# Patient Record
Sex: MALE | Race: WHITE | NOT HISPANIC OR LATINO | Employment: OTHER | URBAN - METROPOLITAN AREA
[De-identification: names, ages, dates, MRNs, and addresses within clinical notes are randomized per-mention and may not be internally consistent; named-entity substitution may affect disease eponyms.]

---

## 2021-04-12 DIAGNOSIS — Z76.82 ORGAN TRANSPLANT CANDIDATE: Primary | ICD-10-CM

## 2021-04-14 ENCOUNTER — TELEPHONE (OUTPATIENT)
Dept: TRANSPLANT | Facility: CLINIC | Age: 61
End: 2021-04-14

## 2021-05-03 DIAGNOSIS — Z76.82 ORGAN TRANSPLANT CANDIDATE: Primary | ICD-10-CM

## 2021-05-19 ENCOUNTER — TELEPHONE (OUTPATIENT)
Dept: TRANSPLANT | Facility: CLINIC | Age: 61
End: 2021-05-19

## 2021-05-24 ENCOUNTER — TELEPHONE (OUTPATIENT)
Dept: TRANSPLANT | Facility: CLINIC | Age: 61
End: 2021-05-24

## 2021-05-25 ENCOUNTER — HOSPITAL ENCOUNTER (OUTPATIENT)
Dept: RADIOLOGY | Facility: HOSPITAL | Age: 61
Discharge: HOME | End: 2021-05-25
Attending: NURSE PRACTITIONER
Payer: COMMERCIAL

## 2021-05-25 ENCOUNTER — OFFICE VISIT (OUTPATIENT)
Dept: TRANSPLANT | Facility: CLINIC | Age: 61
End: 2021-05-25
Payer: COMMERCIAL

## 2021-05-25 VITALS
SYSTOLIC BLOOD PRESSURE: 132 MMHG | BODY MASS INDEX: 32.28 KG/M2 | HEART RATE: 96 BPM | RESPIRATION RATE: 18 BRPM | DIASTOLIC BLOOD PRESSURE: 84 MMHG | TEMPERATURE: 99 F | HEIGHT: 67 IN | OXYGEN SATURATION: 98 % | WEIGHT: 205.69 LBS

## 2021-05-25 DIAGNOSIS — E66.01 CLASS 2 SEVERE OBESITY DUE TO EXCESS CALORIES WITH SERIOUS COMORBIDITY IN ADULT, UNSPECIFIED BMI: ICD-10-CM

## 2021-05-25 DIAGNOSIS — I15.0 RENOVASCULAR HYPERTENSION: ICD-10-CM

## 2021-05-25 DIAGNOSIS — Z76.82 ORGAN TRANSPLANT CANDIDATE: ICD-10-CM

## 2021-05-25 DIAGNOSIS — Z01.818 PRE-TRANSPLANT EVALUATION FOR CHRONIC KIDNEY DISEASE: Primary | ICD-10-CM

## 2021-05-25 DIAGNOSIS — N02.B9 IGA NEPHROPATHY: ICD-10-CM

## 2021-05-25 DIAGNOSIS — N18.4 CKD (CHRONIC KIDNEY DISEASE), STAGE IV: ICD-10-CM

## 2021-05-25 DIAGNOSIS — R73.03 PRE-DIABETES: ICD-10-CM

## 2021-05-25 DIAGNOSIS — Z79.60 LONG-TERM USE OF IMMUNOSUPPRESSANT MEDICATION: ICD-10-CM

## 2021-05-25 DIAGNOSIS — E78.49 OTHER HYPERLIPIDEMIA: ICD-10-CM

## 2021-05-25 PROCEDURE — 76770 US RETROPERITONEAL COMPLETE: ICD-10-PCS | Mod: 26,TXP,, | Performed by: RADIOLOGY

## 2021-05-25 PROCEDURE — 99999 PR PBB SHADOW E&M-EST. PATIENT-LVL IV: CPT | Mod: PBBFAC,TXP,, | Performed by: NURSE PRACTITIONER

## 2021-05-25 PROCEDURE — 97802 PR MED NUTR THER, 1ST, INDIV, EA 15 MIN: ICD-10-PCS | Mod: S$GLB,TXP,, | Performed by: DIETITIAN, REGISTERED

## 2021-05-25 PROCEDURE — 97802 MEDICAL NUTRITION INDIV IN: CPT | Mod: S$GLB,TXP,, | Performed by: DIETITIAN, REGISTERED

## 2021-05-25 PROCEDURE — 72170 X-RAY EXAM OF PELVIS: CPT | Mod: TC,TXP

## 2021-05-25 PROCEDURE — 99999 PR PBB SHADOW E&M-EST. PATIENT-LVL IV: ICD-10-PCS | Mod: PBBFAC,TXP,, | Performed by: NURSE PRACTITIONER

## 2021-05-25 PROCEDURE — 99203 OFFICE O/P NEW LOW 30 MIN: CPT | Mod: S$GLB,TXP,, | Performed by: TRANSPLANT SURGERY

## 2021-05-25 PROCEDURE — 1126F AMNT PAIN NOTED NONE PRSNT: CPT | Mod: S$GLB,TXP,, | Performed by: NURSE PRACTITIONER

## 2021-05-25 PROCEDURE — 99205 OFFICE O/P NEW HI 60 MIN: CPT | Mod: S$GLB,TXP,, | Performed by: NURSE PRACTITIONER

## 2021-05-25 PROCEDURE — 72170 XR PELVIS ROUTINE AP: ICD-10-PCS | Mod: 26,TXP,, | Performed by: RADIOLOGY

## 2021-05-25 PROCEDURE — 72170 X-RAY EXAM OF PELVIS: CPT | Mod: 26,TXP,, | Performed by: RADIOLOGY

## 2021-05-25 PROCEDURE — 99205 PR OFFICE/OUTPT VISIT, NEW, LEVL V, 60-74 MIN: ICD-10-PCS | Mod: S$GLB,TXP,, | Performed by: NURSE PRACTITIONER

## 2021-05-25 PROCEDURE — 71046 XR CHEST PA AND LATERAL: ICD-10-PCS | Mod: 26,TXP,, | Performed by: RADIOLOGY

## 2021-05-25 PROCEDURE — 71046 X-RAY EXAM CHEST 2 VIEWS: CPT | Mod: TC,TXP

## 2021-05-25 PROCEDURE — 76770 US EXAM ABDO BACK WALL COMP: CPT | Mod: TC,TXP

## 2021-05-25 PROCEDURE — 99203 PR OFFICE/OUTPT VISIT, NEW, LEVL III, 30-44 MIN: ICD-10-PCS | Mod: S$GLB,TXP,, | Performed by: TRANSPLANT SURGERY

## 2021-05-25 PROCEDURE — 1126F PR PAIN SEVERITY QUANTIFIED, NO PAIN PRESENT: ICD-10-PCS | Mod: S$GLB,TXP,, | Performed by: NURSE PRACTITIONER

## 2021-05-25 PROCEDURE — 76770 US EXAM ABDO BACK WALL COMP: CPT | Mod: 26,TXP,, | Performed by: RADIOLOGY

## 2021-05-25 PROCEDURE — 71046 X-RAY EXAM CHEST 2 VIEWS: CPT | Mod: 26,TXP,, | Performed by: RADIOLOGY

## 2021-05-25 RX ORDER — LOSARTAN POTASSIUM 100 MG/1
100 TABLET ORAL DAILY
Status: ON HOLD | COMMUNITY
Start: 2021-05-08 | End: 2024-03-12 | Stop reason: HOSPADM

## 2021-05-25 RX ORDER — ATORVASTATIN CALCIUM 40 MG/1
1 TABLET, FILM COATED ORAL NIGHTLY
COMMUNITY
Start: 2021-05-13

## 2021-05-25 RX ORDER — OMEPRAZOLE 20 MG/1
1 CAPSULE, DELAYED RELEASE ORAL DAILY
COMMUNITY
Start: 2021-03-29 | End: 2024-02-28

## 2021-05-25 RX ORDER — MYCOPHENOLATE MOFETIL 500 MG/1
500 TABLET ORAL 2 TIMES DAILY
Status: ON HOLD | COMMUNITY
Start: 2021-05-16 | End: 2024-03-12 | Stop reason: HOSPADM

## 2021-05-25 RX ORDER — ASPIRIN 81 MG/1
81 TABLET ORAL DAILY
Status: ON HOLD | COMMUNITY
End: 2024-03-13

## 2021-06-09 ENCOUNTER — TELEPHONE (OUTPATIENT)
Dept: TRANSPLANT | Facility: CLINIC | Age: 61
End: 2021-06-09

## 2021-06-15 ENCOUNTER — TELEPHONE (OUTPATIENT)
Dept: TRANSPLANT | Facility: CLINIC | Age: 61
End: 2021-06-15

## 2021-06-15 DIAGNOSIS — Z76.82 ORGAN TRANSPLANT CANDIDATE: Primary | ICD-10-CM

## 2021-07-06 ENCOUNTER — TELEPHONE (OUTPATIENT)
Dept: CARDIOLOGY | Facility: CLINIC | Age: 61
End: 2021-07-06

## 2021-07-07 ENCOUNTER — HOSPITAL ENCOUNTER (OUTPATIENT)
Dept: CARDIOLOGY | Facility: HOSPITAL | Age: 61
Discharge: HOME | End: 2021-07-07
Attending: NURSE PRACTITIONER
Payer: COMMERCIAL

## 2021-07-07 ENCOUNTER — OFFICE VISIT (OUTPATIENT)
Dept: INFECTIOUS DISEASES | Facility: CLINIC | Age: 61
End: 2021-07-07
Attending: NURSE PRACTITIONER
Payer: COMMERCIAL

## 2021-07-07 ENCOUNTER — OFFICE VISIT (OUTPATIENT)
Dept: CARDIOLOGY | Facility: CLINIC | Age: 61
End: 2021-07-07
Attending: NURSE PRACTITIONER
Payer: COMMERCIAL

## 2021-07-07 VITALS — HEIGHT: 67 IN | WEIGHT: 205 LBS | BODY MASS INDEX: 32.18 KG/M2

## 2021-07-07 VITALS
HEIGHT: 67 IN | SYSTOLIC BLOOD PRESSURE: 140 MMHG | WEIGHT: 207.25 LBS | HEART RATE: 85 BPM | BODY MASS INDEX: 32.53 KG/M2 | DIASTOLIC BLOOD PRESSURE: 83 MMHG

## 2021-07-07 VITALS
DIASTOLIC BLOOD PRESSURE: 78 MMHG | WEIGHT: 205 LBS | HEIGHT: 67 IN | SYSTOLIC BLOOD PRESSURE: 138 MMHG | BODY MASS INDEX: 32.18 KG/M2 | HEART RATE: 68 BPM

## 2021-07-07 VITALS
HEART RATE: 97 BPM | SYSTOLIC BLOOD PRESSURE: 134 MMHG | TEMPERATURE: 98 F | BODY MASS INDEX: 32.53 KG/M2 | HEIGHT: 67 IN | DIASTOLIC BLOOD PRESSURE: 74 MMHG | WEIGHT: 207.25 LBS

## 2021-07-07 DIAGNOSIS — B78.9 STRONGYLOIDES STERCORALIS INFECTION: ICD-10-CM

## 2021-07-07 DIAGNOSIS — Z76.82 ORGAN TRANSPLANT CANDIDATE: Primary | ICD-10-CM

## 2021-07-07 DIAGNOSIS — Z76.82 ORGAN TRANSPLANT CANDIDATE: ICD-10-CM

## 2021-07-07 DIAGNOSIS — E78.5 HYPERLIPIDEMIA, UNSPECIFIED HYPERLIPIDEMIA TYPE: ICD-10-CM

## 2021-07-07 DIAGNOSIS — I10 HYPERTENSION, UNSPECIFIED TYPE: ICD-10-CM

## 2021-07-07 DIAGNOSIS — I63.9 CEREBROVASCULAR ACCIDENT (CVA), UNSPECIFIED MECHANISM: ICD-10-CM

## 2021-07-07 DIAGNOSIS — Z01.818 PRE-TRANSPLANT EVALUATION FOR END STAGE RENAL DISEASE: ICD-10-CM

## 2021-07-07 DIAGNOSIS — Z23 NEED FOR VACCINATION AGAINST STREPTOCOCCUS PNEUMONIAE: ICD-10-CM

## 2021-07-07 DIAGNOSIS — D84.9 IMMUNOSUPPRESSION: ICD-10-CM

## 2021-07-07 DIAGNOSIS — Z23 NEED FOR VACCINATION WITH TWINRIX: ICD-10-CM

## 2021-07-07 DIAGNOSIS — Z23 NEED FOR ZOSTER VACCINATION: ICD-10-CM

## 2021-07-07 PROBLEM — N04.8: Status: ACTIVE | Noted: 2021-02-05

## 2021-07-07 PROBLEM — N02.B9 IGA NEPHROPATHY: Status: ACTIVE | Noted: 2019-12-24

## 2021-07-07 PROBLEM — I12.9 HYPERTENSIVE CHRONIC KIDNEY DISEASE WITH STAGE 1 THROUGH STAGE 4 CHRONIC KIDNEY DISEASE, OR UNSPECIFIED CHRONIC KIDNEY DISEASE: Status: ACTIVE | Noted: 2021-02-08

## 2021-07-07 LAB
ASCENDING AORTA: 2.9 CM
AV INDEX (PROSTH): 0.64
AV MEAN GRADIENT: 8 MMHG
AV PEAK GRADIENT: 16 MMHG
AV VALVE AREA: 1.97 CM2
AV VELOCITY RATIO: 0.63
BSA FOR ECHO PROCEDURE: 2.1 M2
CV ECHO LV RWT: 0.38 CM
CV PHARM DOSE: 0.4 MG
CV STRESS BASE HR: 61 BPM
DIASTOLIC BLOOD PRESSURE: 75 MMHG
DOP CALC AO PEAK VEL: 1.98 M/S
DOP CALC AO VTI: 33.44 CM
DOP CALC LVOT AREA: 3.1 CM2
DOP CALC LVOT DIAMETER: 1.98 CM
DOP CALC LVOT PEAK VEL: 1.24 M/S
DOP CALC LVOT STROKE VOLUME: 65.8 CM3
DOP CALCLVOT PEAK VEL VTI: 21.38 CM
E WAVE DECELERATION TIME: 264.84 MSEC
E/A RATIO: 0.71
E/E' RATIO: 9.33 M/S
ECHO LV POSTERIOR WALL: 0.95 CM (ref 0.6–1.1)
EJECTION FRACTION- HIGH: 65 %
EJECTION FRACTION: 65 %
END DIASTOLIC INDEX-HIGH: 153 ML/M2
END DIASTOLIC INDEX-LOW: 93 ML/M2
END SYSTOLIC INDEX-HIGH: 71 ML/M2
END SYSTOLIC INDEX-LOW: 31 ML/M2
FRACTIONAL SHORTENING: 28 % (ref 28–44)
INTERVENTRICULAR SEPTUM: 0.97 CM (ref 0.6–1.1)
IVRT: 97.05 MSEC
LA MAJOR: 4.54 CM
LA MINOR: 4.36 CM
LA WIDTH: 3.34 CM
LEFT ATRIUM SIZE: 3.22 CM
LEFT ATRIUM VOLUME INDEX MOD: 11.9 ML/M2
LEFT ATRIUM VOLUME INDEX: 19.9 ML/M2
LEFT ATRIUM VOLUME MOD: 24.22 CM3
LEFT ATRIUM VOLUME: 40.66 CM3
LEFT INTERNAL DIMENSION IN SYSTOLE: 3.61 CM (ref 2.1–4)
LEFT VENTRICLE DIASTOLIC VOLUME INDEX: 57.39 ML/M2
LEFT VENTRICLE DIASTOLIC VOLUME: 117.08 ML
LEFT VENTRICLE MASS INDEX: 84 G/M2
LEFT VENTRICLE SYSTOLIC VOLUME INDEX: 26.9 ML/M2
LEFT VENTRICLE SYSTOLIC VOLUME: 54.83 ML
LEFT VENTRICULAR INTERNAL DIMENSION IN DIASTOLE: 4.98 CM (ref 3.5–6)
LEFT VENTRICULAR MASS: 171.16 G
LV LATERAL E/E' RATIO: 8.75 M/S
LV SEPTAL E/E' RATIO: 10 M/S
MV A" WAVE DURATION": 13.7 MSEC
MV PEAK A VEL: 0.99 M/S
MV PEAK E VEL: 0.7 M/S
MV STENOSIS PRESSURE HALF TIME: 76.8 MS
MV VALVE AREA P 1/2 METHOD: 2.86 CM2
NUC REST DIASTOLIC VOLUME INDEX: 91
NUC REST EJECTION FRACTION: 52
NUC REST SYSTOLIC VOLUME INDEX: 44
NUC STRESS DIASTOLIC VOLUME INDEX: 70
NUC STRESS EJECTION FRACTION: 70 %
NUC STRESS SYSTOLIC VOLUME INDEX: 21
OHS CV CPX 85 PERCENT MAX PREDICTED HEART RATE MALE: 134
OHS CV CPX MAX PREDICTED HEART RATE: 158
OHS CV CPX PATIENT IS FEMALE: 0
OHS CV CPX PATIENT IS MALE: 1
OHS CV CPX PEAK DIASTOLIC BLOOD PRESSURE: 87 MMHG
OHS CV CPX PEAK HEAR RATE: 84 BPM
OHS CV CPX PEAK RATE PRESSURE PRODUCT: NORMAL
OHS CV CPX PEAK SYSTOLIC BLOOD PRESSURE: 150 MMHG
OHS CV CPX PERCENT MAX PREDICTED HEART RATE ACHIEVED: 53
OHS CV CPX RATE PRESSURE PRODUCT PRESENTING: 7564
PISA TR MAX VEL: 1.81 M/S
PULM VEIN S/D RATIO: 1.36
PV PEAK D VEL: 0.47 M/S
PV PEAK S VEL: 0.64 M/S
RA MAJOR: 4.56 CM
RA PRESSURE: 3 MMHG
RA WIDTH: 2.96 CM
RETIRED EF AND QEF - SEE NOTES: 53 %
RIGHT VENTRICULAR END-DIASTOLIC DIMENSION: 2.24 CM
RV TISSUE DOPPLER FREE WALL SYSTOLIC VELOCITY 1 (APICAL 4 CHAMBER VIEW): 16.67 CM/S
SINUS: 2.7 CM
STJ: 2.52 CM
SYSTOLIC BLOOD PRESSURE: 124 MMHG
TDI LATERAL: 0.08 M/S
TDI SEPTAL: 0.07 M/S
TDI: 0.08 M/S
TR MAX PG: 13 MMHG
TRICUSPID ANNULAR PLANE SYSTOLIC EXCURSION: 2.22 CM
TV REST PULMONARY ARTERY PRESSURE: 16 MMHG

## 2021-07-07 PROCEDURE — 78452 HT MUSCLE IMAGE SPECT MULT: CPT | Mod: 26,TXP,, | Performed by: INTERNAL MEDICINE

## 2021-07-07 PROCEDURE — 93306 TTE W/DOPPLER COMPLETE: CPT | Mod: 26,TXP,, | Performed by: INTERNAL MEDICINE

## 2021-07-07 PROCEDURE — 99204 OFFICE O/P NEW MOD 45 MIN: CPT | Mod: S$GLB,TXP,, | Performed by: STUDENT IN AN ORGANIZED HEALTH CARE EDUCATION/TRAINING PROGRAM

## 2021-07-07 PROCEDURE — 99999 PR PBB SHADOW E&M-EST. PATIENT-LVL III: ICD-10-PCS | Mod: PBBFAC,TXP,, | Performed by: INTERNAL MEDICINE

## 2021-07-07 PROCEDURE — 93306 TTE W/DOPPLER COMPLETE: CPT | Mod: TXP

## 2021-07-07 PROCEDURE — 99999 PR PBB SHADOW E&M-EST. PATIENT-LVL IV: CPT | Mod: PBBFAC,TXP,, | Performed by: STUDENT IN AN ORGANIZED HEALTH CARE EDUCATION/TRAINING PROGRAM

## 2021-07-07 PROCEDURE — 93016 STRESS TEST WITH MYOCARDIAL PERFUSION (CUPID ONLY): ICD-10-PCS | Mod: TXP,,, | Performed by: INTERNAL MEDICINE

## 2021-07-07 PROCEDURE — 99203 OFFICE O/P NEW LOW 30 MIN: CPT | Mod: S$GLB,TXP,, | Performed by: INTERNAL MEDICINE

## 2021-07-07 PROCEDURE — 93306 ECHO (CUPID ONLY): ICD-10-PCS | Mod: 26,TXP,, | Performed by: INTERNAL MEDICINE

## 2021-07-07 PROCEDURE — 99999 PR PBB SHADOW E&M-EST. PATIENT-LVL IV: ICD-10-PCS | Mod: PBBFAC,TXP,, | Performed by: STUDENT IN AN ORGANIZED HEALTH CARE EDUCATION/TRAINING PROGRAM

## 2021-07-07 PROCEDURE — 99204 PR OFFICE/OUTPT VISIT, NEW, LEVL IV, 45-59 MIN: ICD-10-PCS | Mod: S$GLB,TXP,, | Performed by: STUDENT IN AN ORGANIZED HEALTH CARE EDUCATION/TRAINING PROGRAM

## 2021-07-07 PROCEDURE — 93018 CV STRESS TEST I&R ONLY: CPT | Mod: TXP,,, | Performed by: INTERNAL MEDICINE

## 2021-07-07 PROCEDURE — 93018 STRESS TEST WITH MYOCARDIAL PERFUSION (CUPID ONLY): ICD-10-PCS | Mod: TXP,,, | Performed by: INTERNAL MEDICINE

## 2021-07-07 PROCEDURE — 93016 CV STRESS TEST SUPVJ ONLY: CPT | Mod: TXP,,, | Performed by: INTERNAL MEDICINE

## 2021-07-07 PROCEDURE — 78452 STRESS TEST WITH MYOCARDIAL PERFUSION (CUPID ONLY): ICD-10-PCS | Mod: 26,TXP,, | Performed by: INTERNAL MEDICINE

## 2021-07-07 PROCEDURE — 93017 CV STRESS TEST TRACING ONLY: CPT | Mod: TXP

## 2021-07-07 PROCEDURE — A9502 TC99M TETROFOSMIN: HCPCS | Mod: TXP

## 2021-07-07 PROCEDURE — 99203 PR OFFICE/OUTPT VISIT, NEW, LEVL III, 30-44 MIN: ICD-10-PCS | Mod: S$GLB,TXP,, | Performed by: INTERNAL MEDICINE

## 2021-07-07 PROCEDURE — 63600175 PHARM REV CODE 636 W HCPCS: Mod: TXP | Performed by: NURSE PRACTITIONER

## 2021-07-07 PROCEDURE — 99999 PR PBB SHADOW E&M-EST. PATIENT-LVL III: CPT | Mod: PBBFAC,TXP,, | Performed by: INTERNAL MEDICINE

## 2021-07-07 RX ORDER — REGADENOSON 0.08 MG/ML
0.4 INJECTION, SOLUTION INTRAVENOUS ONCE
Status: COMPLETED | OUTPATIENT
Start: 2021-07-07 | End: 2021-07-07

## 2021-07-07 RX ORDER — IVERMECTIN 3 MG/1
21 TABLET ORAL ONCE
Qty: 7 TABLET | Refills: 0 | Status: SHIPPED | OUTPATIENT
Start: 2021-07-07 | End: 2021-07-07

## 2021-07-07 RX ADMIN — REGADENOSON 0.4 MG: 0.08 INJECTION, SOLUTION INTRAVENOUS at 08:07

## 2021-07-22 ENCOUNTER — TELEPHONE (OUTPATIENT)
Dept: TRANSPLANT | Facility: CLINIC | Age: 61
End: 2021-07-22

## 2021-07-23 ENCOUNTER — TELEPHONE (OUTPATIENT)
Dept: TRANSPLANT | Facility: CLINIC | Age: 61
End: 2021-07-23

## 2021-09-13 ENCOUNTER — TELEPHONE (OUTPATIENT)
Dept: TRANSPLANT | Facility: CLINIC | Age: 61
End: 2021-09-13

## 2021-09-13 DIAGNOSIS — F19.90 DRUG USE: ICD-10-CM

## 2021-09-13 DIAGNOSIS — Z01.818 PRE-TRANSPLANT EVALUATION FOR CHRONIC KIDNEY DISEASE: Primary | ICD-10-CM

## 2021-09-15 ENCOUNTER — EPISODE CHANGES (OUTPATIENT)
Dept: TRANSPLANT | Facility: CLINIC | Age: 61
End: 2021-09-15

## 2021-09-15 ENCOUNTER — TELEPHONE (OUTPATIENT)
Dept: TRANSPLANT | Facility: CLINIC | Age: 61
End: 2021-09-15

## 2021-09-20 ENCOUNTER — SOCIAL WORK (OUTPATIENT)
Dept: TRANSPLANT | Facility: CLINIC | Age: 61
End: 2021-09-20

## 2021-09-24 ENCOUNTER — TELEPHONE (OUTPATIENT)
Dept: TRANSPLANT | Facility: CLINIC | Age: 61
End: 2021-09-24

## 2021-09-27 ENCOUNTER — TELEPHONE (OUTPATIENT)
Dept: TRANSPLANT | Facility: CLINIC | Age: 61
End: 2021-09-27

## 2021-10-08 ENCOUNTER — LAB VISIT (OUTPATIENT)
Dept: LAB | Facility: HOSPITAL | Age: 61
End: 2021-10-08
Payer: COMMERCIAL

## 2021-10-08 DIAGNOSIS — F19.90 DRUG USE: ICD-10-CM

## 2021-10-08 DIAGNOSIS — Z01.818 PRE-TRANSPLANT EVALUATION FOR CHRONIC KIDNEY DISEASE: ICD-10-CM

## 2021-10-08 PROCEDURE — 36415 COLL VENOUS BLD VENIPUNCTURE: CPT | Mod: TXP | Performed by: NURSE PRACTITIONER

## 2021-10-08 PROCEDURE — 80307 DRUG TEST PRSMV CHEM ANLYZR: CPT | Mod: TXP | Performed by: NURSE PRACTITIONER

## 2021-10-11 LAB
AMPHETAMINES SERPL QL: NEGATIVE
BARBITURATES SERPL QL SCN: NEGATIVE
BENZODIAZ SERPL QL SCN: NEGATIVE
BZE SERPL QL: NEGATIVE
CARBOXYTHC SERPL QL SCN: NEGATIVE
ETHANOL SERPL QL SCN: NEGATIVE
METHADONE SERPL QL SCN: NEGATIVE
OPIATES SERPL QL SCN: NEGATIVE
PCP SERPL QL SCN: NEGATIVE
PROPOXYPH SERPL QL: NEGATIVE

## 2021-10-18 ENCOUNTER — SOCIAL WORK (OUTPATIENT)
Dept: TRANSPLANT | Facility: CLINIC | Age: 61
End: 2021-10-18

## 2021-10-27 ENCOUNTER — TELEPHONE (OUTPATIENT)
Dept: TRANSPLANT | Facility: CLINIC | Age: 61
End: 2021-10-27

## 2021-11-04 ENCOUNTER — SOCIAL WORK (OUTPATIENT)
Dept: TRANSPLANT | Facility: CLINIC | Age: 61
End: 2021-11-04

## 2021-11-05 ENCOUNTER — COMMITTEE REVIEW (OUTPATIENT)
Dept: TRANSPLANT | Facility: CLINIC | Age: 61
End: 2021-11-05

## 2021-11-08 ENCOUNTER — TELEPHONE (OUTPATIENT)
Dept: TRANSPLANT | Facility: CLINIC | Age: 61
End: 2021-11-08

## 2021-11-08 DIAGNOSIS — Z76.82 ORGAN TRANSPLANT CANDIDATE: Primary | ICD-10-CM

## 2021-11-09 DIAGNOSIS — Z76.82 ORGAN TRANSPLANT CANDIDATE: Primary | ICD-10-CM

## 2021-11-10 ENCOUNTER — TELEPHONE (OUTPATIENT)
Dept: TRANSPLANT | Facility: CLINIC | Age: 61
End: 2021-11-10

## 2021-11-18 ENCOUNTER — LAB VISIT (OUTPATIENT)
Dept: LAB | Facility: HOSPITAL | Age: 61
End: 2021-11-18
Attending: NURSE PRACTITIONER
Payer: COMMERCIAL

## 2021-11-18 DIAGNOSIS — Z76.82 ORGAN TRANSPLANT CANDIDATE: ICD-10-CM

## 2021-11-18 PROCEDURE — 86833 HLA CLASS II HIGH DEFIN QUAL: CPT | Mod: PO,TXP | Performed by: NURSE PRACTITIONER

## 2021-11-18 PROCEDURE — 86977 RBC SERUM PRETX INCUBJ/INHIB: CPT | Mod: PO,TXP | Performed by: NURSE PRACTITIONER

## 2021-11-18 PROCEDURE — 99001 SPECIMEN HANDLING PT-LAB: CPT | Mod: TXP | Performed by: NURSE PRACTITIONER

## 2021-11-18 PROCEDURE — 86832 HLA CLASS I HIGH DEFIN QUAL: CPT | Mod: PO,TXP | Performed by: NURSE PRACTITIONER

## 2021-11-24 ENCOUNTER — LAB VISIT (OUTPATIENT)
Dept: LAB | Facility: HOSPITAL | Age: 61
End: 2021-11-24
Payer: COMMERCIAL

## 2021-11-24 DIAGNOSIS — Z76.82 ORGAN TRANSPLANT CANDIDATE: ICD-10-CM

## 2021-12-13 LAB
HLA FREEZE AND HOLD INTERPRETATION: NORMAL
HLAFH COLLECTION DATE: NORMAL
HPRA INTERPRETATION: NORMAL
HPRA INTERPRETATION: NORMAL

## 2021-12-21 ENCOUNTER — LAB VISIT (OUTPATIENT)
Dept: LAB | Facility: HOSPITAL | Age: 61
End: 2021-12-21
Payer: COMMERCIAL

## 2021-12-21 DIAGNOSIS — Z76.82 ORGAN TRANSPLANT CANDIDATE: ICD-10-CM

## 2021-12-22 LAB
CLASS I ANTIBODIES - LUMINEX: NORMAL
CLASS I ANTIBODY COMMENTS - LUMINEX: NORMAL
CLASS I ANTIBODY COMMENTS - LUMINEX: NORMAL
CLASS II ANTIBODY COMMENTS - LUMINEX: NORMAL
CLASS II ANTIBODY COMMENTS - LUMINEX: NORMAL
CPRA %: 1
SERUM COLLECTION DT - LUMINEX CLASS I: NORMAL
SERUM COLLECTION DT - LUMINEX CLASS I: NORMAL
SERUM COLLECTION DT - LUMINEX CLASS II: NORMAL
SERUM COLLECTION DT - LUMINEX CLASS II: NORMAL
SPCL1 TESTING DATE: NORMAL
SPCL1 TESTING DATE: NORMAL
SPCL2 TESTING DATE: NORMAL
SPCL2 TESTING DATE: NORMAL
SPCLU TESTING DATE: NORMAL
SPLUA TESTING DATE: NORMAL

## 2022-01-12 PROCEDURE — 86833 HLA CLASS II HIGH DEFIN QUAL: CPT | Mod: PO,TXP | Performed by: NURSE PRACTITIONER

## 2022-01-12 PROCEDURE — 86832 HLA CLASS I HIGH DEFIN QUAL: CPT | Mod: PO,TXP | Performed by: NURSE PRACTITIONER

## 2022-01-12 PROCEDURE — 86977 RBC SERUM PRETX INCUBJ/INHIB: CPT | Mod: PO,TXP | Performed by: NURSE PRACTITIONER

## 2022-01-18 ENCOUNTER — LAB VISIT (OUTPATIENT)
Dept: LAB | Facility: HOSPITAL | Age: 62
End: 2022-01-18
Payer: COMMERCIAL

## 2022-01-18 DIAGNOSIS — Z76.82 ORGAN TRANSPLANT CANDIDATE: ICD-10-CM

## 2022-01-24 PROCEDURE — 99001 SPECIMEN HANDLING PT-LAB: CPT | Mod: PO,TXP | Performed by: NURSE PRACTITIONER

## 2022-01-25 LAB
HLA FREEZE AND HOLD INTERPRETATION: NORMAL
HLAFH COLLECTION DATE: NORMAL
HPRA INTERPRETATION: NORMAL

## 2022-02-09 LAB — HPRA INTERPRETATION: NORMAL

## 2022-02-13 PROCEDURE — 99001 SPECIMEN HANDLING PT-LAB: CPT | Mod: PO,TXP | Performed by: NURSE PRACTITIONER

## 2022-02-18 LAB
CLASS I ANTIBODIES - LUMINEX: NORMAL
CLASS I ANTIBODY COMMENTS - LUMINEX: NORMAL
CLASS II ANTIBODY COMMENTS - LUMINEX: NORMAL
CPRA %: 0
CPRA %: 1
CPRA %: 1
SERUM COLLECTION DT - LUMINEX CLASS I: NORMAL
SERUM COLLECTION DT - LUMINEX CLASS II: NORMAL
SPCL1 TESTING DATE: NORMAL
SPCL2 TESTING DATE: NORMAL
SPLUA TESTING DATE: NORMAL

## 2022-02-22 ENCOUNTER — LAB VISIT (OUTPATIENT)
Dept: LAB | Facility: HOSPITAL | Age: 62
End: 2022-02-22
Payer: COMMERCIAL

## 2022-02-22 DIAGNOSIS — Z76.82 ORGAN TRANSPLANT CANDIDATE: ICD-10-CM

## 2022-03-14 PROCEDURE — 86833 HLA CLASS II HIGH DEFIN QUAL: CPT | Mod: PO,TXP | Performed by: NURSE PRACTITIONER

## 2022-03-14 PROCEDURE — 86832 HLA CLASS I HIGH DEFIN QUAL: CPT | Mod: PO,TXP | Performed by: NURSE PRACTITIONER

## 2022-03-18 ENCOUNTER — LAB VISIT (OUTPATIENT)
Dept: LAB | Facility: HOSPITAL | Age: 62
End: 2022-03-18
Payer: COMMERCIAL

## 2022-03-18 DIAGNOSIS — Z76.82 ORGAN TRANSPLANT CANDIDATE: ICD-10-CM

## 2022-03-31 LAB — HPRA INTERPRETATION: NORMAL

## 2022-04-12 PROCEDURE — 99001 SPECIMEN HANDLING PT-LAB: CPT | Mod: PO,TXP | Performed by: NURSE PRACTITIONER

## 2022-04-19 ENCOUNTER — LAB VISIT (OUTPATIENT)
Dept: LAB | Facility: HOSPITAL | Age: 62
End: 2022-04-19
Payer: COMMERCIAL

## 2022-04-19 DIAGNOSIS — Z76.82 ORGAN TRANSPLANT CANDIDATE: ICD-10-CM

## 2022-05-12 ENCOUNTER — EPISODE CHANGES (OUTPATIENT)
Dept: TRANSPLANT | Facility: CLINIC | Age: 62
End: 2022-05-12

## 2022-05-13 LAB
CLASS I ANTIBODIES - LUMINEX: NORMAL
CLASS I ANTIBODY COMMENTS - LUMINEX: NORMAL
CLASS II ANTIBODIES - LUMINEX: NORMAL
CLASS II ANTIBODY COMMENTS - LUMINEX: NORMAL
CPRA %: 1
SERUM COLLECTION DT - LUMINEX CLASS I: NORMAL
SERUM COLLECTION DT - LUMINEX CLASS II: NORMAL
SPCL1 TESTING DATE: NORMAL
SPCL2 TESTING DATE: NORMAL
SPCLU TESTING DATE: NORMAL

## 2022-05-14 PROCEDURE — 86833 HLA CLASS II HIGH DEFIN QUAL: CPT | Mod: PO,TXP | Performed by: NURSE PRACTITIONER

## 2022-05-14 PROCEDURE — 86832 HLA CLASS I HIGH DEFIN QUAL: CPT | Mod: PO,TXP | Performed by: NURSE PRACTITIONER

## 2022-05-16 ENCOUNTER — TELEPHONE (OUTPATIENT)
Dept: TRANSPLANT | Facility: CLINIC | Age: 62
End: 2022-05-16
Payer: COMMERCIAL

## 2022-05-17 ENCOUNTER — LAB VISIT (OUTPATIENT)
Dept: LAB | Facility: HOSPITAL | Age: 62
End: 2022-05-17
Payer: COMMERCIAL

## 2022-05-17 DIAGNOSIS — Z76.82 ORGAN TRANSPLANT CANDIDATE: ICD-10-CM

## 2022-06-14 LAB — HPRA INTERPRETATION: NORMAL

## 2022-06-14 PROCEDURE — 99001 SPECIMEN HANDLING PT-LAB: CPT | Mod: PO,TXP | Performed by: NURSE PRACTITIONER

## 2022-06-21 ENCOUNTER — LAB VISIT (OUTPATIENT)
Dept: LAB | Facility: HOSPITAL | Age: 62
End: 2022-06-21
Payer: COMMERCIAL

## 2022-06-21 DIAGNOSIS — Z76.82 ORGAN TRANSPLANT CANDIDATE: ICD-10-CM

## 2022-07-12 PROCEDURE — 86833 HLA CLASS II HIGH DEFIN QUAL: CPT | Mod: PO,TXP | Performed by: NURSE PRACTITIONER

## 2022-07-12 PROCEDURE — 86832 HLA CLASS I HIGH DEFIN QUAL: CPT | Mod: PO,TXP | Performed by: NURSE PRACTITIONER

## 2022-07-15 ENCOUNTER — LAB VISIT (OUTPATIENT)
Dept: LAB | Facility: HOSPITAL | Age: 62
End: 2022-07-15
Payer: COMMERCIAL

## 2022-07-15 DIAGNOSIS — Z76.82 ORGAN TRANSPLANT CANDIDATE: ICD-10-CM

## 2022-07-25 LAB — HPRA INTERPRETATION: NORMAL

## 2022-08-15 PROCEDURE — 99001 SPECIMEN HANDLING PT-LAB: CPT | Mod: PO,TXP | Performed by: NURSE PRACTITIONER

## 2022-08-19 ENCOUNTER — LAB VISIT (OUTPATIENT)
Dept: LAB | Facility: HOSPITAL | Age: 62
End: 2022-08-19
Payer: COMMERCIAL

## 2022-08-19 DIAGNOSIS — Z76.82 ORGAN TRANSPLANT CANDIDATE: ICD-10-CM

## 2022-08-26 LAB
CLASS I ANTIBODY COMMENTS - LUMINEX: NORMAL
CLASS II ANTIBODY COMMENTS - LUMINEX: NORMAL
CPRA %: 0
SERUM COLLECTION DT - LUMINEX CLASS I: NORMAL
SERUM COLLECTION DT - LUMINEX CLASS II: NORMAL
SPCL1 TESTING DATE: NORMAL
SPCL2 TESTING DATE: NORMAL
SPCLU TESTING DATE: NORMAL

## 2022-09-13 PROCEDURE — 86832 HLA CLASS I HIGH DEFIN QUAL: CPT | Mod: PO,TXP | Performed by: NURSE PRACTITIONER

## 2022-09-13 PROCEDURE — 86833 HLA CLASS II HIGH DEFIN QUAL: CPT | Mod: PO,TXP | Performed by: NURSE PRACTITIONER

## 2022-09-16 ENCOUNTER — LAB VISIT (OUTPATIENT)
Dept: LAB | Facility: HOSPITAL | Age: 62
End: 2022-09-16
Payer: COMMERCIAL

## 2022-09-16 DIAGNOSIS — Z76.82 ORGAN TRANSPLANT CANDIDATE: ICD-10-CM

## 2022-09-22 LAB — HPRA INTERPRETATION: NORMAL

## 2022-09-28 LAB
CLASS I ANTIBODIES - LUMINEX: NORMAL
CLASS I ANTIBODY COMMENTS - LUMINEX: NORMAL
CLASS II ANTIBODY COMMENTS - LUMINEX: NORMAL
CPRA %: 1
SERUM COLLECTION DT - LUMINEX CLASS I: NORMAL
SERUM COLLECTION DT - LUMINEX CLASS II: NORMAL
SPCL1 TESTING DATE: NORMAL
SPCL2 TESTING DATE: NORMAL
SPCLU TESTING DATE: NORMAL

## 2022-10-11 PROCEDURE — 99001 SPECIMEN HANDLING PT-LAB: CPT | Mod: PO,TXP | Performed by: NURSE PRACTITIONER

## 2022-10-18 ENCOUNTER — LAB VISIT (OUTPATIENT)
Dept: LAB | Facility: HOSPITAL | Age: 62
End: 2022-10-18
Payer: COMMERCIAL

## 2022-10-18 DIAGNOSIS — Z76.82 ORGAN TRANSPLANT CANDIDATE: ICD-10-CM

## 2022-10-21 ENCOUNTER — TELEPHONE (OUTPATIENT)
Dept: TRANSPLANT | Facility: CLINIC | Age: 62
End: 2022-10-21
Payer: COMMERCIAL

## 2022-10-21 DIAGNOSIS — Z76.82 ORGAN TRANSPLANT CANDIDATE: Primary | ICD-10-CM

## 2022-10-21 NOTE — PROGRESS NOTES
YEARLY LIST MANAGEMENT NOTE    Colten Monet's kidney transplant listing status reviewed.  Patient is due for follow-up appointments in December 2022.  Appointments will be scheduled per protocol.  HLA sample is current and being rec'd on a regular basis.

## 2022-10-24 ENCOUNTER — TELEPHONE (OUTPATIENT)
Dept: TRANSPLANT | Facility: CLINIC | Age: 62
End: 2022-10-24
Payer: COMMERCIAL

## 2022-11-14 PROCEDURE — 86832 HLA CLASS I HIGH DEFIN QUAL: CPT | Mod: PO,TXP | Performed by: NURSE PRACTITIONER

## 2022-11-14 PROCEDURE — 86833 HLA CLASS II HIGH DEFIN QUAL: CPT | Mod: PO,TXP | Performed by: NURSE PRACTITIONER

## 2022-11-15 ENCOUNTER — LAB VISIT (OUTPATIENT)
Dept: LAB | Facility: HOSPITAL | Age: 62
End: 2022-11-15
Payer: COMMERCIAL

## 2022-11-15 DIAGNOSIS — Z76.82 ORGAN TRANSPLANT CANDIDATE: ICD-10-CM

## 2022-11-29 ENCOUNTER — HOSPITAL ENCOUNTER (OUTPATIENT)
Dept: RADIOLOGY | Facility: HOSPITAL | Age: 62
Discharge: HOME | End: 2022-11-29
Attending: NURSE PRACTITIONER
Payer: COMMERCIAL

## 2022-11-29 ENCOUNTER — HOSPITAL ENCOUNTER (OUTPATIENT)
Dept: CARDIOLOGY | Facility: HOSPITAL | Age: 62
Discharge: HOME | End: 2022-11-29
Attending: NURSE PRACTITIONER
Payer: COMMERCIAL

## 2022-11-29 ENCOUNTER — OFFICE VISIT (OUTPATIENT)
Dept: TRANSPLANT | Facility: CLINIC | Age: 62
End: 2022-11-29
Payer: COMMERCIAL

## 2022-11-29 VITALS
RESPIRATION RATE: 18 BRPM | DIASTOLIC BLOOD PRESSURE: 86 MMHG | HEART RATE: 92 BPM | BODY MASS INDEX: 33.67 KG/M2 | HEIGHT: 67 IN | OXYGEN SATURATION: 100 % | SYSTOLIC BLOOD PRESSURE: 154 MMHG | TEMPERATURE: 97 F | WEIGHT: 214.5 LBS

## 2022-11-29 VITALS
WEIGHT: 207 LBS | BODY MASS INDEX: 32.49 KG/M2 | DIASTOLIC BLOOD PRESSURE: 76 MMHG | HEIGHT: 67 IN | SYSTOLIC BLOOD PRESSURE: 144 MMHG | HEART RATE: 76 BPM

## 2022-11-29 DIAGNOSIS — Z76.82 PATIENT ON WAITING LIST FOR KIDNEY TRANSPLANT: Primary | ICD-10-CM

## 2022-11-29 DIAGNOSIS — N02.B9 IGA NEPHROPATHY: ICD-10-CM

## 2022-11-29 DIAGNOSIS — I15.0 RENOVASCULAR HYPERTENSION: ICD-10-CM

## 2022-11-29 DIAGNOSIS — Z76.82 ORGAN TRANSPLANT CANDIDATE: ICD-10-CM

## 2022-11-29 DIAGNOSIS — D84.9 IMMUNOSUPPRESSION: ICD-10-CM

## 2022-11-29 DIAGNOSIS — N18.5 CKD (CHRONIC KIDNEY DISEASE), STAGE V: ICD-10-CM

## 2022-11-29 PROBLEM — Z01.818 PRE-TRANSPLANT EVALUATION FOR END STAGE RENAL DISEASE: Status: RESOLVED | Noted: 2021-07-07 | Resolved: 2022-11-29

## 2022-11-29 LAB
ASCENDING AORTA: 3.03 CM
AV INDEX (PROSTH): 0.74
AV MEAN GRADIENT: 4 MMHG
AV PEAK GRADIENT: 8 MMHG
AV VALVE AREA: 2.84 CM2
AV VELOCITY RATIO: 0.73
BSA FOR ECHO PROCEDURE: 2.11 M2
CV ECHO LV RWT: 0.41 CM
DOP CALC AO PEAK VEL: 1.42 M/S
DOP CALC AO VTI: 25.2 CM
DOP CALC LVOT AREA: 3.9 CM2
DOP CALC LVOT DIAMETER: 2.22 CM
DOP CALC LVOT PEAK VEL: 1.04 M/S
DOP CALC LVOT STROKE VOLUME: 71.69 CM3
DOP CALCLVOT PEAK VEL VTI: 18.53 CM
E WAVE DECELERATION TIME: 280.1 MSEC
E/A RATIO: 0.73
E/E' RATIO: 6.4 M/S
ECHO LV POSTERIOR WALL: 0.95 CM (ref 0.6–1.1)
EJECTION FRACTION: 60 %
FRACTIONAL SHORTENING: 26 % (ref 28–44)
INTERVENTRICULAR SEPTUM: 0.8 CM (ref 0.6–1.1)
LA MAJOR: 3.97 CM
LA MINOR: 4.18 CM
LA WIDTH: 2.96 CM
LEFT ATRIUM SIZE: 3.09 CM
LEFT ATRIUM VOLUME INDEX MOD: 13.6 ML/M2
LEFT ATRIUM VOLUME INDEX: 15.4 ML/M2
LEFT ATRIUM VOLUME MOD: 27.83 CM3
LEFT ATRIUM VOLUME: 31.66 CM3
LEFT INTERNAL DIMENSION IN SYSTOLE: 3.47 CM (ref 2.1–4)
LEFT VENTRICLE DIASTOLIC VOLUME INDEX: 49.73 ML/M2
LEFT VENTRICLE DIASTOLIC VOLUME: 101.95 ML
LEFT VENTRICLE MASS INDEX: 67 G/M2
LEFT VENTRICLE SYSTOLIC VOLUME INDEX: 24.3 ML/M2
LEFT VENTRICLE SYSTOLIC VOLUME: 49.74 ML
LEFT VENTRICULAR INTERNAL DIMENSION IN DIASTOLE: 4.69 CM (ref 3.5–6)
LEFT VENTRICULAR MASS: 136.99 G
LV LATERAL E/E' RATIO: 5.33 M/S
LV SEPTAL E/E' RATIO: 8 M/S
MV A" WAVE DURATION": 7.99 MSEC
MV PEAK A VEL: 0.66 M/S
MV PEAK E VEL: 0.48 M/S
MV STENOSIS PRESSURE HALF TIME: 81.23 MS
MV VALVE AREA P 1/2 METHOD: 2.71 CM2
PULM VEIN S/D RATIO: 1.75
PV PEAK D VEL: 0.32 M/S
PV PEAK S VEL: 0.56 M/S
RA MAJOR: 4.33 CM
RA PRESSURE: 3 MMHG
RA WIDTH: 3.02 CM
RIGHT VENTRICULAR END-DIASTOLIC DIMENSION: 2.58 CM
RV TISSUE DOPPLER FREE WALL SYSTOLIC VELOCITY 1 (APICAL 4 CHAMBER VIEW): 12.12 CM/S
SINUS: 3.06 CM
STJ: 2.5 CM
TDI LATERAL: 0.09 M/S
TDI SEPTAL: 0.06 M/S
TDI: 0.08 M/S
TRICUSPID ANNULAR PLANE SYSTOLIC EXCURSION: 1.87 CM

## 2022-11-29 PROCEDURE — 99999 PR PBB SHADOW E&M-EST. PATIENT-LVL IV: ICD-10-PCS | Mod: PBBFAC,TXP,, | Performed by: NURSE PRACTITIONER

## 2022-11-29 PROCEDURE — 71046 XR CHEST PA AND LATERAL: ICD-10-PCS | Mod: 26,TXP,, | Performed by: RADIOLOGY

## 2022-11-29 PROCEDURE — 93306 TTE W/DOPPLER COMPLETE: CPT | Mod: TXP

## 2022-11-29 PROCEDURE — 99999 PR PBB SHADOW E&M-EST. PATIENT-LVL IV: CPT | Mod: PBBFAC,TXP,, | Performed by: NURSE PRACTITIONER

## 2022-11-29 PROCEDURE — 71046 X-RAY EXAM CHEST 2 VIEWS: CPT | Mod: TC,TXP

## 2022-11-29 PROCEDURE — 93978 VASCULAR STUDY: CPT | Mod: 26,TXP,, | Performed by: INTERNAL MEDICINE

## 2022-11-29 PROCEDURE — 93978 US DOPP ILIACS BILATERAL: ICD-10-PCS | Mod: 26,TXP,, | Performed by: INTERNAL MEDICINE

## 2022-11-29 PROCEDURE — 99215 PR OFFICE/OUTPT VISIT, EST, LEVL V, 40-54 MIN: ICD-10-PCS | Mod: S$GLB,TXP,, | Performed by: NURSE PRACTITIONER

## 2022-11-29 PROCEDURE — 99215 OFFICE O/P EST HI 40 MIN: CPT | Mod: S$GLB,TXP,, | Performed by: NURSE PRACTITIONER

## 2022-11-29 PROCEDURE — 93306 TTE W/DOPPLER COMPLETE: CPT | Mod: 26,TXP,, | Performed by: INTERNAL MEDICINE

## 2022-11-29 PROCEDURE — 93978 VASCULAR STUDY: CPT | Mod: TC,TXP

## 2022-11-29 PROCEDURE — 93306 ECHO (CUPID ONLY): ICD-10-PCS | Mod: 26,TXP,, | Performed by: INTERNAL MEDICINE

## 2022-11-29 PROCEDURE — 71046 X-RAY EXAM CHEST 2 VIEWS: CPT | Mod: 26,TXP,, | Performed by: RADIOLOGY

## 2022-11-29 NOTE — PROGRESS NOTES
Kidney Transplant Recipient Reevalulation    Referring Physician: Colby Rene  Current Nephrologist: Colby Rene  Waitlist Status: active  Dialysis Start Date: (Not currently on dialysis)    Subjective:     CC:  Annual reassessment of kidney transplant candidacy.    HPI:  Mr. Monet is a 62 y.o. year old White male with advanced kidney disease secondary to IgA nephropathy.  He has been on the wait list for a kidney transplant at [facility] since 11/8/2021. Patient is currently pre-dialysis. He has a  no access .   Patient denies any recent hospitalizations or ED visits.  Fx assessment:   Continues to work, does construction work. Does not appear frail.     10/3/2022      Remains on  mg BID     Lab /diagnostic results/wk up  reviewed with patient today.   11/29/22 iliac doppler study : Triphasic bilaterally   11/29/22 CXR (-)  5/25/21 renal US: Chronic medical renal disease  5/25/22 PXR: No evidence of an unusually advanced degree of iliac arterial calcification in the pelvis      11/29/22 TTE  The left ventricle is normal in size with normal systolic function.  The estimated ejection fraction is 60%.  Normal left ventricular diastolic function.  Normal right ventricular size with normal right ventricular systolic function.  Normal central venous pressure (3 mmHg).    7/7/21 Lexiscan    Normal myocardial perfusion scan. There is no evidence of myocardial ischemia or infarction.    The gated perfusion images showed an ejection fraction of 52% at rest. The gated perfusion images showed an ejection fraction of 70% post stress. Normal ejection fraction is greater than 53%.    There is normal wall motion at rest and post stress.    LV cavity size is normal at rest and normal at stress.    The EKG portion of this study is negative for ischemia.    The patient reported no chest pain during the stress test.    There were no arrhythmias during stress.    There are no prior studies for comparison.     PSA, Screen (ng/mL)  "  Date Value   11/29/2022 2.5     colonoscopy/pathology      (Outside) 1/23/20: Misael: A single semi-pedunculated 1 cm polyp of malignant appearance was found in the mid rectum. A polypectomy was performed using a cold snare. The polyp was completely removed.     Polyp (1cm ) in the mild rectum  ) Polypectomy  Call in 1 week for biopsy results   Colonoscopy in 3 years     1/23/20: Final Diagnosis   Rectum: Tubular Adenomatous polyp.       Review of Systems   Constitutional:  Negative for activity change, appetite change, chills, fatigue, fever and unexpected weight change.   HENT:  Negative for congestion, facial swelling, postnasal drip, rhinorrhea, sinus pressure, sore throat and trouble swallowing.    Eyes:  Negative for pain, redness and visual disturbance.   Respiratory:  Negative for cough, chest tightness, shortness of breath and wheezing.    Cardiovascular:  Positive for leg swelling. Negative for chest pain and palpitations.   Gastrointestinal:  Positive for abdominal distention. Negative for abdominal pain, diarrhea, nausea and vomiting.   Genitourinary:  Negative for dysuria, flank pain and urgency.   Musculoskeletal:  Negative for gait problem, neck pain and neck stiffness.   Skin:  Negative for rash.   Allergic/Immunologic: Positive for immunocompromised state. Negative for environmental allergies and food allergies.   Neurological:  Negative for dizziness, weakness, light-headedness and headaches.   Psychiatric/Behavioral:  Negative for agitation and confusion. The patient is not nervous/anxious.      Objective:   body mass index is 33.67 kg/m².  BP (!) 154/86 (BP Location: Right arm, Patient Position: Sitting, BP Method: Medium (Automatic))   Pulse 92   Temp 97.3 °F (36.3 °C) (Temporal)   Resp 18   Ht 5' 6.93" (1.7 m)   Wt 97.3 kg (214 lb 8.1 oz)   SpO2 100%   BMI 33.67 kg/m²     Physical Exam  Vitals reviewed.   Constitutional:       Appearance: Normal appearance. He is well-developed. "   HENT:      Head: Normocephalic.   Eyes:      Pupils: Pupils are equal, round, and reactive to light.   Cardiovascular:      Rate and Rhythm: Normal rate and regular rhythm.      Heart sounds: Normal heart sounds.   Pulmonary:      Effort: Pulmonary effort is normal.      Breath sounds: Normal breath sounds.   Abdominal:      General: Bowel sounds are normal. There is distension.      Palpations: Abdomen is soft.   Musculoskeletal:         General: Normal range of motion.      Cervical back: Normal range of motion and neck supple.      Right lower leg: Edema present.      Left lower leg: Edema present.      Comments: Bilateral trace peripheral edema    Skin:     General: Skin is warm and dry.   Neurological:      Mental Status: He is alert and oriented to person, place, and time.      Motor: No abnormal muscle tone.   Psychiatric:         Behavior: Behavior normal.       Labs:  Lab Results   Component Value Date    WBC 9.81 05/25/2021    HGB 12.6 (L) 05/25/2021    HCT 42.0 05/25/2021     05/25/2021    K 5.7 (H) 05/25/2021     (H) 05/25/2021    CO2 21 (L) 05/25/2021    BUN 42 (H) 05/25/2021    CREATININE 3.3 (H) 05/25/2021    EGFRNONAA 19.0 (A) 05/25/2021    CALCIUM 9.3 05/25/2021    PHOS 3.0 05/25/2021    ALBUMIN 3.7 05/25/2021    AST 17 05/25/2021    ALT 16 05/25/2021    .0 (H) 05/25/2021       No results found for: PREALBUMIN, BILIRUBINUA, GGT, AMYLASE, LIPASE, PROTEINUA, NITRITE, RBCUA, WBCUA    No results found for: HLAABCTYPE    Lab Results   Component Value Date    CPRA 1 09/13/2022    FW4CVAF B48 09/13/2022    CIABCLM C07--UELZ CW17, CW18, CW9, CW12, CW5, CW16 09/13/2022    CIIAB DP20 05/14/2022    ABCMT WEAK DP20, DP10, DQ7, DR4, DR52 09/13/2022       Labs were reviewed with the patient.    Pre-transplant Workup:   Reviewed with the patient.    Assessment:     1. Patient on waiting list for kidney transplant    2. IgA nephropathy    3. Renovascular hypertension    4. Immunosuppression     5. CKD (chronic kidney disease), stage V        Plan:       Transplant Candidacy:   Mr. Monet is a suitable kidney transplant candidate.  Meets center eligibility for accepting HCV+ donor offer - yes.  Patient educated on HCV+ donors. Colten is willing  to accept HCV+ donor offer -  yes   Patient is a candidate for KDPI > 85 kidney donor offer - no d/t weight   He remains in overall stable health, and will remain active on the transplant list.    Patient advised that it is recommended that all transplant candidates, and their close contacts and household members receive Covid vaccination.    Florida Jennings NP       Follow-up:   In addition to the tests noted in the plan, Mr. Monet will continue to have reevaluation as per the standing pre-kidney transplant protocol:  Monthly blood for PRA  Annual return to clinic, except HIV positive, > 65 years of age, or pancreas transplant candidates who will be scheduled to see transplant every 6 months while in pre-transplant phase  Annual re-testing: CXR, EKG, yearly mammograms for women over 40 and PSA for males over 40, cardiology follow-up as recommended by initial cardiology pre-transplant evaluation  Renal ultrasound every 2 years  Baseline colonoscopy after age 50 and repeated as recommended    UNOS Patient Status  Functional Status: 60% - Requires occasional assistance but is able to care for needs  Physical Capacity: No Limitations

## 2022-11-29 NOTE — LETTER
December 1, 2022        Colby Rene  [address] [place] [address] [address]  [place] MILL[place] [address]  Phone: [phone]  Fax: [phone]             [facility] Transplant 1st Fl  [address]  [facility] [address]  Phone: [phone]   Patient: Colten Monet   MR Number: [id number]   YOB: 1960   Date of Visit: 11/29/2022       Dear Dr. Colby Rene    Thank you for referring Colten Monet to me for evaluation. Attached you will find relevant portions of my assessment and plan of care.    If you have questions, please do not hesitate to call me. I look forward to following Colten Monet along with you.    Sincerely,    Florida Jennings, NP    Enclosure    If you would like to receive this communication electronically, please contact [email or web address] or [phone] to request [employer] access.    [employer] is a tool which provides read-only access to select patient information with whom you have a relationship. Its easy to use and provides real time access to review your patients record including encounter summaries, notes, results, and demographic information.    If you feel you have received this communication in error or would no longer like to receive these types of communications, please e-mail [email or web address]

## 2022-11-30 LAB — HPRA INTERPRETATION: NORMAL

## 2022-12-01 ENCOUNTER — TELEPHONE (OUTPATIENT)
Dept: TRANSPLANT | Facility: CLINIC | Age: 62
End: 2022-12-01
Payer: COMMERCIAL

## 2022-12-01 NOTE — LETTER
Date: 12/1/2022          Referral Process      To: Dialysis Unit  and Charge RN From: [facility] and      Kidney Transplant Nurse Coordinators    RE: Colten Monet, 1960, [id number]     At [facility], we conduct adherence checks as an important part of transplant care. Initial and listed patient assessments are not complete without adherence information.        Please complete the following information:                 Data from the last 3 months:  (data from last 3 months preferred):    Number of AMAs with dates, time, and reasons: ____________________________________________________    ______________________________________________________________________________________________    ______________________________________________________________________________________________    Number of No-Shows with dates and reasons: ______________________________________________________      ______________________________________________________________________________________________      Any concerns with Caregivers:  YES / NO    If yes, please explain:  ___________________________________________________________________________    ______________________________________________________________________________________________     Any concerns with Transportation:  YES / NO    If yes, please explain:  ___________________________________________________________________________    ______________________________________________________________________________________________    Any Psychiatric and/or Psychosocial concerns:  YES / NO     If yes, please explain: ___________________________________________________________________________    ______________________________________________________________________________________________      PLEASE RETURN TO: FAX: [phone]     Thank you for collaborating with us in the care of this patient.            [address]  ?  [place] [address]  ?  phone [phone]  ?  fax [phone]  ?  [email or web address]  Confidentiality notice: The accompanying facsimile is intended solely for the use of the recipient designated above. Document(s) transmitted herewith may contain information that is confidential and privileged. Delivery, distribution or dissemination of this communication other than to the intended recipient is strictly prohibited. If you have received this facsimile in error, please notify us immediately by telephone.

## 2022-12-01 NOTE — LETTER
Date: 12/1/2022          Pre-Dialysis      To: Dialysis Unit  and Charge RN From: [facility] and      Kidney Transplant Nurse Coordinators    RE: Colten Monet, 1960, [id number]     At [facility], we conduct adherence checks as an important part of transplant care. Initial and listed patient assessments are not complete without adherence information.        Please complete the following information:                 Data from the last 3 months:  (data from last 3 months preferred):    Number of AMAs with dates, time, and reasons: ____________________________________________________    ______________________________________________________________________________________________    ______________________________________________________________________________________________    Number of No-Shows with dates and reasons: ______________________________________________________      ______________________________________________________________________________________________      Any concerns with Caregivers:  YES / NO    If yes, please explain:  ___________________________________________________________________________    ______________________________________________________________________________________________     Any concerns with Transportation:  YES / NO    If yes, please explain:  ___________________________________________________________________________    ______________________________________________________________________________________________    Any Psychiatric and/or Psychosocial concerns:  YES / NO     If yes, please explain: ___________________________________________________________________________    ______________________________________________________________________________________________      PLEASE RETURN TO: FAX: [phone]     Thank you for collaborating with us in the care of this patient.           [id number]  ?  [place] [address]  ?  phone [phone]  ?  fax [phone]  ?  [email or web address]  Confidentiality notice: The accompanying facsimile is intended solely for the use of the recipient designated above. Document(s) transmitted herewith may contain information that is confidential and privileged. Delivery, distribution or dissemination of this communication other than to the intended recipient is strictly prohibited. If you have received this facsimile in error, please notify us immediately by telephone.

## 2022-12-12 PROCEDURE — 99001 SPECIMEN HANDLING PT-LAB: CPT | Mod: PO,TXP | Performed by: NURSE PRACTITIONER

## 2022-12-15 ENCOUNTER — LAB VISIT (OUTPATIENT)
Dept: LAB | Facility: HOSPITAL | Age: 62
End: 2022-12-15
Payer: COMMERCIAL

## 2022-12-15 DIAGNOSIS — Z76.82 ORGAN TRANSPLANT CANDIDATE: ICD-10-CM

## 2022-12-19 NOTE — PROGRESS NOTES
NEED TO CONFIRM TRANSPORTATION PLAN PRIOR TO COMING FOR TRANSPLANT.    Transplant Psychosocial Evaluation Update:  Last psychosocial evaluation for transplant was completed on 5/25/21 by PREETI Banda    Pt presents today in company of wife, Ginna. Pt and wife  present as alert, oriented to person, place, time, purpose of visit. Pt and wife deny concerns about going through with transplant and state motivation and sense of preparedness for transplantation, caregiver role, psychosocial risk factors, medical risk factors, financial aspects, and lifetime commitments. All concerns and education points as per transplant psychosocial evaluation process addressed (also refer to psychosocial dated 3/12/2021). Pt actively participated in today's interview, with wife participating as caregiver. Pt asking questions appropriately and denies changes to previous psychosocial evaluation for transplantation which we reviewed line by line with pt and, per pt choice, with pts caregiver today.    Primary Caregivers and Transportation for Transplant:  1. Ginna Monet, 41, does not drive, [phone]  2. Tripp Miguel, 58, friend (will provide transportation).[phone]  3. Angela Bulter friend, (will provide transportation) [phone]    Both pt and caregiver/s plan to stay locally at lodging arranged by themselves.  Pt and caregiver informed that lodging assistance will be unavailable beginning 2023.  - information reviewed in depth.  Caregivers plan to stay at hospital as appropriate for transplant and post-transplant process.    Pts Vocation: Pt works at [employer] Last day worked:  10/25/22.    DME: denies.     ADLS:  Pt states ability to independently accomplish all adls.     Household and Dependents: pt resides with wife and two minor children and has two dependents at this time.    Income: Pt states ability to afford all monthly expenses and costs including for medications now and if transplanted based on income  and on savings and assets.    Dialysis Adherence: predialysis-compliance letter sent to nephrologist    Pt and wife deny any additional concerns, stating preparedness and motivation to proceed with organ transplant.     Suitability for Transplant:   Pt remains low to medium for transplant at this time based on psychosocial risk factors and strengths.     Pt noy well overall with health needs and life in general, denies current or past suicidal/homicidal ideation, has stable supports, adequate insurance, financial stability, transportation (to be provided by friends-primary caregiver does not drive.) and caregiver plans in place, and is using no substances unless prescribed.  Pt plans to stay at lodging arranged by themselves.  Pt and caregiver informed that lodging assistance will be unavailable beginning .  - information reviewed in depth. Recommend fundraising to offset costs associated with transplant.  Pt and caregiver informed that they are responsible for transplant related lodging arrangements and expense.   Pt and caregiver informed that lodging assistance will be unavailable beginning . - information reviewed in depth.     Payer/Plan Subscr  Sex Relation Sub. Ins. ID Effective Group Num   1. [facility]* MARGUERITE FLOOD 1960 Male Self [id number]                                    [address] [address], [facility] [address]   2. [facility]* MARGUERITE FLOOD 1960 Male Self [id number]                                    [address] [address]

## 2023-02-13 PROCEDURE — 86832 HLA CLASS I HIGH DEFIN QUAL: CPT | Mod: PO,TXP | Performed by: NURSE PRACTITIONER

## 2023-02-13 PROCEDURE — 86833 HLA CLASS II HIGH DEFIN QUAL: CPT | Mod: PO,TXP | Performed by: NURSE PRACTITIONER

## 2023-02-27 ENCOUNTER — LAB VISIT (OUTPATIENT)
Dept: LAB | Facility: HOSPITAL | Age: 63
End: 2023-02-27
Payer: COMMERCIAL

## 2023-02-27 DIAGNOSIS — Z76.82 ORGAN TRANSPLANT CANDIDATE: ICD-10-CM

## 2023-03-07 LAB — HPRA INTERPRETATION: NORMAL

## 2023-03-10 PROCEDURE — 99001 SPECIMEN HANDLING PT-LAB: CPT | Mod: PO,TXP | Performed by: NURSE PRACTITIONER

## 2023-03-14 ENCOUNTER — LAB VISIT (OUTPATIENT)
Dept: LAB | Facility: HOSPITAL | Age: 63
End: 2023-03-14
Payer: COMMERCIAL

## 2023-03-14 DIAGNOSIS — Z76.82 ORGAN TRANSPLANT CANDIDATE: ICD-10-CM

## 2023-04-04 LAB
CLASS I ANTIBODIES - LUMINEX: NORMAL
CLASS I ANTIBODY COMMENTS - LUMINEX: NORMAL
CLASS II ANTIBODY COMMENTS - LUMINEX: NORMAL
CPRA %: 3
SERUM COLLECTION DT - LUMINEX CLASS I: NORMAL
SERUM COLLECTION DT - LUMINEX CLASS II: NORMAL
SPCL1 TESTING DATE: NORMAL
SPCL2 TESTING DATE: NORMAL
SPCLU TESTING DATE: NORMAL

## 2023-04-12 PROCEDURE — 86832 HLA CLASS I HIGH DEFIN QUAL: CPT | Mod: PO,TXP | Performed by: NURSE PRACTITIONER

## 2023-04-12 PROCEDURE — 86833 HLA CLASS II HIGH DEFIN QUAL: CPT | Mod: PO,TXP | Performed by: NURSE PRACTITIONER

## 2023-04-18 ENCOUNTER — LAB VISIT (OUTPATIENT)
Dept: LAB | Facility: HOSPITAL | Age: 63
End: 2023-04-18
Payer: COMMERCIAL

## 2023-04-18 DIAGNOSIS — Z76.82 ORGAN TRANSPLANT CANDIDATE: ICD-10-CM

## 2023-04-22 LAB — HPRA INTERPRETATION: NORMAL

## 2023-05-13 PROCEDURE — 99001 SPECIMEN HANDLING PT-LAB: CPT | Mod: PO,TXP | Performed by: NURSE PRACTITIONER

## 2023-05-16 ENCOUNTER — LAB VISIT (OUTPATIENT)
Dept: LAB | Facility: HOSPITAL | Age: 63
End: 2023-05-16
Payer: COMMERCIAL

## 2023-05-16 DIAGNOSIS — Z76.82 ORGAN TRANSPLANT CANDIDATE: ICD-10-CM

## 2023-05-22 LAB
CLASS I ANTIBODIES - LUMINEX: NORMAL
CLASS I ANTIBODY COMMENTS - LUMINEX: NORMAL
CLASS II ANTIBODY COMMENTS - LUMINEX: NORMAL
CPRA %: 25
HLA FREEZE AND HOLD INTERPRETATION: NORMAL
HLAFH COLLECTION DATE: NORMAL
HPRA INTERPRETATION: NORMAL
SERUM COLLECTION DT - LUMINEX CLASS I: NORMAL
SERUM COLLECTION DT - LUMINEX CLASS II: NORMAL
SPCL1 TESTING DATE: NORMAL
SPCL2 TESTING DATE: NORMAL
SPCLU TESTING DATE: NORMAL

## 2023-06-18 PROCEDURE — 86832 HLA CLASS I HIGH DEFIN QUAL: CPT | Mod: PO,TXP | Performed by: NURSE PRACTITIONER

## 2023-06-18 PROCEDURE — 86833 HLA CLASS II HIGH DEFIN QUAL: CPT | Mod: PO,TXP | Performed by: NURSE PRACTITIONER

## 2023-06-21 ENCOUNTER — LAB VISIT (OUTPATIENT)
Dept: LAB | Facility: HOSPITAL | Age: 63
End: 2023-06-21
Payer: COMMERCIAL

## 2023-06-21 DIAGNOSIS — Z76.82 ORGAN TRANSPLANT CANDIDATE: ICD-10-CM

## 2023-06-27 LAB — HPRA INTERPRETATION: NORMAL

## 2023-07-14 PROCEDURE — 99001 SPECIMEN HANDLING PT-LAB: CPT | Mod: PO,TXP | Performed by: NURSE PRACTITIONER

## 2023-07-19 ENCOUNTER — LAB VISIT (OUTPATIENT)
Dept: LAB | Facility: HOSPITAL | Age: 63
End: 2023-07-19
Payer: COMMERCIAL

## 2023-07-19 DIAGNOSIS — Z76.82 ORGAN TRANSPLANT CANDIDATE: ICD-10-CM

## 2023-07-26 DIAGNOSIS — Z76.82 PATIENT ON WAITING LIST FOR KIDNEY TRANSPLANT: Primary | ICD-10-CM

## 2023-07-27 LAB — HPRA INTERPRETATION: NORMAL

## 2023-07-31 LAB
HLA FREEZE AND HOLD INTERPRETATION: NORMAL
HLAFH COLLECTION DATE: NORMAL

## 2023-08-01 ENCOUNTER — LAB VISIT (OUTPATIENT)
Dept: LAB | Facility: HOSPITAL | Age: 63
End: 2023-08-01
Payer: COMMERCIAL

## 2023-08-01 DIAGNOSIS — Z76.82 PATIENT ON WAITING LIST FOR KIDNEY TRANSPLANT: ICD-10-CM

## 2023-08-01 PROCEDURE — 86826 HLA X-MATCH NONCYTOTOXC ADDL: CPT | Mod: 91,PO,TXP | Performed by: NURSE PRACTITIONER

## 2023-08-01 PROCEDURE — 86825 HLA X-MATH NON-CYTOTOXIC: CPT | Mod: 91,PO,TXP | Performed by: NURSE PRACTITIONER

## 2023-08-01 PROCEDURE — 86825 HLA X-MATH NON-CYTOTOXIC: CPT | Mod: PO,TXP | Performed by: NURSE PRACTITIONER

## 2023-08-04 LAB
B CELL RESULTS - XM ALLO: NEGATIVE
B MCS AVERAGE - XM ALLO: -1.4
B MCS AVERAGE - XM ALLO: -2.5
B MCS AVERAGE - XM ALLO: 7.3
DONOR MRN: NORMAL
FXMAL TESTING DATE: NORMAL
HLA FLOW CROSSMATCH (ALLO) INTERPRETATION: NORMAL
SERUM COLLECTION DT - XM ALLO: NORMAL
T CELL RESULTS - XM ALLO: NEGATIVE
T MCS AVERAGE - XM ALLO: -4.2
T MCS AVERAGE - XM ALLO: 0.7
T MCS AVERAGE - XM ALLO: 4

## 2023-08-14 PROCEDURE — 86833 HLA CLASS II HIGH DEFIN QUAL: CPT | Mod: PO | Performed by: NURSE PRACTITIONER

## 2023-08-14 PROCEDURE — 86977 RBC SERUM PRETX INCUBJ/INHIB: CPT | Mod: 59,PO | Performed by: NURSE PRACTITIONER

## 2023-08-14 PROCEDURE — 86832 HLA CLASS I HIGH DEFIN QUAL: CPT | Mod: PO | Performed by: NURSE PRACTITIONER

## 2023-08-17 ENCOUNTER — LAB VISIT (OUTPATIENT)
Dept: LAB | Facility: HOSPITAL | Age: 63
End: 2023-08-17
Payer: COMMERCIAL

## 2023-08-17 DIAGNOSIS — Z76.82 ORGAN TRANSPLANT CANDIDATE: ICD-10-CM

## 2023-08-25 DIAGNOSIS — Z76.82 ORGAN TRANSPLANT CANDIDATE: Primary | ICD-10-CM

## 2023-08-25 NOTE — PROGRESS NOTES
YEARLY LIST MANAGEMENT NOTE    Colten Keilajade's kidney transplant listing status reviewed.  Patient is due for follow-up appointments in November 2023.  Appointments will be scheduled per protocol.  HLA sample is up to date and being received on a regular basis.

## 2023-08-29 ENCOUNTER — TELEPHONE (OUTPATIENT)
Dept: TRANSPLANT | Facility: CLINIC | Age: 63
End: 2023-08-29
Payer: COMMERCIAL

## 2023-08-29 NOTE — TELEPHONE ENCOUNTER
Spoke to pt confirming appts on 09/08/2021(Echo) and 09/21/2023 (Test, imaging and clinic visit). Appt reminders were mailed on 08/29/2023 and pt is aware to bring care giver.

## 2023-09-11 ENCOUNTER — TELEPHONE (OUTPATIENT)
Dept: TRANSPLANT | Facility: CLINIC | Age: 63
End: 2023-09-11
Payer: COMMERCIAL

## 2023-09-12 PROCEDURE — 86977 RBC SERUM PRETX INCUBJ/INHIB: CPT | Mod: 59,PO,TXP | Performed by: NURSE PRACTITIONER

## 2023-09-12 PROCEDURE — 86832 HLA CLASS I HIGH DEFIN QUAL: CPT | Mod: PO,TXP | Performed by: NURSE PRACTITIONER

## 2023-09-12 PROCEDURE — 86833 HLA CLASS II HIGH DEFIN QUAL: CPT | Mod: PO,TXP | Performed by: NURSE PRACTITIONER

## 2023-09-13 LAB — HPRA INTERPRETATION: NORMAL

## 2023-09-13 PROCEDURE — 99001 SPECIMEN HANDLING PT-LAB: CPT | Mod: PO,TXP | Performed by: NURSE PRACTITIONER

## 2023-09-14 ENCOUNTER — LAB VISIT (OUTPATIENT)
Dept: LAB | Facility: HOSPITAL | Age: 63
End: 2023-09-14
Payer: COMMERCIAL

## 2023-09-14 DIAGNOSIS — Z76.82 ORGAN TRANSPLANT CANDIDATE: ICD-10-CM

## 2023-09-19 ENCOUNTER — TELEPHONE (OUTPATIENT)
Dept: CARDIOLOGY | Facility: HOSPITAL | Age: 63
End: 2023-09-19
Payer: COMMERCIAL

## 2023-09-21 ENCOUNTER — HOSPITAL ENCOUNTER (OUTPATIENT)
Dept: RADIOLOGY | Facility: HOSPITAL | Age: 63
Discharge: HOME | End: 2023-09-21
Attending: NURSE PRACTITIONER
Payer: COMMERCIAL

## 2023-09-21 ENCOUNTER — HOSPITAL ENCOUNTER (OUTPATIENT)
Dept: CARDIOLOGY | Facility: HOSPITAL | Age: 63
Discharge: HOME | End: 2023-09-21
Attending: NURSE PRACTITIONER
Payer: COMMERCIAL

## 2023-09-21 ENCOUNTER — OFFICE VISIT (OUTPATIENT)
Dept: TRANSPLANT | Facility: CLINIC | Age: 63
End: 2023-09-21
Payer: COMMERCIAL

## 2023-09-21 VITALS
BODY MASS INDEX: 32.91 KG/M2 | RESPIRATION RATE: 16 BRPM | HEIGHT: 67 IN | DIASTOLIC BLOOD PRESSURE: 74 MMHG | SYSTOLIC BLOOD PRESSURE: 134 MMHG | WEIGHT: 209.69 LBS | TEMPERATURE: 97 F | HEART RATE: 104 BPM | OXYGEN SATURATION: 96 %

## 2023-09-21 VITALS
DIASTOLIC BLOOD PRESSURE: 90 MMHG | WEIGHT: 214 LBS | HEART RATE: 77 BPM | HEIGHT: 67 IN | BODY MASS INDEX: 33.59 KG/M2 | SYSTOLIC BLOOD PRESSURE: 154 MMHG

## 2023-09-21 DIAGNOSIS — Z76.82 ORGAN TRANSPLANT CANDIDATE: ICD-10-CM

## 2023-09-21 DIAGNOSIS — N02.B9 IGA NEPHROPATHY: ICD-10-CM

## 2023-09-21 DIAGNOSIS — Z76.82 PATIENT ON WAITING LIST FOR KIDNEY TRANSPLANT: Primary | ICD-10-CM

## 2023-09-21 DIAGNOSIS — N18.5 CKD (CHRONIC KIDNEY DISEASE), STAGE V: ICD-10-CM

## 2023-09-21 DIAGNOSIS — I12.9 HYPERTENSIVE CHRONIC KIDNEY DISEASE WITH STAGE 1 THROUGH STAGE 4 CHRONIC KIDNEY DISEASE, OR UNSPECIFIED CHRONIC KIDNEY DISEASE: ICD-10-CM

## 2023-09-21 LAB
CV PHARM DOSE: 0.4 MG
CV STRESS BASE HR: 77 BPM
DIASTOLIC BLOOD PRESSURE: 90 MMHG
EJECTION FRACTION- HIGH: 59 %
END DIASTOLIC INDEX-HIGH: 155 ML/M2
END DIASTOLIC INDEX-LOW: 91 ML/M2
END SYSTOLIC INDEX-HIGH: 78 ML/M2
END SYSTOLIC INDEX-LOW: 40 ML/M2
NUC REST DIASTOLIC VOLUME INDEX: 55
NUC REST EJECTION FRACTION: 67
NUC REST SYSTOLIC VOLUME INDEX: 18
NUC STRESS DIASTOLIC VOLUME INDEX: 54
NUC STRESS EJECTION FRACTION: 54 %
NUC STRESS SYSTOLIC VOLUME INDEX: 25
OHS CV CPX 1 MINUTE RECOVERY HEART RATE: 114 BPM
OHS CV CPX 85 PERCENT MAX PREDICTED HEART RATE MALE: 133
OHS CV CPX MAX PREDICTED HEART RATE: 156
OHS CV CPX PATIENT IS FEMALE: 0
OHS CV CPX PATIENT IS MALE: 1
OHS CV CPX PEAK DIASTOLIC BLOOD PRESSURE: 85 MMHG
OHS CV CPX PEAK HEAR RATE: 100 BPM
OHS CV CPX PEAK RATE PRESSURE PRODUCT: NORMAL
OHS CV CPX PEAK SYSTOLIC BLOOD PRESSURE: 166 MMHG
OHS CV CPX PERCENT MAX PREDICTED HEART RATE ACHIEVED: 64
OHS CV CPX RATE PRESSURE PRODUCT PRESENTING: NORMAL
RETIRED EF AND QEF - SEE NOTES: 47 %
SYSTOLIC BLOOD PRESSURE: 154 MMHG

## 2023-09-21 PROCEDURE — 86832 HLA CLASS I HIGH DEFIN QUAL: CPT | Mod: PO,TXP | Performed by: NURSE PRACTITIONER

## 2023-09-21 PROCEDURE — 76770 US EXAM ABDO BACK WALL COMP: CPT | Mod: 26,TXP,, | Performed by: RADIOLOGY

## 2023-09-21 PROCEDURE — 86833 HLA CLASS II HIGH DEFIN QUAL: CPT | Mod: PO,TXP | Performed by: NURSE PRACTITIONER

## 2023-09-21 PROCEDURE — 93016 NUCLEAR STRESS - CARDIOLOGY INTERPRETED (CUPID ONLY): ICD-10-PCS | Mod: TXP,,, | Performed by: INTERNAL MEDICINE

## 2023-09-21 PROCEDURE — 72170 X-RAY EXAM OF PELVIS: CPT | Mod: 26,TXP,, | Performed by: RADIOLOGY

## 2023-09-21 PROCEDURE — 99215 OFFICE O/P EST HI 40 MIN: CPT | Mod: S$GLB,TXP,, | Performed by: NURSE PRACTITIONER

## 2023-09-21 PROCEDURE — 99215 PR OFFICE/OUTPT VISIT, EST, LEVL V, 40-54 MIN: ICD-10-PCS | Mod: S$GLB,TXP,, | Performed by: NURSE PRACTITIONER

## 2023-09-21 PROCEDURE — 78452 NUCLEAR STRESS - CARDIOLOGY INTERPRETED (CUPID ONLY): ICD-10-PCS | Mod: 26,TXP,, | Performed by: INTERNAL MEDICINE

## 2023-09-21 PROCEDURE — 72170 XR PELVIS ROUTINE AP: ICD-10-PCS | Mod: 26,TXP,, | Performed by: RADIOLOGY

## 2023-09-21 PROCEDURE — 76770 US RETROPERITONEAL COMPLETE: ICD-10-PCS | Mod: 26,TXP,, | Performed by: RADIOLOGY

## 2023-09-21 PROCEDURE — 63600175 PHARM REV CODE 636 W HCPCS: Mod: TXP | Performed by: NURSE PRACTITIONER

## 2023-09-21 PROCEDURE — 99999 PR PBB SHADOW E&M-EST. PATIENT-LVL IV: ICD-10-PCS | Mod: PBBFAC,TXP,, | Performed by: NURSE PRACTITIONER

## 2023-09-21 PROCEDURE — 93016 CV STRESS TEST SUPVJ ONLY: CPT | Mod: TXP,,, | Performed by: INTERNAL MEDICINE

## 2023-09-21 PROCEDURE — 99999 PR PBB SHADOW E&M-EST. PATIENT-LVL IV: CPT | Mod: PBBFAC,TXP,, | Performed by: NURSE PRACTITIONER

## 2023-09-21 PROCEDURE — 76770 US EXAM ABDO BACK WALL COMP: CPT | Mod: TC,TXP

## 2023-09-21 PROCEDURE — 78452 HT MUSCLE IMAGE SPECT MULT: CPT | Mod: TXP

## 2023-09-21 PROCEDURE — A9502 TC99M TETROFOSMIN: HCPCS | Mod: TXP

## 2023-09-21 PROCEDURE — 78452 HT MUSCLE IMAGE SPECT MULT: CPT | Mod: 26,TXP,, | Performed by: INTERNAL MEDICINE

## 2023-09-21 PROCEDURE — 93018 NUCLEAR STRESS - CARDIOLOGY INTERPRETED (CUPID ONLY): ICD-10-PCS | Mod: TXP,,, | Performed by: INTERNAL MEDICINE

## 2023-09-21 PROCEDURE — 93018 CV STRESS TEST I&R ONLY: CPT | Mod: TXP,,, | Performed by: INTERNAL MEDICINE

## 2023-09-21 PROCEDURE — 72170 X-RAY EXAM OF PELVIS: CPT | Mod: TC,TXP

## 2023-09-21 RX ORDER — AMINOPHYLLINE 25 MG/ML
75 INJECTION, SOLUTION INTRAVENOUS ONCE
Status: COMPLETED | OUTPATIENT
Start: 2023-09-21 | End: 2023-09-21

## 2023-09-21 RX ORDER — CALCITRIOL 0.25 UG/1
0.25 CAPSULE ORAL DAILY
Status: ON HOLD | COMMUNITY
Start: 2023-05-04 | End: 2024-03-13 | Stop reason: HOSPADM

## 2023-09-21 RX ORDER — AZITHROMYCIN 250 MG/1
TABLET, FILM COATED ORAL
COMMUNITY
Start: 2023-09-09 | End: 2024-02-28

## 2023-09-21 RX ORDER — REGADENOSON 0.08 MG/ML
0.4 INJECTION, SOLUTION INTRAVENOUS
Status: COMPLETED | OUTPATIENT
Start: 2023-09-21 | End: 2023-09-21

## 2023-09-21 RX ORDER — BENZONATATE 100 MG/1
100 CAPSULE ORAL
COMMUNITY
Start: 2023-09-09 | End: 2024-02-28

## 2023-09-21 RX ADMIN — AMINOPHYLLINE 75 MG: 25 INJECTION, SOLUTION INTRAVENOUS at 09:09

## 2023-09-21 RX ADMIN — REGADENOSON 0.4 MG: 0.08 INJECTION, SOLUTION INTRAVENOUS at 09:09

## 2023-09-21 NOTE — PROGRESS NOTES
Kidney Transplant Recipient Reevalulation    Referring Physician: Colby Rene  Current Nephrologist: Colby Rene  Waitlist Status: active  Dialysis Start Date: (Not currently on dialysis)    Subjective:     CC:  Annual reassessment of kidney transplant candidacy.    HPI:  Mr. Monet is a 63 y.o. year old White male with advanced kidney disease secondary to IgA nephropathy.  He has been on the wait list for a kidney transplant at [facility] since 11/8/2021. Patient is currently pre-dialysis. He has a  no access .   Patient denies any recent hospitalizations or ED visits.  Fx assessment:   Continues to work, does construction work. Does not appear frail.   Remains on  mg BID     Lab /diagnostic results/wk up  reviewed with patient today.   11/29/22 iliac doppler study : Triphasic bilaterally   7/13/23 CXR The lungs are clear bilaterally. No consolidation or effusion. The cardiac silhouette is within normal limits for size. Pulmonary vasculature is not distended  9/21/23 renal US: Loss of normal corticomedullary distinction consistent with chronic medical renal disease.  9/21/23 PXR: Minimal mild calcified plaquing, common, external, internal iliac and left common femoral artery regions.  No aortic calcified plaque   Results for orders placed during the hospital encounter of 11/29/22    Echo Saline Bubble? No    Interpretation Summary  · The left ventricle is normal in size with normal systolic function.  · The estimated ejection fraction is 60%.  · Normal left ventricular diastolic function.  · Normal right ventricular size with normal right ventricular systolic function.  · Normal central venous pressure (3 mmHg).      7/7/21 Lexiscan    Normal myocardial perfusion scan. There is no evidence of myocardial ischemia or infarction.    The gated perfusion images showed an ejection fraction of 52% at rest. The gated perfusion images showed an ejection fraction of 70% post stress. Normal ejection fraction is greater  "than 53%.    There is normal wall motion at rest and post stress.    LV cavity size is normal at rest and normal at stress.    The EKG portion of this study is negative for ischemia.    The patient reported no chest pain during the stress test.    There were no arrhythmias during stress.    There are no prior studies for comparison.     PSA, Screen (ng/mL)   Date Value   09/21/2023 1.6           1/26/23:  Impressions:    Polyp (5mm) in the sigmoid colon.  (Polypectomy)  Internal Hemorrhoids.  Plan:  Colonoscopy in 5 years.  Pathology:  A.  Colon - Sigmoid  - Tubular Adenoma       Review of Systems   Constitutional:  Negative for activity change, appetite change, chills, fatigue, fever and unexpected weight change.   HENT:  Negative for congestion, facial swelling, postnasal drip, rhinorrhea, sinus pressure, sore throat and trouble swallowing.    Eyes:  Negative for pain, redness and visual disturbance.   Respiratory:  Negative for cough, chest tightness, shortness of breath and wheezing.    Cardiovascular:  Positive for leg swelling. Negative for chest pain and palpitations.   Gastrointestinal:  Positive for abdominal distention. Negative for abdominal pain, diarrhea, nausea and vomiting.   Genitourinary:  Negative for dysuria, flank pain and urgency.   Musculoskeletal:  Negative for gait problem, neck pain and neck stiffness.   Skin:  Negative for rash.   Allergic/Immunologic: Positive for immunocompromised state. Negative for environmental allergies and food allergies.   Neurological:  Negative for dizziness, weakness, light-headedness and headaches.   Psychiatric/Behavioral:  Negative for agitation and confusion. The patient is not nervous/anxious.        Objective:   body mass index is 32.87 kg/m².  /74 (BP Location: Right arm, Patient Position: Sitting, BP Method: Medium (Automatic))   Pulse 104   Temp 97.3 °F (36.3 °C) (Skin)   Resp 16   Ht 5' 6.97" (1.701 m)   Wt 95.1 kg (209 lb 10.5 oz)   SpO2 " "96%   BMI 32.87 kg/m²     Physical Exam  Vitals reviewed.   Constitutional:       Appearance: Normal appearance. He is well-developed.   HENT:      Head: Normocephalic.   Eyes:      Pupils: Pupils are equal, round, and reactive to light.   Cardiovascular:      Rate and Rhythm: Normal rate and regular rhythm.      Heart sounds: Normal heart sounds.   Pulmonary:      Effort: Pulmonary effort is normal.      Breath sounds: Normal breath sounds.   Abdominal:      General: Bowel sounds are normal. There is distension.      Palpations: Abdomen is soft.   Musculoskeletal:         General: Normal range of motion.      Cervical back: Normal range of motion and neck supple.      Right lower leg: Edema present.      Left lower leg: Edema present.      Comments: Bilateral trace peripheral edema    Skin:     General: Skin is warm and dry.   Neurological:      Mental Status: He is alert and oriented to person, place, and time.      Motor: No abnormal muscle tone.   Psychiatric:         Behavior: Behavior normal.         Labs:  Lab Results   Component Value Date    WBC 9.81 05/25/2021    HGB 12.6 (L) 05/25/2021    HCT 42.0 05/25/2021     05/25/2021    K 5.7 (H) 05/25/2021     (H) 05/25/2021    CO2 21 (L) 05/25/2021    BUN 42 (H) 05/25/2021    CREATININE 3.3 (H) 05/25/2021    EGFRNONAA 19.0 (A) 05/25/2021    GLUCOSE 76 09/22/2020    CALCIUM 9.3 05/25/2021    PHOS 3.0 05/25/2021    ALBUMIN 3.7 05/25/2021    AST 17 05/25/2021    ALT 16 05/25/2021    .0 (H) 05/25/2021       No results found for: "PREALBUMIN", "BILIRUBINUA", "GGT", "AMYLASE", "LIPASE", "PROTEINUA", "NITRITE", "RBCUA", "WBCUA"    No results found for: "HLAABCTYPE"    Lab Results   Component Value Date    CPRA 25 04/12/2023    PJ3CZYM B44,B48,B41 04/12/2023    CIABCLM B55--XKVD CW9, CW17, A80, CW5 04/12/2023    CIIAB DP20 05/14/2022    ABCMT WEAK DP20 04/12/2023       Labs were reviewed with the patient.    Pre-transplant Workup:   Reviewed with the " patient.    Assessment:     1. Patient on waiting list for kidney transplant    2. CKD (chronic kidney disease), stage V    3. Hypertensive chronic kidney disease with stage 1 through stage 4 chronic kidney disease, or unspecified chronic kidney disease    4. IgA nephropathy        Plan:   Will need echo in 11/2023    Transplant Candidacy:   Mr. Monet is a suitable kidney transplant candidate.  Meets center eligibility for accepting HCV+ donor offer - yes.  Patient educated on HCV+ donors. Colten is willing  to accept HCV+ donor offer -  yes   Patient is a candidate for KDPI > 85 kidney donor offer - no d/t weight   He remains in overall stable health, and will remain active on the transplant list.    Patient advised that it is recommended that all transplant candidates, and their close contacts and household members receive Covid vaccination.    Kathryn Hernandez NP       Follow-up:   In addition to the tests noted in the plan, Mr. Monet will continue to have reevaluation as per the standing pre-kidney transplant protocol:  Monthly blood for PRA  Annual return to clinic, except HIV positive, > 65 years of age, or pancreas transplant candidates who will be scheduled to see transplant every 6 months while in pre-transplant phase  Annual re-testing: CXR, EKG, yearly mammograms for women over 40 and PSA for males over 40, cardiology follow-up as recommended by initial cardiology pre-transplant evaluation  Renal ultrasound every 2 years  Baseline colonoscopy after age 50 and repeated as recommended    UNOS Patient Status  Functional Status: 60% - Requires occasional assistance but is able to care for needs  Physical Capacity: No Limitations

## 2023-09-21 NOTE — LETTER
September 25, 2023        Colby Rene  [address] [place] [address] [address]  Marla MILLER [address]  Phone: [phone]  Fax: [phone]             [facility] 1st Fl  [address]  [facility] [address]  Phone: [phone]   Patient: Colten Monet   MR Number: [id number]   YOB: 1960   Date of Visit: 9/21/2023       Dear Dr. Colby Rene    Thank you for referring Colten Monet to me for evaluation. Attached you will find relevant portions of my assessment and plan of care.    If you have questions, please do not hesitate to call me. I look forward to following Colten Monet along with you.    Sincerely,    Kathryn Hernandez, NP    Enclosure    If you would like to receive this communication electronically, please contact [email or web address] or [phone] to request [facility] Link access.    [facility] Link is a tool which provides read-only access to select patient information with whom you have a relationship. Its easy to use and provides real time access to review your patients record including encounter summaries, notes, results, and demographic information.    If you feel you have received this communication in error or would no longer like to receive these types of communications, please e-mail [email or web address]

## 2023-09-29 NOTE — PROGRESS NOTES
Psychosocial Update     Met with patient in clinic for listed patient psychosocial update. .      Patient has been listed for kidney transplant since 11/8/2021.  At this time, patient presents as alert and oriented x 4, pleasant, good eye contact, recall good, concentration/judgement good, calm, communicative, cooperative, and asking and answering questions appropriately.  At this time, patient is accompanied by caregiver wife, Ginna Monet.       Household/Family Systems      Patient resides with wife, son and stepson.     Support system includes:  Ginna Monet, 44 y/o wife, [phone], drives  Sophia King, 29 y/o dtr, [place], [phone], drives  Tripp Tyson, 59 y/o friend, [place], [phone], drives     Dependents that require care: none     Patient's primary caregiver is Wife, Ginna, phone number [phone]    Living donor:  denies.   Education provided     Lodging:  Post transplant, patient and caregiver plan to stay at lodging arranged by themselves.  Pt and caregiver informed that lodging assistance may be unavailable beginning 2023.  - information reviewed in depth.  Confirmed patient and patient's caregivers do have access to reliable transportation.     Cognitive Status/Learning      Patient reports reading ability as:  High school.   Patient reports patient learns best by:  multisensory.     Needed: no.   Highest education level: 10th     Vocation/Disability   .  Working for Income: yes, full time at [employer]  If no, reason not working: n/a    Adherence     Pt states current and expected compliance with all healthcare recommendations.      Substance Use     Patient reports the following substance usage.    Tobacco: former smoker, denies current use  Alcohol: denies  Illicit Drugs/Non-prescribed Medications: denies current drug use  Patient states clear understanding of the potential impact of substance use.  Substance abstinence/cessation counseling,  education and resources provided and reviewed.      Services Utilizing/ADLS     Infusion Service: patient utilizing? denies  Home Health: patient utilizing? denies  DME: denies   Pulmonary/Cardiac Rehab:  denies  Dialysis:  pt is predialysis  ADLs:  Pt states ability to independently accomplish all adls.       Insurance    Payer/Plan Subscr  Sex Relation Sub. Ins. ID Effective Group Num   1. [facility]* MARGUERITE FLOOD 1960 Male Self [id number]                                    [address] [address]   2. [facility]* MARGUERITE FLOOD 1960 Male Self [id number]                                   [address] [address], [facility] [address]     Patient reports patient is able to obtain and afford medications at this time.     Living Will/Healthcare Power of      Patient states patient does not have a LW and/or HCPA.   provided education regarding LW and HCPA and the completion of forms.     Coping/Mental Health    Mental health diagnosis:  pt denies mental health concerns  Suicidal/Homicidal:  Pt denies current or past suicidal/homicidal ideation.    Home safety:  Pt denies any home safety concerns.        Coping  Past: Pt has had past experience with surgical procedures.  Present:  family support, hobbies, job  At time of surgery:    family support  Post surgery:  family support        Additional Concerns     Patient is being followed for needs, education, resources, information, emotional support, supportive counseling.  providing ongoing psychosocial support, education, resources and d/c planning as needed.  SW remains available.  provided resources Patient denies additional needs and/or concerns at this time. Patient verbalizes understanding and agreement with information reviewed, social work availability, and how to access available resources as needed.    Suitability    Patient presents as low risk at this time based upon reports  caregiver support, transportation and financial plans.    Recommendations:  Recommend fundraising to offset costs associated with transplant.  Pt and caregiver informed that they are responsible for transplant related lodging arrangements and expense.   Pt and caregiver informed that lodging assistance may be unavailable beginning 2023. - information reviewed in depth.

## 2023-10-06 LAB
CLASS I ANTIBODIES - LUMINEX: NORMAL
CLASS I ANTIBODIES - LUMINEX: NORMAL
CLASS I ANTIBODY COMMENTS - LUMINEX: NORMAL
CLASS I ANTIBODY COMMENTS - LUMINEX: NORMAL
CLASS II ANTIBODIES - LUMINEX: NORMAL
CLASS II ANTIBODY COMMENTS - LUMINEX: NORMAL
CLASS II ANTIBODY COMMENTS - LUMINEX: NORMAL
CPRA %: 38
CPRA %: 38
SERUM COLLECTION DT - LUMINEX CLASS I: NORMAL
SERUM COLLECTION DT - LUMINEX CLASS II: NORMAL
SPCL1 TESTING DATE: NORMAL
SPCL1 TESTING DATE: NORMAL
SPCL2 TESTING DATE: NORMAL
SPCL2 TESTING DATE: NORMAL
SPCLU TESTING DATE: NORMAL
SPIM1 TESTING DATE: NORMAL
SPIM2 TESTING DATE: NORMAL
SPLIM TESTING DATE: NORMAL
SPLUA TESTING DATE: NORMAL

## 2023-10-11 PROCEDURE — 86977 RBC SERUM PRETX INCUBJ/INHIB: CPT | Mod: PO,TXP | Performed by: NURSE PRACTITIONER

## 2023-10-11 PROCEDURE — 86832 HLA CLASS I HIGH DEFIN QUAL: CPT | Mod: PO,TXP | Performed by: NURSE PRACTITIONER

## 2023-10-11 PROCEDURE — 86833 HLA CLASS II HIGH DEFIN QUAL: CPT | Mod: PO,TXP | Performed by: NURSE PRACTITIONER

## 2023-10-12 ENCOUNTER — LAB VISIT (OUTPATIENT)
Dept: LAB | Facility: HOSPITAL | Age: 63
End: 2023-10-12
Payer: COMMERCIAL

## 2023-10-12 DIAGNOSIS — Z76.82 ORGAN TRANSPLANT CANDIDATE: ICD-10-CM

## 2023-11-01 LAB — HPRA INTERPRETATION: NORMAL

## 2023-11-27 ENCOUNTER — LAB VISIT (OUTPATIENT)
Dept: LAB | Facility: HOSPITAL | Age: 63
End: 2023-11-27
Payer: COMMERCIAL

## 2023-11-27 DIAGNOSIS — Z76.82 ORGAN TRANSPLANT CANDIDATE: ICD-10-CM

## 2023-12-19 ENCOUNTER — LAB VISIT (OUTPATIENT)
Dept: LAB | Facility: HOSPITAL | Age: 63
End: 2023-12-19
Payer: COMMERCIAL

## 2023-12-19 DIAGNOSIS — Z76.82 ORGAN TRANSPLANT CANDIDATE: ICD-10-CM

## 2023-12-20 ENCOUNTER — TELEPHONE (OUTPATIENT)
Dept: TRANSPLANT | Facility: CLINIC | Age: 63
End: 2023-12-20
Payer: COMMERCIAL

## 2023-12-20 DIAGNOSIS — Z76.82 ORGAN TRANSPLANT CANDIDATE: Primary | ICD-10-CM

## 2023-12-20 NOTE — TELEPHONE ENCOUNTER
Spoke to pt confirming Echo appt on 12/27/2023 ([facility]). Appt reminder mailed on 12/20/2023.

## 2023-12-27 ENCOUNTER — HOSPITAL ENCOUNTER (OUTPATIENT)
Dept: CARDIOLOGY | Facility: HOSPITAL | Age: 63
Discharge: HOME | End: 2023-12-27
Attending: NURSE PRACTITIONER
Payer: COMMERCIAL

## 2023-12-27 DIAGNOSIS — Z76.82 ORGAN TRANSPLANT CANDIDATE: ICD-10-CM

## 2023-12-27 LAB
ASCENDING AORTA: 2.71 CM
AV INDEX (PROSTH): 0.73
AV MEAN GRADIENT: 5 MMHG
AV PEAK GRADIENT: 10 MMHG
AV VALVE AREA BY VELOCITY RATIO: 2.06 CM²
AV VALVE AREA: 2.36 CM²
AV VELOCITY RATIO: 0.64
CV ECHO LV RWT: 0.42 CM
DOP CALC AO PEAK VEL: 1.57 M/S
DOP CALC AO VTI: 25.6 CM
DOP CALC LVOT AREA: 3.2 CM2
DOP CALC LVOT DIAMETER: 2.03 CM
DOP CALC LVOT PEAK VEL: 1 M/S
DOP CALC LVOT STROKE VOLUME: 60.49 CM3
DOP CALC MV VTI: 16.4 CM
DOP CALCLVOT PEAK VEL VTI: 18.7 CM
E WAVE DECELERATION TIME: 179.07 MSEC
E/A RATIO: 0.79
E/E' RATIO: 11.45 M/S
ECHO LV POSTERIOR WALL: 1.01 CM (ref 0.6–1.1)
FRACTIONAL SHORTENING: 26 % (ref 28–44)
INTERVENTRICULAR SEPTUM: 0.89 CM (ref 0.6–1.1)
IVC DIAMETER: 1.78 CM
IVRT: 85.63 MSEC
LA MAJOR: 4.52 CM
LA MINOR: 4.91 CM
LA WIDTH: 3.6 CM
LEFT ATRIUM SIZE: 3.48 CM
LEFT ATRIUM VOLUME: 50.12 CM3
LEFT INTERNAL DIMENSION IN SYSTOLE: 3.53 CM (ref 2.1–4)
LEFT VENTRICLE DIASTOLIC VOLUME: 107.48 ML
LEFT VENTRICLE SYSTOLIC VOLUME: 52.04 ML
LEFT VENTRICULAR INTERNAL DIMENSION IN DIASTOLE: 4.8 CM (ref 3.5–6)
LEFT VENTRICULAR MASS: 158.82 G
LV LATERAL E/E' RATIO: 9 M/S
LV SEPTAL E/E' RATIO: 15.75 M/S
LVOT MG: 2.36 MMHG
LVOT MV: 0.72 CM/S
MV MEAN GRADIENT: 1 MMHG
MV PEAK A VEL: 0.8 M/S
MV PEAK E VEL: 0.63 M/S
MV PEAK GRADIENT: 3 MMHG
MV STENOSIS PRESSURE HALF TIME: 51.93 MS
MV VALVE AREA BY CONTINUITY EQUATION: 3.69 CM2
MV VALVE AREA P 1/2 METHOD: 4.24 CM2
PISA TR MAX VEL: 1.53 M/S
PULM VEIN S/D RATIO: 1.73
PV PEAK D VEL: 0.33 M/S
PV PEAK GRADIENT: 5 MMHG
PV PEAK S VEL: 0.57 M/S
PV PEAK VELOCITY: 1.17 M/S
RA MAJOR: 4.19 CM
RA PRESSURE ESTIMATED: 3 MMHG
RA WIDTH: 3 CM
RIGHT VENTRICULAR END-DIASTOLIC DIMENSION: 2.74 CM
RV TB RVSP: 5 MMHG
RV TISSUE DOPPLER FREE WALL SYSTOLIC VELOCITY 1 (APICAL 4 CHAMBER VIEW): 13.76 CM/S
SINUS: 3.04 CM
STJ: 2.15 CM
TDI LATERAL: 0.07 M/S
TDI SEPTAL: 0.04 M/S
TDI: 0.06 M/S
TR MAX PG: 9 MMHG
TRICUSPID ANNULAR PLANE SYSTOLIC EXCURSION: 1.99 CM
TV REST PULMONARY ARTERY PRESSURE: 12 MMHG

## 2023-12-27 PROCEDURE — 93306 ECHO (CUPID ONLY): ICD-10-PCS | Mod: 26,TXP,, | Performed by: INTERNAL MEDICINE

## 2023-12-27 PROCEDURE — 93306 TTE W/DOPPLER COMPLETE: CPT | Mod: TXP

## 2023-12-27 PROCEDURE — 93306 TTE W/DOPPLER COMPLETE: CPT | Mod: 26,TXP,, | Performed by: INTERNAL MEDICINE

## 2024-01-02 ENCOUNTER — EPISODE CHANGES (OUTPATIENT)
Dept: TRANSPLANT | Facility: CLINIC | Age: 64
End: 2024-01-02

## 2024-01-09 ENCOUNTER — TELEPHONE (OUTPATIENT)
Dept: TRANSPLANT | Facility: CLINIC | Age: 64
End: 2024-01-09
Payer: COMMERCIAL

## 2024-01-09 ENCOUNTER — HOSPITAL ENCOUNTER (EMERGENCY)
Facility: HOSPITAL | Age: 64
Discharge: HOME | End: 2024-01-09
Attending: EMERGENCY MEDICINE
Payer: COMMERCIAL

## 2024-01-09 VITALS
WEIGHT: 210 LBS | RESPIRATION RATE: 19 BRPM | BODY MASS INDEX: 32.96 KG/M2 | HEART RATE: 104 BPM | HEIGHT: 67 IN | OXYGEN SATURATION: 96 % | SYSTOLIC BLOOD PRESSURE: 163 MMHG | DIASTOLIC BLOOD PRESSURE: 95 MMHG | TEMPERATURE: 97 F

## 2024-01-09 DIAGNOSIS — R06.02 SHORTNESS OF BREATH: ICD-10-CM

## 2024-01-09 DIAGNOSIS — R06.02 SOB (SHORTNESS OF BREATH): ICD-10-CM

## 2024-01-09 LAB
ALBUMIN SERPL BCP-MCNC: 3.7 G/DL (ref 3.5–5.2)
ALP SERPL-CCNC: 91 U/L (ref 55–135)
ALT SERPL W/O P-5'-P-CCNC: 14 U/L (ref 10–44)
ANION GAP SERPL CALC-SCNC: 9 MMOL/L (ref 8–16)
AST SERPL-CCNC: 17 U/L (ref 10–40)
BASOPHILS # BLD AUTO: 0.06 K/UL (ref 0–0.2)
BASOPHILS NFR BLD: 0.5 % (ref 0–1.9)
BILIRUB SERPL-MCNC: 0.3 MG/DL (ref 0.1–1)
BNP SERPL-MCNC: <10 PG/ML (ref 0–99)
BUN SERPL-MCNC: 41 MG/DL (ref 8–23)
CALCIUM SERPL-MCNC: 8.8 MG/DL (ref 8.7–10.5)
CHLORIDE SERPL-SCNC: 110 MMOL/L (ref 95–110)
CO2 SERPL-SCNC: 21 MMOL/L (ref 23–29)
CREAT SERPL-MCNC: 3.6 MG/DL (ref 0.5–1.4)
CTP QC/QA: YES
CTP QC/QA: YES
DIFFERENTIAL METHOD BLD: ABNORMAL
EOSINOPHIL # BLD AUTO: 0.8 K/UL (ref 0–0.5)
EOSINOPHIL NFR BLD: 6.5 % (ref 0–8)
ERYTHROCYTE [DISTWIDTH] IN BLOOD BY AUTOMATED COUNT: 12.8 % (ref 11.5–14.5)
EST. GFR  (NO RACE VARIABLE): 18 ML/MIN/1.73 M^2
GLUCOSE SERPL-MCNC: 87 MG/DL (ref 70–110)
HCT VFR BLD AUTO: 41.2 % (ref 40–54)
HGB BLD-MCNC: 12.4 G/DL (ref 14–18)
IMM GRANULOCYTES # BLD AUTO: 0.15 K/UL (ref 0–0.04)
IMM GRANULOCYTES NFR BLD AUTO: 1.2 % (ref 0–0.5)
LYMPHOCYTES # BLD AUTO: 3.1 K/UL (ref 1–4.8)
LYMPHOCYTES NFR BLD: 24.8 % (ref 18–48)
MCH RBC QN AUTO: 26.6 PG (ref 27–31)
MCHC RBC AUTO-ENTMCNC: 30.1 G/DL (ref 32–36)
MCV RBC AUTO: 88 FL (ref 82–98)
MONOCYTES # BLD AUTO: 1.7 K/UL (ref 0.3–1)
MONOCYTES NFR BLD: 13.3 % (ref 4–15)
NEUTROPHILS # BLD AUTO: 6.7 K/UL (ref 1.8–7.7)
NEUTROPHILS NFR BLD: 53.7 % (ref 38–73)
NRBC BLD-RTO: 0 /100 WBC
PLATELET # BLD AUTO: 254 K/UL (ref 150–450)
PMV BLD AUTO: 9.9 FL (ref 9.2–12.9)
POC MOLECULAR INFLUENZA A AGN: NEGATIVE
POC MOLECULAR INFLUENZA B AGN: NEGATIVE
POTASSIUM SERPL-SCNC: 4.5 MMOL/L (ref 3.5–5.1)
PROT SERPL-MCNC: 6.9 G/DL (ref 6–8.4)
RBC # BLD AUTO: 4.67 M/UL (ref 4.6–6.2)
SARS-COV-2 RDRP RESP QL NAA+PROBE: NEGATIVE
SODIUM SERPL-SCNC: 140 MMOL/L (ref 136–145)
TROPONIN I SERPL DL<=0.01 NG/ML-MCNC: <0.006 NG/ML (ref 0–0.03)
WBC # BLD AUTO: 12.55 K/UL (ref 3.9–12.7)

## 2024-01-09 PROCEDURE — 83880 ASSAY OF NATRIURETIC PEPTIDE: CPT | Mod: NTX | Performed by: PHYSICIAN ASSISTANT

## 2024-01-09 PROCEDURE — 25000242 PHARM REV CODE 250 ALT 637 W/ HCPCS: Mod: NTX | Performed by: EMERGENCY MEDICINE

## 2024-01-09 PROCEDURE — 93005 ELECTROCARDIOGRAM TRACING: CPT | Mod: NTX

## 2024-01-09 PROCEDURE — 93010 ELECTROCARDIOGRAM REPORT: CPT | Mod: NTX,,, | Performed by: INTERNAL MEDICINE

## 2024-01-09 PROCEDURE — 84484 ASSAY OF TROPONIN QUANT: CPT | Mod: NTX | Performed by: PHYSICIAN ASSISTANT

## 2024-01-09 PROCEDURE — 94761 N-INVAS EAR/PLS OXIMETRY MLT: CPT | Mod: NTX

## 2024-01-09 PROCEDURE — 94640 AIRWAY INHALATION TREATMENT: CPT | Mod: NTX

## 2024-01-09 PROCEDURE — 85025 COMPLETE CBC W/AUTO DIFF WBC: CPT | Mod: NTX | Performed by: PHYSICIAN ASSISTANT

## 2024-01-09 PROCEDURE — 87635 SARS-COV-2 COVID-19 AMP PRB: CPT | Mod: NTX | Performed by: EMERGENCY MEDICINE

## 2024-01-09 PROCEDURE — 87502 INFLUENZA DNA AMP PROBE: CPT | Mod: NTX

## 2024-01-09 PROCEDURE — 99285 EMERGENCY DEPT VISIT HI MDM: CPT | Mod: 25,NTX

## 2024-01-09 PROCEDURE — 80053 COMPREHEN METABOLIC PANEL: CPT | Mod: NTX | Performed by: PHYSICIAN ASSISTANT

## 2024-01-09 RX ORDER — ALBUTEROL SULFATE 90 UG/1
1-2 AEROSOL, METERED RESPIRATORY (INHALATION) EVERY 6 HOURS PRN
Qty: 18 G | Refills: 1 | Status: SHIPPED | OUTPATIENT
Start: 2024-01-09 | End: 2025-01-08

## 2024-01-09 RX ORDER — IPRATROPIUM BROMIDE AND ALBUTEROL SULFATE 2.5; .5 MG/3ML; MG/3ML
3 SOLUTION RESPIRATORY (INHALATION)
Status: COMPLETED | OUTPATIENT
Start: 2024-01-09 | End: 2024-01-09

## 2024-01-09 RX ADMIN — IPRATROPIUM BROMIDE AND ALBUTEROL SULFATE 3 ML: 2.5; .5 SOLUTION RESPIRATORY (INHALATION) at 04:01

## 2024-01-09 NOTE — TELEPHONE ENCOUNTER
"Returned patient call.  He advised he is having SOB.  Believes he may have COVID.    Patient stated he is coughing up "white stuff".  Questioned patient as to nature of "White Stuff".  He said it is sometimes phlegm and other times like the "head of a beer'.  Advised that he may be in a fluid overload and that he needs to go to a local ER for evaluation.  He stated he recently went to a local Urgent Care.  Advised patient, that with his medical history and being pre-dialysis, he needs to be evaluated at an ER.  An Urgent Care cannot properly assess him. Advised he would be going to get 'checked out'.  "

## 2024-01-09 NOTE — ED PROVIDER NOTES
"Encounter Date: 2024    SCRIBE #1 NOTE: IJanina, am scribing for, and in the presence of,  Bo Rosales MD.   SCRIBE #2 NOTE: INiya, am scribing for, and in the presence of,  Bo Rosales MD.     History     Chief Complaint   Patient presents with    Shortness of Breath     SOB x1 week progressively worsening in the last 2 days. Patient states he is on the kidney transplant list and reports feeling fatigued. Patient denies CP or dizziness.      Colten Monet is a 63 y.o. male, with a PMHx of Anemia, CVA, GERD, Hypertension, Obesity, HLD, who presents to the ED with  one week history of shortness of breath worsening for the last 2 days. He also reports regurgitating "foamy fluid", orthopnea and fatigue.  Patient also report ZHU and can only walk a short distance w/o Shortness of breath. Patient is on the Kidney transplant list.  No other exacerbating or alleviating factors. Patient denies chest pain, dizziness or other associated symptoms. Patient has social history of smoking but has quit 8 years ago.       The history is provided by the patient.     Review of patient's allergies indicates:   Allergen Reactions    Codeine Hives     Reaction to Tylenol #3     Past Medical History:   Diagnosis Date    Anemia     CVA (cerebral vascular accident)     GERD (gastroesophageal reflux disease)     Hyperlipidemia     Hypertension     IgA nephropathy     Obesity      Past Surgical History:   Procedure Laterality Date    APPENDECTOMY      CARDIAC CATHETERIZATION      [facility] ~ 6-7 years ago    RENAL BIOPSY      TONSILLECTOMY       History reviewed. No pertinent family history.  Social History     Tobacco Use    Smoking status: Former     Current packs/day: 0.00     Types: Cigarettes     Quit date: 2016     Years since quittin.7    Smokeless tobacco: Never   Substance Use Topics    Alcohol use: Not Currently    Drug use: Never     Review of Systems   Constitutional:  Positive for " fatigue. Negative for activity change.   HENT:  Negative for facial swelling.    Eyes:  Negative for pain.   Respiratory:  Positive for shortness of breath. Negative for chest tightness.         Positive orthopnea   Cardiovascular:  Negative for chest pain.   Gastrointestinal:  Positive for abdominal distention. Negative for abdominal pain, constipation, diarrhea, nausea and vomiting.   Genitourinary:  Negative for difficulty urinating and dysuria.   Musculoskeletal:  Negative for back pain.   Skin:  Negative for rash.   Neurological:  Negative for dizziness, weakness and headaches.   Hematological:  Negative for adenopathy.   Psychiatric/Behavioral:  Negative for behavioral problems.        Physical Exam     Initial Vitals [01/09/24 1453]   BP Pulse Resp Temp SpO2   137/76 104 (!) 22 97.4 °F (36.3 °C) 95 %      MAP       --         Physical Exam    Nursing note and vitals reviewed.  Constitutional: He appears well-developed and well-nourished.   HENT:   Head: Normocephalic and atraumatic.   Eyes: EOM are normal. Pupils are equal, round, and reactive to light.   Neck: Neck supple. No thyromegaly present. No JVD present.   Normal range of motion.  Cardiovascular:  Normal rate and regular rhythm.     Exam reveals no gallop and no friction rub.       No murmur heard.  Pulmonary/Chest: No respiratory distress. He has rales.    Bibasilar rales   Abdominal: Abdomen is soft. Bowel sounds are normal. There is no abdominal tenderness.   Musculoskeletal:         General: No tenderness or edema. Normal range of motion.      Cervical back: Normal range of motion and neck supple.     Neurological: He is alert and oriented to person, place, and time. He has normal strength. GCS score is 15. GCS eye subscore is 4. GCS verbal subscore is 5. GCS motor subscore is 6.   Skin: Skin is warm. Capillary refill takes less than 2 seconds.   Psychiatric: He has a normal mood and affect. His behavior is normal. Thought content normal.          ED Course   Procedures  Labs Reviewed   CBC W/ AUTO DIFFERENTIAL - Abnormal; Notable for the following components:       Result Value    Hemoglobin 12.4 (*)     MCH 26.6 (*)     MCHC 30.1 (*)     Immature Granulocytes 1.2 (*)     Immature Grans (Abs) 0.15 (*)     Mono # 1.7 (*)     Eos # 0.8 (*)     All other components within normal limits   COMPREHENSIVE METABOLIC PANEL - Abnormal; Notable for the following components:    CO2 21 (*)     BUN 41 (*)     Creatinine 3.6 (*)     eGFR 18 (*)     All other components within normal limits   TROPONIN I   B-TYPE NATRIURETIC PEPTIDE   SARS-COV-2 RDRP GENE   POCT INFLUENZA A/B MOLECULAR     EKG Readings: (Independently Interpreted)   Initial Reading: No STEMI. Rhythm: Sinus Tachycardia. Heart Rate: 116. ST Segments: Normal ST Segments.     ECG Results              EKG 12-lead (Final result)  Result time 01/09/24 21:52:57      Final result by Interface, Lab In [facility] (01/09/24 21:52:57)                   Narrative:    Test Reason : R06.02,    Vent. Rate : 116 BPM     Atrial Rate : 116 BPM     P-R Int : 124 ms          QRS Dur : 076 ms      QT Int : 312 ms       P-R-T Axes : 063 087 047 degrees     QTc Int : 433 ms    Sinus tachycardia  Otherwise normal ECG  When compared with ECG of 21-SEP-2023 08:59,  Significant changes have occurred  Confirmed by Jayla GAITAN, Maurice CASILLAS ([id number]) on 1/9/2024 9:52:50 PM    Referred By: [email or web address]   SELF           Confirmed By:Maurice Dickerson MD                                  Imaging Results              X-Ray Chest PA And Lateral (Final result)  Result time 01/09/24 15:28:44      Final result by Teresita Ohara MD (01/09/24 15:28:44)                   Impression:      No significant abnormality seen.      Electronically signed by: Teresita Ohara MD  Date:    01/09/2024  Time:    15:28               Narrative:    EXAMINATION:  XR CHEST PA AND LATERAL    CLINICAL HISTORY:  SOB;    TECHNIQUE:  PA and lateral views of the chest were  "performed.    COMPARISON:  07/13/2023    FINDINGS:  The cardiac silhouette is normal in size, midline.    The pulmonary vascularity is normal.    The pulmonary [place] appear normal bilaterally.    The lungs are well expanded and clear.    No focal airspace disease.    No pleural effusion, no pneumothorax.    The osseous structures appear within normal limits for age.                                       Medications   albuterol-ipratropium 2.5 mg-0.5 mg/3 mL nebulizer solution 3 mL (3 mLs Nebulization Given 1/9/24 3356)     Medical Decision Making  Emergent evaluation of 63 y.o. male, with a PMHx of Anemia, CVA, GERD, Hypertension, Obesity, HLD, who presents to the ED with  one week history of shortness of breath worsening for the last 2 days. He also reports regurgitating "foamy fluid", orthopnea and fatigue.  Patient also report ZHU and can only walk a short distance w/o Shortness of breath. Patient is on the Kidney transplant list.  No other exacerbating or alleviating factors. Patient denies chest pain, dizziness or other associated symptoms. Patient has social history of smoking but has quit 8 years ago.   VSS and nursing notes reviewed. On exam, he has bibasilar rales. Differential diagnosis includes, but is not limited to:  pulmonary infectious process, allergic rhinitis, postnasal drip, allergic bronchitis, sinusitis, infectious bronchitis, COPD, asthma, pulmonary embolus, pleural effusion, myocardial ischemia, cardiac valvulopathy, and congestive heart failure. Will order labs, CXR, EKG, and reassess. Administered albuterol for relief.    Amount and/or Complexity of Data Reviewed  Labs: ordered. Decision-making details documented in ED Course.  Radiology: ordered. Decision-making details documented in ED Course.  ECG/medicine tests: ordered and independent interpretation performed. Decision-making details documented in ED Course.    Risk  Prescription drug management.    Patient had improvement to resolution " with albuterol.  Oxygen saturations normal.  No chest pain or pain with breathing.  No evidence of congestive heart failure.  Will discharge with albuterol.  Follow up with primary care.       Scribe Attestation:   Scribe #1: I performed the above scribed service and the documentation accurately describes the services I performed. I attest to the accuracy of the note.  Scribe #2: I performed the above scribed service and the documentation accurately describes the services I performed. I attest to the accuracy of the note.                           I, Bo Rosales, personally performed the services described in this documentation. All medical record entries made by the scribe were at my direction and in my presence. I have reviewed the chart and agree that the record reflects my personal performance and is accurate and complete.      Clinical Impression:  Final diagnoses:  [R06.02] Shortness of breath  [R06.02] SOB (shortness of breath)          ED Disposition Condition    Discharge Stable          ED Prescriptions       Medication Sig Dispense Start Date End Date Auth. Provider    albuterol (PROVENTIL/VENTOLIN HFA) 90 mcg/actuation inhaler Inhale 1-2 puffs into the lungs every 6 (six) hours as needed for Wheezing. Rescue 18 g 1/9/2024 1/8/2025 Bo Rosales MD          Follow-up Information       Follow up With Specialties Details Why Contact Info        contact your transplant team in the AM for further directions.             Bo Rosales MD  02/07/24 [id number]

## 2024-01-09 NOTE — ED TRIAGE NOTES
Pt complaining of SOB and cough for a few days. Pt is on kidney transplant list and was advised to come to ED for evaluation. Pt has no hx of CHF and denies chest pain. Placed on cardiac monitor.    FAMILY HISTORY:  No pertinent family history in first degree relatives  No pertinent family history in first degree relatives

## 2024-01-18 ENCOUNTER — LAB VISIT (OUTPATIENT)
Dept: LAB | Facility: HOSPITAL | Age: 64
End: 2024-01-18
Payer: COMMERCIAL

## 2024-01-18 DIAGNOSIS — Z76.82 ORGAN TRANSPLANT CANDIDATE: ICD-10-CM

## 2024-01-22 PROCEDURE — 99001 SPECIMEN HANDLING PT-LAB: CPT | Mod: PO,TXP | Performed by: NURSE PRACTITIONER

## 2024-01-26 PROCEDURE — 86832 HLA CLASS I HIGH DEFIN QUAL: CPT | Mod: PO,TXP | Performed by: NURSE PRACTITIONER

## 2024-01-26 PROCEDURE — 86833 HLA CLASS II HIGH DEFIN QUAL: CPT | Mod: PO,TXP | Performed by: NURSE PRACTITIONER

## 2024-02-06 ENCOUNTER — TELEPHONE (OUTPATIENT)
Dept: TRANSPLANT | Facility: CLINIC | Age: 64
End: 2024-02-06
Payer: COMMERCIAL

## 2024-02-06 LAB — HPRA INTERPRETATION: NORMAL

## 2024-02-06 NOTE — TELEPHONE ENCOUNTER
"Confirmed surgery date of 3/11/24 with patient. Preop appointments will be scheduled for 2/28/24. All questions were answered. 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 Recommended that he speak with his HR department. All questions were answered.     ----- Message from Will oT Jr., RN sent at 2/6/2024  9:41 AM CST -----  Good morning,  Can you confirm this date for me?  I can call him back if you just confirm the date, or if you want to call him.  Please just let me know.  Thanks  Will    ----- Message -----  From: David Mary  Sent: 2/6/2024   9:31 AM CST  To: Kidney Waitlisted Coordinator    Consult/Advisory:      Name Of Caller: Self      Contact Preference?: [phone] (home)       What is the nature of the call?: Calling to speak christian/ Will about clarifying transplant date. Stating his sister was told 3/11, but wanted to be extra sure      Additional Notes:  "Thank you for all that you do for our patients"           "

## 2024-02-07 ENCOUNTER — TELEPHONE (OUTPATIENT)
Dept: TRANSPLANT | Facility: CLINIC | Age: 64
End: 2024-02-07
Payer: COMMERCIAL

## 2024-02-07 DIAGNOSIS — Z76.82 ORGAN TRANSPLANT CANDIDATE: Primary | ICD-10-CM

## 2024-02-07 NOTE — TELEPHONE ENCOUNTER
PRE-SCHEDULING LD SURGERY REVIEW    Living donor surgery planned 3/11/24    Preop testing planned 2/28/24      Recipient:      Surgeon  Neph/DAVID  Stress test 9/21/23 Ok 2/7/24 EB    OK 2/15/2024 JG   2D Echo 12/27/23 Ok 2/7/24 EB OK 2/15/2024 JG         CXR 1/9/24 Ok 2/7/24 EB OK 2/15/2024 JG   Renal US 9/21/23 Ok 2/7/24 EB OK 2/15/2024 JG   Imaging 9/21/23 PXR - favorable Ok 2/7/24 EB          ABO  A Ok 2/7/24 EB OK 2/15/2024 JG   Serologies 5/25/21 - + stongyloides treated Ok 2/7/24 EB OK 2/15/2024 JG   PTH 4/21/23 - 177 care everywhere Ok 2/7/24 EB OK 2/15/2024 JG   Lab 1/9/24 care everywhere Ok 2/7/24 EB OK 2/15/2024 JG         Cancer Screenings UTD; colonoscopy in media Ok 2/7/24 EB OK 2/15/2024 JG         Pt. Specific issues Pre-dialysis Ok 2/7/24 EB I see hematology wanted to repeat anticardiolipin ab due to prior stroke, which was done. I can't open the result (11/2020)       Donor:       Surgeon  Neph/DAVID  ABO compatible A Ok 2/7/24 EB OK 2/15/2024 JG         Serologies/Lab 9/26/23 Ok 2/7/24 EB OK 2/15/2024 JG         Imaging Left Ok 2/7/24 EB

## 2024-02-10 ENCOUNTER — LAB VISIT (OUTPATIENT)
Dept: LAB | Facility: HOSPITAL | Age: 64
End: 2024-02-10
Attending: INTERNAL MEDICINE
Payer: COMMERCIAL

## 2024-02-10 DIAGNOSIS — Z76.82 ORGAN TRANSPLANT CANDIDATE: ICD-10-CM

## 2024-02-10 PROCEDURE — 86665 EPSTEIN-BARR CAPSID VCA: CPT | Mod: TXP | Performed by: INTERNAL MEDICINE

## 2024-02-10 PROCEDURE — 36415 COLL VENOUS BLD VENIPUNCTURE: CPT | Mod: TXP | Performed by: INTERNAL MEDICINE

## 2024-02-12 LAB — EBV VCA IGG SER QL IA: POSITIVE

## 2024-02-24 PROCEDURE — 99001 SPECIMEN HANDLING PT-LAB: CPT | Mod: PO,TXP | Performed by: NURSE PRACTITIONER

## 2024-02-27 NOTE — DISCHARGE INSTRUCTIONS
Your surgery has been scheduled for:______3/11/2024____________________________________    You should report to:  ____[facility], located on the [place] side of the first floor of the           [facility] ([phone])  __x__[facility], located on the [facility] side of the            Second floor of the [facility] ([phone])  ____3rd Floor [facility] located on the [facility] side of the [facility] [phone]  ____[facility]: located at [address] [place] [address]. [address]     Please Note   Tell your doctor if you take Aspirin, products containing Aspirin, herbal medications  or blood thinners, such as Coumadin, Ticlid, or Plavix.  (Consult your provider regarding holding or stopping before surgery).  Arrange for someone to drive you home following surgery.  You will not be allowed to leave the surgical facility alone or drive yourself home following sedation and anesthesia.        Before Surgery  Stop taking all herbal medications, vitamins, and supplements 7 days prior to surgery  No Motrin/Advil (Ibuprofen) 7 days before surgery  No Aleve (Naproxen) 7 days before surgery  Stop Taking Asprin, products containing Asprin __7___days before surgery  Stop taking blood thinners_______days before surgery  No Goody's/BC  Powder 7 days before surgery  Refrain from drinking alcoholic beverages for 24hours before and after surgery  Stop or limit smoking ____7_____days before surgery  You may take Tylenol for pain    Night before Surgery  Do not eat or drink after midnight  Take a shower or bath (shower is recommended).  Bathe with Hibiclens soap or an antibacterial soap from the neck down.  If not supplied by your surgeon, hibiclens soap will need to be purchased over the counter in pharmacy.  Rinse soap off thoroughly.  Shampoo your hair with your regular shampoo    The Day of  Surgery  Take another bath or shower with hibiclens or any antibacterial soap, to reduce the chance of infection.  Take heart and blood pressure medications with a small sip of water, as advised by the perioperative team.  Do not take fluid pills  You may brush your teeth and rinse your mouth, but do not swall any additional water.   Do not apply perfumes, powder, body lotions or deodorant on the day of surgery.  Nail polish should be removed.  Do not wear makeup or moisturizer  Wear comfortable clothes, such as a button front shirt and loose fitting pants.  Leave all jewelry, including body piercings, and valuables at home.    Bring any devices you will neeed after surgery such as crutches or canes.  If you have sleep apnea, please bring your CPAP machine  In the event that your physical condition changes including the onset of a cold or respiratory illness, or if you have to delay or cancel your surgery, please notify your surgeon.     Anesthesia: General Anesthesia     You are watched continuously during your procedure by your anesthesia provider.     Youre due to have surgery. During surgery, youll be given medicine called anesthesia or anesthetic. This will keep you comfortable and pain-free. Your anesthesia provider will use general anesthesia.  What is general anesthesia?  General anesthesia puts you into a state like deep sleep. It goes into the bloodstream (IV anesthetics), into the lungs (gas anesthetics), or both. You feel nothing during the procedure. You will not remember it. During the procedure, the anesthesia provider monitors you continuously. He or she checks your heart rate and rhythm, blood pressure, breathing, and blood oxygen.  IV anesthetics. IV anesthetics are given through an IV line in your arm. Theyre often given first. This is so you are asleep before a gas anesthetic is started. Some kinds of IV anesthetics relieve pain. Others relax you. Your doctor will decide which kind is best in  your case.  Gas anesthetics. Gas anesthetics are breathed into the lungs. They are often used to keep you asleep. They can be given through a facemask or a tube placed in your larynx or trachea (breathing tube).  If you have a facemask, your anesthesia provider will most likely place it over your nose and mouth while youre still awake. Youll breathe oxygen through the mask as your IV anesthetic is started. Gas anesthetic may be added through the mask.  If you have a tube in the larynx or trachea, it will be inserted into your throat after youre asleep.  Anesthesia tools and medicines  You will likely have:  IV anesthetics. These are put into an IV line into your bloodstream.  Gas anesthetics. You breathe these anesthetics into your lungs, where they pass into your bloodstream.  Pulse oximeter. This is a small clip that is attached to the end of your finger. This measures your blood oxygen level.  Electrocardiography leads (electrodes). These are small sticky pads that are placed on your chest. They record your heart rate and rhythm.  Blood pressure cuff. This reads your blood pressure.  Risks and possible complications  General anesthesia has some risks. These include:  Breathing problems  Nausea and vomiting  Sore throat or hoarseness (usually temporary)  Allergic reaction to the anesthetic  Irregular heartbeat (rare)  Cardiac arrest (rare)   Anesthesia safety  Follow all instructions you are given for how long not to eat or drink before your procedure.  Be sure your doctor knows what medicines and drugs you take. This includes over-the-counter medicines, herbs, supplements, alcohol or other drugs. You will be asked when those were last taken.  Have an adult family member or friend drive you home after the procedure.  For the first 24 hours after your surgery:  Do not drive or use heavy equipment.  Do not make important decisions or sign legal documents. If important decisions or signing legal documents is  necessary during the first 24 hours after surgery, have a trusted family member or spouse act on your behalf.  Avoid alcohol.  Have a responsible adult stay with you. He or she can watch for problems and help keep you safe.  Date Last Reviewed: 12/1/2016 © 2000-2017 [employer]. [address] [place] [address], [place] [address]. All rights reserved. This information is not intended as a substitute for professional medical care. Always follow your healthcare professional's instructions.     hard copy, drawn during this pregnancy

## 2024-02-27 NOTE — PROGRESS NOTES
ADDENDUM 3/8/24  After seeing me in clinic as noted below, Colten was sent to ED for management of hyperkalemia and eval resp sx and leukocytosis. No acute process found. He was subsequently seen by ID, pulmonary and ENT for bronchitis and ear pain. None found evidence of active infection or any barrier to transplant.  Thus, from a medical perspective, he is stable to proceed with transplant as scheduled 3/11/24.  Michelle Lundberg MD         Kidney Transplant Recipient Preoperative Evaluation    Subjective:     CC:  Preoperative evaluation of kidney transplant candidacy.    HPI:  Mr. Monet is a 63 y.o. year old White male who has been approved to receive a living  donor  kidney transplant.  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  Patient is predialysis.  He has presented for his final preoperative medical evaluation.       Hospitalizations or ED visits since last visit 9/21/23:  Changes in health or new problems diagnosed:  Current dialysis schedule:  Current dialysis access:  Urine output:  Urinary symptoms:  Bladder emptying problems:  Activity:  Blood transfusions:    Echo after last listed clinic visit 9/2023:    Left Ventricle: There is normal systolic function with a visually estimated ejection fraction of 55 - 60%.    Right Ventricle: Normal right ventricular cavity size. Systolic function is normal.    Aortic Valve: There is mild aortic valve sclerosis.    Pulmonary Artery: The estimated pulmonary artery systolic pressure is 12 mmHg.    IVC/SVC: Normal venous pressure at 3 mmHg.    Colten presents for pre-op clearance prior to his LD kidney transplant from his sister. He reports doing OK, but upon questioning, he noted having cough and resp issues x 3 months. His hx was difficult to clarify, but I was able to discern he saw local urgent care 1/3/24 and was given rx for doxy. His resp symptoms continues, so he went to [facility] 1/26/24 where (per care everywhere), he was  "diagnosed with viral URI, acute bronchitis, and mild COPD. He was rx'd prednisone 20 mg bid x5 days and used albuterol inhaler. He states these treatments helped.  He noted 3 days ago more congestion and cough. He does not feel , noting breathing at baseline. He reports having smoked for many years, but quit 8 yrs ago, and was told in past he had COPD [no records noted to confirm].  He reports his urinary stream is OK, but not great.    Current Outpatient Medications   Medication Sig    aspirin (ECOTRIN) 81 MG EC tablet Take 81 mg by mouth once daily.    atorvastatin (LIPITOR) 40 MG tablet Take 1 tablet by mouth once daily.    calcitRIOL (ROCALTROL) 0.25 MCG Cap Take 0.25 mcg by mouth once daily.    losartan (COZAAR) 100 MG tablet Take 100 mg by mouth once daily.    mycophenolate (CELLCEPT) 500 mg Tab Take 500 mg by mouth 2 (two) times daily.    albuterol (PROVENTIL/VENTOLIN HFA) 90 mcg/actuation inhaler Inhale 1-2 puffs into the lungs every 6 (six) hours as needed for Wheezing. Rescue (Patient not taking: Reported on 2024)     No current facility-administered medications for this visit.       Review of Systems   Constitutional:  Negative for fever.   HENT:  Positive for congestion and postnasal drip. Negative for sore throat.    Eyes:  Negative for visual disturbance.   Respiratory:  Positive for cough (occ productive yellow white). Negative for shortness of breath and wheezing.    Cardiovascular:  Negative for chest pain and leg swelling.   Gastrointestinal:  Negative for abdominal pain, nausea and vomiting.   Genitourinary:  Negative for difficulty urinating.   Skin:  Negative for rash.   Allergic/Immunologic: Positive for immunocompromised state.       Objective:     Blood pressure (!) 144/87, pulse 106, temperature 97.9 °F (36.6 °C), temperature source Temporal, resp. rate 16, height 5' 6.97" (1.701 m), weight 92.3 kg (203 lb 7.8 oz), SpO2 98 %.    Physical Exam  Constitutional:       Appearance: Normal " appearance.   Eyes:      Conjunctiva/sclera: Conjunctivae normal.   Cardiovascular:      Rate and Rhythm: Normal rate and regular rhythm.   Pulmonary:      Effort: Pulmonary effort is normal.      Breath sounds: Wheezing and rales (bases) present.   Abdominal:      Palpations: There is no mass.      Tenderness: There is no abdominal tenderness. There is no guarding.   Musculoskeletal:      Right lower leg: Edema (1+) present.      Left lower leg: Edema (1+) present.   Skin:     General: Skin is warm and dry.   Neurological:      Mental Status: He is alert.         Labs:  2/28/2024: Prothrombin Time 9.8 sec (Ref range: 9.0 - 12.5 sec)  Labs were reviewed with the patient.    ABO type: A POS    Assessment:     No diagnosis found.    Plan:   SEND TO ED FOR HYPERKALEMIA AND LEUKOCYTOSIS I IMMUNOCOMPROMISED PATIENT WITH COUGH/CONGESTION & CRACKLES/WHEEZES.  NEEDS OPTIMIZED PRIOR TO PROCEEDING WITH TRANSPLANT.    Transplant Candidacy:   Based on available information, Mr. Monet is an an unacceptable kidney transplant candidate today d/t high WBC and acute resp illness. He also needs hyperkalemia addressed, although this can be managed medically, as he has no s/s uremia that warrant dialysis initiation.  He may NOT proceed with transplant surgery as planned until resp status improved and hyperkalemia managed. He otherwise would be acceptable.  -Family to stay behind as he goes to ED for low K diet education per our dietician  -start Veltassa 8.4 g daily until transplant to manage K.  -Share this note with Dr. Cloud d/w BRENDA Anne RN to formulate plan of care post DC from ED and prior to LD transplant  -pt and family updated on plan.    Michelle Lundberg MD       Follow-up:  After the transplant, the patient will follow-up with the kidney transplant team and get blood work per standard protocol as described below.

## 2024-02-28 ENCOUNTER — SOCIAL WORK (OUTPATIENT)
Dept: TRANSPLANT | Facility: CLINIC | Age: 64
End: 2024-02-28
Payer: COMMERCIAL

## 2024-02-28 ENCOUNTER — CLINICAL SUPPORT (OUTPATIENT)
Dept: TRANSPLANT | Facility: CLINIC | Age: 64
End: 2024-02-28
Payer: COMMERCIAL

## 2024-02-28 ENCOUNTER — OFFICE VISIT (OUTPATIENT)
Dept: TRANSPLANT | Facility: CLINIC | Age: 64
End: 2024-02-28
Payer: COMMERCIAL

## 2024-02-28 ENCOUNTER — HOSPITAL ENCOUNTER (OUTPATIENT)
Dept: PREADMISSION TESTING | Facility: HOSPITAL | Age: 64
Discharge: HOME | End: 2024-02-28
Payer: COMMERCIAL

## 2024-02-28 ENCOUNTER — HOSPITAL ENCOUNTER (EMERGENCY)
Facility: HOSPITAL | Age: 64
Discharge: HOME | End: 2024-02-28
Attending: STUDENT IN AN ORGANIZED HEALTH CARE EDUCATION/TRAINING PROGRAM
Payer: COMMERCIAL

## 2024-02-28 VITALS
RESPIRATION RATE: 16 BRPM | BODY MASS INDEX: 31.94 KG/M2 | BODY MASS INDEX: 31.94 KG/M2 | WEIGHT: 203.5 LBS | WEIGHT: 203.5 LBS | HEIGHT: 67 IN | RESPIRATION RATE: 16 BRPM | BODY MASS INDEX: 31.94 KG/M2 | RESPIRATION RATE: 16 BRPM | OXYGEN SATURATION: 98 % | OXYGEN SATURATION: 98 % | TEMPERATURE: 98 F | DIASTOLIC BLOOD PRESSURE: 87 MMHG | SYSTOLIC BLOOD PRESSURE: 144 MMHG | SYSTOLIC BLOOD PRESSURE: 144 MMHG | TEMPERATURE: 98 F | SYSTOLIC BLOOD PRESSURE: 144 MMHG | DIASTOLIC BLOOD PRESSURE: 87 MMHG | BODY MASS INDEX: 31.94 KG/M2 | SYSTOLIC BLOOD PRESSURE: 144 MMHG | OXYGEN SATURATION: 98 % | HEIGHT: 67 IN | WEIGHT: 203.5 LBS | WEIGHT: 203.5 LBS | HEIGHT: 67 IN | HEART RATE: 106 BPM | HEART RATE: 106 BPM | HEART RATE: 106 BPM | DIASTOLIC BLOOD PRESSURE: 87 MMHG | HEART RATE: 106 BPM | TEMPERATURE: 98 F | OXYGEN SATURATION: 98 % | HEIGHT: 67 IN | RESPIRATION RATE: 16 BRPM | DIASTOLIC BLOOD PRESSURE: 87 MMHG | TEMPERATURE: 98 F

## 2024-02-28 VITALS
DIASTOLIC BLOOD PRESSURE: 70 MMHG | OXYGEN SATURATION: 96 % | TEMPERATURE: 98 F | HEART RATE: 127 BPM | WEIGHT: 200 LBS | BODY MASS INDEX: 31.35 KG/M2 | SYSTOLIC BLOOD PRESSURE: 134 MMHG | RESPIRATION RATE: 18 BRPM

## 2024-02-28 DIAGNOSIS — D84.821 IMMUNOCOMPROMISED STATE DUE TO DRUG THERAPY: ICD-10-CM

## 2024-02-28 DIAGNOSIS — N18.9 CHRONIC KIDNEY DISEASE, UNSPECIFIED CKD STAGE: ICD-10-CM

## 2024-02-28 DIAGNOSIS — E87.5 HYPERKALEMIA: Primary | ICD-10-CM

## 2024-02-28 DIAGNOSIS — N18.5 CHRONIC KIDNEY DISEASE (CKD), STAGE V: ICD-10-CM

## 2024-02-28 DIAGNOSIS — D84.9: ICD-10-CM

## 2024-02-28 DIAGNOSIS — E87.5 HYPERKALEMIA: ICD-10-CM

## 2024-02-28 DIAGNOSIS — N18.5 CKD (CHRONIC KIDNEY DISEASE), STAGE V: Primary | ICD-10-CM

## 2024-02-28 DIAGNOSIS — Z76.82 AWAITING TRANSPLANTATION OF KIDNEY: ICD-10-CM

## 2024-02-28 DIAGNOSIS — Z79.899 IMMUNOCOMPROMISED STATE DUE TO DRUG THERAPY: ICD-10-CM

## 2024-02-28 DIAGNOSIS — J06.9: ICD-10-CM

## 2024-02-28 DIAGNOSIS — D72.829 LEUKOCYTOSIS, UNSPECIFIED TYPE: Primary | ICD-10-CM

## 2024-02-28 DIAGNOSIS — R89.9 ABNORMAL LABORATORY TEST: ICD-10-CM

## 2024-02-28 DIAGNOSIS — R05.9 COUGH: ICD-10-CM

## 2024-02-28 LAB
ALBUMIN SERPL BCP-MCNC: 3.9 G/DL (ref 3.5–5.2)
ALP SERPL-CCNC: 112 U/L (ref 55–135)
ALT SERPL W/O P-5'-P-CCNC: 12 U/L (ref 10–44)
ANION GAP SERPL CALC-SCNC: 6 MMOL/L (ref 8–16)
AST SERPL-CCNC: 15 U/L (ref 10–40)
BASOPHILS # BLD AUTO: 0.05 K/UL (ref 0–0.2)
BASOPHILS NFR BLD: 0.3 % (ref 0–1.9)
BILIRUB SERPL-MCNC: 0.4 MG/DL (ref 0.1–1)
BUN SERPL-MCNC: 39 MG/DL (ref 8–23)
CALCIUM SERPL-MCNC: 9.9 MG/DL (ref 8.7–10.5)
CHLORIDE SERPL-SCNC: 110 MMOL/L (ref 95–110)
CLASS I ANTIBODIES - LUMINEX: NORMAL
CLASS I ANTIBODY COMMENTS - LUMINEX: NORMAL
CLASS II ANTIBODY COMMENTS - LUMINEX: NORMAL
CO2 SERPL-SCNC: 24 MMOL/L (ref 23–29)
CPRA %: 1
CREAT SERPL-MCNC: 3.5 MG/DL (ref 0.5–1.4)
DIFFERENTIAL METHOD BLD: ABNORMAL
EOSINOPHIL # BLD AUTO: 0.4 K/UL (ref 0–0.5)
EOSINOPHIL NFR BLD: 2.2 % (ref 0–8)
ERYTHROCYTE [DISTWIDTH] IN BLOOD BY AUTOMATED COUNT: 13.2 % (ref 11.5–14.5)
EST. GFR  (NO RACE VARIABLE): 18.7 ML/MIN/1.73 M^2
GLUCOSE SERPL-MCNC: 94 MG/DL (ref 70–110)
HCT VFR BLD AUTO: 42.8 % (ref 40–54)
HGB BLD-MCNC: 13.1 G/DL (ref 14–18)
IMM GRANULOCYTES # BLD AUTO: 0.08 K/UL (ref 0–0.04)
IMM GRANULOCYTES NFR BLD AUTO: 0.5 % (ref 0–0.5)
LYMPHOCYTES # BLD AUTO: 2.8 K/UL (ref 1–4.8)
LYMPHOCYTES NFR BLD: 17.4 % (ref 18–48)
MAGNESIUM SERPL-MCNC: 2 MG/DL (ref 1.6–2.6)
MCH RBC QN AUTO: 26.8 PG (ref 27–31)
MCHC RBC AUTO-ENTMCNC: 30.6 G/DL (ref 32–36)
MCV RBC AUTO: 88 FL (ref 82–98)
MONOCYTES # BLD AUTO: 1.2 K/UL (ref 0.3–1)
MONOCYTES NFR BLD: 7.5 % (ref 4–15)
NEUTROPHILS # BLD AUTO: 11.7 K/UL (ref 1.8–7.7)
NEUTROPHILS NFR BLD: 72.1 % (ref 38–73)
NRBC BLD-RTO: 0 /100 WBC
OHS QRS DURATION: 74 MS
OHS QTC CALCULATION: 403 MS
PHOSPHATE SERPL-MCNC: 2.9 MG/DL (ref 2.7–4.5)
PLATELET # BLD AUTO: 278 K/UL (ref 150–450)
PMV BLD AUTO: 9.8 FL (ref 9.2–12.9)
POTASSIUM SERPL-SCNC: 5.4 MMOL/L (ref 3.5–5.1)
PROT SERPL-MCNC: 7.8 G/DL (ref 6–8.4)
RBC # BLD AUTO: 4.89 M/UL (ref 4.6–6.2)
SERUM COLLECTION DT - LUMINEX CLASS I: NORMAL
SERUM COLLECTION DT - LUMINEX CLASS II: NORMAL
SODIUM SERPL-SCNC: 140 MMOL/L (ref 136–145)
SPCL1 TESTING DATE: NORMAL
SPCL2 TESTING DATE: NORMAL
SPLUA TESTING DATE: NORMAL
WBC # BLD AUTO: 16.17 K/UL (ref 3.9–12.7)

## 2024-02-28 PROCEDURE — 99999 PR PBB SHADOW E&M-EST. PATIENT-LVL III: CPT | Mod: PBBFAC,TXP,,

## 2024-02-28 PROCEDURE — 83735 ASSAY OF MAGNESIUM: CPT | Mod: NTX | Performed by: STUDENT IN AN ORGANIZED HEALTH CARE EDUCATION/TRAINING PROGRAM

## 2024-02-28 PROCEDURE — 94640 AIRWAY INHALATION TREATMENT: CPT | Mod: NTX

## 2024-02-28 PROCEDURE — 25000242 PHARM REV CODE 250 ALT 637 W/ HCPCS: Mod: NTX | Performed by: STUDENT IN AN ORGANIZED HEALTH CARE EDUCATION/TRAINING PROGRAM

## 2024-02-28 PROCEDURE — 93005 ELECTROCARDIOGRAM TRACING: CPT | Mod: NTX

## 2024-02-28 PROCEDURE — 84100 ASSAY OF PHOSPHORUS: CPT | Mod: 91,NTX | Performed by: STUDENT IN AN ORGANIZED HEALTH CARE EDUCATION/TRAINING PROGRAM

## 2024-02-28 PROCEDURE — 85025 COMPLETE CBC W/AUTO DIFF WBC: CPT | Mod: 91,NTX | Performed by: STUDENT IN AN ORGANIZED HEALTH CARE EDUCATION/TRAINING PROGRAM

## 2024-02-28 PROCEDURE — 99999 PR PBB SHADOW E&M-EST. PATIENT-LVL I: CPT | Mod: PBBFAC,TXP,,

## 2024-02-28 PROCEDURE — 99215 OFFICE O/P EST HI 40 MIN: CPT | Mod: S$GLB,TXP,, | Performed by: INTERNAL MEDICINE

## 2024-02-28 PROCEDURE — 63600175 PHARM REV CODE 636 W HCPCS: Mod: NTX | Performed by: STUDENT IN AN ORGANIZED HEALTH CARE EDUCATION/TRAINING PROGRAM

## 2024-02-28 PROCEDURE — 99499 UNLISTED E&M SERVICE: CPT | Mod: NTX,S$GLB,, | Performed by: TRANSPLANT SURGERY

## 2024-02-28 PROCEDURE — 96374 THER/PROPH/DIAG INJ IV PUSH: CPT | Mod: NTX

## 2024-02-28 PROCEDURE — 93010 ELECTROCARDIOGRAM REPORT: CPT | Mod: NTX,,, | Performed by: INTERNAL MEDICINE

## 2024-02-28 PROCEDURE — 99999 PR PBB SHADOW E&M-EST. PATIENT-LVL III: CPT | Mod: PBBFAC,TXP,, | Performed by: INTERNAL MEDICINE

## 2024-02-28 PROCEDURE — 99285 EMERGENCY DEPT VISIT HI MDM: CPT | Mod: 25,NTX

## 2024-02-28 PROCEDURE — 80053 COMPREHEN METABOLIC PANEL: CPT | Mod: 91,NTX | Performed by: STUDENT IN AN ORGANIZED HEALTH CARE EDUCATION/TRAINING PROGRAM

## 2024-02-28 RX ORDER — FUROSEMIDE 10 MG/ML
80 INJECTION INTRAMUSCULAR; INTRAVENOUS
Status: COMPLETED | OUTPATIENT
Start: 2024-02-28 | End: 2024-02-28

## 2024-02-28 RX ORDER — ALBUTEROL SULFATE 2.5 MG/.5ML
10 SOLUTION RESPIRATORY (INHALATION)
Status: COMPLETED | OUTPATIENT
Start: 2024-02-28 | End: 2024-02-28

## 2024-02-28 RX ORDER — SODIUM POLYSTYRENE SULFONATE 4.1 MEQ/G
15 POWDER, FOR SUSPENSION ORAL; RECTAL DAILY
Qty: 453.6 G | Refills: 2 | Status: SHIPPED | OUTPATIENT
Start: 2024-02-28 | End: 2024-02-28 | Stop reason: ALTCHOICE

## 2024-02-28 RX ORDER — SODIUM POLYSTYRENE SULFONATE 4.1 MEQ/G
15 POWDER, FOR SUSPENSION ORAL; RECTAL DAILY
Qty: 453.6 G | Refills: 2 | Status: SHIPPED | OUTPATIENT
Start: 2024-02-28 | End: 2024-02-28

## 2024-02-28 RX ADMIN — ALBUTEROL SULFATE 10 MG: 2.5 SOLUTION RESPIRATORY (INHALATION) at 12:02

## 2024-02-28 RX ADMIN — FUROSEMIDE 80 MG: 10 INJECTION, SOLUTION INTRAVENOUS at 01:02

## 2024-02-28 NOTE — DISCHARGE INSTRUCTIONS
Follow-up as scheduled for your upcoming appointments  Return to the emergency department for any chest pain, dizziness, feeling like her heart is racing, fevers, shortness of breath, or for any other concerning symptoms

## 2024-02-28 NOTE — ED PROVIDER NOTES
Encounter Date: 2024       History     Chief Complaint   Patient presents with    Abnormal Lab     Sent from clinic for Hyperkalemia - supposed to get a kidney transplant next month      63 y.o. male with CKD, GERD, HLD, HTN presents for hyperkalemia on outside labs.  Patient reports he was at a routine follow-up appointment and was found to have potassium of 6.  Patient reports a cough and upper respiratory symptoms as well as occasional shortness of breath.  He denies any palpitations, chest pain, fevers, dizziness, decreased urine output.  He is scheduled to have a kidney transplant in the next few weeks    The history is provided by the patient and medical records.     Review of patient's allergies indicates:   Allergen Reactions    Codeine Hives     Reaction to Tylenol #3     Past Medical History:   Diagnosis Date    Anemia     CVA (cerebral vascular accident)     GERD (gastroesophageal reflux disease)     Hyperlipidemia     Hypertension     IgA nephropathy     Obesity      Past Surgical History:   Procedure Laterality Date    APPENDECTOMY      CARDIAC CATHETERIZATION      [place] ~ 6-7 years ago    RENAL BIOPSY      TONSILLECTOMY       Family History   Problem Relation Age of Onset    Depression Mother     Kidney disease Neg Hx     Cancer Neg Hx      Social History     Tobacco Use    Smoking status: Former     Current packs/day: 0.00     Types: Cigarettes     Quit date: 2016     Years since quittin.7    Smokeless tobacco: Never   Substance Use Topics    Alcohol use: Not Currently    Drug use: Never     Review of Systems   Reason unable to perform ROS: See HPI for relevant ROS.       Physical Exam     Initial Vitals [24 1022]   BP Pulse Resp Temp SpO2   (!) 140/86 100 18 97.7 °F (36.5 °C) 97 %      MAP       --         Physical Exam    Nursing note and vitals reviewed.  Constitutional:   Alert, speaking full sentences, no acute distress   Eyes: Conjunctivae are normal. No scleral icterus.    Cardiovascular:  Normal rate, regular rhythm and intact distal pulses.           Pulmonary/Chest:   Normal work of breathing, speaking full sentences, mild expiratory wheezing   Abdominal: Abdomen is soft. He exhibits no distension. There is no abdominal tenderness.   Musculoskeletal:         General: No tenderness or edema.     Neurological: He is alert and oriented to person, place, and time.   Skin: Skin is warm and dry.         ED Course   Procedures  Labs Reviewed   CBC W/ AUTO DIFFERENTIAL - Abnormal; Notable for the following components:       Result Value    WBC 16.17 (*)     Hemoglobin 13.1 (*)     MCH 26.8 (*)     MCHC 30.6 (*)     Gran # (ANC) 11.7 (*)     Immature Grans (Abs) 0.08 (*)     Mono # 1.2 (*)     Lymph % 17.4 (*)     All other components within normal limits   COMPREHENSIVE METABOLIC PANEL - Abnormal; Notable for the following components:    Potassium 5.4 (*)     BUN 39 (*)     Creatinine 3.5 (*)     eGFR 18.7 (*)     Anion Gap 6 (*)     All other components within normal limits   MAGNESIUM   PHOSPHORUS     EKG Readings: (Independently Interpreted)   Sinus rhythm, regular, narrow complex, rate of 98, no STEMI, mildly prominent T-waves in V3 and V4, compared to prior     ECG Results              EKG 12-lead (Final result)        Collection Time Result Time QRS Duration OHS QTC Calculation    02/28/24 10:42:27 02/28/24 17:57:34 74 403                     Final result by Interface, Lab In [facility] (02/28/24 17:57:38)                   Narrative:    Test Reason : R89.9,    Vent. Rate : 098 BPM     Atrial Rate : 098 BPM     P-R Int : 122 ms          QRS Dur : 074 ms      QT Int : 316 ms       P-R-T Axes : 062 050 059 degrees     QTc Int : 403 ms    Normal sinus rhythm  Normal ECG  When compared with ECG of 09-JAN-2024 14:52,  No significant change was found  Confirmed by ILANA GARCIA MD ([id number]) on 2/28/2024 5:57:33 PM    Referred By: KRYSTAL   SELF           Confirmed By:ILANA GARCIA MD                                   Imaging Results              X-Ray Chest AP Portable (Final result)  Result time 02/28/24 13:22:54      Final result by Dinesh Ojeda MD (02/28/24 13:22:54)                   Impression:      No radiographic evidence of pneumonia or other source of cough/shortness of breath, noting that early/mild viral pneumonia or nonspecific bronchitis may be radiographically occult.      Electronically signed by: Dinesh Ojeda MD  Date:    02/28/2024  Time:    13:22               Narrative:    EXAMINATION:  XR CHEST AP PORTABLE    CLINICAL HISTORY:  Cough, unspecified    TECHNIQUE:  Single frontal view of the chest was performed.    COMPARISON:  Chest radiograph 01/09/2024    FINDINGS:  Patient is slightly rotated.    Left basilar minimal platelike scarring versus atelectasis.  The lungs are otherwise symmetrically well expanded without consolidation, pleural effusion or pneumothorax.  Cardiomediastinal silhouette is midline and within normal limits for age noting calcification at the arch.  Pulmonary vasculature and hilar contours are within normal limits.  No acute osseous process seen.  PA and lateral views can be obtained.                                       Medications   albuterol sulfate nebulizer solution 10 mg (10 mg Nebulization Given 2/28/24 1218)   furosemide injection 80 mg (80 mg Intravenous Given 2/28/24 1302)     Medical Decision Making  63 y.o. male with CKD, GERD, HLD, HTN presents for hyperkalemia on outside labs.  Differentials include HARRISON, hyperkalemia, metabolic derangement, URI, pneumonia  Patient is alert, well-appearing on exam.  EKG with mild prominence of T-waves with no significant peaking and no widening of QRS, no evidence of arrhythmia  Patient with potassium of 6 on outside labs with creatinine 3.7 which is baseline.  He is scheduled for kidney transplant in the next couple weeks  Patient does report URI symptoms and cough for several months, he reports his URI  symptoms have improved but continues to have a cough, he has mild end expiratory wheezing, no hypoxia, no fever/chills, chest x-ray ordered to evaluate further.  Suspect asthma resulting in chronic cough, no focal rales on exam, no hypoxia  No other infectious symptoms    Amount and/or Complexity of Data Reviewed  Labs: ordered. Decision-making details documented in ED Course.  Radiology: ordered. Decision-making details documented in ED Course.    Risk  Prescription drug management.               ED Course as of 02/28/24 1825   Wed Feb 28, 2024   1317 Creatinine(!): 3.5  At approximate baseline [OK]   1317 X-Ray Chest AP Portable  Findings, per independent interpretation:  Symmetrically expanded lungs, no focal consolidation, no pulmonary edema [OK]   1424 Potassium(!): 5.4  Prior to intervention [OK]   1425 On repeat evaluation patient with no significant symptoms.  He is tachycardic but I suspect this is secondary to large dose of albuterol he received as he did not have significant tachycardia prior to his treatments and does not have a fever, does not appear hypovolemic or septic.  Chest x-ray without evidence of pneumonia, suspect his cough is secondary to bronchitis/asthma.  He does have a mild leukocytosis but no significant left shift on ED labs.  No significant respiratory distress.  No hypoxia.  Continues to be hemodynamically stable.  Discussed results with patient.  No evidence of pneumonia on chest x-ray.  Repeat potassium prior to intervention is 5.4.  Unclear if there was some degree of hemolysis on outside labs.  Offered admission in context of patient with poor kidney function and hyperkalemia.  However, patient was just prescribed potassium binder and has no EKG changes, was given 1 dose of Lasix and shifted with albuterol.  Patient would prefer to go home, shared decision was made to discharge home as he does have close follow-up this week, return precautions were given [OK]   1437 Called and  discussed with patient's transplant nephrologist.  Reviewed the case including his chest x-ray findings in his new potassium level in the ED. we discussed plan of care and she is in agreement that patient is safe for discharge [OK]      ED Course User Index  [OK] Yash Puente MD                             Clinical Impression:  Final diagnoses:  [R89.9] Abnormal laboratory test  [E87.5] Hyperkalemia (Primary)  [R05.9] Cough  [N18.9] Chronic kidney disease, unspecified CKD stage          ED Disposition Condition    Discharge Stable          ED Prescriptions    None       Follow-up Information    None          Yash Puente MD  02/28/24 1246

## 2024-02-28 NOTE — LETTER
February 28, 2024        Colby Rene  [address] [place] [address] [address]  Marla MILLER [address]  Phone: [phone]  Fax: [phone]             [facility]- Transplant 1st Fl  [address]  [facility] [address]  Phone: [phone]   Patient: Colten Monet   MR Number: [id number]   YOB: 1960   Date of Visit: 2/28/2024       Dear Dr. Colby Rene    Thank you for referring Colten Monet to me for evaluation. Attached you will find relevant portions of my assessment and plan of care.    If you have questions, please do not hesitate to call me. I look forward to following Colten Monet along with you.    Sincerely,    Michelle Lundberg MD    Enclosure    If you would like to receive this communication electronically, please contact [email or web address] or [phone] to request [employer] Link access.    [employer] Link is a tool which provides read-only access to select patient information with whom you have a relationship. Its easy to use and provides real time access to review your patients record including encounter summaries, notes, results, and demographic information.    If you feel you have received this communication in error or would no longer like to receive these types of communications, please e-mail [email or web address]

## 2024-02-28 NOTE — PROGRESS NOTES
REASON FOR VISIT:   Pre-Op Kidney Recipient; elevated K     PAST MEDICAL HX:   IgA nephropathy, HTN, HLD, CVA, GERD     LABS:   K 6.0    NUTRITION HX:   Low potassium education per RN request. Unable to fully educate pt 2/2 being wheeled to the ED. Encouraged pt and caregiver to call with any questions.     INTERVENTION/EDUCATION:  Contact information was provided & will f/u as needed at clinic visits.     Consultation Time: 1 minute

## 2024-02-28 NOTE — PHARMACY MED REC
"    Admission Medication History     The home medication history was taken by Sonia Dixon.    You may go to "Admission" then "Reconcile Home Medications" tabs to review and/or act upon these items.     The home medication list has been updated by the Pharmacy department.   Please read ALL comments highlighted in yellow.   Please address this information as you see fit.    Feel free to contact us if you have any questions or require assistance.      The medications listed below were removed from the home medication list. Please reorder if appropriate:  Patient reports no longer taking the following medication(s):  VELTASSA 8.4 GM DOSE PACK    Medications listed below were obtained from: Patient/family and Analytic software- "[employer]  Current Outpatient Medications on File Prior to Encounter   Medication Sig    albuterol (PROVENTIL/VENTOLIN HFA) 90 mcg/actuation inhaler   Inhale 1-2 puffs into the lungs every 6 (six) hours as needed for wheezing. Rescue    aspirin (ECOTRIN) 81 MG EC tablet   Take 81 mg by mouth once daily.    atorvastatin (LIPITOR) 40 MG tablet   Take 1 tablet by mouth every evening.    calcitRIOL (ROCALTROL) 0.25 MCG Cap   Take 0.25 mcg by mouth once daily.    losartan (COZAAR) 100 MG tablet   Take 100 mg by mouth once daily.    mycophenolate (CELLCEPT) 500 mg Tab   Take 500 mg by mouth 2 (two) times daily.  Last filled on 11/25/23 for 180/90 DS         Sonia Dixon  EXT [id number]              .          "

## 2024-02-28 NOTE — PROGRESS NOTES
Preop appointment not complete as patient was sent to the ER for elevated potassium and cough/congestion.   Patient notified of Veltassa prescription at [facility] pharmacy and informed that we will be scheduling an infectious disease appointment for him. Understanding verbalized.

## 2024-02-28 NOTE — ED TRIAGE NOTES
Colten Monet, a 63 y.o. male presents to the ED w/ complaint of abnormal lab values today at pre transplant appointment.     Triage note:  Chief Complaint   Patient presents with    Abnormal Lab     Sent from clinic for Hyperkalemia - supposed to get a kidney transplant next month      Review of patient's allergies indicates:   Allergen Reactions    Codeine Hives     Reaction to Tylenol #3     Past Medical History:   Diagnosis Date    Anemia     CVA (cerebral vascular accident)     GERD (gastroesophageal reflux disease)     Hyperlipidemia     Hypertension     IgA nephropathy     Obesity

## 2024-02-29 ENCOUNTER — CLINICAL SUPPORT (OUTPATIENT)
Dept: INFECTIOUS DISEASES | Facility: CLINIC | Age: 64
End: 2024-02-29
Payer: COMMERCIAL

## 2024-02-29 ENCOUNTER — OFFICE VISIT (OUTPATIENT)
Dept: INFECTIOUS DISEASES | Facility: CLINIC | Age: 64
End: 2024-02-29
Payer: COMMERCIAL

## 2024-02-29 ENCOUNTER — TELEPHONE (OUTPATIENT)
Dept: INFECTIOUS DISEASES | Facility: CLINIC | Age: 64
End: 2024-02-29

## 2024-02-29 ENCOUNTER — HOSPITAL ENCOUNTER (OUTPATIENT)
Dept: RADIOLOGY | Facility: HOSPITAL | Age: 64
Discharge: HOME | End: 2024-02-29
Attending: INTERNAL MEDICINE
Payer: COMMERCIAL

## 2024-02-29 ENCOUNTER — TELEPHONE (OUTPATIENT)
Dept: TRANSPLANT | Facility: CLINIC | Age: 64
End: 2024-02-29
Payer: COMMERCIAL

## 2024-02-29 VITALS
HEIGHT: 66 IN | DIASTOLIC BLOOD PRESSURE: 78 MMHG | TEMPERATURE: 98 F | HEART RATE: 98 BPM | SYSTOLIC BLOOD PRESSURE: 124 MMHG | WEIGHT: 204.81 LBS | BODY MASS INDEX: 32.92 KG/M2

## 2024-02-29 DIAGNOSIS — R05.9 COUGH, UNSPECIFIED TYPE: ICD-10-CM

## 2024-02-29 DIAGNOSIS — Z76.82 PATIENT ON WAITING LIST FOR KIDNEY TRANSPLANT: Primary | ICD-10-CM

## 2024-02-29 DIAGNOSIS — R05.9 COUGH, UNSPECIFIED TYPE: Primary | ICD-10-CM

## 2024-02-29 DIAGNOSIS — Z76.82 PATIENT ON WAITING LIST FOR KIDNEY TRANSPLANT: ICD-10-CM

## 2024-02-29 DIAGNOSIS — Z76.82 ORGAN TRANSPLANT CANDIDATE: Primary | ICD-10-CM

## 2024-02-29 PROBLEM — Z23 NEED FOR ZOSTER VACCINATION: Status: RESOLVED | Noted: 2021-07-07 | Resolved: 2024-02-29

## 2024-02-29 PROBLEM — B78.9 STRONGYLOIDES STERCORALIS INFECTION: Status: RESOLVED | Noted: 2021-07-07 | Resolved: 2024-02-29

## 2024-02-29 PROBLEM — Z23 NEED FOR VACCINATION AGAINST STREPTOCOCCUS PNEUMONIAE: Status: RESOLVED | Noted: 2021-07-07 | Resolved: 2024-02-29

## 2024-02-29 PROBLEM — Z23 NEED FOR VACCINATION WITH TWINRIX: Status: RESOLVED | Noted: 2021-07-07 | Resolved: 2024-02-29

## 2024-02-29 LAB
ADENOVIRUS: NOT DETECTED
BORDETELLA PARAPERTUSSIS (IS1001): NOT DETECTED
BORDETELLA PERTUSSIS (PTXP): NOT DETECTED
CHLAMYDIA PNEUMONIAE: NOT DETECTED
CLASS I ANTIBODIES - LUMINEX: NORMAL
CLASS I ANTIBODY COMMENTS - LUMINEX: NORMAL
CLASS II ANTIBODY COMMENTS - LUMINEX: NORMAL
CORONAVIRUS 229E, COMMON COLD VIRUS: NOT DETECTED
CORONAVIRUS HKU1, COMMON COLD VIRUS: NOT DETECTED
CORONAVIRUS NL63, COMMON COLD VIRUS: NOT DETECTED
CORONAVIRUS OC43, COMMON COLD VIRUS: NOT DETECTED
CPRA %: 1
FLUBV RNA NPH QL NAA+NON-PROBE: NOT DETECTED
HPIV1 RNA NPH QL NAA+NON-PROBE: NOT DETECTED
HPIV2 RNA NPH QL NAA+NON-PROBE: NOT DETECTED
HPIV3 RNA NPH QL NAA+NON-PROBE: NOT DETECTED
HPIV4 RNA NPH QL NAA+NON-PROBE: NOT DETECTED
HUMAN METAPNEUMOVIRUS: NOT DETECTED
INFLUENZA A (SUBTYPES H1,H1-2009,H3): NOT DETECTED
MYCOPLASMA PNEUMONIAE: NOT DETECTED
RESPIRATORY INFECTION PANEL SOURCE: ABNORMAL
RSV RNA NPH QL NAA+NON-PROBE: NOT DETECTED
RV+EV RNA NPH QL NAA+NON-PROBE: DETECTED
SARS-COV-2 RNA RESP QL NAA+PROBE: NOT DETECTED
SERUM COLLECTION DT - LUMINEX CLASS I: NORMAL
SERUM COLLECTION DT - LUMINEX CLASS II: NORMAL
SPCL1 TESTING DATE: NORMAL
SPCL2 TESTING DATE: NORMAL
SPCLU TESTING DATE: NORMAL

## 2024-02-29 PROCEDURE — 87102 FUNGUS ISOLATION CULTURE: CPT | Mod: TXP | Performed by: INTERNAL MEDICINE

## 2024-02-29 PROCEDURE — 71250 CT THORAX DX C-: CPT | Mod: 26,TXP,, | Performed by: RADIOLOGY

## 2024-02-29 PROCEDURE — 87070 CULTURE OTHR SPECIMN AEROBIC: CPT | Mod: NTX | Performed by: INTERNAL MEDICINE

## 2024-02-29 PROCEDURE — 87633 RESP VIRUS 12-25 TARGETS: CPT | Mod: NTX | Performed by: INTERNAL MEDICINE

## 2024-02-29 PROCEDURE — 99999 PR PBB SHADOW E&M-EST. PATIENT-LVL I: CPT | Mod: PBBFAC,TXP,,

## 2024-02-29 PROCEDURE — 99215 OFFICE O/P EST HI 40 MIN: CPT | Mod: 25,S$GLB,TXP, | Performed by: INTERNAL MEDICINE

## 2024-02-29 PROCEDURE — 87015 SPECIMEN INFECT AGNT CONCNTJ: CPT | Mod: TXP | Performed by: INTERNAL MEDICINE

## 2024-02-29 PROCEDURE — 90739 HEPB VACC 2/4 DOSE ADULT IM: CPT | Mod: S$GLB,TXP,, | Performed by: INTERNAL MEDICINE

## 2024-02-29 PROCEDURE — 90471 IMMUNIZATION ADMIN: CPT | Mod: S$GLB,TXP,, | Performed by: INTERNAL MEDICINE

## 2024-02-29 PROCEDURE — 71250 CT THORAX DX C-: CPT | Mod: TC,TXP

## 2024-02-29 PROCEDURE — 87116 MYCOBACTERIA CULTURE: CPT | Mod: NTX | Performed by: INTERNAL MEDICINE

## 2024-02-29 PROCEDURE — 87205 SMEAR GRAM STAIN: CPT | Mod: TXP | Performed by: INTERNAL MEDICINE

## 2024-02-29 PROCEDURE — 99999 PR PBB SHADOW E&M-EST. PATIENT-LVL IV: CPT | Mod: PBBFAC,TXP,, | Performed by: INTERNAL MEDICINE

## 2024-02-29 PROCEDURE — 87206 SMEAR FLUORESCENT/ACID STAI: CPT | Mod: 59,NTX | Performed by: INTERNAL MEDICINE

## 2024-02-29 RX ORDER — DOXYCYCLINE HYCLATE 100 MG/1
100 TABLET, DELAYED RELEASE ORAL
Status: ON HOLD | COMMUNITY
End: 2024-03-13 | Stop reason: HOSPADM

## 2024-02-29 RX ORDER — OFLOXACIN 3 MG/ML
SOLUTION/ DROPS OPHTHALMIC
Status: ON HOLD | COMMUNITY
Start: 2023-03-08 | End: 2024-03-11 | Stop reason: CLARIF

## 2024-02-29 RX ORDER — PREDNISONE 20 MG/1
TABLET ORAL
Status: ON HOLD | COMMUNITY
Start: 2024-01-26 | End: 2024-03-12 | Stop reason: HOSPADM

## 2024-02-29 RX ORDER — BENZONATATE 100 MG/1
1 CAPSULE ORAL 3 TIMES DAILY
COMMUNITY
Start: 2024-01-03 | End: 2024-03-14 | Stop reason: SDUPTHER

## 2024-02-29 RX ORDER — PROMETHAZINE HYDROCHLORIDE AND DEXTROMETHORPHAN HYDROBROMIDE 6.25; 15 MG/5ML; MG/5ML
5 SYRUP ORAL 4 TIMES DAILY PRN
Status: ON HOLD | COMMUNITY
Start: 2024-01-26 | End: 2024-03-12 | Stop reason: HOSPADM

## 2024-02-29 RX ORDER — PREDNISOLONE ACETATE 10 MG/ML
SUSPENSION/ DROPS OPHTHALMIC
Status: ON HOLD | COMMUNITY
Start: 2023-03-27 | End: 2024-03-11 | Stop reason: CLARIF

## 2024-02-29 NOTE — TELEPHONE ENCOUNTER
----- Message from Joanna Parra sent at 2/29/2024  3:08 PM CST -----  Regarding: PFT  Contact: [phone]  Patient is calling to schedule PFT test. Please contact patient to further discuss.

## 2024-02-29 NOTE — TELEPHONE ENCOUNTER
Spoke with patient regarding results of infectious disease appointment this morning. Notified that he tested + for Rhinovirus and that a pulmonary clearance is needed. Connected with  to book appointments. All questions were answered.

## 2024-02-29 NOTE — PROGRESS NOTES
Subjective:     Patient ID: Colten Monet is a 63 y.o. male    Chief Complaint: cough, prekidney transplant    HPI: 63M with IgA nephropathy scheduled for living kidney transplant (sister will be donating) on 3/10 referred to ID for evaluation of cough. He is not currently on dialysis. Patient states that he has been having on and off cough for the last several months. Has 4.5 year old son at home, who has also been intermittently sick w/ the same symptoms. Endorses history of prior smoking for 35 years. Hx of post-nasal drip intermittently, heartburn occasionally that is alleviated w/ tums. Denies any fevers. Cough is occasionally productive. 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     Recent labs w/ a leukocytosis of 16. He was seen in the ER yesterday for hyperkalemia, treated w/ albuterol and lasix w/ improvement. CXR done given hx of cough, no acute pathology present.       Immunization History   Administered Date(s) Administered    Influenza (FLUBLOK) - Quadrivalent - Recombinant - PF *Preferred* (egg allergy) 10/21/2020    Influenza - High Dose - PF (65 years and older) 2013    Influenza - Trivalent (ADULT) 10/01/2018, 2019        Review of Systems   All other systems reviewed and are negative.       Past Medical History:   Diagnosis Date    Anemia     CVA (cerebral vascular accident)     GERD (gastroesophageal reflux disease)     Hyperlipidemia     Hypertension     IgA nephropathy     Obesity      Past Surgical History:   Procedure Laterality Date    APPENDECTOMY      CARDIAC CATHETERIZATION      [facility] ~ 6-7 years ago    RENAL BIOPSY      TONSILLECTOMY       Family History   Problem Relation Age of Onset    Depression Mother     Kidney disease Neg Hx     Cancer Neg Hx      Social History     Tobacco Use    Smoking status: Former     Current packs/day: 0.00     Types: Cigarettes     Quit date: 2016     Years since quittin.7    Smokeless tobacco:  Never   Substance Use Topics    Alcohol use: Not Currently    Drug use: Never       Objective:     Physical Exam  Constitutional:       General: He is not in acute distress.     Appearance: Normal appearance. He is well-developed. He is not ill-appearing or diaphoretic.   HENT:      Head: Normocephalic and atraumatic.      Right Ear: External ear normal.      Left Ear: External ear normal.      Nose: Nose normal.   Eyes:      General: No scleral icterus.        Right eye: No discharge.         Left eye: No discharge.      Extraocular Movements: Extraocular movements intact.      Conjunctiva/sclera: Conjunctivae normal.   Pulmonary:      Effort: Pulmonary effort is normal. No respiratory distress.      Breath sounds: No stridor.      Comments: Expiratory wheezing  Skin:     General: Skin is dry.      Coloration: Skin is not jaundiced or pale.      Findings: No erythema.   Neurological:      General: No focal deficit present.      Mental Status: He is alert and oriented to person, place, and time. Mental status is at baseline.   Psychiatric:         Mood and Affect: Mood normal.         Behavior: Behavior normal.         Thought Content: Thought content normal.         Judgment: Judgment normal.         Data:    All data, including recent labs, radiology, and pathology, has been independently reviewed.    Labs:    Recent Labs   Lab Result Units 01/09/24  1509 02/28/24  0724 02/28/24  1217   WBC K/uL 12.55 17.73* 16.17*   Hemoglobin g/dL 12.4* 12.9* 13.1*   Hematocrit % 41.2 43.1 42.8   Sodium mmol/L 140 140 140   Potassium mmol/L 4.5 6.0* 5.4*   Chloride mmol/L 110 110 110   BUN mg/dL 41* 38* 39*   Creatinine mg/dL 3.6* 3.7* 3.5*   AST U/L 17 14 15   ALT U/L 14 13 12   Alkaline Phosphatase U/L 91 104 112   Total Bilirubin mg/dL 0.3 0.5 0.4   INR   --  0.9  --    HIV 1/2 Ag/Ab   --  Non-reactive  --         Radiology:    X-Ray Chest AP Portable    Result Date: 2/28/2024  No radiographic evidence of pneumonia or other  source of cough/shortness of breath, noting that early/mild viral pneumonia or nonspecific bronchitis may be radiographically occult. Electronically signed by: Dinesh Ojeda MD Date:    02/28/2024 Time:    13:22       Assessment:    1. Cough, unspecified type  Currently scheduled to undergo living donor kidney transplant on 3/10  Ongoing cough with expiratory wheezing  Will obtain more detailed chest imaging, RIP and sputum cultures to assess for infectious etiology  Recommend pulmonary evaluation given significant smoking history, likely component of COPD contributing to symptoms that needs medically optimized, not currently on any therapy. Also significant hx of occupational exposures.    CT Chest Without Contrast    Culture, Respiratory with Gram Stain    AFB Culture & Smear    CULTURE, FUNGUS    Culture, Respiratory with Gram Stain    AFB Culture & Smear    CULTURE, FUNGUS  Pulmonary function testing      2. Patient on waiting list for kidney transplant  (In Office Administered) Hepatitis B (Recombinant) Adjuvanted, 2 dose         Follow up as needed    The total time for evaluation and management services performed on 2/29/24 was greater than 40 minutes.     Marissa Grant DO  Transplant Infectious Disease

## 2024-02-29 NOTE — PROGRESS NOTES
Patient received the first Heplisav B vaccine in the left deltoid. Pt tolerated well. Pt asked to wait in the clinic 15 minutes after injection in the event of an allergic reaction. Pt verbalized understanding. Pt left in NAD.

## 2024-03-02 LAB
BACTERIA SPEC AEROBE CULT: NORMAL
BACTERIA SPEC AEROBE CULT: NORMAL
GRAM STN SPEC: NORMAL

## 2024-03-07 ENCOUNTER — CLINICAL SUPPORT (OUTPATIENT)
Dept: TRANSPLANT | Facility: CLINIC | Age: 64
End: 2024-03-07
Payer: COMMERCIAL

## 2024-03-07 ENCOUNTER — HOSPITAL ENCOUNTER (OUTPATIENT)
Dept: PULMONOLOGY | Facility: CLINIC | Age: 64
Discharge: HOME | End: 2024-03-07
Payer: COMMERCIAL

## 2024-03-07 ENCOUNTER — OFFICE VISIT (OUTPATIENT)
Dept: OTOLARYNGOLOGY | Facility: CLINIC | Age: 64
End: 2024-03-07
Payer: COMMERCIAL

## 2024-03-07 ENCOUNTER — HOSPITAL ENCOUNTER (OUTPATIENT)
Dept: CARDIOLOGY | Facility: CLINIC | Age: 64
Discharge: HOME | End: 2024-03-07
Payer: COMMERCIAL

## 2024-03-07 ENCOUNTER — OFFICE VISIT (OUTPATIENT)
Dept: PULMONOLOGY | Facility: CLINIC | Age: 64
End: 2024-03-07
Payer: COMMERCIAL

## 2024-03-07 ENCOUNTER — OFFICE VISIT (OUTPATIENT)
Dept: TRANSPLANT | Facility: CLINIC | Age: 64
End: 2024-03-07
Payer: COMMERCIAL

## 2024-03-07 ENCOUNTER — SOCIAL WORK (OUTPATIENT)
Dept: TRANSPLANT | Facility: CLINIC | Age: 64
End: 2024-03-07
Payer: COMMERCIAL

## 2024-03-07 ENCOUNTER — HOSPITAL ENCOUNTER (OUTPATIENT)
Dept: PREADMISSION TESTING | Facility: HOSPITAL | Age: 64
Discharge: HOME | End: 2024-03-07
Attending: SURGERY
Payer: COMMERCIAL

## 2024-03-07 VITALS
WEIGHT: 201.94 LBS | SYSTOLIC BLOOD PRESSURE: 146 MMHG | HEIGHT: 67 IN | WEIGHT: 201.94 LBS | BODY MASS INDEX: 31.7 KG/M2 | DIASTOLIC BLOOD PRESSURE: 86 MMHG | SYSTOLIC BLOOD PRESSURE: 146 MMHG | BODY MASS INDEX: 31.7 KG/M2 | OXYGEN SATURATION: 99 % | OXYGEN SATURATION: 99 % | HEART RATE: 92 BPM | TEMPERATURE: 97 F | HEART RATE: 92 BPM | DIASTOLIC BLOOD PRESSURE: 86 MMHG | TEMPERATURE: 97 F | HEIGHT: 67 IN | RESPIRATION RATE: 16 BRPM | RESPIRATION RATE: 16 BRPM

## 2024-03-07 VITALS
DIASTOLIC BLOOD PRESSURE: 106 MMHG | OXYGEN SATURATION: 100 % | WEIGHT: 201.94 LBS | HEIGHT: 66 IN | TEMPERATURE: 98 F | HEART RATE: 97 BPM | SYSTOLIC BLOOD PRESSURE: 162 MMHG | BODY MASS INDEX: 32.45 KG/M2

## 2024-03-07 VITALS
BODY MASS INDEX: 32.49 KG/M2 | SYSTOLIC BLOOD PRESSURE: 142 MMHG | DIASTOLIC BLOOD PRESSURE: 82 MMHG | HEIGHT: 66 IN | WEIGHT: 202.19 LBS | HEART RATE: 84 BPM | OXYGEN SATURATION: 99 %

## 2024-03-07 DIAGNOSIS — Z76.82 ORGAN TRANSPLANT CANDIDATE: Primary | ICD-10-CM

## 2024-03-07 DIAGNOSIS — H61.23 BILATERAL IMPACTED CERUMEN: ICD-10-CM

## 2024-03-07 DIAGNOSIS — Z94.0 STATUS POST KIDNEY TRANSPLANT: ICD-10-CM

## 2024-03-07 DIAGNOSIS — R05.9 COUGH, UNSPECIFIED TYPE: ICD-10-CM

## 2024-03-07 DIAGNOSIS — J68.3 REACTIVE AIRWAYS DYSFUNCTION SYNDROME: Primary | ICD-10-CM

## 2024-03-07 DIAGNOSIS — H65.91 OME (OTITIS MEDIA WITH EFFUSION), RIGHT: Primary | ICD-10-CM

## 2024-03-07 DIAGNOSIS — Z76.82 ORGAN TRANSPLANT CANDIDATE: ICD-10-CM

## 2024-03-07 LAB
DLCO ADJ PRE: 14.07 ML/(MIN*MMHG) (ref 18.07–31.92)
DLCO SINGLE BREATH LLN: 18.07
DLCO SINGLE BREATH PRE REF: 53.7 %
DLCO SINGLE BREATH REF: 25
DLCOC SBVA LLN: 2.64
DLCOC SBVA PRE REF: 79.8 %
DLCOC SBVA REF: 3.96
DLCOC SINGLE BREATH LLN: 18.07
DLCOC SINGLE BREATH PRE REF: 56.3 %
DLCOC SINGLE BREATH REF: 25
DLCOCSBVAULN: 5.27
DLCOCSINGLEBREATHULN: 31.92
DLCOSINGLEBREATHULN: 31.92
DLCOVA LLN: 2.64
DLCOVA PRE REF: 76.2 %
DLCOVA PRE: 3.02 ML/(MIN*MMHG*L) (ref 2.64–5.27)
DLCOVA REF: 3.96
DLCOVAULN: 5.27
DLVAADJ PRE: 3.16 ML/(MIN*MMHG*L) (ref 2.64–5.27)
ERV LLN: -16448.97
ERV PRE REF: 54.7 %
ERV REF: 1.03
ERVULN: ABNORMAL
FEF 25 75 LLN: 1.14
FEF 25 75 PRE REF: 29.7 %
FEF 25 75 REF: 2.45
FET100 CHG: -24.2 %
FEV05 LLN: 1.23
FEV05 REF: 2.37
FEV1 CHG: 17.9 %
FEV1 FVC LLN: 65
FEV1 FVC PRE REF: 85 %
FEV1 FVC REF: 77
FEV1 LLN: 2.21
FEV1 PRE REF: 47.9 %
FEV1 REF: 2.99
FEV1 VOL CHG: 0.26
FRCPLETH LLN: 2.42
FRCPLETH PREREF: 103.7 %
FRCPLETH REF: 3.41
FRCPLETHULN: 4.4
FVC CHG: 12.5 %
FVC LLN: 2.9
FVC PRE REF: 56.4 %
FVC REF: 3.86
FVC VOL CHG: 0.27
IVC PRE: 2.69 L (ref 2.9–4.83)
IVC SINGLE BREATH LLN: 2.9
IVC SINGLE BREATH PRE REF: 69.8 %
IVC SINGLE BREATH REF: 3.86
IVCSINGLEBREATHULN: 4.83
LLN IC: -9999997.25
PEF LLN: 5.97
PEF PRE REF: 43.4 %
PEF REF: 8.03
PHYSICIAN COMMENT: ABNORMAL
POST FEF 25 75: 1.06 L/S (ref 1.14–4.26)
POST FET 100: 7.74 SEC
POST FEV1 FVC: 69.01 % (ref 64.66–88.67)
POST FEV1: 1.69 L (ref 2.21–3.72)
POST FEV5: 1.29 L (ref 1.23–3.5)
POST FVC: 2.45 L (ref 2.9–4.83)
POST PEF: 3.56 L/S (ref 5.97–10.09)
PRE DLCO: 13.43 ML/(MIN*MMHG) (ref 18.07–31.92)
PRE ERV: 0.57 L (ref -16448.97–16451.03)
PRE FEF 25 75: 0.73 L/S (ref 1.14–4.26)
PRE FET 100: 10.22 SEC
PRE FEV05 REF: 46.7 %
PRE FEV1 FVC: 65.82 % (ref 64.66–88.67)
PRE FEV1: 1.43 L (ref 2.21–3.72)
PRE FEV5: 1.11 L (ref 1.23–3.5)
PRE FRC PL: 3.53 L (ref 2.42–4.4)
PRE FVC: 2.17 L (ref 2.9–4.83)
PRE IC: 2.13 L (ref -9999997.25–#######.####)
PRE PEF: 3.48 L/S (ref 5.97–10.09)
PRE REF IC: 77.4 %
PRE RV: 2.97 L (ref 1.7–3.05)
PRE TLC: 5.66 L (ref 5.16–7.47)
RAW PRE REF: 178 %
RAW PRE: 5.45 CMH2O*S/L (ref 3.06–3.06)
RAW REF: 3.06
REF IC: 2.75
RV LLN: 1.7
RV PRE REF: 125 %
RV REF: 2.37
RVTLC LLN: 30
RVTLC PRE REF: 134.7 %
RVTLC PRE: 52.41 % (ref 29.94–47.9)
RVTLC REF: 39
RVTLCULN: 48
RVULN: 3.05
SGAW PRE REF: 51.8 %
SGAW PRE: 0.04 1/(CMH2O*S) (ref 0.08–0.08)
SGAW REF: 0.08
TLC LLN: 5.16
TLC PRE REF: 89.7 %
TLC REF: 6.31
TLC ULN: 7.47
ULN IC: ABNORMAL
VA PRE: 4.45 L (ref 6.16–6.16)
VA SINGLE BREATH LLN: 6.16
VA SINGLE BREATH PRE REF: 72.2 %
VA SINGLE BREATH REF: 6.16
VASINGLEBREATHULN: 6.16
VC LLN: 2.9
VC PRE REF: 69.8 %
VC PRE: 2.69 L (ref 2.9–4.83)
VC REF: 3.86
VC ULN: 4.83

## 2024-03-07 PROCEDURE — 99999 PR PBB SHADOW E&M-EST. PATIENT-LVL IV: CPT | Mod: PBBFAC,TXP,, | Performed by: INTERNAL MEDICINE

## 2024-03-07 PROCEDURE — 99203 OFFICE O/P NEW LOW 30 MIN: CPT | Mod: 25,S$GLB,, | Performed by: INTERNAL MEDICINE

## 2024-03-07 PROCEDURE — 94060 EVALUATION OF WHEEZING: CPT | Mod: S$GLB,,, | Performed by: INTERNAL MEDICINE

## 2024-03-07 PROCEDURE — 93010 ELECTROCARDIOGRAM REPORT: CPT | Mod: S$GLB,,, | Performed by: INTERNAL MEDICINE

## 2024-03-07 PROCEDURE — 93005 ELECTROCARDIOGRAM TRACING: CPT | Mod: S$GLB,,, | Performed by: INTERNAL MEDICINE

## 2024-03-07 PROCEDURE — 94729 DIFFUSING CAPACITY: CPT | Mod: S$GLB,,, | Performed by: INTERNAL MEDICINE

## 2024-03-07 PROCEDURE — 99203 OFFICE O/P NEW LOW 30 MIN: CPT | Mod: 25,S$GLB,, | Performed by: NURSE PRACTITIONER

## 2024-03-07 PROCEDURE — 99999 PR PBB SHADOW E&M-EST. PATIENT-LVL III: CPT | Mod: PBBFAC,TXP,,

## 2024-03-07 PROCEDURE — 99999 PR PBB SHADOW E&M-EST. PATIENT-LVL I: CPT | Mod: PBBFAC,,, | Performed by: NURSE PRACTITIONER

## 2024-03-07 PROCEDURE — 94726 PLETHYSMOGRAPHY LUNG VOLUMES: CPT | Mod: S$GLB,,, | Performed by: INTERNAL MEDICINE

## 2024-03-07 PROCEDURE — 99499 UNLISTED E&M SERVICE: CPT | Mod: S$GLB,,, | Performed by: SURGERY

## 2024-03-07 RX ORDER — SODIUM CHLORIDE 0.9 % (FLUSH) 0.9 %
10 SYRINGE (ML) INJECTION
Status: CANCELLED | OUTPATIENT
Start: 2024-03-07

## 2024-03-07 RX ORDER — CEFAZOLIN SODIUM 2 G/50ML
2 SOLUTION INTRAVENOUS
Status: CANCELLED | OUTPATIENT
Start: 2024-03-07

## 2024-03-07 RX ORDER — ACETAMINOPHEN 650 MG/20.3ML
650 LIQUID ORAL ONCE
Status: CANCELLED | OUTPATIENT
Start: 2024-03-07 | End: 2024-03-07

## 2024-03-07 RX ORDER — MUPIROCIN 20 MG/G
OINTMENT TOPICAL
Status: CANCELLED | OUTPATIENT
Start: 2024-03-07

## 2024-03-07 RX ORDER — PREGABALIN 75 MG/1
75 CAPSULE ORAL
Status: CANCELLED | OUTPATIENT
Start: 2024-03-07

## 2024-03-07 RX ORDER — HEPARIN SODIUM 5000 [USP'U]/ML
5000 INJECTION, SOLUTION INTRAVENOUS; SUBCUTANEOUS ONCE
Status: CANCELLED | OUTPATIENT
Start: 2024-03-07 | End: 2024-03-07

## 2024-03-07 RX ORDER — DIPHENHYDRAMINE HYDROCHLORIDE 50 MG/ML
50 INJECTION INTRAMUSCULAR; INTRAVENOUS ONCE
Status: CANCELLED | OUTPATIENT
Start: 2024-03-07 | End: 2024-03-07

## 2024-03-07 RX ORDER — FLUTICASONE FUROATE AND VILANTEROL TRIFENATATE 100; 25 UG/1; UG/1
1 POWDER RESPIRATORY (INHALATION) DAILY
Qty: 30 EACH | Refills: 11 | Status: SHIPPED | OUTPATIENT
Start: 2024-03-07 | End: 2025-03-07

## 2024-03-07 NOTE — PROGRESS NOTES
RECIPIENT PRE-OP NOTE    Potential Recipient Name: Colten Monet, [id number]  Encounter Date: 3/7/2024    Sex: male  YOB: 1960  Age: 63 y.o.    Housing/Contact Info:  [address] [place] [address] [address] [address]  Telephone Information:   Mobile [phone]    Home: [phone] (home)  Work: There is no work phone number on file.  E-mail: [email or web address]    Potential Surgery Date: Monday March 11, 2024  Potential Donor Name: Angela Phelps MRN [id number]  Potential Donor Relationship: sister    Patient presents as alert and oriented x 4, pleasant, good eye contact, well groomed, recall good, concentration/judgement good, average intelligence, calm, communicative, cooperative, and asking and answering questions appropriately. Patient presents as a 63 y.o. year old male to recipient pre-op appointment for scheduled kidney living donor surgery. Patient's friend accompanies patient.  Patient states that he is independent with ADLs at this time.  Patient states motivation to pursue organ transplant at this time.    Does patient drive?  Patient is aware will not be able to drive until medically cleared by the transplant team. Patient verbalizes understanding that patient will need assistance for all transportation needs until medically cleared to drive.    Caregivers/Transportation:  Name: Ginna Mnoet  Age: 42  Phone: [phone]  Relationship: wife  Does person drive? yes  Does person have own/reliable transportation? yes    Name: Tripp Tyson  Age: 59  Phone: [phone]  Relationship: friend  Does person drive? yes  Does person have own/reliable transportation? yes    Name: Sophia Ballesterosb  Age: 29  Phone: [phone]  Relationship: daughter  Does person drive? Yes  Does person have own/reliable transportation? yes    Dependents/Others who rely on Patient/Caregiver for care: Pt has 3 yo son Colten, will be cared for by family at time of txp    Cognitive:  Education: high school  Reading  Level: 12th grade  Reports difficulty with: N/A  Denies difficulty with: reading, writing, seeing, hearing, comprehension, learning, and memory    Infusion Service: patient utilizing? no  Home Health: patient utilizing? no  DME:  patient utilizing? no    Living Will: no  Healthcare Power of : no  Written LW/HCPA and verbal information presented to patient today.    Insurance: Payor: [employer] / Plan: [facility] CHOICE PLUS / Product Type: Commercial /   Possible concerns regarding insurance post-donation reviewed. Patient verbalizes clear understanding.    Financial:  Employment: Patient is currently employed: occupation: superintendent, company: [employer], time at present employer: 7 yrs.  Able to take time off work without financial concerns: yes.. Patient does plan to return to work once medically cleared. Patient referred to vocational rehabilitation.  Spouse/Significant Other Employment: occupation: homemaker   Patient states does not expect to have any financial problems following transplant surgery.  Patient states has not conducted fundraising to assist with post-transplant costs.    Tobacco/Alcohol/Illicit Drug Abuse: Patient reports does not use tobacco products, alcohol, illicit drugs, and non-prescription medications and that patient does plan to remain abstinent.     Psychiatric History: patient denies    Coping: Patient states that he is coping well with having kidney living donor surgery. Patient states will call as needed and does understand how to access resources including the  as needed, both inpatient and outpatient.    Resources, information and support provided. Psychosocial aspects regarding organ donation and transplantation were discussed. Patient reports having a clear understanding of resources, information, support and psychosocial aspects.    Discharge Plan: Patient to discharge to the [facility] complex under the care of patient's caregiver  post-organ transplant. Patient states that patient's friend will be present as caregiver in the hospital. Patient's friend will transport patient to [facility]. Patient states agreement with not driving and not returning to work until medically cleared to do so.    Patient states having clear understanding and realistic expectations regarding the potential risks and potential benefits of organ transplantation and organ donation. Patient agrees to further discuss with health care team members and support system members, as well as to utilize available resources and express questions and/or concerns. Resources and information provided and reviewed.    Patient reports motivation to proceed with living organ donor transplantation as scheduled.     Suitability for Transplant: Patient presents as low risk candidate for organ transplant at this time.  Patient states does have a caregiver plan, transportation plan, and lodging plan in place. Patient states that patient does have medical and prescription medicine insurance in place and does have a plan in place to afford post-transplant costs.    Patient provided verbal permission to release any necessary information to outside resources for patient care and discharge planning.  Resources and information provided and reviewed.  Patient is choosing has no specific agency preferences.    Pharmacy Name: [email or web address] [place] [id number]    Pharmacy Contact Information: Phone: [phone] Fax: [phone]    Patient states that he is aware of what patient's normal copays and deductibles are for prescription medicines.       provided psychosocial support, counseling, resources, education, assistance, and discharge planning.  remains available.    Recommendations/Additional Comments: SW recommends pt conduct fundraising to assist in the transplant process. SW recommends that pt remain aware of potential mental health concerns and  contact the team if any concerns arise. SW recommends that pt remain abstinent from tobacco, ETOH and drug use. SW remains available to answer any questions or concerns that arise as the pt moves through the transplant process.     Final determination of transplant candidacy will be made once work up is complete and reviewed by the selection committee.    Patient states is aware of [facility]'s affiliation and/or partnership with agencies in home health care, LTAC, SNF, [facility], and other hospitals and clinics.

## 2024-03-07 NOTE — ASSESSMENT & PLAN NOTE
Cough for several months that does seem slightly better but has not resolved completely recently seen by Dr. Grant in ID  and in her evaluations was discovered that he did have rhino virus/ enterovirus on his infection panel.

## 2024-03-07 NOTE — PROGRESS NOTES
"Subjective:     Reason for visit: cough     Patient ID:  Colten Monet is a 63 y.o. male    History:   Mr. Monet is a 62 yo  with IgA nephropathy  and chronic immunosuppression that is planning for a kidney transplant.  He has no previous lung disease but does have a significant smoking history.  He has had a chronic cough for the last couple of months and was recently seen by infectious disease who initiated a thorough workup.  His sputum cultures have been negative thus far, his respiratory infection panel showed rhinovirus or enterovirus and his CT scan showed bronchial thickening and inflammation in his bronchioles.  He is at increased risk for infection given his immunosuppression but he does report that he seems to be feeling somewhat better but his cough has not improved.  He was never diagnosed with COPD or have issues with significant shortness of breath prior to this cough.  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   Constitutional:       General: He is not in acute distress.     Appearance: He is not diaphoretic.   HENT:      Head: Normocephalic and atraumatic.      Right Ear: External ear normal.      Left Ear: External ear normal.   Eyes:      Conjunctiva/sclera: Conjunctivae normal.      Pupils: Pupils are equal, round, and reactive to light.   Neck:      Trachea: No tracheal deviation.   Cardiovascular:      " Rate and Rhythm: Normal rate and regular rhythm.      Heart sounds: Normal heart sounds. No murmur heard.  Pulmonary:      Effort: Pulmonary effort is normal. No respiratory distress.      Breath sounds: No stridor. Rhonchi present. No wheezing or rales.   Abdominal:      General: Bowel sounds are normal. There is no distension.      Palpations: Abdomen is soft.      Tenderness: There is no abdominal tenderness.   Musculoskeletal:         General: Normal range of motion.      Cervical back: Normal range of motion and neck supple.   Skin:     General: Skin is warm and dry.      Findings: No erythema.   Neurological:      Mental Status: He is alert and oriented to person, place, and time.      Gait: Gait is intact.   Psychiatric:         Mood and Affect: Mood and affect normal.         Cognition and Memory: Memory normal.         Judgment: Judgment normal.          Personal Diagnostic Review and Interpretation  Results for orders placed or performed during the hospital encounter of 02/29/24 (from the past 2160 hour(s))   CT Chest Without Contrast    Narrative    EXAMINATION:  CT CHEST WITHOUT CONTRAST    CLINICAL HISTORY:  pre-kidney transplant, persistent cough/sputum production; Cough, unspecified    TECHNIQUE:  Low dose axial images, sagittal and coronal reformations were obtained from the thoracic inlet to the lung bases. Contrast was not administered.    COMPARISON:  Chest x-ray 02/28/2024    FINDINGS:  Heart and mediastinal vessels: Normal sized heart.  Aortic and coronary atherosclerosis.    Bisi/Mediastinum: No pathologic danielle enlargement.    Lungs/Pleura: There are some patchy areas of mosaic attenuation in the lung bases and there is some central and peripheral peribronchial thickening.  There are faint areas of centrilobular and tree-in-bud nodular opacity scattered within the lingula and lower lobes which may reflect endobronchial spread of infection.  No pleural effusions.    Upper Abdomen: The  gallbladder is partially imaged but appears distended.  There is a right adrenal gland adenoma that is partially imaged measuring at least 2.5 cm.    Bones: Unremarkable.      Impression    Peribronchial thickening/inflammation and nodular lung opacities suggesting the possibility of infectious or inflammatory bronchitis.  Mosaic attenuation in the lung bases may reflect some air trapping and or atelectasis.      Electronically signed by: Lorenzo Shaffer Jr  Date:    02/29/2024  Time:    14:08           Pertinent Studies Reviewed & Interpreted:     Pulmonary Function Tests:    PFTs show significant reversibility and low normal FEV1/FVC ratio. Decreased DLCO     Echocardiograms:   Results for orders placed during the hospital encounter of 12/27/23    Echo Saline Bubble? No    Interpretation Summary    Left Ventricle: There is normal systolic function with a visually estimated ejection fraction of 55 - 60%.    Right Ventricle: Normal right ventricular cavity size. Systolic function is normal.    Aortic Valve: There is mild aortic valve sclerosis.    Pulmonary Artery: The estimated pulmonary artery systolic pressure is 12 mmHg.    IVC/SVC: Normal venous pressure at 3 mmHg.        Assessment & Plan:       Problem List Items Addressed This Visit          Pulmonary    Cough    Current Assessment & Plan       Cough for several months that does seem slightly better but has not resolved completely recently seen by Dr. Grant in ID  and in her evaluations was discovered that he did have rhino virus/ enterovirus on his infection panel.         Reactive airways dysfunction syndrome - Primary    Current Assessment & Plan       Over the last couple of months has had worsening cough and PFTs show evidence of possible evolving obstruction but it is significant reversibility with albuterol.  Likely reactive airways triggered by recent viral infection although he does have a significant smoking history in the past.  Starting Advair  for the next couple of months to see if his reactive airways improve and to optimize him prior to major surgery         Relevant Medications    fluticasone furoate-vilanteroL (BREO ELLIPTA) 100-25 mcg/dose diskus inhaler          RETURN TO CLINIC IN 3 MONTHS       Portions of the record may have been created with voice-recognition software. Occasional wrong-word or sound-a-like substitutions may have occurred due to the inherent limitations of voice-recognition software. Read the chart carefully and recognize, using context, where substitutions have occurred.

## 2024-03-07 NOTE — PROGRESS NOTES
Transplant Surgery  Kidney Transplant Recipient Evaluation    Referring Physician: Colby Rene  Current Nephrologist: Colby Rene    Subjective:     Reason for Visit: evaluate transplant candidacy    History of Present Illness: Colten Monet is a 63 y.o. year old male undergoing transplant evaluation.    Dialysis History: Colten is pre-dialysis.      Transplant History: N/A    Etiology of Renal Disease: IgA Nephropathy (based on medical records from referral).    External provider notes reviewed: No    Review of Systems   Constitutional:  Negative for fatigue.   HENT:  Negative for drooling, postnasal drip and sore throat.    Eyes:  Negative for discharge and itching.   Respiratory:  Negative for choking and stridor.    Gastrointestinal:  Negative for rectal pain.   Endocrine: Negative for polydipsia.   Genitourinary:  Negative for enuresis and genital sores.   Musculoskeletal:  Negative for back pain, neck pain and neck stiffness.   Allergic/Immunologic: Negative for immunocompromised state.   Neurological:  Negative for facial asymmetry and numbness.   Hematological:  Negative for adenopathy.   Psychiatric/Behavioral:  Negative for behavioral problems, self-injury and suicidal ideas.    Objective:     Physical Exam:  Constitutional:   Vitals reviewed: yes   Well-nourished and well-groomed: yes  Eyes:   Sclerae icteric: no   Extraocular movements intact: yes  GI:    Bowel sounds normal: yes   Tenderness: no    If yes, quadrant/location: not applicable   Palpable masses: no    If yes, quadrant/location: not applicable   Hepatosplenomegaly: no   Ascites: no   Hernia: no    If yes, type/location: not applicable   Surgical scars: yes    If yes, type/location: lower right quafdranr - appendectomy & small bowel inflammation?  Resp:   Effort normal: yes   Breath sounds normal: yes    CV:   Regular rate and rhythm: yes   Heart sounds normal: yes   Femoral pulses normal: yes   Extremities edematous: no  Skin:   Rashes or  lesions present: no    If yes, describe:not applicable   Jaundice:: no    Musculoskeletal:   Gait normal: yes   Strength normal: yes  Psych:   Oriented to person, place, and time: yes   Affect and mood normal: yes    Additional comments: not applicable    Diagnostics:  The following labs have been reviewed: CBC  CMP  T  Counseling: We provided Colten Monet with a group education session today.  We discussed kidney transplantation at length with him, including risks, potential complications, and alternatives in the management of his renal failure.  The discussion included complications related to anesthesia, bleeding, infection, primary nonfunction, and ATN.  I discussed the typical postoperative course, length of hospitalization, the need for long-term immunosuppression, and the need for long-term routine follow-up.  I discussed living-donor and -donor transplantation and the relative advantages and disadvantages of each.  I also discussed average waiting times for both living donation and  donation.  I discussed national and center-specific survival rates.  I also mentioned the potential benefit of multicenter listing to candidates listed with centers within more than one organ procurement organization.  All questions were answered.    Patient advised that it is recommended that all transplant candidates, and their close contacts and household members receive Covid vaccination.    Final determination of transplant candidacy will be made once evaluation is complete and reviewed by the Kidney & Kidney/Pancreas Selection Committee.    Coronavirus disease (COVID-19) caused by severe acute respiratory virus coronavirus 2 (SARS-C0V 2) is associated with increased mortality in solid organ transplant recipients (SOT) compared to non-transplant patients. Vaccine responses to vaccination are depressed against SARS-CoV2 compared to normal individuals but improve with third vaccination doses. Vaccination  prior to SOT provides both the best opportunity for transplant candidates to develop protective immunity and to reduce the risk of serious COVID19 infections post transplantation. Organ transplant candidates at [facility] will be required to receive SARS-CoV-2 vaccination prior to being listed with a an active status, whenever possible. Exceptions will be made for disability related reasons or for sincerely held Alevism beliefs.          Transplant Surgery - Candidacy   Assessment/Plan:   Colten Monet is pre-dialysis with CKD stage 4 (GFR 15-29 mL/min). I have concerns with recent cough, and left ear pain =- will be further worked up by pulmonary and ent.. Based on available information, Colten Monet is a suitable kidney transplant candidate.     Additional testing to be completed according to the Written Order Guidelines for Adult Pre-kidney and Pancreas Transplant Evaluation (KI-02).  Interpretation of tests and discussion of patient management involves all members of the multidisciplinary transplant team.    Justyn Chirinos MD

## 2024-03-07 NOTE — PROGRESS NOTES
Subjective:   Colten Monet is a 63 y.o. male who was self-referred for cerumen impaction. He has a past medical history of Anemia, CVA (cerebral vascular accident), GERD (gastroesophageal reflux disease), Hyperlipidemia, Hypertension, IgA nephropathy, and Obesity. He reports that he is receiving a kidney transplant from his sister on Monday and was instructed to have his ears checked before the surgery to rule out any infection as he is going to be immunocompromised after surgery. He denies any specific ear complaints today he does know some hearing loss that is worse in the right ear-this developed after recent URI.    Past Medical History  He has a past medical history of Anemia, CVA (cerebral vascular accident), GERD (gastroesophageal reflux disease), Hyperlipidemia, Hypertension, IgA nephropathy, and Obesity.    Past Surgical History  He has a past surgical history that includes Tonsillectomy; Appendectomy; Renal biopsy; and Cardiac catheterization.    Family History  His family history includes Depression in his mother.    Social History  He reports that he quit smoking about 7 years ago. His smoking use included cigarettes. He has never used smokeless tobacco. He reports that he does not currently use alcohol. He reports that he does not use drugs.    Allergies  He is allergic to codeine.    Medications  He has a current medication list which includes the following prescription(s): albuterol, aspirin, atorvastatin, benzonatate, calcitriol, doxycycline, breo ellipta, losartan, mycophenolate, ofloxacin, prednisolone acetate, prednisone, and promethazine-dextromethorphan.  Review of Systems     Constitutional: Negative for chills and fever.      HENT: Positive for hearing loss.  Negative for ear discharge, ear infection, ear pain and runny nose.      Neurological: Negative for dizziness and light-headedness.          Objective:     Constitutional:   He is oriented to person, place, and time. He appears  well-developed and well-nourished. He appears alert. He is cooperative.  Non-toxic appearance. He does not have a sickly appearance. He does not appear ill. Normal speech.      Head:  Normocephalic and atraumatic. Not macrocephalic and not microcephalic. Head is without abrasion, without right periorbital erythema, without left periorbital erythema and without TMJ tenderness.     Ears:    Right Ear: No drainage, swelling or tenderness. No mastoid tenderness. Tympanic membrane is not scarred, not perforated, not erythematous, not retracted and not bulging. A middle ear effusion is present.   Left Ear: No drainage, swelling or tenderness. No mastoid tenderness. Tympanic membrane is not scarred, not perforated, not erythematous, not retracted and not bulging.  No middle ear effusion.   Ceruminous debris obstructing bilateral TMs removed under microscopy  [place]:  AS: AC>BC  AD: BC>AC  [place]: equilateralization    Pulmonary/Chest:   Effort normal.     Psychiatric:   He has a normal mood and affect. His speech is normal and behavior is normal.     Neurological:   He is alert and oriented to person, place, and time.     Procedure  Cerumen removal performed.  See procedure note.  Procedure Note:  The patient was brought to the minor procedure room and placed under the operating microscope of the right ear canal which was cleaned of ceruminous debris. Using a combination of suction, curettes and cup forceps the patient's cerumen was removed. The patient tolerated the procedure well. There were no complications.  Procedure Note:  The patient was brought to the minor procedure room and placed under the operating microscope of the left ear canal which was cleaned of ceruminous debris. Using a combination of suction, curettes and cup forceps the patient's cerumen was removed. The patient tolerated the procedure well. There were no complications.    Audiogram  No testing available today  Assessment:     1. OME (otitis media with  effusion), right    2. Bilateral impacted cerumen      Plan:   Diagnoses and all orders for this visit:    OME (otitis media with effusion), right  After clearing cerumen from the right ear, exam with right serous effusion. No signs of acute infection or inflammation. We discussed the potential causes and treatments for this problem. Recommended trial of Flonase 2 SEN daily. Reviewed the correct administration technique of nasal sprays. Also instructed to perform nasal insufflation 1-2 times daily, but to stop if it causes any pain.     Follow-up in 6-8 weeks with audiogram.  Follow-up sooner if he develops any ear pain or concerning ear symptoms.    Bilateral impacted cerumen  Bilateral cerumen impaction removed under microscopy. Patient tolerated procedure well and noted improvement upon impaction removal.

## 2024-03-07 NOTE — ANESTHESIA PAT ROS NOTE
03/07/2024  Colten Monet is a 63 y.o., male, with IgA nephropathy scheduled for living kidney transplant (sister will be donating) on 3/10.   Presents today for Anesthesia Assessment and preop instructions.      Pre-op Assessment    I have reviewed the Patient Summary Reports.     I have reviewed the Nursing Notes. I have reviewed the NPO Status.   I have reviewed the Medications.     Review of Systems  Anesthesia Hx:  No problems with previous Anesthesia             Denies Family Hx of Anesthesia complications.    Denies Personal Hx of Anesthesia complications.                    Social:  Former Smoker, No Alcohol Use Smoking Status  Former  Quit  5/25/2016  Types  Cigarettes quit in 5/25/2016  Pack Year History  0 packs/day, Started 5/25/2016  Smokeless Tobacco Status  Never        Hematology/Oncology:    Oncology Normal    -- Anemia:                                  EENT/Dental:  denies chronic allergic rhinitis   Tonsillectomy    Eyes:             Eye Disease:           Tresion left eye  Ears General/Symptom(s) Hearing Impairment:     Otitis Media        Cardiovascular:  Exercise tolerance: poor   Denies Pacemaker. Hypertension, poorly controlled       Denies Angina.     hyperlipidemia  Denies ZHU.   12/20/23 TTE   ·  Left Ventricle: There is normal systolic function with a visually estimated ejection fraction of 55 - 60%.  ·  Right Ventricle: Normal right ventricular cavity size. Systolic function is normal.  ·  Aortic Valve: There is mild aortic valve sclerosis.  ·  Pulmonary Artery: The estimated pulmonary artery systolic pressure is 12 mmHg.  ·  IVC/SVC: Normal venous pressure at 3 mmHg.      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  GERD, well controlled Denies Liver Disease.  Denies Hepatitis. Appendectomy          Musculoskeletal:   Musculoskeletal General/Symptoms:  Functional capacity is ambulatory without assistance.            Neurological:   CVA    Denies Seizures.     Neuro Symptoms                CVA - Cerebrovasular Accident , Most recent CVA was on 2019  , residual deficits are hemiparesis - right. No residual             Endocrine:        Obesity / BMI > 30  Psych:    depression                Physical Exam  General: Well nourished, Cooperative, Alert and Oriented    Airway:  Mouth Opening: Normal  Tongue: Normal  Neck ROM: Left Lateral Motion Decreased, Right Lateral Motion Decreased    Dental:  Caps / Implants, Intact    Chest/Lungs:  Clear to auscultation, Normal Respiratory Rate    Heart:  Rate: Normal  Rhythm: Regular Rhythm  Sounds: Normal

## 2024-03-07 NOTE — H&P (VIEW-ONLY)
ransplant Surgery  Kidney Transplant Recipient Evaluation     Referring Physician: Colby Rene  Current Nephrologist: Colby Rene     Subjective:      Reason for Visit: evaluate transplant candidacy     History of Present Illness: Colten Monet is a 63 y.o. year old male undergoing transplant evaluation.     Dialysis History: Colten is pre-dialysis.       Transplant History: N/A     Etiology of Renal Disease: IgA Nephropathy (based on medical records from referral).     External provider notes reviewed: No     Review of Systems   Constitutional:  Negative for fatigue.   HENT:  Negative for drooling, postnasal drip and sore throat.    Eyes:  Negative for discharge and itching.   Respiratory:  Negative for choking and stridor.    Gastrointestinal:  Negative for rectal pain.   Endocrine: Negative for polydipsia.   Genitourinary:  Negative for enuresis and genital sores.   Musculoskeletal:  Negative for back pain, neck pain and neck stiffness.   Allergic/Immunologic: Negative for immunocompromised state.   Neurological:  Negative for facial asymmetry and numbness.   Hematological:  Negative for adenopathy.   Psychiatric/Behavioral:  Negative for behavioral problems, self-injury and suicidal ideas.    Objective:      Physical Exam:  Constitutional:              Vitals reviewed: yes              Well-nourished and well-groomed: yes  Eyes:              Sclerae icteric: no              Extraocular movements intact: yes  GI:                     Bowel sounds normal: yes              Tenderness: no                          If yes, quadrant/location: not applicable              Palpable masses: no                          If yes, quadrant/location: not applicable              Hepatosplenomegaly: no              Ascites: no              Hernia: no                          If yes, type/location: not applicable              Surgical scars: yes                          If yes, type/location: lower right quafdranr - appendectomy &  small bowel inflammation?  Resp:              Effort normal: yes              Breath sounds normal: yes     CV:              Regular rate and rhythm: yes              Heart sounds normal: yes              Femoral pulses normal: yes              Extremities edematous: no  Skin:              Rashes or lesions present: no                          If yes, describe:not applicable              Jaundice:: no     Musculoskeletal:              Gait normal: yes              Strength normal: yes  Psych:              Oriented to person, place, and time: yes              Affect and mood normal: yes     Additional comments: not applicable     Diagnostics:  The following labs have been reviewed: CBC  CMP  T  Counseling: We provided Colten Monet with a group education session today.  We discussed kidney transplantation at length with him, including risks, potential complications, and alternatives in the management of his renal failure.  The discussion included complications related to anesthesia, bleeding, infection, primary nonfunction, and ATN.  I discussed the typical postoperative course, length of hospitalization, the need for long-term immunosuppression, and the need for long-term routine follow-up.  I discussed living-donor and -donor transplantation and the relative advantages and disadvantages of each.  I also discussed average waiting times for both living donation and  donation.  I discussed national and center-specific survival rates.  I also mentioned the potential benefit of multicenter listing to candidates listed with centers within more than one organ procurement organization.  All questions were answered.     Patient advised that it is recommended that all transplant candidates, and their close contacts and household members receive Covid vaccination.     Final determination of transplant candidacy will be made once evaluation is complete and reviewed by the Kidney & Kidney/Pancreas Selection  Committee.     Coronavirus disease (COVID-19) caused by severe acute respiratory virus coronavirus 2 (SARS-C0V 2) is associated with increased mortality in solid organ transplant recipients (SOT) compared to non-transplant patients. Vaccine responses to vaccination are depressed against SARS-CoV2 compared to normal individuals but improve with third vaccination doses. Vaccination prior to SOT provides both the best opportunity for transplant candidates to develop protective immunity and to reduce the risk of serious COVID19 infections post transplantation. Organ transplant candidates at [facility] will be required to receive SARS-CoV-2 vaccination prior to being listed with a an active status, whenever possible. Exceptions will be made for disability related reasons or for sincerely held Rastafari beliefs.         TxpSurgAssessPlan   Transplant Surgery - Candidacy   Assessment/Plan:   Colten Monet is pre-dialysis with CKD stage 4 (GFR 15-29 mL/min). I have concerns with recent cough, and left ear pain =- will be further worked up by pulmonary and ent.. Based on available information, Colten Monet is a suitable kidney transplant candidate.      Additional testing to be completed according to the Written Order Guidelines for Adult Pre-kidney and Pancreas Transplant Evaluation (KI-02).  Interpretation of tests and discussion of patient management involves all members of the multidisciplinary transplant team.     Justyn Chirinos MD         [Well-appearing] : well-appearing [Normocephalic] : normocephalic [No dysmorphic facial features] : no dysmorphic facial features [No deformities] : no deformities [Alert] : alert [Well related, good eye contact] : well related, good eye contact [Conversant] : conversant [Normal speech and language] : normal speech and language [No facial asymmetry or weakness] : no facial asymmetry or weakness [Gross hearing intact] : gross hearing intact [No abnormal involuntary movements] : no abnormal involuntary movements [de-identified] : can not be assessed, Telehealth visit.

## 2024-03-07 NOTE — PROGRESS NOTES
PRE-OP TEACHING NOTE    Colten Monet is here today for pre-op appointments.  Pre-op instructions reviewed and written information was provided. Post kidney transplant information book provided. All questions were answered.  Discussed possibility that surgery may be rescheduled if the program is busy with  donor transplants.  Patient agreed and verbalized understanding of all instructions.

## 2024-03-07 NOTE — ASSESSMENT & PLAN NOTE
Over the last couple of months has had worsening cough and PFTs show evidence of possible evolving obstruction but it is significant reversibility with albuterol.  Likely reactive airways triggered by recent viral infection although he does have a significant smoking history in the past.  Starting Advair for the next couple of months to see if his reactive airways improve and to optimize him prior to major surgery

## 2024-03-07 NOTE — H&P
ransplant Surgery  Kidney Transplant Recipient Evaluation     Referring Physician: Colby Rene  Current Nephrologist: Colby Rene     Subjective:      Reason for Visit: evaluate transplant candidacy     History of Present Illness: Colten Monet is a 63 y.o. year old male undergoing transplant evaluation.     Dialysis History: Colten is pre-dialysis.       Transplant History: N/A     Etiology of Renal Disease: IgA Nephropathy (based on medical records from referral).     External provider notes reviewed: No     Review of Systems   Constitutional:  Negative for fatigue.   HENT:  Negative for drooling, postnasal drip and sore throat.    Eyes:  Negative for discharge and itching.   Respiratory:  Negative for choking and stridor.    Gastrointestinal:  Negative for rectal pain.   Endocrine: Negative for polydipsia.   Genitourinary:  Negative for enuresis and genital sores.   Musculoskeletal:  Negative for back pain, neck pain and neck stiffness.   Allergic/Immunologic: Negative for immunocompromised state.   Neurological:  Negative for facial asymmetry and numbness.   Hematological:  Negative for adenopathy.   Psychiatric/Behavioral:  Negative for behavioral problems, self-injury and suicidal ideas.    Objective:      Physical Exam:  Constitutional:              Vitals reviewed: yes              Well-nourished and well-groomed: yes  Eyes:              Sclerae icteric: no              Extraocular movements intact: yes  GI:                     Bowel sounds normal: yes              Tenderness: no                          If yes, quadrant/location: not applicable              Palpable masses: no                          If yes, quadrant/location: not applicable              Hepatosplenomegaly: no              Ascites: no              Hernia: no                          If yes, type/location: not applicable              Surgical scars: yes                          If yes, type/location: lower right quafdranr - appendectomy &  small bowel inflammation?  Resp:              Effort normal: yes              Breath sounds normal: yes     CV:              Regular rate and rhythm: yes              Heart sounds normal: yes              Femoral pulses normal: yes              Extremities edematous: no  Skin:              Rashes or lesions present: no                          If yes, describe:not applicable              Jaundice:: no     Musculoskeletal:              Gait normal: yes              Strength normal: yes  Psych:              Oriented to person, place, and time: yes              Affect and mood normal: yes     Additional comments: not applicable     Diagnostics:  The following labs have been reviewed: CBC  CMP  T  Counseling: We provided Colten Monet with a group education session today.  We discussed kidney transplantation at length with him, including risks, potential complications, and alternatives in the management of his renal failure.  The discussion included complications related to anesthesia, bleeding, infection, primary nonfunction, and ATN.  I discussed the typical postoperative course, length of hospitalization, the need for long-term immunosuppression, and the need for long-term routine follow-up.  I discussed living-donor and -donor transplantation and the relative advantages and disadvantages of each.  I also discussed average waiting times for both living donation and  donation.  I discussed national and center-specific survival rates.  I also mentioned the potential benefit of multicenter listing to candidates listed with centers within more than one organ procurement organization.  All questions were answered.     Patient advised that it is recommended that all transplant candidates, and their close contacts and household members receive Covid vaccination.     Final determination of transplant candidacy will be made once evaluation is complete and reviewed by the Kidney & Kidney/Pancreas Selection  Committee.     Coronavirus disease (COVID-19) caused by severe acute respiratory virus coronavirus 2 (SARS-C0V 2) is associated with increased mortality in solid organ transplant recipients (SOT) compared to non-transplant patients. Vaccine responses to vaccination are depressed against SARS-CoV2 compared to normal individuals but improve with third vaccination doses. Vaccination prior to SOT provides both the best opportunity for transplant candidates to develop protective immunity and to reduce the risk of serious COVID19 infections post transplantation. Organ transplant candidates at [facility] will be required to receive SARS-CoV-2 vaccination prior to being listed with a an active status, whenever possible. Exceptions will be made for disability related reasons or for sincerely held [employer] beliefs.         TxpSurgAssessPlan   Transplant Surgery - Candidacy   Assessment/Plan:   Colten Monet is pre-dialysis with CKD stage 4 (GFR 15-29 mL/min). I have concerns with recent cough, and left ear pain =- will be further worked up by pulmonary and ent.. Based on available information, Colten Monet is a suitable kidney transplant candidate.      Additional testing to be completed according to the Written Order Guidelines for Adult Pre-kidney and Pancreas Transplant Evaluation (KI-02).  Interpretation of tests and discussion of patient management involves all members of the multidisciplinary transplant team.     Justyn Chirinos MD

## 2024-03-07 NOTE — DISCHARGE INSTRUCTIONS
Your surgery has been scheduled for:_____3/11/2024_____________________________________    You should report to:  ____[facility], located on the [place] side of the first floor of the           [facility] ([phone])  _X___[facility], located on the [facility] side of the            Second floor of the [facility] ([phone])  ____3rd Floor [facility] located on the [facility] side of the [facility] [phone]  ____[facility]: located [address] [place] [address]     Please Note   Tell your doctor if you take Aspirin, products containing Aspirin, herbal medications  or blood thinners, such as Coumadin, Ticlid, or Plavix.  (Consult your provider regarding holding or stopping before surgery).  Arrange for someone to drive you home following surgery.  You will not be allowed to leave the surgical facility alone or drive yourself home following sedation and anesthesia.    Before Surgery  Stop taking all herbal medications, vitamins, and supplements 7 days prior to surgery  No Motrin/Advil (Ibuprofen) 7 days before surgery  No Aleve (Naproxen) 7 days before surgery  Stop Taking Asprin, products containing Asprin _7____days before surgery  Stop taking blood thinners_______days before surgery  No Goody's/BC  Powder 7 days before surgery  Refrain from drinking alcoholic beverages for 24hours before and after surgery  Stop or limit smoking _____7____days before surgery  You may take Tylenol for pain    Night before Surgery  Do not eat or drink after midnight  Take a shower or bath (shower is recommended).  Bathe with Hibiclens soap or an antibacterial soap from the neck down.  If not supplied by your surgeon, hibiclens soap will need to be purchased over the counter in pharmacy.  Rinse soap off thoroughly.  Shampoo your hair with your regular shampoo    The Day of  Surgery  Take another bath or shower with hibiclens or any antibacterial soap, to reduce the chance of infection.  Take heart and blood pressure medications with a small sip of water, as advised by the perioperative team.  Do not take fluid pills  You may brush your teeth and rinse your mouth, but do not swall any additional water.   Do not apply perfumes, powder, body lotions or deodorant on the day of surgery.  Nail polish should be removed.  Do not wear makeup or moisturizer  Wear comfortable clothes, such as a button front shirt and loose fitting pants.  Leave all jewelry, including body piercings, and valuables at home.    Bring any devices you will neeed after surgery such as crutches or canes.  If you have sleep apnea, please bring your CPAP machine  In the event that your physical condition changes including the onset of a cold or respiratory illness, or if you have to delay or cancel your surgery, please notify your surgeon.       Anesthesia: General Anesthesia     You are watched continuously during your procedure by your anesthesia provider.     Youre due to have surgery. During surgery, youll be given medicine called anesthesia or anesthetic. This will keep you comfortable and pain-free. Your anesthesia provider will use general anesthesia.  What is general anesthesia?  General anesthesia puts you into a state like deep sleep. It goes into the bloodstream (IV anesthetics), into the lungs (gas anesthetics), or both. You feel nothing during the procedure. You will not remember it. During the procedure, the anesthesia provider monitors you continuously. He or she checks your heart rate and rhythm, blood pressure, breathing, and blood oxygen.  IV anesthetics. IV anesthetics are given through an IV line in your arm. Theyre often given first. This is so you are asleep before a gas anesthetic is started. Some kinds of IV anesthetics relieve pain. Others relax you. Your doctor will decide which kind is best  in your case.  Gas anesthetics. Gas anesthetics are breathed into the lungs. They are often used to keep you asleep. They can be given through a facemask or a tube placed in your larynx or trachea (breathing tube).  If you have a facemask, your anesthesia provider will most likely place it over your nose and mouth while youre still awake. Youll breathe oxygen through the mask as your IV anesthetic is started. Gas anesthetic may be added through the mask.  If you have a tube in the larynx or trachea, it will be inserted into your throat after youre asleep.  Anesthesia tools and medicines  You will likely have:  IV anesthetics. These are put into an IV line into your bloodstream.  Gas anesthetics. You breathe these anesthetics into your lungs, where they pass into your bloodstream.  Pulse oximeter. This is a small clip that is attached to the end of your finger. This measures your blood oxygen level.  Electrocardiography leads (electrodes). These are small sticky pads that are placed on your chest. They record your heart rate and rhythm.  Blood pressure cuff. This reads your blood pressure.  Risks and possible complications  General anesthesia has some risks. These include:  Breathing problems  Nausea and vomiting  Sore throat or hoarseness (usually temporary)  Allergic reaction to the anesthetic  Irregular heartbeat (rare)  Cardiac arrest (rare)   Anesthesia safety  Follow all instructions you are given for how long not to eat or drink before your procedure.  Be sure your doctor knows what medicines and drugs you take. This includes over-the-counter medicines, herbs, supplements, alcohol or other drugs. You will be asked when those were last taken.  Have an adult family member or friend drive you home after the procedure.  For the first 24 hours after your surgery:  Do not drive or use heavy equipment.  Do not make important decisions or sign legal documents. If important decisions or signing legal documents is  necessary during the first 24 hours after surgery, have a trusted family member or spouse act on your behalf.  Avoid alcohol.  Have a responsible adult stay with you. He or she can watch for problems and help keep you safe.  Date Last Reviewed: 12/1/2016 © 2000-2017 [employer]. [address] [place] [address], [place] [address]. All rights reserved. This information is not intended as a substitute for professional medical care. Always follow your healthcare professional's instructions.

## 2024-03-08 ENCOUNTER — TELEPHONE (OUTPATIENT)
Dept: TRANSPLANT | Facility: CLINIC | Age: 64
End: 2024-03-08
Payer: COMMERCIAL

## 2024-03-08 LAB
OHS QRS DURATION: 84 MS
OHS QTC CALCULATION: 415 MS

## 2024-03-08 NOTE — TELEPHONE ENCOUNTER
Recipient Information  Name: Colten Monet  MRN: [id number]  Age: 63 y.o.    BMI: 32.6       Primary Surgeon:Moreno     Dialysis status: pre-dialysis  GFR: 17.5  K+ at pre-op:   Lab Results   Component Value Date    K 4.4 03/07/2024         Plan for K+: On med    Re-Transplant: No  Currently on Blood thinners? Yes Reason: ASA 81mg for h/o CVA - held    Induction: Thymoglobulin    Other Considerations: Continue Albuterol treatments inpatient    Donor Information  Name: Angela Phelps    MRN: [id number]  Age: 52     GFR:  104 ml/min  BMI: 31.8         Surgeon: Taran and Seal  UNOS ID: [id number]  Kidney to be donated: the left side          Final CXM Results:  HLA Flow Crossmatch (Allo) Interpretation (no units)   Date Value   02/28/2024     Final Crossmatch  No DSA detected.  Results reported to Flor Gage @ 10:00am on 3/1/24. VRB obtainted.       T MCS Average - XM Allo (no units)   Date Value   02/28/2024 -7.50   02/28/2024 3.90   02/28/2024 3.40     T Cell Results - XM Allo (no units)   Date Value   02/28/2024 Negative   02/28/2024 Negative   02/28/2024 Negative     B MCS Average - XM Allo (no units)   Date Value   02/28/2024 -9.00   02/28/2024 3.90   02/28/2024 -7.60     B Cell Results - XM Allo (no units)   Date Value   02/28/2024 Negative   02/28/2024 Negative   02/28/2024 Negative        T MCS Average - XM Auto (no units)   Date Value   02/28/2024 -2.90     T Cell Results - XM Auto (no units)   Date Value   02/28/2024 Negative     B MCS Average - XM Auto (no units)   Date Value   02/28/2024 -16.30     B Cell Results - XM Auto (no units)   Date Value   02/28/2024 Negative       Comments:

## 2024-03-08 NOTE — TELEPHONE ENCOUNTER
"Donor/Recipient Compatibility    Test/Item Donor Recipient Acceptable/Not Acceptable   ABO A POS A POS Acceptable   HBsAb (Hepatitis B Surface Antibody) <3.00, Non-reactive No results found for: "HEPBSAB"  Neg 5/25/21 Acceptable   HBsAg (Hepatitis B Surface Antigen) Non-reactive Hepatitis B Surface Ag (no units)   Date Value   02/28/2024 Non-reactive    Acceptable   HBcAb (Hepatitis Core Antibody) Non-reactive Hep B Core Total Ab (no units)   Date Value   02/28/2024 Non-reactive    Acceptable   HCVab (Hepatitis C antibody) Non-reactive Hepatitis C Ab (no units)   Date Value   05/25/2021 Negative    Acceptable   HIV  Non-reactive HIV 1/2 Ag/Ab (no units)   Date Value   02/28/2024 Non-reactive    Acceptable   CMV Non-reactive CMV IgG Interpretation (no units)   Date Value   05/25/2021 Reactive (A)     If no results found, check Results Review for Sendouts Acceptable   RPR Non-reactive RPR (no units)   Date Value   05/25/2021 Non-reactive    Acceptable     Potassium 4.6 Potassium (mmol/L)   Date Value   03/07/2024 4.4    Acceptable   Creatinine 1.0 Creatinine (mg/dL)   Date Value   03/07/2024 3.7 (H)    Acceptable   eGFR  >60.0 eGFR (mL/min/1.73 m^2)   Date Value   03/07/2024 17.5 (A)    Acceptable   Creatinine Clearance 108 No results found for: "CRCLEARANCE" Acceptable   BUN 9 BUN (mg/dL)   Date Value   03/07/2024 38 (H)    Acceptable   WBC 7.56 WBC (K/uL)   Date Value   03/07/2024 12.83 (H)    Acceptable   Hemoglobin 12.0 Hemoglobin (g/dL)   Date Value   03/07/2024 12.0 (L)    Acceptable   Hematocrit 38.6 Hematocrit (%)   Date Value   03/07/2024 40.5    Acceptable   Platelet count 378 Platelets (K/uL)   Date Value   03/07/2024 260    Acceptable   COVID Vax status Vaccinated Vaccinated      Recipient Only    X Match reviewed     Donor Only    Imaging reviewed imaging and agree with selection coordinator for left kidney   ANA Testing Acceptable  Look in the Labs tab of Chart Review to find the results or Results " Review activity.     Diana Thomas MD

## 2024-03-08 NOTE — TELEPHONE ENCOUNTER
Recipient Information  Name: Colten Monet  MRN: [id number]  Age: 63 y.o.    BMI: 32.6       Primary Surgeon:Moreno     Dialysis status: pre-dialysis  GFR: 17.5  K+ at pre-op:   Lab Results   Component Value Date    K 4.4 03/07/2024         Plan for K+: On med    Re-Transplant: No  Currently on Blood thinners? Yes Reason: ASA 81mg for h/o CVA - held    Induction: Thymoglobulin    Other Considerations: Continue Albuterol treatments inpatient    Donor Information  Name: Angela Phelps    MRN: [id number]  Age: 52     GFR:  104 ml/min  BMI: 31.8         Surgeon: Taran and Seal  UNOS ID: [id number]  Kidney to be donated: the left side          Final CXM Results:  HLA Flow Crossmatch (Allo) Interpretation (no units)   Date Value   02/28/2024     Final Crossmatch  No DSA detected.  Results reported to Flor Gage @ 10:00am on 3/1/24. VRB obtainted.       T MCS Average - XM Allo (no units)   Date Value   02/28/2024 -7.50   02/28/2024 3.90   02/28/2024 3.40     T Cell Results - XM Allo (no units)   Date Value   02/28/2024 Negative   02/28/2024 Negative   02/28/2024 Negative     B MCS Average - XM Allo (no units)   Date Value   02/28/2024 -9.00   02/28/2024 3.90   02/28/2024 -7.60     B Cell Results - XM Allo (no units)   Date Value   02/28/2024 Negative   02/28/2024 Negative   02/28/2024 Negative        T MCS Average - XM Auto (no units)   Date Value   02/28/2024 -2.90     T Cell Results - XM Auto (no units)   Date Value   02/28/2024 Negative     B MCS Average - XM Auto (no units)   Date Value   02/28/2024 -16.30     B Cell Results - XM Auto (no units)   Date Value   02/28/2024 Negative       Comments:

## 2024-03-09 NOTE — TELEPHONE ENCOUNTER
"Donor/Recipient Compatibility    Test/Item Donor Recipient Acceptable/Not Acceptable   ABO A POS A POS Acceptable   HBsAb (Hepatitis B Surface Antibody) <3.00, Non-reactive No results found for: "HEPBSAB" Acceptable   HBsAg (Hepatitis B Surface Antigen) Non-reactive Hepatitis B Surface Ag (no units)   Date Value   02/28/2024 Non-reactive    Acceptable   HBcAb (Hepatitis Core Antibody) Non-reactive Hep B Core Total Ab (no units)   Date Value   02/28/2024 Non-reactive    Acceptable   HCVab (Hepatitis C antibody) Non-reactive Hepatitis C Ab (no units)   Date Value   05/25/2021 Negative    Acceptable   HIV  Non-reactive HIV 1/2 Ag/Ab (no units)   Date Value   02/28/2024 Non-reactive    Acceptable   CMV Non-reactive CMV IgG Interpretation (no units)   Date Value   05/25/2021 Reactive (A)     If no results found, check Results Review for Sendouts Acceptable   RPR Non-reactive RPR (no units)   Date Value   05/25/2021 Non-reactive    Acceptable     Potassium 4.6 Potassium (mmol/L)   Date Value   03/07/2024 4.4    Acceptable   Creatinine 1.0 Creatinine (mg/dL)   Date Value   03/07/2024 3.7 (H)    Acceptable   eGFR  >60.0 eGFR (mL/min/1.73 m^2)   Date Value   03/07/2024 17.5 (A)    Acceptable   Creatinine Clearance 108 No results found for: "CRCLEARANCE" Acceptable   BUN 9 BUN (mg/dL)   Date Value   03/07/2024 38 (H)    Acceptable   WBC 7.56 WBC (K/uL)   Date Value   03/07/2024 12.83 (H)    Acceptable   Hemoglobin 12.0 Hemoglobin (g/dL)   Date Value   03/07/2024 12.0 (L)    Acceptable   Hematocrit 38.6 Hematocrit (%)   Date Value   03/07/2024 40.5    Acceptable   Platelet count 378 Platelets (K/uL)   Date Value   03/07/2024 260    Acceptable   COVID Vax status Vaccinated Vaccinated      Recipient Only    X Match reviewed     Donor Only    Imaging Per surgeon   ANA Testing Acceptable  Look in the Labs tab of Chart Review to find the results or Results Review activity.     Michelle Lundberg MD                "

## 2024-03-10 ENCOUNTER — ANESTHESIA EVENT (OUTPATIENT)
Dept: SURGERY | Facility: HOSPITAL | Age: 64
DRG: 652 | End: 2024-03-10
Payer: COMMERCIAL

## 2024-03-10 NOTE — ANESTHESIA PREPROCEDURE EVALUATION
[facility]  Anesthesia Pre-Operative Evaluation         Patient Name: Colten Monet  YOB: 1960  MRN: [id number]    SUBJECTIVE:     Pre-operative evaluation for Procedure(s) (LRB):  TRANSPLANT, KIDNEY (N/A)     03/10/2024    Colten Monet is a 63 y.o. male with a significant medical history of CVA(2020), GERD, Hypertension, IgA nephropathy, and Obesity who presents for the above procedure.    He also has a remote history of tobacco use with a chronic cough for which he uses an albuterol inhaler. He is pre-dialysis. He takes prednisone 20mg daily.     Lab Results   Component Value Date    HGB 12.0 (L) 03/07/2024     BMP  Lab Results   Component Value Date     03/07/2024    K 4.4 03/07/2024     (H) 03/07/2024    CO2 22 (L) 03/07/2024    BUN 38 (H) 03/07/2024    CREATININE 3.7 (H) 03/07/2024    CALCIUM 9.5 03/07/2024    ANIONGAP 8 03/07/2024    EGFRNORACEVR 17.5 (A) 03/07/2024     Prev airway: None documented.      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     Current Outpatient Medications on File Prior to Encounter   Medication Sig Dispense Refill    albuterol (PROVENTIL/VENTOLIN HFA) 90 mcg/actuation inhaler Inhale 1-2 puffs into the lungs every 6 (six) hours as needed for Wheezing. Rescue 18 g 1    aspirin (ECOTRIN) 81 MG EC tablet Take 81 mg by mouth once daily.       "atorvastatin (LIPITOR) 40 MG tablet Take 1 tablet by mouth every evening.      calcitRIOL (ROCALTROL) 0.25 MCG Cap Take 0.25 mcg by mouth once daily.      losartan (COZAAR) 100 MG tablet Take 100 mg by mouth once daily.      mycophenolate (CELLCEPT) 500 mg Tab Take 500 mg by mouth 2 (two) times daily.         Past Surgical History:   Procedure Laterality Date    APPENDECTOMY      CARDIAC CATHETERIZATION      [place] ~ 6-7 years ago    RENAL BIOPSY      TONSILLECTOMY         Social History     Socioeconomic History    Marital status:    Tobacco Use    Smoking status: Former     Current packs/day: 0.00     Types: Cigarettes     Quit date: 2016     Years since quittin.7    Smokeless tobacco: Never   Substance and Sexual Activity    Alcohol use: Not Currently    Drug use: Never   Social History Narrative    Works in construction as a supervisor, operating crane in marine environment.       OBJECTIVE:     Vital Signs Range:      3/7/2024     8:35 AM 3/7/2024    10:57 AM 3/7/2024     1:57 PM   Vitals - 1 value per visit   SYSTOLIC 146 142 162   DIASTOLIC 86 82 106   Pulse 92 84 97   Temp 36.3 °C (97.3 °F)  36.4 °C (97.5 °F)   Resp 16     SPO2 99 % 99 % 100 %   Weight (lb) 201.94 202.16 201.94   Weight (kg) 91.6 91.7 91.6   Height 5' 6.97" (1.701 m) 5' 6" (1.676 m) 5' 6" (1.676 m)   BMI (Calculated) 31.7 32.6 32.6   Pain Score Zero           CBC:   Lab Results   Component Value Date    WBC 12.83 (H) 2024    HGB 12.0 (L) 2024    HCT 40.5 2024    MCV 89 2024     2024         CMP:   Sodium   Date Value Ref Range Status   2024 141 136 - 145 mmol/L Final     Potassium   Date Value Ref Range Status   2024 4.4 3.5 - 5.1 mmol/L Final     Chloride   Date Value Ref Range Status   2024 111 (H) 95 - 110 mmol/L Final     CO2   Date Value Ref Range Status   2024 22 (L) 23 - 29 mmol/L Final     Glucose   Date Value Ref Range Status   2024 103 70 - 110 mg/dL " Final     BUN   Date Value Ref Range Status   03/07/2024 38 (H) 8 - 23 mg/dL Final     Creatinine   Date Value Ref Range Status   03/07/2024 3.7 (H) 0.5 - 1.4 mg/dL Final     Calcium   Date Value Ref Range Status   03/07/2024 9.5 8.7 - 10.5 mg/dL Final     Total Protein   Date Value Ref Range Status   02/28/2024 7.8 6.0 - 8.4 g/dL Final     Albumin   Date Value Ref Range Status   03/07/2024 3.8 3.5 - 5.2 g/dL Final     Total Bilirubin   Date Value Ref Range Status   02/28/2024 0.4 0.1 - 1.0 mg/dL Final     Comment:     For infants and newborns, interpretation of results should be based  on gestational age, weight and in agreement with clinical  observations.    Premature Infant recommended reference ranges:  Up to 24 hours.............<8.0 mg/dL  Up to 48 hours............<12.0 mg/dL  3-5 days..................<15.0 mg/dL  6-29 days.................<15.0 mg/dL       Alkaline Phosphatase   Date Value Ref Range Status   02/28/2024 112 55 - 135 U/L Final     AST   Date Value Ref Range Status   02/28/2024 15 10 - 40 U/L Final     ALT   Date Value Ref Range Status   02/28/2024 12 10 - 44 U/L Final     Anion Gap   Date Value Ref Range Status   03/07/2024 8 8 - 16 mmol/L Final     eGFR if    Date Value Ref Range Status   05/25/2021 21.9 (A) >60 mL/min/1.73 m^2 Final     eGFR if non    Date Value Ref Range Status   05/25/2021 19.0 (A) >60 mL/min/1.73 m^2 Final     Comment:     Calculation used to obtain the estimated glomerular filtration  rate (eGFR) is the CKD-EPI equation.          INR:  Lab Results   Component Value Date    INR 0.9 02/28/2024    INR 0.9 05/25/2021    INR 0.9 05/04/2020       EKG:   Results for orders placed or performed during the hospital encounter of 03/07/24   EKG 12-lead    Collection Time: 03/07/24 11:39 AM   Result Value Ref Range    QRS Duration 84 ms    [facility] QTC Calculation 415 ms    Narrative    Test Reason : Z76.82,    Vent. Rate : 079 BPM     Atrial Rate : 079  BPM     P-R Int : 126 ms          QRS Dur : 084 ms      QT Int : 362 ms       P-R-T Axes : 065 070 052 degrees     QTc Int : 415 ms    Normal sinus rhythm  Normal ECG  When compared with ECG of 28-FEB-2024 10:42,  No significant change was found  Confirmed by Deion CEVALLOS MD ([id number]) on 3/8/2024 6:39:09 AM    Referred By: DENNIS MORILLO           Confirmed By:Deion CEVALLOS MD        2D ECHO:   Results for orders placed during the hospital encounter of 12/27/23    Echo Saline Bubble? No    Interpretation Summary    Left Ventricle: There is normal systolic function with a visually estimated ejection fraction of 55 - 60%.    Right Ventricle: Normal right ventricular cavity size. Systolic function is normal.    Aortic Valve: There is mild aortic valve sclerosis.    Pulmonary Artery: The estimated pulmonary artery systolic pressure is 12 mmHg.    IVC/SVC: Normal venous pressure at 3 mmHg.         ASSESSMENT/PLAN:         Pre-op Assessment    I have reviewed the Patient Summary Reports.     I have reviewed the Nursing Notes. I have reviewed the NPO Status.   I have reviewed the Medications.     Review of Systems  Anesthesia Hx:  No problems with previous Anesthesia             Denies Family Hx of Anesthesia complications.    Denies Personal Hx of Anesthesia complications.                    Social:  Former Smoker, No Alcohol Use Smoking Status  Former  Quit  5/25/2016  Types  Cigarettes quit in 5/25/2016  Pack Year History  0 packs/day, Started 5/25/2016  Smokeless Tobacco Status  Never        Hematology/Oncology:    Oncology Normal    -- Anemia:                                  EENT/Dental:  denies chronic allergic rhinitis   Tonsillectomy    Eyes:             Eye Disease:           Tresion left eye  Ears General/Symptom(s) Hearing Impairment:     Otitis Media        Cardiovascular:  Exercise tolerance: poor   Denies Pacemaker. Hypertension, poorly controlled       Denies Angina.     hyperlipidemia  Denies ZHU.   12/20/23 TTE    ·  Left Ventricle: There is normal systolic function with a visually estimated ejection fraction of 55 - 60%.  ·  Right Ventricle: Normal right ventricular cavity size. Systolic function is normal.  ·  Aortic Valve: There is mild aortic valve sclerosis.  ·  Pulmonary Artery: The estimated pulmonary artery systolic pressure is 12 mmHg.  ·  IVC/SVC: Normal venous pressure at 3 mmHg.      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  GERD, well controlled Denies Liver Disease.  Denies Hepatitis. Appendectomy          Musculoskeletal:   Musculoskeletal General/Symptoms:  Functional capacity is ambulatory without assistance.            Neurological:   CVA    Denies Seizures.     Neuro Symptoms                CVA - Cerebrovasular Accident , Most recent CVA was on 2019  , residual deficits are hemiparesis - right. No residual             Endocrine:        Obesity / BMI > 30  Psych:    depression                Physical Exam  General: Well nourished, Cooperative, Alert and Oriented    Airway:  Mallampati: II   Mouth Opening: Normal  TM Distance: Normal  Tongue: Normal  Neck ROM: Normal ROM    Dental:  Periodontal disease        Anesthesia Plan  Type of Anesthesia, risks &  benefits discussed:    Anesthesia Type: Gen ETT  Intra-op Monitoring Plan: Standard ASA Monitors, Art Line and Central Line  Post Op Pain Control Plan: multimodal analgesia and IV/PO Opioids PRN  Induction:  IV  Airway Plan: Direct and Video, Post-Induction  Informed Consent: Informed consent signed with the Patient and all parties understand the risks and agree with anesthesia plan.  All questions answered.   ASA Score: 4  Day of Surgery Review of History & Physical: H&P Update referred to the surgeon/provider.    Ready For Surgery From Anesthesia Perspective.     .

## 2024-03-11 ENCOUNTER — ANESTHESIA (OUTPATIENT)
Dept: SURGERY | Facility: HOSPITAL | Age: 64
DRG: 652 | End: 2024-03-11
Payer: COMMERCIAL

## 2024-03-11 ENCOUNTER — HOSPITAL ENCOUNTER (INPATIENT)
Facility: HOSPITAL | Age: 64
LOS: 2 days | Discharge: HOME | DRG: 652 | End: 2024-03-13
Attending: SURGERY | Admitting: SURGERY
Payer: COMMERCIAL

## 2024-03-11 DIAGNOSIS — Z76.82 ORGAN TRANSPLANT CANDIDATE: ICD-10-CM

## 2024-03-11 DIAGNOSIS — D62 ACUTE BLOOD LOSS ANEMIA: ICD-10-CM

## 2024-03-11 DIAGNOSIS — Z91.89 AT RISK FOR OPPORTUNISTIC INFECTIONS: ICD-10-CM

## 2024-03-11 DIAGNOSIS — Z79.60 LONG-TERM USE OF IMMUNOSUPPRESSANT MEDICATION: ICD-10-CM

## 2024-03-11 DIAGNOSIS — Z94.0 STATUS POST KIDNEY TRANSPLANT: ICD-10-CM

## 2024-03-11 DIAGNOSIS — D84.9 IMMUNOSUPPRESSION: ICD-10-CM

## 2024-03-11 DIAGNOSIS — Z29.89 PROPHYLACTIC IMMUNOTHERAPY: ICD-10-CM

## 2024-03-11 DIAGNOSIS — Z94.0 S/P LIVING-DONOR KIDNEY TRANSPLANTATION: Primary | ICD-10-CM

## 2024-03-11 DIAGNOSIS — N02.B9 IGA NEPHROPATHY: ICD-10-CM

## 2024-03-11 LAB
25(OH)D3+25(OH)D2 SERPL-MCNC: 48 NG/ML (ref 30–96)
ABO + RH BLD: NORMAL
ALBUMIN SERPL BCP-MCNC: 3.1 G/DL (ref 3.5–5.2)
ALBUMIN SERPL BCP-MCNC: 4 G/DL (ref 3.5–5.2)
ANION GAP SERPL CALC-SCNC: 11 MMOL/L (ref 8–16)
ANION GAP SERPL CALC-SCNC: 6 MMOL/L (ref 8–16)
ANION GAP SERPL CALC-SCNC: 6 MMOL/L (ref 8–16)
APTT PPP: 24.8 SEC (ref 21–32)
BASOPHILS # BLD AUTO: 0.01 K/UL (ref 0–0.2)
BASOPHILS NFR BLD: 0 % (ref 0–1.9)
BLD GP AB SCN CELLS X3 SERPL QL: NORMAL
BUN SERPL-MCNC: 33 MG/DL (ref 8–23)
BUN SERPL-MCNC: 33 MG/DL (ref 8–23)
BUN SERPL-MCNC: 34 MG/DL (ref 8–23)
CALCIUM SERPL-MCNC: 8 MG/DL (ref 8.7–10.5)
CALCIUM SERPL-MCNC: 8.1 MG/DL (ref 8.7–10.5)
CALCIUM SERPL-MCNC: 9.9 MG/DL (ref 8.7–10.5)
CHLORIDE SERPL-SCNC: 111 MMOL/L (ref 95–110)
CHLORIDE SERPL-SCNC: 113 MMOL/L (ref 95–110)
CHLORIDE SERPL-SCNC: 113 MMOL/L (ref 95–110)
CO2 SERPL-SCNC: 18 MMOL/L (ref 23–29)
CREAT SERPL-MCNC: 2.8 MG/DL (ref 0.5–1.4)
CREAT SERPL-MCNC: 3.4 MG/DL (ref 0.5–1.4)
CREAT SERPL-MCNC: 3.5 MG/DL (ref 0.5–1.4)
CREAT UR-MCNC: 105 MG/DL (ref 23–375)
DIFFERENTIAL METHOD BLD: ABNORMAL
EOSINOPHIL # BLD AUTO: 0 K/UL (ref 0–0.5)
EOSINOPHIL NFR BLD: 0 % (ref 0–8)
ERYTHROCYTE [DISTWIDTH] IN BLOOD BY AUTOMATED COUNT: 13.2 % (ref 11.5–14.5)
EST. GFR  (NO RACE VARIABLE): 18.7 ML/MIN/1.73 M^2
EST. GFR  (NO RACE VARIABLE): 19.4 ML/MIN/1.73 M^2
EST. GFR  (NO RACE VARIABLE): 24.4 ML/MIN/1.73 M^2
GLUCOSE SERPL-MCNC: 134 MG/DL (ref 70–110)
GLUCOSE SERPL-MCNC: 189 MG/DL (ref 70–110)
GLUCOSE SERPL-MCNC: 83 MG/DL (ref 70–110)
HBV CORE AB SERPL QL IA: NORMAL
HBV SURFACE AB SER-ACNC: <3 MIU/ML
HBV SURFACE AB SER-ACNC: NORMAL M[IU]/ML
HBV SURFACE AG SERPL QL IA: NORMAL
HCT VFR BLD AUTO: 34.2 % (ref 40–54)
HCT VFR BLD AUTO: 34.9 % (ref 40–54)
HCT VFR BLD AUTO: 40.9 % (ref 40–54)
HCV AB SERPL QL IA: NORMAL
HGB BLD-MCNC: 10.6 G/DL (ref 14–18)
HIV 1+2 AB+HIV1 P24 AG SERPL QL IA: NORMAL
IMM GRANULOCYTES # BLD AUTO: 0.2 K/UL (ref 0–0.04)
IMM GRANULOCYTES NFR BLD AUTO: 0.9 % (ref 0–0.5)
INR PPP: 0.9 (ref 0.8–1.2)
LYMPHOCYTES # BLD AUTO: 0.1 K/UL (ref 1–4.8)
LYMPHOCYTES NFR BLD: 0.3 % (ref 18–48)
MAGNESIUM SERPL-MCNC: 1.6 MG/DL (ref 1.6–2.6)
MCH RBC QN AUTO: 27.2 PG (ref 27–31)
MCHC RBC AUTO-ENTMCNC: 30.4 G/DL (ref 32–36)
MCV RBC AUTO: 90 FL (ref 82–98)
MONOCYTES # BLD AUTO: 0.6 K/UL (ref 0.3–1)
MONOCYTES NFR BLD: 2.6 % (ref 4–15)
NEUTROPHILS # BLD AUTO: 22 K/UL (ref 1.8–7.7)
NEUTROPHILS NFR BLD: 96.2 % (ref 38–73)
NRBC BLD-RTO: 0 /100 WBC
PHOSPHATE SERPL-MCNC: 2.8 MG/DL (ref 2.7–4.5)
PHOSPHATE SERPL-MCNC: 3.5 MG/DL (ref 2.7–4.5)
PLATELET # BLD AUTO: 180 K/UL (ref 150–450)
PMV BLD AUTO: 10.7 FL (ref 9.2–12.9)
POCT GLUCOSE: 127 MG/DL (ref 70–110)
POCT GLUCOSE: 200 MG/DL (ref 70–110)
POTASSIUM SERPL-SCNC: 4.3 MMOL/L (ref 3.5–5.1)
POTASSIUM SERPL-SCNC: 5.1 MMOL/L (ref 3.5–5.1)
POTASSIUM SERPL-SCNC: 5.7 MMOL/L (ref 3.5–5.1)
PROT UR-MCNC: 73 MG/DL (ref 0–15)
PROT/CREAT UR: 0.7 MG/G{CREAT} (ref 0–0.2)
PROTHROMBIN TIME: 10.1 SEC (ref 9–12.5)
RBC # BLD AUTO: 3.89 M/UL (ref 4.6–6.2)
SARS-COV-2 RDRP RESP QL NAA+PROBE: NEGATIVE
SODIUM SERPL-SCNC: 137 MMOL/L (ref 136–145)
SODIUM SERPL-SCNC: 137 MMOL/L (ref 136–145)
SODIUM SERPL-SCNC: 140 MMOL/L (ref 136–145)
SPECIMEN OUTDATE: NORMAL
WBC # BLD AUTO: 22.84 K/UL (ref 3.9–12.7)

## 2024-03-11 PROCEDURE — 36415 COLL VENOUS BLD VENIPUNCTURE: CPT | Performed by: SURGERY

## 2024-03-11 PROCEDURE — 81100000 HC KIDNEY ACQUISITION-LIVE DONOR

## 2024-03-11 PROCEDURE — 80048 BASIC METABOLIC PNL TOTAL CA: CPT | Performed by: SURGERY

## 2024-03-11 PROCEDURE — 63600175 PHARM REV CODE 636 W HCPCS

## 2024-03-11 PROCEDURE — 63600175 PHARM REV CODE 636 W HCPCS: Mod: NTX | Performed by: SURGERY

## 2024-03-11 PROCEDURE — 71000033 HC RECOVERY, INTIAL HOUR: Performed by: SURGERY

## 2024-03-11 PROCEDURE — 25000003 PHARM REV CODE 250

## 2024-03-11 PROCEDURE — 37000009 HC ANESTHESIA EA ADD 15 MINS: Performed by: SURGERY

## 2024-03-11 PROCEDURE — 50325 PREP DONOR RENAL GRAFT: CPT | Mod: 51,LT,, | Performed by: SURGERY

## 2024-03-11 PROCEDURE — 85730 THROMBOPLASTIN TIME PARTIAL: CPT | Performed by: SURGERY

## 2024-03-11 PROCEDURE — 0TY10Z0 TRANSPLANTATION OF LEFT KIDNEY, ALLOGENEIC, OPEN APPROACH: ICD-10-PCS | Performed by: SURGERY

## 2024-03-11 PROCEDURE — 50605 INSERT URETERAL SUPPORT: CPT | Mod: 51,LT,, | Performed by: SURGERY

## 2024-03-11 PROCEDURE — 63600175 PHARM REV CODE 636 W HCPCS: Performed by: STUDENT IN AN ORGANIZED HEALTH CARE EDUCATION/TRAINING PROGRAM

## 2024-03-11 PROCEDURE — 82306 VITAMIN D 25 HYDROXY: CPT | Performed by: SURGERY

## 2024-03-11 PROCEDURE — 80069 RENAL FUNCTION PANEL: CPT | Mod: 91

## 2024-03-11 PROCEDURE — U0002 COVID-19 LAB TEST NON-CDC: HCPCS | Performed by: SURGERY

## 2024-03-11 PROCEDURE — 84156 ASSAY OF PROTEIN URINE: CPT | Performed by: SURGERY

## 2024-03-11 PROCEDURE — 63600175 PHARM REV CODE 636 W HCPCS: Performed by: SURGERY

## 2024-03-11 PROCEDURE — 20600001 HC STEP DOWN PRIVATE ROOM

## 2024-03-11 PROCEDURE — S5010 5% DEXTROSE AND 0.45% SALINE: HCPCS

## 2024-03-11 PROCEDURE — 86704 HEP B CORE ANTIBODY TOTAL: CPT | Performed by: SURGERY

## 2024-03-11 PROCEDURE — 87340 HEPATITIS B SURFACE AG IA: CPT | Performed by: SURGERY

## 2024-03-11 PROCEDURE — 25000003 PHARM REV CODE 250: Mod: NTX | Performed by: SURGERY

## 2024-03-11 PROCEDURE — D9220A PRA ANESTHESIA: Mod: ,,, | Performed by: SURGERY

## 2024-03-11 PROCEDURE — 36000930 HC OR TIME LEV VII 1ST 15 MIN: Performed by: SURGERY

## 2024-03-11 PROCEDURE — 25000003 PHARM REV CODE 250: Mod: NTX | Performed by: STUDENT IN AN ORGANIZED HEALTH CARE EDUCATION/TRAINING PROGRAM

## 2024-03-11 PROCEDURE — 85014 HEMATOCRIT: CPT | Mod: 91

## 2024-03-11 PROCEDURE — 36415 COLL VENOUS BLD VENIPUNCTURE: CPT | Mod: XB | Performed by: SURGERY

## 2024-03-11 PROCEDURE — 50360 RNL ALTRNSPLJ W/O RCP NFRCT: CPT | Mod: LT,,, | Performed by: SURGERY

## 2024-03-11 PROCEDURE — 83735 ASSAY OF MAGNESIUM: CPT | Performed by: SURGERY

## 2024-03-11 PROCEDURE — 86803 HEPATITIS C AB TEST: CPT | Performed by: SURGERY

## 2024-03-11 PROCEDURE — 37000008 HC ANESTHESIA 1ST 15 MINUTES: Performed by: SURGERY

## 2024-03-11 PROCEDURE — 85025 COMPLETE CBC W/AUTO DIFF WBC: CPT | Performed by: SURGERY

## 2024-03-11 PROCEDURE — 27201423 OPTIME MED/SURG SUP & DEVICES STERILE SUPPLY: Performed by: SURGERY

## 2024-03-11 PROCEDURE — 87522 HEPATITIS C REVRS TRNSCRPJ: CPT | Performed by: SURGERY

## 2024-03-11 PROCEDURE — 86850 RBC ANTIBODY SCREEN: CPT | Performed by: SURGERY

## 2024-03-11 PROCEDURE — 25000242 PHARM REV CODE 250 ALT 637 W/ HCPCS: Performed by: STUDENT IN AN ORGANIZED HEALTH CARE EDUCATION/TRAINING PROGRAM

## 2024-03-11 PROCEDURE — 36000931 HC OR TIME LEV VII EA ADD 15 MIN: Performed by: SURGERY

## 2024-03-11 PROCEDURE — 85014 HEMATOCRIT: CPT | Performed by: SURGERY

## 2024-03-11 PROCEDURE — 36620 INSERTION CATHETER ARTERY: CPT | Mod: GC,,, | Performed by: SURGERY

## 2024-03-11 PROCEDURE — 80069 RENAL FUNCTION PANEL: CPT | Performed by: SURGERY

## 2024-03-11 PROCEDURE — 94761 N-INVAS EAR/PLS OXIMETRY MLT: CPT

## 2024-03-11 PROCEDURE — 87389 HIV-1 AG W/HIV-1&-2 AB AG IA: CPT | Performed by: SURGERY

## 2024-03-11 PROCEDURE — 85610 PROTHROMBIN TIME: CPT | Performed by: SURGERY

## 2024-03-11 PROCEDURE — 71000015 HC POSTOP RECOV 1ST HR: Performed by: SURGERY

## 2024-03-11 PROCEDURE — 25000003 PHARM REV CODE 250: Performed by: STUDENT IN AN ORGANIZED HEALTH CARE EDUCATION/TRAINING PROGRAM

## 2024-03-11 PROCEDURE — 71000016 HC POSTOP RECOV ADDL HR: Performed by: SURGERY

## 2024-03-11 PROCEDURE — C2617 STENT, NON-COR, TEM W/O DEL: HCPCS | Performed by: SURGERY

## 2024-03-11 PROCEDURE — 86706 HEP B SURFACE ANTIBODY: CPT | Mod: 91 | Performed by: SURGERY

## 2024-03-11 DEVICE — STENT DOUBLE J 7FRX12CM: Type: IMPLANTABLE DEVICE | Site: URETER | Status: FUNCTIONAL

## 2024-03-11 RX ORDER — PREDNISONE 20 MG/1
20 TABLET ORAL DAILY
Status: DISCONTINUED | OUTPATIENT
Start: 2024-03-14 | End: 2024-03-13 | Stop reason: HOSPADM

## 2024-03-11 RX ORDER — MANNITOL 250 MG/ML
INJECTION, SOLUTION INTRAVENOUS
Status: DISCONTINUED | OUTPATIENT
Start: 2024-03-11 | End: 2024-03-11

## 2024-03-11 RX ORDER — ACETAMINOPHEN 650 MG/20.3ML
650 LIQUID ORAL ONCE
Status: DISCONTINUED | OUTPATIENT
Start: 2024-03-11 | End: 2024-03-11

## 2024-03-11 RX ORDER — CEFAZOLIN 2 G/1
INJECTION, POWDER, FOR SOLUTION INTRAMUSCULAR; INTRAVENOUS
Status: DISCONTINUED | OUTPATIENT
Start: 2024-03-11 | End: 2024-03-11

## 2024-03-11 RX ORDER — FENTANYL CITRATE 50 UG/ML
25 INJECTION, SOLUTION INTRAMUSCULAR; INTRAVENOUS EVERY 5 MIN PRN
Status: DISCONTINUED | OUTPATIENT
Start: 2024-03-11 | End: 2024-03-11 | Stop reason: HOSPADM

## 2024-03-11 RX ORDER — METHYLPREDNISOLONE SOD SUCC 125 MG
125 VIAL (EA) INJECTION ONCE
Status: COMPLETED | OUTPATIENT
Start: 2024-03-13 | End: 2024-03-13

## 2024-03-11 RX ORDER — KETAMINE HCL IN 0.9 % NACL 50 MG/5 ML
SYRINGE (ML) INTRAVENOUS
Status: DISCONTINUED | OUTPATIENT
Start: 2024-03-11 | End: 2024-03-11

## 2024-03-11 RX ORDER — HEPARIN SODIUM 1000 [USP'U]/ML
INJECTION, SOLUTION INTRAVENOUS; SUBCUTANEOUS
Status: DISCONTINUED | OUTPATIENT
Start: 2024-03-11 | End: 2024-03-11

## 2024-03-11 RX ORDER — ACETAMINOPHEN 325 MG/1
650 TABLET ORAL
Status: DISCONTINUED | OUTPATIENT
Start: 2024-03-12 | End: 2024-03-13 | Stop reason: HOSPADM

## 2024-03-11 RX ORDER — DIPHENHYDRAMINE HCL 25 MG
50 CAPSULE ORAL ONCE AS NEEDED
Status: DISCONTINUED | OUTPATIENT
Start: 2024-03-11 | End: 2024-03-13 | Stop reason: HOSPADM

## 2024-03-11 RX ORDER — LIDOCAINE HYDROCHLORIDE 20 MG/ML
INJECTION, SOLUTION EPIDURAL; INFILTRATION; INTRACAUDAL; PERINEURAL
Status: DISCONTINUED | OUTPATIENT
Start: 2024-03-11 | End: 2024-03-11

## 2024-03-11 RX ORDER — DEXTROSE MONOHYDRATE AND SODIUM CHLORIDE 5; .45 G/100ML; G/100ML
INJECTION, SOLUTION INTRAVENOUS CONTINUOUS
Status: DISCONTINUED | OUTPATIENT
Start: 2024-03-11 | End: 2024-03-12

## 2024-03-11 RX ORDER — SULFAMETHOXAZOLE AND TRIMETHOPRIM 400; 80 MG/1; MG/1
1 TABLET ORAL EVERY MORNING
Status: DISCONTINUED | OUTPATIENT
Start: 2024-03-21 | End: 2024-03-13 | Stop reason: HOSPADM

## 2024-03-11 RX ORDER — ROCURONIUM BROMIDE 10 MG/ML
INJECTION, SOLUTION INTRAVENOUS
Status: DISCONTINUED | OUTPATIENT
Start: 2024-03-11 | End: 2024-03-11

## 2024-03-11 RX ORDER — ACETAMINOPHEN 325 MG/1
650 TABLET ORAL EVERY 8 HOURS
Status: DISPENSED | OUTPATIENT
Start: 2024-03-11 | End: 2024-03-13

## 2024-03-11 RX ORDER — PREGABALIN 75 MG/1
75 CAPSULE ORAL
Status: COMPLETED | OUTPATIENT
Start: 2024-03-11 | End: 2024-03-11

## 2024-03-11 RX ORDER — BENZONATATE 100 MG/1
100 CAPSULE ORAL 3 TIMES DAILY
Status: DISCONTINUED | OUTPATIENT
Start: 2024-03-11 | End: 2024-03-13 | Stop reason: HOSPADM

## 2024-03-11 RX ORDER — SODIUM CHLORIDE 0.9 % (FLUSH) 0.9 %
10 SYRINGE (ML) INJECTION
Status: DISCONTINUED | OUTPATIENT
Start: 2024-03-11 | End: 2024-03-11 | Stop reason: HOSPADM

## 2024-03-11 RX ORDER — VALGANCICLOVIR 450 MG/1
450 TABLET, FILM COATED ORAL EVERY MORNING
Status: DISCONTINUED | OUTPATIENT
Start: 2024-03-21 | End: 2024-03-13 | Stop reason: HOSPADM

## 2024-03-11 RX ORDER — BISACODYL 5 MG
10 TABLET, DELAYED RELEASE (ENTERIC COATED) ORAL NIGHTLY
Status: DISCONTINUED | OUTPATIENT
Start: 2024-03-11 | End: 2024-03-13 | Stop reason: HOSPADM

## 2024-03-11 RX ORDER — METHYLPREDNISOLONE SODIUM SUCCINATE 1 G/16ML
INJECTION INTRAMUSCULAR; INTRAVENOUS
Status: DISCONTINUED | OUTPATIENT
Start: 2024-03-11 | End: 2024-03-11

## 2024-03-11 RX ORDER — TRAMADOL HYDROCHLORIDE 50 MG/1
50 TABLET ORAL EVERY 6 HOURS PRN
Status: DISCONTINUED | OUTPATIENT
Start: 2024-03-11 | End: 2024-03-13 | Stop reason: HOSPADM

## 2024-03-11 RX ORDER — MUPIROCIN 20 MG/G
OINTMENT TOPICAL
Status: DISCONTINUED | OUTPATIENT
Start: 2024-03-11 | End: 2024-03-11 | Stop reason: HOSPADM

## 2024-03-11 RX ORDER — PROPOFOL 10 MG/ML
VIAL (ML) INTRAVENOUS
Status: DISCONTINUED | OUTPATIENT
Start: 2024-03-11 | End: 2024-03-11

## 2024-03-11 RX ORDER — HEPARIN SODIUM 5000 [USP'U]/ML
5000 INJECTION, SOLUTION INTRAVENOUS; SUBCUTANEOUS ONCE
Status: COMPLETED | OUTPATIENT
Start: 2024-03-11 | End: 2024-03-11

## 2024-03-11 RX ORDER — HEPARIN SODIUM 5000 [USP'U]/ML
5000 INJECTION, SOLUTION INTRAVENOUS; SUBCUTANEOUS EVERY 8 HOURS
Status: DISCONTINUED | OUTPATIENT
Start: 2024-03-11 | End: 2024-03-13 | Stop reason: HOSPADM

## 2024-03-11 RX ORDER — SODIUM CHLORIDE 0.9 % (FLUSH) 0.9 %
10 SYRINGE (ML) INJECTION
Status: DISCONTINUED | OUTPATIENT
Start: 2024-03-11 | End: 2024-03-13 | Stop reason: HOSPADM

## 2024-03-11 RX ORDER — EPINEPHRINE 1 MG/ML
1 INJECTION, SOLUTION, CONCENTRATE INTRAVENOUS ONCE AS NEEDED
Status: DISCONTINUED | OUTPATIENT
Start: 2024-03-11 | End: 2024-03-13 | Stop reason: HOSPADM

## 2024-03-11 RX ORDER — IBUPROFEN 200 MG
16 TABLET ORAL
Status: DISCONTINUED | OUTPATIENT
Start: 2024-03-11 | End: 2024-03-13 | Stop reason: HOSPADM

## 2024-03-11 RX ORDER — HALOPERIDOL 5 MG/ML
0.5 INJECTION INTRAMUSCULAR EVERY 10 MIN PRN
Status: DISCONTINUED | OUTPATIENT
Start: 2024-03-11 | End: 2024-03-11 | Stop reason: HOSPADM

## 2024-03-11 RX ORDER — LOSARTAN POTASSIUM 50 MG/1
100 TABLET ORAL DAILY
Status: DISCONTINUED | OUTPATIENT
Start: 2024-03-11 | End: 2024-03-11

## 2024-03-11 RX ORDER — ONDANSETRON HYDROCHLORIDE 2 MG/ML
4 INJECTION, SOLUTION INTRAVENOUS EVERY 6 HOURS PRN
Status: DISCONTINUED | OUTPATIENT
Start: 2024-03-11 | End: 2024-03-13 | Stop reason: HOSPADM

## 2024-03-11 RX ORDER — FLUTICASONE FUROATE AND VILANTEROL 100; 25 UG/1; UG/1
1 POWDER RESPIRATORY (INHALATION) DAILY
Status: DISCONTINUED | OUTPATIENT
Start: 2024-03-11 | End: 2024-03-13 | Stop reason: HOSPADM

## 2024-03-11 RX ORDER — CEFAZOLIN SODIUM 1 G/3ML
INJECTION, POWDER, FOR SOLUTION INTRAMUSCULAR; INTRAVENOUS
Status: DISCONTINUED | OUTPATIENT
Start: 2024-03-11 | End: 2024-03-11 | Stop reason: HOSPADM

## 2024-03-11 RX ORDER — SODIUM CHLORIDE 9 MG/ML
INJECTION, SOLUTION INTRAVENOUS CONTINUOUS
Status: DISCONTINUED | OUTPATIENT
Start: 2024-03-11 | End: 2024-03-13 | Stop reason: HOSPADM

## 2024-03-11 RX ORDER — ALBUTEROL SULFATE 90 UG/1
AEROSOL, METERED RESPIRATORY (INHALATION)
Status: DISCONTINUED | OUTPATIENT
Start: 2024-03-11 | End: 2024-03-11

## 2024-03-11 RX ORDER — ALBUTEROL SULFATE 2.5 MG/.5ML
2.5 SOLUTION RESPIRATORY (INHALATION) EVERY 4 HOURS PRN
Status: DISCONTINUED | OUTPATIENT
Start: 2024-03-11 | End: 2024-03-13 | Stop reason: HOSPADM

## 2024-03-11 RX ORDER — LIDOCAINE HYDROCHLORIDE 10 MG/ML
1 INJECTION, SOLUTION EPIDURAL; INFILTRATION; INTRACAUDAL; PERINEURAL ONCE
Status: DISCONTINUED | OUTPATIENT
Start: 2024-03-11 | End: 2024-03-11 | Stop reason: HOSPADM

## 2024-03-11 RX ORDER — FENTANYL CITRATE 50 UG/ML
INJECTION, SOLUTION INTRAMUSCULAR; INTRAVENOUS
Status: DISCONTINUED | OUTPATIENT
Start: 2024-03-11 | End: 2024-03-11

## 2024-03-11 RX ORDER — ONDANSETRON HYDROCHLORIDE 2 MG/ML
INJECTION, SOLUTION INTRAVENOUS
Status: DISCONTINUED | OUTPATIENT
Start: 2024-03-11 | End: 2024-03-11

## 2024-03-11 RX ORDER — FAMOTIDINE 20 MG/1
20 TABLET, FILM COATED ORAL NIGHTLY
Status: DISCONTINUED | OUTPATIENT
Start: 2024-03-11 | End: 2024-03-13 | Stop reason: HOSPADM

## 2024-03-11 RX ORDER — MIDAZOLAM HYDROCHLORIDE 1 MG/ML
INJECTION, SOLUTION INTRAMUSCULAR; INTRAVENOUS
Status: DISCONTINUED | OUTPATIENT
Start: 2024-03-11 | End: 2024-03-11

## 2024-03-11 RX ORDER — ACETAMINOPHEN 325 MG/1
650 TABLET ORAL ONCE
Status: COMPLETED | OUTPATIENT
Start: 2024-03-11 | End: 2024-03-11

## 2024-03-11 RX ORDER — SODIUM CHLORIDE 9 MG/ML
INJECTION, SOLUTION INTRAVENOUS CONTINUOUS
Status: DISCONTINUED | OUTPATIENT
Start: 2024-03-11 | End: 2024-03-12

## 2024-03-11 RX ORDER — IBUPROFEN 200 MG
24 TABLET ORAL
Status: DISCONTINUED | OUTPATIENT
Start: 2024-03-11 | End: 2024-03-13 | Stop reason: HOSPADM

## 2024-03-11 RX ORDER — TACROLIMUS 1 MG/1
3 CAPSULE ORAL 2 TIMES DAILY
Status: DISCONTINUED | OUTPATIENT
Start: 2024-03-11 | End: 2024-03-12

## 2024-03-11 RX ORDER — ATORVASTATIN CALCIUM 20 MG/1
40 TABLET, FILM COATED ORAL NIGHTLY
Status: DISCONTINUED | OUTPATIENT
Start: 2024-03-11 | End: 2024-03-13 | Stop reason: HOSPADM

## 2024-03-11 RX ORDER — GLUCAGON 1 MG
1 KIT INJECTION CONTINUOUS PRN
Status: DISCONTINUED | OUTPATIENT
Start: 2024-03-11 | End: 2024-03-13 | Stop reason: HOSPADM

## 2024-03-11 RX ORDER — DIPHENHYDRAMINE HYDROCHLORIDE 50 MG/ML
50 INJECTION INTRAMUSCULAR; INTRAVENOUS ONCE
Status: COMPLETED | OUTPATIENT
Start: 2024-03-11 | End: 2024-03-11

## 2024-03-11 RX ORDER — PHENYLEPHRINE HCL IN 0.9% NACL 1 MG/10 ML
SYRINGE (ML) INTRAVENOUS
Status: DISCONTINUED | OUTPATIENT
Start: 2024-03-11 | End: 2024-03-11

## 2024-03-11 RX ORDER — BUPIVACAINE HYDROCHLORIDE 2.5 MG/ML
INJECTION, SOLUTION EPIDURAL; INFILTRATION; INTRACAUDAL
Status: DISCONTINUED | OUTPATIENT
Start: 2024-03-11 | End: 2024-03-11 | Stop reason: HOSPADM

## 2024-03-11 RX ORDER — OXYCODONE HYDROCHLORIDE 5 MG/1
5 TABLET ORAL EVERY 6 HOURS PRN
Status: DISCONTINUED | OUTPATIENT
Start: 2024-03-11 | End: 2024-03-13 | Stop reason: HOSPADM

## 2024-03-11 RX ORDER — MYCOPHENOLATE MOFETIL 250 MG/1
1000 CAPSULE ORAL 2 TIMES DAILY
Status: DISCONTINUED | OUTPATIENT
Start: 2024-03-11 | End: 2024-03-13 | Stop reason: HOSPADM

## 2024-03-11 RX ORDER — INSULIN ASPART 100 [IU]/ML
0-5 INJECTION, SOLUTION INTRAVENOUS; SUBCUTANEOUS
Status: DISCONTINUED | OUTPATIENT
Start: 2024-03-11 | End: 2024-03-13 | Stop reason: HOSPADM

## 2024-03-11 RX ORDER — DOCUSATE SODIUM 100 MG/1
100 CAPSULE, LIQUID FILLED ORAL 3 TIMES DAILY
Status: DISCONTINUED | OUTPATIENT
Start: 2024-03-11 | End: 2024-03-13 | Stop reason: HOSPADM

## 2024-03-11 RX ORDER — SODIUM BICARBONATE 650 MG/1
1300 TABLET ORAL DAILY
Status: DISCONTINUED | OUTPATIENT
Start: 2024-03-11 | End: 2024-03-13

## 2024-03-11 RX ORDER — DIPHENHYDRAMINE HCL 25 MG
25 CAPSULE ORAL
Status: COMPLETED | OUTPATIENT
Start: 2024-03-12 | End: 2024-03-13

## 2024-03-11 RX ORDER — METHYLPREDNISOLONE SOD SUCC 125 MG
250 VIAL (EA) INJECTION ONCE
Status: COMPLETED | OUTPATIENT
Start: 2024-03-12 | End: 2024-03-12

## 2024-03-11 RX ORDER — FUROSEMIDE 10 MG/ML
INJECTION INTRAMUSCULAR; INTRAVENOUS
Status: DISCONTINUED | OUTPATIENT
Start: 2024-03-11 | End: 2024-03-11

## 2024-03-11 RX ORDER — HEPARIN SODIUM 10000 [USP'U]/ML
INJECTION, SOLUTION INTRAVENOUS; SUBCUTANEOUS
Status: DISCONTINUED | OUTPATIENT
Start: 2024-03-11 | End: 2024-03-11 | Stop reason: HOSPADM

## 2024-03-11 RX ORDER — MUPIROCIN 20 MG/G
1 OINTMENT TOPICAL 2 TIMES DAILY
Status: DISCONTINUED | OUTPATIENT
Start: 2024-03-11 | End: 2024-03-13 | Stop reason: HOSPADM

## 2024-03-11 RX ADMIN — FAMOTIDINE 20 MG: 20 TABLET, FILM COATED ORAL at 09:03

## 2024-03-11 RX ADMIN — SUGAMMADEX 200 MG: 100 INJECTION, SOLUTION INTRAVENOUS at 01:03

## 2024-03-11 RX ADMIN — CEFAZOLIN 2 EACH: 330 INJECTION, POWDER, FOR SOLUTION INTRAMUSCULAR; INTRAVENOUS at 09:03

## 2024-03-11 RX ADMIN — MANNITOL 25 G: 250 INJECTION, SOLUTION INTRAVENOUS at 12:03

## 2024-03-11 RX ADMIN — TACROLIMUS 3 MG: 1 CAPSULE ORAL at 06:03

## 2024-03-11 RX ADMIN — ACETAMINOPHEN 650 MG: 325 TABLET ORAL at 02:03

## 2024-03-11 RX ADMIN — Medication 100 MCG: at 09:03

## 2024-03-11 RX ADMIN — Medication 100 MCG: at 12:03

## 2024-03-11 RX ADMIN — OXYCODONE 5 MG: 5 TABLET ORAL at 01:03

## 2024-03-11 RX ADMIN — CEFAZOLIN 2 G: 2 INJECTION, POWDER, FOR SOLUTION INTRAMUSCULAR; INTRAVENOUS at 06:03

## 2024-03-11 RX ADMIN — SODIUM CHLORIDE: 9 INJECTION, SOLUTION INTRAVENOUS at 09:03

## 2024-03-11 RX ADMIN — MIDAZOLAM HYDROCHLORIDE 2 MG: 1 INJECTION, SOLUTION INTRAMUSCULAR; INTRAVENOUS at 08:03

## 2024-03-11 RX ADMIN — DIPHENHYDRAMINE HYDROCHLORIDE 50 MG: 50 INJECTION INTRAMUSCULAR; INTRAVENOUS at 11:03

## 2024-03-11 RX ADMIN — Medication 200 MCG: at 12:03

## 2024-03-11 RX ADMIN — SODIUM CHLORIDE: 9 INJECTION, SOLUTION INTRAVENOUS at 06:03

## 2024-03-11 RX ADMIN — ONDANSETRON 4 MG: 2 INJECTION INTRAMUSCULAR; INTRAVENOUS at 01:03

## 2024-03-11 RX ADMIN — Medication 10 MG: at 12:03

## 2024-03-11 RX ADMIN — FENTANYL CITRATE 25 MCG: 50 INJECTION INTRAMUSCULAR; INTRAVENOUS at 02:03

## 2024-03-11 RX ADMIN — Medication 10 MG: at 01:03

## 2024-03-11 RX ADMIN — SUGAMMADEX 200 MG: 100 INJECTION, SOLUTION INTRAVENOUS at 12:03

## 2024-03-11 RX ADMIN — DOCUSATE SODIUM 100 MG: 100 CAPSULE, LIQUID FILLED ORAL at 02:03

## 2024-03-11 RX ADMIN — HEPARIN SODIUM 1000 UNITS: 1000 INJECTION, SOLUTION INTRAVENOUS; SUBCUTANEOUS at 11:03

## 2024-03-11 RX ADMIN — MUPIROCIN 1 G: 20 OINTMENT TOPICAL at 09:03

## 2024-03-11 RX ADMIN — SODIUM CHLORIDE, SODIUM GLUCONATE, SODIUM ACETATE, POTASSIUM CHLORIDE, MAGNESIUM CHLORIDE, SODIUM PHOSPHATE, DIBASIC, AND POTASSIUM PHOSPHATE: .53; .5; .37; .037; .03; .012; .00082 INJECTION, SOLUTION INTRAVENOUS at 09:03

## 2024-03-11 RX ADMIN — ANTI-THYMOCYTE GLOBULIN (RABBIT) 125 MG: 5 INJECTION, POWDER, LYOPHILIZED, FOR SOLUTION INTRAVENOUS at 11:03

## 2024-03-11 RX ADMIN — THERA TABS 1 TABLET: TAB at 02:03

## 2024-03-11 RX ADMIN — ROCURONIUM BROMIDE 20 MG: 10 INJECTION, SOLUTION INTRAVENOUS at 12:03

## 2024-03-11 RX ADMIN — Medication 100 MCG: at 11:03

## 2024-03-11 RX ADMIN — ROCURONIUM BROMIDE 50 MG: 10 INJECTION, SOLUTION INTRAVENOUS at 08:03

## 2024-03-11 RX ADMIN — PREGABALIN 75 MG: 75 CAPSULE ORAL at 06:03

## 2024-03-11 RX ADMIN — HEPARIN SODIUM 5000 UNITS: 5000 INJECTION INTRAVENOUS; SUBCUTANEOUS at 02:03

## 2024-03-11 RX ADMIN — ALBUTEROL SULFATE 2 PUFF: 108 INHALANT RESPIRATORY (INHALATION) at 08:03

## 2024-03-11 RX ADMIN — FENTANYL CITRATE 50 MCG: 50 INJECTION INTRAMUSCULAR; INTRAVENOUS at 01:03

## 2024-03-11 RX ADMIN — ROCURONIUM BROMIDE 30 MG: 10 INJECTION, SOLUTION INTRAVENOUS at 11:03

## 2024-03-11 RX ADMIN — BENZONATATE 100 MG: 100 CAPSULE ORAL at 09:03

## 2024-03-11 RX ADMIN — PROPOFOL 150 MG: 10 INJECTION, EMULSION INTRAVENOUS at 08:03

## 2024-03-11 RX ADMIN — LIDOCAINE HYDROCHLORIDE 100 MG: 20 INJECTION, SOLUTION EPIDURAL; INFILTRATION; INTRACAUDAL at 08:03

## 2024-03-11 RX ADMIN — HEPARIN SODIUM 5000 UNITS: 5000 INJECTION INTRAVENOUS; SUBCUTANEOUS at 09:03

## 2024-03-11 RX ADMIN — DOCUSATE SODIUM 100 MG: 100 CAPSULE, LIQUID FILLED ORAL at 09:03

## 2024-03-11 RX ADMIN — ATORVASTATIN CALCIUM 40 MG: 20 TABLET, FILM COATED ORAL at 09:03

## 2024-03-11 RX ADMIN — Medication 20 MG: at 09:03

## 2024-03-11 RX ADMIN — DEXTROSE AND SODIUM CHLORIDE: 5; 450 INJECTION, SOLUTION INTRAVENOUS at 01:03

## 2024-03-11 RX ADMIN — BENZONATATE 100 MG: 100 CAPSULE ORAL at 04:03

## 2024-03-11 RX ADMIN — SODIUM CHLORIDE: 9 INJECTION, SOLUTION INTRAVENOUS at 02:03

## 2024-03-11 RX ADMIN — FUROSEMIDE 100 MG: 10 INJECTION, SOLUTION INTRAMUSCULAR; INTRAVENOUS at 12:03

## 2024-03-11 RX ADMIN — MUPIROCIN: 20 OINTMENT TOPICAL at 06:03

## 2024-03-11 RX ADMIN — FENTANYL CITRATE 100 MCG: 50 INJECTION INTRAMUSCULAR; INTRAVENOUS at 08:03

## 2024-03-11 RX ADMIN — BISACODYL 10 MG: 5 TABLET, COATED ORAL at 09:03

## 2024-03-11 RX ADMIN — SODIUM CHLORIDE: 9 INJECTION, SOLUTION INTRAVENOUS at 08:03

## 2024-03-11 RX ADMIN — ACETAMINOPHEN 650 MG: 325 TABLET ORAL at 09:03

## 2024-03-11 RX ADMIN — METHYLPREDNISOLONE SODIUM SUCCINATE 500 MG: 1 INJECTION, POWDER, FOR SOLUTION INTRAMUSCULAR; INTRAVENOUS at 09:03

## 2024-03-11 RX ADMIN — Medication 10 MG: at 11:03

## 2024-03-11 RX ADMIN — SODIUM BICARBONATE 650 MG TABLET 1300 MG: at 04:03

## 2024-03-11 RX ADMIN — ROCURONIUM BROMIDE 20 MG: 10 INJECTION, SOLUTION INTRAVENOUS at 10:03

## 2024-03-11 RX ADMIN — HEPARIN SODIUM 5000 UNITS: 5000 INJECTION INTRAVENOUS; SUBCUTANEOUS at 06:03

## 2024-03-11 RX ADMIN — MYCOPHENOLATE MOFETIL 1000 MG: 250 CAPSULE ORAL at 09:03

## 2024-03-11 RX ADMIN — ACETAMINOPHEN 650 MG: 325 TABLET ORAL at 08:03

## 2024-03-11 RX ADMIN — ALBUTEROL SULFATE 2 PUFF: 108 INHALANT RESPIRATORY (INHALATION) at 01:03

## 2024-03-11 NOTE — ANESTHESIA PROCEDURE NOTES
Intubation    Date/Time: 3/11/2024 8:58 AM    Performed by: Fredrick Carranza MD  Authorized by: William Beebe MD    Intubation:     Induction:  Intravenous    Intubated:  Postinduction    Mask Ventilation:  Easy with oral airway    Attempts:  1    Attempted By:  Resident anesthesiologist    Method of Intubation:  Direct    Blade:  Agustin 3    Laryngeal View Grade: Grade I - full view of cords      Difficult Airway Encountered?: No      Complications:  None    Airway Device:  Oral endotracheal tube    Airway Device Size:  7.5    Style/Cuff Inflation:  Cuffed (inflated to minimal occlusive pressure)    Tube secured:  23    Secured at:  The lips    Placement Verified By:  Capnometry    Findings Post-Intubation:  BS equal bilateral and atraumatic/condition of teeth unchanged

## 2024-03-11 NOTE — TRANSFER OF CARE
"Anesthesia Transfer of Care Note    Patient: Colten Monet    Procedure(s) Performed: Procedure(s) (LRB):  TRANSPLANT, KIDNEY (N/A)    Patient location: PACU    Anesthesia Type: general    Transport from OR: Transported from OR on 6-10 L/min O2 by face mask with adequate spontaneous ventilation    Post pain: adequate analgesia    Post assessment: no apparent anesthetic complications    Post vital signs: stable    Level of consciousness: sedated    Nausea/Vomiting: no nausea/vomiting    Complications: none    Transfer of care protocol was followed      Last vitals: Visit Vitals  BP (!) 128/59   Pulse 96   Temp 37.2 °C (99 °F) (Temporal)   Resp 11   Ht 5' 6" (1.676 m)   Wt 91.6 kg (201 lb 15.1 oz)   SpO2 100%   BMI 32.59 kg/m²     "

## 2024-03-11 NOTE — OP NOTE
Operative Report    Date of Procedure: 3/11/2024  Date of Transplant (UNOS): 3/11/24    Surgeons:  Surgeon(s) and Role:     * Silvio Mayer MD - Primary     * Tanja Laguerre MD - Resident - Assisting    First Assistant Attestation:  The indicated resident served as first assistant for this procedure.    Pre-operative Diagnosis: CKD5, dialysis not yet started  secondary to IgA Nephropathy  Post-operative Diagnosis: Same    Procedure(s) Performed:   Back Table Preparation of left kidney    Living Kidney transplant    Anesthesia: General endotracheal    Preamble  Indications and Patient Counseling: The patient is a 63 y.o. year-old male with end-stage kidney disease secondary to IgA Nephropathy who has been evaluated for a kidney transplant.  The procedure was thoroughly discussed with the patient, including potential risks, complications, and alternatives.  Specific complications mentioned included death, graft non-function, bleeding, infection, and rejection, as well as the possibility of other complications not specifically mentioned.    Donor Risk Factors: Prior to the operation, the patient was advised of any donor-specific risk factors requiring specific disclosure.  Factors in this case included nothing that required specific disclosure.      Specific [facility] donor risk criteria for the organ donor include:  None    All questions were answered, the patient voiced appropriate understanding, and he agreed to proceed with the planned procedure.    ABO Confirmation: Immediately following arrival of the donor organ and prior to implantation, a formal ABO confirmation was done according to hospital and UNOS policies.  I confirmed the UNOS ID number ([id number]) of the donor organ and the donor and recipient ABO types, directly verifying these data by comparison with the UNOS Match Run report ().  This confirmation was personally done by an attending surgeon and circulating nurse, and is officially documented  elsewhere.    Time-Out: A complete time out was carried out prior to incision, with confirmation of patient identity, correct procedure, correct operative site, appropriate antibiotic prophylaxis, review of any known allergies, and presence of all needed equipment.    Procedure in Detail  Prior to starting the operation, the   Left kidney  was prepared on the back table. Arterial anatomy was normal. Venous anatomy was single. Ureteral anatomy was single. Back table vascular reconstruction was not required .  Unneeded fat was removed from the kidney, the vessels were cleaned of adherent tissue and tested for leaks, and the kidney was maintained at ice temperature in organ preservation solution until it was brought to the operative field.     The patient was brought into the operating room and placed in a supine position on the OR table.  After the induction of general endotracheal anesthesia, lines were placed by the anesthesiologist.  The urinary bladder was catheterized and irrigated with antibiotic solution.  There was no tension on the axillae and all pressure points were padded.  Sequential compression boots were used as were Dakota Huggers.  The abdomen was prepped and draped in the usual sterile fashion.  Skin was incised over the right with a knife and deepened with electrocautery.  The peritoneum and its contents were swept medially, exposing the right external iliac artery and the right external iliac vein.  The Bookwalter retractor was used to provide exposure.  Overlying lymphatics were ligated or cauterized and the vessels were dissected free for a length compatible with anastomosis.  The kidney was brought to the OR table at 3/11/2024 11:51 AM.  Venous control was obtained with a vascular clamp.  A venotomy was made, the vein irrigated, and an end renal to right external iliac vein anastomosis was created with 5-0 polypropylene.  Arterial control was obtained with a vascular clamp.  Arteriotomy was made,  the artery irrigated, and an end renal to right external iliac artery anastomosis was created with 6-0 polypropylene.  The kidney was unclamped and reperfused at 3/11/2024 12:12 PM.  Reperfusion quality was good. Intraoperative urine production was observed.  After hemostasis was obtained, a Lich uretero-neocystostomy was created.  The bladder was filled and identified, opened, and the anastomosis created using 6-0 PDS.  The bladder muscle was closed over the distal ureter to create an antireflux tunnel.  A ureteral stent was used.  With the kidney well perfused and sitting appropriately without tension on the anastomoses, viscera were replaced in their usual position.  The wound was closed after a final check for hemostasis.  Overall, the graft quality was assessed to be good. At the end of the case the needle, sponge and instrument counts were all correct.  Sterile dressings were applied and the patient was brought to the recovery room/ICU in good condition.    Estimated Blood Loss: 100 mL  Fluids Administered:    Drains: 0  Specimens: none    Findings:    Organ Transplanted: kidney    Arterial Anatomy: single  Number of Arteries: 1  Configuration of Multiple Arteries: not applicable  Venous Anatomy: single  Number of Veins: 1  Ureteral Anatomy: single  Number of Ureters: 1  Reperfusion Quality: good  Overall Graft Quality: good  Intraoperative Urine Production: yes  Hill: not to be removed before 2 days.  Ureteral Stent: Yes    Ischemic Times:   Anastomosis (warm ischemia) time: 21 minutes   Cold ischemia time: 45 minutes  Total ischemia time: 66 minutes    Donor Data:  UNOS ID: [id number]   UNOS Match Run:    Donor Type: Living   Donor CMV Status:    Donor HBcAB:    Donor HCV Status:

## 2024-03-11 NOTE — ANESTHESIA PROCEDURE NOTES
Left Radial Arterial Line    Diagnosis: chronic kidney disease  Doctor requesting consult: Moreno    Patient location during procedure: done in OR  Timeout: 3/11/2024 8:53 AM  Procedure end time: 3/11/2024 9:00 AM    Staffing  Authorizing Provider: William Beebe MD  Performing Provider: Fredrick Carranza MD    Staffing  Performed by: Fredrick Carranza MD  Authorized by: William Beebe MD    Anesthesiologist was present at the time of the procedure.    Preanesthetic Checklist  Completed: patient identified, IV checked, site marked, risks and benefits discussed, surgical consent, monitors and equipment checked, pre-op evaluation, timeout performed and anesthesia consent givenLeft Radial Arterial Line  Skin Prep: chlorhexidine gluconate and isopropyl alcohol  Local Infiltration: none  Orientation: left  Location: radial    Catheter Size: 20 G  Catheter placement by Ultrasound guidance. Heme positive aspiration all ports.   Vessel Caliber: medium, patent  Needle advanced into vessel with real time Ultrasound guidance.Insertion Attempts: 1  Assessment  Dressing: secured with tape and tegaderm  Patient: Tolerated well

## 2024-03-12 PROBLEM — Z91.89 AT RISK FOR OPPORTUNISTIC INFECTIONS: Status: ACTIVE | Noted: 2024-03-12

## 2024-03-12 PROBLEM — Z94.0 S/P LIVING-DONOR KIDNEY TRANSPLANTATION: Status: ACTIVE | Noted: 2024-03-12

## 2024-03-12 PROBLEM — Z79.60 LONG-TERM USE OF IMMUNOSUPPRESSANT MEDICATION: Status: ACTIVE | Noted: 2024-03-12

## 2024-03-12 PROBLEM — D62 ACUTE BLOOD LOSS ANEMIA: Status: ACTIVE | Noted: 2024-03-12

## 2024-03-12 PROBLEM — Z29.89 PROPHYLACTIC IMMUNOTHERAPY: Status: ACTIVE | Noted: 2024-03-12

## 2024-03-12 LAB
ALBUMIN SERPL BCP-MCNC: 2.8 G/DL (ref 3.5–5.2)
ANION GAP SERPL CALC-SCNC: 7 MMOL/L (ref 8–16)
BASOPHILS # BLD AUTO: 0.03 K/UL (ref 0–0.2)
BASOPHILS NFR BLD: 0.2 % (ref 0–1.9)
BUN SERPL-MCNC: 31 MG/DL (ref 8–23)
CALCIUM SERPL-MCNC: 8.1 MG/DL (ref 8.7–10.5)
CHLORIDE SERPL-SCNC: 115 MMOL/L (ref 95–110)
CO2 SERPL-SCNC: 19 MMOL/L (ref 23–29)
CREAT SERPL-MCNC: 2.1 MG/DL (ref 0.5–1.4)
DIFFERENTIAL METHOD BLD: ABNORMAL
EOSINOPHIL # BLD AUTO: 0 K/UL (ref 0–0.5)
EOSINOPHIL NFR BLD: 0 % (ref 0–8)
ERYTHROCYTE [DISTWIDTH] IN BLOOD BY AUTOMATED COUNT: 13.2 % (ref 11.5–14.5)
EST. GFR  (NO RACE VARIABLE): 34.5 ML/MIN/1.73 M^2
GLUCOSE SERPL-MCNC: 156 MG/DL (ref 70–110)
HCT VFR BLD AUTO: 33.2 % (ref 40–54)
HCV RNA SERPL QL NAA+PROBE: NOT DETECTED
HCV RNA SPEC NAA+PROBE-ACNC: NOT DETECTED IU/ML
HGB BLD-MCNC: 10.2 G/DL (ref 14–18)
IMM GRANULOCYTES # BLD AUTO: 0.09 K/UL (ref 0–0.04)
IMM GRANULOCYTES NFR BLD AUTO: 0.5 % (ref 0–0.5)
LYMPHOCYTES # BLD AUTO: 0.1 K/UL (ref 1–4.8)
LYMPHOCYTES NFR BLD: 0.8 % (ref 18–48)
MAGNESIUM SERPL-MCNC: 1.8 MG/DL (ref 1.6–2.6)
MCH RBC QN AUTO: 27.2 PG (ref 27–31)
MCHC RBC AUTO-ENTMCNC: 30.7 G/DL (ref 32–36)
MCV RBC AUTO: 89 FL (ref 82–98)
MONOCYTES # BLD AUTO: 0.8 K/UL (ref 0.3–1)
MONOCYTES NFR BLD: 4.9 % (ref 4–15)
NEUTROPHILS # BLD AUTO: 15.7 K/UL (ref 1.8–7.7)
NEUTROPHILS NFR BLD: 93.6 % (ref 38–73)
NRBC BLD-RTO: 0 /100 WBC
PHOSPHATE SERPL-MCNC: 2.9 MG/DL (ref 2.7–4.5)
PLATELET # BLD AUTO: 183 K/UL (ref 150–450)
PMV BLD AUTO: 10.7 FL (ref 9.2–12.9)
POCT GLUCOSE: 126 MG/DL (ref 70–110)
POCT GLUCOSE: 158 MG/DL (ref 70–110)
POCT GLUCOSE: 169 MG/DL (ref 70–110)
POCT GLUCOSE: 212 MG/DL (ref 70–110)
POTASSIUM SERPL-SCNC: 4.3 MMOL/L (ref 3.5–5.1)
RBC # BLD AUTO: 3.75 M/UL (ref 4.6–6.2)
SODIUM SERPL-SCNC: 141 MMOL/L (ref 136–145)
TACROLIMUS BLD-MCNC: 2.7 NG/ML (ref 5–15)
WBC # BLD AUTO: 16.73 K/UL (ref 3.9–12.7)

## 2024-03-12 PROCEDURE — 94640 AIRWAY INHALATION TREATMENT: CPT

## 2024-03-12 PROCEDURE — 99233 SBSQ HOSP IP/OBS HIGH 50: CPT | Mod: 24,,, | Performed by: PHYSICIAN ASSISTANT

## 2024-03-12 PROCEDURE — 94761 N-INVAS EAR/PLS OXIMETRY MLT: CPT

## 2024-03-12 PROCEDURE — 36415 COLL VENOUS BLD VENIPUNCTURE: CPT

## 2024-03-12 PROCEDURE — 25000003 PHARM REV CODE 250

## 2024-03-12 PROCEDURE — 80197 ASSAY OF TACROLIMUS: CPT

## 2024-03-12 PROCEDURE — 63600175 PHARM REV CODE 636 W HCPCS

## 2024-03-12 PROCEDURE — 83735 ASSAY OF MAGNESIUM: CPT

## 2024-03-12 PROCEDURE — 85025 COMPLETE CBC W/AUTO DIFF WBC: CPT

## 2024-03-12 PROCEDURE — 25000242 PHARM REV CODE 250 ALT 637 W/ HCPCS

## 2024-03-12 PROCEDURE — S5010 5% DEXTROSE AND 0.45% SALINE: HCPCS

## 2024-03-12 PROCEDURE — 63600175 PHARM REV CODE 636 W HCPCS: Performed by: SURGERY

## 2024-03-12 PROCEDURE — 80069 RENAL FUNCTION PANEL: CPT

## 2024-03-12 PROCEDURE — 20600001 HC STEP DOWN PRIVATE ROOM

## 2024-03-12 PROCEDURE — 25000003 PHARM REV CODE 250: Performed by: SURGERY

## 2024-03-12 RX ORDER — TACROLIMUS 1 MG/1
1 CAPSULE ORAL ONCE
Status: COMPLETED | OUTPATIENT
Start: 2024-03-12 | End: 2024-03-12

## 2024-03-12 RX ORDER — SODIUM CHLORIDE 9 MG/ML
INJECTION, SOLUTION INTRAVENOUS CONTINUOUS
Status: DISCONTINUED | OUTPATIENT
Start: 2024-03-12 | End: 2024-03-12

## 2024-03-12 RX ORDER — TACROLIMUS 1 MG/1
6 CAPSULE ORAL EVERY 12 HOURS
Qty: 360 CAPSULE | Refills: 11 | Status: SHIPPED | OUTPATIENT
Start: 2024-03-12 | End: 2024-03-13

## 2024-03-12 RX ORDER — MYCOPHENOLATE MOFETIL 250 MG/1
1000 CAPSULE ORAL 2 TIMES DAILY
Qty: 240 CAPSULE | Refills: 11 | Status: ON HOLD | OUTPATIENT
Start: 2024-03-12 | End: 2024-04-24 | Stop reason: HOSPADM

## 2024-03-12 RX ORDER — VALGANCICLOVIR 450 MG/1
900 TABLET, FILM COATED ORAL EVERY MORNING
Qty: 60 TABLET | Refills: 2 | Status: SHIPPED | OUTPATIENT
Start: 2024-03-11 | End: 2024-06-10

## 2024-03-12 RX ORDER — BISACODYL 5 MG
10 TABLET, DELAYED RELEASE (ENTERIC COATED) ORAL DAILY PRN
Refills: 0 | COMMUNITY
Start: 2024-03-12 | End: 2024-06-04

## 2024-03-12 RX ORDER — DOCUSATE SODIUM 100 MG/1
100 CAPSULE, LIQUID FILLED ORAL 3 TIMES DAILY PRN
Refills: 0 | COMMUNITY
Start: 2024-03-12

## 2024-03-12 RX ORDER — SULFAMETHOXAZOLE AND TRIMETHOPRIM 400; 80 MG/1; MG/1
1 TABLET ORAL EVERY MORNING
Qty: 30 TABLET | Refills: 5 | Status: ON HOLD | OUTPATIENT
Start: 2024-03-11 | End: 2024-04-24 | Stop reason: HOSPADM

## 2024-03-12 RX ORDER — PREDNISONE 5 MG/1
TABLET ORAL
Qty: 120 TABLET | Refills: 11 | Status: ON HOLD | OUTPATIENT
Start: 2024-03-14 | End: 2024-04-24 | Stop reason: HOSPADM

## 2024-03-12 RX ORDER — FAMOTIDINE 20 MG/1
20 TABLET, FILM COATED ORAL NIGHTLY
Qty: 30 TABLET | Refills: 0 | Status: ON HOLD | OUTPATIENT
Start: 2024-03-12 | End: 2024-05-23 | Stop reason: HOSPADM

## 2024-03-12 RX ORDER — SODIUM BICARBONATE 650 MG/1
1300 TABLET ORAL DAILY
Qty: 60 TABLET | Refills: 5 | Status: SHIPPED | OUTPATIENT
Start: 2024-03-13 | End: 2024-03-13

## 2024-03-12 RX ORDER — TACROLIMUS 1 MG/1
4 CAPSULE ORAL 2 TIMES DAILY
Status: DISCONTINUED | OUTPATIENT
Start: 2024-03-12 | End: 2024-03-13 | Stop reason: HOSPADM

## 2024-03-12 RX ADMIN — TACROLIMUS 4 MG: 1 CAPSULE ORAL at 05:03

## 2024-03-12 RX ADMIN — HEPARIN SODIUM 5000 UNITS: 5000 INJECTION INTRAVENOUS; SUBCUTANEOUS at 01:03

## 2024-03-12 RX ADMIN — DOCUSATE SODIUM 100 MG: 100 CAPSULE, LIQUID FILLED ORAL at 02:03

## 2024-03-12 RX ADMIN — ACETAMINOPHEN 650 MG: 325 TABLET ORAL at 02:03

## 2024-03-12 RX ADMIN — ACETAMINOPHEN 650 MG: 325 TABLET ORAL at 06:03

## 2024-03-12 RX ADMIN — THERA TABS 1 TABLET: TAB at 08:03

## 2024-03-12 RX ADMIN — ATORVASTATIN CALCIUM 40 MG: 20 TABLET, FILM COATED ORAL at 08:03

## 2024-03-12 RX ADMIN — MUPIROCIN 1 G: 20 OINTMENT TOPICAL at 08:03

## 2024-03-12 RX ADMIN — DOCUSATE SODIUM 100 MG: 100 CAPSULE, LIQUID FILLED ORAL at 08:03

## 2024-03-12 RX ADMIN — CEFAZOLIN 2 G: 2 INJECTION, POWDER, FOR SOLUTION INTRAMUSCULAR; INTRAVENOUS at 01:03

## 2024-03-12 RX ADMIN — BISACODYL 10 MG: 5 TABLET, COATED ORAL at 08:03

## 2024-03-12 RX ADMIN — SODIUM BICARBONATE 650 MG TABLET 1300 MG: at 08:03

## 2024-03-12 RX ADMIN — TACROLIMUS 3 MG: 1 CAPSULE ORAL at 08:03

## 2024-03-12 RX ADMIN — HEPARIN SODIUM 5000 UNITS: 5000 INJECTION INTRAVENOUS; SUBCUTANEOUS at 08:03

## 2024-03-12 RX ADMIN — ANTI-THYMOCYTE GLOBULIN (RABBIT) 100 MG: 5 INJECTION, POWDER, LYOPHILIZED, FOR SOLUTION INTRAVENOUS at 10:03

## 2024-03-12 RX ADMIN — FLUTICASONE FUROATE AND VILANTEROL TRIFENATATE 1 PUFF: 100; 25 POWDER RESPIRATORY (INHALATION) at 10:03

## 2024-03-12 RX ADMIN — HEPARIN SODIUM 5000 UNITS: 5000 INJECTION INTRAVENOUS; SUBCUTANEOUS at 06:03

## 2024-03-12 RX ADMIN — BENZONATATE 100 MG: 100 CAPSULE ORAL at 08:03

## 2024-03-12 RX ADMIN — BENZONATATE 100 MG: 100 CAPSULE ORAL at 02:03

## 2024-03-12 RX ADMIN — TACROLIMUS 1 MG: 1 CAPSULE ORAL at 02:03

## 2024-03-12 RX ADMIN — OXYCODONE 5 MG: 5 TABLET ORAL at 05:03

## 2024-03-12 RX ADMIN — SODIUM CHLORIDE: 9 INJECTION, SOLUTION INTRAVENOUS at 09:03

## 2024-03-12 RX ADMIN — DIPHENHYDRAMINE HYDROCHLORIDE 25 MG: 25 CAPSULE ORAL at 10:03

## 2024-03-12 RX ADMIN — MYCOPHENOLATE MOFETIL 1000 MG: 250 CAPSULE ORAL at 08:03

## 2024-03-12 RX ADMIN — METHYLPREDNISOLONE SODIUM SUCCINATE 250 MG: 125 INJECTION, POWDER, FOR SOLUTION INTRAMUSCULAR; INTRAVENOUS at 09:03

## 2024-03-12 RX ADMIN — FAMOTIDINE 20 MG: 20 TABLET, FILM COATED ORAL at 08:03

## 2024-03-12 RX ADMIN — DEXTROSE AND SODIUM CHLORIDE: 5; 450 INJECTION, SOLUTION INTRAVENOUS at 05:03

## 2024-03-12 NOTE — PLAN OF CARE
Recommendations     1. Continue Renal Diet.   2. If PO intake inadequate recommend ONS Novasource Renal QD to help meet nutritional needs.   3. Monitor I/O's, weight and labs.   4. RD to monitor.     Goals: Meet % EEN/EPN by follow-up date.  Nutrition Goal Status: new  Communication of RD Recs: other (comment) (POC)

## 2024-03-12 NOTE — ASSESSMENT & PLAN NOTE
- Continue Prograf. Monitor trough daily and adjust dose as needed to achieve therapeutic level.  - Continue Cellcept.  - Continue steroids

## 2024-03-12 NOTE — SUBJECTIVE & OBJECTIVE
Subjective:   History of Present Illness:  Reason for Visit: evaluate transplant candidacy     History of Present Illness: Colten Monet is a 63 y.o. year old male undergoing transplant evaluation.     Dialysis History: Colten is pre-dialysis.       Transplant History: N/A     Etiology of Renal Disease: IgA Nephropathy (based on medical records from referral).    Mr. Monet is a 63 y.o. year old male who is status post Kidney Transplant - 3/11/2024  (#1).  His maintenance immunosuppression consists of:   Immunosuppressants (From admission, onward)      Start     Stop Route Frequency Ordered    03/12/24 0900  antithymocyte globulin (rabbit) 100 mg, hydrocortisone sodium succinate (SOLU-CORTEF) 20 mg in sodium chloride 0.9% 500 mL (FOR PERIPHERAL LINE ADMINISTRATION ONLY)  (IP TXP KIDNEY POST-OP THYMO WITH PERIPHERAL PRECHECKED)         03/14/24 0859 IV Daily 03/11/24 1329    03/11/24 2200  mycophenolate capsule 1,000 mg  (IP TXP KIDNEY POST-OP THYMO WITH PERIPHERAL PRECHECKED)         -- Oral 2 times daily 03/11/24 1329    03/11/24 1800  tacrolimus capsule 3 mg  (IP TXP KIDNEY POST-OP THYMO WITH PERIPHERAL PRECHECKED)         -- Oral 2 times daily 03/11/24 1329            Hospital Course:  Patient is now S/P living related Ktx. Surgery without complications. Intra-op urine noted. 2 day schmidt placed, no drains.     Interval history: No acute events over night. Patient reports feeling well this morning. H/H stable. Cr trending down. UOP 3.7L/24hr. Plan to stop I/O IVF and placed on continuous IVF.  Pain well controlled. Tolerating diet. +flatus/-BM. Ambulation encouraged. VSS      Past Medical, Surgical, Family, and Social History:   Unchanged from H&P.    Scheduled Meds:   acetaminophen  650 mg Oral Q8H    acetaminophen  650 mg Oral Q24H    antithymocyte globulin (rabbit) 100 mg, hydrocortisone sodium succinate (SOLU-CORTEF) 20 mg in sodium chloride 0.9% 500 mL (FOR PERIPHERAL LINE ADMINISTRATION ONLY)  1.5  mg/kg (Adjusted) Intravenous Daily    atorvastatin  40 mg Oral QHS    benzonatate  100 mg Oral TID    bisacodyL  10 mg Oral QHS    diphenhydrAMINE  25 mg Oral Q24H    docusate sodium  100 mg Oral TID    famotidine  20 mg Oral QHS    fluticasone furoate-vilanteroL  1 puff Inhalation Daily    heparin (porcine)  5,000 Units Subcutaneous Q8H    [START ON 3/13/2024] methylPREDNISolone sodium succinate injection  125 mg Intravenous Once    multivitamin  1 tablet Oral Daily    mupirocin  1 g Nasal BID    mycophenolate  1,000 mg Oral BID    [START ON 3/14/2024] predniSONE  20 mg Oral Daily    sodium bicarbonate  1,300 mg Oral Daily    [START ON 3/21/2024] sulfamethoxazole-trimethoprim 400-80mg  1 tablet Oral Daily AM    tacrolimus  3 mg Oral BID    [START ON 3/21/2024] valGANciclovir  450 mg Oral Daily AM     Continuous Infusions:   sodium chloride 0.9% 10 mL/hr at 03/11/24 0847    sodium chloride 0.9% 100 mL/hr at 03/12/24 0945    glucagon (human recombinant)       PRN Meds:albuterol sulfate, dextrose 10%, diphenhydrAMINE, EPINEPHrine (PF), glucagon (human recombinant), glucose, glucose, glucose, hydrocortisone sodium succinate, insulin aspart U-100, ondansetron, oxyCODONE, sodium chloride 0.9%, traMADoL    Intake/Output - Last 3 Shifts         03/10 0700  03/11 0659 03/11 0700  03/12 0659 03/12 0700  03/13 0659    P.O.  450     I.V. (mL/kg)  2462.8 (26.9)     IV Piggyback  1049.8     Total Intake(mL/kg)  3962.6 (43.3)     Urine (mL/kg/hr)  3685 (1.7) 150 (0.4)    Total Output  3685 150    Net  +277.6 -150                    Review of Systems   Constitutional:  Negative for chills and fever.   Gastrointestinal:  Negative for abdominal distention, abdominal pain, constipation, diarrhea, nausea and vomiting.   Genitourinary:  Negative for decreased urine volume, difficulty urinating, dysuria and frequency.   Skin:  Positive for wound.   Allergic/Immunologic: Positive for immunocompromised state.   Psychiatric/Behavioral:   "Negative for agitation, confusion and decreased concentration. The patient is not nervous/anxious.       Objective:     Vital Signs (Most Recent):  Temp: 98.3 °F (36.8 °C) (03/12/24 1117)  Pulse: 93 (03/12/24 1132)  Resp: 18 (03/12/24 1117)  BP: (!) 147/87 (03/12/24 1132)  SpO2: 95 % (03/12/24 1132) Vital Signs (24h Range):  Temp:  [97.5 °F (36.4 °C)-99 °F (37.2 °C)] 98.3 °F (36.8 °C)  Pulse:  [76-97] 93  Resp:  [10-19] 18  SpO2:  [94 %-100 %] 95 %  BP: (110-147)/(59-87) 147/87     Weight: 91.6 kg (201 lb 15.1 oz)  Height: 5' 6" (167.6 cm)  Body mass index is 32.59 kg/m².     Physical Exam  Vitals and nursing note reviewed.   Constitutional:       General: He is not in acute distress.     Appearance: He is well-developed. He is not diaphoretic.   HENT:      Head: Normocephalic.   Eyes:      Conjunctiva/sclera: Conjunctivae normal.      Pupils: Pupils are equal, round, and reactive to light.   Cardiovascular:      Rate and Rhythm: Normal rate and regular rhythm.      Heart sounds: No murmur heard.     No friction rub.   Pulmonary:      Breath sounds: Normal breath sounds. No wheezing or rales.   Abdominal:      General: Bowel sounds are normal. There is no distension.      Palpations: Abdomen is soft.      Tenderness: There is no abdominal tenderness. There is no guarding or rebound.          Comments: Surgical incision w/ staples CDI   Skin:     General: Skin is warm and dry.      Capillary Refill: Capillary refill takes less than 2 seconds.   Neurological:      Mental Status: He is alert and oriented to person, place, and time. Mental status is at baseline.   Psychiatric:         Behavior: Behavior normal.         Thought Content: Thought content normal.          Laboratory:  CBC:   Recent Labs   Lab 03/07/24  1037 03/11/24  0626 03/11/24  1329 03/11/24  2101 03/12/24  0553   WBC 12.83*  --   --  22.84* 16.73*   RBC 4.55*  --   --  3.89* 3.75*   HGB 12.0*  --   --  10.6* 10.2*   HCT 40.5   < > 34.2* 34.9* 33.2* "     --   --  180 183   MCV 89  --   --  90 89   MCH 26.4*  --   --  27.2 27.2   MCHC 29.6*  --   --  30.4* 30.7*    < > = values in this interval not displayed.     CMP:   Recent Labs   Lab 03/11/24  0626 03/11/24  1329 03/11/24  2101 03/12/24  0553   GLU 83 134* 189* 156*   CALCIUM 9.9 8.1* 8.0* 8.1*   ALBUMIN 4.0 3.1*  --  2.8*    137 137 141   K 5.7* 5.1 4.3 4.3   CO2 18* 18* 18* 19*   * 113* 113* 115*   BUN 34* 33* 33* 31*   CREATININE 3.5* 3.4* 2.8* 2.1*     Labs within the past 24 hours have been reviewed.    Diagnostic Results:  None

## 2024-03-12 NOTE — PROGRESS NOTES
Admit Note     Met with patient and wife to assess needs. Patient is a 63 y.o.  male, admitted for for kidney transplant.      Organ transplant candidate [Z76.82]  Status post kidney transplant [Z94.0]      Patient admitted from home on 3/11/2024 .  At this time, patient presents as alert and oriented x 4, pleasant, good eye contact, well groomed, recall good, concentration/judgement good, average intelligence, calm, communicative, cooperative, and asking and answering questions appropriately.  At this time, patients caregiver presents as alert and oriented x 4, pleasant, good eye contact, well groomed, recall good, concentration/judgement good, average intelligence, calm, communicative, cooperative, and asking and answering questions appropriately.    Household/Family Systems     Patient resides with patient's wife and children, at [address] [place] [address].  Support system includes his wife Ginna Monet ([phone]) and his friend Tripp Miguel ([phone]).   Patient reports his children are being cared for by their god parents while he is in the hospital.      Patients primary caregiver is Ginna Monet and Tripp Miguel. Patients wife does not drive, patients friend Tripp will provide transportation while patient is in [place].  Confirmed patients contact information is [phone] (home);   Telephone Information:   Mobile [phone]   .    During admission, patient's caregiver plans to stay in patient's room.  Confirmed patient and patients caregivers do have access to reliable transportation.    Cognitive Status/Learning     Patient reports reading ability as 10th grade and states patient does have difficulty with hearing.  Patient reports patient learns best by multisensory learning.   Needed: No.   Highest education level: High School (9-12) or GED    Vocation/Disability   .  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    Adherence     Patient reports a high level of adherence to patients health care regimen.  Adherence counseling and education provided. Patient verbalizes understanding.    Substance Use    Patient reports the following substance usage.    Tobacco: none, patient denies any use.  Alcohol: none, patient denies any use.  Illicit Drugs/Non-prescribed Medications: none, patient denies any use.  Patient states clear understanding of the potential impact of substance use.  Substance abstinence/cessation counseling, education and resources provided and reviewed.     Services Utilizing/ADLS    Infusion Service: Prior to admission, patient utilizing? no  Home Health: Prior to admission, patient utilizing? no  DME: Prior to admission, no  Pulmonary/Cardiac Rehab: Prior to admission, no  Dialysis:  Prior to admission, no-  notified transplant MA of need for Medicare 2728 form.   Transplant Specialty Pharmacy:  Prior to admission, no; patient provides permission to release necessary information to specialty pharmacy for medications post-tranplant. Resources provided and patient is choosing [facility] pharmacy at this time.    Prior to admission, patient reports patient was independent with ADLS and was driving.  Patient reports patient is not able to care for self at this time due to compromised medical condition (as documented in medical record) and physical weakness..  Patient indicates a willingness to care for self once medically cleared to do so.    Insurance/Medications    Insured by   Payer/Plan Subscr  Sex Relation Sub. Ins. ID Effective Group Num   1. [facility]* MARGUERITE FLOOD 1960 Male Self [id number]                                    [address] [address]   2. [facility]* MARGUERITE FLOOD 1960 Male Self [id number]                                   [id number]      Primary Insurance (for UNOS reporting): Private  Insurance  Secondary Insurance (for UNOS reporting): Private Insurance    Patient reports patient is able to obtain and afford medications at this time and at time of discharge.    Living Will/Healthcare Power of     Patient states patient does not have a LW and/or HCPA.   provided education regarding LW and HCPA and the completion of forms.    Coping/Mental Health    Patient is coping adequately with the aid of  family members and friends.  Patient denies mental health difficulties. Patient and caregiver denied needing or wanting resources or referrals at this time. Patient and caregiver agree to contact transplant team with needs, questions, or concerns as they arise.     Discharge Planning    At time of discharge, patient plans to return to [facility] under the care of Ginna Monet.  Patients friend will transport patient.  Per rounds today, expected discharge date has not been medically determined at this time. Patient and patients caregiver  verbalize understanding and are involved in treatment planning and discharge process.    Additional Concerns    Patient's caretaker denies additional needs and/or concerns at this time.  providing ongoing psychosocial support, education, resources and d/c planning as needed.  SW remains available.  remains available. Patient's caregiver verbalizes understanding and agreement with information reviewed,  availability and how to access available resources as needed. Patient denies additional needs and/or concerns at this time. Patient verbalizes understanding and agreement with information reviewed, social work availability, and how to access available resources as needed.

## 2024-03-12 NOTE — PLAN OF CARE
Patient is AAOx4.  Afebrile  RA  Patient up in recliner for a few hours today.   Self medication taught to patient and patient's wife. Patient pulled 8PM medications with this RN at 100%.  Thymo #2 of 3 completed  Patient states he is passing gas, but no bowel movement noted thus far this shift.   Pain well controlled. PO oxy 5mg administered x1 this shift.   RLQ incision with island border remains in place. Drainage is marked.   Instructed to call for assistance.   Call light remains within reach.   Plan of care is ongoing.

## 2024-03-12 NOTE — PROGRESS NOTES
TRANSPLANT NOTE:      ORGAN: LEFT KIDNEY    Disease Etiology: IgA Nephropathy  Donor Type: Living    [facility] High Risk: No    Donor CMV Status: Negative  Donor HBcAB:   Negative  Donor HCV Status:   Negative  Peak cPRA % (within 1 year of transplant): 38      Colten Monet is a 63 y.o. male s/p  Living   kidney transplant on 3/11/2024 (Kidney) for IgA Nephropathy.   This patient will receive 3 doses of Thymoglobulin for induction.  This patients maintenance immunosuppression will include a steroid taper per protocol to 5mg daily, Prograf, and Cellcept maintenance.  Opportunistic infection prophylaxis will include Valcyte for 3 months (CMV D - , R + ) and Bactrim for 6 months.  Patient is to begin self medications upon transfer to the [facility], and I plan to meet with this patient and his/her support person prior to discharge to review the medication section of the Kidney Transplant Education Manual.  I have reviewed the pre-op medications and have restarted those, as appropriate.

## 2024-03-12 NOTE — HPI
Reason for Visit: evaluate transplant candidacy     History of Present Illness: Colten Monet is a 63 y.o. year old male undergoing transplant evaluation.     Dialysis History: Colten is pre-dialysis.       Transplant History: N/A     Etiology of Renal Disease: IgA Nephropathy (based on medical records from referral).

## 2024-03-12 NOTE — PLAN OF CARE
Pt is s/p kidney transplant 3/11/24.  He is AAOx4 and is able to reposition himself independently, has not ambulated since surgery.  VSS, Q2 hr vitals taken.  Afebrile.  Weaned down to RA o/n.  Thymo #1 completed, plan for dose 2 today.  Hill in place w/ clear lew urine.  Cr 2.8 trending down.  Q1hr I=O as ordered, D51/2NS @ 50 cc continuously, adjusting NS hourly per MAR.  UOP has steadily decreased each hour.  RLQ incision w/ surgical dressing still in place w/ small amount of SS drainage, area marked.  H/H stable.  Accuchecks achs, no SSI indicated.  Self meds ready.  Pt tolerating a renal diet, bowel regimen started.  PO intake encouraged.  Wife at bedside.  Bed in lowest locked position.  Non skids on.  Call bell in reach.

## 2024-03-12 NOTE — HOSPITAL COURSE
Patient is now S/P living related Ktx 2/2 IgA nephropathy. Surgery without complications. Intra-op urine noted. 2 day schmidt placed, no drains. Post op labs stable.    Interval history: No acute events over night. Patient reports feeling well this morning. H/H stable. Cr trending down. UOP 3.7L/24hr. Plan to stop I/O IVF and placed on continuous IVF.  Pain well controlled. Tolerating diet. +flatus/-BM. Ambulation encouraged. VSS

## 2024-03-12 NOTE — CONSULTS
"  [facility] - Transplant Stepdown  Adult Nutrition  Consult Note- S/p Kidney Transplant    SUMMARY     Recommendations    1. Continue Renal Diet.   2. If PO intake inadequate recommend ONS Novasource Renal QD to help meet nutritional needs.   3. Monitor I/O's, weight and labs.   4. RD to monitor.    Goals: Meet % EEN/EPN by follow-up date.  Nutrition Goal Status: new  Communication of RD Recs: other (comment) (POC)    Assessment and Plan    Nutrition Problem  Increased energy/protein needs    Related to (etiology):   Increased physiological demands    Signs and Symptoms (as evidenced by):   S/p kidney transplant     Interventions/Recommendations (treatment strategy):  Collaboration of nutrition care with other providers  Nutrition education    Nutrition Diagnosis Status:   New      Reason for Assessment    Reason For Assessment: consult  Diagnosis:  (s/p kidney transplant)  Relevant Medical History: anemia, CVA, GERD, HLD, HTN, obesity  Interdisciplinary Rounds: did not attend  General Information Comments: RD consulted for s/o kidney tx. RD spoke with patient and wife at bedside. Tolerating renal diet. No c/o N/V/D, endorses some constipation. UBW ~200lbs, pt appears nourished no physical indicators of malnutrition at this time. Low-sodium/general health precaution nutrition eduation provided. Handout provided. All concerns and questions answered. No other needs identified. RD contact information provided.  Nutrition Discharge Planning: Pending Clinical Course,  Food safety/drug interactions and General healthy/low salt diet education provided 3/12    Nutrition Risk Screen    Nutrition Risk Screen: no indicators present    Nutrition/Diet History    Spiritual, Cultural Beliefs, Zoroastrian Practices, Values that Affect Care: yes  Factors Affecting Nutritional Intake: None identified at this time    Anthropometrics    Temp: 98.4 °F (36.9 °C)  Height: 5' 6" (167.6 cm)  Height (inches): 66 in  Weight: 91.6 kg (201 " lb 15.1 oz)  Weight (lb): 201.94 lb  Ideal Body Weight (IBW), Male: 142 lb  % Ideal Body Weight, Male (lb): 142.21 %  BMI (Calculated): 32.6       Lab/Procedures/Meds    Pertinent Labs Reviewed: reviewed  Pertinent Labs Comments: Cl 115, CO2 19, BUN 31, Cr 2.1, GFR 34.5, Glu 156, Ca 8.1  Pertinent Medications Reviewed: reviewed  Pertinent Medications Comments: atorvastatin, benzonatate, benadryl, docusate sodium, famotidine, heparin, methylprednisolone, MVI, mycophenolate, tacrolimus, NaCl    Estimated/Assessed Needs    Weight Used For Calorie Calculations: 91.2 kg (201 lb)  Energy Calorie Requirements (kcal): 1808 kcal/d (MSJ x 1.1)  Energy Need Method: Keystone-St Jeor  Protein Requirements: 91 g/d (1.0 g/kg)  Weight Used For Protein Calculations: 91.2 kg (201 lb)        RDA Method (mL): 1808         Nutrition Prescription Ordered    Current Diet Order: Renal    Evaluation of Received Nutrient/Fluid Intake    I/O: +277.6 mL  Comments: LBM: 3/10  Tolerance: tolerating  % Intake of Estimated Energy Needs: 75 - 100 %  % Meal Intake: 50 - 75 %    Nutrition Risk    Level of Risk/Frequency of Follow-up: high (2x/ week)       Monitor and Evaluation    Food and Nutrient Intake: energy intake, food and beverage intake  Food and Nutrient Adminstration: diet order  Physical Activity and Function: nutrition-related ADLs and IADLs, factors affecting access to physical activity  Anthropometric Measurements: body mass index, weight change, weight  Biochemical Data, Medical Tests and Procedures: electrolyte and renal panel, glucose/endocrine profile, gastrointestinal profile, inflammatory profile, lipid profile  Nutrition-Focused Physical Findings: skin, overall appearance, extremities, muscles and bones, head and eyes       Nutrition Follow-Up    RD Follow-up?: Yes    Jed Regalado Registration Eligible, Provisional LDN

## 2024-03-12 NOTE — ASSESSMENT & PLAN NOTE
- Patient is now s/p living Ktx 3/11/24  - Surgery without complications  - 2 day schmidt placed, to be removed 3/13 am  - No drains  - Cr trending down  - UOP 3.7L

## 2024-03-12 NOTE — PROGRESS NOTES
[facility] - Transplant Stepdown  Kidney Transplant  Progress Note      Reason for Follow-up: Reassessment of Kidney Transplant - 3/11/2024  (#1) recipient and management of immunosuppression.    ORGAN: LEFT KIDNEY    Donor Type: Living    Donor CMV Status:   Donor HBcAB:  Donor HCV Status:  Donor HBV ANA:   Donor HCV ANA:       Subjective:   History of Present Illness:  Reason for Visit: evaluate transplant candidacy     History of Present Illness: Colten Monet is a 63 y.o. year old male undergoing transplant evaluation.     Dialysis History: Colten is pre-dialysis.       Transplant History: N/A     Etiology of Renal Disease: IgA Nephropathy (based on medical records from referral).    Mr. Monet is a 63 y.o. year old male who is status post Kidney Transplant - 3/11/2024  (#1).  His maintenance immunosuppression consists of:   Immunosuppressants (From admission, onward)      Start     Stop Route Frequency Ordered    03/12/24 0900  antithymocyte globulin (rabbit) 100 mg, hydrocortisone sodium succinate (SOLU-CORTEF) 20 mg in sodium chloride 0.9% 500 mL (FOR PERIPHERAL LINE ADMINISTRATION ONLY)  (IP TXP KIDNEY POST-OP THYMO WITH PERIPHERAL PRECHECKED)         03/14/24 0859 IV Daily 03/11/24 1329    03/11/24 2200  mycophenolate capsule 1,000 mg  (IP TXP KIDNEY POST-OP THYMO WITH PERIPHERAL PRECHECKED)         -- Oral 2 times daily 03/11/24 1329    03/11/24 1800  tacrolimus capsule 3 mg  (IP TXP KIDNEY POST-OP THYMO WITH PERIPHERAL PRECHECKED)         -- Oral 2 times daily 03/11/24 1329            Hospital Course:  Patient is now S/P living related Ktx. Surgery without complications. Intra-op urine noted. 2 day schmidt placed, no drains.     Interval history: No acute events over night. Patient reports feeling well this morning. H/H stable. Cr trending down. UOP 3.7L/24hr. Plan to stop I/O IVF and placed on continuous IVF.  Pain well controlled. Tolerating diet. +flatus/-BM. Ambulation encouraged. VSS      Past  Medical, Surgical, Family, and Social History:   Unchanged from H&P.    Scheduled Meds:   acetaminophen  650 mg Oral Q8H    acetaminophen  650 mg Oral Q24H    antithymocyte globulin (rabbit) 100 mg, hydrocortisone sodium succinate (SOLU-CORTEF) 20 mg in sodium chloride 0.9% 500 mL (FOR PERIPHERAL LINE ADMINISTRATION ONLY)  1.5 mg/kg (Adjusted) Intravenous Daily    atorvastatin  40 mg Oral QHS    benzonatate  100 mg Oral TID    bisacodyL  10 mg Oral QHS    diphenhydrAMINE  25 mg Oral Q24H    docusate sodium  100 mg Oral TID    famotidine  20 mg Oral QHS    fluticasone furoate-vilanteroL  1 puff Inhalation Daily    heparin (porcine)  5,000 Units Subcutaneous Q8H    [START ON 3/13/2024] methylPREDNISolone sodium succinate injection  125 mg Intravenous Once    multivitamin  1 tablet Oral Daily    mupirocin  1 g Nasal BID    mycophenolate  1,000 mg Oral BID    [START ON 3/14/2024] predniSONE  20 mg Oral Daily    sodium bicarbonate  1,300 mg Oral Daily    [START ON 3/21/2024] sulfamethoxazole-trimethoprim 400-80mg  1 tablet Oral Daily AM    tacrolimus  3 mg Oral BID    [START ON 3/21/2024] valGANciclovir  450 mg Oral Daily AM     Continuous Infusions:   sodium chloride 0.9% 10 mL/hr at 03/11/24 0847    sodium chloride 0.9% 100 mL/hr at 03/12/24 0945    glucagon (human recombinant)       PRN Meds:albuterol sulfate, dextrose 10%, diphenhydrAMINE, EPINEPHrine (PF), glucagon (human recombinant), glucose, glucose, glucose, hydrocortisone sodium succinate, insulin aspart U-100, ondansetron, oxyCODONE, sodium chloride 0.9%, traMADoL    Intake/Output - Last 3 Shifts         03/10 0700  03/11 0659 03/11 0700 03/12 0659 03/12 0700  03/13 0659    P.O.  450     I.V. (mL/kg)  2462.8 (26.9)     IV Piggyback  1049.8     Total Intake(mL/kg)  3962.6 (43.3)     Urine (mL/kg/hr)  3685 (1.7) 150 (0.4)    Total Output  3685 150    Net  +277.6 -150                    Review of Systems   Constitutional:  Negative for chills and fever.  "  Gastrointestinal:  Negative for abdominal distention, abdominal pain, constipation, diarrhea, nausea and vomiting.   Genitourinary:  Negative for decreased urine volume, difficulty urinating, dysuria and frequency.   Skin:  Positive for wound.   Allergic/Immunologic: Positive for immunocompromised state.   Psychiatric/Behavioral:  Negative for agitation, confusion and decreased concentration. The patient is not nervous/anxious.       Objective:     Vital Signs (Most Recent):  Temp: 98.3 °F (36.8 °C) (03/12/24 1117)  Pulse: 93 (03/12/24 1132)  Resp: 18 (03/12/24 1117)  BP: (!) 147/87 (03/12/24 1132)  SpO2: 95 % (03/12/24 1132) Vital Signs (24h Range):  Temp:  [97.5 °F (36.4 °C)-99 °F (37.2 °C)] 98.3 °F (36.8 °C)  Pulse:  [76-97] 93  Resp:  [10-19] 18  SpO2:  [94 %-100 %] 95 %  BP: (110-147)/(59-87) 147/87     Weight: 91.6 kg (201 lb 15.1 oz)  Height: 5' 6" (167.6 cm)  Body mass index is 32.59 kg/m².     Physical Exam  Vitals and nursing note reviewed.   Constitutional:       General: He is not in acute distress.     Appearance: He is well-developed. He is not diaphoretic.   HENT:      Head: Normocephalic.   Eyes:      Conjunctiva/sclera: Conjunctivae normal.      Pupils: Pupils are equal, round, and reactive to light.   Cardiovascular:      Rate and Rhythm: Normal rate and regular rhythm.      Heart sounds: No murmur heard.     No friction rub.   Pulmonary:      Breath sounds: Normal breath sounds. No wheezing or rales.   Abdominal:      General: Bowel sounds are normal. There is no distension.      Palpations: Abdomen is soft.      Tenderness: There is no abdominal tenderness. There is no guarding or rebound.          Comments: Surgical incision w/ staples CDI   Skin:     General: Skin is warm and dry.      Capillary Refill: Capillary refill takes less than 2 seconds.   Neurological:      Mental Status: He is alert and oriented to person, place, and time. Mental status is at baseline.   Psychiatric:         " "Behavior: Behavior normal.         Thought Content: Thought content normal.          Laboratory:  CBC:   Recent Labs   Lab 03/07/24  1037 03/11/24  0626 03/11/24  1329 03/11/24  2101 03/12/24  0553   WBC 12.83*  --   --  22.84* 16.73*   RBC 4.55*  --   --  3.89* 3.75*   HGB 12.0*  --   --  10.6* 10.2*   HCT 40.5   < > 34.2* 34.9* 33.2*     --   --  180 183   MCV 89  --   --  90 89   MCH 26.4*  --   --  27.2 27.2   MCHC 29.6*  --   --  30.4* 30.7*    < > = values in this interval not displayed.     CMP:   Recent Labs   Lab 03/11/24  0626 03/11/24  1329 03/11/24 2101 03/12/24  0553   GLU 83 134* 189* 156*   CALCIUM 9.9 8.1* 8.0* 8.1*   ALBUMIN 4.0 3.1*  --  2.8*    137 137 141   K 5.7* 5.1 4.3 4.3   CO2 18* 18* 18* 19*   * 113* 113* 115*   BUN 34* 33* 33* 31*   CREATININE 3.5* 3.4* 2.8* 2.1*     Labs within the past 24 hours have been reviewed.    Diagnostic Results:  None  Assessment/Plan:     Prophylactic immunotherapy  - Continue Prograf. Monitor trough daily and adjust dose as needed to achieve therapeutic level.  - Continue Cellcept.  - Continue steroids    At risk for opportunistic infections  - Bactrim for PCP ppx (start POD#10 or at dc)  - Valcyte for CMV ppx (start POD#10 or at dc)      Long-term use of immunosuppressant medication  - see "prophylactic immunotherapy"      S/P living-donor kidney transplantation  - Patient is now s/p living Ktx 3/11/24  - Surgery without complications  - 2 day schmidt placed, to be removed 3/13 am  - No drains  - Cr trending down  - UOP 3.7L      Acute blood loss anemia  - Expected post-op  - CBC daily      Cough  - Continue home medications      IgA nephropathy  - Patient is now s/p living kidney transplant 3/11/24      Immunosuppression  - See "prophylactic immunotherapy"          Discharge Planning: tentative d/c tomorrow  Medical decision making for this encounter includes review of pertinent labs and diagnostic studies, assessment and planning, " discussions with consulting providers, discussion with patient/family, and participation in multidisciplinary rounds. Time spent caring for patient: 60 minutes    Mayi Bartholomew PA-C  Kidney Transplant  [facility] - Transplant Stepdown

## 2024-03-13 VITALS
OXYGEN SATURATION: 96 % | TEMPERATURE: 98 F | HEART RATE: 94 BPM | SYSTOLIC BLOOD PRESSURE: 162 MMHG | HEIGHT: 66 IN | BODY MASS INDEX: 32.45 KG/M2 | RESPIRATION RATE: 18 BRPM | DIASTOLIC BLOOD PRESSURE: 83 MMHG | WEIGHT: 201.94 LBS

## 2024-03-13 DIAGNOSIS — Z94.0 KIDNEY REPLACED BY TRANSPLANT: Primary | ICD-10-CM

## 2024-03-13 LAB
ALBUMIN SERPL BCP-MCNC: 2.9 G/DL (ref 3.5–5.2)
ANION GAP SERPL CALC-SCNC: 7 MMOL/L (ref 8–16)
BASOPHILS # BLD AUTO: 0.03 K/UL (ref 0–0.2)
BASOPHILS NFR BLD: 0.1 % (ref 0–1.9)
BUN SERPL-MCNC: 25 MG/DL (ref 8–23)
CALCIUM SERPL-MCNC: 8.4 MG/DL (ref 8.7–10.5)
CHLORIDE SERPL-SCNC: 114 MMOL/L (ref 95–110)
CO2 SERPL-SCNC: 19 MMOL/L (ref 23–29)
CREAT SERPL-MCNC: 1.2 MG/DL (ref 0.5–1.4)
DIFFERENTIAL METHOD BLD: ABNORMAL
EOSINOPHIL # BLD AUTO: 0 K/UL (ref 0–0.5)
EOSINOPHIL NFR BLD: 0 % (ref 0–8)
ERYTHROCYTE [DISTWIDTH] IN BLOOD BY AUTOMATED COUNT: 13.1 % (ref 11.5–14.5)
EST. GFR  (NO RACE VARIABLE): >60 ML/MIN/1.73 M^2
GLUCOSE SERPL-MCNC: 104 MG/DL (ref 70–110)
GLUCOSE SERPL-MCNC: 108 MG/DL (ref 70–110)
GLUCOSE SERPL-MCNC: 90 MG/DL (ref 70–110)
HCO3 UR-SCNC: 18.8 MMOL/L (ref 24–28)
HCO3 UR-SCNC: 22 MMOL/L (ref 24–28)
HCT VFR BLD AUTO: 35 % (ref 40–54)
HCT VFR BLD CALC: 32 %PCV (ref 36–54)
HCT VFR BLD CALC: 41 %PCV (ref 36–54)
HGB BLD-MCNC: 10.9 G/DL (ref 14–18)
IMM GRANULOCYTES # BLD AUTO: 0.16 K/UL (ref 0–0.04)
IMM GRANULOCYTES NFR BLD AUTO: 0.8 % (ref 0–0.5)
LYMPHOCYTES # BLD AUTO: 0.4 K/UL (ref 1–4.8)
LYMPHOCYTES NFR BLD: 1.8 % (ref 18–48)
MAGNESIUM SERPL-MCNC: 1.9 MG/DL (ref 1.6–2.6)
MCH RBC QN AUTO: 27.3 PG (ref 27–31)
MCHC RBC AUTO-ENTMCNC: 31.1 G/DL (ref 32–36)
MCV RBC AUTO: 88 FL (ref 82–98)
MONOCYTES # BLD AUTO: 1 K/UL (ref 0.3–1)
MONOCYTES NFR BLD: 4.7 % (ref 4–15)
NEUTROPHILS # BLD AUTO: 19.2 K/UL (ref 1.8–7.7)
NEUTROPHILS NFR BLD: 92.6 % (ref 38–73)
NRBC BLD-RTO: 0 /100 WBC
PCO2 BLDA: 43.9 MMHG (ref 35–45)
PCO2 BLDA: 49.7 MMHG (ref 35–45)
PH SMN: 7.24 [PH] (ref 7.35–7.45)
PH SMN: 7.25 [PH] (ref 7.35–7.45)
PHOSPHATE SERPL-MCNC: 2.1 MG/DL (ref 2.7–4.5)
PLATELET # BLD AUTO: 142 K/UL (ref 150–450)
PMV BLD AUTO: 10.8 FL (ref 9.2–12.9)
PO2 BLDA: 191 MMHG (ref 80–100)
PO2 BLDA: 28 MMHG (ref 40–60)
POC BE: -5 MMOL/L
POC BE: -9 MMOL/L
POC IONIZED CALCIUM: 1.24 MMOL/L (ref 1.06–1.42)
POC IONIZED CALCIUM: 1.26 MMOL/L (ref 1.06–1.42)
POC SATURATED O2: 44 % (ref 95–100)
POC SATURATED O2: 99 % (ref 95–100)
POC TCO2: 20 MMOL/L (ref 23–27)
POC TCO2: 24 MMOL/L (ref 24–29)
POCT GLUCOSE: 125 MG/DL (ref 70–110)
POCT GLUCOSE: 156 MG/DL (ref 70–110)
POTASSIUM BLD-SCNC: 4.4 MMOL/L (ref 3.5–5.1)
POTASSIUM BLD-SCNC: 4.7 MMOL/L (ref 3.5–5.1)
POTASSIUM SERPL-SCNC: 4 MMOL/L (ref 3.5–5.1)
RBC # BLD AUTO: 4 M/UL (ref 4.6–6.2)
SAMPLE: ABNORMAL
SAMPLE: ABNORMAL
SODIUM BLD-SCNC: 140 MMOL/L (ref 136–145)
SODIUM BLD-SCNC: 141 MMOL/L (ref 136–145)
SODIUM SERPL-SCNC: 140 MMOL/L (ref 136–145)
TACROLIMUS BLD-MCNC: 8.1 NG/ML (ref 5–15)
WBC # BLD AUTO: 20.72 K/UL (ref 3.9–12.7)

## 2024-03-13 PROCEDURE — 63600175 PHARM REV CODE 636 W HCPCS

## 2024-03-13 PROCEDURE — 85025 COMPLETE CBC W/AUTO DIFF WBC: CPT

## 2024-03-13 PROCEDURE — 80197 ASSAY OF TACROLIMUS: CPT

## 2024-03-13 PROCEDURE — 80069 RENAL FUNCTION PANEL: CPT

## 2024-03-13 PROCEDURE — 36415 COLL VENOUS BLD VENIPUNCTURE: CPT

## 2024-03-13 PROCEDURE — 99239 HOSP IP/OBS DSCHRG MGMT >30: CPT | Mod: 24,,, | Performed by: PHYSICIAN ASSISTANT

## 2024-03-13 PROCEDURE — 25000003 PHARM REV CODE 250

## 2024-03-13 PROCEDURE — 94640 AIRWAY INHALATION TREATMENT: CPT

## 2024-03-13 PROCEDURE — 63600175 PHARM REV CODE 636 W HCPCS: Performed by: SURGERY

## 2024-03-13 PROCEDURE — 99900035 HC TECH TIME PER 15 MIN (STAT)

## 2024-03-13 PROCEDURE — 25000003 PHARM REV CODE 250: Performed by: SURGERY

## 2024-03-13 PROCEDURE — 83735 ASSAY OF MAGNESIUM: CPT

## 2024-03-13 PROCEDURE — 94761 N-INVAS EAR/PLS OXIMETRY MLT: CPT

## 2024-03-13 RX ORDER — SODIUM BICARBONATE 650 MG/1
1300 TABLET ORAL 2 TIMES DAILY
Qty: 120 TABLET | Refills: 5 | Status: ON HOLD | OUTPATIENT
Start: 2024-03-13 | End: 2024-04-24 | Stop reason: HOSPADM

## 2024-03-13 RX ORDER — TACROLIMUS 1 MG/1
4 CAPSULE ORAL EVERY 12 HOURS
Qty: 240 CAPSULE | Refills: 11 | Status: SHIPPED | OUTPATIENT
Start: 2024-03-13 | End: 2024-03-18

## 2024-03-13 RX ORDER — TRAMADOL HYDROCHLORIDE 50 MG/1
50 TABLET ORAL EVERY 6 HOURS PRN
Qty: 24 TABLET | Refills: 0 | Status: SHIPPED | OUTPATIENT
Start: 2024-03-13 | End: 2024-03-14

## 2024-03-13 RX ORDER — ASPIRIN 81 MG/1
81 TABLET ORAL DAILY
Qty: 30 TABLET | Refills: 11 | Status: SHIPPED | OUTPATIENT
Start: 2024-03-13

## 2024-03-13 RX ORDER — SODIUM BICARBONATE 650 MG/1
1300 TABLET ORAL 2 TIMES DAILY
Status: DISCONTINUED | OUTPATIENT
Start: 2024-03-13 | End: 2024-03-13 | Stop reason: HOSPADM

## 2024-03-13 RX ORDER — TRAMADOL HYDROCHLORIDE 50 MG/1
50 TABLET ORAL EVERY 6 HOURS PRN
Qty: 24 TABLET | Refills: 0 | Status: CANCELLED | OUTPATIENT
Start: 2024-03-13

## 2024-03-13 RX ADMIN — DIBASIC SODIUM PHOSPHATE, MONOBASIC POTASSIUM PHOSPHATE AND MONOBASIC SODIUM PHOSPHATE 2 TABLET: 852; 155; 130 TABLET ORAL at 10:03

## 2024-03-13 RX ADMIN — MUPIROCIN 1 G: 20 OINTMENT TOPICAL at 08:03

## 2024-03-13 RX ADMIN — ACETAMINOPHEN 650 MG: 325 TABLET ORAL at 05:03

## 2024-03-13 RX ADMIN — BENZONATATE 100 MG: 100 CAPSULE ORAL at 08:03

## 2024-03-13 RX ADMIN — DIPHENHYDRAMINE HYDROCHLORIDE 25 MG: 25 CAPSULE ORAL at 10:03

## 2024-03-13 RX ADMIN — TACROLIMUS 4 MG: 1 CAPSULE ORAL at 08:03

## 2024-03-13 RX ADMIN — TRAMADOL HYDROCHLORIDE 50 MG: 50 TABLET, COATED ORAL at 04:03

## 2024-03-13 RX ADMIN — SODIUM BICARBONATE 650 MG TABLET 1300 MG: at 08:03

## 2024-03-13 RX ADMIN — MYCOPHENOLATE MOFETIL 1000 MG: 250 CAPSULE ORAL at 08:03

## 2024-03-13 RX ADMIN — DOCUSATE SODIUM 100 MG: 100 CAPSULE, LIQUID FILLED ORAL at 08:03

## 2024-03-13 RX ADMIN — HEPARIN SODIUM 5000 UNITS: 5000 INJECTION INTRAVENOUS; SUBCUTANEOUS at 05:03

## 2024-03-13 RX ADMIN — ANTI-THYMOCYTE GLOBULIN (RABBIT) 100 MG: 5 INJECTION, POWDER, LYOPHILIZED, FOR SOLUTION INTRAVENOUS at 10:03

## 2024-03-13 RX ADMIN — THERA TABS 1 TABLET: TAB at 08:03

## 2024-03-13 RX ADMIN — FLUTICASONE FUROATE AND VILANTEROL TRIFENATATE 1 PUFF: 100; 25 POWDER RESPIRATORY (INHALATION) at 08:03

## 2024-03-13 RX ADMIN — METHYLPREDNISOLONE SODIUM SUCCINATE 125 MG: 125 INJECTION, POWDER, FOR SOLUTION INTRAMUSCULAR; INTRAVENOUS at 09:03

## 2024-03-13 NOTE — PROGRESS NOTES
DISCHARGE NOTE:    Colten Monet is a 63 y.o. 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      Past Medical History:   Diagnosis Date    Anemia     CVA (cerebral vascular accident)     GERD (gastroesophageal reflux disease)     Hyperlipidemia     Hypertension     IgA nephropathy     Obesity        Hospital Course: Patient with LRD with good UOP and SCr down to 1.2.  Otherwise patient doing well.     Allergies:   Review of patient's allergies indicates:   Allergen Reactions    Codeine Hives     Reaction to Tylenol #3       Patient Pharmacy: [facility]    Discharge Medications:     Medication List        START taking these medications      bisacodyL 5 mg EC tablet  Commonly known as: DULCOLAX  Take 2 tablets (10 mg total) by mouth daily as needed for Constipation.     docusate sodium 100 MG capsule  Commonly known as: COLACE  Take 1 capsule (100 mg total) by mouth 3 (three) times daily as needed for Constipation.     famotidine 20 MG tablet  Commonly known as: PEPCID  Take 1 tablet (20 mg total) by mouth every evening.     mycophenolate 250 mg Cap  Commonly known as: CELLCEPT  Take 4 capsules (1,000 mg total) by mouth 2 (two) times daily.  Replaces: mycophenolate 500 mg Tab     sodium bicarbonate 650 MG tablet  Take 2 tablets (1,300 mg total) by mouth 2 (two) times daily.     sulfamethoxazole-trimethoprim 400-80mg 400-80 mg per tablet  Commonly known as: BACTRIM,SEPTRA  Take 1 tablet by mouth every morning. Stop: 9/7/24     tacrolimus 1 MG Cap  Commonly known as: PROGRAF  Take 4 capsules (4 mg total) by mouth every 12 (twelve) hours.     traMADoL 50 mg tablet  Commonly known as: ULTRAM  Take 1 tablet (50 mg total) by mouth every 6 (six) hours as needed for Pain.     valGANciclovir 450 mg Tab  Commonly known as: VALCYTE  Take 2 tablets (900 mg total) by mouth every morning. Stop: 6/9/24     SALEEM-PHOS 250 NEUTRAL 250 mg Tab  Generic drug: k phos di & mono-sod phos  mono  Take 2 tablets by mouth 2 (two) times daily.            CHANGE how you take these medications      predniSONE 5 MG tablet  Commonly known as: DELTASONE  Take 20 mg BY MOUTH daily 3/14-3/20; 15 mg daily 3/21-3/27; 10 mg daily 3/28-4/3/2024; then 5 mg daily thereafter 4/4/24  Start taking on: March 14, 2024  What changed:   medication strength  See the new instructions.            CONTINUE taking these medications      albuterol 90 mcg/actuation inhaler  Commonly known as: PROVENTIL/VENTOLIN HFA  Inhale 1-2 puffs into the lungs every 6 (six) hours as needed for Wheezing. Rescue     aspirin 81 MG EC tablet  Commonly known as: ECOTRIN  Take 1 tablet (81 mg total) by mouth once daily.     atorvastatin 40 MG tablet  Commonly known as: LIPITOR     benzonatate 100 MG capsule  Commonly known as: TESSALON     BREO ELLIPTA 100-25 mcg/dose diskus inhaler  Generic drug: fluticasone furoate-vilanteroL  Inhale 1 puff into the lungs once daily. Controller            STOP taking these medications      calcitRIOL 0.25 MCG Cap  Commonly known as: ROCALTROL     doxycycline 100 MG EC tablet  Commonly known as: DORYX     losartan 100 MG tablet  Commonly known as: COZAAR     mycophenolate 500 mg Tab  Commonly known as: CELLCEPT  Replaced by: mycophenolate 250 mg Cap     promethazine-dextromethorphan 6.25-15 mg/5 mL Syrp  Commonly known as: PROMETHAZINE-DM               Where to Get Your Medications        These medications were sent to [facility] [address], [place] [address]      Hours: Mon-Fri 7a-7p, Sat-Sun 10a-4p Phone: [phone]   aspirin 81 MG EC tablet  famotidine 20 MG tablet  mycophenolate 250 mg Cap  predniSONE 5 MG tablet  sodium bicarbonate 650 MG tablet  sulfamethoxazole-trimethoprim 400-80mg 400-80 mg per tablet  tacrolimus 1 MG Cap  traMADoL 50 mg tablet  valGANciclovir 450 mg Tab  SALEEM-PHOS 250 NEUTRAL 250 mg Tab       You can get these medications from any pharmacy    You don't need a  prescription for these medications  bisacodyL 5 mg EC tablet  docusate sodium 100 MG capsule          Pharmacy Interventions/Recommendations:  1) Transplant Immunosuppression: Induction Thymoglobulin, and maintenance Tacrolimus 4 mg BID, Cellcept 1000 mg BID and Prednisone taper (cPRA and IgA nephropathy therefore will not taper).     2) Opportunistic Infection prophylaxis: Bactrim until 9/7/24; Valcyte until 6/9/24    3) Osteoporosis Prevention measures (liver txp): N/A    4) Patient Counseling/Education: Demonstrated the use of the BP cuff, thermometer.    5) Follow-Up/Discharge Needs:  Monitor BP - slightly elevated in the hospital, if home readings elevated will need to start agent (was not on a BP med prior to txp); patient needs outpatient PTH (last from 2021); started on KPN (encouraged high phos diet)    6) Patient Assistance Information: N/A    7) The following medications have been placed on HOLD and should be restarted in the outpatient setting (when appropriate): N/A    Colten and his caregiver verbalized their understanding and had the opportunity to ask questions.

## 2024-03-13 NOTE — PROGRESS NOTES
Thymo #3 completed. PIV removed. Patient discharged per order. AVS reviewed with patient and patient verbalizes complete understanding of all discharge instructions. No acute distress noted. Medications have been delivered to the bedside. No further questions at this time. Patient's friend will provide transport to [facility] [place]

## 2024-03-13 NOTE — PLAN OF CARE
Pt is s/p kidney transplant 3/11/24.  He is AAOx4 and ambulatory w/ SBA.  VSS.  Afebrile. Stable on RA o/n.  Thymo #2 completed, plan for dose 3 today. Schmidt in place w/ clear pink urine, plan to d/c schmidt @ 0600.  Cr 2.1 trending down.  IVF off.  RLQ incision w/ staples intact, instructed pt and wife how to paint w/ betadine.  H/H stable.  Accuchecks achs, no SSI indicated.  Self meds pulled 100% by pt w/ dayshift RN assistance.  Pt tolerating a renal diet, bowel regimen continued, (-) BM (+) flatus.  PO intake encouraged.  Wife at bedside. Bed in lowest locked position. Non skids on.  Call bell in reach.

## 2024-03-13 NOTE — DISCHARGE SUMMARY
[facility] - Transplant Stepdown  Kidney Transplant  Discharge Summary    Patient Name: Colten Monet  MRN: [id number]  Admission Date: 3/11/2024  Hospital Length of Stay: 2 days  Discharge Date and Time:  03/13/2024 12:08 PM  Attending Physician: Silvio Mayer MD   Discharging Provider: Mayi Bartholomew PA-C  Primary Care Provider: Jean[facility] Primary Doctor    HPI:   Reason for Visit: evaluate transplant candidacy     History of Present Illness: Colten Monet is a 63 y.o. year old male undergoing transplant evaluation.     Dialysis History: Colten is pre-dialysis.       Transplant History: N/A     Etiology of Renal Disease: IgA Nephropathy (based on medical records from referral).    Procedure(s) (LRB):  TRANSPLANT, KIDNEY (N/A)     Hospital Course:    Patient is now S/P living related Ktx 2/2 IgA nephropathy. Surgery without complications. Intra-op urine noted. 2 day schmidt placed, no drains. Post op labs stable.     Hospital course uneventful. H/H remained stable. Cr trending down. Patient making adequate urine post transplant (~2.5L). IVF stopped 3/12. Schmidt removed 3/13 without issue. Pain remained controlled with PO medications.Tolerating diet. +flatus/+BM. Vital signs remained stable. Patient will follow up in clinic tomorrow for labs. He has a history of COPD, and will need to follow up with his outpatient pulmonologist.    Goals of Care Treatment Preferences:  Code Status: Full Code      Final Active Diagnoses:    Diagnosis Date Noted POA    Acute blood loss anemia [D62] 03/12/2024 No    S/P living-donor kidney transplantation [Z94.0] 03/12/2024 Not Applicable    Long-term use of immunosuppressant medication [Z79.60] 03/12/2024 Not Applicable    At risk for opportunistic infections [Z91.89] 03/12/2024 No    Prophylactic immunotherapy [Z29.89] 03/12/2024 Not Applicable    Cough [R05.9] 03/07/2024 Yes    Immunosuppression [D84.9] 07/07/2021 Yes    IgA nephropathy [N02.B9] 12/24/2019 Yes      Problems Resolved  During this Admission:       Treatments: surgery: kidney transplant     Consults (From admission, onward)          Status Ordering Provider     Inpatient consult to Registered Dietitian/Nutritionist  Once        Provider:  (Not yet assigned)    Completed LYNSEY VELA     Inpatient consult to Transplant Nephrology ([facility])  Once        Provider:  (Not yet assigned)    Acknowledged LYNSEY VELA            Pending Diagnostic Studies:       None          Significant Diagnostic Studies: Labs: CMP   Recent Labs   Lab 03/11/24  1329 03/11/24  2101 03/12/24  0553 03/13/24  0547    137 141 140   K 5.1 4.3 4.3 4.0   * 113* 115* 114*   CO2 18* 18* 19* 19*   * 189* 156* 108   BUN 33* 33* 31* 25*   CREATININE 3.4* 2.8* 2.1* 1.2   CALCIUM 8.1* 8.0* 8.1* 8.4*   ALBUMIN 3.1*  --  2.8* 2.9*   ANIONGAP 6* 6* 7* 7*   , CBC   Recent Labs   Lab 03/11/24  2101 03/12/24  0553 03/13/24  0547   WBC 22.84* 16.73* 20.72*   HGB 10.6* 10.2* 10.9*   HCT 34.9* 33.2* 35.0*    183 142*   , and All labs within the past 24 hours have been reviewed    Discharged Condition: good    Disposition: Home or Self Care    Follow Up:    Patient Instructions:      Diet Adult Regular     Notify your health care provider if you experience any of the following:  temperature >100.4     Notify your health care provider if you experience any of the following:  persistent nausea and vomiting or diarrhea     Notify your health care provider if you experience any of the following:  severe uncontrolled pain     Notify your health care provider if you experience any of the following:  redness, tenderness, or signs of infection (pain, swelling, redness, odor or green/yellow discharge around incision site)     Notify your health care provider if you experience any of the following:  difficulty breathing or increased cough     Notify your health care provider if you experience any of the following:  severe persistent headache     Notify your health  care provider if you experience any of the following:  worsening rash     Notify your health care provider if you experience any of the following:  persistent dizziness, light-headedness, or visual disturbances     Notify your health care provider if you experience any of the following:  increased confusion or weakness     No dressing needed     Activity as tolerated     Medications:  Reconciled Home Medications:      Medication List        START taking these medications      bisacodyL 5 mg EC tablet  Commonly known as: DULCOLAX  Take 2 tablets (10 mg total) by mouth daily as needed for Constipation.     docusate sodium 100 MG capsule  Commonly known as: COLACE  Take 1 capsule (100 mg total) by mouth 3 (three) times daily as needed for Constipation.     famotidine 20 MG tablet  Commonly known as: PEPCID  Take 1 tablet (20 mg total) by mouth every evening.     mycophenolate 250 mg Cap  Commonly known as: CELLCEPT  Take 4 capsules (1,000 mg total) by mouth 2 (two) times daily.  Replaces: mycophenolate 500 mg Tab     sodium bicarbonate 650 MG tablet  Take 2 tablets (1,300 mg total) by mouth 2 (two) times daily.     sulfamethoxazole-trimethoprim 400-80mg 400-80 mg per tablet  Commonly known as: BACTRIM,SEPTRA  Take 1 tablet by mouth every morning. Stop: 9/7/24     tacrolimus 1 MG Cap  Commonly known as: PROGRAF  Take 4 capsules (4 mg total) by mouth every 12 (twelve) hours.     traMADoL 50 mg tablet  Commonly known as: ULTRAM  Take 1 tablet (50 mg total) by mouth every 6 (six) hours as needed for Pain.     valGANciclovir 450 mg Tab  Commonly known as: VALCYTE  Take 2 tablets (900 mg total) by mouth every morning. Stop: 6/9/24     SALEEM-PHOS 250 NEUTRAL 250 mg Tab  Generic drug: k phos di & mono-sod phos mono  Take 2 tablets by mouth 2 (two) times daily.            CHANGE how you take these medications      predniSONE 5 MG tablet  Commonly known as: DELTASONE  Take 20 mg BY MOUTH daily 3/14-3/20; 15 mg daily 3/21-3/27; 10  mg daily 3/28-4/3/2024; then 5 mg daily thereafter 4/4/24  Start taking on: March 14, 2024  What changed:   medication strength  See the new instructions.            CONTINUE taking these medications      albuterol 90 mcg/actuation inhaler  Commonly known as: PROVENTIL/VENTOLIN HFA  Inhale 1-2 puffs into the lungs every 6 (six) hours as needed for Wheezing. Rescue     aspirin 81 MG EC tablet  Commonly known as: ECOTRIN  Take 1 tablet (81 mg total) by mouth once daily.     atorvastatin 40 MG tablet  Commonly known as: LIPITOR  Take 1 tablet by mouth every evening.     benzonatate 100 MG capsule  Commonly known as: TESSALON  Take 1 capsule by mouth 3 (three) times daily.     BREO ELLIPTA 100-25 mcg/dose diskus inhaler  Generic drug: fluticasone furoate-vilanteroL  Inhale 1 puff into the lungs once daily. Controller            STOP taking these medications      calcitRIOL 0.25 MCG Cap  Commonly known as: ROCALTROL     doxycycline 100 MG EC tablet  Commonly known as: DORYX     losartan 100 MG tablet  Commonly known as: COZAAR     mycophenolate 500 mg Tab  Commonly known as: CELLCEPT  Replaced by: mycophenolate 250 mg Cap     promethazine-dextromethorphan 6.25-15 mg/5 mL Syrp  Commonly known as: PROMETHAZINE-DM            Time spent caring for patient (Greater than 1/2 spent in direct face-to-face contact): > 30 minutes    Mayi Bartholomew PA-C  Kidney Transplant  [facility] - Transplant Stepdown

## 2024-03-13 NOTE — PROGRESS NOTES
Patient admitted for Kidney transplant.Transplant coordinator met with the patient on rounds to introduce self and explain the coordinator role. The post-transplant teaching book was given. Transplant Coordinator explained that she will follow the patient while in the hospital and assist with discharge.     CKD 2/2 IgA nephropathy  SCD, LRD from sister  Thymo induction  CMV D-,R+

## 2024-03-13 NOTE — PROGRESS NOTES
Thymo #3 completed. PIV removed. Patient discharged per order. AVS reviewed with patient and patient verbalizes complete understanding of all discharge instructions. No acute distress noted. Medications have been delivered to the bedside. No further questions at this time. Patient's friend will provide transport to [facility].

## 2024-03-13 NOTE — ANESTHESIA POSTPROCEDURE EVALUATION
Anesthesia Post Evaluation    Patient: Colten Monet    Procedure(s) Performed: Procedure(s) (LRB):  TRANSPLANT, KIDNEY (N/A)    Final Anesthesia Type: general      Patient location during evaluation: PACU  Patient participation: Yes- Able to Participate  Level of consciousness: awake and alert  Post-procedure vital signs: reviewed and stable  Pain management: adequate  Airway patency: patent  SAMUEL mitigation strategies: Multimodal analgesia, Preoperative use of mandibular advancement devices or oral appliances, Intraoperative administration of CPAP, nasopharyngeal airway, or oral appliance during sedation, Extubation while patient is awake and Verification of full reversal of neuromuscular block  PONV status at discharge: No PONV  Anesthetic complications: no      Cardiovascular status: blood pressure returned to baseline, hemodynamically stable and stable  Respiratory status: unassisted and spontaneous ventilation  Hydration status: euvolemic  Follow-up not needed.              Vitals Value Taken Time   /113 03/13/24 1151   Temp 36.7 °C (98 °F) 03/13/24 1150   Pulse 96 03/13/24 1150   Resp 18 03/13/24 1150   SpO2 96 % 03/13/24 1150   Vitals shown include unvalidated device data.      Event Time   Out of Recovery 14:00:00         Pain/Kati Score: Pain Rating Prior to Med Admin: 2 (3/13/2024  4:57 AM)  Pain Rating Post Med Admin: 0 (3/13/2024  5:34 AM)

## 2024-03-13 NOTE — NURSING
Schmidt cathter removed at this time per orders.  9cc NS removed from intact balloon tip.  Pt tolerated removal fine.  Pt instructed to void within 6 hrs of schmidt removal.  Urinals provided.  Pt immediately ambulated to BR and voided about 10cc clear red urine w/ small clot.  He is now up in the chair resting comfortably.    LINDA GEORGE notified of pt's urine becoming bloodier o/n.  Urine was clear/pink at start of shift and is now clear/red.  AM labs have been collected and results are pending.  No new orders at this time.

## 2024-03-13 NOTE — PROGRESS NOTES
Transplant Social Work Discharge Note:    Pt will discharge to [facility] [address] (fee $8.33) under the care of Ginna Monet & Tripp Miguel, patient's wife and friend.      met with patient and caregivers in room. Patient reports readiness to discharge at this time. Per rounds this morning patient does not require additional referrals from this  at time of discharge. Patient and caregivers denied needing or wanting referrals or resources at this time. Patient and caregivers agree to contact transplant team with needs, questions, or concerns as they arise.       Pt aware of, involved in, and coping well with this discharge plan. Pt did not have any concerns with the discharge plan at this time. SW remains available at [phone].          Betsey Lewis LMSW

## 2024-03-13 NOTE — PROGRESS NOTES
Transplant Teaching Book given to patient, Colten Monet, during pre-op appts.  During the course of the hospital stay the patient received information regarding kidney transplant. Teaching and instruction were completed.  Areas that were discussed included: how to contact the Transplant Team, the importance of measuring intake of fluids and urine output, and monitoring vital signs such as blood pressure, temperature, and daily weights.  Parameters for which to report abnormal findings were given.  Appointment were provided along with the rational for the importance of lab work and clinic visits.  A written medication list was provided.  The importance of immunosuppressive medications, their common side effects, and treatment to prevent or minimize side effects has been reviewed.  Signs and symptoms of rejection and infection along with various treatments were reviewed.  The need to avoid infection was discussed.  Wound care and special consideration regarding activities of daily living were explained.  Written and verbal teaching of the above information was given.     Discussed with the patient and caregiver the importance of maintaining COVID-19 precautions; wearing a mask, good handwashing, and social distancing.  Also, to report any signs or symptoms (fever, difficulty breathing, loss of taste/smell, etc.), suspected exposure, or COVID testing, immediately to the transplant program.     Education was provided to the patient, his wife and caregiver.

## 2024-03-13 NOTE — PROGRESS NOTES
EDUCATION NOTE:    Met with Colten Pedrazajade and his caregivers to provide teaching re: immunosuppressant medications.  Reviewed medication section of the Kidney Transplant Education book that was provided.  Emphasized the importance of compliance, role of the blue medication card, concerns for drug interactions, and process of obtaining refills.  Counseled regarding Prograf, Cellcept , prednisone, including directions for use, monitoring, how to handle missed doses, and side effects.  Mr. Monet and his caregivers verbalized understanding and had the opportunity to ask questions.

## 2024-03-14 ENCOUNTER — CLINICAL SUPPORT (OUTPATIENT)
Dept: TRANSPLANT | Facility: CLINIC | Age: 64
End: 2024-03-14
Payer: COMMERCIAL

## 2024-03-14 ENCOUNTER — OFFICE VISIT (OUTPATIENT)
Dept: TRANSPLANT | Facility: CLINIC | Age: 64
End: 2024-03-14
Payer: COMMERCIAL

## 2024-03-14 ENCOUNTER — LAB VISIT (OUTPATIENT)
Dept: LAB | Facility: HOSPITAL | Age: 64
End: 2024-03-14
Attending: INTERNAL MEDICINE
Payer: COMMERCIAL

## 2024-03-14 VITALS
DIASTOLIC BLOOD PRESSURE: 81 MMHG | WEIGHT: 207.44 LBS | HEART RATE: 108 BPM | SYSTOLIC BLOOD PRESSURE: 148 MMHG | OXYGEN SATURATION: 98 % | BODY MASS INDEX: 32.56 KG/M2 | HEIGHT: 67 IN | RESPIRATION RATE: 18 BRPM

## 2024-03-14 VITALS
BODY MASS INDEX: 32.56 KG/M2 | RESPIRATION RATE: 18 BRPM | DIASTOLIC BLOOD PRESSURE: 81 MMHG | OXYGEN SATURATION: 98 % | HEIGHT: 67 IN | WEIGHT: 207.44 LBS | SYSTOLIC BLOOD PRESSURE: 148 MMHG | HEART RATE: 108 BPM

## 2024-03-14 DIAGNOSIS — Z94.0 S/P KIDNEY TRANSPLANT: Primary | ICD-10-CM

## 2024-03-14 DIAGNOSIS — Z79.60 LONG-TERM USE OF IMMUNOSUPPRESSANT MEDICATION: ICD-10-CM

## 2024-03-14 DIAGNOSIS — Z51.81 ENCOUNTER FOR THERAPEUTIC DRUG MONITORING: ICD-10-CM

## 2024-03-14 DIAGNOSIS — Z57.8 OCCUPATIONAL EXPOSURE TO OTHER RISK FACTORS: ICD-10-CM

## 2024-03-14 DIAGNOSIS — Z94.0 KIDNEY REPLACED BY TRANSPLANT: ICD-10-CM

## 2024-03-14 DIAGNOSIS — E83.42 HYPOMAGNESEMIA: ICD-10-CM

## 2024-03-14 DIAGNOSIS — Z94.0 KIDNEY REPLACED BY TRANSPLANT: Primary | ICD-10-CM

## 2024-03-14 LAB
ALBUMIN SERPL BCP-MCNC: 2.9 G/DL (ref 3.5–5.2)
ANION GAP SERPL CALC-SCNC: 6 MMOL/L (ref 8–16)
BASOPHILS # BLD AUTO: 0.02 K/UL (ref 0–0.2)
BASOPHILS NFR BLD: 0.2 % (ref 0–1.9)
BUN SERPL-MCNC: 18 MG/DL (ref 8–23)
CALCIUM SERPL-MCNC: 8.2 MG/DL (ref 8.7–10.5)
CHLORIDE SERPL-SCNC: 112 MMOL/L (ref 95–110)
CO2 SERPL-SCNC: 24 MMOL/L (ref 23–29)
CREAT SERPL-MCNC: 1.1 MG/DL (ref 0.5–1.4)
DIFFERENTIAL METHOD BLD: ABNORMAL
EOSINOPHIL # BLD AUTO: 0 K/UL (ref 0–0.5)
EOSINOPHIL NFR BLD: 0.3 % (ref 0–8)
ERYTHROCYTE [DISTWIDTH] IN BLOOD BY AUTOMATED COUNT: 13.3 % (ref 11.5–14.5)
EST. GFR  (NO RACE VARIABLE): >60 ML/MIN/1.73 M^2
GLUCOSE SERPL-MCNC: 114 MG/DL (ref 70–110)
HCT VFR BLD AUTO: 34.5 % (ref 40–54)
HGB BLD-MCNC: 10.4 G/DL (ref 14–18)
IMM GRANULOCYTES # BLD AUTO: 0.09 K/UL (ref 0–0.04)
IMM GRANULOCYTES NFR BLD AUTO: 0.8 % (ref 0–0.5)
LYMPHOCYTES # BLD AUTO: 0.5 K/UL (ref 1–4.8)
LYMPHOCYTES NFR BLD: 4 % (ref 18–48)
MAGNESIUM SERPL-MCNC: 1.6 MG/DL (ref 1.6–2.6)
MCH RBC QN AUTO: 26.5 PG (ref 27–31)
MCHC RBC AUTO-ENTMCNC: 30.1 G/DL (ref 32–36)
MCV RBC AUTO: 88 FL (ref 82–98)
MONOCYTES # BLD AUTO: 0.9 K/UL (ref 0.3–1)
MONOCYTES NFR BLD: 7.3 % (ref 4–15)
NEUTROPHILS # BLD AUTO: 10.4 K/UL (ref 1.8–7.7)
NEUTROPHILS NFR BLD: 87.4 % (ref 38–73)
NRBC BLD-RTO: 0 /100 WBC
PHOSPHATE SERPL-MCNC: 1.6 MG/DL (ref 2.7–4.5)
PLATELET # BLD AUTO: 138 K/UL (ref 150–450)
PMV BLD AUTO: 11.6 FL (ref 9.2–12.9)
POTASSIUM SERPL-SCNC: 3.5 MMOL/L (ref 3.5–5.1)
RBC # BLD AUTO: 3.92 M/UL (ref 4.6–6.2)
SODIUM SERPL-SCNC: 142 MMOL/L (ref 136–145)
TACROLIMUS BLD-MCNC: 7.2 NG/ML (ref 5–15)
WBC # BLD AUTO: 11.86 K/UL (ref 3.9–12.7)

## 2024-03-14 PROCEDURE — 36415 COLL VENOUS BLD VENIPUNCTURE: CPT | Performed by: INTERNAL MEDICINE

## 2024-03-14 PROCEDURE — 80197 ASSAY OF TACROLIMUS: CPT | Performed by: INTERNAL MEDICINE

## 2024-03-14 PROCEDURE — 99214 OFFICE O/P EST MOD 30 MIN: CPT | Mod: 24,S$GLB,, | Performed by: TRANSPLANT SURGERY

## 2024-03-14 PROCEDURE — 85025 COMPLETE CBC W/AUTO DIFF WBC: CPT | Performed by: INTERNAL MEDICINE

## 2024-03-14 PROCEDURE — 83735 ASSAY OF MAGNESIUM: CPT | Performed by: INTERNAL MEDICINE

## 2024-03-14 PROCEDURE — 80069 RENAL FUNCTION PANEL: CPT | Performed by: INTERNAL MEDICINE

## 2024-03-14 PROCEDURE — 99999 PR PBB SHADOW E&M-EST. PATIENT-LVL III: CPT | Mod: PBBFAC,,,

## 2024-03-14 RX ORDER — OXYCODONE HYDROCHLORIDE 5 MG/1
5 TABLET ORAL EVERY 6 HOURS PRN
Qty: 28 TABLET | Refills: 0 | Status: SHIPPED | OUTPATIENT
Start: 2024-03-14 | End: 2024-03-21

## 2024-03-14 SDOH — SOCIAL DETERMINANTS OF HEALTH (SDOH): OCCUPATIONAL EXPOSURE TO OTHER RISK FACTORS: Z57.8

## 2024-03-14 NOTE — PROGRESS NOTES
Transplant Surgery  Kidney Transplant Recipient Follow-up    Referring Physician: Colby Rene  Current Nephrologist: Colby Rene    Subjective:     Chief Complaint: Colten Monet is a 63 y.o. year old White male who is status post Kidney transplant performed on 3/11/2024.    ORGAN: LEFT KIDNEY   Disease Etiology: IgA Nephropathy  Donor Type: Living   Donor CMV Status:    Donor HBcAB:    Donor HCV Status:      History of Present Illness: He reports  pain over incision .  From a transplant perspective, he reports normal urination.  Colten is here for management of his immunosuppression medication.  Colten states that his immunosuppression is being well tolerated.  Hypertension is not present.    External provider notes reviewed: Yes    Review of Systems    Objective:     Physical Exam  Constitutional:       Appearance: He is well-developed.   HENT:      Head: Normocephalic and atraumatic.   Eyes:      Pupils: Pupils are equal, round, and reactive to light.   Neck:      Vascular: No JVD.   Cardiovascular:      Rate and Rhythm: Normal rate and regular rhythm.      Heart sounds: Normal heart sounds.   Pulmonary:      Effort: Pulmonary effort is normal.      Breath sounds: Normal breath sounds. No stridor.   Abdominal:      General: There is no distension.      Palpations: Abdomen is soft.      Tenderness: There is no abdominal tenderness.      Comments: Obese abdomen; incision c/d/I, no erythema; no hernia   Musculoskeletal:         General: Normal range of motion.   Skin:     General: Skin is warm and dry.   Neurological:      Mental Status: He is alert and oriented to person, place, and time.   Psychiatric:         Behavior: Behavior normal.       Lab Results   Component Value Date    CREATININE 1.1 03/14/2024    BUN 18 03/14/2024     Lab Results   Component Value Date    WBC 11.86 03/14/2024    HGB 10.4 (L) 03/14/2024    HCT 34.5 (L) 03/14/2024    HCT 32 (L) 03/11/2024     (L) 03/14/2024     Lab Results    Component Value Date    TACROLIMUS 7.2 03/14/2024       Diagnostics:  The following labs have been reviewed: CBC  CMP  INR  TACROLIMUS LEVEL  The following radiology images have been independently reviewed and interpreted: Renal US    Assessment and Plan:        S/P Kidney transplant.  Dc tramadol; oxycodone prn written  Chronic immunosuppressive medications for rejection prophylaxis at target.  Plan: no adjustment needed.  Continue monitoring symptoms, labs and drug levels for drug-related toxicity and side effects.  Renal hypertension at target.    Additional testing to be completed according to the Kidney: Written Order Guideline for Kidney Transplant Follow-Up (KI-09)    Interpretation of tests and discussion of patient management involves all members of the multidisciplinary transplant team.  Patient advised that it is recommended that all transplant candidates, and their close contacts and household members receive Covid vaccination.  Follow-up: Patient reminded to call with any health changes, since these can be early signs of significant complications.  Also, I advised the patient to be sure any new medications or changes of old medications are discussed with either a pharmacist, or physician knowledgeable with transplant to avoid rejection/drug toxicity related to significant drug interactions.    Rosalio Eduardo Jr, MD       [facility] Patient Status  Functional Status: 60% - Requires occasional assistance but is able to care for needs  Physical Capacity: No Limitations

## 2024-03-14 NOTE — PROGRESS NOTES
"1ST NURSING VISIT POST DISCHARGE NOTE    1st RN appointment with Colten Monet post discharge 3/13/24 s/p kidney transplant 3/11/24.  Patient's caregiver and friend accompanied him.  Patient reports incisional pain and incisional drainage- SS drainage.  Patient says that he that he is sleeping well.  Incision intact with staples and has drainage.  Patient reports incisional pain uncontrolled by prescribed tramadol; Dr. Eduardo at bedside to assess, PRN oxycodone prescribed.  Patient that he is able to explain daily incision care and showering instructions.  Reviewed I&O monitoring, measuring, and recording, and the need for hydration (i.e., at least 2 liters of water daily with minimal caffeine and no grapefruit products).  Medication list and rationale were reviewed.  Patient did bring blue medication card and medication bottles for review.  Patient reports that he has not stopped Dulcolax and has continued Colace.  Patient has had a bowel movement.  Patient expressed understanding of daily care including BID VS, medications, and I&O documentation.  Patient made aware of today's creatinine level: 1.1.  Patient aware that coordinator will review today's labs with a transplant physician and call the patient with any dose changes indicated.  Next lab appointment scheduled for 3/18/24.  First post-operative transplant team appointment with labs scheduled for 3/25/24.    Using the Kidney Transplant Patient Reference Manual, the patient submitted his open book "Self-assessment of Kidney Transplant Patient Knowledge" test, which was completed in the transplant clinic this morning before 1st nursing visit.  This test includes questions regarding critical dose medications commonly used after kidney transplant, medication dosing and side effects, importance of timed lab draws, important signs and symptoms to report 24/7 immediately post-transplant as well as how to contact the transplant team 24/7.    Test Score: " 23/25    After completing the test, the patient was given a copy of the Self-assessment Answer Key to reinforce accurate learning of test content.  Patient expressed his understanding of the value of the information included in the self-assessment test.

## 2024-03-15 ENCOUNTER — TELEPHONE (OUTPATIENT)
Dept: TRANSPLANT | Facility: CLINIC | Age: 64
End: 2024-03-15
Payer: COMMERCIAL

## 2024-03-15 ENCOUNTER — LAB VISIT (OUTPATIENT)
Dept: LAB | Facility: HOSPITAL | Age: 64
End: 2024-03-15
Payer: COMMERCIAL

## 2024-03-15 DIAGNOSIS — Z94.0 KIDNEY REPLACED BY TRANSPLANT: Primary | ICD-10-CM

## 2024-03-15 DIAGNOSIS — Z94.0 KIDNEY REPLACED BY TRANSPLANT: ICD-10-CM

## 2024-03-15 LAB
ALBUMIN SERPL BCP-MCNC: 3 G/DL (ref 3.5–5.2)
ANION GAP SERPL CALC-SCNC: 6 MMOL/L (ref 8–16)
BASOPHILS # BLD AUTO: 0.01 K/UL (ref 0–0.2)
BASOPHILS NFR BLD: 0.1 % (ref 0–1.9)
BUN SERPL-MCNC: 24 MG/DL (ref 8–23)
CALCIUM SERPL-MCNC: 8.3 MG/DL (ref 8.7–10.5)
CHLORIDE SERPL-SCNC: 108 MMOL/L (ref 95–110)
CO2 SERPL-SCNC: 27 MMOL/L (ref 23–29)
CREAT SERPL-MCNC: 1.1 MG/DL (ref 0.5–1.4)
DIFFERENTIAL METHOD BLD: ABNORMAL
EOSINOPHIL # BLD AUTO: 0 K/UL (ref 0–0.5)
EOSINOPHIL NFR BLD: 0.1 % (ref 0–8)
ERYTHROCYTE [DISTWIDTH] IN BLOOD BY AUTOMATED COUNT: 13.1 % (ref 11.5–14.5)
EST. GFR  (NO RACE VARIABLE): >60 ML/MIN/1.73 M^2
GLUCOSE SERPL-MCNC: 127 MG/DL (ref 70–110)
HCT VFR BLD AUTO: 35.1 % (ref 40–54)
HGB BLD-MCNC: 10.6 G/DL (ref 14–18)
IMM GRANULOCYTES # BLD AUTO: 0.09 K/UL (ref 0–0.04)
IMM GRANULOCYTES NFR BLD AUTO: 0.9 % (ref 0–0.5)
LYMPHOCYTES # BLD AUTO: 0.2 K/UL (ref 1–4.8)
LYMPHOCYTES NFR BLD: 2.4 % (ref 18–48)
MCH RBC QN AUTO: 26.8 PG (ref 27–31)
MCHC RBC AUTO-ENTMCNC: 30.2 G/DL (ref 32–36)
MCV RBC AUTO: 89 FL (ref 82–98)
MONOCYTES # BLD AUTO: 0.6 K/UL (ref 0.3–1)
MONOCYTES NFR BLD: 5.9 % (ref 4–15)
NEUTROPHILS # BLD AUTO: 8.9 K/UL (ref 1.8–7.7)
NEUTROPHILS NFR BLD: 90.6 % (ref 38–73)
NRBC BLD-RTO: 0 /100 WBC
PHOSPHATE SERPL-MCNC: 2.8 MG/DL (ref 2.7–4.5)
PLATELET # BLD AUTO: 149 K/UL (ref 150–450)
PMV BLD AUTO: 11.6 FL (ref 9.2–12.9)
POTASSIUM SERPL-SCNC: 4 MMOL/L (ref 3.5–5.1)
RBC # BLD AUTO: 3.95 M/UL (ref 4.6–6.2)
SODIUM SERPL-SCNC: 141 MMOL/L (ref 136–145)
WBC # BLD AUTO: 9.86 K/UL (ref 3.9–12.7)

## 2024-03-15 PROCEDURE — 80069 RENAL FUNCTION PANEL: CPT | Performed by: INTERNAL MEDICINE

## 2024-03-15 PROCEDURE — 36415 COLL VENOUS BLD VENIPUNCTURE: CPT | Performed by: INTERNAL MEDICINE

## 2024-03-15 PROCEDURE — 85025 COMPLETE CBC W/AUTO DIFF WBC: CPT | Performed by: INTERNAL MEDICINE

## 2024-03-15 RX ORDER — FUROSEMIDE 20 MG/1
20 TABLET ORAL DAILY
Qty: 30 TABLET | Refills: 0 | Status: ON HOLD | OUTPATIENT
Start: 2024-03-15 | End: 2024-04-24 | Stop reason: HOSPADM

## 2024-03-15 NOTE — TELEPHONE ENCOUNTER
Spoke with patient regarding lab results - instructed pt to present to [facility] ED over the weekend if his UOP stops/decreases significantly. Pt verbalized understanding.  ----- Message from Candido Arnold MD sent at 3/15/2024  2:18 PM CDT -----  Labs and diagnostic tests were reviewed. No action/changes indicated.

## 2024-03-18 ENCOUNTER — LAB VISIT (OUTPATIENT)
Dept: LAB | Facility: HOSPITAL | Age: 64
End: 2024-03-18
Attending: INTERNAL MEDICINE
Payer: COMMERCIAL

## 2024-03-18 DIAGNOSIS — E83.42 HYPOMAGNESEMIA: ICD-10-CM

## 2024-03-18 DIAGNOSIS — Z94.0 KIDNEY REPLACED BY TRANSPLANT: ICD-10-CM

## 2024-03-18 DIAGNOSIS — Z94.0 STATUS POST KIDNEY TRANSPLANT: ICD-10-CM

## 2024-03-18 LAB
ALBUMIN SERPL BCP-MCNC: 2.9 G/DL (ref 3.5–5.2)
ANION GAP SERPL CALC-SCNC: 6 MMOL/L (ref 8–16)
BASOPHILS # BLD AUTO: 0.04 K/UL (ref 0–0.2)
BASOPHILS NFR BLD: 0.3 % (ref 0–1.9)
BUN SERPL-MCNC: 17 MG/DL (ref 8–23)
CALCIUM SERPL-MCNC: 8.3 MG/DL (ref 8.7–10.5)
CHLORIDE SERPL-SCNC: 109 MMOL/L (ref 95–110)
CO2 SERPL-SCNC: 22 MMOL/L (ref 23–29)
CREAT SERPL-MCNC: 1 MG/DL (ref 0.5–1.4)
DIFFERENTIAL METHOD BLD: ABNORMAL
EOSINOPHIL # BLD AUTO: 0.5 K/UL (ref 0–0.5)
EOSINOPHIL NFR BLD: 3.7 % (ref 0–8)
ERYTHROCYTE [DISTWIDTH] IN BLOOD BY AUTOMATED COUNT: 13.1 % (ref 11.5–14.5)
EST. GFR  (NO RACE VARIABLE): >60 ML/MIN/1.73 M^2
GLUCOSE SERPL-MCNC: 83 MG/DL (ref 70–110)
HCT VFR BLD AUTO: 35.4 % (ref 40–54)
HGB BLD-MCNC: 10.6 G/DL (ref 14–18)
IMM GRANULOCYTES # BLD AUTO: 0.18 K/UL (ref 0–0.04)
IMM GRANULOCYTES NFR BLD AUTO: 1.2 % (ref 0–0.5)
LYMPHOCYTES # BLD AUTO: 1 K/UL (ref 1–4.8)
LYMPHOCYTES NFR BLD: 7.1 % (ref 18–48)
MAGNESIUM SERPL-MCNC: 1.4 MG/DL (ref 1.6–2.6)
MCH RBC QN AUTO: 27.3 PG (ref 27–31)
MCHC RBC AUTO-ENTMCNC: 29.9 G/DL (ref 32–36)
MCV RBC AUTO: 91 FL (ref 82–98)
MONOCYTES # BLD AUTO: 1.4 K/UL (ref 0.3–1)
MONOCYTES NFR BLD: 9.6 % (ref 4–15)
NEUTROPHILS # BLD AUTO: 11.3 K/UL (ref 1.8–7.7)
NEUTROPHILS NFR BLD: 78.1 % (ref 38–73)
NRBC BLD-RTO: 0 /100 WBC
PHOSPHATE SERPL-MCNC: 2.8 MG/DL (ref 2.7–4.5)
PLATELET # BLD AUTO: 157 K/UL (ref 150–450)
PMV BLD AUTO: 11.5 FL (ref 9.2–12.9)
POTASSIUM SERPL-SCNC: 3.9 MMOL/L (ref 3.5–5.1)
RBC # BLD AUTO: 3.88 M/UL (ref 4.6–6.2)
SODIUM SERPL-SCNC: 137 MMOL/L (ref 136–145)
TACROLIMUS BLD-MCNC: 9.9 NG/ML (ref 5–15)
WBC # BLD AUTO: 14.52 K/UL (ref 3.9–12.7)

## 2024-03-18 PROCEDURE — 85025 COMPLETE CBC W/AUTO DIFF WBC: CPT | Performed by: INTERNAL MEDICINE

## 2024-03-18 PROCEDURE — 36415 COLL VENOUS BLD VENIPUNCTURE: CPT | Performed by: INTERNAL MEDICINE

## 2024-03-18 PROCEDURE — 80069 RENAL FUNCTION PANEL: CPT | Performed by: INTERNAL MEDICINE

## 2024-03-18 PROCEDURE — 80197 ASSAY OF TACROLIMUS: CPT | Performed by: INTERNAL MEDICINE

## 2024-03-18 PROCEDURE — 83735 ASSAY OF MAGNESIUM: CPT | Performed by: INTERNAL MEDICINE

## 2024-03-18 RX ORDER — TACROLIMUS 1 MG/1
CAPSULE ORAL
Qty: 210 CAPSULE | Refills: 11 | Status: SHIPPED | OUTPATIENT
Start: 2024-03-18 | End: 2024-03-21

## 2024-03-18 NOTE — PROGRESS NOTES
Is this the patient that you said was seen by surgery and they put him in Doxy for something with the wound? I advise you have this man seen in clinic by surgery soon

## 2024-03-19 ENCOUNTER — HOSPITAL ENCOUNTER (OUTPATIENT)
Dept: RADIOLOGY | Facility: HOSPITAL | Age: 64
Discharge: HOME | End: 2024-03-19
Attending: INTERNAL MEDICINE
Payer: COMMERCIAL

## 2024-03-19 DIAGNOSIS — Z94.0 KIDNEY REPLACED BY TRANSPLANT: ICD-10-CM

## 2024-03-19 PROCEDURE — 76776 US EXAM K TRANSPL W/DOPPLER: CPT | Mod: TC

## 2024-03-19 PROCEDURE — 76776 US EXAM K TRANSPL W/DOPPLER: CPT | Mod: 26,,, | Performed by: STUDENT IN AN ORGANIZED HEALTH CARE EDUCATION/TRAINING PROGRAM

## 2024-03-20 ENCOUNTER — TELEPHONE (OUTPATIENT)
Dept: TRANSPLANT | Facility: CLINIC | Age: 64
End: 2024-03-20
Payer: COMMERCIAL

## 2024-03-20 LAB
CLASS I ANTIBODIES - LUMINEX: NORMAL
CLASS I ANTIBODY COMMENTS - LUMINEX: NORMAL
CLASS II ANTIBODY COMMENTS - LUMINEX: NORMAL
CPRA %: 29
SERUM COLLECTION DT - LUMINEX CLASS I: NORMAL
SERUM COLLECTION DT - LUMINEX CLASS II: NORMAL
SPCL1 TESTING DATE: NORMAL
SPCL2 TESTING DATE: NORMAL
SPLUA TESTING DATE: NORMAL

## 2024-03-20 NOTE — TELEPHONE ENCOUNTER
Returned call to pt. Pt reports incision is still closed, staples intact, reddened area by where staples insert into skin. Instructed pt to splint while coughing and support abdomen while ambulating. Instructed pt to present to ED if incision opens. Surgery Clinic appointment made to assess.  ----- Message from Kymberly Aguilar sent at 3/20/2024  1:46 PM CDT -----  Regarding: concern wound site  Contact: PT  [phone]  The patient called requesting to speak to Nurse regarding concerns - states thinks the incision site and staples are pulling when standing up. Had a coughing spell last night and may have pulled on. He is concerned is coming in for labs in the morning. Please advise maybe stop by so can have looked at. Please call at your earliest convenience.     No further information provided    Patient can be contacted @# [phone]

## 2024-03-21 ENCOUNTER — TELEPHONE (OUTPATIENT)
Dept: UROLOGY | Facility: CLINIC | Age: 64
End: 2024-03-21
Payer: COMMERCIAL

## 2024-03-21 ENCOUNTER — LAB VISIT (OUTPATIENT)
Dept: LAB | Facility: HOSPITAL | Age: 64
End: 2024-03-21
Attending: INTERNAL MEDICINE
Payer: COMMERCIAL

## 2024-03-21 DIAGNOSIS — E83.42 HYPOMAGNESEMIA: ICD-10-CM

## 2024-03-21 DIAGNOSIS — Z94.0 KIDNEY REPLACED BY TRANSPLANT: ICD-10-CM

## 2024-03-21 DIAGNOSIS — Z94.0 STATUS POST KIDNEY TRANSPLANT: ICD-10-CM

## 2024-03-21 PROBLEM — Z76.82 PATIENT ON WAITING LIST FOR KIDNEY TRANSPLANT: Status: RESOLVED | Noted: 2023-09-21 | Resolved: 2024-03-21

## 2024-03-21 PROBLEM — N18.2 STAGE 2 CHRONIC KIDNEY DISEASE: Status: ACTIVE | Noted: 2024-03-21

## 2024-03-21 PROBLEM — N18.5 CKD (CHRONIC KIDNEY DISEASE), STAGE V: Status: RESOLVED | Noted: 2022-11-29 | Resolved: 2024-03-21

## 2024-03-21 LAB
ALBUMIN SERPL BCP-MCNC: 3.2 G/DL (ref 3.5–5.2)
ANION GAP SERPL CALC-SCNC: 6 MMOL/L (ref 8–16)
BASOPHILS # BLD AUTO: 0.07 K/UL (ref 0–0.2)
BASOPHILS NFR BLD: 0.4 % (ref 0–1.9)
BUN SERPL-MCNC: 20 MG/DL (ref 8–23)
CALCIUM SERPL-MCNC: 8.9 MG/DL (ref 8.7–10.5)
CHLORIDE SERPL-SCNC: 109 MMOL/L (ref 95–110)
CO2 SERPL-SCNC: 27 MMOL/L (ref 23–29)
CREAT SERPL-MCNC: 1.3 MG/DL (ref 0.5–1.4)
DIFFERENTIAL METHOD BLD: ABNORMAL
EOSINOPHIL # BLD AUTO: 0.7 K/UL (ref 0–0.5)
EOSINOPHIL NFR BLD: 4.4 % (ref 0–8)
ERYTHROCYTE [DISTWIDTH] IN BLOOD BY AUTOMATED COUNT: 13.6 % (ref 11.5–14.5)
EST. GFR  (NO RACE VARIABLE): >60 ML/MIN/1.73 M^2
GLUCOSE SERPL-MCNC: 79 MG/DL (ref 70–110)
HCT VFR BLD AUTO: 37 % (ref 40–54)
HGB BLD-MCNC: 11 G/DL (ref 14–18)
IMM GRANULOCYTES # BLD AUTO: 0.29 K/UL (ref 0–0.04)
IMM GRANULOCYTES NFR BLD AUTO: 1.8 % (ref 0–0.5)
LYMPHOCYTES # BLD AUTO: 1.2 K/UL (ref 1–4.8)
LYMPHOCYTES NFR BLD: 7.7 % (ref 18–48)
MAGNESIUM SERPL-MCNC: 1.5 MG/DL (ref 1.6–2.6)
MCH RBC QN AUTO: 27.2 PG (ref 27–31)
MCHC RBC AUTO-ENTMCNC: 29.7 G/DL (ref 32–36)
MCV RBC AUTO: 92 FL (ref 82–98)
MONOCYTES # BLD AUTO: 1.6 K/UL (ref 0.3–1)
MONOCYTES NFR BLD: 9.8 % (ref 4–15)
NEUTROPHILS # BLD AUTO: 12.1 K/UL (ref 1.8–7.7)
NEUTROPHILS NFR BLD: 75.9 % (ref 38–73)
NRBC BLD-RTO: 0 /100 WBC
PHOSPHATE SERPL-MCNC: 3.3 MG/DL (ref 2.7–4.5)
PLATELET # BLD AUTO: 275 K/UL (ref 150–450)
PMV BLD AUTO: 10.8 FL (ref 9.2–12.9)
POTASSIUM SERPL-SCNC: 4.7 MMOL/L (ref 3.5–5.1)
RBC # BLD AUTO: 4.04 M/UL (ref 4.6–6.2)
SODIUM SERPL-SCNC: 142 MMOL/L (ref 136–145)
TACROLIMUS BLD-MCNC: 14.2 NG/ML (ref 5–15)
WBC # BLD AUTO: 16 K/UL (ref 3.9–12.7)

## 2024-03-21 PROCEDURE — 85025 COMPLETE CBC W/AUTO DIFF WBC: CPT | Performed by: INTERNAL MEDICINE

## 2024-03-21 PROCEDURE — 83735 ASSAY OF MAGNESIUM: CPT | Performed by: INTERNAL MEDICINE

## 2024-03-21 PROCEDURE — 80197 ASSAY OF TACROLIMUS: CPT | Performed by: INTERNAL MEDICINE

## 2024-03-21 PROCEDURE — 80069 RENAL FUNCTION PANEL: CPT | Performed by: INTERNAL MEDICINE

## 2024-03-21 PROCEDURE — 36415 COLL VENOUS BLD VENIPUNCTURE: CPT | Performed by: INTERNAL MEDICINE

## 2024-03-21 RX ORDER — TACROLIMUS 1 MG/1
CAPSULE ORAL
Qty: 150 CAPSULE | Refills: 11 | Status: SHIPPED | OUTPATIENT
Start: 2024-03-22 | End: 2024-04-01

## 2024-03-21 NOTE — TELEPHONE ENCOUNTER
Spoke with patient who was able to provide acceptable patient identifiers prior to start of conversation.   Procedure rescheduled with patient.  Appointment reminder mailed per patient request.

## 2024-03-21 NOTE — PROGRESS NOTES
Hold prograf tonight , and lower prograf to 3/2, repeat a prograf level on Saturday please and discuss with the transplant MD on call (Dr Mckinley)

## 2024-03-21 NOTE — PROGRESS NOTES
Kidney Post-Transplant Assessment    Referring Physician: Colby Rene  Current Nephrologist: Colby Rene    ORGAN: LEFT KIDNEY  Donor Type: living  PHS Increased Risk: no  Cold Ischemia: 45 mins  Induction Medications:      Subjective:     CC:  Reassessment of renal allograft function and management of chronic immunosuppression.    HPI:  Mr. Monet is a 63 y.o. year old White male with PMH ESRD 2/2 IGA nephropathy, who received a living kidney transplant on 3/11/24. His most recent creatinine is 1.4. He takes mycophenolate mofetil, prednisone, and tacrolimus for maintenance immunosuppression. His post transplant course has been uncomplicated to date.  Hx COPD--needs to f/u with his local pulmonology outpatient       3/19./24 Kidney US:  FINDINGS:  Renal allograft in the right lower quadrant.  The allograft measures 12.8 cm. Normal perfusion.  Simple cyst within the superior pole measuring 1.9 cm.  Mildly complex cyst within the mid kidney measuring 1.1 cm containing thin septation and peripheral calcification measuring 0.6 cm.  No hydronephrosis.  Ureteral stent noted.  Subincisional collection measuring 9.5 x 2.6 x 1.4 cm.   Vasculature:   Resistive indices ranged from 0.68 to 0.70.   Main renal artery peak systolic velocity: 204cm/sec with normal waveform.   Renal artery/iliac ratio: 1.2.   The main renal vein is patent.  Elevated velocity at the main renal vein anastomosis measuring 177 cm/sec.   Impression:   1. Elevated velocity at the main renal vein anastomosis.  Otherwise, satisfactory Doppler evaluation of renal allograft.  2. Simple and mildly complex renal cysts.  3. Subincisional fluid collection.       Interval HX:  Intake ~ 2L water daily   UOP:   BP   BP Readings from Last 3 Encounters:   03/22/24 139/80   03/14/24 (!) 148/81   03/14/24 (!) 148/81     Peripheral edema --none --lasix use prn now --only took 2x last week   Weight: has lost some weight since txp   Appetite: good  Wound--reports small  amount drainage   fx assessment over all feeling well, more energy, denies fever or N/V/D, tolerating IS meds well  Lab /diagnostic results reviewed with patient today.   All questions answered      Past Medical History:   Diagnosis Date    Anemia     CVA (cerebral vascular accident)     GERD (gastroesophageal reflux disease)     Hyperlipidemia     Hypertension     IgA nephropathy     Obesity        Review of Systems   Constitutional:  Negative for activity change, appetite change, chills, fatigue, fever and unexpected weight change.   HENT:  Negative for congestion, facial swelling, postnasal drip, rhinorrhea, sinus pressure, sore throat and trouble swallowing.    Eyes:  Negative for pain, redness and visual disturbance.   Respiratory:  Negative for cough, chest tightness, shortness of breath and wheezing.    Cardiovascular: Negative.  Negative for chest pain, palpitations and leg swelling.   Gastrointestinal:  Positive for abdominal distention. Negative for abdominal pain, diarrhea, nausea and vomiting.   Genitourinary:  Negative for dysuria, flank pain and urgency.   Musculoskeletal:  Negative for gait problem, neck pain and neck stiffness.   Skin:  Positive for wound. Negative for rash.   Allergic/Immunologic: Positive for immunocompromised state. Negative for environmental allergies and food allergies.   Neurological:  Negative for dizziness, weakness, light-headedness and headaches.   Psychiatric/Behavioral:  Negative for agitation and confusion. The patient is not nervous/anxious.        Objective:   There were no vitals taken for this visit.body mass index is unknown because there is no height or weight on file.    Physical Exam  Constitutional:       Appearance: Normal appearance. He is well-developed.   HENT:      Head: Normocephalic.      Nose: Nose normal.   Eyes:      Conjunctiva/sclera: Conjunctivae normal.      Pupils: Pupils are equal, round, and reactive to light.   Cardiovascular:      Rate and  "Rhythm: Normal rate and regular rhythm.      Heart sounds: Normal heart sounds.   Pulmonary:      Effort: Pulmonary effort is normal.      Breath sounds: Normal breath sounds.   Abdominal:      General: Bowel sounds are normal. There is distension.      Palpations: Abdomen is soft. There is no hepatomegaly or splenomegaly.      Comments: Staples intact, incision well approximated . Local erythema on incision/staple line, small amount serosanguinous drainage noted on dressing    Musculoskeletal:      Cervical back: Normal range of motion and neck supple.   Skin:     General: Skin is warm and dry.   Neurological:      Mental Status: He is alert and oriented to person, place, and time.   Psychiatric:         Behavior: Behavior normal.         Labs:  Lab Results   Component Value Date    WBC 17.34 (H) 03/25/2024    HGB 11.0 (L) 03/25/2024    HCT 38.2 (L) 03/25/2024     03/25/2024    K 4.5 03/25/2024     (H) 03/25/2024    CO2 23 03/25/2024    BUN 24 (H) 03/25/2024    CREATININE 1.4 03/25/2024    EGFRNORACEVR 56.1 (A) 03/25/2024    GLUCOSE 76 09/22/2020    CALCIUM 8.9 03/25/2024    PHOS 3.5 03/25/2024    MG 1.6 03/25/2024    ALBUMIN 3.1 (L) 03/25/2024    AST 15 02/28/2024    ALT 12 02/28/2024    UTPCR 0.70 (H) 03/11/2024    .0 (H) 05/25/2021    TACROLIMUS 7.5 03/25/2024       No results found for: "EXTANC", "EXTWBC", "EXTSEGS", "EXTPLATELETS", "EXTHEMOGLOBI", "EXTHEMATOCRI", "EXTCREATININ", "EXTSODIUM", "EXTPOTASSIUM", "EXTBUN", "EXTCO2", "EXTCALCIUM", "EXTPHOSPHORU", "EXTGLUCOSE", "EXTALBUMIN", "EXTAST", "EXTALT", "EXTBILITOTAL", "EXTLIPASE", "EXTAMYLASE"    No results found for: "EXTCYCLOSLVL", "EXTSIROLIMUS", "EXTTACROLVL", "EXTPROTCRE", "EXTPTHINTACT", "EXTPROTEINUA", "EXTWBCUA", "EXTRBCUA"    Labs were reviewed with the patient    Assessment:     1. S/P living-donor kidney transplantation    2. Long-term use of immunosuppressant medication    3. At risk for opportunistic infections    4. " Prophylactic immunotherapy    5. IgA nephropathy    6. Stage 2 chronic kidney disease        Plan:    Discussed with Dr Whyte:  Leukocytosis:   UA, urine cx, blood cx    -add kidney US    -f/u txp surgery apt on Wednesday, 3/27/24   Discussed Low sodium diet and to Increase water intake     Only take Lasix if needed    1) s/p living related kidney transplant              - Graft function CKD 3a   -FK Trough 7.5   - cont on FK  3/2   - cont on Cellcept  1000 mg BID   -prednisone  taper   - Bactrim 400/80 mg QD for PCP prophylaxis   -  Valcyte 900 mg QD for CMV prophylaxis  -Will continue to monitor for drug toxicities     Lab Results   Component Value Date    CREATININE 1.4 03/25/2024           Latest Reference Range & Units POD 14,  Kidney-Post 1 Year  03/25/24 07:15   eGFR >60 mL/min/1.73 m^2 56.1 !   !: Data is abnormal        2) Uncontrolled HTN: advise low salt diet and home BP monitoring  -   lasix  BP Readings from Last 3 Encounters:   03/22/24 139/80   03/14/24 (!) 148/81   03/14/24 (!) 148/81        3) Anemia:                - no intervention required ,will continue to monitor/ guidelines     Leukocytosis:  add UA, urine cx, blood cx today  Lab Results   Component Value Date    WBC 17.34 (H) 03/25/2024    HGB 11.0 (L) 03/25/2024    HCT 38.2 (L) 03/25/2024    MCV 92 03/25/2024     03/25/2024          5) Hypophosphatemia:Secondary hyperparathyroidism:  - no intervention required ,will continue to monitor/ guidelines                -continue [facility]     Lab Results   Component Value Date    .0 (H) 05/25/2021    CALCIUM 8.9 03/25/2024    PHOS 3.5 03/25/2024      Latest Reference Range & Units POD 14,  Kidney-Post 1 Year  03/25/24 07:15   Magnesium  1.6 - 2.6 mg/dL 1.6         6) Metabolic acidosis/Electrolyte balance:  - no intervention required ,will continue to monitor/ guidelines    -  low sodium diet, increase water intake   Lab Results   Component Value Date     03/25/2024    K 4.5  03/25/2024     (H) 03/25/2024    CO2 23 03/25/2024         7) hypomagnesemia:  - no intervention required ,will continue to monitor/ guidelines       Latest Reference Range & Units POD 10,  Kidney-Post 1 Year  03/21/24   Magnesium  1.6 - 2.6 mg/dL 1.5 (L)   (L): Data is abnormally low    8) Cytopenias: no significant cytopenias will monitor as per our guidelines. Medicine list reviewed including potential causes of drug-induced cytopenias    9) Proteinuria: continue p/c ratio as per guidelines   Protein Creatinine Ratios:    Creatinine, Urine   Date Value Ref Range Status   03/11/2024 105.0 23.0 - 375.0 mg/dL Final     Protein, Urine Random   Date Value Ref Range Status   03/11/2024 73 (H) 0 - 15 mg/dL Final     Prot/Creat Ratio, Urine   Date Value Ref Range Status   03/11/2024 0.70 (H) 0.00 - 0.20 Final        .     10) BK virus infection screening:  will continue to monitor/ guidelines     11) Weight education: provided during the clinic visit   There is no height or weight on file to calculate BMI.  .     Follow-up:   Clinic: return to transplant clinic weekly for the first month after transplant; every 2 weeks during months 2-3; then at 6-, 9-, 12-, 18-, 24-, and 36- months post-transplant to reassess for complications from immunosuppression toxicity and monitor for rejection.  Annually thereafter.    Labs: since patient remains at high risk for rejection and drug-related complications that warrant close monitoring, labs will be ordered as follows: continue twice weekly CBC, renal panel, and drug level for first month; then same labs once weekly through 3rd month post-transplant.  Urine for UA and protein/creatinine ratio monthly.  Serum BK - PCR at 1-, 3-, 6-, 9-, 12-, 18-, 24-, 36-, 48-, and 60 months post-transplant.  Hepatic panel at 1-, 2-, 3-, 6-, 9-, 12-, 18-, 24-, and 36- months post-transplant.    Florida Jennings NP       Education:   Material provided to the patient.  Patient reminded to call  with any health changes since these can be early signs of significant complications.  Also, I advised the patient to be sure any new medications or changes of old medications are discussed with either a pharmacist or physician knowledgeable with transplant to avoid rejection/drug toxicity related to significant drug interactions.    Patient advised that it is recommended that all transplanted patients, and their close contacts and household members receive Covid vaccination.

## 2024-03-22 ENCOUNTER — HOSPITAL ENCOUNTER (EMERGENCY)
Facility: HOSPITAL | Age: 64
Discharge: HOME | End: 2024-03-22
Attending: EMERGENCY MEDICINE
Payer: COMMERCIAL

## 2024-03-22 VITALS
HEIGHT: 67 IN | TEMPERATURE: 98 F | RESPIRATION RATE: 20 BRPM | HEART RATE: 92 BPM | BODY MASS INDEX: 31.86 KG/M2 | WEIGHT: 203 LBS | SYSTOLIC BLOOD PRESSURE: 139 MMHG | OXYGEN SATURATION: 97 % | DIASTOLIC BLOOD PRESSURE: 80 MMHG

## 2024-03-22 DIAGNOSIS — Z48.89 ENCOUNTER FOR POST SURGICAL WOUND CHECK: Primary | ICD-10-CM

## 2024-03-22 LAB
OHS QRS DURATION: 74 MS
OHS QTC CALCULATION: 421 MS

## 2024-03-22 PROCEDURE — 99282 EMERGENCY DEPT VISIT SF MDM: CPT

## 2024-03-22 NOTE — ED TRIAGE NOTES
Colten Monet, a 63 y.o. male presents to the ED w/ complaint of incision drainage and pain that started on yesterday and states that the pain is getting worse. Patient has now saturated the pad that was placed on today.     Triage note:  Chief Complaint   Patient presents with    Post-op Problem     March 11 kidney transplant , incision leaking and more painful     Review of patient's allergies indicates:   Allergen Reactions    Codeine Hives     Reaction to Tylenol #3     Past Medical History:   Diagnosis Date    Anemia     CVA (cerebral vascular accident)     GERD (gastroesophageal reflux disease)     Hyperlipidemia     Hypertension     IgA nephropathy     Obesity

## 2024-03-22 NOTE — ED PROVIDER NOTES
Encounter Date: 3/22/2024       History     Chief Complaint   Patient presents with    Post-op Problem      kidney transplant , incision leaking and more painful     63-year-old past medical history of CVA, GERD, hyperlipidemia, IgA nephropathy status post kidney transplant 3/11/24 by  presenting with concerns of fluid drainage from postop wound.  Patient mentions yesterday today noted increased clear/red tinged drainage onto worse wound pad.  Patient additionally mentions feeling mild tightness in the area.  Denies any increased pain, nausea, vomiting, diarrhea, change in urinary output, fevers, chills, bleeding or purulent drainage from wound site.  Patient had laboratory tests done yesterday noted for normal kidney function patient had additionally recent follow-up status post transplant appointment was uneventful.      Review of patient's allergies indicates:   Allergen Reactions    Codeine Hives     Reaction to Tylenol #3     Past Medical History:   Diagnosis Date    Anemia     CVA (cerebral vascular accident)     GERD (gastroesophageal reflux disease)     Hyperlipidemia     Hypertension     IgA nephropathy     Obesity      Past Surgical History:   Procedure Laterality Date    APPENDECTOMY      CARDIAC CATHETERIZATION      [facility] 6-7 years ago    KIDNEY TRANSPLANT N/A 3/11/2024    Procedure: TRANSPLANT, KIDNEY;  Surgeon: Silvio Mayer MD;  Location: [facility] OR [address] [place] [address];  Service: Transplant;  Laterality: N/A;    RENAL BIOPSY      TONSILLECTOMY       Family History   Problem Relation Age of Onset    Depression Mother     Kidney disease Neg Hx     Cancer Neg Hx      Social History     Tobacco Use    Smoking status: Former     Current packs/day: 0.00     Types: Cigarettes     Quit date: 2016     Years since quittin.8    Smokeless tobacco: Never   Substance Use Topics    Alcohol use: Not Currently    Drug use: Never     Review of Systems    Physical Exam     Initial Vitals [24 1135]    BP Pulse Resp Temp SpO2   (!) 150/68 102 18 98.1 °F (36.7 °C) 97 %      MAP       --         Physical Exam    Nursing note and vitals reviewed.  Constitutional: He appears well-developed and well-nourished. He is not diaphoretic. No distress.   HENT:   Head: Normocephalic and atraumatic.   Nose: Nose normal.   Eyes: Conjunctivae and EOM are normal. Pupils are equal, round, and reactive to light. No scleral icterus.   Neck: Neck supple.   Normal range of motion.  Cardiovascular:  Normal rate and regular rhythm.     Exam reveals no gallop and no friction rub.       No murmur heard.  Pulmonary/Chest: Breath sounds normal. No respiratory distress. He has no wheezes. He has no rhonchi. He has no rales.   Abdominal: Abdomen is soft. Bowel sounds are normal. There is no abdominal tenderness.   Wound postop appears clean dry and intact no bleeding or purulent drainage.  No noted dehiscence noted ecchymoses right lower quadrant at various stages of healing.  No severe tenderness to palpation There is no rebound and no guarding.   Musculoskeletal:         General: No tenderness or edema. Normal range of motion.      Cervical back: Normal range of motion and neck supple.     Neurological: He is alert and oriented to person, place, and time. He has normal strength. No sensory deficit. GCS score is 15. GCS eye subscore is 4. GCS verbal subscore is 5. GCS motor subscore is 6.   Skin: Skin is warm and dry. No rash noted. No erythema. No pallor.   Psychiatric: He has a normal mood and affect. His behavior is normal. Judgment and thought content normal.         ED Course   Procedures  Labs Reviewed - No data to display       Imaging Results    None          Medications - No data to display  Medical Decision Making  63-year-old past medical history of recent kidney transplant presenting with postop wound evaluation.    DX includes normal expected postop healing, lower likelihood of cellulitis, postop infection at this  time.    Plan: Will contact kidney transplant team continue reassess.               ED Course as of 03/22/24 1224   Fri Mar 22, 2024   1221 Patient seen by transplant team will DC home Wound appears normal postop wound healing patient safe for plan discharge home without further questions. [DC]      ED Course User Index  [DC] Harvey Nielsen Jr., MD                           Clinical Impression:  Final diagnoses:  [Z48.89] Encounter for post surgical wound check (Primary)          ED Disposition Condition    Discharge Stable          ED Prescriptions    None       Follow-up Information       Follow up With Specialties Details Why Contact Info    [facility] - Emergency Dept Emergency Medicine  If symptoms worsen [address]  [facility] [address]  [phone]             Harvey Nielsen Jr., MD  03/22/24 1225

## 2024-03-23 ENCOUNTER — TELEPHONE (OUTPATIENT)
Dept: TRANSPLANT | Facility: CLINIC | Age: 64
End: 2024-03-23
Payer: COMMERCIAL

## 2024-03-23 ENCOUNTER — LAB VISIT (OUTPATIENT)
Dept: LAB | Facility: HOSPITAL | Age: 64
End: 2024-03-23
Payer: COMMERCIAL

## 2024-03-23 DIAGNOSIS — Z94.0 KIDNEY REPLACED BY TRANSPLANT: ICD-10-CM

## 2024-03-23 LAB — TACROLIMUS BLD-MCNC: 8.2 NG/ML (ref 5–15)

## 2024-03-23 PROCEDURE — 36415 COLL VENOUS BLD VENIPUNCTURE: CPT | Performed by: INTERNAL MEDICINE

## 2024-03-23 PROCEDURE — 80197 ASSAY OF TACROLIMUS: CPT | Performed by: INTERNAL MEDICINE

## 2024-03-23 NOTE — TELEPHONE ENCOUNTER
----- Message from Michelle Lundberg MD sent at 3/23/2024  1:24 PM CDT -----  Level acceptable; no change.

## 2024-03-25 ENCOUNTER — HOSPITAL ENCOUNTER (OUTPATIENT)
Dept: RADIOLOGY | Facility: HOSPITAL | Age: 64
Discharge: HOME | End: 2024-03-25
Attending: NURSE PRACTITIONER
Payer: COMMERCIAL

## 2024-03-25 ENCOUNTER — TELEPHONE (OUTPATIENT)
Dept: TRANSPLANT | Facility: CLINIC | Age: 64
End: 2024-03-25

## 2024-03-25 ENCOUNTER — OFFICE VISIT (OUTPATIENT)
Dept: TRANSPLANT | Facility: CLINIC | Age: 64
End: 2024-03-25
Payer: COMMERCIAL

## 2024-03-25 VITALS
RESPIRATION RATE: 18 BRPM | BODY MASS INDEX: 32.11 KG/M2 | OXYGEN SATURATION: 96 % | HEIGHT: 67 IN | SYSTOLIC BLOOD PRESSURE: 129 MMHG | HEART RATE: 96 BPM | TEMPERATURE: 97 F | WEIGHT: 204.56 LBS | DIASTOLIC BLOOD PRESSURE: 75 MMHG

## 2024-03-25 DIAGNOSIS — N18.2 STAGE 2 CHRONIC KIDNEY DISEASE: ICD-10-CM

## 2024-03-25 DIAGNOSIS — Z79.60 LONG-TERM USE OF IMMUNOSUPPRESSANT MEDICATION: ICD-10-CM

## 2024-03-25 DIAGNOSIS — D72.829 LEUKOCYTOSIS, UNSPECIFIED TYPE: ICD-10-CM

## 2024-03-25 DIAGNOSIS — N02.B9 IGA NEPHROPATHY: ICD-10-CM

## 2024-03-25 DIAGNOSIS — Z91.89 AT RISK FOR OPPORTUNISTIC INFECTIONS: ICD-10-CM

## 2024-03-25 DIAGNOSIS — Z94.0 S/P LIVING-DONOR KIDNEY TRANSPLANTATION: ICD-10-CM

## 2024-03-25 DIAGNOSIS — Z29.89 PROPHYLACTIC IMMUNOTHERAPY: ICD-10-CM

## 2024-03-25 DIAGNOSIS — Z94.0 S/P LIVING-DONOR KIDNEY TRANSPLANTATION: Primary | ICD-10-CM

## 2024-03-25 PROCEDURE — 3066F NEPHROPATHY DOC TX: CPT | Mod: CPTII,S$GLB,, | Performed by: NURSE PRACTITIONER

## 2024-03-25 PROCEDURE — 87086 URINE CULTURE/COLONY COUNT: CPT | Performed by: NURSE PRACTITIONER

## 2024-03-25 PROCEDURE — 1159F MED LIST DOCD IN RCRD: CPT | Mod: CPTII,S$GLB,, | Performed by: NURSE PRACTITIONER

## 2024-03-25 PROCEDURE — 3074F SYST BP LT 130 MM HG: CPT | Mod: CPTII,S$GLB,, | Performed by: NURSE PRACTITIONER

## 2024-03-25 PROCEDURE — 1111F DSCHRG MED/CURRENT MED MERGE: CPT | Mod: CPTII,S$GLB,, | Performed by: NURSE PRACTITIONER

## 2024-03-25 PROCEDURE — 76776 US EXAM K TRANSPL W/DOPPLER: CPT | Mod: TC

## 2024-03-25 PROCEDURE — 4010F ACE/ARB THERAPY RXD/TAKEN: CPT | Mod: CPTII,S$GLB,, | Performed by: NURSE PRACTITIONER

## 2024-03-25 PROCEDURE — 3008F BODY MASS INDEX DOCD: CPT | Mod: CPTII,S$GLB,, | Performed by: NURSE PRACTITIONER

## 2024-03-25 PROCEDURE — 76776 US EXAM K TRANSPL W/DOPPLER: CPT | Mod: 26,,, | Performed by: RADIOLOGY

## 2024-03-25 PROCEDURE — 81001 URINALYSIS AUTO W/SCOPE: CPT | Performed by: NURSE PRACTITIONER

## 2024-03-25 PROCEDURE — 99999 PR PBB SHADOW E&M-EST. PATIENT-LVL V: CPT | Mod: PBBFAC,,, | Performed by: NURSE PRACTITIONER

## 2024-03-25 PROCEDURE — 3078F DIAST BP <80 MM HG: CPT | Mod: CPTII,S$GLB,, | Performed by: NURSE PRACTITIONER

## 2024-03-25 PROCEDURE — 99215 OFFICE O/P EST HI 40 MIN: CPT | Mod: S$GLB,,, | Performed by: NURSE PRACTITIONER

## 2024-03-25 NOTE — TELEPHONE ENCOUNTER
Addressed in clinic by markus    ----- Message from Claudia Whyte MD sent at 3/25/2024  9:13 AM CDT -----  Cr slowly trending up. Encourage more fluid intake   WBC trending up? Please UA, urine culture. Is he ill?  High Na and Cl: avoid high salty food and encourage more fluid intake

## 2024-03-25 NOTE — PROGRESS NOTES
Transplant Coordinator  3/10-3/13/24    Pt admitted for a LRD kidney transplant from his sister. CKD 2/2  IgA nephropathy. Thymo induction. CMV D-, R+    Cr trending down.Good UOP   RLQ incision ANN with staples    He has a history of COPD, and will need to follow up with his outpatient pulmonologist.  Labs 3/14/24 and RTC to meet with coordinator/pharmD

## 2024-03-25 NOTE — LETTER
March 25, 2024        Colby Rene  [address] [place] [address] [address]  [place] [address]  Phone: [phone]  Fax: [phone]             [facility]- Transplant 1st Fl  [address]  [facility] [address]  Phone: [phone]   Patient: Colten Monet   MR Number: [id number]   YOB: 1960   Date of Visit: 3/25/2024       Dear Dr. Colby Rene    Thank you for referring Colten Monet to me for evaluation. Attached you will find relevant portions of my assessment and plan of care.    If you have questions, please do not hesitate to call me. I look forward to following Colten Monet along with you.    Sincerely,    [place] Jennings, NP    Enclosure    If you would like to receive this communication electronically, please contact [email or web address] or [phone] to request mobintent Link access.    mobintent Link is a tool which provides read-only access to select patient information with whom you have a relationship. Its easy to use and provides real time access to review your patients record including encounter summaries, notes, results, and demographic information.    If you feel you have received this communication in error or would no longer like to receive these types of communications, please e-mail [email or web address]

## 2024-03-26 LAB
BACTERIA #/AREA URNS AUTO: ABNORMAL /HPF
BILIRUB UR QL STRIP: NEGATIVE
CLARITY UR REFRACT.AUTO: CLEAR
COLOR UR AUTO: YELLOW
GLUCOSE UR QL STRIP: NEGATIVE
HGB UR QL STRIP: NEGATIVE
HYALINE CASTS UR QL AUTO: 0 /LPF
KETONES UR QL STRIP: NEGATIVE
LEUKOCYTE ESTERASE UR QL STRIP: NEGATIVE
MICROSCOPIC COMMENT: ABNORMAL
NITRITE UR QL STRIP: NEGATIVE
PH UR STRIP: 6 [PH] (ref 5–8)
PROT UR QL STRIP: ABNORMAL
RBC #/AREA URNS AUTO: 16 /HPF (ref 0–4)
SP GR UR STRIP: 1.02 (ref 1–1.03)
URN SPEC COLLECT METH UR: ABNORMAL
WBC #/AREA URNS AUTO: 3 /HPF (ref 0–5)

## 2024-03-27 ENCOUNTER — OFFICE VISIT (OUTPATIENT)
Dept: TRANSPLANT | Facility: CLINIC | Age: 64
End: 2024-03-27
Payer: COMMERCIAL

## 2024-03-27 VITALS
HEIGHT: 67 IN | OXYGEN SATURATION: 95 % | HEART RATE: 117 BPM | DIASTOLIC BLOOD PRESSURE: 69 MMHG | RESPIRATION RATE: 18 BRPM | TEMPERATURE: 98 F | SYSTOLIC BLOOD PRESSURE: 140 MMHG | WEIGHT: 203.5 LBS | BODY MASS INDEX: 31.94 KG/M2

## 2024-03-27 DIAGNOSIS — Z29.89 PROPHYLACTIC IMMUNOTHERAPY: Primary | ICD-10-CM

## 2024-03-27 DIAGNOSIS — Z94.0 S/P KIDNEY TRANSPLANT: ICD-10-CM

## 2024-03-27 DIAGNOSIS — Z79.60 LONG-TERM USE OF IMMUNOSUPPRESSANT MEDICATION: ICD-10-CM

## 2024-03-27 LAB — BACTERIA UR CULT: NORMAL

## 2024-03-27 PROCEDURE — 99213 OFFICE O/P EST LOW 20 MIN: CPT | Mod: 24,S$GLB,, | Performed by: TRANSPLANT SURGERY

## 2024-03-27 PROCEDURE — 99999 PR PBB SHADOW E&M-EST. PATIENT-LVL IV: CPT | Mod: PBBFAC,,, | Performed by: TRANSPLANT SURGERY

## 2024-03-27 NOTE — LETTER
March 27, 2024        Colby Rene  [address] [place] [address] [address]  [place] [address]  Phone: [phone]  Fax: [phone]             [facility]- Transplant 1st Fl  [address]  [facility] [address]  Phone: [phone]   Patient: Colten Monet   MR Number: [id number]   YOB: 1960   Date of Visit: 3/27/2024       Dear Dr. Colby Rene    Thank you for referring Colten Monet to me for evaluation. Attached you will find relevant portions of my assessment and plan of care.    If you have questions, please do not hesitate to call me. I look forward to following Colten Monet along with you.    Sincerely,    Lorenzo Gonzales MD    Enclosure    If you would like to receive this communication electronically, please contact [email or web address] or [phone] to request Editlite Link access.    Editlite Link is a tool which provides read-only access to select patient information with whom you have a relationship. Its easy to use and provides real time access to review your patients record including encounter summaries, notes, results, and demographic information.    If you feel you have received this communication in error or would no longer like to receive these types of communications, please e-mail [email or web address]

## 2024-03-27 NOTE — PROGRESS NOTES
Transplant Surgery  Kidney Transplant Recipient Follow-up    Referring Physician: Colby Rene  Current Nephrologist: Colby Rene    Subjective:     Chief Complaint: Colten Monet is a 63 y.o. year old White male who is status post Kidney transplant performed on 3/11/2024.    ORGAN: LEFT KIDNEY   Disease Etiology: IgA Nephropathy  Donor Type: Living   Donor CMV Status:    Donor HBcAB:    Donor HCV Status:      History of Present Illness: He reports  incisional pain and serous drainage .  From a transplant perspective, he reports normal urination.  Colten is here for management of his immunosuppression medication.  Colten states that his immunosuppression is being well tolerated.  Hypertension is managed by nephrology.    External provider notes reviewed: No    Review of Systems   Constitutional: Negative.  Negative for fatigue and fever.   HENT:  Negative for hearing loss, nosebleeds and sinus pressure.    Eyes:  Negative for photophobia and visual disturbance.   Respiratory:  Negative for cough, shortness of breath, wheezing and stridor.    Cardiovascular:  Negative for chest pain, palpitations and leg swelling.   Gastrointestinal:  Negative for abdominal distention, blood in stool, constipation, diarrhea and nausea.   Endocrine: Negative for polydipsia and polyphagia.   Genitourinary:  Negative for dysuria, flank pain and hematuria.   Musculoskeletal:  Negative for arthralgias, joint swelling and myalgias.   Skin:  Negative for color change and rash.   Neurological:  Negative for dizziness, tremors, seizures, syncope, weakness and numbness.   Hematological:  Negative for adenopathy. Does not bruise/bleed easily.   Psychiatric/Behavioral:  Negative for behavioral problems, confusion, decreased concentration, dysphoric mood and hallucinations.        Objective:     Physical Exam  Vitals and nursing note reviewed.   Constitutional:       Appearance: He is well-developed.   HENT:      Head: Normocephalic and  atraumatic.      Mouth/Throat:      Pharynx: No oropharyngeal exudate.   Eyes:      General: No scleral icterus.     Pupils: Pupils are equal, round, and reactive to light.   Neck:      Thyroid: No thyromegaly.      Vascular: No JVD.      Trachea: No tracheal deviation.   Cardiovascular:      Rate and Rhythm: Normal rate and regular rhythm.      Heart sounds: Normal heart sounds.   Pulmonary:      Effort: Pulmonary effort is normal. No respiratory distress.      Breath sounds: Normal breath sounds. No stridor. No wheezing or rales.   Chest:      Chest wall: No tenderness.   Abdominal:      General: Bowel sounds are normal. There is no distension.      Palpations: Abdomen is soft. There is no mass.      Tenderness: There is no abdominal tenderness. There is no rebound.          Comments: No hematoma or cellulitis; staples intact; no palpable fluid collection   Musculoskeletal:         General: No tenderness. Normal range of motion.      Cervical back: Normal range of motion.   Lymphadenopathy:      Cervical: No cervical adenopathy.   Skin:     General: Skin is warm and dry.      Findings: No erythema or rash.   Neurological:      Mental Status: He is alert and oriented to person, place, and time.   Psychiatric:         Behavior: Behavior normal.       Lab Results   Component Value Date    CREATININE 1.4 03/25/2024    BUN 24 (H) 03/25/2024     Lab Results   Component Value Date    WBC 17.34 (H) 03/25/2024    HGB 11.0 (L) 03/25/2024    HCT 38.2 (L) 03/25/2024    HCT 32 (L) 03/11/2024     03/25/2024     Lab Results   Component Value Date    TACROLIMUS 7.5 03/25/2024       Diagnostics:  The following labs have been reviewed: CBC  CMP  TACROLIMUS LEVEL  The following radiology images have been independently reviewed and interpreted: Renal US    Assessment and Plan:        S/P Kidney transplant.  Chronic immunosuppressive medications for rejection prophylaxis at target.  Plan: no adjustment needed.  Continue  monitoring symptoms, labs and drug levels for drug-related toxicity and side effects.  Renal hypertension per neph.    Additional testing to be completed according to the Kidney: Written Order Guideline for Kidney Transplant Follow-Up (KI-09)    Interpretation of tests and discussion of patient management involves all members of the multidisciplinary transplant team.  Patient advised that it is recommended that all transplant candidates, and their close contacts and household members receive Covid vaccination.  Follow-up: Patient reminded to call with any health changes, since these can be early signs of significant complications.  Also, I advised the patient to be sure any new medications or changes of old medications are discussed with either a pharmacist, or physician knowledgeable with transplant to avoid rejection/drug toxicity related to significant drug interactions.    Lorenzo Gonzales MD       [facility] Patient Status  Functional Status: 80% - Normal activity with effort: some symptoms of disease  Physical Capacity: No Limitations

## 2024-03-28 ENCOUNTER — LAB VISIT (OUTPATIENT)
Dept: LAB | Facility: HOSPITAL | Age: 64
End: 2024-03-28
Attending: INTERNAL MEDICINE
Payer: COMMERCIAL

## 2024-03-28 DIAGNOSIS — E87.5 HYPERKALEMIA: Primary | ICD-10-CM

## 2024-03-28 DIAGNOSIS — Z94.0 KIDNEY REPLACED BY TRANSPLANT: ICD-10-CM

## 2024-03-28 DIAGNOSIS — E83.42 HYPOMAGNESEMIA: ICD-10-CM

## 2024-03-28 LAB
ALBUMIN SERPL BCP-MCNC: 3.2 G/DL (ref 3.5–5.2)
ANION GAP SERPL CALC-SCNC: 7 MMOL/L (ref 8–16)
BASOPHILS # BLD AUTO: 0.12 K/UL (ref 0–0.2)
BASOPHILS NFR BLD: 0.9 % (ref 0–1.9)
BUN SERPL-MCNC: 22 MG/DL (ref 8–23)
CALCIUM SERPL-MCNC: 9.2 MG/DL (ref 8.7–10.5)
CHLORIDE SERPL-SCNC: 112 MMOL/L (ref 95–110)
CO2 SERPL-SCNC: 22 MMOL/L (ref 23–29)
CREAT SERPL-MCNC: 1.3 MG/DL (ref 0.5–1.4)
DIFFERENTIAL METHOD BLD: ABNORMAL
EOSINOPHIL # BLD AUTO: 0.4 K/UL (ref 0–0.5)
EOSINOPHIL NFR BLD: 3.2 % (ref 0–8)
ERYTHROCYTE [DISTWIDTH] IN BLOOD BY AUTOMATED COUNT: 13.5 % (ref 11.5–14.5)
EST. GFR  (NO RACE VARIABLE): >60 ML/MIN/1.73 M^2
GLUCOSE SERPL-MCNC: 83 MG/DL (ref 70–110)
HCT VFR BLD AUTO: 38.9 % (ref 40–54)
HGB BLD-MCNC: 11.6 G/DL (ref 14–18)
IMM GRANULOCYTES # BLD AUTO: 0.17 K/UL (ref 0–0.04)
IMM GRANULOCYTES NFR BLD AUTO: 1.3 % (ref 0–0.5)
LYMPHOCYTES # BLD AUTO: 1.7 K/UL (ref 1–4.8)
LYMPHOCYTES NFR BLD: 12.7 % (ref 18–48)
MAGNESIUM SERPL-MCNC: 1.6 MG/DL (ref 1.6–2.6)
MCH RBC QN AUTO: 27 PG (ref 27–31)
MCHC RBC AUTO-ENTMCNC: 29.8 G/DL (ref 32–36)
MCV RBC AUTO: 91 FL (ref 82–98)
MONOCYTES # BLD AUTO: 0.6 K/UL (ref 0.3–1)
MONOCYTES NFR BLD: 4.6 % (ref 4–15)
NEUTROPHILS # BLD AUTO: 10.3 K/UL (ref 1.8–7.7)
NEUTROPHILS NFR BLD: 77.3 % (ref 38–73)
NRBC BLD-RTO: 0 /100 WBC
PHOSPHATE SERPL-MCNC: 3.4 MG/DL (ref 2.7–4.5)
PLATELET # BLD AUTO: 311 K/UL (ref 150–450)
PMV BLD AUTO: 9.7 FL (ref 9.2–12.9)
POTASSIUM SERPL-SCNC: 5.3 MMOL/L (ref 3.5–5.1)
RBC # BLD AUTO: 4.29 M/UL (ref 4.6–6.2)
SODIUM SERPL-SCNC: 141 MMOL/L (ref 136–145)
TACROLIMUS BLD-MCNC: 9.2 NG/ML (ref 5–15)
WBC # BLD AUTO: 13.29 K/UL (ref 3.9–12.7)

## 2024-03-28 PROCEDURE — 80069 RENAL FUNCTION PANEL: CPT | Performed by: INTERNAL MEDICINE

## 2024-03-28 PROCEDURE — 85025 COMPLETE CBC W/AUTO DIFF WBC: CPT | Performed by: INTERNAL MEDICINE

## 2024-03-28 PROCEDURE — 83735 ASSAY OF MAGNESIUM: CPT | Performed by: INTERNAL MEDICINE

## 2024-03-28 PROCEDURE — 80197 ASSAY OF TACROLIMUS: CPT | Performed by: INTERNAL MEDICINE

## 2024-03-28 PROCEDURE — 36415 COLL VENOUS BLD VENIPUNCTURE: CPT | Performed by: INTERNAL MEDICINE

## 2024-03-28 RX ORDER — SODIUM POLYSTYRENE SULFONATE 4.1 MEQ/G
30 POWDER, FOR SUSPENSION ORAL; RECTAL DAILY
Qty: 60 G | Refills: 0 | Status: SHIPPED | OUTPATIENT
Start: 2024-03-28 | End: 2024-03-30

## 2024-03-28 NOTE — TELEPHONE ENCOUNTER
Called pt and reviewed dangers of hyperkalemia; instructed pt to take 30g kayexalate daily x 2days and to practice low potasium diet. Encouraged increase water intake. Pt verbalized understanding.   ----- Message from Claudia Whyte MD sent at 3/28/2024  9:11 AM CDT -----  High K: low K diet. More water intake. Kayexalate 30 gram daily X 2 days. Pending tac level

## 2024-04-01 ENCOUNTER — CLINICAL SUPPORT (OUTPATIENT)
Dept: INFECTIOUS DISEASES | Facility: CLINIC | Age: 64
End: 2024-04-01
Payer: COMMERCIAL

## 2024-04-01 ENCOUNTER — LAB VISIT (OUTPATIENT)
Dept: LAB | Facility: HOSPITAL | Age: 64
End: 2024-04-01
Attending: INTERNAL MEDICINE
Payer: COMMERCIAL

## 2024-04-01 DIAGNOSIS — E83.42 HYPOMAGNESEMIA: ICD-10-CM

## 2024-04-01 DIAGNOSIS — Z76.82 PATIENT ON WAITING LIST FOR KIDNEY TRANSPLANT: Primary | ICD-10-CM

## 2024-04-01 DIAGNOSIS — Z94.0 STATUS POST KIDNEY TRANSPLANT: ICD-10-CM

## 2024-04-01 DIAGNOSIS — E83.42 HYPOMAGNESEMIA: Primary | ICD-10-CM

## 2024-04-01 DIAGNOSIS — Z94.0 KIDNEY REPLACED BY TRANSPLANT: ICD-10-CM

## 2024-04-01 LAB
BASOPHILS # BLD AUTO: 0.1 K/UL (ref 0–0.2)
BASOPHILS NFR BLD: 0.8 % (ref 0–1.9)
DIFFERENTIAL METHOD BLD: ABNORMAL
EOSINOPHIL # BLD AUTO: 0.3 K/UL (ref 0–0.5)
EOSINOPHIL NFR BLD: 2.7 % (ref 0–8)
ERYTHROCYTE [DISTWIDTH] IN BLOOD BY AUTOMATED COUNT: 13.9 % (ref 11.5–14.5)
FUNGUS SPEC CULT: NORMAL
HCT VFR BLD AUTO: 37.3 % (ref 40–54)
HGB BLD-MCNC: 11.3 G/DL (ref 14–18)
IMM GRANULOCYTES # BLD AUTO: 0.18 K/UL (ref 0–0.04)
IMM GRANULOCYTES NFR BLD AUTO: 1.5 % (ref 0–0.5)
LYMPHOCYTES # BLD AUTO: 1.7 K/UL (ref 1–4.8)
LYMPHOCYTES NFR BLD: 14.2 % (ref 18–48)
MCH RBC QN AUTO: 27.4 PG (ref 27–31)
MCHC RBC AUTO-ENTMCNC: 30.3 G/DL (ref 32–36)
MCV RBC AUTO: 91 FL (ref 82–98)
MONOCYTES # BLD AUTO: 0.7 K/UL (ref 0.3–1)
MONOCYTES NFR BLD: 5.7 % (ref 4–15)
NEUTROPHILS # BLD AUTO: 9.1 K/UL (ref 1.8–7.7)
NEUTROPHILS NFR BLD: 75.1 % (ref 38–73)
NRBC BLD-RTO: 0 /100 WBC
PLATELET # BLD AUTO: 278 K/UL (ref 150–450)
PMV BLD AUTO: 9.5 FL (ref 9.2–12.9)
RBC # BLD AUTO: 4.12 M/UL (ref 4.6–6.2)
TACROLIMUS BLD-MCNC: 10.5 NG/ML (ref 5–15)
WBC # BLD AUTO: 12.1 K/UL (ref 3.9–12.7)

## 2024-04-01 PROCEDURE — 36415 COLL VENOUS BLD VENIPUNCTURE: CPT | Mod: XB | Performed by: INTERNAL MEDICINE

## 2024-04-01 PROCEDURE — 80197 ASSAY OF TACROLIMUS: CPT | Performed by: INTERNAL MEDICINE

## 2024-04-01 PROCEDURE — 85025 COMPLETE CBC W/AUTO DIFF WBC: CPT | Performed by: INTERNAL MEDICINE

## 2024-04-01 PROCEDURE — 90739 HEPB VACC 2/4 DOSE ADULT IM: CPT | Mod: S$GLB,,, | Performed by: INTERNAL MEDICINE

## 2024-04-01 PROCEDURE — 90471 IMMUNIZATION ADMIN: CPT | Mod: S$GLB,,, | Performed by: INTERNAL MEDICINE

## 2024-04-01 PROCEDURE — 99999 PR PBB SHADOW E&M-EST. PATIENT-LVL I: CPT | Mod: PBBFAC,,,

## 2024-04-01 RX ORDER — TACROLIMUS 1 MG/1
2 CAPSULE ORAL EVERY 12 HOURS
Qty: 120 CAPSULE | Refills: 11 | Status: SHIPPED | OUTPATIENT
Start: 2024-04-01 | End: 2024-04-04

## 2024-04-01 NOTE — TELEPHONE ENCOUNTER
Called and spoke with patient about returning to labs for renal panel. Instructed pt to lower prograf dose to 2mg twice a day. Pt repeated instructions and verbalized understanding.   ----- Message from Candido Arnold MD sent at 4/1/2024  9:15 AM CDT -----  Lets lower prograf to 2 mg PO bid

## 2024-04-01 NOTE — TELEPHONE ENCOUNTER
Called and spoke with patient about starting MagOx 400mg BID. Instructed pt to separate MagOx from his other medicines to optimize absorption. Pt verbalized understanding. Prescription sent to local Pharmacy.   ----- Message from Candido Arnold MD sent at 4/1/2024  3:19 PM CDT -----  Lets start magnesium oxide tablets 400 mg PO bid

## 2024-04-02 RX ORDER — LANOLIN ALCOHOL/MO/W.PET/CERES
400 CREAM (GRAM) TOPICAL 2 TIMES DAILY
Qty: 60 TABLET | Refills: 11 | Status: ON HOLD | OUTPATIENT
Start: 2024-04-02 | End: 2024-04-24 | Stop reason: HOSPADM

## 2024-04-03 ENCOUNTER — OFFICE VISIT (OUTPATIENT)
Dept: TRANSPLANT | Facility: CLINIC | Age: 64
End: 2024-04-03
Payer: COMMERCIAL

## 2024-04-03 ENCOUNTER — TELEPHONE (OUTPATIENT)
Dept: UROLOGY | Facility: CLINIC | Age: 64
End: 2024-04-03

## 2024-04-03 VITALS
BODY MASS INDEX: 31.66 KG/M2 | SYSTOLIC BLOOD PRESSURE: 160 MMHG | OXYGEN SATURATION: 96 % | RESPIRATION RATE: 18 BRPM | DIASTOLIC BLOOD PRESSURE: 79 MMHG | HEIGHT: 67 IN | WEIGHT: 201.75 LBS | HEART RATE: 108 BPM

## 2024-04-03 DIAGNOSIS — L03.311 CELLULITIS, ABDOMINAL WALL: ICD-10-CM

## 2024-04-03 DIAGNOSIS — Z79.60 LONG-TERM USE OF IMMUNOSUPPRESSANT MEDICATION: ICD-10-CM

## 2024-04-03 DIAGNOSIS — Z29.89 PROPHYLACTIC IMMUNOTHERAPY: ICD-10-CM

## 2024-04-03 DIAGNOSIS — S30.1XXA ABDOMINAL WALL SEROMA, INITIAL ENCOUNTER: ICD-10-CM

## 2024-04-03 DIAGNOSIS — Z94.0 S/P LIVING-DONOR KIDNEY TRANSPLANTATION: Primary | ICD-10-CM

## 2024-04-03 DIAGNOSIS — Z91.89 AT RISK FOR OPPORTUNISTIC INFECTIONS: ICD-10-CM

## 2024-04-03 DIAGNOSIS — Z51.81 ENCOUNTER FOR THERAPEUTIC DRUG MONITORING: ICD-10-CM

## 2024-04-03 PROCEDURE — 99214 OFFICE O/P EST MOD 30 MIN: CPT | Mod: 24,S$GLB,, | Performed by: SURGERY

## 2024-04-03 PROCEDURE — 99999 PR PBB SHADOW E&M-EST. PATIENT-LVL IV: CPT | Mod: PBBFAC,,, | Performed by: SURGERY

## 2024-04-03 RX ORDER — DOXYCYCLINE 100 MG/1
100 CAPSULE ORAL EVERY 12 HOURS
Qty: 14 CAPSULE | Refills: 0 | Status: ON HOLD | OUTPATIENT
Start: 2024-04-03 | End: 2024-04-24 | Stop reason: HOSPADM

## 2024-04-03 NOTE — TELEPHONE ENCOUNTER
Confirmed appt with pt. Pt has bloodwork in the AM. Pt will come over after bloodwork to see if we can pull the stent at that time since [place] lives out of town

## 2024-04-03 NOTE — PROGRESS NOTES
Transplant Surgery  Kidney Transplant Recipient Follow-up    Referring Physician: Colby Rene  Current Nephrologist: Colby Rene    Subjective:     Chief Complaint: Colten Monet is a 64 y.o. year old White male who is status post Kidney transplant performed on 3/11/2024.    ORGAN: LEFT KIDNEY   Disease Etiology: IgA Nephropathy  Donor Type: Living   Donor CMV Status:    Donor HBcAB:    Donor HCV Status:      History of Present Illness: He reports  incisional drainage . Incision has been draining serous fluid since transplant. He denies fevers, chills, nausea, vomiting. Has occasional incisional tenderness with certain movements but overall feels well. Cr normal with good UOP. Last kidney US with 10cm incisional fluid collection.    From a transplant perspective, he reports normal urination and no dysuria.  Colten is here for management of his immunosuppression medication.  Colten states that his immunosuppression is being well tolerated.  Hypertension is is reportedly well controlled.    External provider notes reviewed: Yes    Review of Systems   Constitutional: Negative.    HENT: Negative.     Eyes: Negative.    Respiratory: Negative.     Cardiovascular: Negative.    Gastrointestinal:  Positive for abdominal pain.   Endocrine: Negative.    Genitourinary: Negative.    Musculoskeletal: Negative.    Skin:  Positive for wound.   Allergic/Immunologic: Positive for immunocompromised state.   Neurological: Negative.    Hematological: Negative.    Psychiatric/Behavioral: Negative.         Objective:     Physical Exam  Vitals reviewed.   Constitutional:       Appearance: Normal appearance.   HENT:      Head: Normocephalic and atraumatic.      Mouth/Throat:      Mouth: Mucous membranes are moist.      Pharynx: Oropharynx is clear.   Eyes:      Extraocular Movements: Extraocular movements intact.      Pupils: Pupils are equal, round, and reactive to light.   Cardiovascular:      Rate and Rhythm: Normal rate.       Pulses: Normal pulses.   Pulmonary:      Effort: Pulmonary effort is normal. No respiratory distress.   Abdominal:      General: There is no distension.      Palpations: Abdomen is soft. There is no mass.      Tenderness: There is abdominal tenderness. There is no guarding or rebound.      Hernia: No hernia is present.          Comments: RLQ incision with staples intact with minimal tenderness to deep palpation. There is mild blanching erythema present around superior half of incision but no induration present. Incision probed with 200cc of serous fluid drained; no purulence or bleeding present. No palpable hernias or masses.   Musculoskeletal:         General: No swelling.      Cervical back: Normal range of motion and neck supple.      Right lower leg: No edema.      Left lower leg: No edema.   Skin:     General: Skin is warm and dry.   Neurological:      Mental Status: He is alert and oriented to person, place, and time. Mental status is at baseline.   Psychiatric:         Mood and Affect: Mood normal.         Behavior: Behavior normal.       Lab Results   Component Value Date    CREATININE 1.2 04/01/2024    BUN 19 04/01/2024     Lab Results   Component Value Date    WBC 12.10 04/01/2024    HGB 11.3 (L) 04/01/2024    HCT 37.3 (L) 04/01/2024    HCT 32 (L) 03/11/2024     04/01/2024     Lab Results   Component Value Date    TACROLIMUS 10.5 04/01/2024       Diagnostics:  The following labs have been reviewed: CBC  BMP  TACROLIMUS LEVEL  The following radiology images have been independently reviewed and interpreted: Renal US    Assessment and Plan:        S/P Kidney transplant.  Chronic immunosuppressive medications for rejection prophylaxis supratherapeutic.  Plan: no adjustment needed; dosage adjusted Monday by Dr. Arnold  Continue monitoring symptoms, labs and drug levels for drug-related toxicity and side effects.  Renal hypertension at target.  Incisional seroma drained bedside; staples are not yet ready  to be removed. Will prescribe 1 week of doxycycline for incisional cellulitis (photo of incision in media) and return to clinic in 1 week to evaluate wound and consider staple removal. Strict return precautions provided and all questions addressed and answered.    Additional testing to be completed according to the Kidney: Written Order Guideline for Kidney Transplant Follow-Up (KI-09)    Interpretation of tests and discussion of patient management involves all members of the multidisciplinary transplant team.  Patient advised that it is recommended that all transplant candidates, and their close contacts and household members receive Covid vaccination.  Follow-up: Patient reminded to call with any health changes, since these can be early signs of significant complications.  Also, I advised the patient to be sure any new medications or changes of old medications are discussed with either a pharmacist, or physician knowledgeable with transplant to avoid rejection/drug toxicity related to significant drug interactions.    MD RYDER Grider Patient Status  Functional Status: 80% - Normal activity with effort: some symptoms of disease  Physical Capacity: No Limitations

## 2024-04-03 NOTE — LETTER
April 3, 2024        Colby Rene  [address] [place] [address] [address]  [place] [address]  Phone: [phone]  Fax: [phone]             [facility]- Transplant 1st Fl  [address]  [facility] [address]  Phone: [phone]   Patient: Colten Monet   MR Number: [id number]   YOB: 1960   Date of Visit: 4/3/2024       Dear Dr. Colby Rene    Thank you for referring Colten Monet to me for evaluation. Attached you will find relevant portions of my assessment and plan of care.    If you have questions, please do not hesitate to call me. I look forward to following Colten Monet along with you.    Sincerely,    Silvio Mayer MD    Enclosure    If you would like to receive this communication electronically, please contact [email or web address] or [phone] to request [employer] Link access.    V[employer] Link is a tool which provides read-only access to select patient information with whom you have a relationship. Its easy to use and provides real time access to review your patients record including encounter summaries, notes, results, and demographic information.    If you feel you have received this communication in error or would no longer like to receive these types of communications, please e-mail [email or web address]

## 2024-04-04 ENCOUNTER — PROCEDURE VISIT (OUTPATIENT)
Dept: UROLOGY | Facility: CLINIC | Age: 64
End: 2024-04-04
Payer: COMMERCIAL

## 2024-04-04 ENCOUNTER — LAB VISIT (OUTPATIENT)
Dept: LAB | Facility: HOSPITAL | Age: 64
End: 2024-04-04
Attending: INTERNAL MEDICINE
Payer: COMMERCIAL

## 2024-04-04 VITALS
WEIGHT: 203.13 LBS | HEART RATE: 115 BPM | TEMPERATURE: 98 F | DIASTOLIC BLOOD PRESSURE: 80 MMHG | RESPIRATION RATE: 17 BRPM | BODY MASS INDEX: 31.82 KG/M2 | SYSTOLIC BLOOD PRESSURE: 167 MMHG

## 2024-04-04 DIAGNOSIS — Z94.0 KIDNEY REPLACED BY TRANSPLANT: ICD-10-CM

## 2024-04-04 DIAGNOSIS — Z94.0 STATUS POST KIDNEY TRANSPLANT: ICD-10-CM

## 2024-04-04 DIAGNOSIS — E83.42 HYPOMAGNESEMIA: ICD-10-CM

## 2024-04-04 LAB
ALBUMIN SERPL BCP-MCNC: 3.3 G/DL (ref 3.5–5.2)
ANION GAP SERPL CALC-SCNC: 8 MMOL/L (ref 8–16)
BASOPHILS # BLD AUTO: 0.08 K/UL (ref 0–0.2)
BASOPHILS NFR BLD: 0.8 % (ref 0–1.9)
BUN SERPL-MCNC: 15 MG/DL (ref 8–23)
CALCIUM SERPL-MCNC: 9.5 MG/DL (ref 8.7–10.5)
CHLORIDE SERPL-SCNC: 107 MMOL/L (ref 95–110)
CO2 SERPL-SCNC: 26 MMOL/L (ref 23–29)
CREAT SERPL-MCNC: 1.3 MG/DL (ref 0.5–1.4)
DIFFERENTIAL METHOD BLD: ABNORMAL
EOSINOPHIL # BLD AUTO: 0.2 K/UL (ref 0–0.5)
EOSINOPHIL NFR BLD: 2 % (ref 0–8)
ERYTHROCYTE [DISTWIDTH] IN BLOOD BY AUTOMATED COUNT: 14.3 % (ref 11.5–14.5)
EST. GFR  (NO RACE VARIABLE): >60 ML/MIN/1.73 M^2
GLUCOSE SERPL-MCNC: 93 MG/DL (ref 70–110)
HCT VFR BLD AUTO: 40.5 % (ref 40–54)
HGB BLD-MCNC: 11.9 G/DL (ref 14–18)
IMM GRANULOCYTES # BLD AUTO: 0.17 K/UL (ref 0–0.04)
IMM GRANULOCYTES NFR BLD AUTO: 1.6 % (ref 0–0.5)
LYMPHOCYTES # BLD AUTO: 1.7 K/UL (ref 1–4.8)
LYMPHOCYTES NFR BLD: 15.6 % (ref 18–48)
MAGNESIUM SERPL-MCNC: 1.8 MG/DL (ref 1.6–2.6)
MCH RBC QN AUTO: 27 PG (ref 27–31)
MCHC RBC AUTO-ENTMCNC: 29.4 G/DL (ref 32–36)
MCV RBC AUTO: 92 FL (ref 82–98)
MONOCYTES # BLD AUTO: 0.7 K/UL (ref 0.3–1)
MONOCYTES NFR BLD: 6.1 % (ref 4–15)
NEUTROPHILS # BLD AUTO: 7.8 K/UL (ref 1.8–7.7)
NEUTROPHILS NFR BLD: 73.9 % (ref 38–73)
NRBC BLD-RTO: 0 /100 WBC
PHOSPHATE SERPL-MCNC: 2.7 MG/DL (ref 2.7–4.5)
PLATELET # BLD AUTO: 257 K/UL (ref 150–450)
PMV BLD AUTO: 9.5 FL (ref 9.2–12.9)
POTASSIUM SERPL-SCNC: 4.5 MMOL/L (ref 3.5–5.1)
RBC # BLD AUTO: 4.41 M/UL (ref 4.6–6.2)
SODIUM SERPL-SCNC: 141 MMOL/L (ref 136–145)
TACROLIMUS BLD-MCNC: 10 NG/ML (ref 5–15)
WBC # BLD AUTO: 10.61 K/UL (ref 3.9–12.7)

## 2024-04-04 PROCEDURE — 52310 CYSTOSCOPY AND TREATMENT: CPT | Mod: S$GLB,,, | Performed by: STUDENT IN AN ORGANIZED HEALTH CARE EDUCATION/TRAINING PROGRAM

## 2024-04-04 PROCEDURE — 83735 ASSAY OF MAGNESIUM: CPT | Performed by: INTERNAL MEDICINE

## 2024-04-04 PROCEDURE — 80069 RENAL FUNCTION PANEL: CPT | Performed by: INTERNAL MEDICINE

## 2024-04-04 PROCEDURE — 85025 COMPLETE CBC W/AUTO DIFF WBC: CPT | Performed by: INTERNAL MEDICINE

## 2024-04-04 PROCEDURE — 36415 COLL VENOUS BLD VENIPUNCTURE: CPT | Performed by: INTERNAL MEDICINE

## 2024-04-04 PROCEDURE — 80197 ASSAY OF TACROLIMUS: CPT | Performed by: INTERNAL MEDICINE

## 2024-04-04 RX ORDER — LIDOCAINE HYDROCHLORIDE 20 MG/ML
JELLY TOPICAL
Status: DISCONTINUED | OUTPATIENT
Start: 2024-04-04 | End: 2024-04-24 | Stop reason: HOSPADM

## 2024-04-04 NOTE — PATIENT INSTRUCTIONS
What to Expect After a Cystoscopy with Stent Removal  For the next 24-48 hours, you may feel a mild burning when you urinate. This burning is normal and expected. Drink plenty of water to dilute the urine to help relieve the burning sensation. You may also see a small amount of blood in your urine after the procedure.    Unless you are already taking antibiotics, you may be given an antibiotic after the test to prevent infection.    Signs and Symptoms to Report  Call the [facility] at [phone] if you develop any of the following:  Fever of 101 degrees or higher  Chills or persistent bleeding  Inability to urinate    After hours or on weekends, you may reach a urology resident on call at this number: [phone].

## 2024-04-04 NOTE — TELEPHONE ENCOUNTER
Called and instructed pt to decrease prograf dose to 2mg in AM and 1.5mg in PM. Cautioned pt to be careful of 2 different prograf doses. Pt repeated instructions and verbalized understanding.   ----- Message from Candido Arnold MD sent at 4/4/2024  3:14 PM CDT -----  Please lower prograf to 2 mg am and 1.5 mg PM

## 2024-04-04 NOTE — PROGRESS NOTES
Patient presented to clinic with KPN #6 remaining.  He has been taking 2 tabs TID, prescription is for 2 tabs BID.  Reviewed phos level from 4/1 = 3.4.  Discussed with Dr Arnold and decision was to HOLD KPN for now.  Discussed with patient and reviewed a high phos diet

## 2024-04-05 RX ORDER — TACROLIMUS 1 MG/1
CAPSULE ORAL
Qty: 90 CAPSULE | Refills: 11 | Status: ON HOLD | OUTPATIENT
Start: 2024-04-05 | End: 2024-04-24 | Stop reason: HOSPADM

## 2024-04-05 RX ORDER — TACROLIMUS 0.5 MG/1
0.5 CAPSULE ORAL NIGHTLY
Qty: 30 CAPSULE | Refills: 11 | Status: ON HOLD | OUTPATIENT
Start: 2024-04-05 | End: 2024-04-24 | Stop reason: HOSPADM

## 2024-04-07 ENCOUNTER — NURSE TRIAGE (OUTPATIENT)
Dept: ADMINISTRATIVE | Facility: CLINIC | Age: 64
End: 2024-04-07
Payer: COMMERCIAL

## 2024-04-07 NOTE — TELEPHONE ENCOUNTER
Reason for Disposition   [1] Red streak runs from the incision AND [2] longer than 1 inch (2.5 cm)    Additional Information   Negative: [1] Major abdominal surgical incision AND [2] wound gaping open AND [3] visible internal organs   Negative: Sounds like a life-threatening emergency to the triager   Negative: [1] Bleeding from incision AND [2] won't stop after 10 minutes of direct pressure   Negative: [1] Widespread rash AND [2] bright red, sunburn-like   Negative: Severe pain in the incision   Negative: [1] Suture came out early AND [2] wound gaping AND [3] < 48 hours since sutures placed   Negative: [1] Incision gaping open AND [2] length of opening > 2 inches (5 cm)   Negative: Patient sounds very sick or weak to the triager   Negative: Sounds like a serious complication to the triager   Negative: Fever > 100.5 F (38.1 C)   Negative: [1] Incision looks infected (spreading redness, pain) AND [2] fever > 99.5 F (37.5 C)   Negative: [1] Incision looks infected (spreading redness, pain) AND [2] large red area (> 2 in. or 5 cm)   Negative: [1] Incision looks infected (spreading redness, pain) AND [2] face wound    Protocols used: Post-Op Incision Symptoms-A-AH  Pt called regarding his incision from kidney transplant. Sutures are in place and he is on antibiotics for infection. Has been seen and wound drained but it is filling up again per patient. Called MD on call and spoke with Dr Chirinos. Pt does not have a fever. Pt instructed per MD to monitor fever and may go to ER or may wait until clinic tomorrow 9-3. Pt provided information and will wait until clinic tomorrow but will call back with any worsening symptoms, including fever >100.4. Pt verbalized understanding. Encounter routed to provider.

## 2024-04-08 ENCOUNTER — LAB VISIT (OUTPATIENT)
Dept: LAB | Facility: HOSPITAL | Age: 64
End: 2024-04-08
Attending: INTERNAL MEDICINE
Payer: COMMERCIAL

## 2024-04-08 ENCOUNTER — HOSPITAL ENCOUNTER (INPATIENT)
Facility: HOSPITAL | Age: 64
LOS: 17 days | Discharge: HOME | DRG: 857 | End: 2024-04-25
Attending: EMERGENCY MEDICINE | Admitting: INTERNAL MEDICINE
Payer: COMMERCIAL

## 2024-04-08 DIAGNOSIS — E87.6 HYPOKALEMIA: ICD-10-CM

## 2024-04-08 DIAGNOSIS — N17.9 AKI (ACUTE KIDNEY INJURY): ICD-10-CM

## 2024-04-08 DIAGNOSIS — K43.2 INCISIONAL HERNIA FOLLOWING TRANSPLANT: ICD-10-CM

## 2024-04-08 DIAGNOSIS — Z94.0 STATUS POST KIDNEY TRANSPLANT: ICD-10-CM

## 2024-04-08 DIAGNOSIS — Z29.89 PROPHYLACTIC IMMUNOTHERAPY: ICD-10-CM

## 2024-04-08 DIAGNOSIS — Z94.9 INCISIONAL HERNIA FOLLOWING TRANSPLANT: ICD-10-CM

## 2024-04-08 DIAGNOSIS — Z98.890 HISTORY OF INCISIONAL HERNIA REPAIR: ICD-10-CM

## 2024-04-08 DIAGNOSIS — E83.42 HYPOMAGNESEMIA: ICD-10-CM

## 2024-04-08 DIAGNOSIS — T81.30XA WOUND DEHISCENCE: ICD-10-CM

## 2024-04-08 DIAGNOSIS — R00.0 TACHYCARDIA: ICD-10-CM

## 2024-04-08 DIAGNOSIS — Z94.0 KIDNEY REPLACED BY TRANSPLANT: ICD-10-CM

## 2024-04-08 DIAGNOSIS — Z94.0 S/P LIVING-DONOR KIDNEY TRANSPLANTATION: ICD-10-CM
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
## 2024-04-08 DIAGNOSIS — Z79.60 LONG-TERM USE OF IMMUNOSUPPRESSANT MEDICATION: ICD-10-CM

## 2024-04-08 DIAGNOSIS — K56.7 ILEUS: ICD-10-CM

## 2024-04-08 DIAGNOSIS — I95.1 ORTHOSTATIC HYPOTENSION: ICD-10-CM

## 2024-04-08 DIAGNOSIS — D63.8 ANEMIA OF CHRONIC DISEASE: ICD-10-CM

## 2024-04-08 DIAGNOSIS — Z87.19 HISTORY OF INCISIONAL HERNIA REPAIR: ICD-10-CM

## 2024-04-08 DIAGNOSIS — Z91.89 AT RISK FOR OPPORTUNISTIC INFECTIONS: ICD-10-CM

## 2024-04-08 DIAGNOSIS — Z48.89 ENCOUNTER FOR POST SURGICAL WOUND CHECK: ICD-10-CM

## 2024-04-08 DIAGNOSIS — E87.5 HYPERKALEMIA: ICD-10-CM

## 2024-04-08 DIAGNOSIS — Z87.09 HISTORY OF COPD: ICD-10-CM

## 2024-04-08 DIAGNOSIS — I12.9 HYPERTENSIVE CHRONIC KIDNEY DISEASE WITH STAGE 1 THROUGH STAGE 4 CHRONIC KIDNEY DISEASE, OR UNSPECIFIED CHRONIC KIDNEY DISEASE: ICD-10-CM

## 2024-04-08 DIAGNOSIS — Z94.0 KIDNEY TRANSPLANT RECIPIENT: Primary | ICD-10-CM

## 2024-04-08 DIAGNOSIS — T81.49XA SURGICAL WOUND INFECTION: ICD-10-CM

## 2024-04-08 DIAGNOSIS — E78.5 DYSLIPIDEMIA: ICD-10-CM

## 2024-04-08 DIAGNOSIS — T81.49XA: ICD-10-CM

## 2024-04-08 PROBLEM — D62 ACUTE BLOOD LOSS ANEMIA: Status: RESOLVED | Noted: 2024-03-12 | Resolved: 2024-04-08

## 2024-04-08 LAB
ALBUMIN SERPL BCP-MCNC: 3.4 G/DL (ref 3.5–5.2)
ANION GAP SERPL CALC-SCNC: 8 MMOL/L (ref 8–16)
BASOPHILS # BLD AUTO: 0.09 K/UL (ref 0–0.2)
BASOPHILS NFR BLD: 0.9 % (ref 0–1.9)
BUN SERPL-MCNC: 20 MG/DL (ref 8–23)
CALCIUM SERPL-MCNC: 10.1 MG/DL (ref 8.7–10.5)
CHLORIDE SERPL-SCNC: 109 MMOL/L (ref 95–110)
CO2 SERPL-SCNC: 23 MMOL/L (ref 23–29)
CREAT SERPL-MCNC: 1.5 MG/DL (ref 0.5–1.4)
DIFFERENTIAL METHOD BLD: ABNORMAL
EOSINOPHIL # BLD AUTO: 0.2 K/UL (ref 0–0.5)
EOSINOPHIL NFR BLD: 2 % (ref 0–8)
ERYTHROCYTE [DISTWIDTH] IN BLOOD BY AUTOMATED COUNT: 14.2 % (ref 11.5–14.5)
EST. GFR  (NO RACE VARIABLE): 51.3 ML/MIN/1.73 M^2
GLUCOSE SERPL-MCNC: 94 MG/DL (ref 70–110)
HCT VFR BLD AUTO: 41.8 % (ref 40–54)
HGB BLD-MCNC: 12.3 G/DL (ref 14–18)
IMM GRANULOCYTES # BLD AUTO: 0.17 K/UL (ref 0–0.04)
IMM GRANULOCYTES NFR BLD AUTO: 1.6 % (ref 0–0.5)
LYMPHOCYTES # BLD AUTO: 1.6 K/UL (ref 1–4.8)
LYMPHOCYTES NFR BLD: 15.8 % (ref 18–48)
MAGNESIUM SERPL-MCNC: 1.5 MG/DL (ref 1.6–2.6)
MCH RBC QN AUTO: 27.3 PG (ref 27–31)
MCHC RBC AUTO-ENTMCNC: 29.4 G/DL (ref 32–36)
MCV RBC AUTO: 93 FL (ref 82–98)
MONOCYTES # BLD AUTO: 0.7 K/UL (ref 0.3–1)
MONOCYTES NFR BLD: 6.5 % (ref 4–15)
NEUTROPHILS # BLD AUTO: 7.6 K/UL (ref 1.8–7.7)
NEUTROPHILS NFR BLD: 73.2 % (ref 38–73)
NRBC BLD-RTO: 0 /100 WBC
PHOSPHATE SERPL-MCNC: 2.9 MG/DL (ref 2.7–4.5)
PLATELET # BLD AUTO: 222 K/UL (ref 150–450)
PMV BLD AUTO: 10 FL (ref 9.2–12.9)
POTASSIUM SERPL-SCNC: 4.8 MMOL/L (ref 3.5–5.1)
RBC # BLD AUTO: 4.51 M/UL (ref 4.6–6.2)
SARS-COV-2 RDRP RESP QL NAA+PROBE: NEGATIVE
SODIUM SERPL-SCNC: 140 MMOL/L (ref 136–145)
TACROLIMUS BLD-MCNC: 8.7 NG/ML (ref 5–15)
WBC # BLD AUTO: 10.36 K/UL (ref 3.9–12.7)

## 2024-04-08 PROCEDURE — 85025 COMPLETE CBC W/AUTO DIFF WBC: CPT | Performed by: INTERNAL MEDICINE

## 2024-04-08 PROCEDURE — 25000003 PHARM REV CODE 250: Performed by: CLINICAL NURSE SPECIALIST

## 2024-04-08 PROCEDURE — U0002 COVID-19 LAB TEST NON-CDC: HCPCS | Performed by: CLINICAL NURSE SPECIALIST

## 2024-04-08 PROCEDURE — 96365 THER/PROPH/DIAG IV INF INIT: CPT

## 2024-04-08 PROCEDURE — 99223 1ST HOSP IP/OBS HIGH 75: CPT | Mod: ,,, | Performed by: CLINICAL NURSE SPECIALIST

## 2024-04-08 PROCEDURE — 63600175 PHARM REV CODE 636 W HCPCS: Performed by: CLINICAL NURSE SPECIALIST

## 2024-04-08 PROCEDURE — 20600001 HC STEP DOWN PRIVATE ROOM

## 2024-04-08 PROCEDURE — 63600175 PHARM REV CODE 636 W HCPCS: Performed by: EMERGENCY MEDICINE

## 2024-04-08 PROCEDURE — 99285 EMERGENCY DEPT VISIT HI MDM: CPT | Mod: 25

## 2024-04-08 PROCEDURE — 25000003 PHARM REV CODE 250: Performed by: EMERGENCY MEDICINE

## 2024-04-08 PROCEDURE — 36415 COLL VENOUS BLD VENIPUNCTURE: CPT | Performed by: INTERNAL MEDICINE

## 2024-04-08 PROCEDURE — 83735 ASSAY OF MAGNESIUM: CPT | Performed by: INTERNAL MEDICINE

## 2024-04-08 PROCEDURE — 87040 BLOOD CULTURE FOR BACTERIA: CPT | Performed by: CLINICAL NURSE SPECIALIST

## 2024-04-08 PROCEDURE — 80197 ASSAY OF TACROLIMUS: CPT | Performed by: INTERNAL MEDICINE

## 2024-04-08 PROCEDURE — 27000207 HC ISOLATION

## 2024-04-08 PROCEDURE — 80069 RENAL FUNCTION PANEL: CPT | Performed by: INTERNAL MEDICINE

## 2024-04-08 RX ORDER — SODIUM CHLORIDE 9 MG/ML
INJECTION, SOLUTION INTRAVENOUS CONTINUOUS
Status: DISCONTINUED | OUTPATIENT
Start: 2024-04-08 | End: 2024-04-08

## 2024-04-08 RX ORDER — ALBUTEROL SULFATE 90 UG/1
1 AEROSOL, METERED RESPIRATORY (INHALATION) EVERY 6 HOURS PRN
Status: DISCONTINUED | OUTPATIENT
Start: 2024-04-08 | End: 2024-04-25 | Stop reason: HOSPADM

## 2024-04-08 RX ORDER — ATORVASTATIN CALCIUM 20 MG/1
40 TABLET, FILM COATED ORAL NIGHTLY
Status: DISCONTINUED | OUTPATIENT
Start: 2024-04-08 | End: 2024-04-25 | Stop reason: HOSPADM

## 2024-04-08 RX ORDER — FAMOTIDINE 20 MG/1
20 TABLET, FILM COATED ORAL NIGHTLY
Status: DISCONTINUED | OUTPATIENT
Start: 2024-04-08 | End: 2024-04-19

## 2024-04-08 RX ORDER — HEPARIN SODIUM 5000 [USP'U]/ML
5000 INJECTION, SOLUTION INTRAVENOUS; SUBCUTANEOUS EVERY 8 HOURS
Status: DISCONTINUED | OUTPATIENT
Start: 2024-04-08 | End: 2024-04-25 | Stop reason: HOSPADM

## 2024-04-08 RX ORDER — SODIUM CHLORIDE 0.9 % (FLUSH) 0.9 %
3 SYRINGE (ML) INJECTION
Status: DISCONTINUED | OUTPATIENT
Start: 2024-04-08 | End: 2024-04-25 | Stop reason: HOSPADM

## 2024-04-08 RX ORDER — MAGNESIUM SULFATE HEPTAHYDRATE 40 MG/ML
2 INJECTION, SOLUTION INTRAVENOUS ONCE
Status: COMPLETED | OUTPATIENT
Start: 2024-04-08 | End: 2024-04-08

## 2024-04-08 RX ORDER — TALC
6 POWDER (GRAM) TOPICAL NIGHTLY PRN
Status: DISCONTINUED | OUTPATIENT
Start: 2024-04-08 | End: 2024-04-25 | Stop reason: HOSPADM

## 2024-04-08 RX ORDER — VALGANCICLOVIR 450 MG/1
450 TABLET, FILM COATED ORAL EVERY MORNING
Status: DISCONTINUED | OUTPATIENT
Start: 2024-04-09 | End: 2024-04-14

## 2024-04-08 RX ORDER — OXYCODONE HYDROCHLORIDE 5 MG/1
5 TABLET ORAL EVERY 6 HOURS PRN
Status: DISCONTINUED | OUTPATIENT
Start: 2024-04-08 | End: 2024-04-10

## 2024-04-08 RX ORDER — SULFAMETHOXAZOLE AND TRIMETHOPRIM 400; 80 MG/1; MG/1
1 TABLET ORAL EVERY MORNING
Status: DISCONTINUED | OUTPATIENT
Start: 2024-04-09 | End: 2024-04-09

## 2024-04-08 RX ORDER — HYDROMORPHONE HYDROCHLORIDE 1 MG/ML
0.5 INJECTION, SOLUTION INTRAMUSCULAR; INTRAVENOUS; SUBCUTANEOUS ONCE
Status: DISCONTINUED | OUTPATIENT
Start: 2024-04-08 | End: 2024-04-08

## 2024-04-08 RX ORDER — LANOLIN ALCOHOL/MO/W.PET/CERES
400 CREAM (GRAM) TOPICAL 2 TIMES DAILY
Status: DISCONTINUED | OUTPATIENT
Start: 2024-04-08 | End: 2024-04-14

## 2024-04-08 RX ORDER — DOCUSATE SODIUM 100 MG/1
100 CAPSULE, LIQUID FILLED ORAL 3 TIMES DAILY PRN
Status: DISCONTINUED | OUTPATIENT
Start: 2024-04-08 | End: 2024-04-10

## 2024-04-08 RX ORDER — TACROLIMUS 1 MG/1
1 CAPSULE ORAL EVERY EVENING
Status: DISCONTINUED | OUTPATIENT
Start: 2024-04-08 | End: 2024-04-10

## 2024-04-08 RX ORDER — SODIUM CHLORIDE 9 MG/ML
INJECTION, SOLUTION INTRAVENOUS CONTINUOUS
Status: DISCONTINUED | OUTPATIENT
Start: 2024-04-08 | End: 2024-04-10

## 2024-04-08 RX ORDER — ACETAMINOPHEN 325 MG/1
650 TABLET ORAL EVERY 6 HOURS PRN
Status: DISCONTINUED | OUTPATIENT
Start: 2024-04-08 | End: 2024-04-25 | Stop reason: HOSPADM

## 2024-04-08 RX ORDER — ONDANSETRON 8 MG/1
8 TABLET, ORALLY DISINTEGRATING ORAL EVERY 6 HOURS PRN
Status: DISCONTINUED | OUTPATIENT
Start: 2024-04-08 | End: 2024-04-25 | Stop reason: HOSPADM

## 2024-04-08 RX ORDER — OXYCODONE HYDROCHLORIDE 10 MG/1
10 TABLET ORAL EVERY 6 HOURS PRN
Status: DISCONTINUED | OUTPATIENT
Start: 2024-04-08 | End: 2024-04-10

## 2024-04-08 RX ORDER — SODIUM BICARBONATE 650 MG/1
1300 TABLET ORAL 2 TIMES DAILY
Status: DISCONTINUED | OUTPATIENT
Start: 2024-04-08 | End: 2024-04-14

## 2024-04-08 RX ORDER — FLUTICASONE FUROATE AND VILANTEROL 100; 25 UG/1; UG/1
1 POWDER RESPIRATORY (INHALATION) DAILY
Status: DISCONTINUED | OUTPATIENT
Start: 2024-04-09 | End: 2024-04-25 | Stop reason: HOSPADM

## 2024-04-08 RX ORDER — PREDNISONE 5 MG/1
5 TABLET ORAL DAILY
Status: DISCONTINUED | OUTPATIENT
Start: 2024-04-09 | End: 2024-04-25 | Stop reason: HOSPADM

## 2024-04-08 RX ORDER — TACROLIMUS 1 MG/1
2 CAPSULE ORAL EVERY MORNING
Status: DISCONTINUED | OUTPATIENT
Start: 2024-04-09 | End: 2024-04-10

## 2024-04-08 RX ADMIN — CEFEPIME 2 G: 2 INJECTION, POWDER, FOR SOLUTION INTRAVENOUS at 01:04

## 2024-04-08 RX ADMIN — VANCOMYCIN HYDROCHLORIDE 1750 MG: 500 INJECTION, POWDER, LYOPHILIZED, FOR SOLUTION INTRAVENOUS at 03:04

## 2024-04-08 RX ADMIN — ATORVASTATIN CALCIUM 40 MG: 20 TABLET, FILM COATED ORAL at 10:04

## 2024-04-08 RX ADMIN — SODIUM BICARBONATE 1300 MG: 650 TABLET ORAL at 10:04

## 2024-04-08 RX ADMIN — Medication 400 MG: at 10:04

## 2024-04-08 RX ADMIN — TACROLIMUS 1 MG: 1 CAPSULE ORAL at 06:04

## 2024-04-08 RX ADMIN — OXYCODONE HYDROCHLORIDE 10 MG: 10 TABLET ORAL at 04:04

## 2024-04-08 RX ADMIN — MAGNESIUM SULFATE HEPTAHYDRATE 2 G: 40 INJECTION, SOLUTION INTRAVENOUS at 11:04

## 2024-04-08 RX ADMIN — HEPARIN SODIUM 5000 UNITS: 5000 INJECTION INTRAVENOUS; SUBCUTANEOUS at 10:04

## 2024-04-08 RX ADMIN — SODIUM CHLORIDE: 9 INJECTION, SOLUTION INTRAVENOUS at 01:04

## 2024-04-08 RX ADMIN — FAMOTIDINE 20 MG: 20 TABLET ORAL at 10:04

## 2024-04-08 RX ADMIN — SODIUM CHLORIDE: 9 INJECTION, SOLUTION INTRAVENOUS at 03:04

## 2024-04-08 NOTE — PROGRESS NOTES
Pt arrived to room via stretcher. Pt assessment and vitals complete. Pt acclimated to room and call light.

## 2024-04-08 NOTE — PROGRESS NOTES
"Pharmacokinetic Initial Assessment: IV Vancomycin    Assessment/Plan:    Initiate intravenous vancomycin with loading dose of 1750 mg once with subsequent doses when random concentrations are less than 20 mcg/mL.  Desired empiric serum trough concentration is 10 to 20 mcg/mL.  Draw vancomycin random level on 4/9/24 at 1300.  Pharmacy will continue to follow and monitor vancomycin.      Please contact pharmacy at extension [phone] with any questions regarding this assessment.     Thank you for the consult,   Chelsi Linares       Patient brief summary:  Colten Monet is a 64 y.o. male initiated on antimicrobial therapy with IV Vancomycin for treatment of suspected intra-abdominal infection.    Drug Allergies:   Review of patient's allergies indicates:   Allergen Reactions    Codeine Hives     Reaction to Tylenol #3       Actual Body Weight:   89.8 kg    Renal Function:   Estimated Creatinine Clearance: 52.5 mL/min (A) (based on SCr of 1.5 mg/dL (H)).,     Dialysis Method (if applicable):  N/A    CBC (last 72 hours):  Recent Labs   Lab Result Units 04/08/24  0712   WBC K/uL 10.36   Hemoglobin g/dL 12.3*   Hematocrit % 41.8   Platelets K/uL 222   Gran % % 73.2*   Lymph % % 15.8*   Mono % % 6.5   Eosinophil % % 2.0   Basophil % % 0.9   Differential Method  Automated       Metabolic Panel (last 72 hours):  Recent Labs   Lab Result Units 04/08/24  0712   Sodium mmol/L 140   Potassium mmol/L 4.8   Chloride mmol/L 109   CO2 mmol/L 23   Glucose mg/dL 94   BUN mg/dL 20   Creatinine mg/dL 1.5*   Albumin g/dL 3.4*   Magnesium mg/dL 1.5*   Phosphorus mg/dL 2.9       Drug levels (last 3 results):  No results for input(s): "VANCOMYCINRA", "VANCORANDOM", "VANCOMYCINPE", "VANCOPEAK", "VANCOMYCINTR", "VANCOTROUGH" in the last 72 hours.    Microbiologic Results:  Microbiology Results (last 7 days)       Procedure Component Value Units Date/Time    Blood culture [[id number]] Collected: 04/08/24 1346    Order Status: Sent Specimen: " Blood from Peripheral, Antecubital, Left     Blood culture [[id number]] Collected: 04/08/24 1346    Order Status: Sent Specimen: Blood from Peripheral, Antecubital, Right

## 2024-04-08 NOTE — PLAN OF CARE
64 year-old male admitted 4/8/24 for Erythema/Drainage from transplant incision. Pt has hx of IgA nephropathy, kidney txp 3/11/24.  -AAOx4, ambulates independently  -20 G Rt AC  -Cefepime Q12 IVPB  -Vanc IVPB once  -NS @ 100ml/hr  -RLQ incision, staples removed at bedside, gauze in place for drainage  -Kidney US complete  -CT AP pending  -PRN Oxy 5/10mg Q6  -NPO @ midnight  -family at bedside, pt lying in bed, bed in lowest position, wheels locked, non-skid socks in place, call light within reach

## 2024-04-08 NOTE — SUBJECTIVE & OBJECTIVE
Subjective:     Chief Complaint/Reason for Admission: Surgical wound infection    History of Present Illness:  Mr. Colten Monet is a 65 yo male w/ PMHx of ESRD d/t IgA nephropathy, now S/P living related Ktxp on 3/11/24. Patient progressed well during initial post-txp course and discharged POD#2 with downtrending Cr. Now with BL Cr 1.0-1.1.     Patient recently seen in transplant surgery clinic on 4/3 and noted to have serous drainage from RLQ kidney txp incision along with palpable erythema. Staples intact w/ minimal tenderness to deep palpation at that time. Incision probed w/ 200 cc serous fluid drained in clinic by surgical fellow. Patient was placed on PO for cellulitis. He now presents to ED for worsening erythema to incisional area (see media for photo). Staples remain intact, erythema noted to entire length of incision, worse in upper portion. Small 1-2 cm area of yellow slough in upper portion incision. Minimal serous drainage expressed upon probing. Patient reports mild tenderness to deep palpation. Kidney US in ED w/ large 14.8 x 7.4 x 1.3 cm, just posterior to the anterior pelvic wall overlying the transplant kidney. Reviewed plan with Dr. Eduardo and Dr. Whyte. CT A/P non-con ordered along with IV abx. Plan to remove staples to drain incision at bedside. Mild HARRISON on admit (Cr 1.5). NS bolus ordered.           Facility-Administered Medications Prior to Admission   Medication    LIDOcaine HCl 2% urojet     PTA Medications   Medication Sig    albuterol (PROVENTIL/VENTOLIN HFA) 90 mcg/actuation inhaler Inhale 1-2 puffs into the lungs every 6 (six) hours as needed for Wheezing. Rescue    aspirin (ECOTRIN) 81 MG EC tablet Take 1 tablet (81 mg total) by mouth once daily.    atorvastatin (LIPITOR) 40 MG tablet Take 1 tablet by mouth every evening.    benzonatate (TESSALON) 100 MG capsule Take 1 capsule (100 mg total) by mouth 3 (three) times daily.    bisacodyL (DULCOLAX) 5 mg EC tablet Take 2 tablets  (10 mg total) by mouth daily as needed for Constipation.    docusate sodium (COLACE) 100 MG capsule Take 1 capsule (100 mg total) by mouth 3 (three) times daily as needed for Constipation.    doxycycline (VIBRAMYCIN) 100 MG Cap Take 1 capsule (100 mg total) by mouth every 12 (twelve) hours.    famotidine (PEPCID) 20 MG tablet Take 1 tablet (20 mg total) by mouth every evening.    fluticasone furoate-vilanteroL (BREO ELLIPTA) 100-25 mcg/dose diskus inhaler Inhale 1 puff into the lungs once daily. Controller    furosemide (LASIX) 20 MG tablet Take 1 tablet (20 mg total) by mouth once daily.    magnesium oxide (MAG-OX) 400 mg (241.3 mg magnesium) tablet Take 1 tablet (400 mg total) by mouth 2 (two) times daily.    mycophenolate (CELLCEPT) 250 mg Cap Take 4 capsules (1,000 mg total) by mouth 2 (two) times daily.    predniSONE (DELTASONE) 5 MG tablet Take 20 mg BY MOUTH daily 3/14-3/20; 15 mg daily 3/21-3/27; 10 mg daily 3/28-4/3/2024; then 5 mg daily thereafter 4/4/24    sodium bicarbonate 650 MG tablet Take 2 tablets (1,300 mg total) by mouth 2 (two) times daily.    sulfamethoxazole-trimethoprim 400-80mg (BACTRIM,SEPTRA) 400-80 mg per tablet Take 1 tablet by mouth every morning. Stop: 9/7/24    tacrolimus (PROGRAF) 0.5 MG Cap Take 1 capsule (0.5 mg total) by mouth every evening.    tacrolimus (PROGRAF) 1 MG Cap Take 2 capsules (2 mg total) by mouth every morning AND 1 capsule (1 mg total) every evening.    valGANciclovir (VALCYTE) 450 mg Tab Take 2 tablets (900 mg total) by mouth every morning. Stop: 6/9/24       Review of patient's allergies indicates:   Allergen Reactions    Codeine Hives     Reaction to Tylenol #3       Past Medical History:   Diagnosis Date    Anemia     CVA (cerebral vascular accident)     GERD (gastroesophageal reflux disease)     Hyperlipidemia     Hypertension     IgA nephropathy     Obesity      Past Surgical History:   Procedure Laterality Date    APPENDECTOMY      CARDIAC CATHETERIZATION    "   [facility]an ~ 6-7 years ago    KIDNEY TRANSPLANT N/A 3/11/2024    Procedure: TRANSPLANT, KIDNEY;  Surgeon: Silvio Mayer MD;  Location: [facility] OR [address] [place] [address];  Service: Transplant;  Laterality: N/A;    RENAL BIOPSY      TONSILLECTOMY       Family History       Problem Relation (Age of Onset)    Depression Mother          Tobacco Use    Smoking status: Former     Current packs/day: 0.00     Types: Cigarettes     Quit date: 2016     Years since quittin.8    Smokeless tobacco: Never   Substance and Sexual Activity    Alcohol use: Not Currently    Drug use: Never    Sexual activity: Not on file        Review of Systems   Constitutional:  Negative for chills and fever.   HENT:  Negative for congestion.    Respiratory:  Negative for cough and shortness of breath.    Cardiovascular:  Negative for chest pain and leg swelling.   Gastrointestinal:  Positive for abdominal pain (incisional). Negative for constipation, nausea and vomiting.   Genitourinary:  Negative for decreased urine volume and difficulty urinating.   Skin:  Positive for color change and wound.   Allergic/Immunologic: Positive for immunocompromised state.   Neurological:  Negative for dizziness, weakness and headaches.   Psychiatric/Behavioral:  Negative for agitation and confusion.      Objective:     Vital Signs (Most Recent):  Temp: 98 °F (36.7 °C) (24 1404)  Pulse: 92 (24 1404)  Resp: 20 (24 1404)  BP: (!) 156/95 (24 1404)  SpO2: 96 % (24 1404)  Height: 5' 7" (170.2 cm)  Weight: 89.8 kg (198 lb)  Body mass index is 31.01 kg/m².      Physical Exam  Vitals and nursing note reviewed.   Constitutional:       General: He is not in acute distress.     Appearance: Normal appearance. He is not ill-appearing.   HENT:      Mouth/Throat:      Pharynx: Oropharynx is clear.   Cardiovascular:      Rate and Rhythm: Normal rate.      Pulses: Normal pulses.   Pulmonary:      Effort: Pulmonary effort is normal.   Abdominal:      General: " Bowel sounds are normal.      Palpations: Abdomen is soft.      Tenderness: There is abdominal tenderness in the right lower quadrant.      Comments: RLQ Kidney txp incision with erythema and warmth noted to entire length of incision, worse in upper 2/3 portion. Small 1-2 cm area of yellow slough in upper portion incision which opened upon staple removal. Moderate serous drainage noted from dehisced area.    Skin:     General: Skin is warm.   Neurological:      Mental Status: He is alert and oriented to person, place, and time. Mental status is at baseline.   Psychiatric:         Mood and Affect: Mood normal.         Behavior: Behavior normal.          Laboratory  CBC:   Recent Labs   Lab 04/04/24  0805 04/08/24  0712   WBC 10.61 10.36   RBC 4.41* 4.51*   HGB 11.9* 12.3*   HCT 40.5 41.8    222   MCV 92 93   MCH 27.0 27.3   MCHC 29.4* 29.4*     CMP:   Recent Labs   Lab 04/04/24  0805 04/08/24  0712   GLU 93 94   CALCIUM 9.5 10.1   ALBUMIN 3.3* 3.4*    140   K 4.5 4.8   CO2 26 23    109   BUN 15 20   CREATININE 1.3 1.5*       Diagnostic Results:  US - Kidney: Results for orders placed during the hospital encounter of 04/08/24    US Transplant Kidney With Doppler    Narrative  EXAMINATION:  US TRANSPLANT KIDNEY WITH DOPPLER    CLINICAL HISTORY:  wound infection/drainage;    TECHNIQUE:  Real time gray scale and doppler ultrasound was performed over the patient's renal allograft.    COMPARISON:  03/25/2024    FINDINGS:  Renal allograft in the right lower quadrant.  The allograft measures 12.9 cm. Normal perfusion. No hydronephrosis.  There is a 1.2 cm cyst at the midpole.  There is a 1.7 cm cyst with a peripheral calcification, similar to the prior exam.    There is an elongated fluid collection underneath the anterior pelvic wall overlying the kidney transplant an area measuring 14.8 x 7.4 x 1.3 cm    Vasculature:    Inter lobar and segmental arterial resistive indices ranged from 0.67 to 0.77,  previously 0.65 to 0.75.    Main renal artery peak systolic velocity: 200 cm/s with normal waveform, previously 314 cm/s .    Renal artery/iliac ratio: 1.3.    The main renal vein is patent.    Impression  Large fluid collection, 14.8 x 7.4 x 1.3 cm, just posterior to the anterior pelvic wall overlying the transplant kidney concerning for seroma, hematoma or abscess.    Small renal cysts, one  of which has a small peripheral calcification, unchanged from the prior study.    Satisfactory Doppler evaluation of the transplant kidney.      Electronically signed by: Teresita Ohara MD  Date:    04/08/2024  Time:    12:38    Patient was SARS-CoV-2 /COVID-19 tested with negative results.

## 2024-04-08 NOTE — PROGRESS NOTES
Nurses Note -- 4 Eyes      4/8/2024   5:55 PM      Skin assessed during: Admit      [] No Altered Skin Integrity Present    []Prevention Measures Documented      [x] Yes- Altered Skin Integrity Present or Discovered   [] LDA Added if Not in Epic (Describe Wound)   [] New Altered Skin Integrity was Present on Admit and Documented in LDA   [x] Wound Image Taken    Wound Care Consulted? No    Attending Nurse:  Jourdan Gamboa RN/Staff Member:  Amara CABRERA RN

## 2024-04-08 NOTE — H&P
[facility] - Transplant Stepdown  Kidney Transplant  H&P      Subjective:     Chief Complaint/Reason for Admission: Surgical wound infection    History of Present Illness:  Mr. Colten Monet is a 65 yo male w/ PMHx of ESRD d/t IgA nephropathy, now S/P living related Ktxp on 3/11/24. Patient progressed well during initial post-txp course and discharged POD#2 with downtrending Cr. Now with BL Cr 1.0-1.1.     Patient recently seen in transplant surgery clinic on 4/3 and noted to have serous drainage from RLQ kidney txp incision along with palpable erythema. Staples intact w/ minimal tenderness to deep palpation at that time. Incision probed w/ 200 cc serous fluid drained in clinic by surgical fellow. Patient was placed on PO for cellulitis. He now presents to ED for worsening erythema to incisional area (see media for photo). Staples remain intact, erythema noted to entire length of incision, worse in upper portion. Small 1-2 cm area of yellow slough in upper portion incision. Minimal serous drainage expressed upon probing. Patient reports mild tenderness to deep palpation. Kidney US in ED w/ large 14.8 x 7.4 x 1.3 cm, just posterior to the anterior pelvic wall overlying the transplant kidney. Reviewed plan with Dr. Eduardo and Dr. Whyte. CT A/P non-con ordered along with IV abx. Plan to remove staples to drain incision at bedside. Mild HARRISON on admit (Cr 1.5). NS bolus ordered.           Facility-Administered Medications Prior to Admission   Medication    LIDOcaine HCl 2% urojet     PTA Medications   Medication Sig    albuterol (PROVENTIL/VENTOLIN HFA) 90 mcg/actuation inhaler Inhale 1-2 puffs into the lungs every 6 (six) hours as needed for Wheezing. Rescue    aspirin (ECOTRIN) 81 MG EC tablet Take 1 tablet (81 mg total) by mouth once daily.    atorvastatin (LIPITOR) 40 MG tablet Take 1 tablet by mouth every evening.    benzonatate (TESSALON) 100 MG capsule Take 1 capsule (100 mg total) by mouth 3 (three) times  daily.    bisacodyL (DULCOLAX) 5 mg EC tablet Take 2 tablets (10 mg total) by mouth daily as needed for Constipation.    docusate sodium (COLACE) 100 MG capsule Take 1 capsule (100 mg total) by mouth 3 (three) times daily as needed for Constipation.    doxycycline (VIBRAMYCIN) 100 MG Cap Take 1 capsule (100 mg total) by mouth every 12 (twelve) hours.    famotidine (PEPCID) 20 MG tablet Take 1 tablet (20 mg total) by mouth every evening.    fluticasone furoate-vilanteroL (BREO ELLIPTA) 100-25 mcg/dose diskus inhaler Inhale 1 puff into the lungs once daily. Controller    furosemide (LASIX) 20 MG tablet Take 1 tablet (20 mg total) by mouth once daily.    magnesium oxide (MAG-OX) 400 mg (241.3 mg magnesium) tablet Take 1 tablet (400 mg total) by mouth 2 (two) times daily.    mycophenolate (CELLCEPT) 250 mg Cap Take 4 capsules (1,000 mg total) by mouth 2 (two) times daily.    predniSONE (DELTASONE) 5 MG tablet Take 20 mg BY MOUTH daily 3/14-3/20; 15 mg daily 3/21-3/27; 10 mg daily 3/28-4/3/2024; then 5 mg daily thereafter 4/4/24    sodium bicarbonate 650 MG tablet Take 2 tablets (1,300 mg total) by mouth 2 (two) times daily.    sulfamethoxazole-trimethoprim 400-80mg (BACTRIM,SEPTRA) 400-80 mg per tablet Take 1 tablet by mouth every morning. Stop: 9/7/24    tacrolimus (PROGRAF) 0.5 MG Cap Take 1 capsule (0.5 mg total) by mouth every evening.    tacrolimus (PROGRAF) 1 MG Cap Take 2 capsules (2 mg total) by mouth every morning AND 1 capsule (1 mg total) every evening.    valGANciclovir (VALCYTE) 450 mg Tab Take 2 tablets (900 mg total) by mouth every morning. Stop: 6/9/24       Review of patient's allergies indicates:   Allergen Reactions    Codeine Hives     Reaction to Tylenol #3       Past Medical History:   Diagnosis Date    Anemia     CVA (cerebral vascular accident)     GERD (gastroesophageal reflux disease)     Hyperlipidemia     Hypertension     IgA nephropathy     Obesity      Past Surgical History:   Procedure  "Laterality Date    APPENDECTOMY      CARDIAC CATHETERIZATION      [facility] ~ 6-7 years ago    KIDNEY TRANSPLANT N/A 3/11/2024    Procedure: TRANSPLANT, KIDNEY;  Surgeon: Silvio Mayer MD;  Location: [facility] OR [address] [place] [address];  Service: Transplant;  Laterality: N/A;    RENAL BIOPSY      TONSILLECTOMY       Family History       Problem Relation (Age of Onset)    Depression Mother          Tobacco Use    Smoking status: Former     Current packs/day: 0.00     Types: Cigarettes     Quit date: 2016     Years since quittin.8    Smokeless tobacco: Never   Substance and Sexual Activity    Alcohol use: Not Currently    Drug use: Never    Sexual activity: Not on file        Review of Systems   Constitutional:  Negative for chills and fever.   HENT:  Negative for congestion.    Respiratory:  Negative for cough and shortness of breath.    Cardiovascular:  Negative for chest pain and leg swelling.   Gastrointestinal:  Positive for abdominal pain (incisional). Negative for constipation, nausea and vomiting.   Genitourinary:  Negative for decreased urine volume and difficulty urinating.   Skin:  Positive for color change and wound.   Allergic/Immunologic: Positive for immunocompromised state.   Neurological:  Negative for dizziness, weakness and headaches.   Psychiatric/Behavioral:  Negative for agitation and confusion.      Objective:     Vital Signs (Most Recent):  Temp: 98 °F (36.7 °C) (24 1404)  Pulse: 92 (24 1404)  Resp: 20 (24 1404)  BP: (!) 156/95 (24 1404)  SpO2: 96 % (24 1404)  Height: 5' 7" (170.2 cm)  Weight: 89.8 kg (198 lb)  Body mass index is 31.01 kg/m².      Physical Exam  Vitals and nursing note reviewed.   Constitutional:       General: He is not in acute distress.     Appearance: Normal appearance. He is not ill-appearing.   HENT:      Mouth/Throat:      Pharynx: Oropharynx is clear.   Cardiovascular:      Rate and Rhythm: Normal rate.      Pulses: Normal pulses.   Pulmonary:      " Effort: Pulmonary effort is normal.   Abdominal:      General: Bowel sounds are normal.      Palpations: Abdomen is soft.      Tenderness: There is abdominal tenderness in the right lower quadrant.      Comments: RLQ Kidney txp incision with erythema and warmth noted to entire length of incision, worse in upper 2/3 portion. Small 1-2 cm area of yellow slough in upper portion incision which opened upon staple removal. Moderate serous drainage noted from dehisced area.    Skin:     General: Skin is warm.   Neurological:      Mental Status: He is alert and oriented to person, place, and time. Mental status is at baseline.   Psychiatric:         Mood and Affect: Mood normal.         Behavior: Behavior normal.          Laboratory  CBC:   Recent Labs   Lab 04/04/24  0805 04/08/24  0712   WBC 10.61 10.36   RBC 4.41* 4.51*   HGB 11.9* 12.3*   HCT 40.5 41.8    222   MCV 92 93   MCH 27.0 27.3   MCHC 29.4* 29.4*     CMP:   Recent Labs   Lab 04/04/24  0805 04/08/24  0712   GLU 93 94   CALCIUM 9.5 10.1   ALBUMIN 3.3* 3.4*    140   K 4.5 4.8   CO2 26 23    109   BUN 15 20   CREATININE 1.3 1.5*       Diagnostic Results:  US - Kidney: Results for orders placed during the hospital encounter of 04/08/24    US Transplant Kidney With Doppler    Narrative  EXAMINATION:  US TRANSPLANT KIDNEY WITH DOPPLER    CLINICAL HISTORY:  wound infection/drainage;    TECHNIQUE:  Real time gray scale and doppler ultrasound was performed over the patient's renal allograft.    COMPARISON:  03/25/2024    FINDINGS:  Renal allograft in the right lower quadrant.  The allograft measures 12.9 cm. Normal perfusion. No hydronephrosis.  There is a 1.2 cm cyst at the midpole.  There is a 1.7 cm cyst with a peripheral calcification, similar to the prior exam.    There is an elongated fluid collection underneath the anterior pelvic wall overlying the kidney transplant an area measuring 14.8 x 7.4 x 1.3 cm    Vasculature:    Inter lobar and  "segmental arterial resistive indices ranged from 0.67 to 0.77, previously 0.65 to 0.75.    Main renal artery peak systolic velocity: 200 cm/s with normal waveform, previously 314 cm/s .    Renal artery/iliac ratio: 1.3.    The main renal vein is patent.    Impression  Large fluid collection, 14.8 x 7.4 x 1.3 cm, just posterior to the anterior pelvic wall overlying the transplant kidney concerning for seroma, hematoma or abscess.    Small renal cysts, one  of which has a small peripheral calcification, unchanged from the prior study.    Satisfactory Doppler evaluation of the transplant kidney.      Electronically signed by: Teresita Ohara MD  Date:    04/08/2024  Time:    12:38    Patient was SARS-CoV-2 /COVID-19 tested with negative results.   Assessment/Plan:     Pulmonary  History of COPD  - Resume home neb      Cardiac/Vascular  Dyslipidemia  - continue home statin      Renal/  HARRISON (acute kidney injury)  - expect due to dehydration, gentle IVF hydration ordered      Hypomagnesemia  - Continue home PO mag  - IV Mag as needed      S/P living-donor kidney transplantation  - S/p Ktxp 3/11/24 d/t IgA nephropathy. Progressed well post-txp with Cr downtrending      ID  * Infection of surgical wound following transplant  - Pt with delayed surgical wound healing, seen in clinic 4/3 with 200 mL serous fluid expressed from incision w/ staples intact. Started on PO doxycycline at that time  - Wound remains erythematous with swelling and mild tenderness. No improvement with PO Doxy  - Kidney US 4/8 w/ large 14.8 x 7.4 x 1.3 cm, just posterior to the anterior pelvic wall overlying the transplant kidney  - Staples removed 4/8, small area dehiscence noted with moderate serous fluid expressed  - CT A/P pending      At risk for opportunistic infections  - OI prophylaxis per transplant protocol      Immunology/Multi System  Prophylactic immunotherapy  - see "long term use of immunosuppression"      Oncology  Anemia of " chronic disease  - monitor daily CBC  - stable      Acute blood loss anemia-resolved as of 4/8/2024        Palliative Care  Long-term use of immunosuppressant medication  - Continue prograf and prednisone  - Check prograf level daily and adjust for therapeutic dosage. Monitor for toxic side effects   - Hold cellcept on admit d/t infection          Discharge Planning:  Not suitable for discharge at this time    Medical decision making for this encounter includes review of pertinent labs and diagnostic studies, assessment and planning, discussions with consulting providers, discussion with patient/family, and participation in multidisciplinary rounds. Time spent caring for patient: 60 minutes    Osman Boyce NP  Kidney Transplant  [facility] - Transplant Stepdown

## 2024-04-08 NOTE — HPI
Mr. Colten Monet is a 63 yo male w/ PMHx of ESRD d/t IgA nephropathy, now S/P living related Ktxp on 3/11/24. Patient progressed well during initial post-txp course and discharged POD#2 with downtrending Cr. Now with BL Cr 1.0-1.1.     Patient recently seen in transplant surgery clinic on 4/3 and noted to have serous drainage from RLQ kidney txp incision along with palpable erythema. Staples intact w/ minimal tenderness to deep palpation at that time. Incision probed w/ 200 cc serous fluid drained in clinic by surgical fellow. Patient was placed on PO for cellulitis. He now presents to ED for worsening erythema to incisional area (see media for photo). Staples remain intact, erythema noted to entire length of incision, worse in upper portion. Small 1-2 cm area of yellow slough in upper portion incision. Minimal serous drainage expressed upon probing. Patient reports mild tenderness to deep palpation. Kidney US in ED w/ large 14.8 x 7.4 x 1.3 cm, just posterior to the anterior pelvic wall overlying the transplant kidney. Reviewed plan with Dr. Eduardo and Dr. Whyte. CT A/P non-con ordered along with IV abx. Staples removed at bedside, small 1-2 cm area of wound dehiscence noted, moderate amount serous fluid able to be expressed from incision. Mild HARRISON on admit (Cr 1.5). NS bolus ordered.

## 2024-04-08 NOTE — ED TRIAGE NOTES
Patient presents to the ED today with reports of post-op problem. Patient states kidney transplant 3/11/24. Patient states has been experiencing redness swelling and drainage to surgical wound. Patient seen here on 3/22 and was placed on antibiotics (doxycycline) without relief. Denies fever or chills. Patient denies pain to site at this time describes as pressure

## 2024-04-08 NOTE — ED PROVIDER NOTES
Encounter Date: 2024       History     Chief Complaint   Patient presents with    Post-op Problem     Kidney xplant month ago, more swelling to incision     64-year-old male, with history of hyperlipidemia, hypertension, IgA nephropathy status post transplant, presents to the ED for wound check.  Patient reports to have kidney transplant 1 month ago, he was seen for wound infection, he was on Bactrim, and now on doxycycline, however, state that his wound does not seem to improve, continued to have drainage, redness and tenderness around the incision area.  Denies fever, chill, abdominal pain, or dysuria.        Review of patient's allergies indicates:   Allergen Reactions    Codeine Hives     Reaction to Tylenol #3     Past Medical History:   Diagnosis Date    Anemia     CVA (cerebral vascular accident)     GERD (gastroesophageal reflux disease)     Hyperlipidemia     Hypertension     IgA nephropathy     Obesity      Past Surgical History:   Procedure Laterality Date    APPENDECTOMY      CARDIAC CATHETERIZATION      [facility] ~ 6-7 years ago    KIDNEY TRANSPLANT N/A 3/11/2024    Procedure: TRANSPLANT, KIDNEY;  Surgeon: Silvio Mayer MD;  Location: [facility] OR [address] [place] [address];  Service: Transplant;  Laterality: N/A;    RENAL BIOPSY      TONSILLECTOMY       Family History   Problem Relation Age of Onset    Depression Mother     Kidney disease Neg Hx     Cancer Neg Hx      Social History     Tobacco Use    Smoking status: Former     Current packs/day: 0.00     Types: Cigarettes     Quit date: 2016     Years since quittin.8    Smokeless tobacco: Never   Substance Use Topics    Alcohol use: Not Currently    Drug use: Never     Review of Systems    Physical Exam     Initial Vitals [24 0920]   BP Pulse Resp Temp SpO2   (!) 152/78 107 20 97.7 °F (36.5 °C) 100 %      MAP       --         Physical Exam    Nursing note and vitals reviewed.  Constitutional: He appears well-developed. No distress.   HENT:   Head:  Normocephalic and atraumatic.   Nose: Nose normal.   Eyes: Conjunctivae and EOM are normal. Pupils are equal, round, and reactive to light.   Neck: No JVD present.   Normal range of motion.  Cardiovascular:  Normal rate, regular rhythm and normal heart sounds.           Pulmonary/Chest: Breath sounds normal. No respiratory distress.   Abdominal: Abdomen is soft. He exhibits no distension. There is no abdominal tenderness.   Right lower quadrant incision dehiscence, erythema, tenderness to palpation   Musculoskeletal:         General: No tenderness or edema. Normal range of motion.      Cervical back: Normal range of motion.     Neurological: He is alert and oriented to person, place, and time. He has normal strength. No cranial nerve deficit.   Skin: Skin is warm and dry. Capillary refill takes less than 2 seconds.         ED Course   Procedures  Labs Reviewed   CULTURE, BLOOD   CULTURE, BLOOD   POCT GLUCOSE MONITORING CONTINUOUS          Imaging Results              US Transplant Kidney With Doppler (Final result)  Result time 04/08/24 12:38:35      Final result by Teresita Ohara MD (04/08/24 12:38:35)                   Impression:      Large fluid collection, 14.8 x 7.4 x 1.3 cm, just posterior to the anterior pelvic wall overlying the transplant kidney concerning for seroma, hematoma or abscess.    Small renal cysts, one  of which has a small peripheral calcification, unchanged from the prior study.    Satisfactory Doppler evaluation of the transplant kidney.      Electronically signed by: Teresita Ohara MD  Date:    04/08/2024  Time:    12:38               Narrative:    EXAMINATION:  US TRANSPLANT KIDNEY WITH DOPPLER    CLINICAL HISTORY:  wound infection/drainage;    TECHNIQUE:  Real time gray scale and doppler ultrasound was performed over the patient's renal allograft.    COMPARISON:  03/25/2024    FINDINGS:  Renal allograft in the right lower quadrant.  The allograft measures 12.9 cm. Normal  perfusion. No hydronephrosis.  There is a 1.2 cm cyst at the midpole.  There is a 1.7 cm cyst with a peripheral calcification, similar to the prior exam.    There is an elongated fluid collection underneath the anterior pelvic wall overlying the kidney transplant an area measuring 14.8 x 7.4 x 1.3 cm    Vasculature:    Inter lobar and segmental arterial resistive indices ranged from 0.67 to 0.77, previously 0.65 to 0.75.    Main renal artery peak systolic velocity: 200 cm/s with normal waveform, previously 314 cm/s .    Renal artery/iliac ratio: 1.3.    The main renal vein is patent.                                       Medications   ceFEPIme (MAXIPIME) 2 g in dextrose 5 % in water (D5W) 100 mL IVPB (MB+) (0 g Intravenous Stopped 4/8/24 1417)   vancomycin - pharmacy to dose (has no administration in time range)   vancomycin 1.75 g in 5 % dextrose 500 mL IVPB (1,750 mg Intravenous New Bag 4/8/24 1531)   sodium chloride 0.9% flush 3 mL (has no administration in time range)   ondansetron disintegrating tablet 8 mg (has no administration in time range)   melatonin tablet 6 mg (has no administration in time range)   acetaminophen tablet 650 mg (has no administration in time range)   heparin (porcine) injection 5,000 Units (5,000 Units Subcutaneous Not Given 4/8/24 1400)   0.9%  NaCl infusion ( Intravenous New Bag 4/8/24 1515)   albuterol inhaler 1 puff (has no administration in time range)   atorvastatin tablet 40 mg (has no administration in time range)   docusate sodium capsule 100 mg (has no administration in time range)   famotidine tablet 20 mg (has no administration in time range)   fluticasone furoate-vilanteroL 100-25 mcg/dose diskus inhaler 1 puff (has no administration in time range)   magnesium oxide tablet 400 mg (has no administration in time range)   predniSONE tablet 5 mg (has no administration in time range)   sodium bicarbonate tablet 1,300 mg (has no administration in time range)    sulfamethoxazole-trimethoprim 400-80mg per tablet 1 tablet (has no administration in time range)   tacrolimus capsule 2 mg (has no administration in time range)   tacrolimus capsule 1 mg (has no administration in time range)   valGANciclovir tablet 450 mg (has no administration in time range)   oxyCODONE immediate release tablet 5 mg (has no administration in time range)   oxyCODONE immediate release tablet Tab 10 mg (10 mg Oral Given 4/8/24 1614)   magnesium sulfate 2g in water 50mL IVPB (premix) (0 g Intravenous Stopped 4/8/24 1340)     Medical Decision Making  64-year-old male, with history of hyperlipidemia, hypertension, IgA nephropathy status post transplant, presents to the ED for wound check.  Patient is well-appearing, afebrile, hemodynamically stable.  Physical exam significant for incision wound open, with erythema and tenderness to palpation.  Differential including but not limited to incisional wound infection, dehiscence, intra-abdominal abscess, renal failure, rejection, doubt necrotizing fasciitis      Amount and/or Complexity of Data Reviewed  External Data Reviewed: labs.     Details: Renal function this morning with worsening HARRISON.    Risk  Decision regarding hospitalization.               ED Course as of 04/08/24 1629   Mon Apr 08, 2024   1034 Discussed with kidney transplant, they recommended renal ultrasound, on no additional labs or imaging is needed at this point.  They will evaluate patient at bedside. [NC]   1628 US Transplant Kidney With Doppler  Large fluid collection, 14.8 x 7.4 x 1.3 cm, just posterior to the anterior pelvic wall overlying the transplant kidney concerning for seroma, hematoma or abscess.      [NC]   1628 Patient admitted to transplant service for further management. [NC]      ED Course User Index  [NC] Anil Foley MD                           Clinical Impression:  Final diagnoses:  [Z48.89] Encounter for post surgical wound check (Primary)  [T81.30XA] Wound  dehiscence  [Z94.0] Kidney transplant recipient  [T81.49XA] Surgical wound infection          ED Disposition Condition    Admit Stable                Anil Foley MD  Resident  04/08/24 0604

## 2024-04-09 ENCOUNTER — ANESTHESIA (OUTPATIENT)
Dept: SURGERY | Facility: HOSPITAL | Age: 64
DRG: 857 | End: 2024-04-09
Payer: COMMERCIAL

## 2024-04-09 ENCOUNTER — ANESTHESIA EVENT (OUTPATIENT)
Dept: SURGERY | Facility: HOSPITAL | Age: 64
DRG: 857 | End: 2024-04-09
Payer: COMMERCIAL

## 2024-04-09 LAB
ALBUMIN SERPL BCP-MCNC: 3 G/DL (ref 3.5–5.2)
ALBUMIN SERPL BCP-MCNC: 3.3 G/DL (ref 3.5–5.2)
ANION GAP SERPL CALC-SCNC: 5 MMOL/L (ref 8–16)
ANION GAP SERPL CALC-SCNC: 7 MMOL/L (ref 8–16)
ANION GAP SERPL CALC-SCNC: 8 MMOL/L (ref 8–16)
BASOPHILS # BLD AUTO: 0.05 K/UL (ref 0–0.2)
BASOPHILS # BLD AUTO: 0.07 K/UL (ref 0–0.2)
BASOPHILS NFR BLD: 0.3 % (ref 0–1.9)
BASOPHILS NFR BLD: 0.8 % (ref 0–1.9)
BILIRUB UR QL STRIP: NEGATIVE
BUN SERPL-MCNC: 21 MG/DL (ref 8–23)
BUN SERPL-MCNC: 21 MG/DL (ref 8–23)
BUN SERPL-MCNC: 22 MG/DL (ref 8–23)
CALCIUM SERPL-MCNC: 9 MG/DL (ref 8.7–10.5)
CALCIUM SERPL-MCNC: 9.1 MG/DL (ref 8.7–10.5)
CALCIUM SERPL-MCNC: 9.3 MG/DL (ref 8.7–10.5)
CHLORIDE SERPL-SCNC: 104 MMOL/L (ref 95–110)
CHLORIDE SERPL-SCNC: 109 MMOL/L (ref 95–110)
CHLORIDE SERPL-SCNC: 110 MMOL/L (ref 95–110)
CLARITY UR REFRACT.AUTO: CLEAR
CO2 SERPL-SCNC: 22 MMOL/L (ref 23–29)
CO2 SERPL-SCNC: 23 MMOL/L (ref 23–29)
CO2 SERPL-SCNC: 23 MMOL/L (ref 23–29)
COLOR UR AUTO: YELLOW
CREAT SERPL-MCNC: 1.6 MG/DL (ref 0.5–1.4)
CREAT SERPL-MCNC: 1.7 MG/DL (ref 0.5–1.4)
CREAT SERPL-MCNC: 1.8 MG/DL (ref 0.5–1.4)
DIFFERENTIAL METHOD BLD: ABNORMAL
DIFFERENTIAL METHOD BLD: ABNORMAL
EOSINOPHIL # BLD AUTO: 0 K/UL (ref 0–0.5)
EOSINOPHIL # BLD AUTO: 0.2 K/UL (ref 0–0.5)
EOSINOPHIL NFR BLD: 0 % (ref 0–8)
EOSINOPHIL NFR BLD: 2.2 % (ref 0–8)
ERYTHROCYTE [DISTWIDTH] IN BLOOD BY AUTOMATED COUNT: 14 % (ref 11.5–14.5)
ERYTHROCYTE [DISTWIDTH] IN BLOOD BY AUTOMATED COUNT: 14.1 % (ref 11.5–14.5)
EST. GFR  (NO RACE VARIABLE): 41.3 ML/MIN/1.73 M^2
EST. GFR  (NO RACE VARIABLE): 44.2 ML/MIN/1.73 M^2
EST. GFR  (NO RACE VARIABLE): 47.5 ML/MIN/1.73 M^2
GLUCOSE SERPL-MCNC: 107 MG/DL (ref 70–110)
GLUCOSE SERPL-MCNC: 174 MG/DL (ref 70–110)
GLUCOSE SERPL-MCNC: 182 MG/DL (ref 70–110)
GLUCOSE UR QL STRIP: NEGATIVE
HCT VFR BLD AUTO: 38.1 % (ref 40–54)
HCT VFR BLD AUTO: 40.1 % (ref 40–54)
HGB BLD-MCNC: 11.2 G/DL (ref 14–18)
HGB BLD-MCNC: 11.7 G/DL (ref 14–18)
HGB UR QL STRIP: NEGATIVE
IMM GRANULOCYTES # BLD AUTO: 0.14 K/UL (ref 0–0.04)
IMM GRANULOCYTES # BLD AUTO: 0.18 K/UL (ref 0–0.04)
IMM GRANULOCYTES NFR BLD AUTO: 1.1 % (ref 0–0.5)
IMM GRANULOCYTES NFR BLD AUTO: 1.5 % (ref 0–0.5)
INR PPP: 0.9 (ref 0.8–1.2)
KETONES UR QL STRIP: NEGATIVE
LEUKOCYTE ESTERASE UR QL STRIP: NEGATIVE
LYMPHOCYTES # BLD AUTO: 0.4 K/UL (ref 1–4.8)
LYMPHOCYTES # BLD AUTO: 1 K/UL (ref 1–4.8)
LYMPHOCYTES NFR BLD: 10.5 % (ref 18–48)
LYMPHOCYTES NFR BLD: 2.6 % (ref 18–48)
MAGNESIUM SERPL-MCNC: 1.8 MG/DL (ref 1.6–2.6)
MCH RBC QN AUTO: 27.1 PG (ref 27–31)
MCH RBC QN AUTO: 27.5 PG (ref 27–31)
MCHC RBC AUTO-ENTMCNC: 29.2 G/DL (ref 32–36)
MCHC RBC AUTO-ENTMCNC: 29.4 G/DL (ref 32–36)
MCV RBC AUTO: 92 FL (ref 82–98)
MCV RBC AUTO: 94 FL (ref 82–98)
MONOCYTES # BLD AUTO: 0.4 K/UL (ref 0.3–1)
MONOCYTES # BLD AUTO: 0.6 K/UL (ref 0.3–1)
MONOCYTES NFR BLD: 2.5 % (ref 4–15)
MONOCYTES NFR BLD: 6 % (ref 4–15)
NEUTROPHILS # BLD AUTO: 14.8 K/UL (ref 1.8–7.7)
NEUTROPHILS # BLD AUTO: 7.2 K/UL (ref 1.8–7.7)
NEUTROPHILS NFR BLD: 79 % (ref 38–73)
NEUTROPHILS NFR BLD: 93.5 % (ref 38–73)
NITRITE UR QL STRIP: NEGATIVE
NRBC BLD-RTO: 0 /100 WBC
NRBC BLD-RTO: 0 /100 WBC
PH UR STRIP: 6 [PH] (ref 5–8)
PHOSPHATE SERPL-MCNC: 3.1 MG/DL (ref 2.7–4.5)
PHOSPHATE SERPL-MCNC: 3.1 MG/DL (ref 2.7–4.5)
PLATELET # BLD AUTO: 195 K/UL (ref 150–450)
PLATELET # BLD AUTO: 210 K/UL (ref 150–450)
PMV BLD AUTO: 9.5 FL (ref 9.2–12.9)
PMV BLD AUTO: 9.6 FL (ref 9.2–12.9)
POCT GLUCOSE: 171 MG/DL (ref 70–110)
POCT GLUCOSE: 217 MG/DL (ref 70–110)
POCT GLUCOSE: 310 MG/DL (ref 70–110)
POCT GLUCOSE: 394 MG/DL (ref 70–110)
POTASSIUM SERPL-SCNC: 4.7 MMOL/L (ref 3.5–5.1)
POTASSIUM SERPL-SCNC: 5.4 MMOL/L (ref 3.5–5.1)
POTASSIUM SERPL-SCNC: 5.6 MMOL/L (ref 3.5–5.1)
POTASSIUM SERPL-SCNC: 6 MMOL/L (ref 3.5–5.1)
PROT UR QL STRIP: ABNORMAL
PROTHROMBIN TIME: 9.8 SEC (ref 9–12.5)
RBC # BLD AUTO: 4.13 M/UL (ref 4.6–6.2)
RBC # BLD AUTO: 4.26 M/UL (ref 4.6–6.2)
SODIUM SERPL-SCNC: 135 MMOL/L (ref 136–145)
SODIUM SERPL-SCNC: 138 MMOL/L (ref 136–145)
SODIUM SERPL-SCNC: 138 MMOL/L (ref 136–145)
SP GR UR STRIP: 1.02 (ref 1–1.03)
TACROLIMUS BLD-MCNC: 7.7 NG/ML (ref 5–15)
URN SPEC COLLECT METH UR: ABNORMAL
VANCOMYCIN SERPL-MCNC: 8.3 UG/ML
WBC # BLD AUTO: 15.83 K/UL (ref 3.9–12.7)
WBC # BLD AUTO: 9.14 K/UL (ref 3.9–12.7)

## 2024-04-09 PROCEDURE — 25000003 PHARM REV CODE 250

## 2024-04-09 PROCEDURE — 27201423 OPTIME MED/SURG SUP & DEVICES STERILE SUPPLY: Performed by: TRANSPLANT SURGERY

## 2024-04-09 PROCEDURE — 0W3G0ZZ CONTROL BLEEDING IN PERITONEAL CAVITY, OPEN APPROACH: ICD-10-PCS | Performed by: TRANSPLANT SURGERY

## 2024-04-09 PROCEDURE — 80069 RENAL FUNCTION PANEL: CPT | Performed by: CLINICAL NURSE SPECIALIST

## 2024-04-09 PROCEDURE — 63600175 PHARM REV CODE 636 W HCPCS: Performed by: NURSE ANESTHETIST, CERTIFIED REGISTERED

## 2024-04-09 PROCEDURE — 80202 ASSAY OF VANCOMYCIN: CPT | Performed by: INTERNAL MEDICINE

## 2024-04-09 PROCEDURE — 25000003 PHARM REV CODE 250: Performed by: INTERNAL MEDICINE

## 2024-04-09 PROCEDURE — 37000008 HC ANESTHESIA 1ST 15 MINUTES: Performed by: TRANSPLANT SURGERY

## 2024-04-09 PROCEDURE — 25000003 PHARM REV CODE 250: Performed by: NURSE ANESTHETIST, CERTIFIED REGISTERED

## 2024-04-09 PROCEDURE — 94761 N-INVAS EAR/PLS OXIMETRY MLT: CPT

## 2024-04-09 PROCEDURE — 71000033 HC RECOVERY, INTIAL HOUR: Performed by: TRANSPLANT SURGERY

## 2024-04-09 PROCEDURE — 84132 ASSAY OF SERUM POTASSIUM: CPT

## 2024-04-09 PROCEDURE — 81003 URINALYSIS AUTO W/O SCOPE: CPT

## 2024-04-09 PROCEDURE — 36000706: Performed by: TRANSPLANT SURGERY

## 2024-04-09 PROCEDURE — 99233 SBSQ HOSP IP/OBS HIGH 50: CPT | Mod: 57,,,

## 2024-04-09 PROCEDURE — 36415 COLL VENOUS BLD VENIPUNCTURE: CPT | Mod: XB

## 2024-04-09 PROCEDURE — 63600175 PHARM REV CODE 636 W HCPCS

## 2024-04-09 PROCEDURE — 36415 COLL VENOUS BLD VENIPUNCTURE: CPT | Performed by: INTERNAL MEDICINE

## 2024-04-09 PROCEDURE — 83735 ASSAY OF MAGNESIUM: CPT | Performed by: CLINICAL NURSE SPECIALIST

## 2024-04-09 PROCEDURE — 25000242 PHARM REV CODE 250 ALT 637 W/ HCPCS: Performed by: CLINICAL NURSE SPECIALIST

## 2024-04-09 PROCEDURE — 80069 RENAL FUNCTION PANEL: CPT | Mod: 91

## 2024-04-09 PROCEDURE — 93010 ELECTROCARDIOGRAM REPORT: CPT | Mod: ,,, | Performed by: INTERNAL MEDICINE

## 2024-04-09 PROCEDURE — 27000207 HC ISOLATION

## 2024-04-09 PROCEDURE — 0WQF0ZZ REPAIR ABDOMINAL WALL, OPEN APPROACH: ICD-10-PCS | Performed by: TRANSPLANT SURGERY

## 2024-04-09 PROCEDURE — 87086 URINE CULTURE/COLONY COUNT: CPT

## 2024-04-09 PROCEDURE — 85610 PROTHROMBIN TIME: CPT | Performed by: INTERNAL MEDICINE

## 2024-04-09 PROCEDURE — 36000707: Performed by: TRANSPLANT SURGERY

## 2024-04-09 PROCEDURE — 63600175 PHARM REV CODE 636 W HCPCS: Performed by: CLINICAL NURSE SPECIALIST

## 2024-04-09 PROCEDURE — 93005 ELECTROCARDIOGRAM TRACING: CPT

## 2024-04-09 PROCEDURE — 51798 US URINE CAPACITY MEASURE: CPT

## 2024-04-09 PROCEDURE — 35840 EXPLORE ABDOMINAL VESSELS: CPT | Mod: 78,,, | Performed by: TRANSPLANT SURGERY

## 2024-04-09 PROCEDURE — D9220A PRA ANESTHESIA: Mod: CRNA,,, | Performed by: NURSE ANESTHETIST, CERTIFIED REGISTERED

## 2024-04-09 PROCEDURE — 80197 ASSAY OF TACROLIMUS: CPT | Performed by: CLINICAL NURSE SPECIALIST

## 2024-04-09 PROCEDURE — 20600001 HC STEP DOWN PRIVATE ROOM

## 2024-04-09 PROCEDURE — 85025 COMPLETE CBC W/AUTO DIFF WBC: CPT

## 2024-04-09 PROCEDURE — 99900035 HC TECH TIME PER 15 MIN (STAT)

## 2024-04-09 PROCEDURE — 0JQ80ZZ REPAIR ABDOMEN SUBCUTANEOUS TISSUE AND FASCIA, OPEN APPROACH: ICD-10-PCS | Performed by: TRANSPLANT SURGERY

## 2024-04-09 PROCEDURE — 71000016 HC POSTOP RECOV ADDL HR: Performed by: TRANSPLANT SURGERY

## 2024-04-09 PROCEDURE — 71000015 HC POSTOP RECOV 1ST HR: Performed by: TRANSPLANT SURGERY

## 2024-04-09 PROCEDURE — 80048 BASIC METABOLIC PNL TOTAL CA: CPT

## 2024-04-09 PROCEDURE — D9220A PRA ANESTHESIA: Mod: ANES,,, | Performed by: SURGERY

## 2024-04-09 PROCEDURE — 63600175 PHARM REV CODE 636 W HCPCS: Performed by: SURGERY

## 2024-04-09 PROCEDURE — 63600175 PHARM REV CODE 636 W HCPCS: Performed by: INTERNAL MEDICINE

## 2024-04-09 PROCEDURE — 94640 AIRWAY INHALATION TREATMENT: CPT

## 2024-04-09 PROCEDURE — 85025 COMPLETE CBC W/AUTO DIFF WBC: CPT | Mod: 91 | Performed by: CLINICAL NURSE SPECIALIST

## 2024-04-09 PROCEDURE — 37000009 HC ANESTHESIA EA ADD 15 MINS: Performed by: TRANSPLANT SURGERY

## 2024-04-09 RX ORDER — KETAMINE HCL IN 0.9 % NACL 50 MG/5 ML
SYRINGE (ML) INTRAVENOUS
Status: DISCONTINUED | OUTPATIENT
Start: 2024-04-09 | End: 2024-04-09

## 2024-04-09 RX ORDER — SODIUM CHLORIDE 0.9 % (FLUSH) 0.9 %
10 SYRINGE (ML) INJECTION
Status: DISCONTINUED | OUTPATIENT
Start: 2024-04-09 | End: 2024-04-09 | Stop reason: HOSPADM

## 2024-04-09 RX ORDER — FUROSEMIDE 10 MG/ML
60 INJECTION INTRAMUSCULAR; INTRAVENOUS ONCE
Status: COMPLETED | OUTPATIENT
Start: 2024-04-09 | End: 2024-04-09

## 2024-04-09 RX ORDER — ROCURONIUM BROMIDE 10 MG/ML
INJECTION, SOLUTION INTRAVENOUS
Status: DISCONTINUED | OUTPATIENT
Start: 2024-04-09 | End: 2024-04-09

## 2024-04-09 RX ORDER — PROPOFOL 10 MG/ML
VIAL (ML) INTRAVENOUS
Status: DISCONTINUED | OUTPATIENT
Start: 2024-04-09 | End: 2024-04-09

## 2024-04-09 RX ORDER — MIDAZOLAM HYDROCHLORIDE 1 MG/ML
INJECTION INTRAMUSCULAR; INTRAVENOUS
Status: DISCONTINUED | OUTPATIENT
Start: 2024-04-09 | End: 2024-04-09

## 2024-04-09 RX ORDER — HYDROMORPHONE HYDROCHLORIDE 2 MG/ML
INJECTION, SOLUTION INTRAMUSCULAR; INTRAVENOUS; SUBCUTANEOUS
Status: DISCONTINUED | OUTPATIENT
Start: 2024-04-09 | End: 2024-04-09

## 2024-04-09 RX ORDER — PHENYLEPHRINE HCL IN 0.9% NACL 1 MG/10 ML
SYRINGE (ML) INTRAVENOUS
Status: DISCONTINUED | OUTPATIENT
Start: 2024-04-09 | End: 2024-04-09

## 2024-04-09 RX ORDER — CALCIUM GLUCONATE 20 MG/ML
1 INJECTION, SOLUTION INTRAVENOUS ONCE
Status: COMPLETED | OUTPATIENT
Start: 2024-04-09 | End: 2024-04-09

## 2024-04-09 RX ORDER — FENTANYL CITRATE 50 UG/ML
INJECTION, SOLUTION INTRAMUSCULAR; INTRAVENOUS
Status: DISCONTINUED | OUTPATIENT
Start: 2024-04-09 | End: 2024-04-09

## 2024-04-09 RX ORDER — HYDROMORPHONE HYDROCHLORIDE 1 MG/ML
0.2 INJECTION, SOLUTION INTRAMUSCULAR; INTRAVENOUS; SUBCUTANEOUS EVERY 5 MIN PRN
Status: DISCONTINUED | OUTPATIENT
Start: 2024-04-09 | End: 2024-04-09 | Stop reason: HOSPADM

## 2024-04-09 RX ORDER — FENTANYL CITRATE 50 UG/ML
25 INJECTION, SOLUTION INTRAMUSCULAR; INTRAVENOUS EVERY 5 MIN PRN
Status: COMPLETED | OUTPATIENT
Start: 2024-04-09 | End: 2024-04-09

## 2024-04-09 RX ORDER — LIDOCAINE HYDROCHLORIDE 20 MG/ML
INJECTION INTRAVENOUS
Status: DISCONTINUED | OUTPATIENT
Start: 2024-04-09 | End: 2024-04-09

## 2024-04-09 RX ORDER — ONDANSETRON HYDROCHLORIDE 2 MG/ML
INJECTION, SOLUTION INTRAVENOUS
Status: DISCONTINUED | OUTPATIENT
Start: 2024-04-09 | End: 2024-04-09

## 2024-04-09 RX ORDER — DEXAMETHASONE SODIUM PHOSPHATE 4 MG/ML
INJECTION, SOLUTION INTRA-ARTICULAR; INTRALESIONAL; INTRAMUSCULAR; INTRAVENOUS; SOFT TISSUE
Status: DISCONTINUED | OUTPATIENT
Start: 2024-04-09 | End: 2024-04-09

## 2024-04-09 RX ORDER — CALCIUM GLUCONATE 20 MG/ML
1 INJECTION, SOLUTION INTRAVENOUS EVERY 10 MIN PRN
Status: DISCONTINUED | OUTPATIENT
Start: 2024-04-09 | End: 2024-04-20

## 2024-04-09 RX ADMIN — Medication 400 MG: at 09:04

## 2024-04-09 RX ADMIN — FLUTICASONE FUROATE AND VILANTEROL TRIFENATATE 1 PUFF: 100; 25 POWDER RESPIRATORY (INHALATION) at 07:04

## 2024-04-09 RX ADMIN — CALCIUM GLUCONATE 1 G: 20 INJECTION, SOLUTION INTRAVENOUS at 07:04

## 2024-04-09 RX ADMIN — FAMOTIDINE 20 MG: 20 TABLET ORAL at 09:04

## 2024-04-09 RX ADMIN — CEFEPIME 2 G: 2 INJECTION, POWDER, FOR SOLUTION INTRAVENOUS at 12:04

## 2024-04-09 RX ADMIN — DEXTROSE MONOHYDRATE 500 ML: 100 INJECTION, SOLUTION INTRAVENOUS at 07:04

## 2024-04-09 RX ADMIN — HYDROMORPHONE HYDROCHLORIDE 0.2 MG: 1 INJECTION, SOLUTION INTRAMUSCULAR; INTRAVENOUS; SUBCUTANEOUS at 12:04

## 2024-04-09 RX ADMIN — PREDNISONE 5 MG: 5 TABLET ORAL at 09:04

## 2024-04-09 RX ADMIN — FUROSEMIDE 60 MG: 10 INJECTION, SOLUTION INTRAMUSCULAR; INTRAVENOUS at 05:04

## 2024-04-09 RX ADMIN — SODIUM CHLORIDE: 9 INJECTION, SOLUTION INTRAVENOUS at 07:04

## 2024-04-09 RX ADMIN — FENTANYL CITRATE 100 MCG: 50 INJECTION, SOLUTION INTRAMUSCULAR; INTRAVENOUS at 10:04

## 2024-04-09 RX ADMIN — INSULIN HUMAN 9.27 UNITS: 100 INJECTION, SOLUTION PARENTERAL at 08:04

## 2024-04-09 RX ADMIN — FENTANYL CITRATE 25 MCG: 50 INJECTION INTRAMUSCULAR; INTRAVENOUS at 01:04

## 2024-04-09 RX ADMIN — Medication 100 MCG: at 11:04

## 2024-04-09 RX ADMIN — CEFEPIME 2 G: 2 INJECTION, POWDER, FOR SOLUTION INTRAVENOUS at 02:04

## 2024-04-09 RX ADMIN — HEPARIN SODIUM 5000 UNITS: 5000 INJECTION INTRAVENOUS; SUBCUTANEOUS at 02:04

## 2024-04-09 RX ADMIN — TACROLIMUS 1 MG: 1 CAPSULE ORAL at 05:04

## 2024-04-09 RX ADMIN — Medication 30 MG: at 10:04

## 2024-04-09 RX ADMIN — HYDROMORPHONE HYDROCHLORIDE 0.4 MG: 2 INJECTION INTRAMUSCULAR; INTRAVENOUS; SUBCUTANEOUS at 11:04

## 2024-04-09 RX ADMIN — TACROLIMUS 2 MG: 1 CAPSULE ORAL at 08:04

## 2024-04-09 RX ADMIN — ATORVASTATIN CALCIUM 40 MG: 20 TABLET, FILM COATED ORAL at 09:04

## 2024-04-09 RX ADMIN — ACETAMINOPHEN 650 MG: 325 TABLET ORAL at 09:04

## 2024-04-09 RX ADMIN — HYDROMORPHONE HYDROCHLORIDE 0.6 MG: 2 INJECTION INTRAMUSCULAR; INTRAVENOUS; SUBCUTANEOUS at 11:04

## 2024-04-09 RX ADMIN — DEXAMETHASONE SODIUM PHOSPHATE 4 MG: 4 INJECTION, SOLUTION INTRAMUSCULAR; INTRAVENOUS at 10:04

## 2024-04-09 RX ADMIN — SUGAMMADEX 200 MG: 100 INJECTION, SOLUTION INTRAVENOUS at 11:04

## 2024-04-09 RX ADMIN — LIDOCAINE HYDROCHLORIDE 100 MG: 20 INJECTION INTRAVENOUS at 10:04

## 2024-04-09 RX ADMIN — SODIUM CHLORIDE: 9 INJECTION, SOLUTION INTRAVENOUS at 03:04

## 2024-04-09 RX ADMIN — OXYCODONE HYDROCHLORIDE 10 MG: 10 TABLET ORAL at 02:04

## 2024-04-09 RX ADMIN — SODIUM BICARBONATE 1300 MG: 650 TABLET ORAL at 09:04

## 2024-04-09 RX ADMIN — VANCOMYCIN HYDROCHLORIDE 1500 MG: 1.5 INJECTION, POWDER, LYOPHILIZED, FOR SOLUTION INTRAVENOUS at 09:04

## 2024-04-09 RX ADMIN — PROPOFOL 150 MG: 10 INJECTION, EMULSION INTRAVENOUS at 10:04

## 2024-04-09 RX ADMIN — ROCURONIUM BROMIDE 50 MG: 10 INJECTION, SOLUTION INTRAVENOUS at 10:04

## 2024-04-09 RX ADMIN — Medication 100 MCG: at 10:04

## 2024-04-09 RX ADMIN — HEPARIN SODIUM 5000 UNITS: 5000 INJECTION INTRAVENOUS; SUBCUTANEOUS at 09:04

## 2024-04-09 RX ADMIN — ONDANSETRON 4 MG: 2 INJECTION INTRAMUSCULAR; INTRAVENOUS at 10:04

## 2024-04-09 RX ADMIN — SODIUM CHLORIDE 500 ML: 9 INJECTION, SOLUTION INTRAVENOUS at 08:04

## 2024-04-09 RX ADMIN — OXYCODONE 5 MG: 5 TABLET ORAL at 09:04

## 2024-04-09 RX ADMIN — SODIUM CHLORIDE: 0.9 INJECTION, SOLUTION INTRAVENOUS at 10:04

## 2024-04-09 RX ADMIN — MIDAZOLAM HYDROCHLORIDE 2 MG: 2 INJECTION, SOLUTION INTRAMUSCULAR; INTRAVENOUS at 10:04

## 2024-04-09 RX ADMIN — ROCURONIUM BROMIDE 30 MG: 10 INJECTION, SOLUTION INTRAVENOUS at 11:04

## 2024-04-09 RX ADMIN — FUROSEMIDE 60 MG: 10 INJECTION, SOLUTION INTRAMUSCULAR; INTRAVENOUS at 09:04

## 2024-04-09 RX ADMIN — OXYCODONE 5 MG: 5 TABLET ORAL at 02:04

## 2024-04-09 NOTE — ANESTHESIA PREPROCEDURE EVALUATION
[facility]  Anesthesia Pre-Operative Evaluation         Patient Name: Colten Monet  YOB: 1960  MRN: [id number]    SUBJECTIVE:     Pre-operative evaluation for Procedure(s) (LRB):  LAPAROTOMY, EXPLORATORY (N/A)     04/09/2024    Colten Monet is a 64 y.o. male w/ a significant PMHx of obesity, CVA (2020), GERD, Hypertension, COPD, ESRD 2/2 IgA nephropathy s/p living related Ktxp on 3/11/24 that presented to clinic on 4/3 with serous drainage from RLQ kidney txp incision and admitted for worsening erythema. CT A/P with incisional hernia and soft tissue stranding.     Patient now presents for the above procedure(s).    Echo Summary 12/27/24:    Left Ventricle: There is normal systolic function with a visually estimated ejection fraction of 55 - 60%.    Right Ventricle: Normal right ventricular cavity size. Systolic function is normal.    Aortic Valve: There is mild aortic valve sclerosis.    Pulmonary Artery: The estimated pulmonary artery systolic pressure is 12 mmHg.    IVC/SVC: Normal venous pressure at 3 mmHg.       Prev airway:   Date/Time: 3/11/2024 8:58 AM     Performed by: Fredrick Carranza MD  Authorized by: William Beebe MD    Intubation:     Induction:  Intravenous    Intubated:  Postinduction    Mask Ventilation:  Easy with oral airway    Attempts:  1    Attempted By:  Resident anesthesiologist    Method of Intubation:  Direct    Blade:  Agustin 3    Laryngeal View Grade: Grade I - full view of cords      Difficult Airway Encountered?: No      Complications:  None    Airway Device:  Oral endotracheal tube    Airway Device Size:  7.5    Style/Cuff Inflation:  Cuffed (inflated to minimal occlusive pressure)    Tube secured:  23    Secured at:  The lips    Placement Verified By:  Capnometry    Findings Post-Intubation:  BS equal bilateral and atraumatic/condition of teeth unchanged    LDA:        Peripheral IV - Single Lumen 04/08/24 1116 20 G Right Antecubital  (Active)   Site Assessment Clean;Intact;Dry;No redness;No swelling 04/08/24 1945   Extremity Assessment Distal to IV No warmth;No swelling;No redness;No abnormal discoloration 04/08/24 1945   Line Status Infusing 04/08/24 1945   Dressing Status Clean;Dry;Intact 04/08/24 1945   Dressing Intervention Integrity maintained 04/08/24 1945   Dressing Change Due 04/12/24 04/08/24 1945   Site Change Due 04/12/24 04/08/24 1945   Reason Not Rotated Not due 04/08/24 1945   Number of days: 0       Drips:    sodium chloride 0.9% 100 mL/hr at 04/09/24 0545       Patient Active Problem List   Diagnosis    Immunosuppression    Nephrotic syndrome with lesion of proliferative glomerulonephritis    IgA nephropathy    Hypertensive chronic kidney disease with stage 1 through stage 4 chronic kidney disease, or unspecified chronic kidney disease    Cough    Reactive airways dysfunction syndrome    S/P living-donor kidney transplantation    Long-term use of immunosuppressant medication    At risk for opportunistic infections    Prophylactic immunotherapy    Stage 2 chronic kidney disease    Surgical wound infection    Dyslipidemia    History of COPD    Hypomagnesemia    HARRISON (acute kidney injury)    Anemia of chronic disease    Encounter for post surgical wound check       Review of patient's allergies indicates:   Allergen Reactions    Codeine Hives     Reaction to Tylenol #3       Current Inpatient Medications:   atorvastatin  40 mg Oral QHS    ceFEPime IV (PEDS and ADULTS)  2 g Intravenous Q12H    famotidine  20 mg Oral QHS    fluticasone furoate-vilanteroL  1 puff Inhalation Daily    heparin (porcine)  5,000 Units Subcutaneous Q8H    magnesium oxide  400 mg Oral BID    predniSONE  5 mg Oral Daily    sodium bicarbonate  1,300 mg Oral BID    sulfamethoxazole-trimethoprim 400-80mg  1 tablet Oral QAM    tacrolimus  1 mg Oral Daily PM    tacrolimus  2 mg Oral Daily AM    valGANciclovir  450 mg Oral QAM       No current facility-administered  medications on file prior to encounter.     Current Outpatient Medications on File Prior to Encounter   Medication Sig Dispense Refill    albuterol (PROVENTIL/VENTOLIN HFA) 90 mcg/actuation inhaler Inhale 1-2 puffs into the lungs every 6 (six) hours as needed for Wheezing. Rescue 18 g 1    aspirin (ECOTRIN) 81 MG EC tablet Take 1 tablet (81 mg total) by mouth once daily. 30 tablet 11    atorvastatin (LIPITOR) 40 MG tablet Take 1 tablet by mouth every evening.      benzonatate (TESSALON) 100 MG capsule Take 1 capsule (100 mg total) by mouth 3 (three) times daily. 90 capsule 1    bisacodyL (DULCOLAX) 5 mg EC tablet Take 2 tablets (10 mg total) by mouth daily as needed for Constipation.  0    docusate sodium (COLACE) 100 MG capsule Take 1 capsule (100 mg total) by mouth 3 (three) times daily as needed for Constipation.  0    doxycycline (VIBRAMYCIN) 100 MG Cap Take 1 capsule (100 mg total) by mouth every 12 (twelve) hours. 14 capsule 0    famotidine (PEPCID) 20 MG tablet Take 1 tablet (20 mg total) by mouth every evening. 30 tablet 0    fluticasone furoate-vilanteroL (BREO ELLIPTA) 100-25 mcg/dose diskus inhaler Inhale 1 puff into the lungs once daily. Controller 30 each 11    furosemide (LASIX) 20 MG tablet Take 1 tablet (20 mg total) by mouth once daily. 30 tablet 0    magnesium oxide (MAG-OX) 400 mg (241.3 mg magnesium) tablet Take 1 tablet (400 mg total) by mouth 2 (two) times daily. 60 tablet 11    mycophenolate (CELLCEPT) 250 mg Cap Take 4 capsules (1,000 mg total) by mouth 2 (two) times daily. 240 capsule 11    predniSONE (DELTASONE) 5 MG tablet Take 20 mg BY MOUTH daily 3/14-3/20; 15 mg daily 3/21-3/27; 10 mg daily 3/28-4/3/2024; then 5 mg daily thereafter 4/4/24 120 tablet 11    sodium bicarbonate 650 MG tablet Take 2 tablets (1,300 mg total) by mouth 2 (two) times daily. 120 tablet 5    sulfamethoxazole-trimethoprim 400-80mg (BACTRIM,SEPTRA) 400-80 mg per tablet Take 1 tablet by mouth every morning. Stop:  9/7/24 30 tablet 5    tacrolimus (PROGRAF) 0.5 MG Cap Take 1 capsule (0.5 mg total) by mouth every evening. 30 capsule 11    tacrolimus (PROGRAF) 1 MG Cap Take 2 capsules (2 mg total) by mouth every morning AND 1 capsule (1 mg total) every evening. 90 capsule 11    valGANciclovir (VALCYTE) 450 mg Tab Take 2 tablets (900 mg total) by mouth every morning. Stop: 6/9/24 60 tablet 2       Past Surgical History:   Procedure Laterality Date    APPENDECTOMY      CARDIAC CATHETERIZATION      [place] ~ 6-7 years ago    KIDNEY TRANSPLANT N/A 3/11/2024    Procedure: TRANSPLANT, KIDNEY;  Surgeon: Silvio Mayer MD;  Location: [facility] OR [address] [place] [address];  Service: Transplant;  Laterality: N/A;    RENAL BIOPSY      TONSILLECTOMY         Social History:  Tobacco Use: Medium Risk (4/8/2024)    Patient History     Smoking Tobacco Use: Former     Smokeless Tobacco Use: Never     Passive Exposure: Not on file      Alcohol Use: Not on file        OBJECTIVE:     Vital Signs Range (Last 24H):  Temp:  [36.3 °C (97.4 °F)-36.7 °C (98.1 °F)]   Pulse:  []   Resp:  [14-20]   BP: (133-175)/()   SpO2:  [95 %-100 %]       Significant Labs:  Lab Results   Component Value Date    WBC 10.36 04/08/2024    HGB 12.3 (L) 04/08/2024    HCT 41.8 04/08/2024     04/08/2024    CHOL 138 02/28/2024    TRIG 59 02/28/2024    HDL 39 (L) 02/28/2024    ALT 12 02/28/2024    AST 15 02/28/2024     04/08/2024    K 4.8 04/08/2024     04/08/2024    CREATININE 1.5 (H) 04/08/2024    BUN 20 04/08/2024    CO2 23 04/08/2024    PSA 1.6 09/21/2023    INR 0.9 03/11/2024    HGBA1C 6.0 (H) 05/05/2020       Diagnostic Studies: No relevant studies.    EKG:   Results for orders placed or performed in visit on 03/22/24   EKG 12-lead    Collection Time: 03/22/24 12:07 PM   Result Value Ref Range    QRS Duration 74 ms    [facility] QTC Calculation 421 ms    Narrative    Test Reason :     Vent. Rate : 098 BPM     Atrial Rate : 098 BPM     P-R Int : 114 ms          QRS Dur :  074 ms      QT Int : 330 ms       P-R-T Axes : 066 069 059 degrees     QTc Int : 421 ms    Normal sinus rhythm  Normal ECG  When compared with ECG of 07-MAR-2024 11:39,  No significant change was found  Confirmed by Tenisha Hernandez MD ([id number]) on 3/22/2024 1:33:40 PM    Referred By: System System           Confirmed By:Tenisha Hernandez MD       2D ECHO:  TTE:  Results for orders placed or performed during the hospital encounter of 12/27/23   Echo Saline Bubble? No   Result Value Ref Range    LVOT stroke volume 60.49 cm3    LVIDd 4.80 3.5 - 6.0 cm    LV Systolic Volume 52.04 mL    LVIDs 3.53 2.1 - 4.0 cm    LV Diastolic Volume 107.48 mL    IVS 0.89 0.6 - 1.1 cm    LVOT diameter 2.03 cm    LVOT area 3.2 cm2    FS 26 (A) 28 - 44 %    Left Ventricle Relative Wall Thickness 0.42 cm    Posterior Wall 1.01 0.6 - 1.1 cm    LV mass 158.82 g    MV Peak E Shaun 0.63 m/s    TDI LATERAL 0.07 m/s    TDI SEPTAL 0.04 m/s    E/E' ratio 11.45 m/s    MV Peak A Shaun 0.80 m/s    TR Max Shaun 1.53 m/s    E/A ratio 0.79     IVRT 85.63 msec    E wave deceleration time 179.07 msec    LV SEPTAL E/E' RATIO 15.75 m/s    LV LATERAL E/E' RATIO 9.00 m/s    PV Peak S Shaun 0.57 m/s    PV Peak D Shaun 0.33 m/s    Pulm vein S/D ratio 1.73     LVOT peak shaun 1.00 m/s    Left Ventricular Outflow Tract Mean Velocity 0.72 cm/s    Left Ventricular Outflow Tract Mean Gradient 2.36 mmHg    RVDD 2.74 cm    RV S' 13.76 cm/s    TAPSE 1.99 cm    LA size 3.48 cm    Left Atrium Minor Axis 4.91 cm    Left Atrium Major Axis 4.52 cm    RA Major Axis 4.19 cm    AV mean gradient 5 mmHg    AV peak gradient 10 mmHg    Ao peak shaun 1.57 m/s    Ao VTI 25.60 cm    LVOT peak VTI 18.70 cm    AV valve area 2.36 cm²    AV Velocity Ratio 0.64     AV index (prosthetic) 0.73     KYLIE by Velocity Ratio 2.06 cm²    MV mean gradient 1 mmHg    MV peak gradient 3 mmHg    MV stenosis pressure 1/2 time 51.93 ms    MV valve area p 1/2 method 4.24 cm2    MV valve area by continuity eq 3.69 cm2    MV  VTI 16.4 cm    Triscuspid Valve Regurgitation Peak Gradient 9 mmHg    PV PEAK VELOCITY 1.17 m/s    PV peak gradient 5 mmHg    Sinus 3.04 cm    STJ 2.15 cm    Ascending aorta 2.71 cm    IVC diameter 1.78 cm    Mean e' 0.06 m/s    LA volume 50.12 cm3    LA WIDTH 3.6 cm    RA Width 3.0 cm    TV resting pulmonary artery pressure 12 mmHg    RV TB RVSP 5 mmHg    Est. RA pres 3 mmHg    Narrative      Left Ventricle: There is normal systolic function with a visually   estimated ejection fraction of 55 - 60%.    Right Ventricle: Normal right ventricular cavity size. Systolic   function is normal.    Aortic Valve: There is mild aortic valve sclerosis.    Pulmonary Artery: The estimated pulmonary artery systolic pressure is   12 mmHg.    IVC/SVC: Normal venous pressure at 3 mmHg.         GERALDO:  No results found for this or any previous visit.    ASSESSMENT/PLAN:           Pre-op Assessment          Review of Systems      Physical Exam  General: Well nourished, Cooperative, Alert and Oriented    Airway:  Mallampati: II / II  Mouth Opening: Normal  TM Distance: Normal  Tongue: Normal  Neck ROM: Normal ROM    Dental:  Intact        Anesthesia Plan  Type of Anesthesia, risks & benefits discussed:    Anesthesia Type: Gen ETT  Intra-op Monitoring Plan: Standard ASA Monitors  Post Op Pain Control Plan: multimodal analgesia and IV/PO Opioids PRN  Induction:  IV  Airway Plan: Direct and Video, Post-Induction  Informed Consent: Informed consent signed with the Patient and all parties understand the risks and agree with anesthesia plan.  All questions answered. Patient consented to blood products? Yes  ASA Score: 3  Day of Surgery Review of History & Physical: H&P Update referred to the surgeon/provider.    Ready For Surgery From Anesthesia Perspective.     .

## 2024-04-09 NOTE — PROGRESS NOTES
[facility] ([facility])  Kidney Transplant  Progress Note      Reason for Follow-up: Reassessment of Kidney Transplant - 3/11/2024  (#1) recipient and management of immunosuppression.    ORGAN: LEFT KIDNEY    Donor Type: Living    Donor CMV Status:   Donor HBcAB:  Donor HCV Status:  Donor HBV ANA:   Donor HCV ANA:       Subjective:   History of Present Illness:  Mr. Colten Monet is a 65 yo male w/ PMHx of ESRD d/t IgA nephropathy, now S/P living related Ktxp on 3/11/24. Patient progressed well during initial post-txp course and discharged POD#2 with downtrending Cr. Now with BL Cr 1.0-1.1.     Patient recently seen in transplant surgery clinic on 4/3 and noted to have serous drainage from RLQ kidney txp incision along with palpable erythema. Staples intact w/ minimal tenderness to deep palpation at that time. Incision probed w/ 200 cc serous fluid drained in clinic by surgical fellow. Patient was placed on PO for cellulitis. He now presents to ED for worsening erythema to incisional area (see media for photo). Staples remain intact, erythema noted to entire length of incision, worse in upper portion. Small 1-2 cm area of yellow slough in upper portion incision. Minimal serous drainage expressed upon probing. Patient reports mild tenderness to deep palpation. Kidney US in ED w/ large 14.8 x 7.4 x 1.3 cm, just posterior to the anterior pelvic wall overlying the transplant kidney. Reviewed plan with Dr. Eduardo and Dr. Whyte. CT A/P non-con ordered along with IV abx. Staples removed at bedside, small 1-2 cm area of wound dehiscence noted, moderate amount serous fluid able to be expressed from incision. Mild HARRISON on admit (Cr 1.5). NS bolus ordered.         Mr. Monet is a 64 y.o. year old male who is status post Kidney Transplant - 3/11/2024  (#1).  His maintenance immunosuppression consists of:   Immunosuppressants (From admission, onward)      Start     Stop Route Frequency Ordered    04/09/24 0800   tacrolimus capsule 2 mg         -- Oral Every morning 04/08/24 1517    04/08/24 1800  tacrolimus capsule 1 mg         -- Oral Every evening 04/08/24 1517            Hospital Course:  Patient admitted for worsening cellulitis of kidney transplant incision, incisional hernia, and HARRISON.  CT abd/pelvis with RLQ transplant kidney with incisional hernia containing distal ileum. The large fluid collection interposed between the kidney and anterior abdominal wall drained at patient's bedside. Mild to moderate soft tissue stranding and trace fluid directly adjacent to the transplant kidney. CT reviewed by surgeon. Patient made NPO for surgical intervention. IVFs started. Started on cefepime and vancomycin.     Interval History: NAEON. Patient reports feeling well this morning. NPO for OR take back this morning for incisional dehiscence and possible incisional hernia repair. Infectious work up negative thus far. Continue cefepime and vanc. Cr up to 1.6. Continue IVFs. All VSS. Cont to monitor.       Past Medical, Surgical, Family, and Social History:   Unchanged from H&P.    Scheduled Meds:   atorvastatin  40 mg Oral QHS    ceFEPime IV (PEDS and ADULTS)  2 g Intravenous Q12H    famotidine  20 mg Oral QHS    fluticasone furoate-vilanteroL  1 puff Inhalation Daily    heparin (porcine)  5,000 Units Subcutaneous Q8H    magnesium oxide  400 mg Oral BID    predniSONE  5 mg Oral Daily    sodium bicarbonate  1,300 mg Oral BID    sulfamethoxazole-trimethoprim 400-80mg  1 tablet Oral QAM    tacrolimus  1 mg Oral Daily PM    tacrolimus  2 mg Oral Daily AM    valGANciclovir  450 mg Oral QAM     Continuous Infusions:   sodium chloride 0.9% 100 mL/hr at 04/09/24 0700     PRN Meds:acetaminophen, albuterol, docusate sodium, melatonin, ondansetron, oxyCODONE, oxyCODONE, sodium chloride 0.9%, Pharmacy to dose Vancomycin consult **AND** vancomycin - pharmacy to dose    Intake/Output - Last 3 Shifts         04/07 0700  04/08 0659 04/08  "0700  04/09 0659 04/09 0700  04/10 0659    P.O.  822     I.V. (mL/kg)  1668.7 (18) 125 (1.3)    IV Piggyback  692.8     Total Intake(mL/kg)  3183.5 (34.3) 125 (1.3)    Urine (mL/kg/hr)  1050 350 (1.4)    Total Output  1050 350    Net  +2133.5 -225                    Review of Systems   Constitutional:  Negative for chills and fever.   HENT:  Negative for congestion.    Respiratory:  Negative for cough and shortness of breath.    Cardiovascular:  Negative for chest pain and leg swelling.   Gastrointestinal:  Positive for abdominal pain (incisional). Negative for constipation, nausea and vomiting.   Genitourinary:  Negative for decreased urine volume and difficulty urinating.   Skin:  Positive for color change and wound.   Allergic/Immunologic: Positive for immunocompromised state.   Neurological:  Negative for dizziness, weakness and headaches.   Psychiatric/Behavioral:  Negative for agitation and confusion.       Objective:     Vital Signs (Most Recent):  Temp: 98.1 °F (36.7 °C) (04/09/24 0859)  Pulse: 95 (04/09/24 0859)  Resp: 16 (04/09/24 0859)  BP: (!) 178/91 (04/09/24 0859)  SpO2: 99 % (04/09/24 0859) Vital Signs (24h Range):  Temp:  [96.5 °F (35.8 °C)-98.1 °F (36.7 °C)] 98.1 °F (36.7 °C)  Pulse:  [] 95  Resp:  [14-20] 16  SpO2:  [94 %-99 %] 99 %  BP: (133-178)/() 178/91     Weight: 92.7 kg (204 lb 5.9 oz)  Height: 5' 7" (170.2 cm)  Body mass index is 32.01 kg/m².     Physical Exam  Vitals and nursing note reviewed.   Constitutional:       General: He is not in acute distress.     Appearance: Normal appearance. He is not ill-appearing.   HENT:      Head: Normocephalic.      Mouth/Throat:      Pharynx: Oropharynx is clear.   Eyes:      Conjunctiva/sclera: Conjunctivae normal.   Cardiovascular:      Rate and Rhythm: Normal rate.      Pulses: Normal pulses.   Pulmonary:      Effort: Pulmonary effort is normal.      Breath sounds: Normal breath sounds.   Abdominal:      General: Bowel sounds are normal. "      Palpations: Abdomen is soft.      Tenderness: There is abdominal tenderness in the right lower quadrant. There is no guarding or rebound.      Comments: RLQ Kidney txp incision with erythema and warmth noted to entire length of incision, worse in upper 2/3 portion. Small 1-2 cm area of yellow slough in upper portion incision which opened upon staple removal. Moderate serous drainage noted from dehisced area. Small portion of bowel out of incision.   Musculoskeletal:      Right lower leg: No edema.      Left lower leg: No edema.   Skin:     General: Skin is warm.   Neurological:      Mental Status: He is alert and oriented to person, place, and time. Mental status is at baseline.   Psychiatric:         Mood and Affect: Mood normal.         Behavior: Behavior normal. Behavior is cooperative.         Thought Content: Thought content normal.          Laboratory:  CBC:   Recent Labs   Lab 04/04/24  0805 04/08/24  0712 04/09/24  0618   WBC 10.61 10.36 9.14   RBC 4.41* 4.51* 4.13*   HGB 11.9* 12.3* 11.2*   HCT 40.5 41.8 38.1*    222 195   MCV 92 93 92   MCH 27.0 27.3 27.1   MCHC 29.4* 29.4* 29.4*     CMP:   Recent Labs   Lab 04/04/24  0805 04/08/24  0712 04/09/24  0618   GLU 93 94 107   CALCIUM 9.5 10.1 9.0   ALBUMIN 3.3* 3.4* 3.0*    140 138   K 4.5 4.8 4.7   CO2 26 23 23    109 110   BUN 15 20 21   CREATININE 1.3 1.5* 1.6*     Labs within the past 24 hours have been reviewed.    Diagnostic Results:  CT - Abd/Pelvic: No results found for this or any previous visit.  US - Kidney: Results for orders placed during the hospital encounter of 04/08/24    US Transplant Kidney With Doppler    Narrative  EXAMINATION:  US TRANSPLANT KIDNEY WITH DOPPLER    CLINICAL HISTORY:  wound infection/drainage;    TECHNIQUE:  Real time gray scale and doppler ultrasound was performed over the patient's renal allograft.    COMPARISON:  03/25/2024    FINDINGS:  Renal allograft in the right lower quadrant.  The allograft  measures 12.9 cm. Normal perfusion. No hydronephrosis.  There is a 1.2 cm cyst at the midpole.  There is a 1.7 cm cyst with a peripheral calcification, similar to the prior exam.    There is an elongated fluid collection underneath the anterior pelvic wall overlying the kidney transplant an area measuring 14.8 x 7.4 x 1.3 cm    Vasculature:    Inter lobar and segmental arterial resistive indices ranged from 0.67 to 0.77, previously 0.65 to 0.75.    Main renal artery peak systolic velocity: 200 cm/s with normal waveform, previously 314 cm/s .    Renal artery/iliac ratio: 1.3.    The main renal vein is patent.    Impression  Large fluid collection, 14.8 x 7.4 x 1.3 cm, just posterior to the anterior pelvic wall overlying the transplant kidney concerning for seroma, hematoma or abscess.    Small renal cysts, one  of which has a small peripheral calcification, unchanged from the prior study.    Satisfactory Doppler evaluation of the transplant kidney.      Electronically signed by: Teresita Ohara MD  Date:    04/08/2024  Time:    12:38  Assessment/Plan:     * Surgical wound infection  - Pt with delayed surgical wound healing, seen in clinic 4/3 with 200 mL serous fluid expressed from incision w/ staples intact. Started on PO doxycycline at that time  - Wound remains erythematous with swelling and mild tenderness. No improvement with PO Doxy  - Kidney US 4/8 w/ large 14.8 x 7.4 x 1.3 cm, just posterior to the anterior pelvic wall overlying the transplant kidney  - Staples removed 4/8, small area dehiscence noted with moderate serous fluid expressed  - CT abd/pelvis 4/8 with RLQ transplant kidney with incisional hernia containing distal ileum. The large fluid collection interposed between the kidney and anterior abdominal wall drained at patient's bedside. Mild to moderate soft tissue stranding and trace fluid directly adjacent to the transplant kidney. CT reviewed by surgeon. Patient made NPO for surgical  "intervention for 4/9.   - Started on IVFs; continue  -  on cefepime and vancomycin.   - NPO for OR take back this morning for incisional dehiscence and possible incisional hernia repair.   - Infectious work up negative thus far.     Anemia of chronic disease  - monitor daily CBC  - stable      HARRISON (acute kidney injury)  - expect due to dehydration,  IVFs hydration ordered on admit  - Cr up to 1.6 today. Continue IVFs.   - OR take back today  -  cont to monitor     Hypomagnesemia  - Continue home PO mag  - IV Mag as needed      History of COPD  - Resume home neb      Dyslipidemia  - continue home statin      Prophylactic immunotherapy  - see "long term use of immunosuppression"      At risk for opportunistic infections  - OI prophylaxis per transplant protocol      Long-term use of immunosuppressant medication  - Continue prograf and prednisone  - Check prograf level daily and adjust for therapeutic dosage. Monitor for toxic side effects   -  cellcept held on admit d/t infection      S/P living-donor kidney transplantation  - S/p Ktxp 3/11/24 d/t IgA nephropathy. Progressed well post-txp with Cr downtrending  - BL Cr 1.1-1.2          Discharge Planning:  Not a candidate at this time    Medical decision making for this encounter includes review of pertinent labs and diagnostic studies, assessment and planning, discussions with consulting providers, discussion with patient/family, and participation in multidisciplinary rounds. Time spent caring for patient: 45 minutes    Emilee Ellsworth NP  Kidney Transplant  [facility] - [facility] (2nd Fl)  "

## 2024-04-09 NOTE — CARE UPDATE
CT AP reviewed with Dr. Mayer. Decision made for takeback in the morning for washout and possible incisional hernia repair. Patient NPO, case request in.

## 2024-04-09 NOTE — PLAN OF CARE
64 year-old male admitted 4/8/24 for Erythema/Drainage from transplant incision. Pt has hx of IgA nephropathy, kidney txp 3/11/24.  -AAOx4, ambulates independently  -20 G Rt FA  -Cefepime Q12 IVPB  -NS @ 100ml/hr  -OR take back today for exlap/washout  -RLQ incision with staples/island border in place  -K 5.6/Lasix 60mg IVP, repeat K pending  -Cr 1.6  -PRN Oxy 5/10mg Q6  -family at bedside, pt lying in bed, bed in lowest position, wheels locked, non-skid socks in place, call light within reach

## 2024-04-09 NOTE — CLINICAL REVIEW
IP Sepsis Screen (most recent)       Sepsis Screen (IP) - 04/09/24 0709       Is the patient's history or complaint suggestive of a possible infection? Yes  -CB    Are there at least two of the following signs and symptoms present? Yes  -CB    Sepsis signs/symptoms - Hyper or Hypothermia Hyperthermia >100.4 or Hypothermia < 96.8  -CB    Sepsis signs/symptoms - Tachycardia Tachycardia     >90  -CB    Are any of the following organ dysfunction criteria present and not considered to be due to a chronic condition? Yes  -CB    Organ Dysfunction Criteria - O2 O2 Saturation < 95% on room air  -CB    Initiate Sepsis Protocol No  -CB    Reason sepsis not considered Pt. receiving appropriate management  -CB              User Key  (r) = Recorded By, (t) = Taken By, (c) = Cosigned By      Initials Name    CB Jennifer Robles RN

## 2024-04-09 NOTE — ANESTHESIA PROCEDURE NOTES
Intubation    Date/Time: 4/9/2024 10:19 AM    Performed by: Jazlyn Bang CRNA  Authorized by: William Beebe MD    Intubation:     Induction:  Intravenous    Intubated:  Postinduction    Mask Ventilation:  Easy with oral airway    Attempts:  1    Attempted By:  CRNA    Method of Intubation:  Video laryngoscopy    Blade:  Agsutin 3    Laryngeal View Grade: Grade I - full view of cords      Difficult Airway Encountered?: No      Complications:  None    Airway Device:  Oral endotracheal tube    Airway Device Size:  7.5    Style/Cuff Inflation:  Cuffed (inflated to minimal occlusive pressure)    Tube secured:  23    Secured at:  The lips    Placement Verified By:  Capnometry    Complicating Factors:  None    Findings Post-Intubation:  BS equal bilateral and atraumatic/condition of teeth unchanged

## 2024-04-09 NOTE — PLAN OF CARE
AAOx4. VSS. Afebrile. NS @100ml/hr. C/o abdominal pain. PRN oxy 5 given. NPO since midnight. IV cefepime given as ordered. Pt brought for CT A/P. PA, Padmini Martinez, spoke with pt over the phone to inform him he will be going back to surgery in AM for washout and to repair incisional hernia, seen on CT. Pt verbalized understanding. Friend is at bedside. Bed wheels locked, bed in low position, side rails raisedx2. Pt understands to call RN when needed.

## 2024-04-09 NOTE — TRANSFER OF CARE
"Anesthesia Transfer of Care Note    Patient: Colten Monet    Procedure(s) Performed: Procedure(s) (LRB):  LAPAROTOMY, EXPLORATORY (N/A)    Patient location: PACU    Anesthesia Type: general    Transport from OR: Transported from OR on 6-10 L/min O2 by face mask with adequate spontaneous ventilation    Post pain: adequate analgesia    Post assessment: no apparent anesthetic complications and tolerated procedure well    Post vital signs: stable    Level of consciousness: awake and alert    Nausea/Vomiting: no nausea/vomiting    Complications: none    Transfer of care protocol was followed    Last vitals: Visit Vitals  BP (!) 150/113 (BP Location: Left arm, Patient Position: Lying)   Pulse 101   Temp 36.6 °C (97.9 °F) (Temporal)   Resp (!) 23   Ht 5' 7" (1.702 m)   Wt 92.7 kg (204 lb 5.9 oz)   SpO2 99%   BMI 32.01 kg/m²     "

## 2024-04-09 NOTE — SUBJECTIVE & OBJECTIVE
Subjective:   History of Present Illness:  Mr. Colten Monet is a 63 yo male w/ PMHx of ESRD d/t IgA nephropathy, now S/P living related Ktxp on 3/11/24. Patient progressed well during initial post-txp course and discharged POD#2 with downtrending Cr. Now with BL Cr 1.0-1.1.     Patient recently seen in transplant surgery clinic on 4/3 and noted to have serous drainage from RLQ kidney txp incision along with palpable erythema. Staples intact w/ minimal tenderness to deep palpation at that time. Incision probed w/ 200 cc serous fluid drained in clinic by surgical fellow. Patient was placed on PO for cellulitis. He now presents to ED for worsening erythema to incisional area (see media for photo). Staples remain intact, erythema noted to entire length of incision, worse in upper portion. Small 1-2 cm area of yellow slough in upper portion incision. Minimal serous drainage expressed upon probing. Patient reports mild tenderness to deep palpation. Kidney US in ED w/ large 14.8 x 7.4 x 1.3 cm, just posterior to the anterior pelvic wall overlying the transplant kidney. Reviewed plan with Dr. Eduardo and Dr. Whyte. CT A/P non-con ordered along with IV abx. Staples removed at bedside, small 1-2 cm area of wound dehiscence noted, moderate amount serous fluid able to be expressed from incision. Mild HARRISON on admit (Cr 1.5). NS bolus ordered.         Mr. Monet is a 64 y.o. year old male who is status post Kidney Transplant - 3/11/2024  (#1).  His maintenance immunosuppression consists of:   Immunosuppressants (From admission, onward)      Start     Stop Route Frequency Ordered    04/09/24 0800  tacrolimus capsule 2 mg         -- Oral Every morning 04/08/24 1517    04/08/24 1800  tacrolimus capsule 1 mg         -- Oral Every evening 04/08/24 1517            Hospital Course:  Patient admitted for worsening cellulitis of kidney transplant incision, incisional hernia, and HARRISON.  CT abd/pelvis with RLQ transplant kidney with  incisional hernia containing distal ileum. The large fluid collection interposed between the kidney and anterior abdominal wall drained at patient's bedside. Mild to moderate soft tissue stranding and trace fluid directly adjacent to the transplant kidney. CT reviewed by surgeon. Patient made NPO for surgical intervention. IVFs started. Started on cefepime and vancomycin.     Interval History: NAEON. Patient reports feeling well this morning. NPO for OR take back this morning for incisional dehiscence and possible incisional hernia repair. Infectious work up negative thus far. Continue cefepime and vanc. Cr up to 1.6. Continue IVFs. All VSS. Cont to monitor.       Past Medical, Surgical, Family, and Social History:   Unchanged from H&P.    Scheduled Meds:   atorvastatin  40 mg Oral QHS    ceFEPime IV (PEDS and ADULTS)  2 g Intravenous Q12H    famotidine  20 mg Oral QHS    fluticasone furoate-vilanteroL  1 puff Inhalation Daily    heparin (porcine)  5,000 Units Subcutaneous Q8H    magnesium oxide  400 mg Oral BID    predniSONE  5 mg Oral Daily    sodium bicarbonate  1,300 mg Oral BID    sulfamethoxazole-trimethoprim 400-80mg  1 tablet Oral QAM    tacrolimus  1 mg Oral Daily PM    tacrolimus  2 mg Oral Daily AM    valGANciclovir  450 mg Oral QAM     Continuous Infusions:   sodium chloride 0.9% 100 mL/hr at 04/09/24 0700     PRN Meds:acetaminophen, albuterol, docusate sodium, melatonin, ondansetron, oxyCODONE, oxyCODONE, sodium chloride 0.9%, Pharmacy to dose Vancomycin consult **AND** vancomycin - pharmacy to dose    Intake/Output - Last 3 Shifts         04/07 0700 04/08 0659 04/08 0700 04/09 0659 04/09 0700  04/10 0659    P.O.  822     I.V. (mL/kg)  1668.7 (18) 125 (1.3)    IV Piggyback  692.8     Total Intake(mL/kg)  3183.5 (34.3) 125 (1.3)    Urine (mL/kg/hr)  1050 350 (1.4)    Total Output  1050 350    Net  +2133.5 -225                    Review of Systems   Constitutional:  Negative for chills and fever.  "  HENT:  Negative for congestion.    Respiratory:  Negative for cough and shortness of breath.    Cardiovascular:  Negative for chest pain and leg swelling.   Gastrointestinal:  Positive for abdominal pain (incisional). Negative for constipation, nausea and vomiting.   Genitourinary:  Negative for decreased urine volume and difficulty urinating.   Skin:  Positive for color change and wound.   Allergic/Immunologic: Positive for immunocompromised state.   Neurological:  Negative for dizziness, weakness and headaches.   Psychiatric/Behavioral:  Negative for agitation and confusion.       Objective:     Vital Signs (Most Recent):  Temp: 98.1 °F (36.7 °C) (04/09/24 0859)  Pulse: 95 (04/09/24 0859)  Resp: 16 (04/09/24 0859)  BP: (!) 178/91 (04/09/24 0859)  SpO2: 99 % (04/09/24 0859) Vital Signs (24h Range):  Temp:  [96.5 °F (35.8 °C)-98.1 °F (36.7 °C)] 98.1 °F (36.7 °C)  Pulse:  [] 95  Resp:  [14-20] 16  SpO2:  [94 %-99 %] 99 %  BP: (133-178)/() 178/91     Weight: 92.7 kg (204 lb 5.9 oz)  Height: 5' 7" (170.2 cm)  Body mass index is 32.01 kg/m².     Physical Exam  Vitals and nursing note reviewed.   Constitutional:       General: He is not in acute distress.     Appearance: Normal appearance. He is not ill-appearing.   HENT:      Head: Normocephalic.      Mouth/Throat:      Pharynx: Oropharynx is clear.   Eyes:      Conjunctiva/sclera: Conjunctivae normal.   Cardiovascular:      Rate and Rhythm: Normal rate.      Pulses: Normal pulses.   Pulmonary:      Effort: Pulmonary effort is normal.      Breath sounds: Normal breath sounds.   Abdominal:      General: Bowel sounds are normal.      Palpations: Abdomen is soft.      Tenderness: There is abdominal tenderness in the right lower quadrant. There is no guarding or rebound.      Comments: RLQ Kidney txp incision with erythema and warmth noted to entire length of incision, worse in upper 2/3 portion. Small 1-2 cm area of yellow slough in upper portion incision " which opened upon staple removal. Moderate serous drainage noted from dehisced area. Small portion of bowel out of incision.   Musculoskeletal:      Right lower leg: No edema.      Left lower leg: No edema.   Skin:     General: Skin is warm.   Neurological:      Mental Status: He is alert and oriented to person, place, and time. Mental status is at baseline.   Psychiatric:         Mood and Affect: Mood normal.         Behavior: Behavior normal. Behavior is cooperative.         Thought Content: Thought content normal.          Laboratory:  CBC:   Recent Labs   Lab 04/04/24 0805 04/08/24 0712 04/09/24  0618   WBC 10.61 10.36 9.14   RBC 4.41* 4.51* 4.13*   HGB 11.9* 12.3* 11.2*   HCT 40.5 41.8 38.1*    222 195   MCV 92 93 92   MCH 27.0 27.3 27.1   MCHC 29.4* 29.4* 29.4*     CMP:   Recent Labs   Lab 04/04/24 0805 04/08/24 0712 04/09/24  0618   GLU 93 94 107   CALCIUM 9.5 10.1 9.0   ALBUMIN 3.3* 3.4* 3.0*    140 138   K 4.5 4.8 4.7   CO2 26 23 23    109 110   BUN 15 20 21   CREATININE 1.3 1.5* 1.6*     Labs within the past 24 hours have been reviewed.    Diagnostic Results:  CT - Abd/Pelvic: No results found for this or any previous visit.  US - Kidney: Results for orders placed during the hospital encounter of 04/08/24    US Transplant Kidney With Doppler    Narrative  EXAMINATION:  US TRANSPLANT KIDNEY WITH DOPPLER    CLINICAL HISTORY:  wound infection/drainage;    TECHNIQUE:  Real time gray scale and doppler ultrasound was performed over the patient's renal allograft.    COMPARISON:  03/25/2024    FINDINGS:  Renal allograft in the right lower quadrant.  The allograft measures 12.9 cm. Normal perfusion. No hydronephrosis.  There is a 1.2 cm cyst at the midpole.  There is a 1.7 cm cyst with a peripheral calcification, similar to the prior exam.    There is an elongated fluid collection underneath the anterior pelvic wall overlying the kidney transplant an area measuring 14.8 x 7.4 x 1.3  cm    Vasculature:    Inter lobar and segmental arterial resistive indices ranged from 0.67 to 0.77, previously 0.65 to 0.75.    Main renal artery peak systolic velocity: 200 cm/s with normal waveform, previously 314 cm/s .    Renal artery/iliac ratio: 1.3.    The main renal vein is patent.    Impression  Large fluid collection, 14.8 x 7.4 x 1.3 cm, just posterior to the anterior pelvic wall overlying the transplant kidney concerning for seroma, hematoma or abscess.    Small renal cysts, one  of which has a small peripheral calcification, unchanged from the prior study.    Satisfactory Doppler evaluation of the transplant kidney.      Electronically signed by: Teresita Ohara MD  Date:    04/08/2024  Time:    12:38

## 2024-04-09 NOTE — OP NOTE
[facility] - Surgery ([facility])  General Surgery  Operative Note    SUMMARY     Date of Procedure: 4/9/2024     Procedure: Procedure(s) (LRB):  LAPAROTOMY, EXPLORATORY (N/A)       Surgeon(s) and Role:     * Lorenzo Gonzales MD - Primary     * Radha Melgar MD - Resident - Assisting        Pre-Operative Diagnosis: Surgical wound infection [T81.49XA]  Fascial dehiscence with evisceration    Post-Operative Diagnosis: same    Anesthesia: General    Operative Findings (including complications, if any): dehiscence of wound with loop of bowel eviscerated, not reducible but viable; tissues inflamed but adequate for closure    Description of Technical Procedures: secondary suture closure of abdominal wall (no mesh used)    Significant Surgical Tasks Conducted by the Assistant(s), if Applicable: assisting primary surgeon    Estimated Blood Loss (EBL): minimal (less than 50 mL)           Implants: * No implants in log *    Specimens:   Specimen (24h ago, onward)      None                    Condition: Stable    Disposition: PACU - hemodynamically stable.    Attestation: I was present and scrubbed for the key portions of the procedure.    Procedure in Detail: Following the induction of general anesthesia, the abdomen was prepped with povidone-iodine and draped.  The visible loop of small bowel in the dehisced wound was gently freed up but could not be reduced deep to the fascia without freeing up the attachments to the fascia, which was done.  The entire length of the kidney transplant fascial closure appeared to be completely dehisced, and the tissue constraining the loop of bowel was actually omentum. The bowel was very edematous but unequivocally viable and was reduced into the abdomen.  The entire wound was inflamed with granulation tissue, and the normal tissue planes were obscured.  With gradual dissection, the medial side of the fascial opening was fairly easily developed by taking down some adhesion to underlying omentum.   The lateral portion of the incision was very ill-defined, and the preop CT suggested that the allograft kidney was immediately adjacent to the abdominal wall muscle in this area.  The muscle was mobilized enough to give room for good purchase for the closure sutures, but this resulted in decapsulization of part of the kidney with some oozing, which was controlled with argon beam cautery and a strip of Evarrest hemostatic agent.  Fascial closure was then carried out in a mass fashion with interrupted #1 PDS figure-of-8 sutures. The skin was closed with rae.    Lorenzo Gonzales MD

## 2024-04-09 NOTE — NURSING TRANSFER
Nursing Transfer Note      4/9/2024   1:27 PM    Transfer To: Room 94009    Transfer via stretcher    Transfer with 2L NC to O2    Transported by PCT    Telemetry: Rate 94  Order for Tele Monitor? No    Additional Lines: Oxygen    4eyes on Skin: yes    Medicines sent: None    Any special needs or follow-up needed: None    Patient belongings transferred with patient: No    Chart send with patient: Yes    Notified: spouse    Patient reassessed at: 4/9/2024  13:26 (date, time)

## 2024-04-09 NOTE — HOSPITAL COURSE
Patient admitted for worsening cellulitis of kidney transplant incision, incisional hernia, and HARRISON. Cefepime and vanc started, infectious work-up ordered. Hospital course as follows:  Incisional hernia: CT abd/pelvis 4/8/24 revealed incisional hernia containing distal ileum, associated small bowel feces sign, suggesting intestinal stasis, but without CT evidence of high-grade intestinal obstruction. TB  to OR 4/9/24 for repair of dehiscence of kidney transplant incision and hernia repair plus washout. IV abx transitioned to PO levaquin and doxy x 5 days (EOT 4/15/24).  HARRISON: Worsened post OR with associated hyperkalemia. Required several interventions (shifting, lasix, IVF). Renal function and hyperkalemia both improved, now back to baseline.   Ileus: Patient w/ N/V post take back, KUB 4/10 with ileus, NPO for bowel rest 4/11. Enema ordered 4/14 w/ 3 large BM, however no improvement in N/V. Remains w/ ileus. NGT placement 4/16. PPN started 4/18. Prealbumin 14    Interval History: no acute events overnight. Patient reports feeling great today. Advanced to regular diet, tolerating w/o N/V. (+) BM. Active BS throughout. Cr remains slightly elevated, encourage PO fluid intake. Check UA/urine culture. + Orthostatic hypotension, adjusted coreg, continue hold parameters.

## 2024-04-10 PROBLEM — E87.5 HYPERKALEMIA: Status: ACTIVE | Noted: 2024-04-10

## 2024-04-10 LAB
ALBUMIN SERPL BCP-MCNC: 3.1 G/DL (ref 3.5–5.2)
ALBUMIN SERPL BCP-MCNC: 3.2 G/DL (ref 3.5–5.2)
ANION GAP SERPL CALC-SCNC: 9 MMOL/L (ref 8–16)
ANION GAP SERPL CALC-SCNC: 9 MMOL/L (ref 8–16)
BACTERIA UR CULT: NO GROWTH
BASOPHILS # BLD AUTO: 0.07 K/UL (ref 0–0.2)
BASOPHILS NFR BLD: 0.5 % (ref 0–1.9)
BUN SERPL-MCNC: 24 MG/DL (ref 8–23)
BUN SERPL-MCNC: 26 MG/DL (ref 8–23)
CALCIUM SERPL-MCNC: 9.4 MG/DL (ref 8.7–10.5)
CALCIUM SERPL-MCNC: 9.4 MG/DL (ref 8.7–10.5)
CHLORIDE SERPL-SCNC: 103 MMOL/L (ref 95–110)
CHLORIDE SERPL-SCNC: 105 MMOL/L (ref 95–110)
CO2 SERPL-SCNC: 22 MMOL/L (ref 23–29)
CO2 SERPL-SCNC: 25 MMOL/L (ref 23–29)
CREAT SERPL-MCNC: 1.8 MG/DL (ref 0.5–1.4)
CREAT SERPL-MCNC: 1.8 MG/DL (ref 0.5–1.4)
DIFFERENTIAL METHOD BLD: ABNORMAL
EOSINOPHIL # BLD AUTO: 0 K/UL (ref 0–0.5)
EOSINOPHIL NFR BLD: 0.3 % (ref 0–8)
ERYTHROCYTE [DISTWIDTH] IN BLOOD BY AUTOMATED COUNT: 13.7 % (ref 11.5–14.5)
EST. GFR  (NO RACE VARIABLE): 41.3 ML/MIN/1.73 M^2
EST. GFR  (NO RACE VARIABLE): 41.3 ML/MIN/1.73 M^2
GLUCOSE SERPL-MCNC: 119 MG/DL (ref 70–110)
GLUCOSE SERPL-MCNC: 99 MG/DL (ref 70–110)
HCT VFR BLD AUTO: 38 % (ref 40–54)
HGB BLD-MCNC: 11.3 G/DL (ref 14–18)
IMM GRANULOCYTES # BLD AUTO: 0.12 K/UL (ref 0–0.04)
IMM GRANULOCYTES NFR BLD AUTO: 0.8 % (ref 0–0.5)
LYMPHOCYTES # BLD AUTO: 1.5 K/UL (ref 1–4.8)
LYMPHOCYTES NFR BLD: 10.1 % (ref 18–48)
MAGNESIUM SERPL-MCNC: 1.7 MG/DL (ref 1.6–2.6)
MCH RBC QN AUTO: 27.4 PG (ref 27–31)
MCHC RBC AUTO-ENTMCNC: 29.7 G/DL (ref 32–36)
MCV RBC AUTO: 92 FL (ref 82–98)
MONOCYTES # BLD AUTO: 1 K/UL (ref 0.3–1)
MONOCYTES NFR BLD: 6.8 % (ref 4–15)
NEUTROPHILS # BLD AUTO: 11.9 K/UL (ref 1.8–7.7)
NEUTROPHILS NFR BLD: 81.5 % (ref 38–73)
NRBC BLD-RTO: 0 /100 WBC
OHS QRS DURATION: 66 MS
OHS QTC CALCULATION: 410 MS
PHOSPHATE SERPL-MCNC: 2.9 MG/DL (ref 2.7–4.5)
PHOSPHATE SERPL-MCNC: 3.7 MG/DL (ref 2.7–4.5)
PLATELET # BLD AUTO: 215 K/UL (ref 150–450)
PMV BLD AUTO: 10 FL (ref 9.2–12.9)
POTASSIUM SERPL-SCNC: 4.6 MMOL/L (ref 3.5–5.1)
POTASSIUM SERPL-SCNC: 5.1 MMOL/L (ref 3.5–5.1)
RBC # BLD AUTO: 4.12 M/UL (ref 4.6–6.2)
SODIUM SERPL-SCNC: 136 MMOL/L (ref 136–145)
SODIUM SERPL-SCNC: 137 MMOL/L (ref 136–145)
TACROLIMUS BLD-MCNC: 11.3 NG/ML (ref 5–15)
VANCOMYCIN SERPL-MCNC: 17.3 UG/ML
VANCOMYCIN SERPL-MCNC: 27 UG/ML
WBC # BLD AUTO: 14.6 K/UL (ref 3.9–12.7)

## 2024-04-10 PROCEDURE — 25000003 PHARM REV CODE 250: Performed by: INTERNAL MEDICINE

## 2024-04-10 PROCEDURE — 36415 COLL VENOUS BLD VENIPUNCTURE: CPT | Performed by: INTERNAL MEDICINE

## 2024-04-10 PROCEDURE — 25000003 PHARM REV CODE 250

## 2024-04-10 PROCEDURE — 36415 COLL VENOUS BLD VENIPUNCTURE: CPT

## 2024-04-10 PROCEDURE — 80197 ASSAY OF TACROLIMUS: CPT

## 2024-04-10 PROCEDURE — 63600175 PHARM REV CODE 636 W HCPCS: Performed by: INTERNAL MEDICINE

## 2024-04-10 PROCEDURE — 80069 RENAL FUNCTION PANEL: CPT | Mod: 91

## 2024-04-10 PROCEDURE — 80202 ASSAY OF VANCOMYCIN: CPT | Performed by: INTERNAL MEDICINE

## 2024-04-10 PROCEDURE — 63600175 PHARM REV CODE 636 W HCPCS

## 2024-04-10 PROCEDURE — 85025 COMPLETE CBC W/AUTO DIFF WBC: CPT

## 2024-04-10 PROCEDURE — 99233 SBSQ HOSP IP/OBS HIGH 50: CPT | Mod: ,,,

## 2024-04-10 PROCEDURE — 27000207 HC ISOLATION

## 2024-04-10 PROCEDURE — 94761 N-INVAS EAR/PLS OXIMETRY MLT: CPT

## 2024-04-10 PROCEDURE — 80069 RENAL FUNCTION PANEL: CPT

## 2024-04-10 PROCEDURE — 83735 ASSAY OF MAGNESIUM: CPT

## 2024-04-10 PROCEDURE — 25000003 PHARM REV CODE 250: Performed by: NURSE PRACTITIONER

## 2024-04-10 PROCEDURE — 20600001 HC STEP DOWN PRIVATE ROOM

## 2024-04-10 RX ORDER — TACROLIMUS 1 MG/1
1 CAPSULE ORAL 2 TIMES DAILY
Status: DISCONTINUED | OUTPATIENT
Start: 2024-04-10 | End: 2024-04-13

## 2024-04-10 RX ORDER — ALUMINUM HYDROXIDE, MAGNESIUM HYDROXIDE, AND SIMETHICONE 1200; 120; 1200 MG/30ML; MG/30ML; MG/30ML
30 SUSPENSION ORAL EVERY 6 HOURS PRN
Status: DISCONTINUED | OUTPATIENT
Start: 2024-04-11 | End: 2024-04-25 | Stop reason: HOSPADM

## 2024-04-10 RX ORDER — SODIUM CHLORIDE 9 MG/ML
INJECTION, SOLUTION INTRAVENOUS CONTINUOUS
Status: DISCONTINUED | OUTPATIENT
Start: 2024-04-10 | End: 2024-04-13

## 2024-04-10 RX ORDER — OXYCODONE HYDROCHLORIDE 10 MG/1
10 TABLET ORAL EVERY 4 HOURS PRN
Status: DISCONTINUED | OUTPATIENT
Start: 2024-04-11 | End: 2024-04-25 | Stop reason: HOSPADM

## 2024-04-10 RX ORDER — BISACODYL 5 MG
5 TABLET, DELAYED RELEASE (ENTERIC COATED) ORAL DAILY PRN
Status: DISCONTINUED | OUTPATIENT
Start: 2024-04-10 | End: 2024-04-14

## 2024-04-10 RX ORDER — DOCUSATE SODIUM 100 MG/1
100 CAPSULE, LIQUID FILLED ORAL 3 TIMES DAILY
Status: DISCONTINUED | OUTPATIENT
Start: 2024-04-10 | End: 2024-04-18

## 2024-04-10 RX ORDER — ATOVAQUONE 750 MG/5ML
1500 SUSPENSION ORAL DAILY
Status: DISCONTINUED | OUTPATIENT
Start: 2024-04-10 | End: 2024-04-25 | Stop reason: HOSPADM

## 2024-04-10 RX ORDER — CALCIUM CARBONATE 200(500)MG
500 TABLET,CHEWABLE ORAL 3 TIMES DAILY PRN
Status: DISCONTINUED | OUTPATIENT
Start: 2024-04-11 | End: 2024-04-25 | Stop reason: HOSPADM

## 2024-04-10 RX ORDER — SIMETHICONE 80 MG
1 TABLET,CHEWABLE ORAL 3 TIMES DAILY PRN
Status: DISCONTINUED | OUTPATIENT
Start: 2024-04-11 | End: 2024-04-14

## 2024-04-10 RX ORDER — OXYCODONE HYDROCHLORIDE 5 MG/1
5 TABLET ORAL EVERY 4 HOURS PRN
Status: DISCONTINUED | OUTPATIENT
Start: 2024-04-11 | End: 2024-04-25 | Stop reason: HOSPADM

## 2024-04-10 RX ADMIN — SODIUM CHLORIDE: 9 INJECTION, SOLUTION INTRAVENOUS at 08:04

## 2024-04-10 RX ADMIN — VALGANCICLOVIR HYDROCHLORIDE 450 MG: 450 TABLET ORAL at 09:04

## 2024-04-10 RX ADMIN — CALCIUM CARBONATE (ANTACID) CHEW TAB 500 MG 500 MG: 500 CHEW TAB at 11:04

## 2024-04-10 RX ADMIN — SIMETHICONE 80 MG: 80 TABLET, CHEWABLE ORAL at 11:04

## 2024-04-10 RX ADMIN — BISACODYL 5 MG: 5 TABLET, COATED ORAL at 11:04

## 2024-04-10 RX ADMIN — SODIUM BICARBONATE 1300 MG: 650 TABLET ORAL at 09:04

## 2024-04-10 RX ADMIN — TACROLIMUS 1 MG: 1 CAPSULE ORAL at 06:04

## 2024-04-10 RX ADMIN — SODIUM CHLORIDE: 9 INJECTION, SOLUTION INTRAVENOUS at 11:04

## 2024-04-10 RX ADMIN — ATORVASTATIN CALCIUM 40 MG: 20 TABLET, FILM COATED ORAL at 09:04

## 2024-04-10 RX ADMIN — ONDANSETRON 8 MG: 8 TABLET, ORALLY DISINTEGRATING ORAL at 11:04

## 2024-04-10 RX ADMIN — PREDNISONE 5 MG: 5 TABLET ORAL at 09:04

## 2024-04-10 RX ADMIN — HEPARIN SODIUM 5000 UNITS: 5000 INJECTION INTRAVENOUS; SUBCUTANEOUS at 05:04

## 2024-04-10 RX ADMIN — Medication 400 MG: at 09:04

## 2024-04-10 RX ADMIN — OXYCODONE HYDROCHLORIDE 10 MG: 10 TABLET ORAL at 09:04

## 2024-04-10 RX ADMIN — CEFEPIME 2 G: 2 INJECTION, POWDER, FOR SOLUTION INTRAVENOUS at 12:04

## 2024-04-10 RX ADMIN — SODIUM CHLORIDE: 9 INJECTION, SOLUTION INTRAVENOUS at 04:04

## 2024-04-10 RX ADMIN — VANCOMYCIN HYDROCHLORIDE 1250 MG: 1.25 INJECTION, POWDER, LYOPHILIZED, FOR SOLUTION INTRAVENOUS at 09:04

## 2024-04-10 RX ADMIN — ACETAMINOPHEN 650 MG: 325 TABLET ORAL at 09:04

## 2024-04-10 RX ADMIN — HEPARIN SODIUM 5000 UNITS: 5000 INJECTION INTRAVENOUS; SUBCUTANEOUS at 03:04

## 2024-04-10 RX ADMIN — DOCUSATE SODIUM 100 MG: 100 CAPSULE, LIQUID FILLED ORAL at 09:04

## 2024-04-10 RX ADMIN — ATOVAQUONE 1500 MG: 750 SUSPENSION ORAL at 11:04

## 2024-04-10 RX ADMIN — HEPARIN SODIUM 5000 UNITS: 5000 INJECTION INTRAVENOUS; SUBCUTANEOUS at 09:04

## 2024-04-10 RX ADMIN — OXYCODONE HYDROCHLORIDE 10 MG: 10 TABLET ORAL at 03:04

## 2024-04-10 RX ADMIN — TACROLIMUS 1 MG: 1 CAPSULE ORAL at 09:04

## 2024-04-10 RX ADMIN — FAMOTIDINE 20 MG: 20 TABLET ORAL at 09:04

## 2024-04-10 NOTE — PROGRESS NOTES
[facility] - Transplant Stepdown  Kidney Transplant  Progress Note      Reason for Follow-up: Reassessment of Kidney Transplant - 3/11/2024  (#1) recipient and management of immunosuppression.    ORGAN: LEFT KIDNEY    Donor Type: Living    Donor CMV Status:   Donor HBcAB:  Donor HCV Status:  Donor HBV ANA:   Donor HCV ANA:       Subjective:   History of Present Illness:  Mr. Colten Monet is a 63 yo male w/ PMHx of ESRD d/t IgA nephropathy, now S/P living related Ktxp on 3/11/24. Patient progressed well during initial post-txp course and discharged POD#2 with downtrending Cr. Now with BL Cr 1.0-1.1.     Patient recently seen in transplant surgery clinic on 4/3 and noted to have serous drainage from RLQ kidney txp incision along with palpable erythema. Staples intact w/ minimal tenderness to deep palpation at that time. Incision probed w/ 200 cc serous fluid drained in clinic by surgical fellow. Patient was placed on PO for cellulitis. He now presents to ED for worsening erythema to incisional area (see media for photo). Staples remain intact, erythema noted to entire length of incision, worse in upper portion. Small 1-2 cm area of yellow slough in upper portion incision. Minimal serous drainage expressed upon probing. Patient reports mild tenderness to deep palpation. Kidney US in ED w/ large 14.8 x 7.4 x 1.3 cm, just posterior to the anterior pelvic wall overlying the transplant kidney. Reviewed plan with Dr. Eduardo and Dr. Whyte. CT A/P non-con ordered along with IV abx. Staples removed at bedside, small 1-2 cm area of wound dehiscence noted, moderate amount serous fluid able to be expressed from incision. Mild HARRISON on admit (Cr 1.5). NS bolus ordered.           Hospital Course:  Patient admitted for worsening cellulitis of kidney transplant incision, incisional hernia, and HARRISON.  CT abd/pelvis with RLQ transplant kidney with incisional hernia containing distal ileum. The large fluid collection interposed between  the kidney and anterior abdominal wall drained at patient's bedside. Mild to moderate soft tissue stranding and trace fluid directly adjacent to the transplant kidney. CT reviewed by surgeon. Patient made NPO for surgical intervention. IVFs started. Started on cefepime and vancomycin.     Interval History: NAEON. Patient reports feeling ok this morning. Patient taken back to OR yesterday for dehiscence of kidney transplant incision and hernia repair. Post op, patient with hyperkalemia needing interventions such as shifting, IV lasix, and IVFs. K stable this morning. Repeat RFP at 1400. Infectious work up continues to be negative thus far. Continue cefepime and vanc. Cr continuing to rise, up to 1.8 today. DSAs to be drawn with morning labs. Continue IVFs. If Cr increases again tomorrow, will plan for kidney transplant biopsy. Continues with good urine output. Denies any nausea or vomiting. Reports passing flatus. All VSS. Cont to monitor.       Past Medical, Surgical, Family, and Social History:   Unchanged from H&P.    Scheduled Meds:   atorvastatin  40 mg Oral QHS    atovaquone  1,500 mg Oral Daily    ceFEPime IV (PEDS and ADULTS)  2 g Intravenous Q12H    famotidine  20 mg Oral QHS    fluticasone furoate-vilanteroL  1 puff Inhalation Daily    heparin (porcine)  5,000 Units Subcutaneous Q8H    magnesium oxide  400 mg Oral BID    predniSONE  5 mg Oral Daily    sodium bicarbonate  1,300 mg Oral BID    tacrolimus  1 mg Oral BID    valGANciclovir  450 mg Oral QAM     Continuous Infusions:   sodium chloride 0.9% 100 mL/hr at 04/10/24 1102     PRN Meds:acetaminophen, albuterol, bisacodyL, [COMPLETED] calcium gluconate IVPB **AND** calcium gluconate IVPB, [COMPLETED] dextrose 10% **AND** dextrose 10% **AND** [COMPLETED] insulin regular, docusate sodium, melatonin, ondansetron, oxyCODONE, oxyCODONE, sodium chloride 0.9%, Pharmacy to dose Vancomycin consult **AND** vancomycin - pharmacy to dose    Intake/Output - Last 3  "Shifts         04/08 0700 04/09 0659 04/09 0700  04/10 0659 04/10 0700 04/11 0659    P.O. 822 125     I.V. (mL/kg) 1668.7 (18) 1241.4 (13.3) 13.4 (0.1)    IV Piggyback 692.8 2283.2     Total Intake(mL/kg) 3183.5 (34.3) 3649.6 (39.2) 13.4 (0.1)    Urine (mL/kg/hr) 1050 2925 (1.3)     Total Output 1050 2925     Net +2133.5 +724.6 +13.4                    Review of Systems   Constitutional:  Negative for chills and fever.   HENT:  Negative for congestion and trouble swallowing.    Respiratory:  Negative for cough and shortness of breath.    Cardiovascular:  Negative for chest pain and leg swelling.   Gastrointestinal:  Positive for abdominal distention and abdominal pain (incisional). Negative for nausea and vomiting.   Genitourinary:  Negative for decreased urine volume and difficulty urinating.   Skin:  Positive for wound.   Allergic/Immunologic: Positive for immunocompromised state.   Neurological:  Negative for dizziness, tremors, weakness and headaches.   Psychiatric/Behavioral:  Negative for agitation, behavioral problems, confusion and decreased concentration.       Objective:     Vital Signs (Most Recent):  Temp: 98.5 °F (36.9 °C) (04/10/24 1215)  Pulse: 105 (04/10/24 1215)  Resp: 20 (04/10/24 1215)  BP: 132/81 (04/10/24 1215)  SpO2: (!) 90 % (04/10/24 1215) Vital Signs (24h Range):  Temp:  [97.6 °F (36.4 °C)-98.9 °F (37.2 °C)] 98.5 °F (36.9 °C)  Pulse:  [] 105  Resp:  [11-24] 20  SpO2:  [90 %-97 %] 90 %  BP: (101-169)/(58-93) 132/81     Weight: 93 kg (205 lb 0.4 oz)  Height: 5' 7" (170.2 cm)  Body mass index is 32.11 kg/m².     Physical Exam  Vitals and nursing note reviewed.   Constitutional:       General: He is not in acute distress.     Appearance: Normal appearance. He is not ill-appearing.   HENT:      Head: Normocephalic.      Mouth/Throat:      Pharynx: Oropharynx is clear.   Eyes:      Conjunctiva/sclera: Conjunctivae normal.   Cardiovascular:      Rate and Rhythm: Normal rate.      Pulses: " Normal pulses.   Pulmonary:      Effort: Pulmonary effort is normal.      Breath sounds: Normal breath sounds.   Abdominal:      General: There is distension.      Palpations: Abdomen is soft.      Tenderness: There is abdominal tenderness in the right lower quadrant. There is no guarding or rebound.      Comments: RLQ Kidney txp incision CDI with staples intact, small area of erythema    Musculoskeletal:      Right lower leg: No edema.      Left lower leg: No edema.   Skin:     General: Skin is warm.   Neurological:      Mental Status: He is alert and oriented to person, place, and time. Mental status is at baseline.   Psychiatric:         Mood and Affect: Mood normal.         Behavior: Behavior normal. Behavior is cooperative.         Thought Content: Thought content normal.          Laboratory:  CBC:   Recent Labs   Lab 04/09/24  0618 04/09/24  1530 04/10/24  0545   WBC 9.14 15.83* 14.60*   RBC 4.13* 4.26* 4.12*   HGB 11.2* 11.7* 11.3*   HCT 38.1* 40.1 38.0*    210 215   MCV 92 94 92   MCH 27.1 27.5 27.4   MCHC 29.4* 29.2* 29.7*     CMP:   Recent Labs   Lab 04/09/24  0618 04/09/24  1530 04/09/24  1808 04/09/24  2250 04/10/24  0545    174*  --  182* 99   CALCIUM 9.0 9.1  --  9.3 9.4   ALBUMIN 3.0*  --   --  3.3* 3.2*    138  --  135* 136   K 4.7 5.6* 6.0* 5.4* 4.6   CO2 23 22*  --  23 22*    109  --  104 105   BUN 21 21  --  22 24*   CREATININE 1.6* 1.7*  --  1.8* 1.8*     Labs within the past 24 hours have been reviewed.    Diagnostic Results:  US - Kidney: Results for orders placed during the hospital encounter of 04/08/24    US Transplant Kidney With Doppler    Narrative  EXAMINATION:  US TRANSPLANT KIDNEY WITH DOPPLER    CLINICAL HISTORY:  wound infection/drainage;    TECHNIQUE:  Real time gray scale and doppler ultrasound was performed over the patient's renal allograft.    COMPARISON:  03/25/2024    FINDINGS:  Renal allograft in the right lower quadrant.  The allograft measures 12.9  cm. Normal perfusion. No hydronephrosis.  There is a 1.2 cm cyst at the midpole.  There is a 1.7 cm cyst with a peripheral calcification, similar to the prior exam.    There is an elongated fluid collection underneath the anterior pelvic wall overlying the kidney transplant an area measuring 14.8 x 7.4 x 1.3 cm    Vasculature:    Inter lobar and segmental arterial resistive indices ranged from 0.67 to 0.77, previously 0.65 to 0.75.    Main renal artery peak systolic velocity: 200 cm/s with normal waveform, previously 314 cm/s .    Renal artery/iliac ratio: 1.3.    The main renal vein is patent.    Impression  Large fluid collection, 14.8 x 7.4 x 1.3 cm, just posterior to the anterior pelvic wall overlying the transplant kidney concerning for seroma, hematoma or abscess.    Small renal cysts, one  of which has a small peripheral calcification, unchanged from the prior study.    Satisfactory Doppler evaluation of the transplant kidney.      Electronically signed by: Teresita Ohara MD  Date:    04/08/2024  Time:    12:38  Assessment/Plan:     * Surgical wound infection  - Pt with delayed surgical wound healing, seen in clinic 4/3 with 200 mL serous fluid expressed from incision w/ staples intact. Started on PO doxycycline at that time  - Wound remains erythematous with swelling and mild tenderness. No improvement with PO Doxy  - Kidney US 4/8 w/ large 14.8 x 7.4 x 1.3 cm, just posterior to the anterior pelvic wall overlying the transplant kidney  - Staples removed 4/8, small area dehiscence noted with moderate serous fluid expressed  - CT abd/pelvis 4/8 with RLQ transplant kidney with incisional hernia containing distal ileum. The large fluid collection interposed between the kidney and anterior abdominal wall drained at patient's bedside. Mild to moderate soft tissue stranding and trace fluid directly adjacent to the transplant kidney. CT reviewed by surgeon. Patient made NPO for surgical intervention for 4/9.  "  - Started on IVFs; continue  -  on cefepime and vancomycin.   - Patient taken back to OR yesterday (4/9)  for dehiscence of kidney transplant incision and hernia repair.   -  Infectious work up continues to be negative thus far.   - cont to monitor     Hyperkalemia  Post op from OR take back on 4/9, patient with hyperkalemia needing interventions (shifting, IV lasix, and IVFs)  - K stable this morning.   - Repeat RFP at 1400.   - Cont IVFs  - low K diet  - tele  - monitor     Anemia of chronic disease  - monitor daily CBC  - stable      HARRISON (acute kidney injury)  - expect due to dehydration,  IVFs hydration ordered on admit  - Patient taken back to OR yesterday for dehiscence of kidney transplant incision and hernia repair.  - Cr continuing to rise, up to 1.8 today.   - DSAs to be drawn with morning labs.   - Continue IVFs.   - If Cr increases again tomorrow, will plan for kidney transplant biopsy.   - Continues with good urine output.   - infectious workup negative thus far  - monitor    Hypomagnesemia  - Continue home PO mag  - IV Mag as needed      History of COPD  - Resume home neb      Dyslipidemia  - continue home statin      Prophylactic immunotherapy  - see "long term use of immunosuppression"      At risk for opportunistic infections  - OI prophylaxis per transplant protocol      Long-term use of immunosuppressant medication  - Continue prograf and prednisone  - Check prograf level daily and adjust for therapeutic dosage. Monitor for toxic side effects   -  cellcept held on admit d/t infection      S/P living-donor kidney transplantation  - S/p Ktxp 3/11/24 d/t IgA nephropathy. Progressed well post-txp with Cr downtrending  - BL Cr 1.1-1.2          Discharge Planning:  Not a candidate at this time    Medical decision making for this encounter includes review of pertinent labs and diagnostic studies, assessment and planning, discussions with consulting providers, discussion with patient/family, and " participation in multidisciplinary rounds. Time spent caring for patient: 50 minutes    Emilee Ellsworth NP  Kidney Transplant  [facility] - Transplant Stepdown

## 2024-04-10 NOTE — SUBJECTIVE & OBJECTIVE
Subjective:   History of Present Illness:  Mr. Colten Monet is a 63 yo male w/ PMHx of ESRD d/t IgA nephropathy, now S/P living related Ktxp on 3/11/24. Patient progressed well during initial post-txp course and discharged POD#2 with downtrending Cr. Now with BL Cr 1.0-1.1.     Patient recently seen in transplant surgery clinic on 4/3 and noted to have serous drainage from RLQ kidney txp incision along with palpable erythema. Staples intact w/ minimal tenderness to deep palpation at that time. Incision probed w/ 200 cc serous fluid drained in clinic by surgical fellow. Patient was placed on PO for cellulitis. He now presents to ED for worsening erythema to incisional area (see media for photo). Staples remain intact, erythema noted to entire length of incision, worse in upper portion. Small 1-2 cm area of yellow slough in upper portion incision. Minimal serous drainage expressed upon probing. Patient reports mild tenderness to deep palpation. Kidney US in ED w/ large 14.8 x 7.4 x 1.3 cm, just posterior to the anterior pelvic wall overlying the transplant kidney. Reviewed plan with Dr. Eduardo and Dr. Whyte. CT A/P non-con ordered along with IV abx. Staples removed at bedside, small 1-2 cm area of wound dehiscence noted, moderate amount serous fluid able to be expressed from incision. Mild HARRISON on admit (Cr 1.5). NS bolus ordered.           Hospital Course:  Patient admitted for worsening cellulitis of kidney transplant incision, incisional hernia, and HARRISON.  CT abd/pelvis with RLQ transplant kidney with incisional hernia containing distal ileum. The large fluid collection interposed between the kidney and anterior abdominal wall drained at patient's bedside. Mild to moderate soft tissue stranding and trace fluid directly adjacent to the transplant kidney. CT reviewed by surgeon. Patient made NPO for surgical intervention. IVFs started. Started on cefepime and vancomycin.     Interval History: NAEON. Patient  reports feeling ok this morning. Patient taken back to OR yesterday for dehiscence of kidney transplant incision and hernia repair. Post op, patient with hyperkalemia needing interventions such as shifting, IV lasix, and IVFs. K stable this morning. Repeat RFP at 1400. Infectious work up continues to be negative thus far. Continue cefepime and vanc. Cr continuing to rise, up to 1.8 today. DSAs to be drawn with morning labs. Continue IVFs. If Cr increases again tomorrow, will plan for kidney transplant biopsy. Continues with good urine output. Denies any nausea or vomiting. Reports passing flatus. All VSS. Cont to monitor.       Past Medical, Surgical, Family, and Social History:   Unchanged from H&P.    Scheduled Meds:   atorvastatin  40 mg Oral QHS    atovaquone  1,500 mg Oral Daily    ceFEPime IV (PEDS and ADULTS)  2 g Intravenous Q12H    famotidine  20 mg Oral QHS    fluticasone furoate-vilanteroL  1 puff Inhalation Daily    heparin (porcine)  5,000 Units Subcutaneous Q8H    magnesium oxide  400 mg Oral BID    predniSONE  5 mg Oral Daily    sodium bicarbonate  1,300 mg Oral BID    tacrolimus  1 mg Oral BID    valGANciclovir  450 mg Oral QAM     Continuous Infusions:   sodium chloride 0.9% 100 mL/hr at 04/10/24 1102     PRN Meds:acetaminophen, albuterol, bisacodyL, [COMPLETED] calcium gluconate IVPB **AND** calcium gluconate IVPB, [COMPLETED] dextrose 10% **AND** dextrose 10% **AND** [COMPLETED] insulin regular, docusate sodium, melatonin, ondansetron, oxyCODONE, oxyCODONE, sodium chloride 0.9%, Pharmacy to dose Vancomycin consult **AND** vancomycin - pharmacy to dose    Intake/Output - Last 3 Shifts         04/08 0700  04/09 0659 04/09 0700  04/10 0659 04/10 0700  04/11 0659    P.O. 822 125     I.V. (mL/kg) 1668.7 (18) 1241.4 (13.3) 13.4 (0.1)    IV Piggyback 692.8 2283.2     Total Intake(mL/kg) 3183.5 (34.3) 3649.6 (39.2) 13.4 (0.1)    Urine (mL/kg/hr) 1050 2925 (1.3)     Total Output 1050 2925     Net +2133.5  "+724.6 +13.4                    Review of Systems   Constitutional:  Negative for chills and fever.   HENT:  Negative for congestion and trouble swallowing.    Respiratory:  Negative for cough and shortness of breath.    Cardiovascular:  Negative for chest pain and leg swelling.   Gastrointestinal:  Positive for abdominal distention and abdominal pain (incisional). Negative for nausea and vomiting.   Genitourinary:  Negative for decreased urine volume and difficulty urinating.   Skin:  Positive for wound.   Allergic/Immunologic: Positive for immunocompromised state.   Neurological:  Negative for dizziness, tremors, weakness and headaches.   Psychiatric/Behavioral:  Negative for agitation, behavioral problems, confusion and decreased concentration.       Objective:     Vital Signs (Most Recent):  Temp: 98.5 °F (36.9 °C) (04/10/24 1215)  Pulse: 105 (04/10/24 1215)  Resp: 20 (04/10/24 1215)  BP: 132/81 (04/10/24 1215)  SpO2: (!) 90 % (04/10/24 1215) Vital Signs (24h Range):  Temp:  [97.6 °F (36.4 °C)-98.9 °F (37.2 °C)] 98.5 °F (36.9 °C)  Pulse:  [] 105  Resp:  [11-24] 20  SpO2:  [90 %-97 %] 90 %  BP: (101-169)/(58-93) 132/81     Weight: 93 kg (205 lb 0.4 oz)  Height: 5' 7" (170.2 cm)  Body mass index is 32.11 kg/m².     Physical Exam  Vitals and nursing note reviewed.   Constitutional:       General: He is not in acute distress.     Appearance: Normal appearance. He is not ill-appearing.   HENT:      Head: Normocephalic.      Mouth/Throat:      Pharynx: Oropharynx is clear.   Eyes:      Conjunctiva/sclera: Conjunctivae normal.   Cardiovascular:      Rate and Rhythm: Normal rate.      Pulses: Normal pulses.   Pulmonary:      Effort: Pulmonary effort is normal.      Breath sounds: Normal breath sounds.   Abdominal:      General: There is distension.      Palpations: Abdomen is soft.      Tenderness: There is abdominal tenderness in the right lower quadrant. There is no guarding or rebound.      Comments: RLQ Kidney " txp incision CDI with staples intact, small area of erythema    Musculoskeletal:      Right lower leg: No edema.      Left lower leg: No edema.   Skin:     General: Skin is warm.   Neurological:      Mental Status: He is alert and oriented to person, place, and time. Mental status is at baseline.   Psychiatric:         Mood and Affect: Mood normal.         Behavior: Behavior normal. Behavior is cooperative.         Thought Content: Thought content normal.          Laboratory:  CBC:   Recent Labs   Lab 04/09/24  0618 04/09/24  1530 04/10/24  0545   WBC 9.14 15.83* 14.60*   RBC 4.13* 4.26* 4.12*   HGB 11.2* 11.7* 11.3*   HCT 38.1* 40.1 38.0*    210 215   MCV 92 94 92   MCH 27.1 27.5 27.4   MCHC 29.4* 29.2* 29.7*     CMP:   Recent Labs   Lab 04/09/24  0618 04/09/24  1530 04/09/24  1808 04/09/24  2250 04/10/24  0545    174*  --  182* 99   CALCIUM 9.0 9.1  --  9.3 9.4   ALBUMIN 3.0*  --   --  3.3* 3.2*    138  --  135* 136   K 4.7 5.6* 6.0* 5.4* 4.6   CO2 23 22*  --  23 22*    109  --  104 105   BUN 21 21  --  22 24*   CREATININE 1.6* 1.7*  --  1.8* 1.8*     Labs within the past 24 hours have been reviewed.    Diagnostic Results:  US - Kidney: Results for orders placed during the hospital encounter of 04/08/24    US Transplant Kidney With Doppler    Narrative  EXAMINATION:  US TRANSPLANT KIDNEY WITH DOPPLER    CLINICAL HISTORY:  wound infection/drainage;    TECHNIQUE:  Real time gray scale and doppler ultrasound was performed over the patient's renal allograft.    COMPARISON:  03/25/2024    FINDINGS:  Renal allograft in the right lower quadrant.  The allograft measures 12.9 cm. Normal perfusion. No hydronephrosis.  There is a 1.2 cm cyst at the midpole.  There is a 1.7 cm cyst with a peripheral calcification, similar to the prior exam.    There is an elongated fluid collection underneath the anterior pelvic wall overlying the kidney transplant an area measuring 14.8 x 7.4 x 1.3  cm    Vasculature:    Inter lobar and segmental arterial resistive indices ranged from 0.67 to 0.77, previously 0.65 to 0.75.    Main renal artery peak systolic velocity: 200 cm/s with normal waveform, previously 314 cm/s .    Renal artery/iliac ratio: 1.3.    The main renal vein is patent.    Impression  Large fluid collection, 14.8 x 7.4 x 1.3 cm, just posterior to the anterior pelvic wall overlying the transplant kidney concerning for seroma, hematoma or abscess.    Small renal cysts, one  of which has a small peripheral calcification, unchanged from the prior study.    Satisfactory Doppler evaluation of the transplant kidney.      Electronically signed by: Teresita Ohara MD  Date:    04/08/2024  Time:    12:38

## 2024-04-10 NOTE — CLINICAL REVIEW
IP Sepsis Screen (most recent)       Sepsis Screen (IP) - 04/10/24 0714       Is the patient's history or complaint suggestive of a possible infection? Yes  -CB    Are there at least two of the following signs and symptoms present? Yes  -CB    Sepsis signs/symptoms - Tachycardia Tachycardia     >90  -CB    Sepsis signs/symptoms - WBC WBC < 4,000 or WBC > 12,000  -CB    Are any of the following organ dysfunction criteria present and not considered to be due to a chronic condition? Yes  -CB    Organ Dysfunction Criteria - O2 O2 Saturation < 95% on room air  -CB    Initiate Sepsis Protocol No  -CB    Reason sepsis not considered Pt. receiving appropriate management  -CB              User Key  (r) = Recorded By, (t) = Taken By, (c) = Cosigned By      Initials Name    Jennifer Retana RN

## 2024-04-10 NOTE — PROGRESS NOTES
Pharmacokinetic Assessment Follow Up: IV Vancomycin    Vancomycin serum concentration assessment(s):    The random level was drawn correctly and can be used to guide therapy at this time. The measurement is below the desired definitive target range of 10 to 20 mcg/mL.    Vancomycin Regimen Plan:    Re-dose with 15 mg/kg, as level is <20 ug/mL.  Draw random vancomycin level with AM labs on 4/10/24.    Drug levels (last 3 results):  Recent Labs   Lab Result Units 04/09/24  1512   Vancomycin, Random ug/mL 8.3       Pharmacy will continue to follow and monitor vancomycin.    Please contact pharmacy at extension [phone] for questions regarding this assessment.    Thank you for the consult,   Stella Jaime       Patient brief summary:  Colten Monet is a 64 y.o. male initiated on antimicrobial therapy with IV Vancomycin for treatment of intra-abdominal infection    The patient's current regimen is pulse dosing with HARRISON    Drug Allergies:   Review of patient's allergies indicates:   Allergen Reactions    Codeine Hives     Reaction to Tylenol #3       Actual Body Weight:   92.7 kg    Renal Function:   Estimated Creatinine Clearance: 47 mL/min (A) (based on SCr of 1.7 mg/dL (H)).,     Dialysis Method (if applicable):  N/A    CBC (last 72 hours):  Recent Labs   Lab Result Units 04/08/24  0712 04/09/24  0618 04/09/24  1530   WBC K/uL 10.36 9.14 15.83*   Hemoglobin g/dL 12.3* 11.2* 11.7*   Hematocrit % 41.8 38.1* 40.1   Platelets K/uL 222 195 210   Gran % % 73.2* 79.0* 93.5*   Lymph % % 15.8* 10.5* 2.6*   Mono % % 6.5 6.0 2.5*   Eosinophil % % 2.0 2.2 0.0   Basophil % % 0.9 0.8 0.3   Differential Method  Automated Automated Automated       Metabolic Panel (last 72 hours):  Recent Labs   Lab Result Units 04/08/24  0712 04/09/24  0243 04/09/24  0618 04/09/24  1530 04/09/24  1808   Sodium mmol/L 140  --  138 138  --    Potassium mmol/L 4.8  --  4.7 5.6* 6.0*   Chloride mmol/L 109  --  110 109  --    CO2 mmol/L 23  --  23 22*   --    Glucose mg/dL 94  --  107 174*  --    Glucose, UA   --  Negative  --   --   --    BUN mg/dL 20  --  21 21  --    Creatinine mg/dL 1.5*  --  1.6* 1.7*  --    Albumin g/dL 3.4*  --  3.0*  --   --    Magnesium mg/dL 1.5*  --  1.8  --   --    Phosphorus mg/dL 2.9  --  3.1  --   --        Vancomycin Administrations:  vancomycin given in the last 96 hours                     vancomycin 1.75 g in 5 % dextrose 500 mL IVPB (mg) 1,750 mg New Bag 04/08/24 1531                    Microbiologic Results:  Microbiology Results (last 7 days)       Procedure Component Value Units Date/Time    Blood culture [[id number]] Collected: 04/08/24 1346    Order Status: Completed Specimen: Blood from Peripheral, Antecubital, Right Updated: 04/09/24 1412     Blood Culture, Routine No Growth to date      No Growth to date    Blood culture [[id number]] Collected: 04/08/24 1346    Order Status: Completed Specimen: Blood from Peripheral, Antecubital, Left Updated: 04/09/24 1412     Blood Culture, Routine No Growth to date      No Growth to date    Urine Culture High Risk [[id number]] Collected: 04/09/24 0243    Order Status: Sent Specimen: Urine, Clean Catch Updated: 04/09/24 0247

## 2024-04-10 NOTE — PLAN OF CARE
AAOx4. VSS. Afebrile. RLQ island border dressing C/D/I. NS continuous @100ml/hr. PRN oxy 5 given for incisional pain. K+ and Creatinine levels elevated. IV calcium gluconate, D10, and insulin given. 500cc NS bolus and Lasix IVP given. K+ @ 5.4 on reassessment. Creatinine continues to trend up.  IV cefepime and vanc given as ordered. Friend is at bedside. Bed wheels locked, bed in low position, side rails raisedx2. Pt understands to call RN when needed.

## 2024-04-10 NOTE — PLAN OF CARE
64 year-old male admitted 4/8/24 for Erythema/Drainage from transplant incision. Pt has hx of IgA nephropathy, kidney txp 3/11/24.  -AAOx4, ambulates independently  -22G Lt AC  -Cefepime Q12 IVPB  -NS @ 100ml/hr  -RLQ incision with staples/island border in place  -K 4.6  -Cr 1.8  -pt has not had BM in few days, Bisacodyl added to regimen  -pt desat to 81%/'s, NC @ 2L added, KUB/CXR ordered  -PRN Oxy 5/10mg Q6  -family at bedside, pt lying in bed, bed in lowest position, wheels locked, non-skid socks in place, call light within reach

## 2024-04-10 NOTE — PROGRESS NOTES
Admit Note     Met with patient to assess needs. Patient is a 64 y.o.  male, admitted for:    Surgical wound infection [T81.49XA]  Wound dehiscence [T81.30XA]  Encounter for post surgical wound check [Z48.89]  Kidney transplant recipient [Z94.0]      Patient admitted from home on 4/8/2024 .  At this time, patient presents as alert and oriented x 4, pleasant, good eye contact, well groomed, recall good, concentration/judgement good, average intelligence, calm, communicative, cooperative, and asking and answering questions appropriately.  At this time, patients caregiver presents as  not present at this time .    Household/Family Systems     Patient resides with patient's wife and children, at [address] [place] [address] [address]  [place] [address].  Support system includes his wife Ginna Monet ([phone]), and his friend Tripp Miguel ([phone]).   Patient reports his children are being cared for by family.     Patients primary caregiver is Ginna Monet, patients wife, phone number [phone].  Confirmed patients contact information is [phone] (home);   Telephone Information:   Mobile [phone]   .    During admission, patient's caregiver plans to stay at home.  Confirmed patient and patients caregivers do have access to reliable transportation.    Cognitive Status/Learning     Patient reports reading ability as 10th grade and states patient does have difficulty with hearing.  Patient reports patient learns best by multisensory learning.   Needed: No.   Highest education level: High School (9-12) or GED    Vocation/Disability   .  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    Adherence     Patient reports a high level of adherence to patients health care regimen.  Adherence counseling and education provided. Patient verbalizes understanding.    Substance Use    Patient reports the following substance  usage.    Tobacco: none, patient denies any use.  Alcohol: none, patient denies any use.  Illicit Drugs/Non-prescribed Medications: none, patient denies any use.  Patient states clear understanding of the potential impact of substance use.  Substance abstinence/cessation counseling, education and resources provided and reviewed.     Services Utilizing/ADLS    Infusion Service: Prior to admission, patient utilizing? no  Home Health: Prior to admission, patient utilizing? no  DME: Prior to admission, no  Pulmonary/Cardiac Rehab: Prior to admission, no  Dialysis:  Prior to admission, no  Transplant Specialty Pharmacy:  Prior to admission, yes; [facility].    Prior to admission, patient reports patient was independent with ADLS and was not driving.  Patient reports patient is able to care for self at this time..  Patient indicates a willingness to care for self once medically cleared to do so.    Insurance/Medications    Insured by   Payer/Plan Subscr  Sex Relation Sub. Ins. ID Effective Group Num   1. [facility] MARGUERITE FLOOD 1960 Male Self [id number]                                    [address] [address]   2. [facility] MARGUERITE FLOOD 1960 Male Self [id number]                                   [address]      Primary Insurance (for UNOS reporting): Private Insurance  Secondary Insurance (for UNOS reporting): None    Patient reports patient is able to obtain and afford medications at this time and at time of discharge.    Living Will/Healthcare Power of     Patient states patient does not have a LW and/or HCPA.   provided education regarding LW and HCPA and the completion of forms.    Coping/Mental Health    Patient is coping adequately with the aid of  family members and friends. Patient denied needing or wanting resources or referrals from this  at this time. Patient agrees to contact transplant team with needs, questions, or  concerns as they arise.     Discharge Planning    At time of discharge, patient plans to return to patient's home under the care of Ginna Monet.  Patients friend will transport patient.  Per rounds today, expected discharge date has not been medically determined at this time. Patient and patients caregiver  verbalize understanding and are involved in treatment planning and discharge process.    Additional Concerns     providing ongoing psychosocial support, education, resources and d/c planning as needed.  SW remains available.  remains available. Patient denies additional needs and/or concerns at this time. Patient verbalizes understanding and agreement with information reviewed, social work availability, and how to access available resources as needed.

## 2024-04-10 NOTE — PROGRESS NOTES
Pharmacokinetic Assessment Follow Up: IV Vancomycin    Vancomycin serum concentration assessment(s):    The random level was drawn correctly ~7hrs post dose. Level elevated at 27. Plan to re-draw random level in 12 hours @1800 on 4/10/24 to assess for further guidance of vancomycin dosing.     Vancomycin Regimen Plan:    No vancomycin at this time. Plan to re-draw random level in 12 hours @1800 on 4/10/24 to assess for further guidance of vancomycin dosing.     Drug levels (last 3 results):  Recent Labs   Lab Result Units 04/09/24  1512 04/10/24  0545   Vancomycin, Random ug/mL 8.3 27.0       Pharmacy will continue to follow and monitor vancomycin.    Please contact pharmacy at extension [phone] for questions regarding this assessment.    Thank you for the consult,   Stella Jaime       Patient brief summary:  Colten Monet is a 64 y.o. male initiated on antimicrobial therapy with IV Vancomycin for treatment of intra-abdominal infection    The patient's current regimen is pulse dosing    Drug Allergies:   Review of patient's allergies indicates:   Allergen Reactions    Codeine Hives     Reaction to Tylenol #3       Actual Body Weight:   93 kg    Renal Function:   Estimated Creatinine Clearance: 44.5 mL/min (A) (based on SCr of 1.8 mg/dL (H)).,     Dialysis Method (if applicable):  N/A    CBC (last 72 hours):  Recent Labs   Lab Result Units 04/08/24  0712 04/09/24  0618 04/09/24  1530 04/10/24  0545   WBC K/uL 10.36 9.14 15.83* 14.60*   Hemoglobin g/dL 12.3* 11.2* 11.7* 11.3*   Hematocrit % 41.8 38.1* 40.1 38.0*   Platelets K/uL 222 195 210 215   Gran % % 73.2* 79.0* 93.5* 81.5*   Lymph % % 15.8* 10.5* 2.6* 10.1*   Mono % % 6.5 6.0 2.5* 6.8   Eosinophil % % 2.0 2.2 0.0 0.3   Basophil % % 0.9 0.8 0.3 0.5   Differential Method  Automated Automated Automated Automated       Metabolic Panel (last 72 hours):  Recent Labs   Lab Result Units 04/08/24  0712 04/09/24  0243 04/09/24  0618 04/09/24  1530 04/09/24  1804  04/09/24  2250 04/10/24  0545   Sodium mmol/L 140  --  138 138  --  135* 136   Potassium mmol/L 4.8  --  4.7 5.6* 6.0* 5.4* 4.6   Chloride mmol/L 109  --  110 109  --  104 105   CO2 mmol/L 23  --  23 22*  --  23 22*   Glucose mg/dL 94  --  107 174*  --  182* 99   Glucose, UA   --  Negative  --   --   --   --   --    BUN mg/dL 20  --  21 21  --  22 24*   Creatinine mg/dL 1.5*  --  1.6* 1.7*  --  1.8* 1.8*   Albumin g/dL 3.4*  --  3.0*  --   --  3.3* 3.2*   Magnesium mg/dL 1.5*  --  1.8  --   --   --  1.7   Phosphorus mg/dL 2.9  --  3.1  --   --  3.1 3.7       Vancomycin Administrations:  vancomycin given in the last 96 hours                     vancomycin 1,500 mg in dextrose 5 % (D5W) 250 mL IVPB (Vial-Mate) (mg) 1,500 mg New Bag 04/09/24 2146    vancomycin 1.75 g in 5 % dextrose 500 mL IVPB (mg) 1,750 mg New Bag 04/08/24 1531                    Microbiologic Results:  Microbiology Results (last 7 days)       Procedure Component Value Units Date/Time    Urine Culture High Risk [[id number]] Collected: 04/09/24 0243    Order Status: Completed Specimen: Urine, Clean Catch Updated: 04/10/24 0518     Urine Culture, Routine No growth    Narrative:      Indicated criteria for high risk culture:->Kidney transplant  < 2 months    Blood culture [[id number]] Collected: 04/08/24 1346    Order Status: Completed Specimen: Blood from Peripheral, Antecubital, Right Updated: 04/09/24 1412     Blood Culture, Routine No Growth to date      No Growth to date    Blood culture [[id number]] Collected: 04/08/24 1346    Order Status: Completed Specimen: Blood from Peripheral, Antecubital, Left Updated: 04/09/24 1412     Blood Culture, Routine No Growth to date      No Growth to date

## 2024-04-10 NOTE — ANESTHESIA POSTPROCEDURE EVALUATION
Anesthesia Post Evaluation    Patient: Colten Monet    Procedure(s) Performed: Procedure(s) (LRB):  LAPAROTOMY, EXPLORATORY (N/A)    Final Anesthesia Type: general      Patient location during evaluation: PACU  Patient participation: Yes- Able to Participate  Level of consciousness: awake and alert  Post-procedure vital signs: reviewed and stable  Pain management: adequate  Airway patency: patent  SAMUEL mitigation strategies: Multimodal analgesia, Preoperative use of mandibular advancement devices or oral appliances, Intraoperative administration of CPAP, nasopharyngeal airway, or oral appliance during sedation, Extubation while patient is awake and Verification of full reversal of neuromuscular block  PONV status at discharge: No PONV  Anesthetic complications: no      Cardiovascular status: blood pressure returned to baseline, hemodynamically stable and stable  Respiratory status: unassisted and spontaneous ventilation  Hydration status: euvolemic  Follow-up not needed.              Vitals Value Taken Time   /89 04/09/24 2345   Temp 37.2 °C (98.9 °F) 04/09/24 2345   Pulse 106 04/09/24 2345   Resp 17 04/09/24 2345   SpO2 91 % 04/09/24 2345         Event Time   Out of Recovery 04/09/2024 12:30:00         Pain/Kait Score: Pain Rating Prior to Med Admin: 5 (4/9/2024  9:00 PM)  Pain Rating Post Med Admin: 4 (4/9/2024  1:26 PM)  Kati Score: 9 (4/9/2024  1:15 PM)

## 2024-04-11 PROBLEM — K56.7 ILEUS: Status: ACTIVE | Noted: 2024-04-11

## 2024-04-11 LAB
ALBUMIN SERPL BCP-MCNC: 2.9 G/DL (ref 3.5–5.2)
ANION GAP SERPL CALC-SCNC: 11 MMOL/L (ref 8–16)
BASOPHILS # BLD AUTO: 0.06 K/UL (ref 0–0.2)
BASOPHILS NFR BLD: 0.4 % (ref 0–1.9)
BUN SERPL-MCNC: 24 MG/DL (ref 8–23)
CALCIUM SERPL-MCNC: 9.2 MG/DL (ref 8.7–10.5)
CHLORIDE SERPL-SCNC: 104 MMOL/L (ref 95–110)
CO2 SERPL-SCNC: 23 MMOL/L (ref 23–29)
CREAT SERPL-MCNC: 1.5 MG/DL (ref 0.5–1.4)
DIFFERENTIAL METHOD BLD: ABNORMAL
EOSINOPHIL # BLD AUTO: 0.1 K/UL (ref 0–0.5)
EOSINOPHIL NFR BLD: 0.4 % (ref 0–8)
ERYTHROCYTE [DISTWIDTH] IN BLOOD BY AUTOMATED COUNT: 13.6 % (ref 11.5–14.5)
EST. GFR  (NO RACE VARIABLE): 51.3 ML/MIN/1.73 M^2
GLUCOSE SERPL-MCNC: 135 MG/DL (ref 70–110)
HCT VFR BLD AUTO: 39.4 % (ref 40–54)
HGB BLD-MCNC: 11.7 G/DL (ref 14–18)
IMM GRANULOCYTES # BLD AUTO: 0.14 K/UL (ref 0–0.04)
IMM GRANULOCYTES NFR BLD AUTO: 1 % (ref 0–0.5)
LYMPHOCYTES # BLD AUTO: 0.8 K/UL (ref 1–4.8)
LYMPHOCYTES NFR BLD: 5.8 % (ref 18–48)
MAGNESIUM SERPL-MCNC: 1.7 MG/DL (ref 1.6–2.6)
MCH RBC QN AUTO: 27.1 PG (ref 27–31)
MCHC RBC AUTO-ENTMCNC: 29.7 G/DL (ref 32–36)
MCV RBC AUTO: 91 FL (ref 82–98)
MONOCYTES # BLD AUTO: 0.8 K/UL (ref 0.3–1)
MONOCYTES NFR BLD: 6.1 % (ref 4–15)
NEUTROPHILS # BLD AUTO: 11.9 K/UL (ref 1.8–7.7)
NEUTROPHILS NFR BLD: 86.3 % (ref 38–73)
NRBC BLD-RTO: 0 /100 WBC
PHOSPHATE SERPL-MCNC: 3.4 MG/DL (ref 2.7–4.5)
PLATELET # BLD AUTO: 204 K/UL (ref 150–450)
PMV BLD AUTO: 10.4 FL (ref 9.2–12.9)
POTASSIUM SERPL-SCNC: 4.7 MMOL/L (ref 3.5–5.1)
POTASSIUM SERPL-SCNC: 5.2 MMOL/L (ref 3.5–5.1)
POTASSIUM UR-SCNC: 55 MMOL/L (ref 15–95)
RBC # BLD AUTO: 4.31 M/UL (ref 4.6–6.2)
SODIUM SERPL-SCNC: 138 MMOL/L (ref 136–145)
SODIUM UR-SCNC: 58 MMOL/L (ref 20–250)
TACROLIMUS BLD-MCNC: 6.9 NG/ML (ref 5–15)
WBC # BLD AUTO: 13.81 K/UL (ref 3.9–12.7)

## 2024-04-11 PROCEDURE — 99233 SBSQ HOSP IP/OBS HIGH 50: CPT | Mod: ,,,

## 2024-04-11 PROCEDURE — 84133 ASSAY OF URINE POTASSIUM: CPT

## 2024-04-11 PROCEDURE — 25000003 PHARM REV CODE 250

## 2024-04-11 PROCEDURE — 20600001 HC STEP DOWN PRIVATE ROOM

## 2024-04-11 PROCEDURE — 85025 COMPLETE CBC W/AUTO DIFF WBC: CPT

## 2024-04-11 PROCEDURE — 27000207 HC ISOLATION

## 2024-04-11 PROCEDURE — 36415 COLL VENOUS BLD VENIPUNCTURE: CPT

## 2024-04-11 PROCEDURE — 51798 US URINE CAPACITY MEASURE: CPT

## 2024-04-11 PROCEDURE — 86832 HLA CLASS I HIGH DEFIN QUAL: CPT

## 2024-04-11 PROCEDURE — 63600175 PHARM REV CODE 636 W HCPCS: Performed by: INTERNAL MEDICINE

## 2024-04-11 PROCEDURE — 94761 N-INVAS EAR/PLS OXIMETRY MLT: CPT

## 2024-04-11 PROCEDURE — 25000003 PHARM REV CODE 250: Performed by: NURSE PRACTITIONER

## 2024-04-11 PROCEDURE — 80069 RENAL FUNCTION PANEL: CPT

## 2024-04-11 PROCEDURE — 86833 HLA CLASS II HIGH DEFIN QUAL: CPT

## 2024-04-11 PROCEDURE — 83735 ASSAY OF MAGNESIUM: CPT

## 2024-04-11 PROCEDURE — 84132 ASSAY OF SERUM POTASSIUM: CPT

## 2024-04-11 PROCEDURE — 84300 ASSAY OF URINE SODIUM: CPT

## 2024-04-11 PROCEDURE — 80197 ASSAY OF TACROLIMUS: CPT

## 2024-04-11 PROCEDURE — 63600175 PHARM REV CODE 636 W HCPCS

## 2024-04-11 PROCEDURE — 86977 RBC SERUM PRETX INCUBJ/INHIB: CPT | Mod: 59

## 2024-04-11 PROCEDURE — 25000242 PHARM REV CODE 250 ALT 637 W/ HCPCS

## 2024-04-11 RX ORDER — LEVOFLOXACIN 250 MG/1
750 TABLET ORAL DAILY
Status: COMPLETED | OUTPATIENT
Start: 2024-04-11 | End: 2024-04-15

## 2024-04-11 RX ORDER — DOXYCYCLINE HYCLATE 100 MG
100 TABLET ORAL EVERY 12 HOURS
Status: COMPLETED | OUTPATIENT
Start: 2024-04-11 | End: 2024-04-15

## 2024-04-11 RX ADMIN — SODIUM BICARBONATE 1300 MG: 650 TABLET ORAL at 09:04

## 2024-04-11 RX ADMIN — SIMETHICONE 80 MG: 80 TABLET, CHEWABLE ORAL at 08:04

## 2024-04-11 RX ADMIN — OXYCODONE HYDROCHLORIDE 10 MG: 10 TABLET ORAL at 09:04

## 2024-04-11 RX ADMIN — SODIUM CHLORIDE: 9 INJECTION, SOLUTION INTRAVENOUS at 08:04

## 2024-04-11 RX ADMIN — DOXYCYCLINE HYCLATE 100 MG: 100 TABLET, COATED ORAL at 09:04

## 2024-04-11 RX ADMIN — ACETAMINOPHEN 650 MG: 325 TABLET ORAL at 05:04

## 2024-04-11 RX ADMIN — CEFEPIME 2 G: 2 INJECTION, POWDER, FOR SOLUTION INTRAVENOUS at 11:04

## 2024-04-11 RX ADMIN — ATOVAQUONE 1500 MG: 750 SUSPENSION ORAL at 08:04

## 2024-04-11 RX ADMIN — FLUTICASONE FUROATE AND VILANTEROL TRIFENATATE 1 PUFF: 100; 25 POWDER RESPIRATORY (INHALATION) at 08:04

## 2024-04-11 RX ADMIN — HEPARIN SODIUM 5000 UNITS: 5000 INJECTION INTRAVENOUS; SUBCUTANEOUS at 02:04

## 2024-04-11 RX ADMIN — CEFEPIME 2 G: 2 INJECTION, POWDER, FOR SOLUTION INTRAVENOUS at 12:04

## 2024-04-11 RX ADMIN — CALCIUM CARBONATE (ANTACID) CHEW TAB 500 MG 500 MG: 500 CHEW TAB at 04:04

## 2024-04-11 RX ADMIN — OXYCODONE HYDROCHLORIDE 10 MG: 10 TABLET ORAL at 05:04

## 2024-04-11 RX ADMIN — VALGANCICLOVIR HYDROCHLORIDE 450 MG: 450 TABLET ORAL at 08:04

## 2024-04-11 RX ADMIN — TACROLIMUS 1 MG: 1 CAPSULE ORAL at 08:04

## 2024-04-11 RX ADMIN — FAMOTIDINE 20 MG: 20 TABLET ORAL at 09:04

## 2024-04-11 RX ADMIN — Medication 400 MG: at 08:04

## 2024-04-11 RX ADMIN — DOCUSATE SODIUM 100 MG: 100 CAPSULE, LIQUID FILLED ORAL at 08:04

## 2024-04-11 RX ADMIN — HEPARIN SODIUM 5000 UNITS: 5000 INJECTION INTRAVENOUS; SUBCUTANEOUS at 05:04

## 2024-04-11 RX ADMIN — DOCUSATE SODIUM 100 MG: 100 CAPSULE, LIQUID FILLED ORAL at 02:04

## 2024-04-11 RX ADMIN — DOXYCYCLINE HYCLATE 100 MG: 100 TABLET, COATED ORAL at 02:04

## 2024-04-11 RX ADMIN — SODIUM BICARBONATE 1300 MG: 650 TABLET ORAL at 08:04

## 2024-04-11 RX ADMIN — LEVOFLOXACIN 750 MG: 250 TABLET, FILM COATED ORAL at 09:04

## 2024-04-11 RX ADMIN — Medication 400 MG: at 09:04

## 2024-04-11 RX ADMIN — TACROLIMUS 1 MG: 1 CAPSULE ORAL at 05:04

## 2024-04-11 RX ADMIN — HEPARIN SODIUM 5000 UNITS: 5000 INJECTION INTRAVENOUS; SUBCUTANEOUS at 09:04

## 2024-04-11 RX ADMIN — PREDNISONE 5 MG: 5 TABLET ORAL at 08:04

## 2024-04-11 RX ADMIN — ATORVASTATIN CALCIUM 40 MG: 20 TABLET, FILM COATED ORAL at 09:04

## 2024-04-11 RX ADMIN — DOCUSATE SODIUM 100 MG: 100 CAPSULE, LIQUID FILLED ORAL at 09:04

## 2024-04-11 RX ADMIN — OXYCODONE HYDROCHLORIDE 10 MG: 10 TABLET ORAL at 11:04

## 2024-04-11 NOTE — PROGRESS NOTES
[facility]plant Step[facility]  Kidney Transplant  Progress Note      Reason for Follow-up: Reassessment of Kidney Transplant - 3/11/2024  (#1) recipient and management of immunosuppression.    ORGAN: LEFT KIDNEY    Donor Type: Living    Donor CMV Status:   Donor HBcAB:  Donor HCV Status:  Donor HBV ANA:   Donor HCV ANA:       Subjective:   History of Present Illness:  Mr. Colten Monet is a 65 yo male w/ PMHx of ESRD d/t IgA nephropathy, now S/P living related Ktxp on 3/11/24. Patient progressed well during initial post-txp course and discharged POD#2 with downtrending Cr. Now with BL Cr 1.0-1.1.     Patient recently seen in transplant surgery clinic on 4/3 and noted to have serous drainage from RLQ kidney txp incision along with palpable erythema. Staples intact w/ minimal tenderness to deep palpation at that time. Incision probed w/ 200 cc serous fluid drained in clinic by surgical fellow. Patient was placed on PO for cellulitis. He now presents to ED for worsening erythema to incisional area (see media for photo). Staples remain intact, erythema noted to entire length of incision, worse in upper portion. Small 1-2 cm area of yellow slough in upper portion incision. Minimal serous drainage expressed upon probing. Patient reports mild tenderness to deep palpation. Kidney US in ED w/ large 14.8 x 7.4 x 1.3 cm, just posterior to the anterior pelvic wall overlying the transplant kidney. Reviewed plan with Dr. Eduardo and Dr. Whyte. CT A/P non-con ordered along with IV abx. Staples removed at bedside, small 1-2 cm area of wound dehiscence noted, moderate amount serous fluid able to be expressed from incision. Mild HARRISON on admit (Cr 1.5). NS bolus ordered.         Mr. Monet is a 64 y.o. year old male who is status post Kidney Transplant - 3/11/2024  (#1).  His maintenance immunosuppression consists of:   Immunosuppressants (From admission, onward)      Start     Stop Route Frequency Ordered    04/10/24 0930   "tacrolimus capsule 1 mg         -- Oral 2 times daily 04/10/24 0916            Hospital Course:  Patient admitted for worsening cellulitis of kidney transplant incision, incisional hernia, and HARRISON.  CT abd/pelvis with RLQ transplant kidney with incisional hernia containing distal ileum. The large fluid collection interposed between the kidney and anterior abdominal wall drained at patient's bedside. Mild to moderate soft tissue stranding and trace fluid directly adjacent to the transplant kidney. CT reviewed by surgeon. Patient made NPO for surgical intervention. IVFs started. Started on cefepime and vancomycin. Patient taken back to OR 4/9 for dehiscence of kidney transplant incision and hernia repair. Surgery without issues. Post op, patient with hyperkalemia with interventions such as shifting, IV lasix, and IVFs.      Interval History: yesterday evening, patient had a oxygen desaturation event with SpO2 down to the low 80s and -120 at 5:30pm. CXR and KUB obtained. CXR with acute cardiopulmonary process. KUB with findings of ileus. Patient placed on 2L NC with improvement in SpO2. This morning, patient reports feeling bloated and "full". KUB 4/10 with ileus. Patient has not had a  BM since OR take back. Denies any nausea or vomiting. He reports starting to belch more.  Made patient NPO for bowel rest. Will continue IVFs. K 5.2 this morning. Repeat K at 1400 ordered. this morning. Infectious work up continues to be negative thus far. Cefepime and vanc dc'd and levaquin and doxy x5 days. Cr down to 1.5 from 1.8 today. DSAs in process.  Continues with good urine output.  All VSS. Encourage ambulation. Cont to monitor.     Past Medical, Surgical, Family, and Social History:   Unchanged from H&P.    Scheduled Meds:   atorvastatin  40 mg Oral QHS    atovaquone  1,500 mg Oral Daily    docusate sodium  100 mg Oral TID    doxycycline  100 mg Oral Q12H    famotidine  20 mg Oral QHS    fluticasone furoate-vilanteroL  " 1 puff Inhalation Daily    heparin (porcine)  5,000 Units Subcutaneous Q8H    levoFLOXacin  750 mg Oral Daily    magnesium oxide  400 mg Oral BID    predniSONE  5 mg Oral Daily    sodium bicarbonate  1,300 mg Oral BID    tacrolimus  1 mg Oral BID    valGANciclovir  450 mg Oral QAM     Continuous Infusions:   sodium chloride 0.9% 100 mL/hr at 04/11/24 0836     PRN Meds:acetaminophen, albuterol, aluminum-magnesium hydroxide-simethicone, bisacodyL, calcium carbonate, [COMPLETED] calcium gluconate IVPB **AND** calcium gluconate IVPB, melatonin, ondansetron, oxyCODONE, oxyCODONE, simethicone, sodium chloride 0.9%    Intake/Output - Last 3 Shifts         04/09 0700  04/10 0659 04/10 0700  04/11 0659 04/11 0700 04/12 0659    P.O. 125 934     I.V. (mL/kg) 1241.4 (13.3) 1566.2 (17.1) 43.3 (0.5)    IV Piggyback 2283.2 444.3     Total Intake(mL/kg) 3649.6 (39.2) 2944.5 (32.1) 43.3 (0.5)    Urine (mL/kg/hr) 2925 (1.3) 1325 (0.6)     Total Output 2925 1325     Net +724.6 +1619.5 +43.3           Urine Occurrence  2 x     Stool Occurrence   0 x             Review of Systems   Constitutional:  Negative for chills and fever.   HENT:  Negative for congestion and trouble swallowing.    Respiratory:  Negative for cough and shortness of breath.    Cardiovascular:  Negative for chest pain and leg swelling.   Gastrointestinal:  Positive for abdominal distention, abdominal pain (incisional) and constipation. Negative for nausea and vomiting.   Genitourinary:  Negative for decreased urine volume and difficulty urinating.   Skin:  Positive for wound.   Allergic/Immunologic: Positive for immunocompromised state.   Neurological:  Negative for dizziness, tremors, weakness and headaches.   Psychiatric/Behavioral:  Negative for agitation, behavioral problems, confusion and decreased concentration.       Objective:     Vital Signs (Most Recent):  Temp: 98 °F (36.7 °C) (04/11/24 1108)  Pulse: 109 (04/11/24 1146)  Resp: 18 (04/11/24 1108)  BP: (!)  "172/94 (04/11/24 1108)  SpO2: 95 % (04/11/24 1129) Vital Signs (24h Range):  Temp:  [97.9 °F (36.6 °C)-99 °F (37.2 °C)] 98 °F (36.7 °C)  Pulse:  [] 109  Resp:  [15-24] 18  SpO2:  [85 %-100 %] 95 %  BP: (120-172)/(83-97) 172/94     Weight: 91.7 kg (202 lb 4.4 oz)  Height: 5' 7" (170.2 cm)  Body mass index is 31.68 kg/m².     Physical Exam  Vitals and nursing note reviewed.   Constitutional:       General: He is not in acute distress.     Appearance: Normal appearance. He is not ill-appearing.   HENT:      Head: Normocephalic.      Mouth/Throat:      Pharynx: Oropharynx is clear.   Eyes:      Conjunctiva/sclera: Conjunctivae normal.   Cardiovascular:      Rate and Rhythm: Normal rate.      Pulses: Normal pulses.   Pulmonary:      Effort: Pulmonary effort is normal.      Breath sounds: Normal breath sounds.   Abdominal:      General: Bowel sounds are decreased. There is distension.      Tenderness: There is abdominal tenderness. There is no guarding or rebound.      Comments: RLQ Kidney txp incision CDI with staples intact, small area of erythema    Musculoskeletal:      Right lower leg: No edema.      Left lower leg: No edema.   Skin:     General: Skin is warm and dry.   Neurological:      Mental Status: He is alert and oriented to person, place, and time. Mental status is at baseline.   Psychiatric:         Mood and Affect: Mood normal.         Behavior: Behavior normal. Behavior is cooperative.         Thought Content: Thought content normal.          Laboratory:  CBC:   Recent Labs   Lab 04/09/24  1530 04/10/24  0545 04/11/24  0640   WBC 15.83* 14.60* 13.81*   RBC 4.26* 4.12* 4.31*   HGB 11.7* 11.3* 11.7*   HCT 40.1 38.0* 39.4*    215 204   MCV 94 92 91   MCH 27.5 27.4 27.1   MCHC 29.2* 29.7* 29.7*     CMP:   Recent Labs   Lab 04/10/24  0545 04/10/24  1517 04/11/24  0640   GLU 99 119* 135*   CALCIUM 9.4 9.4 9.2   ALBUMIN 3.2* 3.1* 2.9*    137 138   K 4.6 5.1 5.2*   CO2 22* 25 23    103 104 "   BUN 24* 26* 24*   CREATININE 1.8* 1.8* 1.5*     Labs within the past 24 hours have been reviewed.    Diagnostic Results:  US - Kidney: Results for orders placed during the hospital encounter of 04/08/24    US Transplant Kidney With Doppler    Narrative  EXAMINATION:  US TRANSPLANT KIDNEY WITH DOPPLER    CLINICAL HISTORY:  wound infection/drainage;    TECHNIQUE:  Real time gray scale and doppler ultrasound was performed over the patient's renal allograft.    COMPARISON:  03/25/2024    FINDINGS:  Renal allograft in the right lower quadrant.  The allograft measures 12.9 cm. Normal perfusion. No hydronephrosis.  There is a 1.2 cm cyst at the midpole.  There is a 1.7 cm cyst with a peripheral calcification, similar to the prior exam.    There is an elongated fluid collection underneath the anterior pelvic wall overlying the kidney transplant an area measuring 14.8 x 7.4 x 1.3 cm    Vasculature:    Inter lobar and segmental arterial resistive indices ranged from 0.67 to 0.77, previously 0.65 to 0.75.    Main renal artery peak systolic velocity: 200 cm/s with normal waveform, previously 314 cm/s .    Renal artery/iliac ratio: 1.3.    The main renal vein is patent.    Impression  Large fluid collection, 14.8 x 7.4 x 1.3 cm, just posterior to the anterior pelvic wall overlying the transplant kidney concerning for seroma, hematoma or abscess.    Small renal cysts, one  of which has a small peripheral calcification, unchanged from the prior study.    Satisfactory Doppler evaluation of the transplant kidney.      Electronically signed by: Teresita Ohara MD  Date:    04/08/2024  Time:    12:38  Assessment/Plan:     * Surgical wound infection  - Pt with delayed surgical wound healing, seen in clinic 4/3 with 200 mL serous fluid expressed from incision w/ staples intact. Started on PO doxycycline at that time  - Wound remains erythematous with swelling and mild tenderness. No improvement with PO Doxy  - Kidney US 4/8 w/  "large 14.8 x 7.4 x 1.3 cm, just posterior to the anterior pelvic wall overlying the transplant kidney  - Staples removed 4/8, small area dehiscence noted with moderate serous fluid expressed  - CT abd/pelvis 4/8 with RLQ transplant kidney with incisional hernia containing distal ileum. The large fluid collection interposed between the kidney and anterior abdominal wall drained at patient's bedside. Mild to moderate soft tissue stranding and trace fluid directly adjacent to the transplant kidney. CT reviewed by surgeon. Patient made NPO for surgical intervention for 4/9.   - Started on IVFs; continue  - Patient taken back to OR (4/9)  for dehiscence of kidney transplant incision and hernia repair.   -  Infectious work up continues to be negative thus far.   - Cefepime and vanc dc'd and levaquin and doxy x5 days. (4/11)  - cont to monitor     Ileus  - KUB 4/10 with findings of ileus.    - patient reports feeling bloated and "full".  - Patient has not had a  BM since OR take back on 4/9.   - Denies any nausea or vomiting.   - He reports starting to belch more.  -  Made patient NPO for bowel rest.  - repeat KUB pending for today  - Encourage ambulation.   - Cont to monitor.       Hyperkalemia  Post op from OR take back on 4/9, patient with hyperkalemia needing interventions (shifting, IV lasix, and IVFs)  - K 5.2 4/11  - Repeat K at 1400.   - Cont IVFs  - NPO for ilues  - tele  - monitor     Anemia of chronic disease  - monitor daily CBC  - stable      HARRISON (acute kidney injury)  - expect due to dehydration,  IVFs hydration ordered on admit  - Patient taken back to OR 4/9 for dehiscence of kidney transplant incision and hernia repair.  - Cr down to 1.5 from 1.8 today. 4/11  - DSAs in process  - Continue IVFs since NPO for ileus.   - Continues with good urine output.   - infectious workup negative thus far  - Cefepime and vanc dc'd and levaquin and doxy x5 days. 4/11  - monitor    Hypomagnesemia  - Continue home PO mag  - " "IV Mag as needed      History of COPD  - Resume home neb      Dyslipidemia  - continue home statin      Prophylactic immunotherapy  - see "long term use of immunosuppression"      At risk for opportunistic infections  - OI prophylaxis per transplant protocol      Long-term use of immunosuppressant medication  - Continue prograf and prednisone  - Check prograf level daily and adjust for therapeutic dosage. Monitor for toxic side effects   -  cellcept held on admit d/t infection      S/P living-donor kidney transplantation  - S/p Ktxp 3/11/24 d/t IgA nephropathy. Progressed well post-txp with Cr downtrending  - BL Cr 1.1-1.2          Discharge Planning:  Not a candidate at this time    Medical decision making for this encounter includes review of pertinent labs and diagnostic studies, assessment and planning, discussions with consulting providers, discussion with patient/family, and participation in multidisciplinary rounds. Time spent caring for patient: 45 minutes    Emilee Ellsworth NP  Kidney Transplant  [facility]  "

## 2024-04-11 NOTE — PLAN OF CARE
Pt admitted 4/8 with incisional erythema, s/p living-donor kidney transplant from 3/11 with take-back to OR 4/9 for incisional hernia containing distal ileum. Last BM 4/7. KUB 4/10 with ileus. NPO today for bowel rest. PRN tums for indigestion, denies nausea. PRN oxy 10mg for pain though pt reports it not helping much. NP is aware. NS @ 100cc/hr per MAR. RLQ incision with serosang drainage today, abd pad applied. Pt is AAOx4, VSS on bedside monitor, afebrile, weaned off O2 today and satting >92% on RA. NST on tele in 100s. Pt is up with standby-assistance, remains free from falls so far this shift. Up in chair. Bed/chair locked/lowest position, nonskid socks on, call bell within reach. Pt verbalized understanding to call for needs/assistance.

## 2024-04-11 NOTE — SUBJECTIVE & OBJECTIVE
Subjective:   History of Present Illness:  Mr. Colten Monet is a 63 yo male w/ PMHx of ESRD d/t IgA nephropathy, now S/P living related Ktxp on 3/11/24. Patient progressed well during initial post-txp course and discharged POD#2 with downtrending Cr. Now with BL Cr 1.0-1.1.     Patient recently seen in transplant surgery clinic on 4/3 and noted to have serous drainage from RLQ kidney txp incision along with palpable erythema. Staples intact w/ minimal tenderness to deep palpation at that time. Incision probed w/ 200 cc serous fluid drained in clinic by surgical fellow. Patient was placed on PO for cellulitis. He now presents to ED for worsening erythema to incisional area (see media for photo). Staples remain intact, erythema noted to entire length of incision, worse in upper portion. Small 1-2 cm area of yellow slough in upper portion incision. Minimal serous drainage expressed upon probing. Patient reports mild tenderness to deep palpation. Kidney US in ED w/ large 14.8 x 7.4 x 1.3 cm, just posterior to the anterior pelvic wall overlying the transplant kidney. Reviewed plan with Dr. Eduardo and Dr. Whyte. CT A/P non-con ordered along with IV abx. Staples removed at bedside, small 1-2 cm area of wound dehiscence noted, moderate amount serous fluid able to be expressed from incision. Mild HARRISON on admit (Cr 1.5). NS bolus ordered.         Mr. Monet is a 64 y.o. year old male who is status post Kidney Transplant - 3/11/2024  (#1).  His maintenance immunosuppression consists of:   Immunosuppressants (From admission, onward)      Start     Stop Route Frequency Ordered    04/10/24 0930  tacrolimus capsule 1 mg         -- Oral 2 times daily 04/10/24 0916            Hospital Course:  Patient admitted for worsening cellulitis of kidney transplant incision, incisional hernia, and HARRISON.  CT abd/pelvis with RLQ transplant kidney with incisional hernia containing distal ileum. The large fluid collection interposed  "between the kidney and anterior abdominal wall drained at patient's bedside. Mild to moderate soft tissue stranding and trace fluid directly adjacent to the transplant kidney. CT reviewed by surgeon. Patient made NPO for surgical intervention. IVFs started. Started on cefepime and vancomycin. Patient taken back to OR 4/9 for dehiscence of kidney transplant incision and hernia repair. Surgery without issues. Post op, patient with hyperkalemia with interventions such as shifting, IV lasix, and IVFs.      Interval History: yesterday evening, patient had a oxygen desaturation event with SpO2 down to the low 80s and -120 at 5:30pm. CXR and KUB obtained. CXR with acute cardiopulmonary process. KUB with findings of ileus. Patient placed on 2L NC with improvement in SpO2. This morning, patient reports feeling bloated and "full". KUB 4/10 with ileus. Patient has not had a  BM since OR take back. Denies any nausea or vomiting. He reports starting to belch more.  Made patient NPO for bowel rest. Will continue IVFs. K 5.2 this morning. Repeat K at 1400 ordered. this morning. Infectious work up continues to be negative thus far. Cefepime and vanc dc'd and levaquin and doxy x5 days. Cr down to 1.5 from 1.8 today. DSAs in process.  Continues with good urine output.  All VSS. Encourage ambulation. Cont to monitor.     Past Medical, Surgical, Family, and Social History:   Unchanged from H&P.    Scheduled Meds:   atorvastatin  40 mg Oral QHS    atovaquone  1,500 mg Oral Daily    docusate sodium  100 mg Oral TID    doxycycline  100 mg Oral Q12H    famotidine  20 mg Oral QHS    fluticasone furoate-vilanteroL  1 puff Inhalation Daily    heparin (porcine)  5,000 Units Subcutaneous Q8H    levoFLOXacin  750 mg Oral Daily    magnesium oxide  400 mg Oral BID    predniSONE  5 mg Oral Daily    sodium bicarbonate  1,300 mg Oral BID    tacrolimus  1 mg Oral BID    valGANciclovir  450 mg Oral QAM     Continuous Infusions:   sodium chloride " "0.9% 100 mL/hr at 04/11/24 0836     PRN Meds:acetaminophen, albuterol, aluminum-magnesium hydroxide-simethicone, bisacodyL, calcium carbonate, [COMPLETED] calcium gluconate IVPB **AND** calcium gluconate IVPB, melatonin, ondansetron, oxyCODONE, oxyCODONE, simethicone, sodium chloride 0.9%    Intake/Output - Last 3 Shifts         04/09 0700  04/10 0659 04/10 0700 04/11 0659 04/11 0700 04/12 0659    P.O. 125 934     I.V. (mL/kg) 1241.4 (13.3) 1566.2 (17.1) 43.3 (0.5)    IV Piggyback 2283.2 444.3     Total Intake(mL/kg) 3649.6 (39.2) 2944.5 (32.1) 43.3 (0.5)    Urine (mL/kg/hr) 2925 (1.3) 1325 (0.6)     Total Output 2925 1325     Net +724.6 +1619.5 +43.3           Urine Occurrence  2 x     Stool Occurrence   0 x             Review of Systems   Constitutional:  Negative for chills and fever.   HENT:  Negative for congestion and trouble swallowing.    Respiratory:  Negative for cough and shortness of breath.    Cardiovascular:  Negative for chest pain and leg swelling.   Gastrointestinal:  Positive for abdominal distention, abdominal pain (incisional) and constipation. Negative for nausea and vomiting.   Genitourinary:  Negative for decreased urine volume and difficulty urinating.   Skin:  Positive for wound.   Allergic/Immunologic: Positive for immunocompromised state.   Neurological:  Negative for dizziness, tremors, weakness and headaches.   Psychiatric/Behavioral:  Negative for agitation, behavioral problems, confusion and decreased concentration.       Objective:     Vital Signs (Most Recent):  Temp: 98 °F (36.7 °C) (04/11/24 1108)  Pulse: 109 (04/11/24 1146)  Resp: 18 (04/11/24 1108)  BP: (!) 172/94 (04/11/24 1108)  SpO2: 95 % (04/11/24 1129) Vital Signs (24h Range):  Temp:  [97.9 °F (36.6 °C)-99 °F (37.2 °C)] 98 °F (36.7 °C)  Pulse:  [] 109  Resp:  [15-24] 18  SpO2:  [85 %-100 %] 95 %  BP: (120-172)/(83-97) 172/94     Weight: 91.7 kg (202 lb 4.4 oz)  Height: 5' 7" (170.2 cm)  Body mass index is 31.68 " kg/m².     Physical Exam  Vitals and nursing note reviewed.   Constitutional:       General: He is not in acute distress.     Appearance: Normal appearance. He is not ill-appearing.   HENT:      Head: Normocephalic.      Mouth/Throat:      Pharynx: Oropharynx is clear.   Eyes:      Conjunctiva/sclera: Conjunctivae normal.   Cardiovascular:      Rate and Rhythm: Normal rate.      Pulses: Normal pulses.   Pulmonary:      Effort: Pulmonary effort is normal.      Breath sounds: Normal breath sounds.   Abdominal:      General: Bowel sounds are decreased. There is distension.      Tenderness: There is abdominal tenderness. There is no guarding or rebound.      Comments: RLQ Kidney txp incision CDI with staples intact, small area of erythema    Musculoskeletal:      Right lower leg: No edema.      Left lower leg: No edema.   Skin:     General: Skin is warm and dry.   Neurological:      Mental Status: He is alert and oriented to person, place, and time. Mental status is at baseline.   Psychiatric:         Mood and Affect: Mood normal.         Behavior: Behavior normal. Behavior is cooperative.         Thought Content: Thought content normal.          Laboratory:  CBC:   Recent Labs   Lab 04/09/24  1530 04/10/24  0545 04/11/24  0640   WBC 15.83* 14.60* 13.81*   RBC 4.26* 4.12* 4.31*   HGB 11.7* 11.3* 11.7*   HCT 40.1 38.0* 39.4*    215 204   MCV 94 92 91   MCH 27.5 27.4 27.1   MCHC 29.2* 29.7* 29.7*     CMP:   Recent Labs   Lab 04/10/24  0545 04/10/24  1517 04/11/24  0640   GLU 99 119* 135*   CALCIUM 9.4 9.4 9.2   ALBUMIN 3.2* 3.1* 2.9*    137 138   K 4.6 5.1 5.2*   CO2 22* 25 23    103 104   BUN 24* 26* 24*   CREATININE 1.8* 1.8* 1.5*     Labs within the past 24 hours have been reviewed.    Diagnostic Results:  US - Kidney: Results for orders placed during the hospital encounter of 04/08/24    US Transplant Kidney With Doppler    Narrative  EXAMINATION:  US TRANSPLANT KIDNEY WITH DOPPLER    CLINICAL  HISTORY:  wound infection/drainage;    TECHNIQUE:  Real time gray scale and doppler ultrasound was performed over the patient's renal allograft.    COMPARISON:  03/25/2024    FINDINGS:  Renal allograft in the right lower quadrant.  The allograft measures 12.9 cm. Normal perfusion. No hydronephrosis.  There is a 1.2 cm cyst at the midpole.  There is a 1.7 cm cyst with a peripheral calcification, similar to the prior exam.    There is an elongated fluid collection underneath the anterior pelvic wall overlying the kidney transplant an area measuring 14.8 x 7.4 x 1.3 cm    Vasculature:    Inter lobar and segmental arterial resistive indices ranged from 0.67 to 0.77, previously 0.65 to 0.75.    Main renal artery peak systolic velocity: 200 cm/s with normal waveform, previously 314 cm/s .    Renal artery/iliac ratio: 1.3.    The main renal vein is patent.    Impression  Large fluid collection, 14.8 x 7.4 x 1.3 cm, just posterior to the anterior pelvic wall overlying the transplant kidney concerning for seroma, hematoma or abscess.    Small renal cysts, one  of which has a small peripheral calcification, unchanged from the prior study.    Satisfactory Doppler evaluation of the transplant kidney.      Electronically signed by: Teresita Ohara MD  Date:    04/08/2024  Time:    12:38

## 2024-04-11 NOTE — NURSING
Orders received to make pt NPO at this time. Pt notified, he reports drinking one cup of coffee with cream and sugar at about 0500 this morning.     Pt also provided with IS and instructed on its use. He verbalized understanding and was able to demonstrate proper use.

## 2024-04-11 NOTE — PLAN OF CARE
AAOx4. VSS. Afebrile. On 1L NC. RLQ island border dressing C/D/I. NS continuous @100ml/hr. PRN oxy 10 and tylenol given for abd pain w/o relief. Pt c/o severe pain. NP, Maren informed. Tums and simethicone given. No BM this shift. Pt reports constipation since 4/7. Creatinine levels continue to trend up.  IV cefepime and vanc given as ordered. Friend is at bedside. Bed wheels locked, bed in low position, side rails raisedx2. Pt understands to call RN when needed.

## 2024-04-11 NOTE — PROGRESS NOTES
Pharmacokinetic Assessment Follow Up: IV Vancomycin    Vancomycin serum concentration assessment(s):    The random level was drawn correctly and can be used to guide therapy at this time. The measurement is within the desired definitive target range of 10 to 20 mcg/mL.    Vancomycin Regimen Plan:    Patient is s/p kidney txp 3/11/24. Now with HARRISON, scr up to 1.8 from baseline of 1.1-1.2. Will continue to dose by levels.     Will give vancomycin 1250 mg x1 now.   Re-dose when the random level is less than 20 mcg/mL, next level to be drawn at 2000 on 4/11 (~24 hr post dose).    Drug levels (last 3 results):  Recent Labs   Lab Result Units 04/09/24  1512 04/10/24  0545 04/10/24  1816   Vancomycin, Random ug/mL 8.3 27.0 17.3       Pharmacy will continue to follow and monitor vancomycin.    Please contact pharmacy at extension [phone] for questions regarding this assessment.    Thank you for the consult,   Yudy Mcguire       Patient brief summary:  Colten Monet is a 64 y.o. male initiated on antimicrobial therapy with IV Vancomycin for treatment of skin & soft tissue infection    The patient's current regimen is pulse dosing    Drug Allergies:   Review of patient's allergies indicates:   Allergen Reactions    Codeine Hives     Reaction to Tylenol #3       Actual Body Weight:   93 kg    Renal Function:   Estimated Creatinine Clearance: 44.5 mL/min (A) (based on SCr of 1.8 mg/dL (H)).,     Dialysis Method (if applicable):  N/A    CBC (last 72 hours):  Recent Labs   Lab Result Units 04/08/24  0712 04/09/24  0618 04/09/24  1530 04/10/24  0545   WBC K/uL 10.36 9.14 15.83* 14.60*   Hemoglobin g/dL 12.3* 11.2* 11.7* 11.3*   Hematocrit % 41.8 38.1* 40.1 38.0*   Platelets K/uL 222 195 210 215   Gran % % 73.2* 79.0* 93.5* 81.5*   Lymph % % 15.8* 10.5* 2.6* 10.1*   Mono % % 6.5 6.0 2.5* 6.8   Eosinophil % % 2.0 2.2 0.0 0.3   Basophil % % 0.9 0.8 0.3 0.5   Differential Method  Automated Automated Automated Automated        Metabolic Panel (last 72 hours):  Recent Labs   Lab Result Units 04/08/24  0712 04/09/24  0243 04/09/24  0618 04/09/24  1530 04/09/24  1808 04/09/24  2250 04/10/24  0545 04/10/24  1517   Sodium mmol/L 140  --  138 138  --  135* 136 137   Potassium mmol/L 4.8  --  4.7 5.6* 6.0* 5.4* 4.6 5.1   Chloride mmol/L 109  --  110 109  --  104 105 103   CO2 mmol/L 23  --  23 22*  --  23 22* 25   Glucose mg/dL 94  --  107 174*  --  182* 99 119*   Glucose, UA   --  Negative  --   --   --   --   --   --    BUN mg/dL 20  --  21 21  --  22 24* 26*   Creatinine mg/dL 1.5*  --  1.6* 1.7*  --  1.8* 1.8* 1.8*   Albumin g/dL 3.4*  --  3.0*  --   --  3.3* 3.2* 3.1*   Magnesium mg/dL 1.5*  --  1.8  --   --   --  1.7  --    Phosphorus mg/dL 2.9  --  3.1  --   --  3.1 3.7 2.9       Vancomycin Administrations:  vancomycin given in the last 96 hours                     vancomycin 1,500 mg in dextrose 5 % (D5W) 250 mL IVPB (Vial-Mate) (mg) 1,500 mg New Bag 04/09/24 2146    vancomycin 1.75 g in 5 % dextrose 500 mL IVPB (mg) 1,750 mg New Bag 04/08/24 1531                    Microbiologic Results:  Microbiology Results (last 7 days)       Procedure Component Value Units Date/Time    Blood culture [[id number]] Collected: 04/08/24 1346    Order Status: Completed Specimen: Blood from Peripheral, Antecubital, Left Updated: 04/10/24 1412     Blood Culture, Routine No Growth to date      No Growth to date      No Growth to date    Blood culture [[id number]] Collected: 04/08/24 1346    Order Status: Completed Specimen: Blood from Peripheral, Antecubital, Right Updated: 04/10/24 1412     Blood Culture, Routine No Growth to date      No Growth to date      No Growth to date    Urine Culture High Risk [[id number]] Collected: 04/09/24 0243    Order Status: Completed Specimen: Urine, Clean Catch Updated: 04/10/24 0518     Urine Culture, Routine No growth    Narrative:      Indicated criteria for high risk culture:->Kidney transplant  < 2 months

## 2024-04-12 LAB
ALBUMIN SERPL BCP-MCNC: 2.8 G/DL (ref 3.5–5.2)
ANION GAP SERPL CALC-SCNC: 10 MMOL/L (ref 8–16)
BASOPHILS # BLD AUTO: 0.07 K/UL (ref 0–0.2)
BASOPHILS NFR BLD: 0.6 % (ref 0–1.9)
BUN SERPL-MCNC: 23 MG/DL (ref 8–23)
CALCIUM SERPL-MCNC: 9.1 MG/DL (ref 8.7–10.5)
CHLORIDE SERPL-SCNC: 106 MMOL/L (ref 95–110)
CLASS I ANTIBODY COMMENTS - LUMINEX: NORMAL
CLASS II ANTIBODIES - LUMINEX: NORMAL
CLASS II ANTIBODY COMMENTS - LUMINEX: NORMAL
CO2 SERPL-SCNC: 20 MMOL/L (ref 23–29)
CREAT SERPL-MCNC: 1.2 MG/DL (ref 0.5–1.4)
DIFFERENTIAL METHOD BLD: ABNORMAL
DSA1 TESTING DATE: NORMAL
DSA12 TESTING DATE: NORMAL
DSA2 TESTING DATE: NORMAL
EOSINOPHIL # BLD AUTO: 0.1 K/UL (ref 0–0.5)
EOSINOPHIL NFR BLD: 0.9 % (ref 0–8)
ERYTHROCYTE [DISTWIDTH] IN BLOOD BY AUTOMATED COUNT: 13.8 % (ref 11.5–14.5)
EST. GFR  (NO RACE VARIABLE): >60 ML/MIN/1.73 M^2
GLUCOSE SERPL-MCNC: 87 MG/DL (ref 70–110)
HCT VFR BLD AUTO: 38.3 % (ref 40–54)
HGB BLD-MCNC: 11.3 G/DL (ref 14–18)
IMM GRANULOCYTES # BLD AUTO: 0.16 K/UL (ref 0–0.04)
IMM GRANULOCYTES NFR BLD AUTO: 1.3 % (ref 0–0.5)
LACTATE SERPL-SCNC: 1 MMOL/L (ref 0.5–2.2)
LYMPHOCYTES # BLD AUTO: 1.3 K/UL (ref 1–4.8)
LYMPHOCYTES NFR BLD: 11 % (ref 18–48)
MAGNESIUM SERPL-MCNC: 1.9 MG/DL (ref 1.6–2.6)
MCH RBC QN AUTO: 27.4 PG (ref 27–31)
MCHC RBC AUTO-ENTMCNC: 29.5 G/DL (ref 32–36)
MCV RBC AUTO: 93 FL (ref 82–98)
MONOCYTES # BLD AUTO: 0.9 K/UL (ref 0.3–1)
MONOCYTES NFR BLD: 7.1 % (ref 4–15)
NEUTROPHILS # BLD AUTO: 9.5 K/UL (ref 1.8–7.7)
NEUTROPHILS NFR BLD: 79.1 % (ref 38–73)
NRBC BLD-RTO: 0 /100 WBC
PHOSPHATE SERPL-MCNC: 2.9 MG/DL (ref 2.7–4.5)
PLATELET # BLD AUTO: 195 K/UL (ref 150–450)
PMV BLD AUTO: 10.2 FL (ref 9.2–12.9)
POTASSIUM SERPL-SCNC: 4.9 MMOL/L (ref 3.5–5.1)
POTASSIUM SERPL-SCNC: 5.2 MMOL/L (ref 3.5–5.1)
RBC # BLD AUTO: 4.12 M/UL (ref 4.6–6.2)
SERUM COLLECTION DT - LUMINEX CLASS I: NORMAL
SERUM COLLECTION DT - LUMINEX CLASS II: NORMAL
SODIUM SERPL-SCNC: 136 MMOL/L (ref 136–145)
TACROLIMUS BLD-MCNC: 7.5 NG/ML (ref 5–15)
WBC # BLD AUTO: 12.04 K/UL (ref 3.9–12.7)

## 2024-04-12 PROCEDURE — 83735 ASSAY OF MAGNESIUM: CPT

## 2024-04-12 PROCEDURE — 63600175 PHARM REV CODE 636 W HCPCS: Performed by: INTERNAL MEDICINE

## 2024-04-12 PROCEDURE — 94640 AIRWAY INHALATION TREATMENT: CPT

## 2024-04-12 PROCEDURE — 25000003 PHARM REV CODE 250

## 2024-04-12 PROCEDURE — 20600001 HC STEP DOWN PRIVATE ROOM

## 2024-04-12 PROCEDURE — 80069 RENAL FUNCTION PANEL: CPT

## 2024-04-12 PROCEDURE — 25000003 PHARM REV CODE 250: Performed by: NURSE PRACTITIONER

## 2024-04-12 PROCEDURE — 99900035 HC TECH TIME PER 15 MIN (STAT)

## 2024-04-12 PROCEDURE — 85025 COMPLETE CBC W/AUTO DIFF WBC: CPT

## 2024-04-12 PROCEDURE — 99233 SBSQ HOSP IP/OBS HIGH 50: CPT | Mod: ,,,

## 2024-04-12 PROCEDURE — 94761 N-INVAS EAR/PLS OXIMETRY MLT: CPT

## 2024-04-12 PROCEDURE — 36415 COLL VENOUS BLD VENIPUNCTURE: CPT

## 2024-04-12 PROCEDURE — 83605 ASSAY OF LACTIC ACID: CPT

## 2024-04-12 PROCEDURE — 84132 ASSAY OF SERUM POTASSIUM: CPT

## 2024-04-12 PROCEDURE — 27000207 HC ISOLATION

## 2024-04-12 PROCEDURE — 63600175 PHARM REV CODE 636 W HCPCS

## 2024-04-12 PROCEDURE — 80197 ASSAY OF TACROLIMUS: CPT

## 2024-04-12 RX ADMIN — SODIUM BICARBONATE 1300 MG: 650 TABLET ORAL at 08:04

## 2024-04-12 RX ADMIN — OXYCODONE HYDROCHLORIDE 10 MG: 10 TABLET ORAL at 08:04

## 2024-04-12 RX ADMIN — ATOVAQUONE 1500 MG: 750 SUSPENSION ORAL at 08:04

## 2024-04-12 RX ADMIN — DOCUSATE SODIUM 100 MG: 100 CAPSULE, LIQUID FILLED ORAL at 08:04

## 2024-04-12 RX ADMIN — PREDNISONE 5 MG: 5 TABLET ORAL at 08:04

## 2024-04-12 RX ADMIN — DOXYCYCLINE HYCLATE 100 MG: 100 TABLET, COATED ORAL at 08:04

## 2024-04-12 RX ADMIN — TACROLIMUS 1 MG: 1 CAPSULE ORAL at 05:04

## 2024-04-12 RX ADMIN — DOCUSATE SODIUM 100 MG: 100 CAPSULE, LIQUID FILLED ORAL at 02:04

## 2024-04-12 RX ADMIN — FAMOTIDINE 20 MG: 20 TABLET ORAL at 08:04

## 2024-04-12 RX ADMIN — VALGANCICLOVIR HYDROCHLORIDE 450 MG: 450 TABLET ORAL at 05:04

## 2024-04-12 RX ADMIN — FLUTICASONE FUROATE AND VILANTEROL TRIFENATATE 1 PUFF: 100; 25 POWDER RESPIRATORY (INHALATION) at 08:04

## 2024-04-12 RX ADMIN — HEPARIN SODIUM 5000 UNITS: 5000 INJECTION INTRAVENOUS; SUBCUTANEOUS at 05:04

## 2024-04-12 RX ADMIN — HEPARIN SODIUM 5000 UNITS: 5000 INJECTION INTRAVENOUS; SUBCUTANEOUS at 02:04

## 2024-04-12 RX ADMIN — TACROLIMUS 1 MG: 1 CAPSULE ORAL at 08:04

## 2024-04-12 RX ADMIN — ATORVASTATIN CALCIUM 40 MG: 20 TABLET, FILM COATED ORAL at 08:04

## 2024-04-12 RX ADMIN — OXYCODONE HYDROCHLORIDE 10 MG: 10 TABLET ORAL at 01:04

## 2024-04-12 RX ADMIN — HEPARIN SODIUM 5000 UNITS: 5000 INJECTION INTRAVENOUS; SUBCUTANEOUS at 08:04

## 2024-04-12 RX ADMIN — Medication 400 MG: at 08:04

## 2024-04-12 RX ADMIN — LEVOFLOXACIN 750 MG: 250 TABLET, FILM COATED ORAL at 08:04

## 2024-04-12 NOTE — PLAN OF CARE
Patient AAOx4. OOB independently. Tele NSR/ST, all other VSS. NPO for bowel rest. NS continuous 100 ml/hr. BM x1 after soap suds enema administered. RLQ incision, staples intact, cleansed with iodine, open to air. Bed lowest setting, locked, 2x rails up, call light within reach.

## 2024-04-12 NOTE — PROGRESS NOTES
[facility] - Transplant Stepdown  Kidney Transplant  Progress Note      Reason for Follow-up: Reassessment of Kidney Transplant - 3/11/2024  (#1) recipient and management of immunosuppression.    ORGAN: LEFT KIDNEY    Donor Type: Living    Donor CMV Status:   Donor HBcAB:  Donor HCV Status:  Donor HBV ANA:   Donor HCV ANA:       Subjective:   History of Present Illness:  Mr. Colten Monet is a 63 yo male w/ PMHx of ESRD d/t IgA nephropathy, now S/P living related Ktxp on 3/11/24. Patient progressed well during initial post-txp course and discharged POD#2 with downtrending Cr. Now with BL Cr 1.0-1.1.     Patient recently seen in transplant surgery clinic on 4/3 and noted to have serous drainage from RLQ kidney txp incision along with palpable erythema. Staples intact w/ minimal tenderness to deep palpation at that time. Incision probed w/ 200 cc serous fluid drained in clinic by surgical fellow. Patient was placed on PO for cellulitis. He now presents to ED for worsening erythema to incisional area (see media for photo). Staples remain intact, erythema noted to entire length of incision, worse in upper portion. Small 1-2 cm area of yellow slough in upper portion incision. Minimal serous drainage expressed upon probing. Patient reports mild tenderness to deep palpation. Kidney US in ED w/ large 14.8 x 7.4 x 1.3 cm, just posterior to the anterior pelvic wall overlying the transplant kidney. Reviewed plan with Dr. Eduardo and Dr. Whyte. CT A/P non-con ordered along with IV abx. Staples removed at bedside, small 1-2 cm area of wound dehiscence noted, moderate amount serous fluid able to be expressed from incision. Mild HARRISON on admit (Cr 1.5). NS bolus ordered.         Mr. Monet is a 64 y.o. year old male who is status post Kidney Transplant - 3/11/2024  (#1).  His maintenance immunosuppression consists of:   Immunosuppressants (From admission, onward)      Start     Stop Route Frequency Ordered    04/10/24 0930   "tacrolimus capsule 1 mg         -- Oral 2 times daily 04/10/24 0997            Hospital Course:  Patient admitted for worsening cellulitis of kidney transplant incision, incisional hernia, and HARRISON.  CT abd/pelvis with RLQ transplant kidney with incisional hernia containing distal ileum. The large fluid collection interposed between the kidney and anterior abdominal wall drained at patient's bedside. Mild to moderate soft tissue stranding and trace fluid directly adjacent to the transplant kidney. CT reviewed by surgeon. Patient made NPO for surgical intervention. IVFs started. Started on cefepime and vancomycin. Patient taken back to OR 4/9 for dehiscence of kidney transplant incision and hernia repair. Surgery without issues. Post op, patient with hyperkalemia with interventions such as shifting, IV lasix, and IVFs.  Patient without  bowel movements since take back surgery. Patient not passing gas. KUB 4/10 obtained with findings of ileus. Patient made NPO 4/11  for bowel rest.     Interval History: NAEON. Patient reports burning pain to lower portion of abdomen/ surgical incision. He also reports still not passing gas, is bleching, and having some acid reflux symptoms such as burning in his throat. Denies any nausea or vomiting. Repeat KUB done 4/11 with impression of "Adynamic ileus proximal mid small bowel for favored. The possibility of partial obstructive distal small bowel abnormality would be considered and clinical correlation requested.". Will keep patient NPO for today. Enema also ordered for today. Will continue IVFs. K 5.2 this morning. Repeat K at 1400 ordered.  Infectious work up continues to be negative thus far. Cefepime and vanc dc'd and levaquin and doxy x5 days (EOT 4/15). Cr down to 1.2 from 1.5 today. DSAs in process.  Continues with good urine output.  All VSS. Encourage ambulation. Cont to monitor.       Past Medical, Surgical, Family, and Social History:   Unchanged from H&P.    Scheduled " Meds:   atorvastatin  40 mg Oral QHS    atovaquone  1,500 mg Oral Daily    docusate sodium  100 mg Oral TID    doxycycline  100 mg Oral Q12H    famotidine  20 mg Oral QHS    fluticasone furoate-vilanteroL  1 puff Inhalation Daily    heparin (porcine)  5,000 Units Subcutaneous Q8H    levoFLOXacin  750 mg Oral Daily    magnesium oxide  400 mg Oral BID    predniSONE  5 mg Oral Daily    sodium bicarbonate  1,300 mg Oral BID    tacrolimus  1 mg Oral BID    valGANciclovir  450 mg Oral QAM     Continuous Infusions:   sodium chloride 0.9% 100 mL/hr at 04/12/24 0438     PRN Meds:acetaminophen, albuterol, aluminum-magnesium hydroxide-simethicone, bisacodyL, calcium carbonate, [COMPLETED] calcium gluconate IVPB **AND** calcium gluconate IVPB, melatonin, ondansetron, oxyCODONE, oxyCODONE, simethicone, sodium chloride 0.9%    Intake/Output - Last 3 Shifts         04/10 0700 04/11 0659 04/11 0700 04/12 0659 04/12 0700 04/13 0659    P.O. 934 440     I.V. (mL/kg) 1566.2 (17.1) 2089.5 (22.4)     IV Piggyback 444.3 96.9     Total Intake(mL/kg) 2944.5 (32.1) 2626.4 (28.2)     Urine (mL/kg/hr) 1325 (0.6) 750 (0.3)     Stool  0     Total Output 1325 750     Net +1619.5 +1876.4            Urine Occurrence 2 x 1 x     Stool Occurrence  0 x              Review of Systems   Constitutional:  Negative for chills and fever.   HENT:  Negative for congestion and trouble swallowing.    Respiratory:  Negative for cough and shortness of breath.    Cardiovascular:  Negative for chest pain and leg swelling.   Gastrointestinal:  Positive for abdominal distention, abdominal pain (incisional) and constipation. Negative for nausea and vomiting.   Genitourinary:  Negative for decreased urine volume and difficulty urinating.   Skin:  Positive for wound.   Allergic/Immunologic: Positive for immunocompromised state.   Neurological:  Negative for dizziness, tremors, weakness and headaches.   Psychiatric/Behavioral:  Negative for agitation, behavioral  "problems, confusion and decreased concentration.       Objective:     Vital Signs (Most Recent):  Temp: 97.8 °F (36.6 °C) (04/12/24 0716)  Pulse: 104 (04/12/24 0851)  Resp: 18 (04/12/24 0851)  BP: (!) 154/88 (04/12/24 0716)  SpO2: 95 % (04/12/24 0851) Vital Signs (24h Range):  Temp:  [97.8 °F (36.6 °C)-98.8 °F (37.1 °C)] 97.8 °F (36.6 °C)  Pulse:  [101-114] 104  Resp:  [16-18] 18  SpO2:  [85 %-97 %] 95 %  BP: (127-172)/(81-95) 154/88     Weight: 93.1 kg (205 lb 4 oz) (Standing Scale)  Height: 5' 7" (170.2 cm)  Body mass index is 32.15 kg/m².     Physical Exam  Vitals and nursing note reviewed.   Constitutional:       General: He is not in acute distress.     Appearance: Normal appearance. He is not ill-appearing.   HENT:      Head: Normocephalic.      Mouth/Throat:      Pharynx: Oropharynx is clear.   Eyes:      Conjunctiva/sclera: Conjunctivae normal.   Cardiovascular:      Rate and Rhythm: Normal rate.      Pulses: Normal pulses.   Pulmonary:      Effort: Pulmonary effort is normal.      Breath sounds: Normal breath sounds.   Abdominal:      General: Bowel sounds are decreased. There is distension.      Tenderness: There is abdominal tenderness. There is no guarding or rebound.      Comments: RLQ Kidney txp incision CDI with staples intact, small area of erythema    Musculoskeletal:      Right lower leg: No edema.      Left lower leg: No edema.   Skin:     General: Skin is warm and dry.   Neurological:      Mental Status: He is alert and oriented to person, place, and time. Mental status is at baseline.   Psychiatric:         Mood and Affect: Mood normal.         Behavior: Behavior normal. Behavior is cooperative.         Thought Content: Thought content normal.          Laboratory:  CBC:   Recent Labs   Lab 04/10/24  0545 04/11/24  0640 04/12/24  0542   WBC 14.60* 13.81* 12.04   RBC 4.12* 4.31* 4.12*   HGB 11.3* 11.7* 11.3*   HCT 38.0* 39.4* 38.3*    204 195   MCV 92 91 93   MCH 27.4 27.1 27.4   MCHC 29.7* " 29.7* 29.5*     CMP:   Recent Labs   Lab 04/10/24  1517 04/11/24  0640 04/11/24  1437 04/12/24  0542   * 135*  --  87   CALCIUM 9.4 9.2  --  9.1   ALBUMIN 3.1* 2.9*  --  2.8*    138  --  136   K 5.1 5.2* 4.7 5.2*   CO2 25 23  --  20*    104  --  106   BUN 26* 24*  --  23   CREATININE 1.8* 1.5*  --  1.2     Labs within the past 24 hours have been reviewed.    Diagnostic Results:  KUB  EXAMINATION:  XR ABDOMEN AP 1 VIEW     CLINICAL HISTORY:  ilues;     TECHNIQUE:  AP View(s) of the abdomen was performed.     COMPARISON:  04/10/2024     FINDINGS:  Minimal mild distension proximal mid small bowel, nondistended air and large bowel with mild amount of fecal debris right colon lumen region.  Transverse dimension of small bowel loop left upper quadrant 4.7 cm, was about 4.5 cm.  Cutaneous surgical clips diagonally situated along right lateral and lower pelvis.     Impression:     Adynamic ileus proximal mid small bowel for favored.  The possibility of partial obstructive distal small bowel abnormality would be considered and clinical correlation requested.        Electronically signed by: Marcos Mims MD  Date:                                            04/11/2024  Time:                                           15:45           Exam Ended: 04/11/24 13:22 CDT Last Resulted: 04/11/24 15:45 CDT                   US - Kidney: Results for orders placed during the hospital encounter of 04/08/24    US Transplant Kidney With Doppler    Narrative  EXAMINATION:  US TRANSPLANT KIDNEY WITH DOPPLER    CLINICAL HISTORY:  wound infection/drainage;    TECHNIQUE:  Real time gray scale and doppler ultrasound was performed over the patient's renal allograft.    COMPARISON:  03/25/2024    FINDINGS:  Renal allograft in the right lower quadrant.  The allograft measures 12.9 cm. Normal perfusion. No hydronephrosis.  There is a 1.2 cm cyst at the midpole.  There is a 1.7 cm cyst with a peripheral calcification, similar to  the prior exam.    There is an elongated fluid collection underneath the anterior pelvic wall overlying the kidney transplant an area measuring 14.8 x 7.4 x 1.3 cm    Vasculature:    Inter lobar and segmental arterial resistive indices ranged from 0.67 to 0.77, previously 0.65 to 0.75.    Main renal artery peak systolic velocity: 200 cm/s with normal waveform, previously 314 cm/s .    Renal artery/iliac ratio: 1.3.    The main renal vein is patent.    Impression  Large fluid collection, 14.8 x 7.4 x 1.3 cm, just posterior to the anterior pelvic wall overlying the transplant kidney concerning for seroma, hematoma or abscess.    Small renal cysts, one  of which has a small peripheral calcification, unchanged from the prior study.    Satisfactory Doppler evaluation of the transplant kidney.      Electronically signed by: Teresita Ohara MD  Date:    04/08/2024  Time:    12:38  Assessment/Plan:     * Surgical wound infection  - Pt with delayed surgical wound healing, seen in clinic 4/3 with 200 mL serous fluid expressed from incision w/ staples intact. Started on PO doxycycline at that time  - Wound remains erythematous with swelling and mild tenderness. No improvement with PO Doxy  - Kidney US 4/8 w/ large 14.8 x 7.4 x 1.3 cm, just posterior to the anterior pelvic wall overlying the transplant kidney  - Staples removed 4/8, small area dehiscence noted with moderate serous fluid expressed  - CT abd/pelvis 4/8 with RLQ transplant kidney with incisional hernia containing distal ileum. The large fluid collection interposed between the kidney and anterior abdominal wall drained at patient's bedside. Mild to moderate soft tissue stranding and trace fluid directly adjacent to the transplant kidney. CT reviewed by surgeon. Patient made NPO for surgical intervention for 4/9.   - Started on IVFs; continue  - Patient taken back to OR (4/9)  for dehiscence of kidney transplant incision and hernia repair.   -  Infectious work  "up continues to be negative thus far.   - Cefepime and vanc dc'd and levaquin and doxy x5 days. (EOT 4/15)  - cont to monitor     Ileus  - KUB 4/10 with findings of ileus.    - patient reports feeling bloated and "full".  - Patient has not had a  BM since OR take back on 4/9.   - Denies any nausea or vomiting.   - He reports acid reflux and belching  -  Made patient NPO for bowel rest starting 4/11.  - repeat KUB 4/11 with impression of "Adynamic ileus proximal mid small bowel for favored. The possibility of partial obstructive distal small bowel abnormality would be considered and clinical correlation requested."  - continue NPO for bowel rest today 4/12  - enema ordered for today also 4/12  - Encourage ambulation.   - Cont to monitor.       Hyperkalemia  Post op from OR take back on 4/9, patient with hyperkalemia needing interventions (shifting, IV lasix, and IVFs)  - K 5.2 4/12  - Repeat K at 1400.   - Cont IVFs  - NPO for ileus  - tele  - monitor     Anemia of chronic disease  - monitor daily CBC  - stable      HARRISON (acute kidney injury)  - expect due to dehydration,  IVFs hydration ordered on admit  - Patient taken back to OR 4/9 for dehiscence of kidney transplant incision and hernia repair.  - Cr down to 1.2 from 1.5 today. 4/12  - DSAs in process  - Continue IVFs since NPO for ileus.   - Continues with good urine output.   - infectious workup negative thus far  - Cefepime and vanc dc'd and levaquin and doxy x5 days. EOT 4/15  - monitor    Hypomagnesemia  - Continue home PO mag  - IV Mag as needed      History of COPD  - Resume home neb      Dyslipidemia  - continue home statin      Prophylactic immunotherapy  - see "long term use of immunosuppression"      At risk for opportunistic infections  - OI prophylaxis per transplant protocol      Long-term use of immunosuppressant medication  - Continue prograf and prednisone  - Check prograf level daily and adjust for therapeutic dosage. Monitor for toxic side " effects   -  cellcept held on admit d/t infection      S/P living-donor kidney transplantation  - S/p Ktxp 3/11/24 d/t IgA nephropathy. Progressed well post-txp with Cr downtrending  - BL Cr 1.1-1.2          Discharge Planning:  Not a candidate at this time  Medical decision making for this encounter includes review of pertinent labs and diagnostic studies, assessment and planning, discussions with consulting providers, discussion with patient/family, and participation in multidisciplinary rounds. Time spent caring for patient: 40 minutes    Emilee Ellsworth NP  Kidney Transplant  [facility] - Trans[facility] Step[facility]

## 2024-04-12 NOTE — SUBJECTIVE & OBJECTIVE
Subjective:   History of Present Illness:  Mr. Colten Monet is a 63 yo male w/ PMHx of ESRD d/t IgA nephropathy, now S/P living related Ktxp on 3/11/24. Patient progressed well during initial post-txp course and discharged POD#2 with downtrending Cr. Now with BL Cr 1.0-1.1.     Patient recently seen in transplant surgery clinic on 4/3 and noted to have serous drainage from RLQ kidney txp incision along with palpable erythema. Staples intact w/ minimal tenderness to deep palpation at that time. Incision probed w/ 200 cc serous fluid drained in clinic by surgical fellow. Patient was placed on PO for cellulitis. He now presents to ED for worsening erythema to incisional area (see media for photo). Staples remain intact, erythema noted to entire length of incision, worse in upper portion. Small 1-2 cm area of yellow slough in upper portion incision. Minimal serous drainage expressed upon probing. Patient reports mild tenderness to deep palpation. Kidney US in ED w/ large 14.8 x 7.4 x 1.3 cm, just posterior to the anterior pelvic wall overlying the transplant kidney. Reviewed plan with Dr. Eduardo and Dr. Whyte. CT A/P non-con ordered along with IV abx. Staples removed at bedside, small 1-2 cm area of wound dehiscence noted, moderate amount serous fluid able to be expressed from incision. Mild HARRISON on admit (Cr 1.5). NS bolus ordered.         Mr. Monet is a 64 y.o. year old male who is status post Kidney Transplant - 3/11/2024  (#1).  His maintenance immunosuppression consists of:   Immunosuppressants (From admission, onward)      Start     Stop Route Frequency Ordered    04/10/24 0930  tacrolimus capsule 1 mg         -- Oral 2 times daily 04/10/24 0916            Hospital Course:  Patient admitted for worsening cellulitis of kidney transplant incision, incisional hernia, and HARRISON.  CT abd/pelvis with RLQ transplant kidney with incisional hernia containing distal ileum. The large fluid collection interposed  "between the kidney and anterior abdominal wall drained at patient's bedside. Mild to moderate soft tissue stranding and trace fluid directly adjacent to the transplant kidney. CT reviewed by surgeon. Patient made NPO for surgical intervention. IVFs started. Started on cefepime and vancomycin. Patient taken back to OR 4/9 for dehiscence of kidney transplant incision and hernia repair. Surgery without issues. Post op, patient with hyperkalemia with interventions such as shifting, IV lasix, and IVFs.  Patient without  bowel movements since take back surgery. Patient not passing gas. KUB 4/10 obtained with findings of ileus. Patient made NPO 4/11  for bowel rest.     Interval History: NAEON. Patient reports burning pain to lower portion of abdomen/ surgical incision. He also reports still not passing gas, is bleching, and having some acid reflux symptoms such as burning in his throat. Denies any nausea or vomiting. Repeat KUB done 4/11 with impression of "Adynamic ileus proximal mid small bowel for favored. The possibility of partial obstructive distal small bowel abnormality would be considered and clinical correlation requested.". Will keep patient NPO for today. Enema also ordered for today. Will continue IVFs. K 5.2 this morning. Repeat K at 1400 ordered.  Infectious work up continues to be negative thus far. Cefepime and vanc dc'd and levaquin and doxy x5 days (EOT 4/15). Cr down to 1.2 from 1.5 today. DSAs in process.  Continues with good urine output.  All VSS. Encourage ambulation. Cont to monitor.       Past Medical, Surgical, Family, and Social History:   Unchanged from H&P.    Scheduled Meds:   atorvastatin  40 mg Oral QHS    atovaquone  1,500 mg Oral Daily    docusate sodium  100 mg Oral TID    doxycycline  100 mg Oral Q12H    famotidine  20 mg Oral QHS    fluticasone furoate-vilanteroL  1 puff Inhalation Daily    heparin (porcine)  5,000 Units Subcutaneous Q8H    levoFLOXacin  750 mg Oral Daily    magnesium " oxide  400 mg Oral BID    predniSONE  5 mg Oral Daily    sodium bicarbonate  1,300 mg Oral BID    tacrolimus  1 mg Oral BID    valGANciclovir  450 mg Oral QAM     Continuous Infusions:   sodium chloride 0.9% 100 mL/hr at 04/12/24 0438     PRN Meds:acetaminophen, albuterol, aluminum-magnesium hydroxide-simethicone, bisacodyL, calcium carbonate, [COMPLETED] calcium gluconate IVPB **AND** calcium gluconate IVPB, melatonin, ondansetron, oxyCODONE, oxyCODONE, simethicone, sodium chloride 0.9%    Intake/Output - Last 3 Shifts         04/10 0700 04/11 0659 04/11 0700 04/12 0659 04/12 0700 04/13 0659    P.O. 934 440     I.V. (mL/kg) 1566.2 (17.1) 2089.5 (22.4)     IV Piggyback 444.3 96.9     Total Intake(mL/kg) 2944.5 (32.1) 2626.4 (28.2)     Urine (mL/kg/hr) 1325 (0.6) 750 (0.3)     Stool  0     Total Output 1325 750     Net +1619.5 +1876.4            Urine Occurrence 2 x 1 x     Stool Occurrence  0 x              Review of Systems   Constitutional:  Negative for chills and fever.   HENT:  Negative for congestion and trouble swallowing.    Respiratory:  Negative for cough and shortness of breath.    Cardiovascular:  Negative for chest pain and leg swelling.   Gastrointestinal:  Positive for abdominal distention, abdominal pain (incisional) and constipation. Negative for nausea and vomiting.   Genitourinary:  Negative for decreased urine volume and difficulty urinating.   Skin:  Positive for wound.   Allergic/Immunologic: Positive for immunocompromised state.   Neurological:  Negative for dizziness, tremors, weakness and headaches.   Psychiatric/Behavioral:  Negative for agitation, behavioral problems, confusion and decreased concentration.       Objective:     Vital Signs (Most Recent):  Temp: 97.8 °F (36.6 °C) (04/12/24 0716)  Pulse: 104 (04/12/24 0851)  Resp: 18 (04/12/24 0851)  BP: (!) 154/88 (04/12/24 0716)  SpO2: 95 % (04/12/24 0851) Vital Signs (24h Range):  Temp:  [97.8 °F (36.6 °C)-98.8 °F (37.1 °C)] 97.8 °F  "(36.6 °C)  Pulse:  [101-114] 104  Resp:  [16-18] 18  SpO2:  [85 %-97 %] 95 %  BP: (127-172)/(81-95) 154/88     Weight: 93.1 kg (205 lb 4 oz) (Standing Scale)  Height: 5' 7" (170.2 cm)  Body mass index is 32.15 kg/m².     Physical Exam  Vitals and nursing note reviewed.   Constitutional:       General: He is not in acute distress.     Appearance: Normal appearance. He is not ill-appearing.   HENT:      Head: Normocephalic.      Mouth/Throat:      Pharynx: Oropharynx is clear.   Eyes:      Conjunctiva/sclera: Conjunctivae normal.   Cardiovascular:      Rate and Rhythm: Normal rate.      Pulses: Normal pulses.   Pulmonary:      Effort: Pulmonary effort is normal.      Breath sounds: Normal breath sounds.   Abdominal:      General: Bowel sounds are decreased. There is distension.      Tenderness: There is abdominal tenderness. There is no guarding or rebound.      Comments: RLQ Kidney txp incision CDI with staples intact, small area of erythema    Musculoskeletal:      Right lower leg: No edema.      Left lower leg: No edema.   Skin:     General: Skin is warm and dry.   Neurological:      Mental Status: He is alert and oriented to person, place, and time. Mental status is at baseline.   Psychiatric:         Mood and Affect: Mood normal.         Behavior: Behavior normal. Behavior is cooperative.         Thought Content: Thought content normal.          Laboratory:  CBC:   Recent Labs   Lab 04/10/24  0545 04/11/24  0640 04/12/24  0542   WBC 14.60* 13.81* 12.04   RBC 4.12* 4.31* 4.12*   HGB 11.3* 11.7* 11.3*   HCT 38.0* 39.4* 38.3*    204 195   MCV 92 91 93   MCH 27.4 27.1 27.4   MCHC 29.7* 29.7* 29.5*     CMP:   Recent Labs   Lab 04/10/24  1517 04/11/24  0640 04/11/24  1437 04/12/24  0542   * 135*  --  87   CALCIUM 9.4 9.2  --  9.1   ALBUMIN 3.1* 2.9*  --  2.8*    138  --  136   K 5.1 5.2* 4.7 5.2*   CO2 25 23  --  20*    104  --  106   BUN 26* 24*  --  23   CREATININE 1.8* 1.5*  --  1.2 "     Labs within the past 24 hours have been reviewed.    Diagnostic Results:  KUB  EXAMINATION:  XR ABDOMEN AP 1 VIEW     CLINICAL HISTORY:  ilues;     TECHNIQUE:  AP View(s) of the abdomen was performed.     COMPARISON:  04/10/2024     FINDINGS:  Minimal mild distension proximal mid small bowel, nondistended air and large bowel with mild amount of fecal debris right colon lumen region.  Transverse dimension of small bowel loop left upper quadrant 4.7 cm, was about 4.5 cm.  Cutaneous surgical clips diagonally situated along right lateral and lower pelvis.     Impression:     Adynamic ileus proximal mid small bowel for favored.  The possibility of partial obstructive distal small bowel abnormality would be considered and clinical correlation requested.        Electronically signed by: Marcos Mims MD  Date:                                            04/11/2024  Time:                                           15:45           Exam Ended: 04/11/24 13:22 CDT Last Resulted: 04/11/24 15:45 CDT                   US - Kidney: Results for orders placed during the hospital encounter of 04/08/24    US Transplant Kidney With Doppler    Narrative  EXAMINATION:  US TRANSPLANT KIDNEY WITH DOPPLER    CLINICAL HISTORY:  wound infection/drainage;    TECHNIQUE:  Real time gray scale and doppler ultrasound was performed over the patient's renal allograft.    COMPARISON:  03/25/2024    FINDINGS:  Renal allograft in the right lower quadrant.  The allograft measures 12.9 cm. Normal perfusion. No hydronephrosis.  There is a 1.2 cm cyst at the midpole.  There is a 1.7 cm cyst with a peripheral calcification, similar to the prior exam.    There is an elongated fluid collection underneath the anterior pelvic wall overlying the kidney transplant an area measuring 14.8 x 7.4 x 1.3 cm    Vasculature:    Inter lobar and segmental arterial resistive indices ranged from 0.67 to 0.77, previously 0.65 to 0.75.    Main renal artery peak systolic  velocity: 200 cm/s with normal waveform, previously 314 cm/s .    Renal artery/iliac ratio: 1.3.    The main renal vein is patent.    Impression  Large fluid collection, 14.8 x 7.4 x 1.3 cm, just posterior to the anterior pelvic wall overlying the transplant kidney concerning for seroma, hematoma or abscess.    Small renal cysts, one  of which has a small peripheral calcification, unchanged from the prior study.    Satisfactory Doppler evaluation of the transplant kidney.      Electronically signed by: Teresita Ohara MD  Date:    04/08/2024  Time:    12:38

## 2024-04-12 NOTE — PLAN OF CARE
Pt AAOx4. Afebrile. NS infusing at 100ml/hr. PRN oxy given for pain, see mar. Caregiver at the bedside, no report of a fall this shift. UO of 400 mL into urinal. Bed in the lowest setting, call light within reach.

## 2024-04-13 LAB
ALBUMIN SERPL BCP-MCNC: 2.8 G/DL (ref 3.5–5.2)
ANION GAP SERPL CALC-SCNC: 8 MMOL/L (ref 8–16)
BACTERIA BLD CULT: NORMAL
BACTERIA BLD CULT: NORMAL
BASOPHILS # BLD AUTO: 0.07 K/UL (ref 0–0.2)
BASOPHILS NFR BLD: 0.7 % (ref 0–1.9)
BUN SERPL-MCNC: 21 MG/DL (ref 8–23)
CALCIUM SERPL-MCNC: 9.4 MG/DL (ref 8.7–10.5)
CHLORIDE SERPL-SCNC: 105 MMOL/L (ref 95–110)
CO2 SERPL-SCNC: 26 MMOL/L (ref 23–29)
CREAT SERPL-MCNC: 1.3 MG/DL (ref 0.5–1.4)
DIFFERENTIAL METHOD BLD: ABNORMAL
EOSINOPHIL # BLD AUTO: 0.2 K/UL (ref 0–0.5)
EOSINOPHIL NFR BLD: 1.6 % (ref 0–8)
ERYTHROCYTE [DISTWIDTH] IN BLOOD BY AUTOMATED COUNT: 13.5 % (ref 11.5–14.5)
EST. GFR  (NO RACE VARIABLE): >60 ML/MIN/1.73 M^2
GLUCOSE SERPL-MCNC: 100 MG/DL (ref 70–110)
HCT VFR BLD AUTO: 36.3 % (ref 40–54)
HGB BLD-MCNC: 10.8 G/DL (ref 14–18)
IMM GRANULOCYTES # BLD AUTO: 0.16 K/UL (ref 0–0.04)
IMM GRANULOCYTES NFR BLD AUTO: 1.6 % (ref 0–0.5)
LYMPHOCYTES # BLD AUTO: 1.3 K/UL (ref 1–4.8)
LYMPHOCYTES NFR BLD: 12.9 % (ref 18–48)
MAGNESIUM SERPL-MCNC: 1.7 MG/DL (ref 1.6–2.6)
MCH RBC QN AUTO: 27.3 PG (ref 27–31)
MCHC RBC AUTO-ENTMCNC: 29.8 G/DL (ref 32–36)
MCV RBC AUTO: 92 FL (ref 82–98)
MONOCYTES # BLD AUTO: 0.9 K/UL (ref 0.3–1)
MONOCYTES NFR BLD: 8.4 % (ref 4–15)
NEUTROPHILS # BLD AUTO: 7.6 K/UL (ref 1.8–7.7)
NEUTROPHILS NFR BLD: 74.8 % (ref 38–73)
NRBC BLD-RTO: 0 /100 WBC
PHOSPHATE SERPL-MCNC: 2.5 MG/DL (ref 2.7–4.5)
PLATELET # BLD AUTO: 220 K/UL (ref 150–450)
PMV BLD AUTO: 10.2 FL (ref 9.2–12.9)
POTASSIUM SERPL-SCNC: 4.5 MMOL/L (ref 3.5–5.1)
RBC # BLD AUTO: 3.96 M/UL (ref 4.6–6.2)
SODIUM SERPL-SCNC: 139 MMOL/L (ref 136–145)
TACROLIMUS BLD-MCNC: 5.8 NG/ML (ref 5–15)
WBC # BLD AUTO: 10.17 K/UL (ref 3.9–12.7)

## 2024-04-13 PROCEDURE — 94761 N-INVAS EAR/PLS OXIMETRY MLT: CPT

## 2024-04-13 PROCEDURE — 25000003 PHARM REV CODE 250

## 2024-04-13 PROCEDURE — 99233 SBSQ HOSP IP/OBS HIGH 50: CPT | Mod: 24,,,

## 2024-04-13 PROCEDURE — 83735 ASSAY OF MAGNESIUM: CPT

## 2024-04-13 PROCEDURE — 25000003 PHARM REV CODE 250: Performed by: NURSE PRACTITIONER

## 2024-04-13 PROCEDURE — 80197 ASSAY OF TACROLIMUS: CPT

## 2024-04-13 PROCEDURE — 85025 COMPLETE CBC W/AUTO DIFF WBC: CPT

## 2024-04-13 PROCEDURE — 36415 COLL VENOUS BLD VENIPUNCTURE: CPT

## 2024-04-13 PROCEDURE — 80069 RENAL FUNCTION PANEL: CPT

## 2024-04-13 PROCEDURE — 63600175 PHARM REV CODE 636 W HCPCS

## 2024-04-13 PROCEDURE — 27000207 HC ISOLATION

## 2024-04-13 PROCEDURE — 63600175 PHARM REV CODE 636 W HCPCS: Performed by: INTERNAL MEDICINE

## 2024-04-13 PROCEDURE — 94640 AIRWAY INHALATION TREATMENT: CPT

## 2024-04-13 PROCEDURE — 20600001 HC STEP DOWN PRIVATE ROOM

## 2024-04-13 RX ADMIN — ATORVASTATIN CALCIUM 40 MG: 20 TABLET, FILM COATED ORAL at 09:04

## 2024-04-13 RX ADMIN — Medication 400 MG: at 09:04

## 2024-04-13 RX ADMIN — VALGANCICLOVIR HYDROCHLORIDE 450 MG: 450 TABLET ORAL at 05:04

## 2024-04-13 RX ADMIN — LEVOFLOXACIN 750 MG: 250 TABLET, FILM COATED ORAL at 09:04

## 2024-04-13 RX ADMIN — FAMOTIDINE 20 MG: 20 TABLET ORAL at 09:04

## 2024-04-13 RX ADMIN — DOXYCYCLINE HYCLATE 100 MG: 100 TABLET, COATED ORAL at 09:04

## 2024-04-13 RX ADMIN — FLUTICASONE FUROATE AND VILANTEROL TRIFENATATE 1 PUFF: 100; 25 POWDER RESPIRATORY (INHALATION) at 09:04

## 2024-04-13 RX ADMIN — HEPARIN SODIUM 5000 UNITS: 5000 INJECTION INTRAVENOUS; SUBCUTANEOUS at 09:04

## 2024-04-13 RX ADMIN — TACROLIMUS 1.5 MG: 1 CAPSULE ORAL at 05:04

## 2024-04-13 RX ADMIN — ATOVAQUONE 1500 MG: 750 SUSPENSION ORAL at 09:04

## 2024-04-13 RX ADMIN — HEPARIN SODIUM 5000 UNITS: 5000 INJECTION INTRAVENOUS; SUBCUTANEOUS at 05:04

## 2024-04-13 RX ADMIN — OXYCODONE HYDROCHLORIDE 10 MG: 10 TABLET ORAL at 05:04

## 2024-04-13 RX ADMIN — DOCUSATE SODIUM 100 MG: 100 CAPSULE, LIQUID FILLED ORAL at 09:04

## 2024-04-13 RX ADMIN — HEPARIN SODIUM 5000 UNITS: 5000 INJECTION INTRAVENOUS; SUBCUTANEOUS at 12:04

## 2024-04-13 RX ADMIN — SODIUM BICARBONATE 1300 MG: 650 TABLET ORAL at 09:04

## 2024-04-13 RX ADMIN — TACROLIMUS 1 MG: 1 CAPSULE ORAL at 09:04

## 2024-04-13 RX ADMIN — PREDNISONE 5 MG: 5 TABLET ORAL at 09:04

## 2024-04-13 RX ADMIN — DOCUSATE SODIUM 100 MG: 100 CAPSULE, LIQUID FILLED ORAL at 03:04

## 2024-04-13 RX ADMIN — DIBASIC SODIUM PHOSPHATE, MONOBASIC POTASSIUM PHOSPHATE AND MONOBASIC SODIUM PHOSPHATE 2 TABLET: 852; 155; 130 TABLET ORAL at 12:04

## 2024-04-13 NOTE — SUBJECTIVE & OBJECTIVE
Subjective:   History of Present Illness:  Mr. Colten Monet is a 63 yo male w/ PMHx of ESRD d/t IgA nephropathy, now S/P living related Ktxp on 3/11/24. Patient progressed well during initial post-txp course and discharged POD#2 with downtrending Cr. Now with BL Cr 1.0-1.1.     Patient recently seen in transplant surgery clinic on 4/3 and noted to have serous drainage from RLQ kidney txp incision along with palpable erythema. Staples intact w/ minimal tenderness to deep palpation at that time. Incision probed w/ 200 cc serous fluid drained in clinic by surgical fellow. Patient was placed on PO for cellulitis. He now presents to ED for worsening erythema to incisional area (see media for photo). Staples remain intact, erythema noted to entire length of incision, worse in upper portion. Small 1-2 cm area of yellow slough in upper portion incision. Minimal serous drainage expressed upon probing. Patient reports mild tenderness to deep palpation. Kidney US in ED w/ large 14.8 x 7.4 x 1.3 cm, just posterior to the anterior pelvic wall overlying the transplant kidney. Reviewed plan with Dr. Eduardo and Dr. Whyte. CT A/P non-con ordered along with IV abx. Staples removed at bedside, small 1-2 cm area of wound dehiscence noted, moderate amount serous fluid able to be expressed from incision. Mild HARRISON on admit (Cr 1.5). NS bolus ordered.         Mr. Monet is a 64 y.o. year old male who is status post Kidney Transplant - 3/11/2024  (#1).  His maintenance immunosuppression consists of:   Immunosuppressants (From admission, onward)      Start     Stop Route Frequency Ordered    04/13/24 1800  tacrolimus capsule 1.5 mg         -- Oral 2 times daily 04/13/24 1407            Hospital Course:  Patient admitted for worsening cellulitis of kidney transplant incision, incisional hernia, and HARRISON. Cefepime and vanc started, infectious w/u grossly negative.  CT abd/pelvis 4/8/24 revealed incisional hernia containing distal  ileum, associated small bowel feces sign, suggesting intestinal stasis, but without CT evidence of high-grade intestinal obstruction. Reviewed with surgeon, decision made for patient to go to OR 4/9/24 for ex lap   TB 4/9/24 for repair of dehiscence of kidney transplant incision and hernia. Surgery without issues.   HARRISON: as seen on admit, worsened post OR with associated hyperkalemia. Required several interventions (shifting, lasix, IVF). Renal function began to recover, K stabilized back WNL.   Ileus: patient with constipation. KUB 4/10 revealed with findings of ileus, KUB 4/11 similar. Patient made NPO 4/11 for bowel rest.   IV abx transitioned to PO levaquin and doxy x 5 days (EOT 4/15/24).    Interval History:   NAEON. Patient reports feeling better this morning. Endorses passing flatus and improved reflux symptoms. Enema yesterday with very small BM. Will advance diet to clear liquids. Doing well, dc'd IVF in the afternoon. Denies any nausea or vomiting. Cr stable at 1.3. Class II DSA detected ([id number](2137)). Continues with good urine output.  VSS. Encourage ambulation. Cont to monitor.       Past Medical, Surgical, Family, and Social History:   Unchanged from H&P.    Scheduled Meds:  Current Facility-Administered Medications   Medication Dose Route Frequency Provider Last Rate Last Admin    acetaminophen tablet 650 mg  650 mg Oral Q6H PRN Radha Melgar MD   650 mg at 04/11/24 0503    albuterol inhaler 1 puff  1 puff Inhalation Q6H PRN Radha Melgar MD        aluminum-magnesium hydroxide-simethicone 200-200-20 mg/5 mL suspension 30 mL  30 mL Oral Q6H PRN Maren Harvey, LAKESHIA        atorvastatin tablet 40 mg  40 mg Oral QHS Radha Melgar MD   40 mg at 04/12/24 2033    atovaquone 750 mg/5 mL oral liquid 1,500 mg  1,500 mg Oral Daily Emilee Ellsworth NP   1,500 mg at 04/13/24 0905    bisacodyL EC tablet 5 mg  5 mg Oral Daily PRN Emilee Ellsworth NP   5 mg at 04/10/24 1101    calcium carbonate 200 mg calcium (500 mg)  chewable tablet 500 mg  500 mg Oral TID PRN Maren Harvey DNP   500 mg at 04/11/24 1624    calcium gluconate 1 g in NS IVPB (premixed)  1 g Intravenous Q10 Min PRN Claudia Whyte MD        docusate sodium capsule 100 mg  100 mg Oral TID Maren Harvey DNP   100 mg at 04/13/24 1531    doxycycline tablet 100 mg  100 mg Oral Q12H Emilee Ellsworth NP   100 mg at 04/13/24 0901    famotidine tablet 20 mg  20 mg Oral QHS Radha Melgar MD   20 mg at 04/12/24 2034    fluticasone furoate-vilanteroL 100-25 mcg/dose diskus inhaler 1 puff  1 puff Inhalation Daily Radha Melgar MD   1 puff at 04/13/24 0907    heparin (porcine) injection 5,000 Units  5,000 Units Subcutaneous Q8H Radha Melgar MD   5,000 Units at 04/13/24 1240    k phos di & mono-sod phos mono 250 mg tablet 2 tablet  500 mg Oral Daily Padmini Mancilla PA-C   2 tablet at 04/13/24 1240    levoFLOXacin tablet 750 mg  750 mg Oral Daily Emilee Ellsworth NP   750 mg at 04/12/24 2033    magnesium oxide tablet 400 mg  400 mg Oral BID Radha Melgar MD   400 mg at 04/13/24 0902    melatonin tablet 6 mg  6 mg Oral Nightly PRN Radha Melgar MD        ondansetron disintegrating tablet 8 mg  8 mg Oral Q6H PRN Radha Melgar MD   8 mg at 04/10/24 2343    oxyCODONE immediate release tablet 5 mg  5 mg Oral Q4H PRN Maren Harvey DNP        oxyCODONE immediate release tablet Tab 10 mg  10 mg Oral Q4H PRN Maren Harvey DNP   10 mg at 04/13/24 0555    predniSONE tablet 5 mg  5 mg Oral Daily Radha Melgar MD   5 mg at 04/13/24 0902    simethicone chewable tablet 80 mg  1 tablet Oral TID PRN Maren Harvey DNP   80 mg at 04/11/24 0832    sodium bicarbonate tablet 1,300 mg  1,300 mg Oral BID Radha Melgar MD   1,300 mg at 04/13/24 0901    sodium chloride 0.9% flush 3 mL  3 mL Intravenous PRN Radha Melgar MD        tacrolimus capsule 1.5 mg  1.5 mg Oral BID Padmini Mancilla PA-C        valGANciclovir tablet 450 mg  450 mg Oral QAM Radha Melgar MD   450 mg  at 04/13/24 0555     Continuous Infusions:  Current Facility-Administered Medications   Medication Dose Route Frequency Provider Last Rate Last Admin    acetaminophen tablet 650 mg  650 mg Oral Q6H PRN Radha Melgar MD   650 mg at 04/11/24 0503    albuterol inhaler 1 puff  1 puff Inhalation Q6H PRN Radha Melgar MD        aluminum-magnesium hydroxide-simethicone 200-200-20 mg/5 mL suspension 30 mL  30 mL Oral Q6H PRN Maren Harvey DNP        atorvastatin tablet 40 mg  40 mg Oral QHS Radha Melgar MD   40 mg at 04/12/24 2033    atovaquone 750 mg/5 mL oral liquid 1,500 mg  1,500 mg Oral Daily Emilee Ellsworth NP   1,500 mg at 04/13/24 0905    bisacodyL EC tablet 5 mg  5 mg Oral Daily PRN Emilee Ellsworth NP   5 mg at 04/10/24 1101    calcium carbonate 200 mg calcium (500 mg) chewable tablet 500 mg  500 mg Oral TID PRN Maren Harvey DNP   500 mg at 04/11/24 1624    calcium gluconate 1 g in NS IVPB (premixed)  1 g Intravenous Q10 Min PRN Claudia Whyte MD        docusate sodium capsule 100 mg  100 mg Oral TID Maren Harvey DNP   100 mg at 04/13/24 1531    doxycycline tablet 100 mg  100 mg Oral Q12H Emilee Ellsworth NP   100 mg at 04/13/24 0901    famotidine tablet 20 mg  20 mg Oral QHS Radha Melgar MD   20 mg at 04/12/24 2034    fluticasone furoate-vilanteroL 100-25 mcg/dose diskus inhaler 1 puff  1 puff Inhalation Daily Radha Melgar MD   1 puff at 04/13/24 0907    heparin (porcine) injection 5,000 Units  5,000 Units Subcutaneous Q8H Radha Melgar MD   5,000 Units at 04/13/24 1240    k phos di & mono-sod phos mono 250 mg tablet 2 tablet  500 mg Oral Daily Padmini Mancilla PA-C   2 tablet at 04/13/24 1240    levoFLOXacin tablet 750 mg  750 mg Oral Daily Emilee Ellsworth NP   750 mg at 04/12/24 2033    magnesium oxide tablet 400 mg  400 mg Oral BID Radha Melgar MD   400 mg at 04/13/24 0902    melatonin tablet 6 mg  6 mg Oral Nightly PRN Radha Melgar MD        ondansetron disintegrating tablet 8 mg  8  mg Oral Q6H PRN Radha Melgar MD   8 mg at 04/10/24 2343    oxyCODONE immediate release tablet 5 mg  5 mg Oral Q4H PRN Maren Harvey DNP        oxyCODONE immediate release tablet Tab 10 mg  10 mg Oral Q4H PRN Maren Harvey DNP   10 mg at 04/13/24 0555    predniSONE tablet 5 mg  5 mg Oral Daily Rdaha Melgar MD   5 mg at 04/13/24 0902    simethicone chewable tablet 80 mg  1 tablet Oral TID PRN Maren Harvey DNP   80 mg at 04/11/24 0832    sodium bicarbonate tablet 1,300 mg  1,300 mg Oral BID Radha Melgar MD   1,300 mg at 04/13/24 0901    sodium chloride 0.9% flush 3 mL  3 mL Intravenous PRN Radha Melgar MD        tacrolimus capsule 1.5 mg  1.5 mg Oral BID Padmini Mancilla PA-C        valGANciclovir tablet 450 mg  450 mg Oral QAM Radha Melgar MD   450 mg at 04/13/24 0555     PRN Meds:  Current Facility-Administered Medications   Medication Dose Route Frequency Provider Last Rate Last Admin    acetaminophen tablet 650 mg  650 mg Oral Q6H PRN Radha Melgar MD   650 mg at 04/11/24 0503    albuterol inhaler 1 puff  1 puff Inhalation Q6H PRN Radha Melgar MD        aluminum-magnesium hydroxide-simethicone 200-200-20 mg/5 mL suspension 30 mL  30 mL Oral Q6H PRN Maren Harvey DNP        atorvastatin tablet 40 mg  40 mg Oral QHS Radha Melgar MD   40 mg at 04/12/24 2033    atovaquone 750 mg/5 mL oral liquid 1,500 mg  1,500 mg Oral Daily Emilee Ellsworth NP   1,500 mg at 04/13/24 0905    bisacodyL EC tablet 5 mg  5 mg Oral Daily PRN Emilee Ellsworth NP   5 mg at 04/10/24 1101    calcium carbonate 200 mg calcium (500 mg) chewable tablet 500 mg  500 mg Oral TID PRN Maren Harvey DNP   500 mg at 04/11/24 1624    calcium gluconate 1 g in NS IVPB (premixed)  1 g Intravenous Q10 Min PRN Claudia Whyte MD        docusate sodium capsule 100 mg  100 mg Oral TID Maren Harvey DNP   100 mg at 04/13/24 1531    doxycycline tablet 100 mg  100 mg Oral Q12H Emilee Ellsworth NP   100 mg at 04/13/24 0901    famotidine  tablet 20 mg  20 mg Oral QHS Radha Melgar MD   20 mg at 04/12/24 2034    fluticasone furoate-vilanteroL 100-25 mcg/dose diskus inhaler 1 puff  1 puff Inhalation Daily Radha Melgar MD   1 puff at 04/13/24 0907    heparin (porcine) injection 5,000 Units  5,000 Units Subcutaneous Q8H Radha Melgar MD   5,000 Units at 04/13/24 1240    k phos di & mono-sod phos mono 250 mg tablet 2 tablet  500 mg Oral Daily Padmini Mancilla PA-C   2 tablet at 04/13/24 1240    levoFLOXacin tablet 750 mg  750 mg Oral Daily Emilee Ellsworth NP   750 mg at 04/12/24 2033    magnesium oxide tablet 400 mg  400 mg Oral BID Radha Melgar MD   400 mg at 04/13/24 0902    melatonin tablet 6 mg  6 mg Oral Nightly PRN Radha Melgar MD        ondansetron disintegrating tablet 8 mg  8 mg Oral Q6H PRN Radha Melgar MD   8 mg at 04/10/24 2343    oxyCODONE immediate release tablet 5 mg  5 mg Oral Q4H PRN Maren Harvey DNP        oxyCODONE immediate release tablet Tab 10 mg  10 mg Oral Q4H PRN Maren Harvey DNP   10 mg at 04/13/24 0555    predniSONE tablet 5 mg  5 mg Oral Daily Radha Melgar MD   5 mg at 04/13/24 0902    simethicone chewable tablet 80 mg  1 tablet Oral TID PRN Maren Harvey DNP   80 mg at 04/11/24 0832    sodium bicarbonate tablet 1,300 mg  1,300 mg Oral BID Radha Melgar MD   1,300 mg at 04/13/24 0901    sodium chloride 0.9% flush 3 mL  3 mL Intravenous PRN Radha Melgar MD        tacrolimus capsule 1.5 mg  1.5 mg Oral BID Padmini Mancilla PA-C        valGANciclovir tablet 450 mg  450 mg Oral QAM Radha Melgar MD   450 mg at 04/13/24 0555       Intake/Output - Last 3 Shifts         04/11 0700 04/12 0659 04/12 0700  04/13 0659 04/13 0700 04/14 0659    P.O. 440 320     I.V. (mL/kg) 2089.5 (22.4) 332 (3.6)     IV Piggyback 96.9      Total Intake(mL/kg) 2626.4 (28.2) 652 (7)     Urine (mL/kg/hr) 750 (0.3) 475 (0.2)     Stool 0      Total Output 750 475     Net +1876.4 +177            Urine Occurrence 1 x 1 x   "   Stool Occurrence 0 x 0 x              Review of Systems   Constitutional:  Negative for chills and fever.   HENT:  Negative for congestion and trouble swallowing.    Respiratory:  Negative for cough and shortness of breath.    Cardiovascular:  Negative for chest pain and leg swelling.   Gastrointestinal:  Positive for abdominal distention, abdominal pain (incisional) and constipation. Negative for nausea and vomiting.   Genitourinary:  Negative for decreased urine volume and difficulty urinating.   Skin:  Positive for wound.   Allergic/Immunologic: Positive for immunocompromised state.   Neurological:  Negative for dizziness, tremors, weakness and headaches.   Psychiatric/Behavioral:  Negative for agitation, behavioral problems, confusion and decreased concentration.       Objective:     Vital Signs (Most Recent):  Temp: 97.8 °F (36.6 °C) (04/13/24 1541)  Pulse: (!) 112 (04/13/24 1541)  Resp: 18 (04/13/24 1541)  BP: (!) 166/89 (04/13/24 1541)  SpO2: 95 % (04/13/24 1541) Vital Signs (24h Range):  Temp:  [96.3 °F (35.7 °C)-98.1 °F (36.7 °C)] 97.8 °F (36.6 °C)  Pulse:  [] 112  Resp:  [18] 18  SpO2:  [92 %-96 %] 95 %  BP: (120-166)/(75-95) 166/89     Weight: 93.2 kg (205 lb 7.5 oz)  Height: 5' 7" (170.2 cm)  Body mass index is 32.18 kg/m².     Physical Exam  Vitals and nursing note reviewed.   Constitutional:       General: He is not in acute distress.     Appearance: Normal appearance. He is not ill-appearing.   HENT:      Head: Normocephalic.      Mouth/Throat:      Pharynx: Oropharynx is clear.   Eyes:      Conjunctiva/sclera: Conjunctivae normal.   Cardiovascular:      Rate and Rhythm: Normal rate.      Pulses: Normal pulses.   Pulmonary:      Effort: Pulmonary effort is normal.      Breath sounds: Normal breath sounds.   Abdominal:      General: Bowel sounds are decreased. There is distension.      Tenderness: There is abdominal tenderness. There is no guarding or rebound.      Comments: RLQ Kidney txp " incision CDI with staples intact, small area of erythema    Musculoskeletal:      Right lower leg: No edema.      Left lower leg: No edema.   Skin:     General: Skin is warm and dry.   Neurological:      Mental Status: He is alert and oriented to person, place, and time. Mental status is at baseline.   Psychiatric:         Mood and Affect: Mood normal.         Behavior: Behavior normal. Behavior is cooperative.         Thought Content: Thought content normal.          Laboratory:  CBC:   Recent Labs   Lab 04/11/24  0640 04/12/24  0542 04/13/24  0621   WBC 13.81* 12.04 10.17   RBC 4.31* 4.12* 3.96*   HGB 11.7* 11.3* 10.8*   HCT 39.4* 38.3* 36.3*    195 220   MCV 91 93 92   MCH 27.1 27.4 27.3   MCHC 29.7* 29.5* 29.8*     CMP:   Recent Labs   Lab 04/11/24  0640 04/11/24  1437 04/12/24  0542 04/12/24  1251 04/13/24  0621   *  --  87  --  100   CALCIUM 9.2  --  9.1  --  9.4   ALBUMIN 2.9*  --  2.8*  --  2.8*     --  136  --  139   K 5.2*   < > 5.2* 4.9 4.5   CO2 23  --  20*  --  26     --  106  --  105   BUN 24*  --  23  --  21   CREATININE 1.5*  --  1.2  --  1.3    < > = values in this interval not displayed.     Labs within the past 24 hours have been reviewed.    Diagnostic Results:  Relevant imaging reviewed.

## 2024-04-13 NOTE — PLAN OF CARE
Patient is AAOx4. OOB independently. Tele NSR. VSS. Clear liquid diet. Dc'd  ml/hr continuous. Cr 1.3 for am labs. Bowel regimen continued. Bed lowest setting, locked, 2x rails up, call light within reach, instructed to use when needing help, demonstrated use.

## 2024-04-13 NOTE — PROGRESS NOTES
[facility]  Kidney Transplant  Progress Note      Reason for Follow-up: Reassessment of Kidney Transplant - 3/11/2024  (#1) recipient and management of immunosuppression.    ORGAN: LEFT KIDNEY      Donor Type: Living      Donor CMV Status:    Donor HBcAB:   Donor HCV Status:   Donor HBV ANA:   Donor HCV ANA:       Subjective:   History of Present Illness:  Mr. Colten Monet is a 65 yo male w/ PMHx of ESRD d/t IgA nephropathy, now S/P living related Ktxp on 3/11/24. Patient progressed well during initial post-txp course and discharged POD#2 with downtrending Cr. Now with BL Cr 1.0-1.1.     Patient recently seen in transplant surgery clinic on 4/3 and noted to have serous drainage from RLQ kidney txp incision along with palpable erythema. Staples intact w/ minimal tenderness to deep palpation at that time. Incision probed w/ 200 cc serous fluid drained in clinic by surgical fellow. Patient was placed on PO for cellulitis. He now presents to ED for worsening erythema to incisional area (see media for photo). Staples remain intact, erythema noted to entire length of incision, worse in upper portion. Small 1-2 cm area of yellow slough in upper portion incision. Minimal serous drainage expressed upon probing. Patient reports mild tenderness to deep palpation. Kidney US in ED w/ large 14.8 x 7.4 x 1.3 cm, just posterior to the anterior pelvic wall overlying the transplant kidney. Reviewed plan with Dr. Eduardo and Dr. Whyte. CT A/P non-con ordered along with IV abx. Staples removed at bedside, small 1-2 cm area of wound dehiscence noted, moderate amount serous fluid able to be expressed from incision. Mild HARRISON on admit (Cr 1.5). NS bolus ordered.         Mr. Monet is a 64 y.o. year old male who is status post Kidney Transplant - 3/11/2024  (#1).  His maintenance immunosuppression consists of:   Immunosuppressants (From admission, onward)      Start     Stop Route Frequency Ordered    04/13/24 1800   tacrolimus capsule 1.5 mg         -- Oral 2 times daily 04/13/24 1407            Hospital Course:  Patient admitted for worsening cellulitis of kidney transplant incision, incisional hernia, and HARRISON. Cefepime and vanc started, infectious w/u grossly negative.  CT abd/pelvis 4/8/24 revealed incisional hernia containing distal ileum, associated small bowel feces sign, suggesting intestinal stasis, but without CT evidence of high-grade intestinal obstruction. Reviewed with surgeon, decision made for patient to go to OR 4/9/24 for ex lap   TB 4/9/24 for repair of dehiscence of kidney transplant incision and hernia. Surgery without issues.   HARRISON: as seen on admit, worsened post OR with associated hyperkalemia. Required several interventions (shifting, lasix, IVF). Renal function began to recover, K stabilized back WNL.   Ileus: patient with constipation. KUB 4/10 revealed with findings of ileus, KUB 4/11 similar. Patient made NPO 4/11 for bowel rest.   IV abx transitioned to PO levaquin and doxy x 5 days (EOT 4/15/24).    Interval History:   NAEON. Patient reports feeling better this morning. Endorses passing flatus and improved reflux symptoms. Enema yesterday with very small BM. Will advance diet to clear liquids. Doing well, dc'd IVF in the afternoon. Denies any nausea or vomiting. Cr stable at 1.3. Class II DSA detected ([id number](2137)). Continues with good urine output.  VSS. Encourage ambulation. Cont to monitor.       Past Medical, Surgical, Family, and Social History:   Unchanged from H&P.    Scheduled Meds:  Current Facility-Administered Medications   Medication Dose Route Frequency Provider Last Rate Last Admin    acetaminophen tablet 650 mg  650 mg Oral Q6H PRN Radha Melgar MD   650 mg at 04/11/24 0503    albuterol inhaler 1 puff  1 puff Inhalation Q6H PRN Radha Melgar MD        aluminum-magnesium hydroxide-simethicone 200-200-20 mg/5 mL suspension 30 mL  30 mL Oral Q6H PRN Maren Harvey, DNP         atorvastatin tablet 40 mg  40 mg Oral QHS Radha Melgar MD   40 mg at 04/12/24 2033    atovaquone 750 mg/5 mL oral liquid 1,500 mg  1,500 mg Oral Daily Emilee Ellsworth NP   1,500 mg at 04/13/24 0905    bisacodyL EC tablet 5 mg  5 mg Oral Daily PRN Emilee Ellsworth NP   5 mg at 04/10/24 1101    calcium carbonate 200 mg calcium (500 mg) chewable tablet 500 mg  500 mg Oral TID PRN Maren Harvey DNP   500 mg at 04/11/24 1624    calcium gluconate 1 g in NS IVPB (premixed)  1 g Intravenous Q10 Min PRN Claudia Whyte MD        docusate sodium capsule 100 mg  100 mg Oral TID Maren Harvey DNP   100 mg at 04/13/24 1531    doxycycline tablet 100 mg  100 mg Oral Q12H Emilee Ellsworth NP   100 mg at 04/13/24 0901    famotidine tablet 20 mg  20 mg Oral QHS Radha Melgar MD   20 mg at 04/12/24 2034    fluticasone furoate-vilanteroL 100-25 mcg/dose diskus inhaler 1 puff  1 puff Inhalation Daily Radha Melgar MD   1 puff at 04/13/24 0907    heparin (porcine) injection 5,000 Units  5,000 Units Subcutaneous Q8H Radha Melgar MD   5,000 Units at 04/13/24 1240    k phos di & mono-sod phos mono 250 mg tablet 2 tablet  500 mg Oral Daily Padmini Mancilla PA-C   2 tablet at 04/13/24 1240    levoFLOXacin tablet 750 mg  750 mg Oral Daily Emilee Ellsworth NP   750 mg at 04/12/24 2033    magnesium oxide tablet 400 mg  400 mg Oral BID Radha Melgar MD   400 mg at 04/13/24 0902    melatonin tablet 6 mg  6 mg Oral Nightly PRN Radha Melgar MD        ondansetron disintegrating tablet 8 mg  8 mg Oral Q6H PRN Radha Melgar MD   8 mg at 04/10/24 2343    oxyCODONE immediate release tablet 5 mg  5 mg Oral Q4H PRN Maren Harvey DNP        oxyCODONE immediate release tablet Tab 10 mg  10 mg Oral Q4H PRN Maren Harvey DNP   10 mg at 04/13/24 0555    predniSONE tablet 5 mg  5 mg Oral Daily Radha Melgar MD   5 mg at 04/13/24 0902    simethicone chewable tablet 80 mg  1 tablet Oral TID PRN Maren Harvey DNP   80 mg at 04/11/24 0832     sodium bicarbonate tablet 1,300 mg  1,300 mg Oral BID Radha Melgar MD   1,300 mg at 04/13/24 0901    sodium chloride 0.9% flush 3 mL  3 mL Intravenous PRN Radha Melgar MD        tacrolimus capsule 1.5 mg  1.5 mg Oral BID Padmini Mancilla PA-C        valGANciclovir tablet 450 mg  450 mg Oral QAM Radha Melgar MD   450 mg at 04/13/24 0555     Continuous Infusions:  Current Facility-Administered Medications   Medication Dose Route Frequency Provider Last Rate Last Admin    acetaminophen tablet 650 mg  650 mg Oral Q6H PRN Radha Melgar MD   650 mg at 04/11/24 0503    albuterol inhaler 1 puff  1 puff Inhalation Q6H PRN Radha Melgar MD        aluminum-magnesium hydroxide-simethicone 200-200-20 mg/5 mL suspension 30 mL  30 mL Oral Q6H PRN Maren Harvey DNP        atorvastatin tablet 40 mg  40 mg Oral QHS Radha Melgar MD   40 mg at 04/12/24 2033    atovaquone 750 mg/5 mL oral liquid 1,500 mg  1,500 mg Oral Daily Emilee Ellsworth NP   1,500 mg at 04/13/24 0905    bisacodyL EC tablet 5 mg  5 mg Oral Daily PRN Emilee Ellsworth NP   5 mg at 04/10/24 1101    calcium carbonate 200 mg calcium (500 mg) chewable tablet 500 mg  500 mg Oral TID PRN Maren Harvey DNP   500 mg at 04/11/24 1624    calcium gluconate 1 g in NS IVPB (premixed)  1 g Intravenous Q10 Min PRN Claudia Whyte MD        docusate sodium capsule 100 mg  100 mg Oral TID Maren Harvey DNP   100 mg at 04/13/24 1531    doxycycline tablet 100 mg  100 mg Oral Q12H Emilee Ellsworth NP   100 mg at 04/13/24 0901    famotidine tablet 20 mg  20 mg Oral QHS Radha Melgar MD   20 mg at 04/12/24 2034    fluticasone furoate-vilanteroL 100-25 mcg/dose diskus inhaler 1 puff  1 puff Inhalation Daily Radha Melgar MD   1 puff at 04/13/24 0907    heparin (porcine) injection 5,000 Units  5,000 Units Subcutaneous Q8H Radha Melgar MD   5,000 Units at 04/13/24 1240    k phos di & mono-sod phos mono 250 mg tablet 2 tablet  500 mg Oral Daily Padmini Mancilla,  MAXX   2 tablet at 04/13/24 1240    levoFLOXacin tablet 750 mg  750 mg Oral Daily Emilee Ellsworth NP   750 mg at 04/12/24 2033    magnesium oxide tablet 400 mg  400 mg Oral BID Radha Melgar MD   400 mg at 04/13/24 0902    melatonin tablet 6 mg  6 mg Oral Nightly PRN Radha Melgar MD        ondansetron disintegrating tablet 8 mg  8 mg Oral Q6H PRN Radha Melgar MD   8 mg at 04/10/24 2343    oxyCODONE immediate release tablet 5 mg  5 mg Oral Q4H PRN Maren Harvey DNP        oxyCODONE immediate release tablet Tab 10 mg  10 mg Oral Q4H PRN Maren Harvey DNP   10 mg at 04/13/24 0555    predniSONE tablet 5 mg  5 mg Oral Daily Radha Melgar MD   5 mg at 04/13/24 0902    simethicone chewable tablet 80 mg  1 tablet Oral TID PRN Maren Harvey DNP   80 mg at 04/11/24 0832    sodium bicarbonate tablet 1,300 mg  1,300 mg Oral BID Radha Melgar MD   1,300 mg at 04/13/24 0901    sodium chloride 0.9% flush 3 mL  3 mL Intravenous PRN Radha Melgar MD        tacrolimus capsule 1.5 mg  1.5 mg Oral BID Padmini Mancilla PA-C        valGANciclovir tablet 450 mg  450 mg Oral QAM Radha Melgar MD   450 mg at 04/13/24 0555     PRN Meds:  Current Facility-Administered Medications   Medication Dose Route Frequency Provider Last Rate Last Admin    acetaminophen tablet 650 mg  650 mg Oral Q6H PRN Radha Melgar MD   650 mg at 04/11/24 0503    albuterol inhaler 1 puff  1 puff Inhalation Q6H PRN Radha Melgar MD        aluminum-magnesium hydroxide-simethicone 200-200-20 mg/5 mL suspension 30 mL  30 mL Oral Q6H PRN Maren Harvey DNP        atorvastatin tablet 40 mg  40 mg Oral QHS Radha Melgar MD   40 mg at 04/12/24 2033    atovaquone 750 mg/5 mL oral liquid 1,500 mg  1,500 mg Oral Daily Emilee Ellsworth NP   1,500 mg at 04/13/24 0905    bisacodyL EC tablet 5 mg  5 mg Oral Daily PRN Emilee Ellsworth NP   5 mg at 04/10/24 1101    calcium carbonate 200 mg calcium (500 mg) chewable tablet 500 mg  500 mg Oral TID PRN Maren Harvey,  LAKESHIA   500 mg at 04/11/24 1624    calcium gluconate 1 g in NS IVPB (premixed)  1 g Intravenous Q10 Min PRN Claudia Whyte MD        docusate sodium capsule 100 mg  100 mg Oral TID Maren Harvey DNP   100 mg at 04/13/24 1531    doxycycline tablet 100 mg  100 mg Oral Q12H Emilee Ellsworth NP   100 mg at 04/13/24 0901    famotidine tablet 20 mg  20 mg Oral QHS Radha Melgar MD   20 mg at 04/12/24 2034    fluticasone furoate-vilanteroL 100-25 mcg/dose diskus inhaler 1 puff  1 puff Inhalation Daily Radha Melgar MD   1 puff at 04/13/24 0907    heparin (porcine) injection 5,000 Units  5,000 Units Subcutaneous Q8H Radha Melgar MD   5,000 Units at 04/13/24 1240    k phos di & mono-sod phos mono 250 mg tablet 2 tablet  500 mg Oral Daily Padmini Mancilla PA-C   2 tablet at 04/13/24 1240    levoFLOXacin tablet 750 mg  750 mg Oral Daily Emilee Ellsworth NP   750 mg at 04/12/24 2033    magnesium oxide tablet 400 mg  400 mg Oral BID Radha Melgar MD   400 mg at 04/13/24 0902    melatonin tablet 6 mg  6 mg Oral Nightly PRN Radha Melgar MD        ondansetron disintegrating tablet 8 mg  8 mg Oral Q6H PRN Radha Melgar MD   8 mg at 04/10/24 2343    oxyCODONE immediate release tablet 5 mg  5 mg Oral Q4H PRN Maren Harvey DNP        oxyCODONE immediate release tablet Tab 10 mg  10 mg Oral Q4H PRN Maren Harvey DNP   10 mg at 04/13/24 0555    predniSONE tablet 5 mg  5 mg Oral Daily Radha Melgar MD   5 mg at 04/13/24 0902    simethicone chewable tablet 80 mg  1 tablet Oral TID PRN Maren Harvey DNP   80 mg at 04/11/24 0832    sodium bicarbonate tablet 1,300 mg  1,300 mg Oral BID Radha Melgar MD   1,300 mg at 04/13/24 0901    sodium chloride 0.9% flush 3 mL  3 mL Intravenous PRN Radha Melgar MD        tacrolimus capsule 1.5 mg  1.5 mg Oral BID Padmini Mancilla PA-C        valGANciclovir tablet 450 mg  450 mg Oral QAM Radha Melgar MD   450 mg at 04/13/24 0555       Intake/Output - Last 3 Shifts          "04/11 0700 04/12 0659 04/12 0700 04/13 0659 04/13 0700 04/14 0659    P.O. 440 320     I.V. (mL/kg) 2089.5 (22.4) 332 (3.6)     IV Piggyback 96.9      Total Intake(mL/kg) 2626.4 (28.2) 652 (7)     Urine (mL/kg/hr) 750 (0.3) 475 (0.2)     Stool 0      Total Output 750 475     Net +1876.4 +177            Urine Occurrence 1 x 1 x     Stool Occurrence 0 x 0 x              Review of Systems   Constitutional:  Negative for chills and fever.   HENT:  Negative for congestion and trouble swallowing.    Respiratory:  Negative for cough and shortness of breath.    Cardiovascular:  Negative for chest pain and leg swelling.   Gastrointestinal:  Positive for abdominal distention, abdominal pain (incisional) and constipation. Negative for nausea and vomiting.   Genitourinary:  Negative for decreased urine volume and difficulty urinating.   Skin:  Positive for wound.   Allergic/Immunologic: Positive for immunocompromised state.   Neurological:  Negative for dizziness, tremors, weakness and headaches.   Psychiatric/Behavioral:  Negative for agitation, behavioral problems, confusion and decreased concentration.       Objective:     Vital Signs (Most Recent):  Temp: 97.8 °F (36.6 °C) (04/13/24 1541)  Pulse: (!) 112 (04/13/24 1541)  Resp: 18 (04/13/24 1541)  BP: (!) 166/89 (04/13/24 1541)  SpO2: 95 % (04/13/24 1541) Vital Signs (24h Range):  Temp:  [96.3 °F (35.7 °C)-98.1 °F (36.7 °C)] 97.8 °F (36.6 °C)  Pulse:  [] 112  Resp:  [18] 18  SpO2:  [92 %-96 %] 95 %  BP: (120-166)/(75-95) 166/89     Weight: 93.2 kg (205 lb 7.5 oz)  Height: 5' 7" (170.2 cm)  Body mass index is 32.18 kg/m².     Physical Exam  Vitals and nursing note reviewed.   Constitutional:       General: He is not in acute distress.     Appearance: Normal appearance. He is not ill-appearing.   HENT:      Head: Normocephalic.      Mouth/Throat:      Pharynx: Oropharynx is clear.   Eyes:      Conjunctiva/sclera: Conjunctivae normal.   Cardiovascular:      Rate and " Rhythm: Normal rate.      Pulses: Normal pulses.   Pulmonary:      Effort: Pulmonary effort is normal.      Breath sounds: Normal breath sounds.   Abdominal:      General: Bowel sounds are decreased. There is distension.      Tenderness: There is abdominal tenderness. There is no guarding or rebound.      Comments: RLQ Kidney txp incision CDI with staples intact, small area of erythema    Musculoskeletal:      Right lower leg: No edema.      Left lower leg: No edema.   Skin:     General: Skin is warm and dry.   Neurological:      Mental Status: He is alert and oriented to person, place, and time. Mental status is at baseline.   Psychiatric:         Mood and Affect: Mood normal.         Behavior: Behavior normal. Behavior is cooperative.         Thought Content: Thought content normal.          Laboratory:  CBC:   Recent Labs   Lab 04/11/24  0640 04/12/24  0542 04/13/24  0621   WBC 13.81* 12.04 10.17   RBC 4.31* 4.12* 3.96*   HGB 11.7* 11.3* 10.8*   HCT 39.4* 38.3* 36.3*    195 220   MCV 91 93 92   MCH 27.1 27.4 27.3   MCHC 29.7* 29.5* 29.8*     CMP:   Recent Labs   Lab 04/11/24  0640 04/11/24  1437 04/12/24  0542 04/12/24  1251 04/13/24  0621   *  --  87  --  100   CALCIUM 9.2  --  9.1  --  9.4   ALBUMIN 2.9*  --  2.8*  --  2.8*     --  136  --  139   K 5.2*   < > 5.2* 4.9 4.5   CO2 23  --  20*  --  26     --  106  --  105   BUN 24*  --  23  --  21   CREATININE 1.5*  --  1.2  --  1.3    < > = values in this interval not displayed.     Labs within the past 24 hours have been reviewed.    Diagnostic Results:  Relevant imaging reviewed.   Assessment/Plan:     * Surgical wound infection  - Pt with delayed surgical wound healing, seen in clinic 4/3 with 200 mL serous fluid expressed from incision w/ staples intact. Started on PO doxycycline at that time  - Wound remains erythematous with swelling and mild tenderness. No improvement with PO Doxy  - Kidney US 4/8 w/ large 14.8 x 7.4 x 1.3 cm, just  "posterior to the anterior pelvic wall overlying the transplant kidney  - Staples removed 4/8, small area dehiscence noted with moderate serous fluid expressed  - CT abd/pelvis 4/8 with RLQ transplant kidney with incisional hernia containing distal ileum. The large fluid collection interposed between the kidney and anterior abdominal wall drained at patient's bedside. Mild to moderate soft tissue stranding and trace fluid directly adjacent to the transplant kidney. CT reviewed by surgeon. Patient made NPO for surgical intervention for 4/9.   - Started on IVFs; continue  - Patient taken back to OR (4/9)  for dehiscence of kidney transplant incision and hernia repair.   - Infectious work up negative thus far.   - Cefepime and vanc dc'd and levaquin and doxy x5 days. (EOT 4/15)  - cont to monitor     Ileus  - KUB 4/10 with findings of ileus.    - patient reports feeling bloated and "full".  - Patient has not had a  BM since OR take back on 4/9.   - Denies any nausea or vomiting.   - acid reflux and belching improving   - NPO for bowel rest starting 4/11, diet advanced to CLD 4/13   - repeat KUB 4/11 with impression of "Adynamic ileus proximal mid small bowel for favored. The possibility of partial obstructive distal small bowel abnormality would be considered and clinical correlation requested."  - Encourage ambulation.   - Cont to monitor.       Hyperkalemia  Post op from OR take back on 4/9, patient with hyperkalemia needing interventions (shifting, IV lasix, and IVFs)  - K stable at 4.5 this morning  - monitor with renal panel prn    Anemia of chronic disease  - monitor daily CBC  - stable      HARRISON (acute kidney injury)  - expect due to dehydration,  IVFs hydration ordered on admit  - Patient taken back to OR 4/9 for dehiscence of kidney transplant incision and hernia repair.  - Cr stable at 1.3  - minimal + DSA   - good UOP  - IVF dc'd, diet advanced to clear liquids, pt tolerating well      Hypomagnesemia  - " "Continue home PO mag  - IV Mag as needed      History of COPD  - Resume home neb      Dyslipidemia  - continue home statin      Prophylactic immunotherapy  - see "long term use of immunosuppression"      At risk for opportunistic infections  - OI prophylaxis per transplant protocol      Long-term use of immunosuppressant medication  - Continue prograf and prednisone  - Check prograf level daily and adjust for therapeutic dosage. Monitor for toxic side effects   -  cellcept held on admit d/t infection      S/P living-donor kidney transplantation  - S/p Ktxp 3/11/24 d/t IgA nephropathy. Progressed well post-txp with Cr downtrending  - BL Cr 1.1-1.2  - strict Is and Os   - encourage hydration           Discharge Planning:  Not yet stable for dc     Medical decision making for this encounter includes review of pertinent labs and diagnostic studies, assessment and planning, discussions with consulting providers, discussion with patient/family, and participation in multidisciplinary rounds. Time spent caring for patient: 30 minutes    Padmini Mancilla PA-C  Kidney Transplant  [facility]  "

## 2024-04-13 NOTE — PLAN OF CARE
Pt AAOx4. Afebrile. NS infusing at 100ml/hr. PRN oxy given for pain, see mar. Caregiver at the bedside, no report of a fall this shift. No BM overnight, pt reporting passing gas. Bed in the lowest setting, call light within reach

## 2024-04-14 LAB
ALBUMIN SERPL BCP-MCNC: 2.8 G/DL (ref 3.5–5.2)
ANION GAP SERPL CALC-SCNC: 9 MMOL/L (ref 8–16)
BASOPHILS # BLD AUTO: 0.06 K/UL (ref 0–0.2)
BASOPHILS NFR BLD: 0.6 % (ref 0–1.9)
BUN SERPL-MCNC: 18 MG/DL (ref 8–23)
CALCIUM SERPL-MCNC: 9 MG/DL (ref 8.7–10.5)
CHLORIDE SERPL-SCNC: 103 MMOL/L (ref 95–110)
CO2 SERPL-SCNC: 25 MMOL/L (ref 23–29)
CREAT SERPL-MCNC: 1.2 MG/DL (ref 0.5–1.4)
DIFFERENTIAL METHOD BLD: ABNORMAL
EOSINOPHIL # BLD AUTO: 0.1 K/UL (ref 0–0.5)
EOSINOPHIL NFR BLD: 0.9 % (ref 0–8)
ERYTHROCYTE [DISTWIDTH] IN BLOOD BY AUTOMATED COUNT: 13.6 % (ref 11.5–14.5)
EST. GFR  (NO RACE VARIABLE): >60 ML/MIN/1.73 M^2
GLUCOSE SERPL-MCNC: 109 MG/DL (ref 70–110)
HCT VFR BLD AUTO: 36.6 % (ref 40–54)
HGB BLD-MCNC: 10.8 G/DL (ref 14–18)
IMM GRANULOCYTES # BLD AUTO: 0.19 K/UL (ref 0–0.04)
IMM GRANULOCYTES NFR BLD AUTO: 1.8 % (ref 0–0.5)
LYMPHOCYTES # BLD AUTO: 0.9 K/UL (ref 1–4.8)
LYMPHOCYTES NFR BLD: 8.6 % (ref 18–48)
MAGNESIUM SERPL-MCNC: 1.6 MG/DL (ref 1.6–2.6)
MCH RBC QN AUTO: 26.7 PG (ref 27–31)
MCHC RBC AUTO-ENTMCNC: 29.5 G/DL (ref 32–36)
MCV RBC AUTO: 90 FL (ref 82–98)
MONOCYTES # BLD AUTO: 0.9 K/UL (ref 0.3–1)
MONOCYTES NFR BLD: 8.7 % (ref 4–15)
NEUTROPHILS # BLD AUTO: 8.4 K/UL (ref 1.8–7.7)
NEUTROPHILS NFR BLD: 79.4 % (ref 38–73)
NRBC BLD-RTO: 0 /100 WBC
PHOSPHATE SERPL-MCNC: 3.3 MG/DL (ref 2.7–4.5)
PLATELET # BLD AUTO: 199 K/UL (ref 150–450)
PMV BLD AUTO: 10.3 FL (ref 9.2–12.9)
POTASSIUM SERPL-SCNC: 4.6 MMOL/L (ref 3.5–5.1)
RBC # BLD AUTO: 4.05 M/UL (ref 4.6–6.2)
SODIUM SERPL-SCNC: 137 MMOL/L (ref 136–145)
TACROLIMUS BLD-MCNC: 4.9 NG/ML (ref 5–15)
WBC # BLD AUTO: 10.63 K/UL (ref 3.9–12.7)

## 2024-04-14 PROCEDURE — 63600175 PHARM REV CODE 636 W HCPCS

## 2024-04-14 PROCEDURE — 25000003 PHARM REV CODE 250: Performed by: NURSE PRACTITIONER

## 2024-04-14 PROCEDURE — 25000003 PHARM REV CODE 250

## 2024-04-14 PROCEDURE — 99233 SBSQ HOSP IP/OBS HIGH 50: CPT | Mod: 24,,,

## 2024-04-14 PROCEDURE — 36415 COLL VENOUS BLD VENIPUNCTURE: CPT

## 2024-04-14 PROCEDURE — 27000207 HC ISOLATION

## 2024-04-14 PROCEDURE — 80069 RENAL FUNCTION PANEL: CPT

## 2024-04-14 PROCEDURE — 83735 ASSAY OF MAGNESIUM: CPT

## 2024-04-14 PROCEDURE — 85025 COMPLETE CBC W/AUTO DIFF WBC: CPT

## 2024-04-14 PROCEDURE — 80197 ASSAY OF TACROLIMUS: CPT

## 2024-04-14 PROCEDURE — 20600001 HC STEP DOWN PRIVATE ROOM

## 2024-04-14 RX ORDER — BISACODYL 10 MG/1
10 SUPPOSITORY RECTAL DAILY PRN
Status: DISCONTINUED | OUTPATIENT
Start: 2024-04-14 | End: 2024-04-25 | Stop reason: HOSPADM

## 2024-04-14 RX ORDER — LANOLIN ALCOHOL/MO/W.PET/CERES
800 CREAM (GRAM) TOPICAL DAILY
Status: DISCONTINUED | OUTPATIENT
Start: 2024-04-15 | End: 2024-04-19

## 2024-04-14 RX ORDER — SYRING-NEEDL,DISP,INSUL,0.3 ML 29 G X1/2"
296 SYRINGE, EMPTY DISPOSABLE MISCELLANEOUS
Status: COMPLETED | OUTPATIENT
Start: 2024-04-14 | End: 2024-04-14

## 2024-04-14 RX ORDER — CARVEDILOL 6.25 MG/1
6.25 TABLET ORAL 2 TIMES DAILY
Status: DISCONTINUED | OUTPATIENT
Start: 2024-04-14 | End: 2024-04-15

## 2024-04-14 RX ORDER — SIMETHICONE 80 MG
2 TABLET,CHEWABLE ORAL
Status: DISCONTINUED | OUTPATIENT
Start: 2024-04-14 | End: 2024-04-18

## 2024-04-14 RX ORDER — PSEUDOEPHEDRINE/ACETAMINOPHEN 30MG-500MG
100 TABLET ORAL
Status: COMPLETED | OUTPATIENT
Start: 2024-04-14 | End: 2024-04-14

## 2024-04-14 RX ORDER — TACROLIMUS 1 MG/1
1 CAPSULE ORAL ONCE
Status: COMPLETED | OUTPATIENT
Start: 2024-04-14 | End: 2024-04-14

## 2024-04-14 RX ORDER — VALGANCICLOVIR 450 MG/1
900 TABLET, FILM COATED ORAL EVERY MORNING
Status: DISCONTINUED | OUTPATIENT
Start: 2024-04-15 | End: 2024-04-25 | Stop reason: HOSPADM

## 2024-04-14 RX ORDER — BISACODYL 5 MG
10 TABLET, DELAYED RELEASE (ENTERIC COATED) ORAL DAILY
Status: DISCONTINUED | OUTPATIENT
Start: 2024-04-14 | End: 2024-04-25 | Stop reason: HOSPADM

## 2024-04-14 RX ADMIN — DIBASIC SODIUM PHOSPHATE, MONOBASIC POTASSIUM PHOSPHATE AND MONOBASIC SODIUM PHOSPHATE 2 TABLET: 852; 155; 130 TABLET ORAL at 08:04

## 2024-04-14 RX ADMIN — SODIUM BICARBONATE 1300 MG: 650 TABLET ORAL at 08:04

## 2024-04-14 RX ADMIN — DOCUSATE SODIUM 100 MG: 100 CAPSULE, LIQUID FILLED ORAL at 02:04

## 2024-04-14 RX ADMIN — Medication 100 ML: at 12:04

## 2024-04-14 RX ADMIN — Medication 400 MG: at 08:04

## 2024-04-14 RX ADMIN — ONDANSETRON 8 MG: 8 TABLET, ORALLY DISINTEGRATING ORAL at 11:04

## 2024-04-14 RX ADMIN — DOCUSATE SODIUM 100 MG: 100 CAPSULE, LIQUID FILLED ORAL at 08:04

## 2024-04-14 RX ADMIN — MAGNESIUM CITRATE 296 ML: 1.75 LIQUID ORAL at 12:04

## 2024-04-14 RX ADMIN — SIMETHICONE 160 MG: 80 TABLET, CHEWABLE ORAL at 05:04

## 2024-04-14 RX ADMIN — TACROLIMUS 2.5 MG: 1 CAPSULE ORAL at 05:04

## 2024-04-14 RX ADMIN — FAMOTIDINE 20 MG: 20 TABLET ORAL at 08:04

## 2024-04-14 RX ADMIN — LEVOFLOXACIN 750 MG: 250 TABLET, FILM COATED ORAL at 08:04

## 2024-04-14 RX ADMIN — PREDNISONE 5 MG: 5 TABLET ORAL at 08:04

## 2024-04-14 RX ADMIN — HEPARIN SODIUM 5000 UNITS: 5000 INJECTION INTRAVENOUS; SUBCUTANEOUS at 08:04

## 2024-04-14 RX ADMIN — ATORVASTATIN CALCIUM 40 MG: 20 TABLET, FILM COATED ORAL at 08:04

## 2024-04-14 RX ADMIN — ATOVAQUONE 1500 MG: 750 SUSPENSION ORAL at 08:04

## 2024-04-14 RX ADMIN — CARVEDILOL 6.25 MG: 6.25 TABLET, FILM COATED ORAL at 11:04

## 2024-04-14 RX ADMIN — VALGANCICLOVIR HYDROCHLORIDE 450 MG: 450 TABLET ORAL at 05:04

## 2024-04-14 RX ADMIN — HEPARIN SODIUM 5000 UNITS: 5000 INJECTION INTRAVENOUS; SUBCUTANEOUS at 05:04

## 2024-04-14 RX ADMIN — SODIUM CHLORIDE 500 ML: 0.9 INJECTION, SOLUTION INTRAVENOUS at 10:04

## 2024-04-14 RX ADMIN — SIMETHICONE 160 MG: 80 TABLET, CHEWABLE ORAL at 01:04

## 2024-04-14 RX ADMIN — TACROLIMUS 1.5 MG: 1 CAPSULE ORAL at 08:04

## 2024-04-14 RX ADMIN — TACROLIMUS 1 MG: 1 CAPSULE ORAL at 11:04

## 2024-04-14 RX ADMIN — DOXYCYCLINE HYCLATE 100 MG: 100 TABLET, COATED ORAL at 08:04

## 2024-04-14 RX ADMIN — HEPARIN SODIUM 5000 UNITS: 5000 INJECTION INTRAVENOUS; SUBCUTANEOUS at 02:04

## 2024-04-14 RX ADMIN — FLUTICASONE FUROATE AND VILANTEROL TRIFENATATE 1 PUFF: 100; 25 POWDER RESPIRATORY (INHALATION) at 08:04

## 2024-04-14 RX ADMIN — SIMETHICONE 160 MG: 80 TABLET, CHEWABLE ORAL at 08:04

## 2024-04-14 RX ADMIN — BISACODYL 10 MG: 5 TABLET, COATED ORAL at 08:04

## 2024-04-14 NOTE — SUBJECTIVE & OBJECTIVE
Subjective:   History of Present Illness:  Mr. Colten Monet is a 63 yo male w/ PMHx of ESRD d/t IgA nephropathy, now S/P living related Ktxp on 3/11/24. Patient progressed well during initial post-txp course and discharged POD#2 with downtrending Cr. Now with BL Cr 1.0-1.1.     Patient recently seen in transplant surgery clinic on 4/3 and noted to have serous drainage from RLQ kidney txp incision along with palpable erythema. Staples intact w/ minimal tenderness to deep palpation at that time. Incision probed w/ 200 cc serous fluid drained in clinic by surgical fellow. Patient was placed on PO for cellulitis. He now presents to ED for worsening erythema to incisional area (see media for photo). Staples remain intact, erythema noted to entire length of incision, worse in upper portion. Small 1-2 cm area of yellow slough in upper portion incision. Minimal serous drainage expressed upon probing. Patient reports mild tenderness to deep palpation. Kidney US in ED w/ large 14.8 x 7.4 x 1.3 cm, just posterior to the anterior pelvic wall overlying the transplant kidney. Reviewed plan with Dr. Eduardo and Dr. Whyte. CT A/P non-con ordered along with IV abx. Staples removed at bedside, small 1-2 cm area of wound dehiscence noted, moderate amount serous fluid able to be expressed from incision. Mild HARRISON on admit (Cr 1.5). NS bolus ordered.         Mr. Monet is a 64 y.o. year old male who is status post Kidney Transplant - 3/11/2024  (#1).  His maintenance immunosuppression consists of:   Immunosuppressants (From admission, onward)      Start     Stop Route Frequency Ordered    04/14/24 1800  tacrolimus capsule 2.5 mg         -- Oral 2 times daily 04/14/24 1007    04/14/24 1100  tacrolimus capsule 1 mg         -- Oral Once 04/14/24 1007            Hospital Course:  Patient admitted for worsening cellulitis of kidney transplant incision, incisional hernia, and HARRISON. Cefepime and vanc started, infectious w/u grossly  negative.  CT abd/pelvis 4/8/24 revealed incisional hernia containing distal ileum, associated small bowel feces sign, suggesting intestinal stasis, but without CT evidence of high-grade intestinal obstruction. Reviewed with surgeon, decision made for patient to go to OR 4/9/24 for ex lap   TB 4/9/24 for repair of dehiscence of kidney transplant incision and hernia. Surgery without issues.   HARRISON: as seen on admit, worsened post OR with associated hyperkalemia. Required several interventions (shifting, lasix, IVF). Renal function began to recover, K stabilized back WNL.   Ileus: patient with constipation. KUB 4/10 revealed with findings of ileus, KUB 4/11 similar. Patient made NPO 4/11 for bowel rest.   IV abx transitioned to PO levaquin and doxy x 5 days (EOT 4/15/24).    Interval History:   NAEON. Patient feeling ok this morning, continues to pass flatus though no BM yet. Endorses intermittent reflux symptoms. Denies any nausea or vomiting. Will try another enema today. Advancing diet, reminded patient to take it slow. Cr stable 1.2. Class II DSA detected ([id number](2137)). Continues with good urine output.  VSS. Encourage ambulation. Cont to monitor.       Past Medical, Surgical, Family, and Social History:   Unchanged from H&P.    Scheduled Meds:  Current Facility-Administered Medications   Medication Dose Route Frequency Provider Last Rate Last Admin    acetaminophen tablet 650 mg  650 mg Oral Q6H PRN Radha Melgar MD   650 mg at 04/11/24 0503    albuterol inhaler 1 puff  1 puff Inhalation Q6H PRN Radha Melgar MD        aluminum-magnesium hydroxide-simethicone 200-200-20 mg/5 mL suspension 30 mL  30 mL Oral Q6H PRN Maren Harvey, LAKESHIA        atorvastatin tablet 40 mg  40 mg Oral QHS Radha Melgar MD   40 mg at 04/13/24 2110    atovaquone 750 mg/5 mL oral liquid 1,500 mg  1,500 mg Oral Daily Emilee Ellsworth NP   1,500 mg at 04/14/24 0841    bisacodyL EC tablet 10 mg  10 mg Oral Daily Padmini Mancilla PA-C    10 mg at 04/14/24 0841    bisacodyL suppository 10 mg  10 mg Rectal Daily PRN Padmini Mancilla PA-C        calcium carbonate 200 mg calcium (500 mg) chewable tablet 500 mg  500 mg Oral TID PRN Maren Harvey DNP   500 mg at 04/11/24 1624    calcium gluconate 1 g in NS IVPB (premixed)  1 g Intravenous Q10 Min PRN Claudia Whyte MD        docusate sodium capsule 100 mg  100 mg Oral TID Maren Harvey DNP   100 mg at 04/14/24 0841    doxycycline tablet 100 mg  100 mg Oral Q12H Emilee Ellsworth NP   100 mg at 04/14/24 0841    famotidine tablet 20 mg  20 mg Oral QHS Radha Melgar MD   20 mg at 04/13/24 2110    fluticasone furoate-vilanteroL 100-25 mcg/dose diskus inhaler 1 puff  1 puff Inhalation Daily Radha Melgar MD   1 puff at 04/14/24 0843    glycerin 99.5% topical solution 100 mL  100 mL Rectal ED 1 Time Padmini Mancilla PA-C        And    magnesium citrate solution 296 mL  296 mL Rectal ED 1 Time Padmini Mancilla PA-C        And    sodium chloride 0.9% bolus 500 mL 500 mL  500 mL Rectal ED 1 Time Padmini Mancilla PA-C        heparin (porcine) injection 5,000 Units  5,000 Units Subcutaneous Q8H Radha Melgar MD   5,000 Units at 04/14/24 0541    k phos di & mono-sod phos mono 250 mg tablet 2 tablet  500 mg Oral Daily Padmini Mancilla PA-C   2 tablet at 04/14/24 0841    levoFLOXacin tablet 750 mg  750 mg Oral Daily Emilee Ellsworth NP   750 mg at 04/13/24 2110    magnesium oxide tablet 400 mg  400 mg Oral BID Radha Melgar MD   400 mg at 04/14/24 0841    melatonin tablet 6 mg  6 mg Oral Nightly PRN Radha Melgar MD        ondansetron disintegrating tablet 8 mg  8 mg Oral Q6H PRN Radha Melgar MD   8 mg at 04/10/24 2343    oxyCODONE immediate release tablet 5 mg  5 mg Oral Q4H PRN Maren Harvey DNP        oxyCODONE immediate release tablet Tab 10 mg  10 mg Oral Q4H PRN Maren Harvey DNP   10 mg at 04/13/24 1733    predniSONE tablet 5 mg  5 mg Oral Daily Radha Melgar  MD SHOAIB   5 mg at 04/14/24 0841    simethicone chewable tablet 160 mg  2 tablet Oral QID (PC + HS) Padmini Mancilla PA-C        sodium bicarbonate tablet 1,300 mg  1,300 mg Oral BID Radha Melgar MD   1,300 mg at 04/14/24 0841    sodium chloride 0.9% flush 3 mL  3 mL Intravenous PRN Radha Melgar MD        tacrolimus capsule 1 mg  1 mg Oral Once Padmini Mancilla PA-C        tacrolimus capsule 2.5 mg  2.5 mg Oral BID Padmini Mancilla PA-C        valGANciclovir tablet 450 mg  450 mg Oral QAM Radha Melgar MD   450 mg at 04/14/24 0541     Continuous Infusions:  Current Facility-Administered Medications   Medication Dose Route Frequency Provider Last Rate Last Admin    acetaminophen tablet 650 mg  650 mg Oral Q6H PRN Radha Melgar MD   650 mg at 04/11/24 0503    albuterol inhaler 1 puff  1 puff Inhalation Q6H PRN Radha Melgar MD        aluminum-magnesium hydroxide-simethicone 200-200-20 mg/5 mL suspension 30 mL  30 mL Oral Q6H PRN Maren Harvey DNP        atorvastatin tablet 40 mg  40 mg Oral QHS Radha Melgar MD   40 mg at 04/13/24 2110    atovaquone 750 mg/5 mL oral liquid 1,500 mg  1,500 mg Oral Daily Emilee Ellsworth NP   1,500 mg at 04/14/24 0841    bisacodyL EC tablet 10 mg  10 mg Oral Daily Padmini Mancilla PA-C   10 mg at 04/14/24 0841    bisacodyL suppository 10 mg  10 mg Rectal Daily PRN Padmini Mancilla PA-C        calcium carbonate 200 mg calcium (500 mg) chewable tablet 500 mg  500 mg Oral TID PRN Maren Harvey DNP   500 mg at 04/11/24 1624    calcium gluconate 1 g in NS IVPB (premixed)  1 g Intravenous Q10 Min PRN Claudia Whyte MD        docusate sodium capsule 100 mg  100 mg Oral TID Maren Harvey DNP   100 mg at 04/14/24 0841    doxycycline tablet 100 mg  100 mg Oral Q12H Emilee Ellsworth NP   100 mg at 04/14/24 0841    famotidine tablet 20 mg  20 mg Oral QHS Radha Melgar MD   20 mg at 04/13/24 2110    fluticasone furoate-vilanteroL 100-25 mcg/dose  diskus inhaler 1 puff  1 puff Inhalation Daily Radha Melgar MD   1 puff at 04/14/24 0843    glycerin 99.5% topical solution 100 mL  100 mL Rectal ED 1 Time Padmini Mancilla PA-C        And    magnesium citrate solution 296 mL  296 mL Rectal ED 1 Time Padmini Mancilla PA-C        And    sodium chloride 0.9% bolus 500 mL 500 mL  500 mL Rectal ED 1 Time Padmini Mancilla PA-C        heparin (porcine) injection 5,000 Units  5,000 Units Subcutaneous Q8H Radha Melgar MD   5,000 Units at 04/14/24 0541    k phos di & mono-sod phos mono 250 mg tablet 2 tablet  500 mg Oral Daily Padmini Mancilla PA-C   2 tablet at 04/14/24 0841    levoFLOXacin tablet 750 mg  750 mg Oral Daily Emilee Ellsworth NP   750 mg at 04/13/24 2110    magnesium oxide tablet 400 mg  400 mg Oral BID Radha Melgar MD   400 mg at 04/14/24 0841    melatonin tablet 6 mg  6 mg Oral Nightly PRN Radha Melgar MD        ondansetron disintegrating tablet 8 mg  8 mg Oral Q6H PRN Radha Melgar MD   8 mg at 04/10/24 2343    oxyCODONE immediate release tablet 5 mg  5 mg Oral Q4H PRN Maren Harvey, LAKESHIA        oxyCODONE immediate release tablet Tab 10 mg  10 mg Oral Q4H PRN Maren Harvey DNP   10 mg at 04/13/24 1733    predniSONE tablet 5 mg  5 mg Oral Daily Radha Melgar MD   5 mg at 04/14/24 0841    simethicone chewable tablet 160 mg  2 tablet Oral QID (PC + HS) Padmini Mancilla PA-C        sodium bicarbonate tablet 1,300 mg  1,300 mg Oral BID Radha Melgar MD   1,300 mg at 04/14/24 0841    sodium chloride 0.9% flush 3 mL  3 mL Intravenous PRN Radha Melgar MD        tacrolimus capsule 1 mg  1 mg Oral Once Padmini Mancilla PA-C        tacrolimus capsule 2.5 mg  2.5 mg Oral BID Padmini Mancilla PA-C        valGANciclovir tablet 450 mg  450 mg Oral Radha Morgan MD   450 mg at 04/14/24 0541     PRN Meds:  Current Facility-Administered Medications   Medication Dose Route Frequency Provider Last  Rate Last Admin    acetaminophen tablet 650 mg  650 mg Oral Q6H PRN Radha Melgar MD   650 mg at 04/11/24 0503    albuterol inhaler 1 puff  1 puff Inhalation Q6H PRN Radha Melgar MD        aluminum-magnesium hydroxide-simethicone 200-200-20 mg/5 mL suspension 30 mL  30 mL Oral Q6H PRN Maren Harvey DNP        atorvastatin tablet 40 mg  40 mg Oral QHS Radha Melgar MD   40 mg at 04/13/24 2110    atovaquone 750 mg/5 mL oral liquid 1,500 mg  1,500 mg Oral Daily Emilee Ellsworth NP   1,500 mg at 04/14/24 0841    bisacodyL EC tablet 10 mg  10 mg Oral Daily Padmini Mancilla PA-C   10 mg at 04/14/24 0841    bisacodyL suppository 10 mg  10 mg Rectal Daily PRN Padmini Mancilla PA-C        calcium carbonate 200 mg calcium (500 mg) chewable tablet 500 mg  500 mg Oral TID PRN Maren Harvey DNP   500 mg at 04/11/24 1624    calcium gluconate 1 g in NS IVPB (premixed)  1 g Intravenous Q10 Min PRN Claudia Whyte MD        docusate sodium capsule 100 mg  100 mg Oral TID Maren Harvey DNP   100 mg at 04/14/24 0841    doxycycline tablet 100 mg  100 mg Oral Q12H Emilee Ellsworth NP   100 mg at 04/14/24 0841    famotidine tablet 20 mg  20 mg Oral QHS Radha Melgar MD   20 mg at 04/13/24 2110    fluticasone furoate-vilanteroL 100-25 mcg/dose diskus inhaler 1 puff  1 puff Inhalation Daily Radha Melgar MD   1 puff at 04/14/24 0843    glycerin 99.5% topical solution 100 mL  100 mL Rectal ED 1 Time Padmini Mancilla PA-C        And    magnesium citrate solution 296 mL  296 mL Rectal ED 1 Time Padmini Mancilla PA-C        And    sodium chloride 0.9% bolus 500 mL 500 mL  500 mL Rectal ED 1 Time Padmini Mancilla PA-C        heparin (porcine) injection 5,000 Units  5,000 Units Subcutaneous Q8H Radha Melgar MD   5,000 Units at 04/14/24 0541    k phos di & mono-sod phos mono 250 mg tablet 2 tablet  500 mg Oral Daily Padmini Mancilla PA-C   2 tablet at 04/14/24 0841    levoFLOXacin  tablet 750 mg  750 mg Oral Daily Emilee Ellsworth NP   750 mg at 04/13/24 2110    magnesium oxide tablet 400 mg  400 mg Oral BID Radha Melgar MD   400 mg at 04/14/24 0841    melatonin tablet 6 mg  6 mg Oral Nightly PRN Radha Melgar MD        ondansetron disintegrating tablet 8 mg  8 mg Oral Q6H PRN Radha Melgar MD   8 mg at 04/10/24 2343    oxyCODONE immediate release tablet 5 mg  5 mg Oral Q4H PRN Maren Harvey DNP        oxyCODONE immediate release tablet Tab 10 mg  10 mg Oral Q4H PRN Maren Harvey DNP   10 mg at 04/13/24 1733    predniSONE tablet 5 mg  5 mg Oral Daily Radha Melgar MD   5 mg at 04/14/24 0841    simethicone chewable tablet 160 mg  2 tablet Oral QID (PC + HS) Padmini Mancilla PA-C        sodium bicarbonate tablet 1,300 mg  1,300 mg Oral BID Radha Melgar MD   1,300 mg at 04/14/24 0841    sodium chloride 0.9% flush 3 mL  3 mL Intravenous PRN Radha Melgar MD        tacrolimus capsule 1 mg  1 mg Oral Once Padmini Mancilla PA-C        tacrolimus capsule 2.5 mg  2.5 mg Oral BID Padmini Mancilla PA-C        valGANciclovir tablet 450 mg  450 mg Oral QAM Radha Melgar MD   450 mg at 04/14/24 0541       Intake/Output - Last 3 Shifts         04/12 0700  04/13 0659 04/13 0700 04/14 0659 04/14 0700  04/15 0659    P.O. 320 240     I.V. (mL/kg) 332 (3.6)      IV Piggyback       Total Intake(mL/kg) 652 (7) 240 (2.6)     Urine (mL/kg/hr) 475 (0.2) 250 (0.1)     Stool  0     Total Output 475 250     Net +177 -10            Urine Occurrence 1 x 1 x     Stool Occurrence 0 x 0 x              Review of Systems   Constitutional:  Negative for chills and fever.   HENT:  Negative for congestion and trouble swallowing.    Respiratory:  Negative for cough and shortness of breath.    Cardiovascular:  Negative for chest pain and leg swelling.   Gastrointestinal:  Positive for abdominal distention, abdominal pain (incisional) and constipation. Negative for nausea and vomiting.  "  Genitourinary:  Negative for decreased urine volume and difficulty urinating.   Skin:  Positive for wound.   Allergic/Immunologic: Positive for immunocompromised state.   Neurological:  Negative for dizziness, tremors, weakness and headaches.   Psychiatric/Behavioral:  Negative for agitation, behavioral problems, confusion and decreased concentration.       Objective:     Vital Signs (Most Recent):  Temp: 98.2 °F (36.8 °C) (04/14/24 0745)  Pulse: 101 (04/14/24 0843)  Resp: 18 (04/14/24 0843)  BP: (!) 179/88 (04/14/24 0745)  SpO2: 96 % (04/14/24 0745) Vital Signs (24h Range):  Temp:  [96.3 °F (35.7 °C)-98.2 °F (36.8 °C)] 98.2 °F (36.8 °C)  Pulse:  [101-120] 101  Resp:  [18] 18  SpO2:  [94 %-96 %] 96 %  BP: (131-179)/() 179/88     Weight: 93.4 kg (205 lb 14.6 oz)  Height: 5' 7" (170.2 cm)  Body mass index is 32.25 kg/m².     Physical Exam  Vitals and nursing note reviewed.   Constitutional:       General: He is not in acute distress.     Appearance: Normal appearance. He is not ill-appearing.   HENT:      Head: Normocephalic.      Mouth/Throat:      Pharynx: Oropharynx is clear.   Eyes:      Conjunctiva/sclera: Conjunctivae normal.   Cardiovascular:      Rate and Rhythm: Normal rate.      Pulses: Normal pulses.   Pulmonary:      Effort: Pulmonary effort is normal.      Breath sounds: Normal breath sounds.   Abdominal:      General: Bowel sounds are decreased. There is distension.      Tenderness: There is abdominal tenderness. There is no guarding or rebound.      Comments: RLQ Kidney txp incision CDI with staples intact, small area of erythema at superior part of incision, possible pulling from abd swelling/bloating   Musculoskeletal:      Right lower leg: No edema.      Left lower leg: No edema.   Skin:     General: Skin is warm and dry.   Neurological:      Mental Status: He is alert and oriented to person, place, and time. Mental status is at baseline.   Psychiatric:         Mood and Affect: Mood normal.   "       Behavior: Behavior normal. Behavior is cooperative.         Thought Content: Thought content normal.          Laboratory:  CBC:   Recent Labs   Lab 04/12/24  0542 04/13/24  0621 04/14/24  0639   WBC 12.04 10.17 10.63   RBC 4.12* 3.96* 4.05*   HGB 11.3* 10.8* 10.8*   HCT 38.3* 36.3* 36.6*    220 199   MCV 93 92 90   MCH 27.4 27.3 26.7*   MCHC 29.5* 29.8* 29.5*     CMP:   Recent Labs   Lab 04/12/24  0542 04/12/24  1251 04/13/24  0621 04/14/24  0638   GLU 87  --  100 109   CALCIUM 9.1  --  9.4 9.0   ALBUMIN 2.8*  --  2.8* 2.8*     --  139 137   K 5.2* 4.9 4.5 4.6   CO2 20*  --  26 25     --  105 103   BUN 23  --  21 18   CREATININE 1.2  --  1.3 1.2     Labs within the past 24 hours have been reviewed.    Diagnostic Results:  Relevant imaging reviewed.

## 2024-04-14 NOTE — PLAN OF CARE
Patient is AAOx4. OOB independently. Tele ST/NSR, other VSS. Regular diet. BM x3 large post enema. RLQ incision site leaky, dressing in place, CDI. Bed lowest setting, locked, 2x rails up, call light within reach, demonstrated use of call light.

## 2024-04-14 NOTE — PROGRESS NOTES
[facility]  Kidney Transplant  Progress Note      Reason for Follow-up: Reassessment of Kidney Transplant - 3/11/2024  (#1) recipient and management of immunosuppression.    ORGAN: LEFT KIDNEY      Donor Type: Living      Donor CMV Status:    Donor HBcAB:   Donor HCV Status:   Donor HBV ANA:   Donor HCV ANA:       Subjective:   History of Present Illness:  Mr. Colten Monet is a 65 yo male w/ PMHx of ESRD d/t IgA nephropathy, now S/P living related Ktxp on 3/11/24. Patient progressed well during initial post-txp course and discharged POD#2 with downtrending Cr. Now with BL Cr 1.0-1.1.     Patient recently seen in transplant surgery clinic on 4/3 and noted to have serous drainage from RLQ kidney txp incision along with palpable erythema. Staples intact w/ minimal tenderness to deep palpation at that time. Incision probed w/ 200 cc serous fluid drained in clinic by surgical fellow. Patient was placed on PO for cellulitis. He now presents to ED for worsening erythema to incisional area (see media for photo). Staples remain intact, erythema noted to entire length of incision, worse in upper portion. Small 1-2 cm area of yellow slough in upper portion incision. Minimal serous drainage expressed upon probing. Patient reports mild tenderness to deep palpation. Kidney US in ED w/ large 14.8 x 7.4 x 1.3 cm, just posterior to the anterior pelvic wall overlying the transplant kidney. Reviewed plan with Dr. Eduardo and Dr. Whyte. CT A/P non-con ordered along with IV abx. Staples removed at bedside, small 1-2 cm area of wound dehiscence noted, moderate amount serous fluid able to be expressed from incision. Mild HARRISON on admit (Cr 1.5). NS bolus ordered.         Mr. Monet is a 64 y.o. year old male who is status post Kidney Transplant - 3/11/2024  (#1).  His maintenance immunosuppression consists of:   Immunosuppressants (From admission, onward)      Start     Stop Route Frequency Ordered    04/14/24 1800   tacrolimus capsule 2.5 mg         -- Oral 2 times daily 04/14/24 1007    04/14/24 1100  tacrolimus capsule 1 mg         -- Oral Once 04/14/24 1007            Hospital Course:  Patient admitted for worsening cellulitis of kidney transplant incision, incisional hernia, and HARRISON. Cefepime and vanc started, infectious w/u grossly negative.  CT abd/pelvis 4/8/24 revealed incisional hernia containing distal ileum, associated small bowel feces sign, suggesting intestinal stasis, but without CT evidence of high-grade intestinal obstruction. Reviewed with surgeon, decision made for patient to go to OR 4/9/24 for ex lap   TB 4/9/24 for repair of dehiscence of kidney transplant incision and hernia. Surgery without issues.   HARRISON: as seen on admit, worsened post OR with associated hyperkalemia. Required several interventions (shifting, lasix, IVF). Renal function began to recover, K stabilized back WNL.   Ileus: patient with constipation. KUB 4/10 revealed with findings of ileus, KUB 4/11 similar. Patient made NPO 4/11 for bowel rest.   IV abx transitioned to PO levaquin and doxy x 5 days (EOT 4/15/24).    Interval History:   NAEON. Patient feeling ok this morning, continues to pass flatus though no BM yet. Endorses intermittent reflux symptoms. Denies any nausea or vomiting. Will try another enema today. Advancing diet, reminded patient to take it slow. Cr stable 1.2. Class II DSA detected ([id number](2137)). Continues with good urine output.  VSS. Encourage ambulation. Cont to monitor.       Past Medical, Surgical, Family, and Social History:   Unchanged from H&P.    Scheduled Meds:  Current Facility-Administered Medications   Medication Dose Route Frequency Provider Last Rate Last Admin    acetaminophen tablet 650 mg  650 mg Oral Q6H PRN Radha Melgar MD   650 mg at 04/11/24 0503    albuterol inhaler 1 puff  1 puff Inhalation Q6H PRN Radha eMlgar MD        aluminum-magnesium hydroxide-simethicone 200-200-20 mg/5 mL suspension 30  mL  30 mL Oral Q6H PRN Maren Harvey DNP        atorvastatin tablet 40 mg  40 mg Oral QHS Radha Melgar MD   40 mg at 04/13/24 2110    atovaquone 750 mg/5 mL oral liquid 1,500 mg  1,500 mg Oral Daily Emilee Ellsworth NP   1,500 mg at 04/14/24 0841    bisacodyL EC tablet 10 mg  10 mg Oral Daily Padmini Mancilla PA-C   10 mg at 04/14/24 0841    bisacodyL suppository 10 mg  10 mg Rectal Daily PRN Padmini Mancilla PA-C        calcium carbonate 200 mg calcium (500 mg) chewable tablet 500 mg  500 mg Oral TID PRN Maren Harvey DNP   500 mg at 04/11/24 1624    calcium gluconate 1 g in NS IVPB (premixed)  1 g Intravenous Q10 Min PRN Claudia Whyte MD        docusate sodium capsule 100 mg  100 mg Oral TID Maren Harvey DNP   100 mg at 04/14/24 0841    doxycycline tablet 100 mg  100 mg Oral Q12H Emilee Ellsworth NP   100 mg at 04/14/24 0841    famotidine tablet 20 mg  20 mg Oral QHS Radha Melgar MD   20 mg at 04/13/24 2110    fluticasone furoate-vilanteroL 100-25 mcg/dose diskus inhaler 1 puff  1 puff Inhalation Daily Radha Melgar MD   1 puff at 04/14/24 0843    glycerin 99.5% topical solution 100 mL  100 mL Rectal ED 1 Time Padmini Mancilla PA-C        And    magnesium citrate solution 296 mL  296 mL Rectal ED 1 Time Padmini Mancilla PA-C        And    sodium chloride 0.9% bolus 500 mL 500 mL  500 mL Rectal ED 1 Time Padmini Mancilla PA-C        heparin (porcine) injection 5,000 Units  5,000 Units Subcutaneous Q8H Radha Melgar MD   5,000 Units at 04/14/24 0541    k phos di & mono-sod phos mono 250 mg tablet 2 tablet  500 mg Oral Daily Padmini Mancilla PA-C   2 tablet at 04/14/24 0841    levoFLOXacin tablet 750 mg  750 mg Oral Daily Emilee Ellsworth, NP   750 mg at 04/13/24 2110    magnesium oxide tablet 400 mg  400 mg Oral BID Radha Melgar MD   400 mg at 04/14/24 0841    melatonin tablet 6 mg  6 mg Oral Nightly PRN Radha Melgar MD        ondansetron  disintegrating tablet 8 mg  8 mg Oral Q6H PRN Radha Melgar MD   8 mg at 04/10/24 2343    oxyCODONE immediate release tablet 5 mg  5 mg Oral Q4H PRN Maren Harvey DNP        oxyCODONE immediate release tablet Tab 10 mg  10 mg Oral Q4H PRN Maren Harvey DNP   10 mg at 04/13/24 1733    predniSONE tablet 5 mg  5 mg Oral Daily Radha Melgar MD   5 mg at 04/14/24 0841    simethicone chewable tablet 160 mg  2 tablet Oral QID (PC + HS) Padmini Mancilla PA-C        sodium bicarbonate tablet 1,300 mg  1,300 mg Oral BID Radha Melgar MD   1,300 mg at 04/14/24 0841    sodium chloride 0.9% flush 3 mL  3 mL Intravenous PRN Radha Melgar MD        tacrolimus capsule 1 mg  1 mg Oral Once Padmini Mancilla PA-C        tacrolimus capsule 2.5 mg  2.5 mg Oral BID Padmini Mancilla PA-C        valGANciclovir tablet 450 mg  450 mg Oral QAM Radha Melgar MD   450 mg at 04/14/24 0541     Continuous Infusions:  Current Facility-Administered Medications   Medication Dose Route Frequency Provider Last Rate Last Admin    acetaminophen tablet 650 mg  650 mg Oral Q6H PRN Radha Melgar MD   650 mg at 04/11/24 0503    albuterol inhaler 1 puff  1 puff Inhalation Q6H PRN Radha Melgar MD        aluminum-magnesium hydroxide-simethicone 200-200-20 mg/5 mL suspension 30 mL  30 mL Oral Q6H PRN Maren Harvey DNP        atorvastatin tablet 40 mg  40 mg Oral QHS Radha Melgar MD   40 mg at 04/13/24 2110    atovaquone 750 mg/5 mL oral liquid 1,500 mg  1,500 mg Oral Daily Emilee Ellsworth NP   1,500 mg at 04/14/24 0841    bisacodyL EC tablet 10 mg  10 mg Oral Daily Padmini Mancilla PA-C   10 mg at 04/14/24 0841    bisacodyL suppository 10 mg  10 mg Rectal Daily PRN Padmini Mancilla PA-C        calcium carbonate 200 mg calcium (500 mg) chewable tablet 500 mg  500 mg Oral TID PRN Maren Harvey DNP   500 mg at 04/11/24 1624    calcium gluconate 1 g in NS IVPB (premixed)  1 g Intravenous Q10 Min PRN Isabell  Claudia CASILLAS MD        docusate sodium capsule 100 mg  100 mg Oral TID Maren Harvey DNP   100 mg at 04/14/24 0841    doxycycline tablet 100 mg  100 mg Oral Q12H Emilee Ellsworth NP   100 mg at 04/14/24 0841    famotidine tablet 20 mg  20 mg Oral QHS Radha Melgar MD   20 mg at 04/13/24 2110    fluticasone furoate-vilanteroL 100-25 mcg/dose diskus inhaler 1 puff  1 puff Inhalation Daily Radha Melgar MD   1 puff at 04/14/24 0843    glycerin 99.5% topical solution 100 mL  100 mL Rectal ED 1 Time Padmini Mancilla PA-C        And    magnesium citrate solution 296 mL  296 mL Rectal ED 1 Time Padmini Mancilla PA-C        And    sodium chloride 0.9% bolus 500 mL 500 mL  500 mL Rectal ED 1 Time Padmini Mancilla PA-C        heparin (porcine) injection 5,000 Units  5,000 Units Subcutaneous Q8H Radha Melgar MD   5,000 Units at 04/14/24 0541    k phos di & mono-sod phos mono 250 mg tablet 2 tablet  500 mg Oral Daily Padmini Mancilla PA-C   2 tablet at 04/14/24 0841    levoFLOXacin tablet 750 mg  750 mg Oral Daily Emilee Ellsworth NP   750 mg at 04/13/24 2110    magnesium oxide tablet 400 mg  400 mg Oral BID Radha Melgar MD   400 mg at 04/14/24 0841    melatonin tablet 6 mg  6 mg Oral Nightly PRN Radha Melgar MD        ondansetron disintegrating tablet 8 mg  8 mg Oral Q6H PRN Radha Melgar MD   8 mg at 04/10/24 2343    oxyCODONE immediate release tablet 5 mg  5 mg Oral Q4H PRN Maren Harvey DNP        oxyCODONE immediate release tablet Tab 10 mg  10 mg Oral Q4H PRN Maren Harvey DNP   10 mg at 04/13/24 1733    predniSONE tablet 5 mg  5 mg Oral Daily Radha Melgar MD   5 mg at 04/14/24 0841    simethicone chewable tablet 160 mg  2 tablet Oral QID (PC + HS) Padmini Mancilla PA-C        sodium bicarbonate tablet 1,300 mg  1,300 mg Oral BID Radha Melgar MD   1,300 mg at 04/14/24 0841    sodium chloride 0.9% flush 3 mL  3 mL Intravenous PRN Radha Melgar MD        tacrolimus  capsule 1 mg  1 mg Oral Once Padimni Mancilla PA-C        tacrolimus capsule 2.5 mg  2.5 mg Oral BID Padmini Mancilla PA-C        valGANciclovir tablet 450 mg  450 mg Oral QAM Radha Melgar MD   450 mg at 04/14/24 0541     PRN Meds:  Current Facility-Administered Medications   Medication Dose Route Frequency Provider Last Rate Last Admin    acetaminophen tablet 650 mg  650 mg Oral Q6H PRN Radha Melgar MD   650 mg at 04/11/24 0503    albuterol inhaler 1 puff  1 puff Inhalation Q6H PRN Radha Melgar MD        aluminum-magnesium hydroxide-simethicone 200-200-20 mg/5 mL suspension 30 mL  30 mL Oral Q6H PRN Maren Harvey DNP        atorvastatin tablet 40 mg  40 mg Oral QHS Radha Melgar MD   40 mg at 04/13/24 2110    atovaquone 750 mg/5 mL oral liquid 1,500 mg  1,500 mg Oral Daily Emilee Ellsworth NP   1,500 mg at 04/14/24 0841    bisacodyL EC tablet 10 mg  10 mg Oral Daily Padmini Mancilla PA-C   10 mg at 04/14/24 0841    bisacodyL suppository 10 mg  10 mg Rectal Daily PRN Padmini Mancilla PA-C        calcium carbonate 200 mg calcium (500 mg) chewable tablet 500 mg  500 mg Oral TID PRN Maren Harvey DNP   500 mg at 04/11/24 1624    calcium gluconate 1 g in NS IVPB (premixed)  1 g Intravenous Q10 Min PRN Claudia Whyte MD        docusate sodium capsule 100 mg  100 mg Oral TID Maren Harvey DNP   100 mg at 04/14/24 0841    doxycycline tablet 100 mg  100 mg Oral Q12H Emilee Ellsworth NP   100 mg at 04/14/24 0841    famotidine tablet 20 mg  20 mg Oral QHS Radha Melgar MD   20 mg at 04/13/24 2110    fluticasone furoate-vilanteroL 100-25 mcg/dose diskus inhaler 1 puff  1 puff Inhalation Daily Radha Melgar MD   1 puff at 04/14/24 0843    glycerin 99.5% topical solution 100 mL  100 mL Rectal ED 1 Time Padmini Mancilla PA-C        And    magnesium citrate solution 296 mL  296 mL Rectal ED 1 Time Padmini Mancilla PA-C        And    sodium chloride 0.9% bolus 500  mL 500 mL  500 mL Rectal ED 1 Time Padmini Mancilla PA-C        heparin (porcine) injection 5,000 Units  5,000 Units Subcutaneous Q8H Radha Melgar MD   5,000 Units at 04/14/24 0541    k phos di & mono-sod phos mono 250 mg tablet 2 tablet  500 mg Oral Daily Padmini Mancilla PA-C   2 tablet at 04/14/24 0841    levoFLOXacin tablet 750 mg  750 mg Oral Daily Emilee Ellsworth NP   750 mg at 04/13/24 2110    magnesium oxide tablet 400 mg  400 mg Oral BID Radha Melgar MD   400 mg at 04/14/24 0841    melatonin tablet 6 mg  6 mg Oral Nightly PRN Radha Melgar MD        ondansetron disintegrating tablet 8 mg  8 mg Oral Q6H PRN Radha Melgar MD   8 mg at 04/10/24 2343    oxyCODONE immediate release tablet 5 mg  5 mg Oral Q4H PRN Maren Harvey DNP        oxyCODONE immediate release tablet Tab 10 mg  10 mg Oral Q4H PRN Maren Harvey DNP   10 mg at 04/13/24 1733    predniSONE tablet 5 mg  5 mg Oral Daily Radha Melgar MD   5 mg at 04/14/24 0841    simethicone chewable tablet 160 mg  2 tablet Oral QID (PC + HS) Padmini Mancilla PA-C        sodium bicarbonate tablet 1,300 mg  1,300 mg Oral BID Radha Melgar MD   1,300 mg at 04/14/24 0841    sodium chloride 0.9% flush 3 mL  3 mL Intravenous PRN Radha Melgar MD        tacrolimus capsule 1 mg  1 mg Oral Once Padmini Mancilla PA-C        tacrolimus capsule 2.5 mg  2.5 mg Oral BID Padmini Mancilla PA-C        valGANciclovir tablet 450 mg  450 mg Oral QAM Radha Melgar MD   450 mg at 04/14/24 0541       Intake/Output - Last 3 Shifts         04/12 0700  04/13 0659 04/13 0700 04/14 0659 04/14 0700  04/15 0659    P.O. 320 240     I.V. (mL/kg) 332 (3.6)      IV Piggyback       Total Intake(mL/kg) 652 (7) 240 (2.6)     Urine (mL/kg/hr) 475 (0.2) 250 (0.1)     Stool  0     Total Output 475 250     Net +177 -10            Urine Occurrence 1 x 1 x     Stool Occurrence 0 x 0 x              Review of Systems   Constitutional:  Negative for  "chills and fever.   HENT:  Negative for congestion and trouble swallowing.    Respiratory:  Negative for cough and shortness of breath.    Cardiovascular:  Negative for chest pain and leg swelling.   Gastrointestinal:  Positive for abdominal distention, abdominal pain (incisional) and constipation. Negative for nausea and vomiting.   Genitourinary:  Negative for decreased urine volume and difficulty urinating.   Skin:  Positive for wound.   Allergic/Immunologic: Positive for immunocompromised state.   Neurological:  Negative for dizziness, tremors, weakness and headaches.   Psychiatric/Behavioral:  Negative for agitation, behavioral problems, confusion and decreased concentration.       Objective:     Vital Signs (Most Recent):  Temp: 98.2 °F (36.8 °C) (04/14/24 0745)  Pulse: 101 (04/14/24 0843)  Resp: 18 (04/14/24 0843)  BP: (!) 179/88 (04/14/24 0745)  SpO2: 96 % (04/14/24 0745) Vital Signs (24h Range):  Temp:  [96.3 °F (35.7 °C)-98.2 °F (36.8 °C)] 98.2 °F (36.8 °C)  Pulse:  [101-120] 101  Resp:  [18] 18  SpO2:  [94 %-96 %] 96 %  BP: (131-179)/() 179/88     Weight: 93.4 kg (205 lb 14.6 oz)  Height: 5' 7" (170.2 cm)  Body mass index is 32.25 kg/m².     Physical Exam  Vitals and nursing note reviewed.   Constitutional:       General: He is not in acute distress.     Appearance: Normal appearance. He is not ill-appearing.   HENT:      Head: Normocephalic.      Mouth/Throat:      Pharynx: Oropharynx is clear.   Eyes:      Conjunctiva/sclera: Conjunctivae normal.   Cardiovascular:      Rate and Rhythm: Normal rate.      Pulses: Normal pulses.   Pulmonary:      Effort: Pulmonary effort is normal.      Breath sounds: Normal breath sounds.   Abdominal:      General: Bowel sounds are decreased. There is distension.      Tenderness: There is abdominal tenderness. There is no guarding or rebound.      Comments: RLQ Kidney txp incision CDI with staples intact, small area of erythema at superior part of incision, possible " pulling from abd swelling/bloating   Musculoskeletal:      Right lower leg: No edema.      Left lower leg: No edema.   Skin:     General: Skin is warm and dry.   Neurological:      Mental Status: He is alert and oriented to person, place, and time. Mental status is at baseline.   Psychiatric:         Mood and Affect: Mood normal.         Behavior: Behavior normal. Behavior is cooperative.         Thought Content: Thought content normal.          Laboratory:  CBC:   Recent Labs   Lab 04/12/24  0542 04/13/24  0621 04/14/24  0639   WBC 12.04 10.17 10.63   RBC 4.12* 3.96* 4.05*   HGB 11.3* 10.8* 10.8*   HCT 38.3* 36.3* 36.6*    220 199   MCV 93 92 90   MCH 27.4 27.3 26.7*   MCHC 29.5* 29.8* 29.5*     CMP:   Recent Labs   Lab 04/12/24  0542 04/12/24  1251 04/13/24  0621 04/14/24  0638   GLU 87  --  100 109   CALCIUM 9.1  --  9.4 9.0   ALBUMIN 2.8*  --  2.8* 2.8*     --  139 137   K 5.2* 4.9 4.5 4.6   CO2 20*  --  26 25     --  105 103   BUN 23  --  21 18   CREATININE 1.2  --  1.3 1.2     Labs within the past 24 hours have been reviewed.    Diagnostic Results:  Relevant imaging reviewed.   Assessment/Plan:     * Surgical wound infection  - Pt with delayed surgical wound healing, seen in clinic 4/3 with 200 mL serous fluid expressed from incision w/ staples intact. Started on PO doxycycline at that time  - Wound remains erythematous with swelling and mild tenderness. No improvement with PO Doxy  - Kidney US 4/8 w/ large 14.8 x 7.4 x 1.3 cm, just posterior to the anterior pelvic wall overlying the transplant kidney  - Staples removed 4/8, small area dehiscence noted with moderate serous fluid expressed  - CT abd/pelvis 4/8 with RLQ transplant kidney with incisional hernia containing distal ileum. The large fluid collection interposed between the kidney and anterior abdominal wall drained at patient's bedside. Mild to moderate soft tissue stranding and trace fluid directly adjacent to the transplant  "kidney. CT reviewed by surgeon. Patient made NPO for surgical intervention for 4/9.   - Patient taken back to OR (4/9)  for dehiscence of kidney transplant incision and hernia repair.   - Infectious work up negative thus far.   - Cefepime and vanc dc'd and levaquin and doxy x5 days. (EOT 4/15)  - cont to monitor   - incision with some erythema around superior end, no purulence expressed. Likely 2/2 pulling from abd distension, cont to monitor     Ileus  - KUB 4/10 with findings of ileus.    - patient reports feeling bloated and "full".  - Patient has not had a  BM since OR take back on 4/9.   - Denies any nausea or vomiting.   - acid reflux and belching improving but still intermittent   - NPO for bowel rest starting 4/11, diet advanced to CLD 4/13 and regular 4/14   - enema 4/14   - repeat KUB 4/11 with impression of "Adynamic ileus proximal mid small bowel for favored. The possibility of partial obstructive distal small bowel abnormality would be considered and clinical correlation requested."  - Encourage ambulation.   - Cont to monitor.       Hyperkalemia  Post op from OR take back on 4/9, patient with hyperkalemia needing interventions (shifting, IV lasix, and IVFs)  - K stable at 4.5 this morning  - monitor with renal panel prn    Encounter for post surgical wound check        Anemia of chronic disease  - monitor daily CBC  - stable      HARRISON (acute kidney injury)  - expect due to dehydration,  IVFs hydration ordered on admit  - Patient taken back to OR 4/9 for dehiscence of kidney transplant incision and hernia repair.  - Cr stable at 1.3  - minimal + DSA   - good UOP  - IVF dc'd, diet advanced to clear liquids, pt tolerating well      Hypomagnesemia  - Continue home PO mag  - IV Mag as needed      History of COPD  - Resume home neb      Dyslipidemia  - continue home statin      Prophylactic immunotherapy  - see "long term use of immunosuppression"      At risk for opportunistic infections  - OI prophylaxis " per transplant protocol      Long-term use of immunosuppressant medication  - Continue prograf and prednisone  - Check prograf level daily and adjust for therapeutic dosage. Monitor for toxic side effects   -  cellcept held on admit d/t infection      S/P living-donor kidney transplantation  - S/p Ktxp 3/11/24 d/t IgA nephropathy. Progressed well post-txp with Cr downtrending  - BL Cr 1.1-1.2  - strict Is and Os   - encourage hydration           Discharge Planning:  Not yet stable for dc     Medical decision making for this encounter includes review of pertinent labs and diagnostic studies, assessment and planning, discussions with consulting providers, discussion with patient/family, and participation in multidisciplinary rounds. Time spent caring for patient: 30 minutes    Padmini Mancilla PA-C  Kidney Transplant  [facility] - [facility]

## 2024-04-14 NOTE — PLAN OF CARE
Pt AAOx4. Afebrile. Caregiver at the bedside, no report of a fall this shift. No BM overnight, pt reporting passing gas. Bed in the lowest setting, call light within reach

## 2024-04-15 PROBLEM — E87.5 HYPERKALEMIA: Status: RESOLVED | Noted: 2024-04-10 | Resolved: 2024-04-15

## 2024-04-15 PROBLEM — N17.9 AKI (ACUTE KIDNEY INJURY): Status: RESOLVED | Noted: 2024-04-08 | Resolved: 2024-04-15

## 2024-04-15 PROBLEM — Z87.19 HISTORY OF INCISIONAL HERNIA REPAIR: Status: ACTIVE | Noted: 2024-04-15

## 2024-04-15 PROBLEM — Z98.890 HISTORY OF INCISIONAL HERNIA REPAIR: Status: ACTIVE | Noted: 2024-04-15

## 2024-04-15 PROBLEM — Z94.9 INCISIONAL HERNIA FOLLOWING TRANSPLANT: Status: ACTIVE | Noted: 2024-04-15

## 2024-04-15 PROBLEM — R00.0 TACHYCARDIA: Status: ACTIVE | Noted: 2024-04-15

## 2024-04-15 PROBLEM — K43.2 INCISIONAL HERNIA FOLLOWING TRANSPLANT: Status: ACTIVE | Noted: 2024-04-15

## 2024-04-15 LAB
ALBUMIN SERPL BCP-MCNC: 2.9 G/DL (ref 3.5–5.2)
ANION GAP SERPL CALC-SCNC: 11 MMOL/L (ref 8–16)
BASOPHILS # BLD AUTO: 0.05 K/UL (ref 0–0.2)
BASOPHILS NFR BLD: 0.5 % (ref 0–1.9)
BUN SERPL-MCNC: 23 MG/DL (ref 8–23)
CALCIUM SERPL-MCNC: 9.4 MG/DL (ref 8.7–10.5)
CHLORIDE SERPL-SCNC: 102 MMOL/L (ref 95–110)
CO2 SERPL-SCNC: 25 MMOL/L (ref 23–29)
CREAT SERPL-MCNC: 1.4 MG/DL (ref 0.5–1.4)
DIFFERENTIAL METHOD BLD: ABNORMAL
EOSINOPHIL # BLD AUTO: 0.1 K/UL (ref 0–0.5)
EOSINOPHIL NFR BLD: 0.5 % (ref 0–8)
ERYTHROCYTE [DISTWIDTH] IN BLOOD BY AUTOMATED COUNT: 13.5 % (ref 11.5–14.5)
EST. GFR  (NO RACE VARIABLE): 55.8 ML/MIN/1.73 M^2
GLUCOSE SERPL-MCNC: 121 MG/DL (ref 70–110)
HCT VFR BLD AUTO: 37.1 % (ref 40–54)
HGB BLD-MCNC: 11.3 G/DL (ref 14–18)
IMM GRANULOCYTES # BLD AUTO: 0.12 K/UL (ref 0–0.04)
IMM GRANULOCYTES NFR BLD AUTO: 1.3 % (ref 0–0.5)
LYMPHOCYTES # BLD AUTO: 0.6 K/UL (ref 1–4.8)
LYMPHOCYTES NFR BLD: 6.5 % (ref 18–48)
MAGNESIUM SERPL-MCNC: 1.8 MG/DL (ref 1.6–2.6)
MCH RBC QN AUTO: 27.2 PG (ref 27–31)
MCHC RBC AUTO-ENTMCNC: 30.5 G/DL (ref 32–36)
MCV RBC AUTO: 89 FL (ref 82–98)
MONOCYTES # BLD AUTO: 0.8 K/UL (ref 0.3–1)
MONOCYTES NFR BLD: 8.4 % (ref 4–15)
NEUTROPHILS # BLD AUTO: 7.8 K/UL (ref 1.8–7.7)
NEUTROPHILS NFR BLD: 82.8 % (ref 38–73)
NRBC BLD-RTO: 0 /100 WBC
OHS QRS DURATION: 74 MS
OHS QTC CALCULATION: 468 MS
PHOSPHATE SERPL-MCNC: 3.7 MG/DL (ref 2.7–4.5)
PLATELET # BLD AUTO: 218 K/UL (ref 150–450)
PMV BLD AUTO: 10.1 FL (ref 9.2–12.9)
POTASSIUM SERPL-SCNC: 4.2 MMOL/L (ref 3.5–5.1)
RBC # BLD AUTO: 4.16 M/UL (ref 4.6–6.2)
SODIUM SERPL-SCNC: 138 MMOL/L (ref 136–145)
TACROLIMUS BLD-MCNC: 6.5 NG/ML (ref 5–15)
WBC # BLD AUTO: 9.47 K/UL (ref 3.9–12.7)

## 2024-04-15 PROCEDURE — 63600175 PHARM REV CODE 636 W HCPCS

## 2024-04-15 PROCEDURE — 27000207 HC ISOLATION

## 2024-04-15 PROCEDURE — 25000003 PHARM REV CODE 250: Performed by: INTERNAL MEDICINE

## 2024-04-15 PROCEDURE — 25000003 PHARM REV CODE 250: Performed by: NURSE PRACTITIONER

## 2024-04-15 PROCEDURE — 85025 COMPLETE CBC W/AUTO DIFF WBC: CPT

## 2024-04-15 PROCEDURE — 80197 ASSAY OF TACROLIMUS: CPT

## 2024-04-15 PROCEDURE — 25000003 PHARM REV CODE 250

## 2024-04-15 PROCEDURE — 83735 ASSAY OF MAGNESIUM: CPT

## 2024-04-15 PROCEDURE — 25000003 PHARM REV CODE 250: Performed by: CLINICAL NURSE SPECIALIST

## 2024-04-15 PROCEDURE — 80069 RENAL FUNCTION PANEL: CPT

## 2024-04-15 PROCEDURE — 93010 ELECTROCARDIOGRAM REPORT: CPT | Mod: ,,, | Performed by: INTERNAL MEDICINE

## 2024-04-15 PROCEDURE — 93005 ELECTROCARDIOGRAM TRACING: CPT

## 2024-04-15 PROCEDURE — 20600001 HC STEP DOWN PRIVATE ROOM

## 2024-04-15 PROCEDURE — 36415 COLL VENOUS BLD VENIPUNCTURE: CPT

## 2024-04-15 PROCEDURE — 94640 AIRWAY INHALATION TREATMENT: CPT

## 2024-04-15 PROCEDURE — 99233 SBSQ HOSP IP/OBS HIGH 50: CPT | Mod: ,,, | Performed by: CLINICAL NURSE SPECIALIST

## 2024-04-15 RX ORDER — POLYETHYLENE GLYCOL 3350 17 G/17G
17 POWDER, FOR SOLUTION ORAL DAILY
Status: DISCONTINUED | OUTPATIENT
Start: 2024-04-15 | End: 2024-04-25 | Stop reason: HOSPADM

## 2024-04-15 RX ORDER — CARVEDILOL 12.5 MG/1
12.5 TABLET ORAL 2 TIMES DAILY
Status: DISCONTINUED | OUTPATIENT
Start: 2024-04-15 | End: 2024-04-24

## 2024-04-15 RX ADMIN — TACROLIMUS 2.5 MG: 1 CAPSULE ORAL at 05:04

## 2024-04-15 RX ADMIN — DOCUSATE SODIUM 100 MG: 100 CAPSULE, LIQUID FILLED ORAL at 09:04

## 2024-04-15 RX ADMIN — DOXYCYCLINE HYCLATE 100 MG: 100 TABLET, COATED ORAL at 08:04

## 2024-04-15 RX ADMIN — DOCUSATE SODIUM 100 MG: 100 CAPSULE, LIQUID FILLED ORAL at 02:04

## 2024-04-15 RX ADMIN — POLYETHYLENE GLYCOL 3350 17 G: 17 POWDER, FOR SOLUTION ORAL at 02:04

## 2024-04-15 RX ADMIN — ONDANSETRON 8 MG: 8 TABLET, ORALLY DISINTEGRATING ORAL at 05:04

## 2024-04-15 RX ADMIN — DIBASIC SODIUM PHOSPHATE, MONOBASIC POTASSIUM PHOSPHATE AND MONOBASIC SODIUM PHOSPHATE 2 TABLET: 852; 155; 130 TABLET ORAL at 08:04

## 2024-04-15 RX ADMIN — PREDNISONE 5 MG: 5 TABLET ORAL at 08:04

## 2024-04-15 RX ADMIN — LEVOFLOXACIN 750 MG: 250 TABLET, FILM COATED ORAL at 09:04

## 2024-04-15 RX ADMIN — VALGANCICLOVIR 900 MG: 450 TABLET, FILM COATED ORAL at 08:04

## 2024-04-15 RX ADMIN — SIMETHICONE 160 MG: 80 TABLET, CHEWABLE ORAL at 09:04

## 2024-04-15 RX ADMIN — DOXYCYCLINE HYCLATE 100 MG: 100 TABLET, COATED ORAL at 09:04

## 2024-04-15 RX ADMIN — SIMETHICONE 160 MG: 80 TABLET, CHEWABLE ORAL at 02:04

## 2024-04-15 RX ADMIN — BISACODYL 10 MG: 5 TABLET, COATED ORAL at 08:04

## 2024-04-15 RX ADMIN — HEPARIN SODIUM 5000 UNITS: 5000 INJECTION INTRAVENOUS; SUBCUTANEOUS at 09:04

## 2024-04-15 RX ADMIN — CARVEDILOL 6.25 MG: 6.25 TABLET, FILM COATED ORAL at 08:04

## 2024-04-15 RX ADMIN — FLUTICASONE FUROATE AND VILANTEROL TRIFENATATE 1 PUFF: 100; 25 POWDER RESPIRATORY (INHALATION) at 09:04

## 2024-04-15 RX ADMIN — ATOVAQUONE 1500 MG: 750 SUSPENSION ORAL at 08:04

## 2024-04-15 RX ADMIN — Medication 800 MG: at 02:04

## 2024-04-15 RX ADMIN — HEPARIN SODIUM 5000 UNITS: 5000 INJECTION INTRAVENOUS; SUBCUTANEOUS at 02:04

## 2024-04-15 RX ADMIN — CARVEDILOL 12.5 MG: 12.5 TABLET, FILM COATED ORAL at 09:04

## 2024-04-15 RX ADMIN — SIMETHICONE 160 MG: 80 TABLET, CHEWABLE ORAL at 06:04

## 2024-04-15 RX ADMIN — TACROLIMUS 2.5 MG: 1 CAPSULE ORAL at 08:04

## 2024-04-15 RX ADMIN — HEPARIN SODIUM 5000 UNITS: 5000 INJECTION INTRAVENOUS; SUBCUTANEOUS at 05:04

## 2024-04-15 RX ADMIN — ATORVASTATIN CALCIUM 40 MG: 20 TABLET, FILM COATED ORAL at 09:04

## 2024-04-15 RX ADMIN — FAMOTIDINE 20 MG: 20 TABLET ORAL at 09:04

## 2024-04-15 RX ADMIN — DOCUSATE SODIUM 100 MG: 100 CAPSULE, LIQUID FILLED ORAL at 08:04

## 2024-04-15 RX ADMIN — SIMETHICONE 160 MG: 80 TABLET, CHEWABLE ORAL at 08:04

## 2024-04-15 NOTE — PROGRESS NOTES
[facility] - [facility]plant Step[facility]  Kidney Transplant  Progress Note      Reason for Follow-up: Reassessment of Kidney Transplant - 3/11/2024  (#1) recipient and management of immunosuppression.    ORGAN: LEFT KIDNEY      Donor Type: Living      Donor CMV Status:    Donor HBcAB:   Donor HCV Status:   Donor HBV ANA:   Donor HCV ANA:       Subjective:   History of Present Illness:  Mr. Colten Monet is a 63 yo male w/ PMHx of ESRD d/t IgA nephropathy, now S/P living related Ktxp on 3/11/24. Patient progressed well during initial post-txp course and discharged POD#2 with downtrending Cr. Now with BL Cr 1.0-1.1.     Patient recently seen in transplant surgery clinic on 4/3 and noted to have serous drainage from RLQ kidney txp incision along with palpable erythema. Staples intact w/ minimal tenderness to deep palpation at that time. Incision probed w/ 200 cc serous fluid drained in clinic by surgical fellow. Patient was placed on PO for cellulitis. He now presents to ED for worsening erythema to incisional area (see media for photo). Staples remain intact, erythema noted to entire length of incision, worse in upper portion. Small 1-2 cm area of yellow slough in upper portion incision. Minimal serous drainage expressed upon probing. Patient reports mild tenderness to deep palpation. Kidney US in ED w/ large 14.8 x 7.4 x 1.3 cm, just posterior to the anterior pelvic wall overlying the transplant kidney. Reviewed plan with Dr. Eduardo and Dr. Whyte. CT A/P non-con ordered along with IV abx. Staples removed at bedside, small 1-2 cm area of wound dehiscence noted, moderate amount serous fluid able to be expressed from incision. Mild HARRISON on admit (Cr 1.5). NS bolus ordered.         Hospital Course:  Patient admitted for worsening cellulitis of kidney transplant incision, incisional hernia, and HARRISON. Cefepime and vanc started, infectious work-up ordered. Hospital course as follows:  CT abd/pelvis 4/8/24 revealed incisional  hernia containing distal ileum, associated small bowel feces sign, suggesting intestinal stasis, but without CT evidence of high-grade intestinal obstruction. TB  to OR 4/9/24 for repair of dehiscence of kidney transplant incision and hernia repair plus washout.   HARRISON: Worsened post OR with associated hyperkalemia. Required several interventions (shifting, lasix, IVF). Renal function and hyperkalemia both improved.   Ileus: Patient w/ N/V post take back, KUB 4/10 with ileus, NPO for bowel rest 4/11. Enema ordered 4/14 w/ 3 large BM after. Regular diet ordered  Infectious work-up negative. IV abx transitioned to PO levaquin and doxy x 5 days (EOT 4/15/24).    Interval History:   - NAEON. Patient remains with N/V despite bowel movements with enema. KUB today again with ileus pattern. Clear liquids only today. Encourage more ambulation. Zofran as needed. Continue bowel regimen. Cr stable 1.4.       Past Medical, Surgical, Family, and Social History:   Unchanged from H&P.    Scheduled Meds:  Current Facility-Administered Medications   Medication Dose Route Frequency Provider Last Rate Last Admin    acetaminophen tablet 650 mg  650 mg Oral Q6H PRN Radha Melgar MD   650 mg at 04/11/24 0503    albuterol inhaler 1 puff  1 puff Inhalation Q6H PRN Radha Melgar MD        aluminum-magnesium hydroxide-simethicone 200-200-20 mg/5 mL suspension 30 mL  30 mL Oral Q6H PRN Maren Harvey DNP        atorvastatin tablet 40 mg  40 mg Oral QHS Radha Melgar MD   40 mg at 04/14/24 2033    atovaquone 750 mg/5 mL oral liquid 1,500 mg  1,500 mg Oral Daily Emilee Ellsworth NP   1,500 mg at 04/15/24 0819    bisacodyL EC tablet 10 mg  10 mg Oral Daily Padmini Mancilla PA-C   10 mg at 04/15/24 0818    bisacodyL suppository 10 mg  10 mg Rectal Daily PRN Padmini Mancilla PA-C        calcium carbonate 200 mg calcium (500 mg) chewable tablet 500 mg  500 mg Oral TID PRN Maren Harvey DNP   500 mg at 04/11/24 1624    calcium  gluconate 1 g in NS IVPB (premixed)  1 g Intravenous Q10 Min PRN Claudia Whyte MD        carvediloL tablet 12.5 mg  12.5 mg Oral BID Osman Boyce NP        docusate sodium capsule 100 mg  100 mg Oral TID Maren Harvey DNP   100 mg at 04/15/24 1428    doxycycline tablet 100 mg  100 mg Oral Q12H Emilee Ellsworth NP   100 mg at 04/15/24 0819    famotidine tablet 20 mg  20 mg Oral QHS Radha Melgar MD   20 mg at 04/14/24 2033    fluticasone furoate-vilanteroL 100-25 mcg/dose diskus inhaler 1 puff  1 puff Inhalation Daily Radha Melgar MD   1 puff at 04/15/24 0921    heparin (porcine) injection 5,000 Units  5,000 Units Subcutaneous Q8H Radha Melgar MD   5,000 Units at 04/15/24 1429    k phos di & mono-sod phos mono 250 mg tablet 2 tablet  500 mg Oral Daily Padmini Mancilla PA-C   2 tablet at 04/15/24 0817    levoFLOXacin tablet 750 mg  750 mg Oral Daily Emilee Ellsworth NP   750 mg at 04/14/24 2033    magnesium oxide tablet 800 mg  800 mg Oral Daily Claudia Whyte MD   800 mg at 04/15/24 1428    melatonin tablet 6 mg  6 mg Oral Nightly PRN Radha Melgar MD        ondansetron disintegrating tablet 8 mg  8 mg Oral Q6H PRN Radha Melgar MD   8 mg at 04/14/24 2333    oxyCODONE immediate release tablet 5 mg  5 mg Oral Q4H PRN Maren Harvey DNP        oxyCODONE immediate release tablet Tab 10 mg  10 mg Oral Q4H PRN Maren Harvey DNP   10 mg at 04/13/24 1733    polyethylene glycol packet 17 g  17 g Oral Daily Osman Boyce NP   17 g at 04/15/24 1430    predniSONE tablet 5 mg  5 mg Oral Daily Radha Melgar MD   5 mg at 04/15/24 0819    simethicone chewable tablet 160 mg  2 tablet Oral QID (PC + HS) Padmini Mancilla PA-C   160 mg at 04/15/24 1428    sodium chloride 0.9% flush 3 mL  3 mL Intravenous PRN Radha Melgar MD        tacrolimus capsule 2.5 mg  2.5 mg Oral BID Padmini Mancilla PA-C   2.5 mg at 04/15/24 0817    valGANciclovir tablet 900 mg  900 mg Oral QA  Claudia Whyte MD   900 mg at 04/15/24 0817     Continuous Infusions:  Current Facility-Administered Medications   Medication Dose Route Frequency Provider Last Rate Last Admin    acetaminophen tablet 650 mg  650 mg Oral Q6H PRN Radha Melgar MD   650 mg at 04/11/24 0503    albuterol inhaler 1 puff  1 puff Inhalation Q6H PRN Radha Melgar MD        aluminum-magnesium hydroxide-simethicone 200-200-20 mg/5 mL suspension 30 mL  30 mL Oral Q6H PRN Maren Harvey DNP        atorvastatin tablet 40 mg  40 mg Oral QHS Radha Melgar MD   40 mg at 04/14/24 2033    atovaquone 750 mg/5 mL oral liquid 1,500 mg  1,500 mg Oral Daily Emilee Ellsworth NP   1,500 mg at 04/15/24 0819    bisacodyL EC tablet 10 mg  10 mg Oral Daily Padmini Mancilla PA-C   10 mg at 04/15/24 0818    bisacodyL suppository 10 mg  10 mg Rectal Daily PRN Padmini Mancilla PA-C        calcium carbonate 200 mg calcium (500 mg) chewable tablet 500 mg  500 mg Oral TID PRN Maren Harvey DNP   500 mg at 04/11/24 1624    calcium gluconate 1 g in NS IVPB (premixed)  1 g Intravenous Q10 Min PRN Claudia Whyte MD        carvediloL tablet 12.5 mg  12.5 mg Oral BID Osman Boyce NP        docusate sodium capsule 100 mg  100 mg Oral TID Maren Harvey DNP   100 mg at 04/15/24 1428    doxycycline tablet 100 mg  100 mg Oral Q12H Emilee Ellsworth NP   100 mg at 04/15/24 0819    famotidine tablet 20 mg  20 mg Oral QHS Radha Melgar MD   20 mg at 04/14/24 2033    fluticasone furoate-vilanteroL 100-25 mcg/dose diskus inhaler 1 puff  1 puff Inhalation Daily Radha Melgar MD   1 puff at 04/15/24 0921    heparin (porcine) injection 5,000 Units  5,000 Units Subcutaneous Q8H Radha Melgar MD   5,000 Units at 04/15/24 1429    k phos di & mono-sod phos mono 250 mg tablet 2 tablet  500 mg Oral Daily Padmini Mancilla PA-C   2 tablet at 04/15/24 0817    levoFLOXacin tablet 750 mg  750 mg Oral Daily Emilee Ellsworth NP   750 mg at 04/14/24  2033    magnesium oxide tablet 800 mg  800 mg Oral Daily Claudia Whyte MD   800 mg at 04/15/24 1428    melatonin tablet 6 mg  6 mg Oral Nightly PRN Radha Melgar MD        ondansetron disintegrating tablet 8 mg  8 mg Oral Q6H PRN Radha Melgar MD   8 mg at 04/14/24 2333    oxyCODONE immediate release tablet 5 mg  5 mg Oral Q4H PRN Maren Harvey DNP        oxyCODONE immediate release tablet Tab 10 mg  10 mg Oral Q4H PRN Maren Harvey DNP   10 mg at 04/13/24 1733    polyethylene glycol packet 17 g  17 g Oral Daily Osman Boyce NP   17 g at 04/15/24 1430    predniSONE tablet 5 mg  5 mg Oral Daily Radha Melgar MD   5 mg at 04/15/24 0819    simethicone chewable tablet 160 mg  2 tablet Oral QID (PC + HS) Padmini Mancilla PA-C   160 mg at 04/15/24 1428    sodium chloride 0.9% flush 3 mL  3 mL Intravenous PRN Radha Melgar MD        tacrolimus capsule 2.5 mg  2.5 mg Oral BID Padmini Mancilla PA-C   2.5 mg at 04/15/24 0817    valGANciclovir tablet 900 mg  900 mg Oral QAM Claudia Whyte MD   900 mg at 04/15/24 0817     PRN Meds:  Current Facility-Administered Medications   Medication Dose Route Frequency Provider Last Rate Last Admin    acetaminophen tablet 650 mg  650 mg Oral Q6H PRN Radha Melgar MD   650 mg at 04/11/24 0503    albuterol inhaler 1 puff  1 puff Inhalation Q6H PRN Radha Melgar MD        aluminum-magnesium hydroxide-simethicone 200-200-20 mg/5 mL suspension 30 mL  30 mL Oral Q6H PRN Maren Harvey DNP        atorvastatin tablet 40 mg  40 mg Oral QHS Radha Melgar MD   40 mg at 04/14/24 2033    atovaquone 750 mg/5 mL oral liquid 1,500 mg  1,500 mg Oral Daily Emilee Ellsworth NP   1,500 mg at 04/15/24 0819    bisacodyL EC tablet 10 mg  10 mg Oral Daily Padimni Manclila PA-C   10 mg at 04/15/24 0818    bisacodyL suppository 10 mg  10 mg Rectal Daily PRN Padmini Mancilla PA-C        calcium carbonate 200 mg calcium (500 mg) chewable tablet 500 mg   500 mg Oral TID PRN Maren Harvey DNP   500 mg at 04/11/24 1624    calcium gluconate 1 g in NS IVPB (premixed)  1 g Intravenous Q10 Min PRN Claudia Whyte MD        carvediloL tablet 12.5 mg  12.5 mg Oral BID Osman Boyce NP        docusate sodium capsule 100 mg  100 mg Oral TID Maren Harvey DNP   100 mg at 04/15/24 1428    doxycycline tablet 100 mg  100 mg Oral Q12H Emilee Ellsworth NP   100 mg at 04/15/24 0819    famotidine tablet 20 mg  20 mg Oral QHS Radha Melgar MD   20 mg at 04/14/24 2033    fluticasone furoate-vilanteroL 100-25 mcg/dose diskus inhaler 1 puff  1 puff Inhalation Daily Radha Melgar MD   1 puff at 04/15/24 0921    heparin (porcine) injection 5,000 Units  5,000 Units Subcutaneous Q8H Radha Melgar MD   5,000 Units at 04/15/24 1429    k phos di & mono-sod phos mono 250 mg tablet 2 tablet  500 mg Oral Daily Padmini Mancilla PA-C   2 tablet at 04/15/24 0817    levoFLOXacin tablet 750 mg  750 mg Oral Daily Emilee Ellsworth NP   750 mg at 04/14/24 2033    magnesium oxide tablet 800 mg  800 mg Oral Daily Claudia Whyte MD   800 mg at 04/15/24 1428    melatonin tablet 6 mg  6 mg Oral Nightly PRN Radha Melgar MD        ondansetron disintegrating tablet 8 mg  8 mg Oral Q6H PRN Radha Melgar MD   8 mg at 04/14/24 2333    oxyCODONE immediate release tablet 5 mg  5 mg Oral Q4H PRN Maren Harvey DNP        oxyCODONE immediate release tablet Tab 10 mg  10 mg Oral Q4H PRN Maren Harvey DNP   10 mg at 04/13/24 1733    polyethylene glycol packet 17 g  17 g Oral Daily Osman Boyce NP   17 g at 04/15/24 1430    predniSONE tablet 5 mg  5 mg Oral Daily Radha Melgar MD   5 mg at 04/15/24 0819    simethicone chewable tablet 160 mg  2 tablet Oral QID (PC + HS) Padmini Mancilla PA-C   160 mg at 04/15/24 1428    sodium chloride 0.9% flush 3 mL  3 mL Intravenous PRN Radha Melgar MD        tacrolimus capsule 2.5 mg  2.5 mg Oral BID Padmini Mancilla PA-C    "2.5 mg at 04/15/24 0817    valGANciclovir tablet 900 mg  900 mg Oral Claudia Sifuentes MD   900 mg at 04/15/24 0817       Intake/Output - Last 3 Shifts         04/13 0700 04/14 0659 04/14 0700  04/15 0659 04/15 0700 04/16 0659    P.O. 240 680     I.V. (mL/kg)       Total Intake(mL/kg) 240 (2.6) 680 (7.4)     Urine (mL/kg/hr) 250 (0.1) 1375 (0.6)     Emesis/NG output  0     Stool 0 0     Total Output 250 1375     Net -10 -695            Urine Occurrence 1 x 3 x     Stool Occurrence 0 x 3 x     Emesis Occurrence  1 x              Review of Systems   Constitutional:  Negative for chills and fever.   HENT:  Negative for congestion and trouble swallowing.    Respiratory:  Negative for cough and shortness of breath.    Cardiovascular:  Negative for chest pain and leg swelling.   Gastrointestinal:  Positive for abdominal distention, abdominal pain (incisional), nausea and vomiting.   Genitourinary:  Negative for decreased urine volume and difficulty urinating.   Skin:  Positive for wound.   Allergic/Immunologic: Positive for immunocompromised state.   Neurological:  Negative for dizziness, tremors, weakness and headaches.   Psychiatric/Behavioral:  Negative for agitation, behavioral problems, confusion and decreased concentration.       Objective:     Vital Signs (Most Recent):  Temp: 98 °F (36.7 °C) (04/15/24 1150)  Pulse: (!) 116 (04/15/24 1150)  Resp: 19 (04/15/24 1150)  BP: 131/89 (04/15/24 1150)  SpO2: (!) 93 % (04/15/24 1150) Vital Signs (24h Range):  Temp:  [97.5 °F (36.4 °C)-98.6 °F (37 °C)] 98 °F (36.7 °C)  Pulse:  [] 116  Resp:  [16-19] 19  SpO2:  [90 %-98 %] 93 %  BP: (131-184)/() 131/89     Weight: 92.5 kg (204 lb 0.6 oz)  Height: 5' 7" (170.2 cm)  Body mass index is 31.96 kg/m².     Physical Exam  Vitals and nursing note reviewed.   Constitutional:       General: He is not in acute distress.     Appearance: Normal appearance. He is not ill-appearing.   HENT:      Head: Normocephalic.      " "Mouth/Throat:      Pharynx: Oropharynx is clear.   Eyes:      Conjunctiva/sclera: Conjunctivae normal.   Cardiovascular:      Rate and Rhythm: Tachycardia present.      Pulses: Normal pulses.   Pulmonary:      Effort: Pulmonary effort is normal.      Breath sounds: Normal breath sounds.   Abdominal:      General: Bowel sounds are decreased. There is distension.      Tenderness: There is no guarding or rebound.      Comments: RLQ Kidney txp incision CDI with staples intact, small area of erythema at superior part of incision   Musculoskeletal:      Right lower leg: No edema.      Left lower leg: No edema.   Skin:     General: Skin is warm and dry.   Neurological:      Mental Status: He is alert and oriented to person, place, and time. Mental status is at baseline.   Psychiatric:         Mood and Affect: Mood normal.         Behavior: Behavior normal. Behavior is cooperative.         Thought Content: Thought content normal.          Laboratory:  CBC:   Recent Labs   Lab 04/13/24  0621 04/14/24  0639 04/15/24  0556   WBC 10.17 10.63 9.47   RBC 3.96* 4.05* 4.16*   HGB 10.8* 10.8* 11.3*   HCT 36.3* 36.6* 37.1*    199 218   MCV 92 90 89   MCH 27.3 26.7* 27.2   MCHC 29.8* 29.5* 30.5*     CMP:   Recent Labs   Lab 04/13/24  0621 04/14/24  0638 04/15/24  0556    109 121*   CALCIUM 9.4 9.0 9.4   ALBUMIN 2.8* 2.8* 2.9*    137 138   K 4.5 4.6 4.2   CO2 26 25 25    103 102   BUN 21 18 23   CREATININE 1.3 1.2 1.4       Diagnostic Results:  Abdominal X-Ray: Reviewed  Assessment/Plan:     * History of incisional hernia repair  - see " surgical wound infection"      Tachycardia  - Last EKG 4/15 with sinus tach  - Coreg started, dose increased 4/15      Ileus  - Seen on KUB 4/10   - NPO for bowel rest starting 4/11, Enema 4/14 with 3 large BM noted, advanced to regular diet 4/14   - Continued N/V 4/15 w/ KUB still showing ileus. Diet changed to clears only. Encourage increase ambulation. Monitor closely for " "need for NGT  - Continue bowel regimen      Encounter for post surgical wound check        Anemia of chronic disease  - monitor daily CBC  - stable      Hypomagnesemia  - Continue home PO mag  - IV Mag as needed      History of COPD  - Resume home neb      Dyslipidemia  - continue home statin      Surgical wound infection  - Pt with delayed surgical wound healing, seen in clinic 4/3 with 200 mL serous fluid expressed from incision w/ staples intact. Started on PO doxycycline at that time. No improvement with PO Doxy  - CT abd/pelvis 4/8 with RLQ transplant kidney with incisional hernia containing distal ileum. The large fluid collection interposed between the kidney and anterior abdominal wall drained at patient's bedside. Mild to moderate soft tissue stranding and trace fluid directly adjacent to the transplant kidney. CT reviewed by surgeon. Patient made NPO for surgical intervention for 4/9.   - Patient taken back to OR (4/9) for repair of wound dehiscence and hernia repair, abdominal washout  - Infectious work up negative thus far.   - Cefepime and vanc transitioned to PO levaquin and doxy x5 days. (EOT 4/15)      Prophylactic immunotherapy  - see "long term use of immunosuppression"      At risk for opportunistic infections  - OI prophylaxis per transplant protocol      Long-term use of immunosuppressant medication  - Continue prograf and prednisone  - Check prograf level daily and adjust for therapeutic dosage. Monitor for toxic side effects   - cellcept held on admit d/t infection      S/P living-donor kidney transplantation  - S/p Ktxp 3/11/24 d/t IgA nephropathy. Progressed well post-txp with Cr downtrending  - BL Cr 1.1-1.2  - strict Is and Os   - encourage hydration           Discharge Planning:  Not suitable for discharge at this time    Medical decision making for this encounter includes review of pertinent labs and diagnostic studies, assessment and planning, discussions with consulting providers, " discussion with patient/family, and participation in multidisciplinary rounds. Time spent caring for patient: 60 minutes    Osman Boyce, NIKITA  Kidney Transplant  [facility] - [facility]plant Step[facility]

## 2024-04-15 NOTE — SUBJECTIVE & OBJECTIVE
Subjective:   History of Present Illness:  Mr. Colten Monet is a 63 yo male w/ PMHx of ESRD d/t IgA nephropathy, now S/P living related Ktxp on 3/11/24. Patient progressed well during initial post-txp course and discharged POD#2 with downtrending Cr. Now with BL Cr 1.0-1.1.     Patient recently seen in transplant surgery clinic on 4/3 and noted to have serous drainage from RLQ kidney txp incision along with palpable erythema. Staples intact w/ minimal tenderness to deep palpation at that time. Incision probed w/ 200 cc serous fluid drained in clinic by surgical fellow. Patient was placed on PO for cellulitis. He now presents to ED for worsening erythema to incisional area (see media for photo). Staples remain intact, erythema noted to entire length of incision, worse in upper portion. Small 1-2 cm area of yellow slough in upper portion incision. Minimal serous drainage expressed upon probing. Patient reports mild tenderness to deep palpation. Kidney US in ED w/ large 14.8 x 7.4 x 1.3 cm, just posterior to the anterior pelvic wall overlying the transplant kidney. Reviewed plan with Dr. Eduardo and Dr. Whyte. CT A/P non-con ordered along with IV abx. Staples removed at bedside, small 1-2 cm area of wound dehiscence noted, moderate amount serous fluid able to be expressed from incision. Mild HARRISON on admit (Cr 1.5). NS bolus ordered.         Hospital Course:  Patient admitted for worsening cellulitis of kidney transplant incision, incisional hernia, and HARRISON. Cefepime and vanc started, infectious work-up ordered. Hospital course as follows:  CT abd/pelvis 4/8/24 revealed incisional hernia containing distal ileum, associated small bowel feces sign, suggesting intestinal stasis, but without CT evidence of high-grade intestinal obstruction. TB  to OR 4/9/24 for repair of dehiscence of kidney transplant incision and hernia repair plus washout.   HARRISON: Worsened post OR with associated hyperkalemia. Required several  interventions (shifting, lasix, IVF). Renal function and hyperkalemia both improved.   Ileus: Patient w/ N/V post take back, KUB 4/10 with ileus, NPO for bowel rest 4/11. Enema ordered 4/14 w/ 3 large BM after. Regular diet ordered  Infectious work-up negative. IV abx transitioned to PO levaquin and doxy x 5 days (EOT 4/15/24).    Interval History:   - NAEON. Patient remains with N/V despite bowel movements with enema. KUB today again with ileus pattern. Clear liquids only today. Encourage more ambulation. Zofran as needed. Continue bowel regimen. Cr stable 1.4.       Past Medical, Surgical, Family, and Social History:   Unchanged from H&P.    Scheduled Meds:  Current Facility-Administered Medications   Medication Dose Route Frequency Provider Last Rate Last Admin    acetaminophen tablet 650 mg  650 mg Oral Q6H PRN Radha Melgar MD   650 mg at 04/11/24 0503    albuterol inhaler 1 puff  1 puff Inhalation Q6H PRN Radha Melgar MD        aluminum-magnesium hydroxide-simethicone 200-200-20 mg/5 mL suspension 30 mL  30 mL Oral Q6H PRN Maren Harvey DNP        atorvastatin tablet 40 mg  40 mg Oral QHS Radha Melgar MD   40 mg at 04/14/24 2033    atovaquone 750 mg/5 mL oral liquid 1,500 mg  1,500 mg Oral Daily Emilee Ellsworth NP   1,500 mg at 04/15/24 0819    bisacodyL EC tablet 10 mg  10 mg Oral Daily Padmini Mancilla PA-C   10 mg at 04/15/24 0818    bisacodyL suppository 10 mg  10 mg Rectal Daily PRN Padmini Mancilla PA-C        calcium carbonate 200 mg calcium (500 mg) chewable tablet 500 mg  500 mg Oral TID PRN Maren Harvey DNP   500 mg at 04/11/24 1624    calcium gluconate 1 g in NS IVPB (premixed)  1 g Intravenous Q10 Min PRN Claudia Whyte MD        carvediloL tablet 12.5 mg  12.5 mg Oral BID Ladouceur, Osman R., NP        docusate sodium capsule 100 mg  100 mg Oral TID Maren Harvey DNP   100 mg at 04/15/24 1428    doxycycline tablet 100 mg  100 mg Oral Q12H Emilee Ellsworth NP   100 mg  at 04/15/24 0819    famotidine tablet 20 mg  20 mg Oral QHS Radha Melgar MD   20 mg at 04/14/24 2033    fluticasone furoate-vilanteroL 100-25 mcg/dose diskus inhaler 1 puff  1 puff Inhalation Daily Radha Melgar MD   1 puff at 04/15/24 0921    heparin (porcine) injection 5,000 Units  5,000 Units Subcutaneous Q8H Radha Melgar MD   5,000 Units at 04/15/24 1429    k phos di & mono-sod phos mono 250 mg tablet 2 tablet  500 mg Oral Daily Padmini Mancilla PA-C   2 tablet at 04/15/24 0817    levoFLOXacin tablet 750 mg  750 mg Oral Daily Emilee Ellsworth NP   750 mg at 04/14/24 2033    magnesium oxide tablet 800 mg  800 mg Oral Daily Claudia Whyte MD   800 mg at 04/15/24 1428    melatonin tablet 6 mg  6 mg Oral Nightly PRN Radha Melgar MD        ondansetron disintegrating tablet 8 mg  8 mg Oral Q6H PRN Radha Melgar MD   8 mg at 04/14/24 2333    oxyCODONE immediate release tablet 5 mg  5 mg Oral Q4H PRN Maren Harvey DNP        oxyCODONE immediate release tablet Tab 10 mg  10 mg Oral Q4H PRN Maren Harvey DNP   10 mg at 04/13/24 1733    polyethylene glycol packet 17 g  17 g Oral Daily Osman Boyce NP   17 g at 04/15/24 1430    predniSONE tablet 5 mg  5 mg Oral Daily Radha Melgar MD   5 mg at 04/15/24 0819    simethicone chewable tablet 160 mg  2 tablet Oral QID (PC + HS) Padmini Mancilla PA-C   160 mg at 04/15/24 1428    sodium chloride 0.9% flush 3 mL  3 mL Intravenous PRN Radha Melgar MD        tacrolimus capsule 2.5 mg  2.5 mg Oral BID Padmini Mancilla PA-C   2.5 mg at 04/15/24 0817    valGANciclovir tablet 900 mg  900 mg Oral QAM Claudia Whyte, MD   900 mg at 04/15/24 0817     Continuous Infusions:  Current Facility-Administered Medications   Medication Dose Route Frequency Provider Last Rate Last Admin    acetaminophen tablet 650 mg  650 mg Oral Q6H PRN Radha Melgar MD   650 mg at 04/11/24 0503    albuterol inhaler 1 puff  1 puff Inhalation Q6H PRN Ramón,  Radha CRAMER MD        aluminum-magnesium hydroxide-simethicone 200-200-20 mg/5 mL suspension 30 mL  30 mL Oral Q6H PRN Maren Harvey DNP        atorvastatin tablet 40 mg  40 mg Oral QHS Radha Melgar MD   40 mg at 04/14/24 2033    atovaquone 750 mg/5 mL oral liquid 1,500 mg  1,500 mg Oral Daily Emilee Ellsworth NP   1,500 mg at 04/15/24 0819    bisacodyL EC tablet 10 mg  10 mg Oral Daily Padmini Mancilla PA-C   10 mg at 04/15/24 0818    bisacodyL suppository 10 mg  10 mg Rectal Daily PRN Padmini Mancilla PA-C        calcium carbonate 200 mg calcium (500 mg) chewable tablet 500 mg  500 mg Oral TID PRN Maren Harvey DNP   500 mg at 04/11/24 1624    calcium gluconate 1 g in NS IVPB (premixed)  1 g Intravenous Q10 Min PRN Claudia Whyte MD        carvediloL tablet 12.5 mg  12.5 mg Oral BID Osman Boyce NP        docusate sodium capsule 100 mg  100 mg Oral TID Maren Harvey DNP   100 mg at 04/15/24 1428    doxycycline tablet 100 mg  100 mg Oral Q12H Emilee Ellsworth NP   100 mg at 04/15/24 0819    famotidine tablet 20 mg  20 mg Oral QHS Radha Melgar MD   20 mg at 04/14/24 2033    fluticasone furoate-vilanteroL 100-25 mcg/dose diskus inhaler 1 puff  1 puff Inhalation Daily Radha Melgar MD   1 puff at 04/15/24 0921    heparin (porcine) injection 5,000 Units  5,000 Units Subcutaneous Q8H Radha Melgar MD   5,000 Units at 04/15/24 1429    k phos di & mono-sod phos mono 250 mg tablet 2 tablet  500 mg Oral Daily Padmini Mancilla PA-C   2 tablet at 04/15/24 0817    levoFLOXacin tablet 750 mg  750 mg Oral Daily Emilee Ellsworth NP   750 mg at 04/14/24 2033    magnesium oxide tablet 800 mg  800 mg Oral Daily Claudia Whyte MD   800 mg at 04/15/24 1428    melatonin tablet 6 mg  6 mg Oral Nightly PRN Radha Melgar MD        ondansetron disintegrating tablet 8 mg  8 mg Oral Q6H PRN Radha Melgar MD   8 mg at 04/14/24 2333    oxyCODONE immediate release tablet 5 mg  5 mg Oral Q4H PRN  Maren Harvey DNP        oxyCODONE immediate release tablet Tab 10 mg  10 mg Oral Q4H PRN Maren Harvey DNP   10 mg at 04/13/24 1733    polyethylene glycol packet 17 g  17 g Oral Daily Osman Boyce NP   17 g at 04/15/24 1430    predniSONE tablet 5 mg  5 mg Oral Daily Radha Melgar MD   5 mg at 04/15/24 0819    simethicone chewable tablet 160 mg  2 tablet Oral QID (PC + HS) Padmini Mancilla PA-C   160 mg at 04/15/24 1428    sodium chloride 0.9% flush 3 mL  3 mL Intravenous PRN Radha Melgar MD        tacrolimus capsule 2.5 mg  2.5 mg Oral BID Padmini Mancilla PA-C   2.5 mg at 04/15/24 0817    valGANciclovir tablet 900 mg  900 mg Oral Claudia Sifuentes MD   900 mg at 04/15/24 0817     PRN Meds:  Current Facility-Administered Medications   Medication Dose Route Frequency Provider Last Rate Last Admin    acetaminophen tablet 650 mg  650 mg Oral Q6H PRN Radha Melgar MD   650 mg at 04/11/24 0503    albuterol inhaler 1 puff  1 puff Inhalation Q6H PRN Radha Melgar MD        aluminum-magnesium hydroxide-simethicone 200-200-20 mg/5 mL suspension 30 mL  30 mL Oral Q6H PRN Maren Harvey DNP        atorvastatin tablet 40 mg  40 mg Oral QHS Radha Melgar MD   40 mg at 04/14/24 2033    atovaquone 750 mg/5 mL oral liquid 1,500 mg  1,500 mg Oral Daily Emilee Ellsworth NP   1,500 mg at 04/15/24 0819    bisacodyL EC tablet 10 mg  10 mg Oral Daily Padmini Mancilla PA-C   10 mg at 04/15/24 0818    bisacodyL suppository 10 mg  10 mg Rectal Daily PRN Padmini Mancilla PA-C        calcium carbonate 200 mg calcium (500 mg) chewable tablet 500 mg  500 mg Oral TID PRN Maren Harvey DNP   500 mg at 04/11/24 1624    calcium gluconate 1 g in NS IVPB (premixed)  1 g Intravenous Q10 Min PRN Claudia Whyte MD        carvediloL tablet 12.5 mg  12.5 mg Oral BID Osman Boyce, NP        docusate sodium capsule 100 mg  100 mg Oral TID Maren Harvey DNP   100 mg at 04/15/24 1425     doxycycline tablet 100 mg  100 mg Oral Q12H Emilee Ellsworth NP   100 mg at 04/15/24 0819    famotidine tablet 20 mg  20 mg Oral QHS Radha Melgar MD   20 mg at 04/14/24 2033    fluticasone furoate-vilanteroL 100-25 mcg/dose diskus inhaler 1 puff  1 puff Inhalation Daily Radha Melgar MD   1 puff at 04/15/24 0921    heparin (porcine) injection 5,000 Units  5,000 Units Subcutaneous Q8H Radha Melgar MD   5,000 Units at 04/15/24 1429    k phos di & mono-sod phos mono 250 mg tablet 2 tablet  500 mg Oral Daily Padmini Mancilla PA-C   2 tablet at 04/15/24 0817    levoFLOXacin tablet 750 mg  750 mg Oral Daily Emilee Ellsworth NP   750 mg at 04/14/24 2033    magnesium oxide tablet 800 mg  800 mg Oral Daily Claudia Whyte MD   800 mg at 04/15/24 1428    melatonin tablet 6 mg  6 mg Oral Nightly PRN Radha Melgar MD        ondansetron disintegrating tablet 8 mg  8 mg Oral Q6H PRN Radha Melgar MD   8 mg at 04/14/24 2333    oxyCODONE immediate release tablet 5 mg  5 mg Oral Q4H PRN Maren Harvey DNP        oxyCODONE immediate release tablet Tab 10 mg  10 mg Oral Q4H PRN Maren Harvey DNP   10 mg at 04/13/24 1733    polyethylene glycol packet 17 g  17 g Oral Daily Osman Boyce NP   17 g at 04/15/24 1430    predniSONE tablet 5 mg  5 mg Oral Daily Radha Melgar MD   5 mg at 04/15/24 0819    simethicone chewable tablet 160 mg  2 tablet Oral QID (PC + HS) Padmini Mancilla PA-C   160 mg at 04/15/24 1428    sodium chloride 0.9% flush 3 mL  3 mL Intravenous PRN Radha Melgar MD        tacrolimus capsule 2.5 mg  2.5 mg Oral BID Padmini Mancilla PA-C   2.5 mg at 04/15/24 0817    valGANciclovir tablet 900 mg  900 mg Oral Claudia Sifuentes MD   900 mg at 04/15/24 0817       Intake/Output - Last 3 Shifts         04/13 0700  04/14 0659 04/14 0700  04/15 0659 04/15 0700  04/16 0659    P.O. 240 680     I.V. (mL/kg)       Total Intake(mL/kg) 240 (2.6) 680 (7.4)     Urine (mL/kg/hr) 250  "(0.1) 1375 (0.6)     Emesis/NG output  0     Stool 0 0     Total Output 250 1375     Net -10 -695            Urine Occurrence 1 x 3 x     Stool Occurrence 0 x 3 x     Emesis Occurrence  1 x              Review of Systems   Constitutional:  Negative for chills and fever.   HENT:  Negative for congestion and trouble swallowing.    Respiratory:  Negative for cough and shortness of breath.    Cardiovascular:  Negative for chest pain and leg swelling.   Gastrointestinal:  Positive for abdominal distention, abdominal pain (incisional), nausea and vomiting.   Genitourinary:  Negative for decreased urine volume and difficulty urinating.   Skin:  Positive for wound.   Allergic/Immunologic: Positive for immunocompromised state.   Neurological:  Negative for dizziness, tremors, weakness and headaches.   Psychiatric/Behavioral:  Negative for agitation, behavioral problems, confusion and decreased concentration.       Objective:     Vital Signs (Most Recent):  Temp: 98 °F (36.7 °C) (04/15/24 1150)  Pulse: (!) 116 (04/15/24 1150)  Resp: 19 (04/15/24 1150)  BP: 131/89 (04/15/24 1150)  SpO2: (!) 93 % (04/15/24 1150) Vital Signs (24h Range):  Temp:  [97.5 °F (36.4 °C)-98.6 °F (37 °C)] 98 °F (36.7 °C)  Pulse:  [] 116  Resp:  [16-19] 19  SpO2:  [90 %-98 %] 93 %  BP: (131-184)/() 131/89     Weight: 92.5 kg (204 lb 0.6 oz)  Height: 5' 7" (170.2 cm)  Body mass index is 31.96 kg/m².     Physical Exam  Vitals and nursing note reviewed.   Constitutional:       General: He is not in acute distress.     Appearance: Normal appearance. He is not ill-appearing.   HENT:      Head: Normocephalic.      Mouth/Throat:      Pharynx: Oropharynx is clear.   Eyes:      Conjunctiva/sclera: Conjunctivae normal.   Cardiovascular:      Rate and Rhythm: Tachycardia present.      Pulses: Normal pulses.   Pulmonary:      Effort: Pulmonary effort is normal.      Breath sounds: Normal breath sounds.   Abdominal:      General: Bowel sounds are " decreased. There is distension.      Tenderness: There is no guarding or rebound.      Comments: RLQ Kidney txp incision CDI with staples intact, small area of erythema at superior part of incision   Musculoskeletal:      Right lower leg: No edema.      Left lower leg: No edema.   Skin:     General: Skin is warm and dry.   Neurological:      Mental Status: He is alert and oriented to person, place, and time. Mental status is at baseline.   Psychiatric:         Mood and Affect: Mood normal.         Behavior: Behavior normal. Behavior is cooperative.         Thought Content: Thought content normal.          Laboratory:  CBC:   Recent Labs   Lab 04/13/24  0621 04/14/24  0639 04/15/24  0556   WBC 10.17 10.63 9.47   RBC 3.96* 4.05* 4.16*   HGB 10.8* 10.8* 11.3*   HCT 36.3* 36.6* 37.1*    199 218   MCV 92 90 89   MCH 27.3 26.7* 27.2   MCHC 29.8* 29.5* 30.5*     CMP:   Recent Labs   Lab 04/13/24  0621 04/14/24  0638 04/15/24  0556    109 121*   CALCIUM 9.4 9.0 9.4   ALBUMIN 2.8* 2.8* 2.9*    137 138   K 4.5 4.6 4.2   CO2 26 25 25    103 102   BUN 21 18 23   CREATININE 1.3 1.2 1.4       Diagnostic Results:  Abdominal X-Ray: Reviewed

## 2024-04-15 NOTE — PLAN OF CARE
AAOX4  VVS  DRESSING CHANGED   DIET CHANGED TO CLEAR LIQUID   Iv SITE EXTENDED DUE TO POOR ACCESS AND POSSIBLE DC   SAFETY IN PLACE WITH VERBAL UNDERSTANDING NOTED TO CALL PRN

## 2024-04-16 LAB
ALBUMIN SERPL BCP-MCNC: 3.1 G/DL (ref 3.5–5.2)
ANION GAP SERPL CALC-SCNC: 11 MMOL/L (ref 8–16)
BASOPHILS # BLD AUTO: 0.09 K/UL (ref 0–0.2)
BASOPHILS NFR BLD: 0.8 % (ref 0–1.9)
BUN SERPL-MCNC: 30 MG/DL (ref 8–23)
CALCIUM SERPL-MCNC: 9.9 MG/DL (ref 8.7–10.5)
CHLORIDE SERPL-SCNC: 99 MMOL/L (ref 95–110)
CO2 SERPL-SCNC: 28 MMOL/L (ref 23–29)
CREAT SERPL-MCNC: 1.6 MG/DL (ref 0.5–1.4)
DIFFERENTIAL METHOD BLD: ABNORMAL
EOSINOPHIL # BLD AUTO: 0.1 K/UL (ref 0–0.5)
EOSINOPHIL NFR BLD: 1 % (ref 0–8)
ERYTHROCYTE [DISTWIDTH] IN BLOOD BY AUTOMATED COUNT: 13.6 % (ref 11.5–14.5)
EST. GFR  (NO RACE VARIABLE): 47.5 ML/MIN/1.73 M^2
GLUCOSE SERPL-MCNC: 100 MG/DL (ref 70–110)
HCT VFR BLD AUTO: 38.6 % (ref 40–54)
HGB BLD-MCNC: 11.6 G/DL (ref 14–18)
IMM GRANULOCYTES # BLD AUTO: 0.2 K/UL (ref 0–0.04)
IMM GRANULOCYTES NFR BLD AUTO: 1.7 % (ref 0–0.5)
LYMPHOCYTES # BLD AUTO: 1.5 K/UL (ref 1–4.8)
LYMPHOCYTES NFR BLD: 13.1 % (ref 18–48)
MAGNESIUM SERPL-MCNC: 1.8 MG/DL (ref 1.6–2.6)
MCH RBC QN AUTO: 27 PG (ref 27–31)
MCHC RBC AUTO-ENTMCNC: 30.1 G/DL (ref 32–36)
MCV RBC AUTO: 90 FL (ref 82–98)
MONOCYTES # BLD AUTO: 1.4 K/UL (ref 0.3–1)
MONOCYTES NFR BLD: 11.5 % (ref 4–15)
NEUTROPHILS # BLD AUTO: 8.5 K/UL (ref 1.8–7.7)
NEUTROPHILS NFR BLD: 71.9 % (ref 38–73)
NRBC BLD-RTO: 0 /100 WBC
PHOSPHATE SERPL-MCNC: 4.2 MG/DL (ref 2.7–4.5)
PLATELET # BLD AUTO: 211 K/UL (ref 150–450)
PMV BLD AUTO: 10.1 FL (ref 9.2–12.9)
POTASSIUM SERPL-SCNC: 4.2 MMOL/L (ref 3.5–5.1)
RBC # BLD AUTO: 4.29 M/UL (ref 4.6–6.2)
SODIUM SERPL-SCNC: 138 MMOL/L (ref 136–145)
TACROLIMUS BLD-MCNC: 5 NG/ML (ref 5–15)
WBC # BLD AUTO: 11.79 K/UL (ref 3.9–12.7)

## 2024-04-16 PROCEDURE — 63600175 PHARM REV CODE 636 W HCPCS

## 2024-04-16 PROCEDURE — 94640 AIRWAY INHALATION TREATMENT: CPT

## 2024-04-16 PROCEDURE — 25000003 PHARM REV CODE 250: Performed by: NURSE PRACTITIONER

## 2024-04-16 PROCEDURE — 80069 RENAL FUNCTION PANEL: CPT

## 2024-04-16 PROCEDURE — 36415 COLL VENOUS BLD VENIPUNCTURE: CPT

## 2024-04-16 PROCEDURE — 25000003 PHARM REV CODE 250

## 2024-04-16 PROCEDURE — 85025 COMPLETE CBC W/AUTO DIFF WBC: CPT

## 2024-04-16 PROCEDURE — 27000207 HC ISOLATION

## 2024-04-16 PROCEDURE — 25000003 PHARM REV CODE 250: Performed by: CLINICAL NURSE SPECIALIST

## 2024-04-16 PROCEDURE — 20600001 HC STEP DOWN PRIVATE ROOM

## 2024-04-16 PROCEDURE — 80197 ASSAY OF TACROLIMUS: CPT

## 2024-04-16 PROCEDURE — 25000003 PHARM REV CODE 250: Performed by: PHYSICIAN ASSISTANT

## 2024-04-16 PROCEDURE — 99233 SBSQ HOSP IP/OBS HIGH 50: CPT | Mod: ,,, | Performed by: PHYSICIAN ASSISTANT

## 2024-04-16 PROCEDURE — 83735 ASSAY OF MAGNESIUM: CPT

## 2024-04-16 PROCEDURE — 25000003 PHARM REV CODE 250: Performed by: INTERNAL MEDICINE

## 2024-04-16 PROCEDURE — 63600175 PHARM REV CODE 636 W HCPCS: Performed by: PHYSICIAN ASSISTANT

## 2024-04-16 PROCEDURE — 94761 N-INVAS EAR/PLS OXIMETRY MLT: CPT

## 2024-04-16 RX ORDER — TACROLIMUS 1 MG/1
3 CAPSULE ORAL 2 TIMES DAILY
Status: DISCONTINUED | OUTPATIENT
Start: 2024-04-16 | End: 2024-04-18

## 2024-04-16 RX ORDER — TACROLIMUS 0.5 MG/1
0.5 CAPSULE ORAL ONCE
Status: COMPLETED | OUTPATIENT
Start: 2024-04-16 | End: 2024-04-16

## 2024-04-16 RX ORDER — SODIUM CHLORIDE 9 MG/ML
INJECTION, SOLUTION INTRAVENOUS CONTINUOUS
Status: DISCONTINUED | OUTPATIENT
Start: 2024-04-16 | End: 2024-04-17

## 2024-04-16 RX ORDER — NAPROXEN SODIUM 220 MG/1
81 TABLET, FILM COATED ORAL DAILY
Status: CANCELLED | OUTPATIENT
Start: 2024-04-16

## 2024-04-16 RX ADMIN — FLUTICASONE FUROATE AND VILANTEROL TRIFENATATE 1 PUFF: 100; 25 POWDER RESPIRATORY (INHALATION) at 08:04

## 2024-04-16 RX ADMIN — TACROLIMUS 2.5 MG: 1 CAPSULE ORAL at 08:04

## 2024-04-16 RX ADMIN — SIMETHICONE 160 MG: 80 TABLET, CHEWABLE ORAL at 08:04

## 2024-04-16 RX ADMIN — VALGANCICLOVIR 900 MG: 450 TABLET, FILM COATED ORAL at 08:04

## 2024-04-16 RX ADMIN — HEPARIN SODIUM 5000 UNITS: 5000 INJECTION INTRAVENOUS; SUBCUTANEOUS at 03:04

## 2024-04-16 RX ADMIN — PREDNISONE 5 MG: 5 TABLET ORAL at 08:04

## 2024-04-16 RX ADMIN — TACROLIMUS 3 MG: 1 CAPSULE ORAL at 06:04

## 2024-04-16 RX ADMIN — TACROLIMUS 0.5 MG: 1 CAPSULE ORAL at 11:04

## 2024-04-16 RX ADMIN — CARVEDILOL 12.5 MG: 12.5 TABLET, FILM COATED ORAL at 08:04

## 2024-04-16 RX ADMIN — DOCUSATE SODIUM 100 MG: 100 CAPSULE, LIQUID FILLED ORAL at 08:04

## 2024-04-16 RX ADMIN — SODIUM CHLORIDE: 9 INJECTION, SOLUTION INTRAVENOUS at 08:04

## 2024-04-16 RX ADMIN — SODIUM CHLORIDE: 9 INJECTION, SOLUTION INTRAVENOUS at 11:04

## 2024-04-16 RX ADMIN — FAMOTIDINE 20 MG: 20 TABLET ORAL at 08:04

## 2024-04-16 RX ADMIN — POLYETHYLENE GLYCOL 3350 17 G: 17 POWDER, FOR SOLUTION ORAL at 08:04

## 2024-04-16 RX ADMIN — ATOVAQUONE 1500 MG: 750 SUSPENSION ORAL at 08:04

## 2024-04-16 RX ADMIN — ATORVASTATIN CALCIUM 40 MG: 20 TABLET, FILM COATED ORAL at 08:04

## 2024-04-16 RX ADMIN — BISACODYL 10 MG: 5 TABLET, COATED ORAL at 08:04

## 2024-04-16 RX ADMIN — DOCUSATE SODIUM 100 MG: 100 CAPSULE, LIQUID FILLED ORAL at 03:04

## 2024-04-16 NOTE — CHAPLAIN
04/16/24 1120   Clinical Encounter Type   Visit Type Initial Visit   Visit Category General Rounding   Visited With Patient   Length of Visit 15 Minutes   Continue Visiting Yes   Cheondoism Encounters   Spiritual Resources Requested Prayer   Patient Spiritual Encounters   Care Provided Reflective listening;Life review/ reflection;Prayer support;Compassionate presence   Patient Coping Accepting;Open/discussion;Active louann   Comments - Patient Pt is recovering and feeling relaxed. Pt said my louann and trust in the Lord keeps me alive and hopeful. I want to go home and play with my four and half year-old son. Offered prayer support and comforted with active listening.

## 2024-04-16 NOTE — PLAN OF CARE
- Pt AAOx4, afebrile, VSS, RA saturating > 90%.   - Clear liquid diet continued. Pt does not report N/V, but pt states he has not consumed much.   - RLQ incision with staples. SS drainage. Dressing changed PRN.   - Bed in lowest locked position, call light and personal items in reach, verbalized understanding to call for assistance.

## 2024-04-16 NOTE — SUBJECTIVE & OBJECTIVE
Subjective:   History of Present Illness:  Mr. Colten Monet is a 63 yo male w/ PMHx of ESRD d/t IgA nephropathy, now S/P living related Ktxp on 3/11/24. Patient progressed well during initial post-txp course and discharged POD#2 with downtrending Cr. Now with BL Cr 1.0-1.1.     Patient recently seen in transplant surgery clinic on 4/3 and noted to have serous drainage from RLQ kidney txp incision along with palpable erythema. Staples intact w/ minimal tenderness to deep palpation at that time. Incision probed w/ 200 cc serous fluid drained in clinic by surgical fellow. Patient was placed on PO for cellulitis. He now presents to ED for worsening erythema to incisional area (see media for photo). Staples remain intact, erythema noted to entire length of incision, worse in upper portion. Small 1-2 cm area of yellow slough in upper portion incision. Minimal serous drainage expressed upon probing. Patient reports mild tenderness to deep palpation. Kidney US in ED w/ large 14.8 x 7.4 x 1.3 cm, just posterior to the anterior pelvic wall overlying the transplant kidney. Reviewed plan with Dr. Eduardo and Dr. Whyte. CT A/P non-con ordered along with IV abx. Staples removed at bedside, small 1-2 cm area of wound dehiscence noted, moderate amount serous fluid able to be expressed from incision. Mild HARRISON on admit (Cr 1.5). NS bolus ordered.         Mr. Monet is a 64 y.o. year old male who is status post Kidney Transplant - 3/11/2024  (#1).    His maintenance immunosuppression consists of:   Immunosuppressants (From admission, onward)      Start     Stop Route Frequency Ordered    04/16/24 1800  tacrolimus capsule 3 mg         -- Oral 2 times daily 04/16/24 0944    04/16/24 1030  tacrolimus capsule 0.5 mg         -- Oral Once 04/16/24 0944            Hospital Course:  Patient admitted for worsening cellulitis of kidney transplant incision, incisional hernia, and HARRISON. Cefepime and vanc started, infectious work-up  ordered. Hospital course as follows:  CT abd/pelvis 4/8/24 revealed incisional hernia containing distal ileum, associated small bowel feces sign, suggesting intestinal stasis, but without CT evidence of high-grade intestinal obstruction. TB  to OR 4/9/24 for repair of dehiscence of kidney transplant incision and hernia repair plus washout.   HARRISON: Worsened post OR with associated hyperkalemia. Required several interventions (shifting, lasix, IVF). Renal function and hyperkalemia both improved.   Ileus: Patient w/ N/V post take back, KUB 4/10 with ileus, NPO for bowel rest 4/11. Enema ordered 4/14 w/ 3 large BM after. Regular diet ordered  Infectious work-up negative. IV abx transitioned to PO levaquin and doxy x 5 days (EOT 4/15/24).    Interval History: NAEON. Patient remains with N/V with CLD. KUB 4/15 with ileus pattern. Will place NGT for decompression. Encourage more ambulation. Zofran as needed. Continue bowel regimen. Cr 1.6. Pt having poor PO intake. IVFs started. VSS. Monitor.     Past Medical, Surgical, Family, and Social History:   Unchanged from H&P.    Scheduled Meds:  Current Facility-Administered Medications   Medication Dose Route Frequency Provider Last Rate Last Admin    0.9%  NaCl infusion   Intravenous Continuous Diana Gonzales PA-C        acetaminophen tablet 650 mg  650 mg Oral Q6H PRN Radha Melgar MD   650 mg at 04/11/24 0503    albuterol inhaler 1 puff  1 puff Inhalation Q6H PRN Radha Melgar MD        aluminum-magnesium hydroxide-simethicone 200-200-20 mg/5 mL suspension 30 mL  30 mL Oral Q6H PRN Maren Harvey, LAKESHIA        atorvastatin tablet 40 mg  40 mg Oral QHS Radha Melgar MD   40 mg at 04/15/24 2116    atovaquone 750 mg/5 mL oral liquid 1,500 mg  1,500 mg Oral Daily Emilee Ellsworth NP   1,500 mg at 04/16/24 0818    bisacodyL EC tablet 10 mg  10 mg Oral Daily Padmini Mancilla PA-C   10 mg at 04/16/24 0818    bisacodyL suppository 10 mg  10 mg Rectal Daily PRN Padmini Mancilla  MAXX Martinez        calcium carbonate 200 mg calcium (500 mg) chewable tablet 500 mg  500 mg Oral TID PRN Maren Harvey DNP   500 mg at 04/11/24 1624    calcium gluconate 1 g in NS IVPB (premixed)  1 g Intravenous Q10 Min PRN Claudia Whyte MD        carvediloL tablet 12.5 mg  12.5 mg Oral BID Osman Boyce NP   12.5 mg at 04/16/24 0818    docusate sodium capsule 100 mg  100 mg Oral TID Maren Harvey DNP   100 mg at 04/16/24 0818    famotidine tablet 20 mg  20 mg Oral QHS Radha Melgar MD   20 mg at 04/15/24 2116    fluticasone furoate-vilanteroL 100-25 mcg/dose diskus inhaler 1 puff  1 puff Inhalation Daily Radha Melgar MD   1 puff at 04/16/24 0843    heparin (porcine) injection 5,000 Units  5,000 Units Subcutaneous Q8H Radha Melgar MD   5,000 Units at 04/15/24 2116    magnesium oxide tablet 800 mg  800 mg Oral Daily Claudia Whyte MD   800 mg at 04/15/24 1428    melatonin tablet 6 mg  6 mg Oral Nightly PRN Radha Melgar MD        ondansetron disintegrating tablet 8 mg  8 mg Oral Q6H PRN Radha Melgar MD   8 mg at 04/15/24 1743    oxyCODONE immediate release tablet 5 mg  5 mg Oral Q4H PRN Maren Harvey DNP        oxyCODONE immediate release tablet Tab 10 mg  10 mg Oral Q4H PRN Maren Harvey DNP   10 mg at 04/13/24 1733    polyethylene glycol packet 17 g  17 g Oral Daily Osamn Boyce NP   17 g at 04/16/24 0819    predniSONE tablet 5 mg  5 mg Oral Daily Radha Melgar MD   5 mg at 04/16/24 0818    simethicone chewable tablet 160 mg  2 tablet Oral QID (PC + HS) Padmini Mancilla PA-C   160 mg at 04/16/24 0845    sodium chloride 0.9% flush 3 mL  3 mL Intravenous PRN Radha Melgar MD        tacrolimus capsule 0.5 mg  0.5 mg Oral Once Diana Gonzales PA-C        tacrolimus capsule 3 mg  3 mg Oral BID Diana Gonzales PA-C        valGANciclovir tablet 900 mg  900 mg Oral Claudia Sifuentes MD   900 mg at 04/16/24 0800     Continuous Infusions:  Current  Facility-Administered Medications   Medication Dose Route Frequency Provider Last Rate Last Admin    0.9%  NaCl infusion   Intravenous Continuous Diana Gonzales PA-C        acetaminophen tablet 650 mg  650 mg Oral Q6H PRN Radha Melgar MD   650 mg at 04/11/24 0503    albuterol inhaler 1 puff  1 puff Inhalation Q6H PRN Radha Melgar MD        aluminum-magnesium hydroxide-simethicone 200-200-20 mg/5 mL suspension 30 mL  30 mL Oral Q6H PRN Maren Harvey DNP        atorvastatin tablet 40 mg  40 mg Oral QHS Radha Melgar MD   40 mg at 04/15/24 2116    atovaquone 750 mg/5 mL oral liquid 1,500 mg  1,500 mg Oral Daily Emilee Ellsworth NP   1,500 mg at 04/16/24 0818    bisacodyL EC tablet 10 mg  10 mg Oral Daily Padmini Mancilla PA-C   10 mg at 04/16/24 0818    bisacodyL suppository 10 mg  10 mg Rectal Daily PRN Padmini Mancilla PA-C        calcium carbonate 200 mg calcium (500 mg) chewable tablet 500 mg  500 mg Oral TID PRN Maren Harvey DNP   500 mg at 04/11/24 1624    calcium gluconate 1 g in NS IVPB (premixed)  1 g Intravenous Q10 Min PRN Clauida Whyte MD        carvediloL tablet 12.5 mg  12.5 mg Oral BID Osman Boyce NP   12.5 mg at 04/16/24 0818    docusate sodium capsule 100 mg  100 mg Oral TID Maren Harvey DNP   100 mg at 04/16/24 0818    famotidine tablet 20 mg  20 mg Oral QHS Radha Melgar MD   20 mg at 04/15/24 2116    fluticasone furoate-vilanteroL 100-25 mcg/dose diskus inhaler 1 puff  1 puff Inhalation Daily Radha Melgar MD   1 puff at 04/16/24 0843    heparin (porcine) injection 5,000 Units  5,000 Units Subcutaneous Q8H Radha Melgar MD   5,000 Units at 04/15/24 2116    magnesium oxide tablet 800 mg  800 mg Oral Daily Claudia Whyte MD   800 mg at 04/15/24 1428    melatonin tablet 6 mg  6 mg Oral Nightly PRN Radha Melgar MD        ondansetron disintegrating tablet 8 mg  8 mg Oral Q6H PRN Radha Melgar MD   8 mg at 04/15/24 0025    oxyCODONE immediate release  tablet 5 mg  5 mg Oral Q4H PRN Maren Harvey DNP        oxyCODONE immediate release tablet Tab 10 mg  10 mg Oral Q4H PRN Maren Harvey DNP   10 mg at 04/13/24 1733    polyethylene glycol packet 17 g  17 g Oral Daily Osman Boyce NP   17 g at 04/16/24 0819    predniSONE tablet 5 mg  5 mg Oral Daily Radha Melgar MD   5 mg at 04/16/24 0818    simethicone chewable tablet 160 mg  2 tablet Oral QID (PC + HS) Padmini Mancilla PA-C   160 mg at 04/16/24 0845    sodium chloride 0.9% flush 3 mL  3 mL Intravenous PRN Radha Melgar MD        tacrolimus capsule 0.5 mg  0.5 mg Oral Once Diana Gonzales PA-C        tacrolimus capsule 3 mg  3 mg Oral BID Diana Gonzales PA-C        valGANciclovir tablet 900 mg  900 mg Oral Claudia Sifuentes MD   900 mg at 04/16/24 0800     PRN Meds:  Current Facility-Administered Medications   Medication Dose Route Frequency Provider Last Rate Last Admin    0.9%  NaCl infusion   Intravenous Continuous Diana Gonzales PA-C        acetaminophen tablet 650 mg  650 mg Oral Q6H PRN Radha Melgar MD   650 mg at 04/11/24 0503    albuterol inhaler 1 puff  1 puff Inhalation Q6H PRN Radha Melgar MD        aluminum-magnesium hydroxide-simethicone 200-200-20 mg/5 mL suspension 30 mL  30 mL Oral Q6H PRN Maren Harvey DNP        atorvastatin tablet 40 mg  40 mg Oral QHS Radha Melgar MD   40 mg at 04/15/24 2116    atovaquone 750 mg/5 mL oral liquid 1,500 mg  1,500 mg Oral Daily Emilee Ellsworth NP   1,500 mg at 04/16/24 0818    bisacodyL EC tablet 10 mg  10 mg Oral Daily Padmini Mancilla PA-C   10 mg at 04/16/24 0818    bisacodyL suppository 10 mg  10 mg Rectal Daily PRN Charo, Xiomara, PA-C        calcium carbonate 200 mg calcium (500 mg) chewable tablet 500 mg  500 mg Oral TID PRN Maren Harvey DNP   500 mg at 04/11/24 1624    calcium gluconate 1 g in NS IVPB (premixed)  1 g Intravenous Q10 Min PRN Claudia Whyte MD        carvediloL tablet 12.5 mg   12.5 mg Oral BID Osman Boyce NP   12.5 mg at 04/16/24 0818    docusate sodium capsule 100 mg  100 mg Oral TID Maren Harvey DNP   100 mg at 04/16/24 0818    famotidine tablet 20 mg  20 mg Oral QHS Radha Melgar MD   20 mg at 04/15/24 2116    fluticasone furoate-vilanteroL 100-25 mcg/dose diskus inhaler 1 puff  1 puff Inhalation Daily Radha Melgar MD   1 puff at 04/16/24 0843    heparin (porcine) injection 5,000 Units  5,000 Units Subcutaneous Q8H Radha Melgar MD   5,000 Units at 04/15/24 2116    magnesium oxide tablet 800 mg  800 mg Oral Daily Claudia Whyte MD   800 mg at 04/15/24 1428    melatonin tablet 6 mg  6 mg Oral Nightly PRN Radha Melgar MD        ondansetron disintegrating tablet 8 mg  8 mg Oral Q6H PRN Radha Melgar MD   8 mg at 04/15/24 1743    oxyCODONE immediate release tablet 5 mg  5 mg Oral Q4H PRN Maren Harvey DNP        oxyCODONE immediate release tablet Tab 10 mg  10 mg Oral Q4H PRN Maren Harvey DNP   10 mg at 04/13/24 1733    polyethylene glycol packet 17 g  17 g Oral Daily Osman Boyce NP   17 g at 04/16/24 0819    predniSONE tablet 5 mg  5 mg Oral Daily Radha Melgar MD   5 mg at 04/16/24 0818    simethicone chewable tablet 160 mg  2 tablet Oral QID (PC + HS) Padmini Mancilla PA-C   160 mg at 04/16/24 0845    sodium chloride 0.9% flush 3 mL  3 mL Intravenous PRN Radha Melgar MD        tacrolimus capsule 0.5 mg  0.5 mg Oral Once Diana Gonzales PA-C        tacrolimus capsule 3 mg  3 mg Oral BID Diana Gonzales PA-C        valGANciclovir tablet 900 mg  900 mg Oral QAM Claudia Whyte MD   900 mg at 04/16/24 0800       Intake/Output - Last 3 Shifts         04/14 0700  04/15 0659 04/15 0700  04/16 0659 04/16 0700 04/17 0659    P.O. 680 250     Total Intake(mL/kg) 680 (7.4) 250 (2.7)     Urine (mL/kg/hr) 1375 (0.6) 550 (0.2)     Emesis/NG output 0      Stool 0 0     Total Output 1375 550     Net -695 -300            Urine Occurrence 3 x       "Stool Occurrence 3 x 0 x     Emesis Occurrence 1 x               Review of Systems   Constitutional:  Negative for chills and fever.   HENT:  Negative for congestion and trouble swallowing.    Eyes: Negative.    Respiratory:  Negative for cough and shortness of breath.    Cardiovascular:  Negative for chest pain and leg swelling.   Gastrointestinal:  Positive for abdominal distention, abdominal pain (incisional), nausea and vomiting.   Endocrine: Negative.    Genitourinary:  Negative for decreased urine volume and difficulty urinating.   Skin:  Positive for wound.   Allergic/Immunologic: Positive for immunocompromised state.   Neurological:  Negative for dizziness, tremors, weakness and headaches.   Psychiatric/Behavioral:  Negative for agitation, behavioral problems, confusion and decreased concentration.       Objective:     Vital Signs (Most Recent):  Temp: 97.7 °F (36.5 °C) (04/16/24 0720)  Pulse: 109 (04/16/24 0843)  Resp: 18 (04/16/24 0843)  BP: 114/80 (04/16/24 0720)  SpO2: (!) 93 % (04/16/24 0843) Vital Signs (24h Range):  Temp:  [97.2 °F (36.2 °C)-98.2 °F (36.8 °C)] 97.7 °F (36.5 °C)  Pulse:  [] 109  Resp:  [16-19] 18  SpO2:  [91 %-95 %] 93 %  BP: (114-153)/(80-95) 114/80     Weight: 92.5 kg (204 lb 0.6 oz)  Height: 5' 7" (170.2 cm)  Body mass index is 31.96 kg/m².     Physical Exam  Vitals and nursing note reviewed.   Constitutional:       General: He is not in acute distress.     Appearance: Normal appearance. He is not ill-appearing.   HENT:      Head: Normocephalic.      Mouth/Throat:      Pharynx: Oropharynx is clear.   Eyes:      Conjunctiva/sclera: Conjunctivae normal.   Cardiovascular:      Rate and Rhythm: Tachycardia present.      Pulses: Normal pulses.   Pulmonary:      Effort: Pulmonary effort is normal.      Breath sounds: Normal breath sounds.   Abdominal:      General: Bowel sounds are decreased. There is distension.      Tenderness: There is no guarding or rebound.      Comments: RLQ " Kidney txp incision CDI with staples intact, small area of erythema at superior part of incision   Musculoskeletal:         General: Normal range of motion.      Right lower leg: No edema.      Left lower leg: No edema.   Skin:     General: Skin is warm and dry.   Neurological:      Mental Status: He is alert and oriented to person, place, and time. Mental status is at baseline.      Motor: Weakness present.   Psychiatric:         Mood and Affect: Mood normal.         Behavior: Behavior normal. Behavior is cooperative.         Thought Content: Thought content normal.          Laboratory:  CBC:   Recent Labs   Lab 04/14/24  0639 04/15/24  0556 04/16/24  0555   WBC 10.63 9.47 11.79   RBC 4.05* 4.16* 4.29*   HGB 10.8* 11.3* 11.6*   HCT 36.6* 37.1* 38.6*    218 211   MCV 90 89 90   MCH 26.7* 27.2 27.0   MCHC 29.5* 30.5* 30.1*     BMP:   Recent Labs   Lab 04/14/24  0638 04/15/24  0556 04/16/24  0555    121* 100    138 138   K 4.6 4.2 4.2    102 99   CO2 25 25 28   BUN 18 23 30*   CREATININE 1.2 1.4 1.6*   CALCIUM 9.0 9.4 9.9     Labs within the past 24 hours have been reviewed.    Diagnostic Results:  US - Kidney: Results for orders placed during the hospital encounter of 04/08/24    US Transplant Kidney With Doppler    Narrative  EXAMINATION:  US TRANSPLANT KIDNEY WITH DOPPLER    CLINICAL HISTORY:  wound infection/drainage;    TECHNIQUE:  Real time gray scale and doppler ultrasound was performed over the patient's renal allograft.    COMPARISON:  03/25/2024    FINDINGS:  Renal allograft in the right lower quadrant.  The allograft measures 12.9 cm. Normal perfusion. No hydronephrosis.  There is a 1.2 cm cyst at the midpole.  There is a 1.7 cm cyst with a peripheral calcification, similar to the prior exam.    There is an elongated fluid collection underneath the anterior pelvic wall overlying the kidney transplant an area measuring 14.8 x 7.4 x 1.3 cm    Vasculature:    Inter lobar and segmental  arterial resistive indices ranged from 0.67 to 0.77, previously 0.65 to 0.75.    Main renal artery peak systolic velocity: 200 cm/s with normal waveform, previously 314 cm/s .    Renal artery/iliac ratio: 1.3.    The main renal vein is patent.    Impression  Large fluid collection, 14.8 x 7.4 x 1.3 cm, just posterior to the anterior pelvic wall overlying the transplant kidney concerning for seroma, hematoma or abscess.    Small renal cysts, one  of which has a small peripheral calcification, unchanged from the prior study.    Satisfactory Doppler evaluation of the transplant kidney.      Electronically signed by: Teresita Ohara MD  Date:    04/08/2024  Time:    12:38

## 2024-04-16 NOTE — PROGRESS NOTES
[facility]  Kidney Transplant  Progress Note      Reason for Follow-up: Reassessment of Kidney Transplant - 3/11/2024  (#1) recipient and management of immunosuppression.     ORGAN: LEFT KIDNEY      Donor Type: Living          Subjective:   History of Present Illness:  Mr. Colten Monet is a 65 yo male w/ PMHx of ESRD d/t IgA nephropathy, now S/P living related Ktxp on 3/11/24. Patient progressed well during initial post-txp course and discharged POD#2 with downtrending Cr. Now with BL Cr 1.0-1.1.     Patient recently seen in transplant surgery clinic on 4/3 and noted to have serous drainage from RLQ kidney txp incision along with palpable erythema. Staples intact w/ minimal tenderness to deep palpation at that time. Incision probed w/ 200 cc serous fluid drained in clinic by surgical fellow. Patient was placed on PO for cellulitis. He now presents to ED for worsening erythema to incisional area (see media for photo). Staples remain intact, erythema noted to entire length of incision, worse in upper portion. Small 1-2 cm area of yellow slough in upper portion incision. Minimal serous drainage expressed upon probing. Patient reports mild tenderness to deep palpation. Kidney US in ED w/ large 14.8 x 7.4 x 1.3 cm, just posterior to the anterior pelvic wall overlying the transplant kidney. Reviewed plan with Dr. Eduardo and Dr. Whyte. CT A/P non-con ordered along with IV abx. Staples removed at bedside, small 1-2 cm area of wound dehiscence noted, moderate amount serous fluid able to be expressed from incision. Mild HARRISON on admit (Cr 1.5). NS bolus ordered.         Mr. Monet is a 64 y.o. year old male who is status post Kidney Transplant - 3/11/2024  (#1).    His maintenance immunosuppression consists of:   Immunosuppressants (From admission, onward)      Start     Stop Route Frequency Ordered    04/16/24 1800  tacrolimus capsule 3 mg         -- Oral 2 times daily 04/16/24 0944    04/16/24 1030   tacrolimus capsule 0.5 mg         -- Oral Once 04/16/24 0944            Hospital Course:  Patient admitted for worsening cellulitis of kidney transplant incision, incisional hernia, and HARRISON. Cefepime and vanc started, infectious work-up ordered. Hospital course as follows:  CT abd/pelvis 4/8/24 revealed incisional hernia containing distal ileum, associated small bowel feces sign, suggesting intestinal stasis, but without CT evidence of high-grade intestinal obstruction. TB  to OR 4/9/24 for repair of dehiscence of kidney transplant incision and hernia repair plus washout.   HARRISON: Worsened post OR with associated hyperkalemia. Required several interventions (shifting, lasix, IVF). Renal function and hyperkalemia both improved.   Ileus: Patient w/ N/V post take back, KUB 4/10 with ileus, NPO for bowel rest 4/11. Enema ordered 4/14 w/ 3 large BM after. Regular diet ordered  Infectious work-up negative. IV abx transitioned to PO levaquin and doxy x 5 days (EOT 4/15/24).    Interval History: NAEON. Patient remains with N/V with CLD. KUB 4/15 with ileus pattern. Will place NGT for decompression. Encourage more ambulation. Zofran as needed. Continue bowel regimen. Cr 1.6. Pt having poor PO intake. IVFs started. VSS. Monitor.     Past Medical, Surgical, Family, and Social History:   Unchanged from H&P.    Scheduled Meds:  Current Facility-Administered Medications   Medication Dose Route Frequency Provider Last Rate Last Admin    0.9%  NaCl infusion   Intravenous Continuous Diana Gonzales PA-C        acetaminophen tablet 650 mg  650 mg Oral Q6H PRN Radha Melgar MD   650 mg at 04/11/24 0503    albuterol inhaler 1 puff  1 puff Inhalation Q6H PRN Radha Melgar MD        aluminum-magnesium hydroxide-simethicone 200-200-20 mg/5 mL suspension 30 mL  30 mL Oral Q6H PRN Maren Harvey, LAKESHIA        atorvastatin tablet 40 mg  40 mg Oral QHS Radha Melgar MD   40 mg at 04/15/24 2116    atovaquone 750 mg/5 mL oral liquid 1,500 mg   1,500 mg Oral Daily Emilee Ellsworth NP   1,500 mg at 04/16/24 0818    bisacodyL EC tablet 10 mg  10 mg Oral Daily Padmini Mancilla PA-C   10 mg at 04/16/24 0818    bisacodyL suppository 10 mg  10 mg Rectal Daily PRN Padmini Mancilla PA-C        calcium carbonate 200 mg calcium (500 mg) chewable tablet 500 mg  500 mg Oral TID PRN Maren Harvey DNP   500 mg at 04/11/24 1624    calcium gluconate 1 g in NS IVPB (premixed)  1 g Intravenous Q10 Min PRN Claudia Whyte MD        carvediloL tablet 12.5 mg  12.5 mg Oral BID Osman Boyce NP   12.5 mg at 04/16/24 0818    docusate sodium capsule 100 mg  100 mg Oral TID Maren Harvey DNP   100 mg at 04/16/24 0818    famotidine tablet 20 mg  20 mg Oral QHS Radha Melgar MD   20 mg at 04/15/24 2116    fluticasone furoate-vilanteroL 100-25 mcg/dose diskus inhaler 1 puff  1 puff Inhalation Daily Radha Melgar MD   1 puff at 04/16/24 0843    heparin (porcine) injection 5,000 Units  5,000 Units Subcutaneous Q8H Radha Melgar MD   5,000 Units at 04/15/24 2116    magnesium oxide tablet 800 mg  800 mg Oral Daily Claudia Whyte MD   800 mg at 04/15/24 1428    melatonin tablet 6 mg  6 mg Oral Nightly PRN Radha Melgar MD        ondansetron disintegrating tablet 8 mg  8 mg Oral Q6H PRN Radha Melgar MD   8 mg at 04/15/24 1743    oxyCODONE immediate release tablet 5 mg  5 mg Oral Q4H PRN Maren Harvey DNP        oxyCODONE immediate release tablet Tab 10 mg  10 mg Oral Q4H PRN Maren Harvey DNP   10 mg at 04/13/24 1733    polyethylene glycol packet 17 g  17 g Oral Daily Osman Boyce NP   17 g at 04/16/24 0819    predniSONE tablet 5 mg  5 mg Oral Daily Radha Melgar MD   5 mg at 04/16/24 0818    simethicone chewable tablet 160 mg  2 tablet Oral QID (PC + HS) Padmini Mancilla PA-C   160 mg at 04/16/24 0845    sodium chloride 0.9% flush 3 mL  3 mL Intravenous PRN Radha Melgar MD        tacrolimus capsule 0.5 mg  0.5 mg Oral Once  Diana Gonzales PA-C        tacrolimus capsule 3 mg  3 mg Oral BID Diana Gonzales PA-C        valGANciclovir tablet 900 mg  900 mg Oral QA Claudia Whyte MD   900 mg at 04/16/24 0800     Continuous Infusions:  Current Facility-Administered Medications   Medication Dose Route Frequency Provider Last Rate Last Admin    0.9%  NaCl infusion   Intravenous Continuous Diana Gonzales PA-C        acetaminophen tablet 650 mg  650 mg Oral Q6H PRN Radha Melgar MD   650 mg at 04/11/24 0503    albuterol inhaler 1 puff  1 puff Inhalation Q6H PRN Radha Melgar MD        aluminum-magnesium hydroxide-simethicone 200-200-20 mg/5 mL suspension 30 mL  30 mL Oral Q6H PRN Maren Harvey DNP        atorvastatin tablet 40 mg  40 mg Oral QHS Radha Melgar MD   40 mg at 04/15/24 2116    atovaquone 750 mg/5 mL oral liquid 1,500 mg  1,500 mg Oral Daily Emilee Ellsworth NP   1,500 mg at 04/16/24 0818    bisacodyL EC tablet 10 mg  10 mg Oral Daily Padmini Mancilla PA-C   10 mg at 04/16/24 0818    bisacodyL suppository 10 mg  10 mg Rectal Daily PRN Padmini Mancilla PA-C        calcium carbonate 200 mg calcium (500 mg) chewable tablet 500 mg  500 mg Oral TID PRN Maren Harvey DNP   500 mg at 04/11/24 1624    calcium gluconate 1 g in NS IVPB (premixed)  1 g Intravenous Q10 Min PRN Claudia Whyte MD        carvediloL tablet 12.5 mg  12.5 mg Oral BID Osman Boyce NP   12.5 mg at 04/16/24 0818    docusate sodium capsule 100 mg  100 mg Oral TID Maren Harvey DNP   100 mg at 04/16/24 0818    famotidine tablet 20 mg  20 mg Oral QHS Radha Melgar MD   20 mg at 04/15/24 2116    fluticasone furoate-vilanteroL 100-25 mcg/dose diskus inhaler 1 puff  1 puff Inhalation Daily Radha Melgar MD   1 puff at 04/16/24 0843    heparin (porcine) injection 5,000 Units  5,000 Units Subcutaneous Q8H Radha Melgar MD   5,000 Units at 04/15/24 2116    magnesium oxide tablet 800 mg  800 mg Oral Daily Claudia Whyte MD    800 mg at 04/15/24 1428    melatonin tablet 6 mg  6 mg Oral Nightly PRN Radha Melgar MD        ondansetron disintegrating tablet 8 mg  8 mg Oral Q6H PRN Radha Melgar MD   8 mg at 04/15/24 1743    oxyCODONE immediate release tablet 5 mg  5 mg Oral Q4H PRN Maren Harvey DNP        oxyCODONE immediate release tablet Tab 10 mg  10 mg Oral Q4H PRN Maren Harvey DNP   10 mg at 04/13/24 1733    polyethylene glycol packet 17 g  17 g Oral Daily Osman Boyce NP   17 g at 04/16/24 0819    predniSONE tablet 5 mg  5 mg Oral Daily Radha Melgar MD   5 mg at 04/16/24 0818    simethicone chewable tablet 160 mg  2 tablet Oral QID (PC + HS) Padmini Mancilla PA-C   160 mg at 04/16/24 0845    sodium chloride 0.9% flush 3 mL  3 mL Intravenous PRN Radha Melgar MD        tacrolimus capsule 0.5 mg  0.5 mg Oral Once Diana Gonzales PA-C        tacrolimus capsule 3 mg  3 mg Oral BID Diana Gonzales PA-C        valGANciclovir tablet 900 mg  900 mg Oral Claudia Sifuentes MD   900 mg at 04/16/24 0800     PRN Meds:  Current Facility-Administered Medications   Medication Dose Route Frequency Provider Last Rate Last Admin    0.9%  NaCl infusion   Intravenous Continuous Diana Gonzales PA-C        acetaminophen tablet 650 mg  650 mg Oral Q6H PRN Radha Melgar MD   650 mg at 04/11/24 0503    albuterol inhaler 1 puff  1 puff Inhalation Q6H PRN Radha Melgar MD        aluminum-magnesium hydroxide-simethicone 200-200-20 mg/5 mL suspension 30 mL  30 mL Oral Q6H PRN Maren Harvey DNP        atorvastatin tablet 40 mg  40 mg Oral QHS Radha Melgar MD   40 mg at 04/15/24 2116    atovaquone 750 mg/5 mL oral liquid 1,500 mg  1,500 mg Oral Daily Emilee Ellsworth NP   1,500 mg at 04/16/24 0818    bisacodyL EC tablet 10 mg  10 mg Oral Daily Padmini Mancilla PA-C   10 mg at 04/16/24 0818    bisacodyL suppository 10 mg  10 mg Rectal Daily PRN Padmini Mancilla PA-C        calcium carbonate 200 mg calcium (500  mg) chewable tablet 500 mg  500 mg Oral TID PRN Maren Harvey DNP   500 mg at 04/11/24 1624    calcium gluconate 1 g in NS IVPB (premixed)  1 g Intravenous Q10 Min PRN Claudia Whyte MD        carvediloL tablet 12.5 mg  12.5 mg Oral BID Osman Boyce, NP   12.5 mg at 04/16/24 0818    docusate sodium capsule 100 mg  100 mg Oral TID Maren Harvey DNP   100 mg at 04/16/24 0818    famotidine tablet 20 mg  20 mg Oral QHS Radha Melgar MD   20 mg at 04/15/24 2116    fluticasone furoate-vilanteroL 100-25 mcg/dose diskus inhaler 1 puff  1 puff Inhalation Daily Radha Melgar MD   1 puff at 04/16/24 0843    heparin (porcine) injection 5,000 Units  5,000 Units Subcutaneous Q8H Radha Melgar MD   5,000 Units at 04/15/24 2116    magnesium oxide tablet 800 mg  800 mg Oral Daily Claudia Whyte MD   800 mg at 04/15/24 1428    melatonin tablet 6 mg  6 mg Oral Nightly PRN Radha Melgar MD        ondansetron disintegrating tablet 8 mg  8 mg Oral Q6H PRN Radha Melgar MD   8 mg at 04/15/24 1743    oxyCODONE immediate release tablet 5 mg  5 mg Oral Q4H PRN Maren Harvey DNP        oxyCODONE immediate release tablet Tab 10 mg  10 mg Oral Q4H PRN Maren Harvey DNP   10 mg at 04/13/24 1733    polyethylene glycol packet 17 g  17 g Oral Daily Osman Boyce, NP   17 g at 04/16/24 0819    predniSONE tablet 5 mg  5 mg Oral Daily Radha Melgar MD   5 mg at 04/16/24 0818    simethicone chewable tablet 160 mg  2 tablet Oral QID (PC + HS) Padmini Mancilla PA-C   160 mg at 04/16/24 0845    sodium chloride 0.9% flush 3 mL  3 mL Intravenous PRN Radha Melgar MD        tacrolimus capsule 0.5 mg  0.5 mg Oral Once Diana Gonzales PA-C        tacrolimus capsule 3 mg  3 mg Oral BID Diana Gonzales PA-C        valGANciclovir tablet 900 mg  900 mg Oral Claudia Sifuentes MD   900 mg at 04/16/24 0800       Intake/Output - Last 3 Shifts         04/14 0700  04/15 0659 04/15 0700  04/16 0659 04/16 0700 04/17  "0659    P.O. 680 250     Total Intake(mL/kg) 680 (7.4) 250 (2.7)     Urine (mL/kg/hr) 1375 (0.6) 550 (0.2)     Emesis/NG output 0      Stool 0 0     Total Output 1375 550     Net -695 -300            Urine Occurrence 3 x      Stool Occurrence 3 x 0 x     Emesis Occurrence 1 x               Review of Systems   Constitutional:  Negative for chills and fever.   HENT:  Negative for congestion and trouble swallowing.    Eyes: Negative.    Respiratory:  Negative for cough and shortness of breath.    Cardiovascular:  Negative for chest pain and leg swelling.   Gastrointestinal:  Positive for abdominal distention, abdominal pain (incisional), nausea and vomiting.   Endocrine: Negative.    Genitourinary:  Negative for decreased urine volume and difficulty urinating.   Skin:  Positive for wound.   Allergic/Immunologic: Positive for immunocompromised state.   Neurological:  Negative for dizziness, tremors, weakness and headaches.   Psychiatric/Behavioral:  Negative for agitation, behavioral problems, confusion and decreased concentration.       Objective:     Vital Signs (Most Recent):  Temp: 97.7 °F (36.5 °C) (04/16/24 0720)  Pulse: 109 (04/16/24 0843)  Resp: 18 (04/16/24 0843)  BP: 114/80 (04/16/24 0720)  SpO2: (!) 93 % (04/16/24 0843) Vital Signs (24h Range):  Temp:  [97.2 °F (36.2 °C)-98.2 °F (36.8 °C)] 97.7 °F (36.5 °C)  Pulse:  [] 109  Resp:  [16-19] 18  SpO2:  [91 %-95 %] 93 %  BP: (114-153)/(80-95) 114/80     Weight: 92.5 kg (204 lb 0.6 oz)  Height: 5' 7" (170.2 cm)  Body mass index is 31.96 kg/m².     Physical Exam  Vitals and nursing note reviewed.   Constitutional:       General: He is not in acute distress.     Appearance: Normal appearance. He is not ill-appearing.   HENT:      Head: Normocephalic.      Mouth/Throat:      Pharynx: Oropharynx is clear.   Eyes:      Conjunctiva/sclera: Conjunctivae normal.   Cardiovascular:      Rate and Rhythm: Tachycardia present.      Pulses: Normal pulses.   Pulmonary:     "  Effort: Pulmonary effort is normal.      Breath sounds: Normal breath sounds.   Abdominal:      General: Bowel sounds are decreased. There is distension.      Tenderness: There is no guarding or rebound.      Comments: RLQ Kidney txp incision CDI with staples intact, small area of erythema at superior part of incision   Musculoskeletal:         General: Normal range of motion.      Right lower leg: No edema.      Left lower leg: No edema.   Skin:     General: Skin is warm and dry.   Neurological:      Mental Status: He is alert and oriented to person, place, and time. Mental status is at baseline.      Motor: Weakness present.   Psychiatric:         Mood and Affect: Mood normal.         Behavior: Behavior normal. Behavior is cooperative.         Thought Content: Thought content normal.          Laboratory:  CBC:   Recent Labs   Lab 04/14/24  0639 04/15/24  0556 04/16/24  0555   WBC 10.63 9.47 11.79   RBC 4.05* 4.16* 4.29*   HGB 10.8* 11.3* 11.6*   HCT 36.6* 37.1* 38.6*    218 211   MCV 90 89 90   MCH 26.7* 27.2 27.0   MCHC 29.5* 30.5* 30.1*     BMP:   Recent Labs   Lab 04/14/24  0638 04/15/24  0556 04/16/24  0555    121* 100    138 138   K 4.6 4.2 4.2    102 99   CO2 25 25 28   BUN 18 23 30*   CREATININE 1.2 1.4 1.6*   CALCIUM 9.0 9.4 9.9     Labs within the past 24 hours have been reviewed.    Diagnostic Results:  US - Kidney: Results for orders placed during the hospital encounter of 04/08/24    US Transplant Kidney With Doppler    Narrative  EXAMINATION:  US TRANSPLANT KIDNEY WITH DOPPLER    CLINICAL HISTORY:  wound infection/drainage;    TECHNIQUE:  Real time gray scale and doppler ultrasound was performed over the patient's renal allograft.    COMPARISON:  03/25/2024    FINDINGS:  Renal allograft in the right lower quadrant.  The allograft measures 12.9 cm. Normal perfusion. No hydronephrosis.  There is a 1.2 cm cyst at the midpole.  There is a 1.7 cm cyst with a peripheral  "calcification, similar to the prior exam.    There is an elongated fluid collection underneath the anterior pelvic wall overlying the kidney transplant an area measuring 14.8 x 7.4 x 1.3 cm    Vasculature:    Inter lobar and segmental arterial resistive indices ranged from 0.67 to 0.77, previously 0.65 to 0.75.    Main renal artery peak systolic velocity: 200 cm/s with normal waveform, previously 314 cm/s .    Renal artery/iliac ratio: 1.3.    The main renal vein is patent.    Impression  Large fluid collection, 14.8 x 7.4 x 1.3 cm, just posterior to the anterior pelvic wall overlying the transplant kidney concerning for seroma, hematoma or abscess.    Small renal cysts, one  of which has a small peripheral calcification, unchanged from the prior study.    Satisfactory Doppler evaluation of the transplant kidney.      Electronically signed by: Teresita Ohara MD  Date:    04/08/2024  Time:    12:38  Assessment/Plan:     * History of incisional hernia repair  - see " surgical wound infection"      Tachycardia  - Last EKG 4/15 with sinus tach  - Coreg started, dose increased 4/15      Ileus  - Seen on KUB 4/10   - NPO for bowel rest starting 4/11, Enema 4/14 with 3 large BM noted, advanced to regular diet 4/14   - Continued N/V 4/15 w/ KUB still showing ileus.   - Diet changed to clears only, still having n/v  - NGT placed 4/16 for decompression    - Encourage increase ambulation.   - Continue bowel regimen      Anemia of chronic disease  - monitor daily CBC  - stable      Hypomagnesemia  - Continue home PO mag  - IV Mag as needed      History of COPD  - Resume home neb      Dyslipidemia  - continue home statin      Surgical wound infection  - Pt with delayed surgical wound healing, seen in clinic 4/3 with 200 mL serous fluid expressed from incision w/ staples intact. Started on PO doxycycline at that time. No improvement with PO Doxy  - CT abd/pelvis 4/8 with RLQ transplant kidney with incisional hernia " "containing distal ileum. The large fluid collection interposed between the kidney and anterior abdominal wall drained at patient's bedside. Mild to moderate soft tissue stranding and trace fluid directly adjacent to the transplant kidney. CT reviewed by surgeon. Patient made NPO for surgical intervention for 4/9.   - Patient taken back to OR (4/9) for repair of wound dehiscence and hernia repair, abdominal washout  - Infectious work up negative thus far.   - Cefepime and vanc transitioned to PO levaquin and doxy x5 days. (EOT 4/15)      Prophylactic immunotherapy  - see "long term use of immunosuppression"      At risk for opportunistic infections  - OI prophylaxis per transplant protocol      Long-term use of immunosuppressant medication  - Continue prograf and prednisone  - Check prograf level daily and adjust for therapeutic dosage. Monitor for toxic side effects   - Cellcept held on admit d/t infection      S/P living-donor kidney transplantation  - S/p Ktxp 3/11/24 d/t IgA nephropathy. Progressed well post-txp with Cr downtrending  - BL Cr 1.1-1.2  - Cr 1.6 on 4/16, IVFs started   - strict Is and Os   - encourage hydration       Discharge Planning: Not a candidate for d/c at this time.     Medical decision making for this encounter includes review of pertinent labs and diagnostic studies, assessment and planning, discussions with consulting providers, discussion with patient/family, and participation in multidisciplinary rounds. Time spent caring for patient: 60 minutes    Diana Gonzales PA-C  Kidney Transplant  [facility]    "

## 2024-04-16 NOTE — PLAN OF CARE
AAOX4  VVS  R NARE NGT TO WALL SUCTION   NPO  NS GTT STARTED AS ORDERED   SAFETY IN PLACE AND VERBAL UNDERSTANDING NOTED TO CALL PRN

## 2024-04-17 LAB
ALBUMIN SERPL BCP-MCNC: 2.8 G/DL (ref 3.5–5.2)
ANION GAP SERPL CALC-SCNC: 7 MMOL/L (ref 8–16)
BASOPHILS # BLD AUTO: 0.06 K/UL (ref 0–0.2)
BASOPHILS NFR BLD: 0.6 % (ref 0–1.9)
BUN SERPL-MCNC: 31 MG/DL (ref 8–23)
CALCIUM SERPL-MCNC: 8.8 MG/DL (ref 8.7–10.5)
CHLORIDE SERPL-SCNC: 108 MMOL/L (ref 95–110)
CO2 SERPL-SCNC: 25 MMOL/L (ref 23–29)
CREAT SERPL-MCNC: 1.3 MG/DL (ref 0.5–1.4)
DIFFERENTIAL METHOD BLD: ABNORMAL
EOSINOPHIL # BLD AUTO: 0.1 K/UL (ref 0–0.5)
EOSINOPHIL NFR BLD: 1 % (ref 0–8)
ERYTHROCYTE [DISTWIDTH] IN BLOOD BY AUTOMATED COUNT: 13.7 % (ref 11.5–14.5)
EST. GFR  (NO RACE VARIABLE): >60 ML/MIN/1.73 M^2
GLUCOSE SERPL-MCNC: 83 MG/DL (ref 70–110)
HCT VFR BLD AUTO: 34.3 % (ref 40–54)
HGB BLD-MCNC: 10.2 G/DL (ref 14–18)
IMM GRANULOCYTES # BLD AUTO: 0.15 K/UL (ref 0–0.04)
IMM GRANULOCYTES NFR BLD AUTO: 1.6 % (ref 0–0.5)
LYMPHOCYTES # BLD AUTO: 1.1 K/UL (ref 1–4.8)
LYMPHOCYTES NFR BLD: 11.6 % (ref 18–48)
MAGNESIUM SERPL-MCNC: 1.9 MG/DL (ref 1.6–2.6)
MCH RBC QN AUTO: 26.9 PG (ref 27–31)
MCHC RBC AUTO-ENTMCNC: 29.7 G/DL (ref 32–36)
MCV RBC AUTO: 91 FL (ref 82–98)
MONOCYTES # BLD AUTO: 1 K/UL (ref 0.3–1)
MONOCYTES NFR BLD: 10.6 % (ref 4–15)
NEUTROPHILS # BLD AUTO: 7.1 K/UL (ref 1.8–7.7)
NEUTROPHILS NFR BLD: 74.6 % (ref 38–73)
NRBC BLD-RTO: 0 /100 WBC
PHOSPHATE SERPL-MCNC: 3.2 MG/DL (ref 2.7–4.5)
PLATELET # BLD AUTO: 192 K/UL (ref 150–450)
PMV BLD AUTO: 10.1 FL (ref 9.2–12.9)
POTASSIUM SERPL-SCNC: 4 MMOL/L (ref 3.5–5.1)
RBC # BLD AUTO: 3.79 M/UL (ref 4.6–6.2)
SODIUM SERPL-SCNC: 140 MMOL/L (ref 136–145)
TACROLIMUS BLD-MCNC: 7.4 NG/ML (ref 5–15)
WBC # BLD AUTO: 9.54 K/UL (ref 3.9–12.7)

## 2024-04-17 PROCEDURE — 94761 N-INVAS EAR/PLS OXIMETRY MLT: CPT

## 2024-04-17 PROCEDURE — 25000003 PHARM REV CODE 250: Performed by: PHYSICIAN ASSISTANT

## 2024-04-17 PROCEDURE — 25000242 PHARM REV CODE 250 ALT 637 W/ HCPCS

## 2024-04-17 PROCEDURE — 80069 RENAL FUNCTION PANEL: CPT

## 2024-04-17 PROCEDURE — 63600175 PHARM REV CODE 636 W HCPCS

## 2024-04-17 PROCEDURE — 36415 COLL VENOUS BLD VENIPUNCTURE: CPT

## 2024-04-17 PROCEDURE — 27000207 HC ISOLATION

## 2024-04-17 PROCEDURE — 85025 COMPLETE CBC W/AUTO DIFF WBC: CPT

## 2024-04-17 PROCEDURE — 94640 AIRWAY INHALATION TREATMENT: CPT

## 2024-04-17 PROCEDURE — 83735 ASSAY OF MAGNESIUM: CPT

## 2024-04-17 PROCEDURE — 80197 ASSAY OF TACROLIMUS: CPT

## 2024-04-17 PROCEDURE — 20600001 HC STEP DOWN PRIVATE ROOM

## 2024-04-17 PROCEDURE — 25000003 PHARM REV CODE 250: Performed by: INTERNAL MEDICINE

## 2024-04-17 PROCEDURE — 25000003 PHARM REV CODE 250: Performed by: CLINICAL NURSE SPECIALIST

## 2024-04-17 PROCEDURE — 99900035 HC TECH TIME PER 15 MIN (STAT)

## 2024-04-17 PROCEDURE — 63600175 PHARM REV CODE 636 W HCPCS: Performed by: PHYSICIAN ASSISTANT

## 2024-04-17 PROCEDURE — 25000003 PHARM REV CODE 250

## 2024-04-17 PROCEDURE — 25000003 PHARM REV CODE 250: Performed by: NURSE PRACTITIONER

## 2024-04-17 PROCEDURE — 99233 SBSQ HOSP IP/OBS HIGH 50: CPT | Mod: ,,, | Performed by: PHYSICIAN ASSISTANT

## 2024-04-17 RX ORDER — SODIUM CHLORIDE 9 MG/ML
INJECTION, SOLUTION INTRAVENOUS CONTINUOUS
Status: DISCONTINUED | OUTPATIENT
Start: 2024-04-17 | End: 2024-04-18

## 2024-04-17 RX ORDER — MYCOPHENOLIC ACID 180 MG/1
720 TABLET, DELAYED RELEASE ORAL 2 TIMES DAILY
Status: DISCONTINUED | OUTPATIENT
Start: 2024-04-17 | End: 2024-04-22

## 2024-04-17 RX ORDER — NAPROXEN SODIUM 220 MG/1
81 TABLET, FILM COATED ORAL DAILY
Status: DISCONTINUED | OUTPATIENT
Start: 2024-04-17 | End: 2024-04-25 | Stop reason: HOSPADM

## 2024-04-17 RX ADMIN — MYCOPHENOLIC ACID 720 MG: 180 TABLET, DELAYED RELEASE ORAL at 08:04

## 2024-04-17 RX ADMIN — MYCOPHENOLIC ACID 720 MG: 180 TABLET, DELAYED RELEASE ORAL at 01:04

## 2024-04-17 RX ADMIN — VALGANCICLOVIR 900 MG: 450 TABLET, FILM COATED ORAL at 08:04

## 2024-04-17 RX ADMIN — SODIUM CHLORIDE: 9 INJECTION, SOLUTION INTRAVENOUS at 03:04

## 2024-04-17 RX ADMIN — TACROLIMUS 3 MG: 1 CAPSULE ORAL at 08:04

## 2024-04-17 RX ADMIN — FLUTICASONE FUROATE AND VILANTEROL TRIFENATATE 1 PUFF: 100; 25 POWDER RESPIRATORY (INHALATION) at 09:04

## 2024-04-17 RX ADMIN — DOCUSATE SODIUM 100 MG: 100 CAPSULE, LIQUID FILLED ORAL at 08:04

## 2024-04-17 RX ADMIN — TACROLIMUS 3 MG: 1 CAPSULE ORAL at 05:04

## 2024-04-17 RX ADMIN — DOCUSATE SODIUM 100 MG: 100 CAPSULE, LIQUID FILLED ORAL at 02:04

## 2024-04-17 RX ADMIN — CARVEDILOL 12.5 MG: 12.5 TABLET, FILM COATED ORAL at 02:04

## 2024-04-17 RX ADMIN — ASPIRIN 81 MG CHEWABLE TABLET 81 MG: 81 TABLET CHEWABLE at 01:04

## 2024-04-17 RX ADMIN — ATORVASTATIN CALCIUM 40 MG: 20 TABLET, FILM COATED ORAL at 08:04

## 2024-04-17 RX ADMIN — PREDNISONE 5 MG: 5 TABLET ORAL at 08:04

## 2024-04-17 RX ADMIN — SODIUM CHLORIDE: 9 INJECTION, SOLUTION INTRAVENOUS at 11:04

## 2024-04-17 RX ADMIN — Medication 800 MG: at 01:04

## 2024-04-17 RX ADMIN — CARVEDILOL 12.5 MG: 12.5 TABLET, FILM COATED ORAL at 08:04

## 2024-04-17 RX ADMIN — FAMOTIDINE 20 MG: 20 TABLET ORAL at 08:04

## 2024-04-17 RX ADMIN — HEPARIN SODIUM 5000 UNITS: 5000 INJECTION INTRAVENOUS; SUBCUTANEOUS at 01:04

## 2024-04-17 NOTE — PLAN OF CARE
- Pt AAOx4, afebrile, VSS, RA.   - NGT to LIWS. Green output. Refer to flowsheets for output totals. Pt remains NPO.   - RLQ incision with staples. SS drainage. Dressing changed PRN.   - NS continued at 100 cc/hr  - Bed in lowest locked position, call light and personal items in reach, verbalized understanding to call for assistance.

## 2024-04-17 NOTE — SUBJECTIVE & OBJECTIVE
Subjective:   History of Present Illness:  Mr. Colten Monet is a 65 yo male w/ PMHx of ESRD d/t IgA nephropathy, now S/P living related Ktxp on 3/11/24. Patient progressed well during initial post-txp course and discharged POD#2 with downtrending Cr. Now with BL Cr 1.0-1.1.     Patient recently seen in transplant surgery clinic on 4/3 and noted to have serous drainage from RLQ kidney txp incision along with palpable erythema. Staples intact w/ minimal tenderness to deep palpation at that time. Incision probed w/ 200 cc serous fluid drained in clinic by surgical fellow. Patient was placed on PO for cellulitis. He now presents to ED for worsening erythema to incisional area (see media for photo). Staples remain intact, erythema noted to entire length of incision, worse in upper portion. Small 1-2 cm area of yellow slough in upper portion incision. Minimal serous drainage expressed upon probing. Patient reports mild tenderness to deep palpation. Kidney US in ED w/ large 14.8 x 7.4 x 1.3 cm, just posterior to the anterior pelvic wall overlying the transplant kidney. Reviewed plan with Dr. Eduardo and Dr. Whyte. CT A/P non-con ordered along with IV abx. Staples removed at bedside, small 1-2 cm area of wound dehiscence noted, moderate amount serous fluid able to be expressed from incision. Mild HARRISON on admit (Cr 1.5). NS bolus ordered.         Mr. Monet is a 64 y.o. year old male who is status post Kidney Transplant - 3/11/2024  (#1).    His maintenance immunosuppression consists of:   Immunosuppressants (From admission, onward)      Start     Stop Route Frequency Ordered    04/17/24 1215  mycophenolate sodium EC tablet 720 mg         -- Oral 2 times daily 04/17/24 1212    04/16/24 1800  tacrolimus capsule 3 mg         -- Oral 2 times daily 04/16/24 0944            Hospital Course:  Patient admitted for worsening cellulitis of kidney transplant incision, incisional hernia, and HARRISON. Cefepime and vanc started,  infectious work-up ordered. Hospital course as follows:  CT abd/pelvis 4/8/24 revealed incisional hernia containing distal ileum, associated small bowel feces sign, suggesting intestinal stasis, but without CT evidence of high-grade intestinal obstruction. TB  to OR 4/9/24 for repair of dehiscence of kidney transplant incision and hernia repair plus washout.   HARRISON: Worsened post OR with associated hyperkalemia. Required several interventions (shifting, lasix, IVF). Renal function and hyperkalemia both improved.   Ileus: Patient w/ N/V post take back, KUB 4/10 with ileus, NPO for bowel rest 4/11. Enema ordered 4/14 w/ 3 large BM after. Regular diet ordered  Infectious work-up negative. IV abx transitioned to PO levaquin and doxy x 5 days (EOT 4/15/24).    Interval History: NAEON. NGT remains in place, 950 cc output past 24 hrs. Keep NGT in place to LIWS. Pt reports nausea has improved. Had 3 BMs ON. Will repeat KUB in the am. Encourage more ambulation. Zofran as needed. Continue bowel regimen. Myfortic started. Cr 1.3 from 1.6 with IVFs. VSS. Monitor.       Past Medical, Surgical, Family, and Social History:   Unchanged from H&P.    Scheduled Meds:  Current Facility-Administered Medications   Medication Dose Route Frequency    aspirin  81 mg Oral Daily    atorvastatin  40 mg Oral QHS    atovaquone  1,500 mg Oral Daily    bisacodyL  10 mg Oral Daily    carvediloL  12.5 mg Oral BID    docusate sodium  100 mg Oral TID    famotidine  20 mg Oral QHS    fluticasone furoate-vilanteroL  1 puff Inhalation Daily    heparin (porcine)  5,000 Units Subcutaneous Q8H    magnesium oxide  800 mg Oral Daily    mycophenolate sodium  720 mg Oral BID    polyethylene glycol  17 g Oral Daily    predniSONE  5 mg Oral Daily    simethicone  2 tablet Oral QID (PC + HS)    tacrolimus  3 mg Oral BID    valGANciclovir  900 mg Oral QAM     Continuous Infusions:  Current Facility-Administered Medications   Medication Dose Route Frequency Last Rate  Last Admin    sodium chloride 0.9%   Intravenous Continuous 70 mL/hr at 04/17/24 1215 Rate Change at 04/17/24 1215     PRN Meds:  Current Facility-Administered Medications:     acetaminophen, 650 mg, Oral, Q6H PRN    albuterol, 1 puff, Inhalation, Q6H PRN    aluminum-magnesium hydroxide-simethicone, 30 mL, Oral, Q6H PRN    bisacodyL, 10 mg, Rectal, Daily PRN    calcium carbonate, 500 mg, Oral, TID PRN    [COMPLETED] calcium gluconate IVPB, 1 g, Intravenous, Once **AND** calcium gluconate IVPB, 1 g, Intravenous, Q10 Min PRN    melatonin, 6 mg, Oral, Nightly PRN    ondansetron, 8 mg, Oral, Q6H PRN    oxyCODONE, 5 mg, Oral, Q4H PRN    oxyCODONE, 10 mg, Oral, Q4H PRN    sodium chloride 0.9%, 3 mL, Intravenous, PRN    Intake/Output - Last 3 Shifts         04/15 0700  04/16 0659 04/16 0700  04/17 0659 04/17 0700  04/18 0659    P.O. 250 0     I.V. (mL/kg)  741.7 (8.2)     Total Intake(mL/kg) 250 (2.7) 741.7 (8.2)     Urine (mL/kg/hr) 550 (0.2) 600 (0.3)     Emesis/NG output       Drains  950     Stool 0 0     Total Output 550 1550     Net -300 -808.3            Urine Occurrence  4 x     Stool Occurrence 0 x 4 x              Review of Systems   Constitutional:  Negative for chills and fever.   HENT:  Negative for congestion and trouble swallowing.    Eyes: Negative.    Respiratory:  Negative for cough and shortness of breath.    Cardiovascular:  Negative for chest pain and leg swelling.   Gastrointestinal:  Positive for abdominal distention, abdominal pain (incisional) and nausea (improving). Negative for vomiting.   Endocrine: Negative.    Genitourinary:  Negative for decreased urine volume and difficulty urinating.   Skin:  Positive for wound.   Allergic/Immunologic: Positive for immunocompromised state.   Neurological:  Negative for dizziness, tremors, weakness and headaches.   Psychiatric/Behavioral:  Negative for agitation, behavioral problems, confusion and decreased concentration.       Objective:     Vital Signs  "(Most Recent):  Temp: 98.2 °F (36.8 °C) (04/17/24 1119)  Pulse: 97 (04/17/24 1119)  Resp: 16 (04/17/24 1119)  BP: (!) 182/89 (04/17/24 1119)  SpO2: (!) 93 % (04/17/24 1119) Vital Signs (24h Range):  Temp:  [97.9 °F (36.6 °C)-98.2 °F (36.8 °C)] 98.2 °F (36.8 °C)  Pulse:  [85-99] 97  Resp:  [16-20] 16  SpO2:  [91 %-94 %] 93 %  BP: (116-186)/(76-97) 182/89     Weight: 90.5 kg (199 lb 8.3 oz)  Height: 5' 7" (170.2 cm)  Body mass index is 31.25 kg/m².     Physical Exam  Vitals and nursing note reviewed.   Constitutional:       General: He is not in acute distress.     Appearance: Normal appearance. He is not ill-appearing.   HENT:      Head: Normocephalic.      Mouth/Throat:      Pharynx: Oropharynx is clear.   Eyes:      Conjunctiva/sclera: Conjunctivae normal.   Cardiovascular:      Rate and Rhythm: Tachycardia present.      Pulses: Normal pulses.   Pulmonary:      Effort: Pulmonary effort is normal.      Breath sounds: Normal breath sounds.   Abdominal:      General: Bowel sounds are decreased. There is distension.      Tenderness: There is no guarding or rebound.      Comments: RLQ Kidney txp incision CDI with staples intact, small area of erythema at superior part of incision (improving)   Musculoskeletal:         General: Normal range of motion.      Right lower leg: No edema.      Left lower leg: No edema.   Skin:     General: Skin is warm and dry.   Neurological:      Mental Status: He is alert and oriented to person, place, and time. Mental status is at baseline.      Motor: Weakness present.   Psychiatric:         Mood and Affect: Mood normal.         Behavior: Behavior normal. Behavior is cooperative.         Thought Content: Thought content normal.          Laboratory:  CBC:   Recent Labs   Lab 04/15/24  0556 04/16/24  0555 04/17/24  0551   WBC 9.47 11.79 9.54   RBC 4.16* 4.29* 3.79*   HGB 11.3* 11.6* 10.2*   HCT 37.1* 38.6* 34.3*    211 192   MCV 89 90 91   MCH 27.2 27.0 26.9*   MCHC 30.5* 30.1* 29.7* "     BMP:   Recent Labs   Lab 04/15/24  0556 04/16/24  0555 04/17/24  0551   * 100 83    138 140   K 4.2 4.2 4.0    99 108   CO2 25 28 25   BUN 23 30* 31*   CREATININE 1.4 1.6* 1.3   CALCIUM 9.4 9.9 8.8     Labs within the past 24 hours have been reviewed.    Diagnostic Results:  EXAMINATION:  XR ABDOMEN AP 1 VIEW     CLINICAL HISTORY:  emesis;     TECHNIQUE:  AP View(s) of the abdomen was performed.     COMPARISON:  April 11, 2024     FINDINGS:  There is some volume loss in the inferior chest.  Osseous structures are unremarkable.  Surgical changes are present along the right lower quadrant.  There is mildly dilated small bowel, degree of small bowel distension similar to previous exam.  There is air within the colon which is nondistended.  Findings may reflect ileus, partial degree of small-bowel obstruction not excluded.     Impression:     As above        Electronically signed by:Angela Santana MD  Date:                                            04/15/2024  Time:                                           12:37

## 2024-04-17 NOTE — PROGRESS NOTES
04/17/24 1119   Vital Signs   Temp 98.2 °F (36.8 °C)   Temp Source Oral   Pulse 97   Heart Rate Source Monitor   Resp 16   SpO2 (!) 93 %   BP (!) 182/89     Notified LINDA GEORGE

## 2024-04-17 NOTE — PROGRESS NOTES
[facility]plant Step[facility]  Kidney Transplant  Progress Note      Reason for Follow-up: Reassessment of Kidney Transplant - 3/11/2024  (#1) recipient and management of immunosuppression.     ORGAN: LEFT KIDNEY      Donor Type: Living          Subjective:   History of Present Illness:  Mr. Colten Monet is a 65 yo male w/ PMHx of ESRD d/t IgA nephropathy, now S/P living related Ktxp on 3/11/24. Patient progressed well during initial post-txp course and discharged POD#2 with downtrending Cr. Now with BL Cr 1.0-1.1.     Patient recently seen in transplant surgery clinic on 4/3 and noted to have serous drainage from RLQ kidney txp incision along with palpable erythema. Staples intact w/ minimal tenderness to deep palpation at that time. Incision probed w/ 200 cc serous fluid drained in clinic by surgical fellow. Patient was placed on PO for cellulitis. He now presents to ED for worsening erythema to incisional area (see media for photo). Staples remain intact, erythema noted to entire length of incision, worse in upper portion. Small 1-2 cm area of yellow slough in upper portion incision. Minimal serous drainage expressed upon probing. Patient reports mild tenderness to deep palpation. Kidney US in ED w/ large 14.8 x 7.4 x 1.3 cm, just posterior to the anterior pelvic wall overlying the transplant kidney. Reviewed plan with Dr. Eduardo and Dr. Whyte. CT A/P non-con ordered along with IV abx. Staples removed at bedside, small 1-2 cm area of wound dehiscence noted, moderate amount serous fluid able to be expressed from incision. Mild HARRISON on admit (Cr 1.5). NS bolus ordered.         Mr. Monet is a 64 y.o. year old male who is status post Kidney Transplant - 3/11/2024  (#1).    His maintenance immunosuppression consists of:   Immunosuppressants (From admission, onward)      Start     Stop Route Frequency Ordered    04/17/24 1215  mycophenolate sodium EC tablet 720 mg         -- Oral 2 times daily 04/17/24 1212     04/16/24 1800  tacrolimus capsule 3 mg         -- Oral 2 times daily 04/16/24 0944            Hospital Course:  Patient admitted for worsening cellulitis of kidney transplant incision, incisional hernia, and HARRISON. Cefepime and vanc started, infectious work-up ordered. Hospital course as follows:  CT abd/pelvis 4/8/24 revealed incisional hernia containing distal ileum, associated small bowel feces sign, suggesting intestinal stasis, but without CT evidence of high-grade intestinal obstruction. TB  to OR 4/9/24 for repair of dehiscence of kidney transplant incision and hernia repair plus washout.   HARRISON: Worsened post OR with associated hyperkalemia. Required several interventions (shifting, lasix, IVF). Renal function and hyperkalemia both improved.   Ileus: Patient w/ N/V post take back, KUB 4/10 with ileus, NPO for bowel rest 4/11. Enema ordered 4/14 w/ 3 large BM after. Regular diet ordered  Infectious work-up negative. IV abx transitioned to PO levaquin and doxy x 5 days (EOT 4/15/24).    Interval History: NAEON. NGT remains in place, 950 cc output past 24 hrs. Keep NGT in place to LIWS. Pt reports nausea has improved. Had 3 BMs ON. Will repeat KUB in the am. Encourage more ambulation. Zofran as needed. Continue bowel regimen. Myfortic started. Cr 1.3 from 1.6 with IVFs. VSS. Monitor.       Past Medical, Surgical, Family, and Social History:   Unchanged from H&P.    Scheduled Meds:  Current Facility-Administered Medications   Medication Dose Route Frequency    aspirin  81 mg Oral Daily    atorvastatin  40 mg Oral QHS    atovaquone  1,500 mg Oral Daily    bisacodyL  10 mg Oral Daily    carvediloL  12.5 mg Oral BID    docusate sodium  100 mg Oral TID    famotidine  20 mg Oral QHS    fluticasone furoate-vilanteroL  1 puff Inhalation Daily    heparin (porcine)  5,000 Units Subcutaneous Q8H    magnesium oxide  800 mg Oral Daily    mycophenolate sodium  720 mg Oral BID    polyethylene glycol  17 g Oral Daily     predniSONE  5 mg Oral Daily    simethicone  2 tablet Oral QID (PC + HS)    tacrolimus  3 mg Oral BID    valGANciclovir  900 mg Oral QAM     Continuous Infusions:  Current Facility-Administered Medications   Medication Dose Route Frequency Last Rate Last Admin    sodium chloride 0.9%   Intravenous Continuous 70 mL/hr at 04/17/24 1215 Rate Change at 04/17/24 1215     PRN Meds:  Current Facility-Administered Medications:     acetaminophen, 650 mg, Oral, Q6H PRN    albuterol, 1 puff, Inhalation, Q6H PRN    aluminum-magnesium hydroxide-simethicone, 30 mL, Oral, Q6H PRN    bisacodyL, 10 mg, Rectal, Daily PRN    calcium carbonate, 500 mg, Oral, TID PRN    [COMPLETED] calcium gluconate IVPB, 1 g, Intravenous, Once **AND** calcium gluconate IVPB, 1 g, Intravenous, Q10 Min PRN    melatonin, 6 mg, Oral, Nightly PRN    ondansetron, 8 mg, Oral, Q6H PRN    oxyCODONE, 5 mg, Oral, Q4H PRN    oxyCODONE, 10 mg, Oral, Q4H PRN    sodium chloride 0.9%, 3 mL, Intravenous, PRN    Intake/Output - Last 3 Shifts         04/15 0700  04/16 0659 04/16 0700  04/17 0659 04/17 0700  04/18 0659    P.O. 250 0     I.V. (mL/kg)  741.7 (8.2)     Total Intake(mL/kg) 250 (2.7) 741.7 (8.2)     Urine (mL/kg/hr) 550 (0.2) 600 (0.3)     Emesis/NG output       Drains  950     Stool 0 0     Total Output 550 1550     Net -300 -808.3            Urine Occurrence  4 x     Stool Occurrence 0 x 4 x              Review of Systems   Constitutional:  Negative for chills and fever.   HENT:  Negative for congestion and trouble swallowing.    Eyes: Negative.    Respiratory:  Negative for cough and shortness of breath.    Cardiovascular:  Negative for chest pain and leg swelling.   Gastrointestinal:  Positive for abdominal distention, abdominal pain (incisional) and nausea (improving). Negative for vomiting.   Endocrine: Negative.    Genitourinary:  Negative for decreased urine volume and difficulty urinating.   Skin:  Positive for wound.   Allergic/Immunologic: Positive  "for immunocompromised state.   Neurological:  Negative for dizziness, tremors, weakness and headaches.   Psychiatric/Behavioral:  Negative for agitation, behavioral problems, confusion and decreased concentration.       Objective:     Vital Signs (Most Recent):  Temp: 98.2 °F (36.8 °C) (04/17/24 1119)  Pulse: 97 (04/17/24 1119)  Resp: 16 (04/17/24 1119)  BP: (!) 182/89 (04/17/24 1119)  SpO2: (!) 93 % (04/17/24 1119) Vital Signs (24h Range):  Temp:  [97.9 °F (36.6 °C)-98.2 °F (36.8 °C)] 98.2 °F (36.8 °C)  Pulse:  [85-99] 97  Resp:  [16-20] 16  SpO2:  [91 %-94 %] 93 %  BP: (116-186)/(76-97) 182/89     Weight: 90.5 kg (199 lb 8.3 oz)  Height: 5' 7" (170.2 cm)  Body mass index is 31.25 kg/m².     Physical Exam  Vitals and nursing note reviewed.   Constitutional:       General: He is not in acute distress.     Appearance: Normal appearance. He is not ill-appearing.   HENT:      Head: Normocephalic.      Mouth/Throat:      Pharynx: Oropharynx is clear.   Eyes:      Conjunctiva/sclera: Conjunctivae normal.   Cardiovascular:      Rate and Rhythm: Tachycardia present.      Pulses: Normal pulses.   Pulmonary:      Effort: Pulmonary effort is normal.      Breath sounds: Normal breath sounds.   Abdominal:      General: Bowel sounds are decreased. There is distension.      Tenderness: There is no guarding or rebound.      Comments: RLQ Kidney txp incision CDI with staples intact, small area of erythema at superior part of incision (improving)   Musculoskeletal:         General: Normal range of motion.      Right lower leg: No edema.      Left lower leg: No edema.   Skin:     General: Skin is warm and dry.   Neurological:      Mental Status: He is alert and oriented to person, place, and time. Mental status is at baseline.      Motor: Weakness present.   Psychiatric:         Mood and Affect: Mood normal.         Behavior: Behavior normal. Behavior is cooperative.         Thought Content: Thought content normal.        " "  Laboratory:  CBC:   Recent Labs   Lab 04/15/24  0556 04/16/24  0555 04/17/24  0551   WBC 9.47 11.79 9.54   RBC 4.16* 4.29* 3.79*   HGB 11.3* 11.6* 10.2*   HCT 37.1* 38.6* 34.3*    211 192   MCV 89 90 91   MCH 27.2 27.0 26.9*   MCHC 30.5* 30.1* 29.7*     BMP:   Recent Labs   Lab 04/15/24  0556 04/16/24  0555 04/17/24  0551   * 100 83    138 140   K 4.2 4.2 4.0    99 108   CO2 25 28 25   BUN 23 30* 31*   CREATININE 1.4 1.6* 1.3   CALCIUM 9.4 9.9 8.8     Labs within the past 24 hours have been reviewed.    Diagnostic Results:  EXAMINATION:  XR ABDOMEN AP 1 VIEW     CLINICAL HISTORY:  emesis;     TECHNIQUE:  AP View(s) of the abdomen was performed.     COMPARISON:  April 11, 2024     FINDINGS:  There is some volume loss in the inferior chest.  Osseous structures are unremarkable.  Surgical changes are present along the right lower quadrant.  There is mildly dilated small bowel, degree of small bowel distension similar to previous exam.  There is air within the colon which is nondistended.  Findings may reflect ileus, partial degree of small-bowel obstruction not excluded.     Impression:     As above        Electronically signed by:Angela Santana MD  Date:                                            04/15/2024  Time:                                           12:37  Assessment/Plan:     * History of incisional hernia repair  - see " surgical wound infection"      Tachycardia  - Last EKG 4/15 with sinus tach  - Cont coreg       Ileus  - Seen on KUB 4/10   - NPO for bowel rest starting 4/11, Enema 4/14 with 3 large BM noted, advanced to regular diet 4/14   - Continued N/V 4/15 w/ KUB still showing ileus.   - Diet changed to clears only, still having n/v  - NGT placed 4/16 for decompression, 950 cc output  - Cont LIWS  - Repeat KUB ordered for am    - Encourage increase ambulation.   - Continue bowel regimen      Anemia of chronic disease  - monitor daily CBC  - stable      Hypomagnesemia  - " "Continue home PO mag  - IV Mag as needed      History of COPD  - Resume home neb      Dyslipidemia  - continue home statin      Surgical wound infection  - Pt with delayed surgical wound healing, seen in clinic 4/3 with 200 mL serous fluid expressed from incision w/ staples intact. Started on PO doxycycline at that time. No improvement with PO Doxy  - CT abd/pelvis 4/8 with RLQ transplant kidney with incisional hernia containing distal ileum. The large fluid collection interposed between the kidney and anterior abdominal wall drained at patient's bedside. Mild to moderate soft tissue stranding and trace fluid directly adjacent to the transplant kidney. CT reviewed by surgeon. Patient made NPO for surgical intervention for 4/9.   - Patient taken back to OR (4/9) for repair of wound dehiscence and hernia repair, abdominal washout  - Infectious work up negative thus far.   - Cefepime and vanc transitioned to PO levaquin and doxy x5 days. (EOT 4/15)      Prophylactic immunotherapy  - see "long term use of immunosuppression"      At risk for opportunistic infections  - OI prophylaxis per transplant protocol      Long-term use of immunosuppressant medication  - Continue prograf and prednisone  - Check prograf level daily and adjust for therapeutic dosage. Monitor for toxic side effects   - Myfortic started 4/17      S/P living-donor kidney transplantation  - S/p Ktxp 3/11/24 d/t IgA nephropathy. Progressed well post-txp with Cr downtrending  - BL Cr 1.1-1.2  - Cr 1.3 with hydration, cont IVFs  - Strict Is and Os       Discharge Planning: Not a candidate for d/c at this time.     Medical decision making for this encounter includes review of pertinent labs and diagnostic studies, assessment and planning, discussions with consulting providers, discussion with patient/family, and participation in multidisciplinary rounds. Time spent caring for patient: 60 minutes    Diana Gonzales PA-C  Kidney Transplant  [facility] - Transplant " Stepdown

## 2024-04-18 LAB
ACID FAST MOD KINY STN SPEC: NORMAL
ALBUMIN SERPL BCP-MCNC: 2.7 G/DL (ref 3.5–5.2)
ANION GAP SERPL CALC-SCNC: 8 MMOL/L (ref 8–16)
BASOPHILS # BLD AUTO: 0.06 K/UL (ref 0–0.2)
BASOPHILS NFR BLD: 0.6 % (ref 0–1.9)
BUN SERPL-MCNC: 29 MG/DL (ref 8–23)
CALCIUM SERPL-MCNC: 8.6 MG/DL (ref 8.7–10.5)
CHLORIDE SERPL-SCNC: 108 MMOL/L (ref 95–110)
CO2 SERPL-SCNC: 27 MMOL/L (ref 23–29)
CREAT SERPL-MCNC: 1.2 MG/DL (ref 0.5–1.4)
DIFFERENTIAL METHOD BLD: ABNORMAL
EOSINOPHIL # BLD AUTO: 0.1 K/UL (ref 0–0.5)
EOSINOPHIL NFR BLD: 1.1 % (ref 0–8)
ERYTHROCYTE [DISTWIDTH] IN BLOOD BY AUTOMATED COUNT: 13.3 % (ref 11.5–14.5)
EST. GFR  (NO RACE VARIABLE): >60 ML/MIN/1.73 M^2
GLUCOSE SERPL-MCNC: 85 MG/DL (ref 70–110)
HCT VFR BLD AUTO: 34.7 % (ref 40–54)
HGB BLD-MCNC: 10.3 G/DL (ref 14–18)
IMM GRANULOCYTES # BLD AUTO: 0.13 K/UL (ref 0–0.04)
IMM GRANULOCYTES NFR BLD AUTO: 1.3 % (ref 0–0.5)
LYMPHOCYTES # BLD AUTO: 1 K/UL (ref 1–4.8)
LYMPHOCYTES NFR BLD: 10.2 % (ref 18–48)
MAGNESIUM SERPL-MCNC: 1.8 MG/DL (ref 1.6–2.6)
MCH RBC QN AUTO: 27.2 PG (ref 27–31)
MCHC RBC AUTO-ENTMCNC: 29.7 G/DL (ref 32–36)
MCV RBC AUTO: 92 FL (ref 82–98)
MONOCYTES # BLD AUTO: 1.1 K/UL (ref 0.3–1)
MONOCYTES NFR BLD: 10.9 % (ref 4–15)
MYCOBACTERIUM SPEC QL CULT: NORMAL
NEUTROPHILS # BLD AUTO: 7.7 K/UL (ref 1.8–7.7)
NEUTROPHILS NFR BLD: 75.9 % (ref 38–73)
NRBC BLD-RTO: 0 /100 WBC
PHOSPHATE SERPL-MCNC: 2.9 MG/DL (ref 2.7–4.5)
PLATELET # BLD AUTO: 187 K/UL (ref 150–450)
PMV BLD AUTO: 10.5 FL (ref 9.2–12.9)
POTASSIUM SERPL-SCNC: 4.1 MMOL/L (ref 3.5–5.1)
PREALB SERPL-MCNC: 14 MG/DL (ref 20–43)
RBC # BLD AUTO: 3.79 M/UL (ref 4.6–6.2)
SODIUM SERPL-SCNC: 143 MMOL/L (ref 136–145)
TACROLIMUS BLD-MCNC: 5.9 NG/ML (ref 5–15)
WBC # BLD AUTO: 10.19 K/UL (ref 3.9–12.7)

## 2024-04-18 PROCEDURE — 25000003 PHARM REV CODE 250: Performed by: INTERNAL MEDICINE

## 2024-04-18 PROCEDURE — 80197 ASSAY OF TACROLIMUS: CPT

## 2024-04-18 PROCEDURE — 63600175 PHARM REV CODE 636 W HCPCS

## 2024-04-18 PROCEDURE — A4217 STERILE WATER/SALINE, 500 ML: HCPCS | Performed by: PHYSICIAN ASSISTANT

## 2024-04-18 PROCEDURE — 99233 SBSQ HOSP IP/OBS HIGH 50: CPT | Mod: ,,, | Performed by: PHYSICIAN ASSISTANT

## 2024-04-18 PROCEDURE — 63600175 PHARM REV CODE 636 W HCPCS: Performed by: PHYSICIAN ASSISTANT

## 2024-04-18 PROCEDURE — B4185 PARENTERAL SOL 10 GM LIPIDS: HCPCS | Performed by: PHYSICIAN ASSISTANT

## 2024-04-18 PROCEDURE — 25000003 PHARM REV CODE 250: Performed by: NURSE PRACTITIONER

## 2024-04-18 PROCEDURE — 25000003 PHARM REV CODE 250: Performed by: PHYSICIAN ASSISTANT

## 2024-04-18 PROCEDURE — 36415 COLL VENOUS BLD VENIPUNCTURE: CPT

## 2024-04-18 PROCEDURE — 25000003 PHARM REV CODE 250: Performed by: CLINICAL NURSE SPECIALIST

## 2024-04-18 PROCEDURE — 84134 ASSAY OF PREALBUMIN: CPT | Performed by: PHYSICIAN ASSISTANT

## 2024-04-18 PROCEDURE — 27000207 HC ISOLATION

## 2024-04-18 PROCEDURE — 25000003 PHARM REV CODE 250

## 2024-04-18 PROCEDURE — 80069 RENAL FUNCTION PANEL: CPT

## 2024-04-18 PROCEDURE — 20600001 HC STEP DOWN PRIVATE ROOM

## 2024-04-18 PROCEDURE — 83735 ASSAY OF MAGNESIUM: CPT

## 2024-04-18 PROCEDURE — 85025 COMPLETE CBC W/AUTO DIFF WBC: CPT

## 2024-04-18 PROCEDURE — 36415 COLL VENOUS BLD VENIPUNCTURE: CPT | Performed by: PHYSICIAN ASSISTANT

## 2024-04-18 RX ORDER — SODIUM CHLORIDE 9 MG/ML
INJECTION, SOLUTION INTRAVENOUS CONTINUOUS
Status: ACTIVE | OUTPATIENT
Start: 2024-04-18 | End: 2024-04-19

## 2024-04-18 RX ORDER — DOCUSATE SODIUM 100 MG/1
100 CAPSULE, LIQUID FILLED ORAL 2 TIMES DAILY
Status: DISCONTINUED | OUTPATIENT
Start: 2024-04-18 | End: 2024-04-25 | Stop reason: HOSPADM

## 2024-04-18 RX ORDER — TACROLIMUS 1 MG/1
2 CAPSULE ORAL 2 TIMES DAILY
Status: DISCONTINUED | OUTPATIENT
Start: 2024-04-18 | End: 2024-04-21

## 2024-04-18 RX ADMIN — TACROLIMUS 3 MG: 1 CAPSULE ORAL at 08:04

## 2024-04-18 RX ADMIN — SODIUM CHLORIDE: 9 INJECTION, SOLUTION INTRAVENOUS at 02:04

## 2024-04-18 RX ADMIN — Medication 800 MG: at 08:04

## 2024-04-18 RX ADMIN — FAMOTIDINE 20 MG: 20 TABLET ORAL at 08:04

## 2024-04-18 RX ADMIN — MAGNESIUM SULFATE HEPTAHYDRATE: 500 INJECTION, SOLUTION INTRAMUSCULAR; INTRAVENOUS at 10:04

## 2024-04-18 RX ADMIN — I.V. FAT EMULSION 250 ML: 20 EMULSION INTRAVENOUS at 10:04

## 2024-04-18 RX ADMIN — SIMETHICONE 160 MG: 80 TABLET, CHEWABLE ORAL at 08:04

## 2024-04-18 RX ADMIN — TACROLIMUS 2 MG: 1 CAPSULE ORAL at 06:04

## 2024-04-18 RX ADMIN — CARVEDILOL 12.5 MG: 12.5 TABLET, FILM COATED ORAL at 08:04

## 2024-04-18 RX ADMIN — ASPIRIN 81 MG CHEWABLE TABLET 81 MG: 81 TABLET CHEWABLE at 08:04

## 2024-04-18 RX ADMIN — MYCOPHENOLIC ACID 720 MG: 180 TABLET, DELAYED RELEASE ORAL at 08:04

## 2024-04-18 RX ADMIN — PREDNISONE 5 MG: 5 TABLET ORAL at 08:04

## 2024-04-18 RX ADMIN — BISACODYL 10 MG: 5 TABLET, COATED ORAL at 08:04

## 2024-04-18 RX ADMIN — HEPARIN SODIUM 5000 UNITS: 5000 INJECTION INTRAVENOUS; SUBCUTANEOUS at 02:04

## 2024-04-18 RX ADMIN — DOCUSATE SODIUM 100 MG: 100 CAPSULE, LIQUID FILLED ORAL at 08:04

## 2024-04-18 RX ADMIN — ATORVASTATIN CALCIUM 40 MG: 20 TABLET, FILM COATED ORAL at 08:04

## 2024-04-18 RX ADMIN — FLUTICASONE FUROATE AND VILANTEROL TRIFENATATE 1 PUFF: 100; 25 POWDER RESPIRATORY (INHALATION) at 08:04

## 2024-04-18 RX ADMIN — ATOVAQUONE 1500 MG: 750 SUSPENSION ORAL at 08:04

## 2024-04-18 RX ADMIN — VALGANCICLOVIR 900 MG: 450 TABLET, FILM COATED ORAL at 08:04

## 2024-04-18 RX ADMIN — POLYETHYLENE GLYCOL 3350 17 G: 17 POWDER, FOR SOLUTION ORAL at 08:04

## 2024-04-18 NOTE — PLAN OF CARE
Recommendations    Continue current PPN order: 2.75% AA/ 10% D @ 100 ml/hr + IV lipids to provide 1580 kcal, 66g protein. GIR = 1.86   Meet 88% estimated energy, 65% protein needs    If central line placed and TPN warranted, rec'd 270g D, 100g AA + IV lipids to provide 1818 kcal, 100g protein. GIR = 2.10    When medically able, ADAT Regular diet with texture per SLP/MD    RD to monitor and follow    Goals: Meet % EEN, EPN by RD f/u  Nutrition Goal Status: new  Communication of RD Recs:  (POC)

## 2024-04-18 NOTE — PROGRESS NOTES
met with patient in room. Patient sitting in chair, and patients friend resting in bed. Patient and friend denied needs or concerns for this  at this time. Patient expressed hope that he would be able to discharge soon.  offered support and encouragement to patient. Patient reports understanding how to contact transplant social work if needed.       Betsey Lewis LMSW

## 2024-04-18 NOTE — SUBJECTIVE & OBJECTIVE
Subjective:   History of Present Illness:  Mr. Colten Monet is a 63 yo male w/ PMHx of ESRD d/t IgA nephropathy, now S/P living related Ktxp on 3/11/24. Patient progressed well during initial post-txp course and discharged POD#2 with downtrending Cr. Now with BL Cr 1.0-1.1.     Patient recently seen in transplant surgery clinic on 4/3 and noted to have serous drainage from RLQ kidney txp incision along with palpable erythema. Staples intact w/ minimal tenderness to deep palpation at that time. Incision probed w/ 200 cc serous fluid drained in clinic by surgical fellow. Patient was placed on PO for cellulitis. He now presents to ED for worsening erythema to incisional area (see media for photo). Staples remain intact, erythema noted to entire length of incision, worse in upper portion. Small 1-2 cm area of yellow slough in upper portion incision. Minimal serous drainage expressed upon probing. Patient reports mild tenderness to deep palpation. Kidney US in ED w/ large 14.8 x 7.4 x 1.3 cm, just posterior to the anterior pelvic wall overlying the transplant kidney. Reviewed plan with Dr. Eduardo and Dr. Whyte. CT A/P non-con ordered along with IV abx. Staples removed at bedside, small 1-2 cm area of wound dehiscence noted, moderate amount serous fluid able to be expressed from incision. Mild HARRISON on admit (Cr 1.5). NS bolus ordered.         Mr. Monet is a 64 y.o. year old male who is status post Kidney Transplant - 3/11/2024  (#1).    His maintenance immunosuppression consists of:   Immunosuppressants (From admission, onward)      Start     Stop Route Frequency Ordered    04/18/24 1800  tacrolimus (PROGRAF) capsule (SUBLINGUAL) 2 mg         -- SL 2 times daily 04/18/24 1031    04/17/24 1215  mycophenolate sodium EC tablet 720 mg         -- Oral 2 times daily 04/17/24 1212            Hospital Course:  Patient admitted for worsening cellulitis of kidney transplant incision, incisional hernia, and HARRISON. Cefepime  and vanc started, infectious work-up ordered. Hospital course as follows:  CT abd/pelvis 4/8/24 revealed incisional hernia containing distal ileum, associated small bowel feces sign, suggesting intestinal stasis, but without CT evidence of high-grade intestinal obstruction. TB  to OR 4/9/24 for repair of dehiscence of kidney transplant incision and hernia repair plus washout.   HARRISON: Worsened post OR with associated hyperkalemia. Required several interventions (shifting, lasix, IVF). Renal function and hyperkalemia both improved with.   Ileus: Patient w/ N/V post take back, KUB 4/10 with ileus, NPO for bowel rest 4/11. Enema ordered 4/14 w/ 3 large BM after. Ileus w/o improvement, required NGT placement 4/16.   Infectious work-up negative. IV abx transitioned to PO levaquin and doxy x 5 days (EOT 4/15/24).    Interval History: NAEON. NGT remains in place, 1500 cc output past 24 hrs. Repeat KUB this am showing dilated loops of small bowel measuring up to 5.1 cm, progressed when compared to radiograph dated 04/15/2024 and possibly representing sequela of an ileus or obstruction. Keep NGT in place to LIWS. Will repeat KUB in the am. Pre albumin ordered and plan to start PPN. Zofran as needed. Continue bowel regimen. Cr at BL after IVFs. VSS. Monitor.     Past Medical, Surgical, Family, and Social History:   Unchanged from H&P.    Scheduled Meds:  Current Facility-Administered Medications   Medication Dose Route Frequency    aspirin  81 mg Oral Daily    atorvastatin  40 mg Oral QHS    atovaquone  1,500 mg Oral Daily    bisacodyL  10 mg Oral Daily    carvediloL  12.5 mg Oral BID    docusate sodium  100 mg Oral BID    famotidine  20 mg Oral QHS    fat emulsion 20%  250 mL Intravenous Daily    fluticasone furoate-vilanteroL  1 puff Inhalation Daily    heparin (porcine)  5,000 Units Subcutaneous Q8H    magnesium oxide  800 mg Oral Daily    mycophenolate sodium  720 mg Oral BID    polyethylene glycol  17 g Oral Daily     predniSONE  5 mg Oral Daily    tacrolimus  2 mg Sublingual BID    valGANciclovir  900 mg Oral QAM     Continuous Infusions:  Current Facility-Administered Medications   Medication Dose Route Frequency Last Rate Last Admin    sodium chloride 0.9%   Intravenous Continuous        TPN ADULT PERIPHERAL CUSTOM   Intravenous Continuous         PRN Meds:  Current Facility-Administered Medications:     acetaminophen, 650 mg, Oral, Q6H PRN    albuterol, 1 puff, Inhalation, Q6H PRN    aluminum-magnesium hydroxide-simethicone, 30 mL, Oral, Q6H PRN    bisacodyL, 10 mg, Rectal, Daily PRN    calcium carbonate, 500 mg, Oral, TID PRN    [COMPLETED] calcium gluconate IVPB, 1 g, Intravenous, Once **AND** calcium gluconate IVPB, 1 g, Intravenous, Q10 Min PRN    melatonin, 6 mg, Oral, Nightly PRN    ondansetron, 8 mg, Oral, Q6H PRN    oxyCODONE, 5 mg, Oral, Q4H PRN    oxyCODONE, 10 mg, Oral, Q4H PRN    sodium chloride 0.9%, 3 mL, Intravenous, PRN    Intake/Output - Last 3 Shifts         04/16 0700  04/17 0659 04/17 0700  04/18 0659 04/18 0700  04/19 0659    P.O. 0 180 60    I.V. (mL/kg) 741.7 (8.2)      Total Intake(mL/kg) 741.7 (8.2) 180 (2) 60 (0.7)    Urine (mL/kg/hr) 600 (0.3) 750 (0.3) 250 (0.4)    Drains 950 1500 250    Stool 0 0     Total Output 1550 2250 500    Net -808.3 -2070 -440           Urine Occurrence 4 x      Stool Occurrence 4 x 1 x              Review of Systems   Constitutional:  Positive for activity change. Negative for chills and fever.   HENT:  Negative for congestion and trouble swallowing.    Eyes: Negative.    Respiratory:  Negative for cough and shortness of breath.    Cardiovascular:  Negative for chest pain and leg swelling.   Gastrointestinal:  Positive for abdominal distention, abdominal pain (incisional) and nausea (improving). Negative for vomiting.   Endocrine: Negative.    Genitourinary:  Negative for decreased urine volume and difficulty urinating.   Skin:  Positive for wound.   Allergic/Immunologic:  "Positive for immunocompromised state.   Neurological:  Negative for dizziness, tremors, weakness and headaches.   Psychiatric/Behavioral:  Negative for agitation, behavioral problems, confusion and decreased concentration.       Objective:     Vital Signs (Most Recent):  Temp: 97.3 °F (36.3 °C) (04/18/24 1139)  Pulse: 105 (04/18/24 1139)  Resp: 18 (04/18/24 1139)  BP: (!) 148/90 (04/18/24 1139)  SpO2: (!) 94 % (04/18/24 1139) Vital Signs (24h Range):  Temp:  [97.3 °F (36.3 °C)-98 °F (36.7 °C)] 97.3 °F (36.3 °C)  Pulse:  [] 105  Resp:  [17-20] 18  SpO2:  [93 %-96 %] 94 %  BP: (123-193)/() 148/90     Weight: 89.4 kg (197 lb 1.5 oz)  Height: 5' 7" (170.2 cm)  Body mass index is 30.87 kg/m².     Physical Exam  Vitals and nursing note reviewed.   Constitutional:       General: He is not in acute distress.     Appearance: Normal appearance. He is not ill-appearing.   HENT:      Head: Normocephalic.      Nose:      Comments: NGT in place      Mouth/Throat:      Pharynx: Oropharynx is clear.   Eyes:      Conjunctiva/sclera: Conjunctivae normal.   Cardiovascular:      Rate and Rhythm: Tachycardia present.      Pulses: Normal pulses.   Pulmonary:      Effort: Pulmonary effort is normal.      Breath sounds: Normal breath sounds.   Abdominal:      General: Bowel sounds are decreased. There is distension.      Tenderness: There is no guarding or rebound.      Comments: RLQ Kidney txp incision CDI with staples intact, small area of erythema at superior part of incision (improving)   Musculoskeletal:         General: Normal range of motion.      Right lower leg: No edema.      Left lower leg: No edema.   Skin:     General: Skin is warm and dry.   Neurological:      Mental Status: He is alert and oriented to person, place, and time. Mental status is at baseline.      Motor: Weakness present.   Psychiatric:         Mood and Affect: Mood normal.         Behavior: Behavior normal. Behavior is cooperative.         Thought " Content: Thought content normal.          Laboratory:  CBC:   Recent Labs   Lab 04/16/24  0555 04/17/24  0551 04/18/24  0613   WBC 11.79 9.54 10.19   RBC 4.29* 3.79* 3.79*   HGB 11.6* 10.2* 10.3*   HCT 38.6* 34.3* 34.7*    192 187   MCV 90 91 92   MCH 27.0 26.9* 27.2   MCHC 30.1* 29.7* 29.7*     BMP:   Recent Labs   Lab 04/16/24  0555 04/17/24  0551 04/18/24  0613    83 85    140 143   K 4.2 4.0 4.1   CL 99 108 108   CO2 28 25 27   BUN 30* 31* 29*   CREATININE 1.6* 1.3 1.2   CALCIUM 9.9 8.8 8.6*     Labs within the past 24 hours have been reviewed.    Diagnostic Results:  EXAMINATION:  XR ABDOMEN AP 1 VIEW     CLINICAL HISTORY:  reassess ileus;     TECHNIQUE:  AP supine abdominal radiographs.     COMPARISON:  [id number]/16/2024, 04/15/2024     FINDINGS:  Enteric catheter crosses the diaphragm with side port projected at the level of the GE junction, recommend advancing approximately 7 cm.  Multiple dilated loops of small bowel measuring up to 5.1 cm, similar when compared to radiograph dated 04/11/2024 but progressed when compared to radiograph dated 04/15/2024.  No pneumatosis.  No large volume of intra-abdominal free air.  Suspected left basilar atelectasis.  No acute osseous abnormalities.  Surgical clips project over the right lower quadrant.     Impression:     1. Dilated loops of small bowel measuring up to 5.1 cm, progressed when compared to radiograph dated 04/15/2024 and possibly representing sequela of an ileus or obstruction.  No pneumatosis.  No large volume of intra-abdominal free air.        Electronically signed by:Angel Blancas MD  Date:                                            04/18/2024  Time:                                           08:14

## 2024-04-18 NOTE — PROGRESS NOTES
[facility] - Transplant Stepdown  Adult Nutrition  Progress Note    SUMMARY       Recommendations    Continue current PPN order: 2.75% AA/ 10% D @ 100 ml/hr + IV lipids to provide 1580 kcal, 66g protein. GIR = 1.86   Meet 88% estimated energy, 65% protein needs    If central line placed and TPN warranted, rec'd 270g D, 100g AA + IV lipids to provide 1818 kcal, 100g protein. GIR = 2.10    When medically able, ADAT Regular diet with texture per SLP/MD    RD to monitor and follow    Goals: Meet % EEN, EPN by RD f/u  Nutrition Goal Status: new  Communication of RD Recs:  (POC)    Assessment and Plan    Nutrition Problem  Increased nutrient needs     Related to (etiology):   Increased demand for nutrient due to wound healing/ prolonged catabolic illness/ chronic infection    Signs and Symptoms (as evidenced by):    Pt with hx of s/p living kidney tx (3/11).     Interventions/Recommendations (treatment strategy):  Collaboration with other providers    Nutrition Diagnosis Status:   Continue    Malnutrition Assessment     Skin (Micronutrient): none  Nails (Micronutrient): none  Hair/Scalp (Micronutrient): none  Eyes (Micronutrient): Bitot's spots  Extraoral (Micronutrient): none  Gums (Micronutrient): none  Lips/Mucous Membranes (Micronutrient): none  Teeth (Micronutrient): none  Tongue (Micronutrient): none  Neck/Chest (Micronutrient): none  Musculoskeletal/Lower Extremities: none   Micronutrient Evaluation Summary: suspected deficiency (vitamin A, zn)       Orbital Region (Subcutaneous Fat Loss): well nourished  Upper Arm Region (Subcutaneous Fat Loss): well nourished   D[place]ze Region (Muscle Loss): well nourished  Clavicle Bone Region (Muscle Loss): well nourished  Clavicle and Acromion Bone Region (Muscle Loss): well nourished  Dorsal Hand (Muscle Loss): well nourished  Patellar Region (Muscle Loss): well nourished  Anterior Thigh Region (Muscle Loss): well nourished  Posterior Calf Region (Muscle Loss): well  "nourished     Reason for Assessment    Reason For Assessment: LOS  Diagnosis:  (History of incisional hernia repair)  Relevant Medical History: s/p living kidney tx, dyslipidemia, COPD, anemia, ileus  Interdisciplinary Rounds: did not attend    General Information Comments:   LOS 10 days. Pt with hx of s/p living kidney tx (3/11). OR 4/9 for incision dehiscence and hernia repair plus washout, KUB 4/10 with ileus. NGT in place for suction. On bowel regimen. Remains NPO, PPN ordered and plan to start today. Pt report has had good appetite PTA, hasn't been able to eat much since admit. Stated  lb. Per wt hx, recent wt stable at 200 lb. NFPE completed today, no wasting noted, suspect micronutrient deficiency.    Nutrition Discharge Planning: Pending medical course    Nutrition Risk Screen    Nutrition Risk Screen: no indicators present    Nutrition/Diet History    Spiritual, Cultural Beliefs, Advent Practices, Values that Affect Care: no    Anthropometrics    Temp: 97.3 °F (36.3 °C)  Height Method: Stated  Height: 5' 7" (170.2 cm)  Height (inches): 67 in  Weight Method: Bed Scale  Weight: 89.4 kg (197 lb 1.5 oz)  Weight (lb): 197.09 lb  Ideal Body Weight (IBW), Male: 148 lb  % Ideal Body Weight, Male (lb): 138.09 %  BMI (Calculated): 30.9       Lab/Procedures/Meds    Pertinent Labs Reviewed: reviewed  Pertinent Labs Comments: prealbumin 14, BUN 29, albumin 2.7  Pertinent Medications Reviewed: reviewed  Pertinent Medications Comments:   Current Facility-Administered Medications   Medication Dose Route Frequency    aspirin  81 mg Oral Daily    atorvastatin  40 mg Oral QHS    atovaquone  1,500 mg Oral Daily    bisacodyL  10 mg Oral Daily    carvediloL  12.5 mg Oral BID    docusate sodium  100 mg Oral BID    famotidine  20 mg Oral QHS    fat emulsion 20%  250 mL Intravenous Daily    fluticasone furoate-vilanteroL  1 puff Inhalation Daily    heparin (porcine)  5,000 Units Subcutaneous Q8H    magnesium oxide  800 mg " Oral Daily    mycophenolate sodium  720 mg Oral BID    polyethylene glycol  17 g Oral Daily    predniSONE  5 mg Oral Daily    tacrolimus  2 mg Sublingual BID    valGANciclovir  900 mg Oral QAM      Current Facility-Administered Medications   Medication Dose Route Frequency Last Rate Last Admin    sodium chloride 0.9%   Intravenous Continuous 70 mL/hr at 04/18/24 1406 New Bag at 04/18/24 1406    TPN ADULT PERIPHERAL CUSTOM   Intravenous Continuous            Estimated/Assessed Needs    Weight Used For Calorie Calculations: 89.4 kg (197 lb)  Energy Calorie Requirements (kcal): 1800 kcal  Energy Need Method: [place]-St Jeor (PAL 1.0)  Protein Requirements: 101g (1.5g/kg IBW)  Weight Used For Protein Calculations: 67.1 kg (148 lb) (IBW)  Fluid Requirements (mL): 1ml/kcal or per MD  Estimated Fluid Requirement Method: RDA Method  RDA Method (mL): 1800     Nutrition Prescription Ordered    Current Diet Order: NPO  Current Nutrition Support Formula Ordered:  (2.75% AA/10% D + IL)  Current Nutrition Support Rate Ordered: 100 (ml)  Current Nutrition Support Frequency Ordered: ml/hr    Evaluation of Received Nutrient/Fluid Intake    Parenteral Calories (kcal): 1080  Parenteral Protein (gm): 66  Parenteral Fluid (mL): 2400  Lipid Calories (kcals): 500 kcals  GIR (Glucose Infusion Rate) (mg/kg/min): 1.86 mg/kg/min  Total Calories (kcal): 1580  % Kcal Needs: 88%  Total Protein (gm): 66  % Protein Needs: 65%  I/O: + 2.6 L since admit  Energy Calories Required: meeting needs  Protein Required: not meeting needs  Comments: LBM 4/17  Tolerance: tolerating    Nutrition Risk    Level of Risk/Frequency of Follow-up:  (1x/week)     Monitor and Evaluation    Food and Nutrient Intake: energy intake, food and beverage intake, enteral nutrition intake, parenteral nutrition intake  Food and Nutrient Adminstration: diet order, enteral and parenteral nutrition administration  Physical Activity and Function: nutrition-related ADLs and  IADLs  Anthropometric Measurements: height/length, weight, weight change, body mass index  Biochemical Data, Medical Tests and Procedures: electrolyte and renal panel, gastrointestinal profile, glucose/endocrine profile, inflammatory profile, lipid profile  Nutrition-Focused Physical Findings: overall appearance     Nutrition Follow-Up    RD Follow-up?: Yes

## 2024-04-18 NOTE — PROGRESS NOTES
[facility]plant Step[facility]  Kidney Transplant  Progress Note      Reason for Follow-up: Reassessment of Kidney Transplant - 3/11/2024  (#1) recipient and management of immunosuppression.     ORGAN: LEFT KIDNEY      Donor Type: Living        Subjective:   History of Present Illness:  Mr. Colten Monet is a 65 yo male w/ PMHx of ESRD d/t IgA nephropathy, now S/P living related Ktxp on 3/11/24. Patient progressed well during initial post-txp course and discharged POD#2 with downtrending Cr. Now with BL Cr 1.0-1.1.     Patient recently seen in transplant surgery clinic on 4/3 and noted to have serous drainage from RLQ kidney txp incision along with palpable erythema. Staples intact w/ minimal tenderness to deep palpation at that time. Incision probed w/ 200 cc serous fluid drained in clinic by surgical fellow. Patient was placed on PO for cellulitis. He now presents to ED for worsening erythema to incisional area (see media for photo). Staples remain intact, erythema noted to entire length of incision, worse in upper portion. Small 1-2 cm area of yellow slough in upper portion incision. Minimal serous drainage expressed upon probing. Patient reports mild tenderness to deep palpation. Kidney US in ED w/ large 14.8 x 7.4 x 1.3 cm, just posterior to the anterior pelvic wall overlying the transplant kidney. Reviewed plan with Dr. Eduardo and Dr. Whyte. CT A/P non-con ordered along with IV abx. Staples removed at bedside, small 1-2 cm area of wound dehiscence noted, moderate amount serous fluid able to be expressed from incision. Mild HARRISON on admit (Cr 1.5). NS bolus ordered.         Mr. Monet is a 64 y.o. year old male who is status post Kidney Transplant - 3/11/2024  (#1).    His maintenance immunosuppression consists of:   Immunosuppressants (From admission, onward)      Start     Stop Route Frequency Ordered    04/18/24 1800  tacrolimus (PROGRAF) capsule (SUBLINGUAL) 2 mg         -- SL 2 times daily 04/18/24  1031    04/17/24 1215  mycophenolate sodium EC tablet 720 mg         -- Oral 2 times daily 04/17/24 1212            Hospital Course:  Patient admitted for worsening cellulitis of kidney transplant incision, incisional hernia, and HARRISON. Cefepime and vanc started, infectious work-up ordered. Hospital course as follows:  CT abd/pelvis 4/8/24 revealed incisional hernia containing distal ileum, associated small bowel feces sign, suggesting intestinal stasis, but without CT evidence of high-grade intestinal obstruction. TB  to OR 4/9/24 for repair of dehiscence of kidney transplant incision and hernia repair plus washout.   HARRISON: Worsened post OR with associated hyperkalemia. Required several interventions (shifting, lasix, IVF). Renal function and hyperkalemia both improved with.   Ileus: Patient w/ N/V post take back, KUB 4/10 with ileus, NPO for bowel rest 4/11. Enema ordered 4/14 w/ 3 large BM after. Ileus w/o improvement, required NGT placement 4/16.   Infectious work-up negative. IV abx transitioned to PO levaquin and doxy x 5 days (EOT 4/15/24).    Interval History: NAEON. NGT remains in place, 1500 cc output past 24 hrs. Repeat KUB this am showing dilated loops of small bowel measuring up to 5.1 cm, progressed when compared to radiograph dated 04/15/2024 and possibly representing sequela of an ileus or obstruction. Keep NGT in place to LIWS. Will repeat KUB in the am. Pre albumin ordered and plan to start PPN. Zofran as needed. Continue bowel regimen. Cr at BL after IVFs. VSS. Monitor.     Past Medical, Surgical, Family, and Social History:   Unchanged from H&P.    Scheduled Meds:  Current Facility-Administered Medications   Medication Dose Route Frequency    aspirin  81 mg Oral Daily    atorvastatin  40 mg Oral QHS    atovaquone  1,500 mg Oral Daily    bisacodyL  10 mg Oral Daily    carvediloL  12.5 mg Oral BID    docusate sodium  100 mg Oral BID    famotidine  20 mg Oral QHS    fat emulsion 20%  250 mL Intravenous  Daily    fluticasone furoate-vilanteroL  1 puff Inhalation Daily    heparin (porcine)  5,000 Units Subcutaneous Q8H    magnesium oxide  800 mg Oral Daily    mycophenolate sodium  720 mg Oral BID    polyethylene glycol  17 g Oral Daily    predniSONE  5 mg Oral Daily    tacrolimus  2 mg Sublingual BID    valGANciclovir  900 mg Oral QAM     Continuous Infusions:  Current Facility-Administered Medications   Medication Dose Route Frequency Last Rate Last Admin    sodium chloride 0.9%   Intravenous Continuous        TPN ADULT PERIPHERAL CUSTOM   Intravenous Continuous         PRN Meds:  Current Facility-Administered Medications:     acetaminophen, 650 mg, Oral, Q6H PRN    albuterol, 1 puff, Inhalation, Q6H PRN    aluminum-magnesium hydroxide-simethicone, 30 mL, Oral, Q6H PRN    bisacodyL, 10 mg, Rectal, Daily PRN    calcium carbonate, 500 mg, Oral, TID PRN    [COMPLETED] calcium gluconate IVPB, 1 g, Intravenous, Once **AND** calcium gluconate IVPB, 1 g, Intravenous, Q10 Min PRN    melatonin, 6 mg, Oral, Nightly PRN    ondansetron, 8 mg, Oral, Q6H PRN    oxyCODONE, 5 mg, Oral, Q4H PRN    oxyCODONE, 10 mg, Oral, Q4H PRN    sodium chloride 0.9%, 3 mL, Intravenous, PRN    Intake/Output - Last 3 Shifts         04/16 0700  04/17 0659 04/17 0700  04/18 0659 04/18 0700  04/19 0659    P.O. 0 180 60    I.V. (mL/kg) 741.7 (8.2)      Total Intake(mL/kg) 741.7 (8.2) 180 (2) 60 (0.7)    Urine (mL/kg/hr) 600 (0.3) 750 (0.3) 250 (0.4)    Drains 950 1500 250    Stool 0 0     Total Output 1550 2250 500    Net -808.3 -2070 -440           Urine Occurrence 4 x      Stool Occurrence 4 x 1 x              Review of Systems   Constitutional:  Positive for activity change. Negative for chills and fever.   HENT:  Negative for congestion and trouble swallowing.    Eyes: Negative.    Respiratory:  Negative for cough and shortness of breath.    Cardiovascular:  Negative for chest pain and leg swelling.   Gastrointestinal:  Positive for abdominal  "distention, abdominal pain (incisional) and nausea (improving). Negative for vomiting.   Endocrine: Negative.    Genitourinary:  Negative for decreased urine volume and difficulty urinating.   Skin:  Positive for wound.   Allergic/Immunologic: Positive for immunocompromised state.   Neurological:  Negative for dizziness, tremors, weakness and headaches.   Psychiatric/Behavioral:  Negative for agitation, behavioral problems, confusion and decreased concentration.       Objective:     Vital Signs (Most Recent):  Temp: 97.3 °F (36.3 °C) (04/18/24 1139)  Pulse: 105 (04/18/24 1139)  Resp: 18 (04/18/24 1139)  BP: (!) 148/90 (04/18/24 1139)  SpO2: (!) 94 % (04/18/24 1139) Vital Signs (24h Range):  Temp:  [97.3 °F (36.3 °C)-98 °F (36.7 °C)] 97.3 °F (36.3 °C)  Pulse:  [] 105  Resp:  [17-20] 18  SpO2:  [93 %-96 %] 94 %  BP: (123-193)/() 148/90     Weight: 89.4 kg (197 lb 1.5 oz)  Height: 5' 7" (170.2 cm)  Body mass index is 30.87 kg/m².     Physical Exam  Vitals and nursing note reviewed.   Constitutional:       General: He is not in acute distress.     Appearance: Normal appearance. He is not ill-appearing.   HENT:      Head: Normocephalic.      Nose:      Comments: NGT in place      Mouth/Throat:      Pharynx: Oropharynx is clear.   Eyes:      Conjunctiva/sclera: Conjunctivae normal.   Cardiovascular:      Rate and Rhythm: Tachycardia present.      Pulses: Normal pulses.   Pulmonary:      Effort: Pulmonary effort is normal.      Breath sounds: Normal breath sounds.   Abdominal:      General: Bowel sounds are decreased. There is distension.      Tenderness: There is no guarding or rebound.      Comments: RLQ Kidney txp incision CDI with staples intact, small area of erythema at superior part of incision (improving)   Musculoskeletal:         General: Normal range of motion.      Right lower leg: No edema.      Left lower leg: No edema.   Skin:     General: Skin is warm and dry.   Neurological:      Mental Status: " He is alert and oriented to person, place, and time. Mental status is at baseline.      Motor: Weakness present.   Psychiatric:         Mood and Affect: Mood normal.         Behavior: Behavior normal. Behavior is cooperative.         Thought Content: Thought content normal.          Laboratory:  CBC:   Recent Labs   Lab 04/16/24  0555 04/17/24  0551 04/18/24  0613   WBC 11.79 9.54 10.19   RBC 4.29* 3.79* 3.79*   HGB 11.6* 10.2* 10.3*   HCT 38.6* 34.3* 34.7*    192 187   MCV 90 91 92   MCH 27.0 26.9* 27.2   MCHC 30.1* 29.7* 29.7*     BMP:   Recent Labs   Lab 04/16/24  0555 04/17/24  0551 04/18/24  0613    83 85    140 143   K 4.2 4.0 4.1   CL 99 108 108   CO2 28 25 27   BUN 30* 31* 29*   CREATININE 1.6* 1.3 1.2   CALCIUM 9.9 8.8 8.6*     Labs within the past 24 hours have been reviewed.    Diagnostic Results:  EXAMINATION:  XR ABDOMEN AP 1 VIEW     CLINICAL HISTORY:  reassess ileus;     TECHNIQUE:  AP supine abdominal radiographs.     COMPARISON:  Tvquufdtbyt02/16/2024, 04/15/2024     FINDINGS:  Enteric catheter crosses the diaphragm with side port projected at the level of the GE junction, recommend advancing approximately 7 cm.  Multiple dilated loops of small bowel measuring up to 5.1 cm, similar when compared to radiograph dated 04/11/2024 but progressed when compared to radiograph dated 04/15/2024.  No pneumatosis.  No large volume of intra-abdominal free air.  Suspected left basilar atelectasis.  No acute osseous abnormalities.  Surgical clips project over the right lower quadrant.     Impression:     1. Dilated loops of small bowel measuring up to 5.1 cm, progressed when compared to radiograph dated 04/15/2024 and possibly representing sequela of an ileus or obstruction.  No pneumatosis.  No large volume of intra-abdominal free air.        Electronically signed by:Angel Blancas MD  Date:                                            04/18/2024  Time:                                            "08:14  Assessment/Plan:     * History of incisional hernia repair  - see " surgical wound infection"      Tachycardia  - Last EKG 4/15 with sinus tach  - Cont coreg       Ileus  - Seen on KUB 4/10   - NPO for bowel rest starting 4/11, Enema 4/14 with 3 large BM noted, advanced to regular diet 4/14   - Continued N/V 4/15 w/ KUB still showing ileus.   - Diet changed to clears only, still having n/v  - NGT placed 4/16 for decompression, 1500 cc output  - Cont LIWS  - Repeat KUB this am showing dilated loops of small bowel measuring up to 5.1 cm, progressed when compared to radiograph dated 04/15/2024 and possibly representing sequela of an ileus or obstruction.   - Will repeat KUB in the am.  - Pre albumin ordered and plan to start PPN.   - Continue bowel regimen      Anemia of chronic disease  - monitor daily CBC  - stable      Hypomagnesemia  - Continue home PO mag  - IV Mag as needed      History of COPD  - Resume home neb      Dyslipidemia  - continue home statin      Surgical wound infection  - Pt with delayed surgical wound healing, seen in clinic 4/3 with 200 mL serous fluid expressed from incision w/ staples intact. Started on PO doxycycline at that time. No improvement with PO Doxy  - CT abd/pelvis 4/8 with RLQ transplant kidney with incisional hernia containing distal ileum. The large fluid collection interposed between the kidney and anterior abdominal wall drained at patient's bedside. Mild to moderate soft tissue stranding and trace fluid directly adjacent to the transplant kidney. CT reviewed by surgeon.   - Patient taken back to OR (4/9) for repair of wound dehiscence and hernia repair, abdominal washout  - Infectious work up negative.  - Cefepime and vanc transitioned to PO levaquin and doxy x5 days. (EOT 4/15)      Prophylactic immunotherapy  - see "long term use of immunosuppression"      At risk for opportunistic infections  - OI prophylaxis per transplant protocol      Long-term use of " immunosuppressant medication  - Continue prograf and prednisone  - Check prograf level daily and adjust for therapeutic dosage. Monitor for toxic side effects   - Myfortic started 4/17      S/P living-donor kidney transplantation  - S/p Ktxp 3/11/24 d/t IgA nephropathy. Progressed well post-txp with Cr downtrending  - BL Cr 1.1-1.2  - Cr at BL  - Cont IVFs  - Strict Is and Os         Discharge Planning: Not a candidate for d/c at this time.     Medical decision making for this encounter includes review of pertinent labs and diagnostic studies, assessment and planning, discussions with consulting providers, discussion with patient/family, and participation in multidisciplinary rounds. Time spent caring for patient: 60 minutes    Diana Gonzales PA-C  Kidney Transplant  [facility]plant [facility]

## 2024-04-19 PROBLEM — E87.6 HYPOKALEMIA: Status: ACTIVE | Noted: 2024-04-19

## 2024-04-19 PROBLEM — T81.30XA WOUND DEHISCENCE: Status: ACTIVE | Noted: 2024-04-08

## 2024-04-19 LAB
ALBUMIN SERPL BCP-MCNC: 2.8 G/DL (ref 3.5–5.2)
ANION GAP SERPL CALC-SCNC: 9 MMOL/L (ref 8–16)
BASOPHILS # BLD AUTO: 0.07 K/UL (ref 0–0.2)
BASOPHILS NFR BLD: 0.7 % (ref 0–1.9)
BUN SERPL-MCNC: 26 MG/DL (ref 8–23)
CALCIUM SERPL-MCNC: 8.6 MG/DL (ref 8.7–10.5)
CHLORIDE SERPL-SCNC: 107 MMOL/L (ref 95–110)
CO2 SERPL-SCNC: 24 MMOL/L (ref 23–29)
CREAT SERPL-MCNC: 1.1 MG/DL (ref 0.5–1.4)
DIFFERENTIAL METHOD BLD: ABNORMAL
EOSINOPHIL # BLD AUTO: 0.2 K/UL (ref 0–0.5)
EOSINOPHIL NFR BLD: 1.4 % (ref 0–8)
ERYTHROCYTE [DISTWIDTH] IN BLOOD BY AUTOMATED COUNT: 13.4 % (ref 11.5–14.5)
EST. GFR  (NO RACE VARIABLE): >60 ML/MIN/1.73 M^2
GLUCOSE SERPL-MCNC: 177 MG/DL (ref 70–110)
HBV SURFACE AG SERPL QL IA: NORMAL
HCT VFR BLD AUTO: 33.2 % (ref 40–54)
HCV RNA SERPL QL NAA+PROBE: NOT DETECTED
HCV RNA SPEC NAA+PROBE-ACNC: NOT DETECTED IU/ML
HEPATITIS B VIRUS DNA: NORMAL
HEPATITIS B VIRUS PCR, QUANT: NOT DETECTED IU/ML
HGB BLD-MCNC: 10.1 G/DL (ref 14–18)
HIV 1+2 AB+HIV1 P24 AG SERPL QL IA: NORMAL
IMM GRANULOCYTES # BLD AUTO: 0.13 K/UL (ref 0–0.04)
IMM GRANULOCYTES NFR BLD AUTO: 1.2 % (ref 0–0.5)
LYMPHOCYTES # BLD AUTO: 1.1 K/UL (ref 1–4.8)
LYMPHOCYTES NFR BLD: 10.9 % (ref 18–48)
MAGNESIUM SERPL-MCNC: 1.8 MG/DL (ref 1.6–2.6)
MCH RBC QN AUTO: 27.6 PG (ref 27–31)
MCHC RBC AUTO-ENTMCNC: 30.4 G/DL (ref 32–36)
MCV RBC AUTO: 91 FL (ref 82–98)
MONOCYTES # BLD AUTO: 1.2 K/UL (ref 0.3–1)
MONOCYTES NFR BLD: 11 % (ref 4–15)
NEUTROPHILS # BLD AUTO: 7.9 K/UL (ref 1.8–7.7)
NEUTROPHILS NFR BLD: 74.8 % (ref 38–73)
NRBC BLD-RTO: 0 /100 WBC
PHOSPHATE SERPL-MCNC: 2.6 MG/DL (ref 2.7–4.5)
PLATELET # BLD AUTO: 197 K/UL (ref 150–450)
PMV BLD AUTO: 10 FL (ref 9.2–12.9)
POTASSIUM SERPL-SCNC: 3.2 MMOL/L (ref 3.5–5.1)
RBC # BLD AUTO: 3.66 M/UL (ref 4.6–6.2)
SODIUM SERPL-SCNC: 140 MMOL/L (ref 136–145)
TACROLIMUS BLD-MCNC: 7.1 NG/ML (ref 5–15)
WBC # BLD AUTO: 10.5 K/UL (ref 3.9–12.7)

## 2024-04-19 PROCEDURE — 83735 ASSAY OF MAGNESIUM: CPT

## 2024-04-19 PROCEDURE — 63600175 PHARM REV CODE 636 W HCPCS: Performed by: CLINICAL NURSE SPECIALIST

## 2024-04-19 PROCEDURE — 80197 ASSAY OF TACROLIMUS: CPT

## 2024-04-19 PROCEDURE — 87517 HEPATITIS B DNA QUANT: CPT | Performed by: PHYSICIAN ASSISTANT

## 2024-04-19 PROCEDURE — 94761 N-INVAS EAR/PLS OXIMETRY MLT: CPT

## 2024-04-19 PROCEDURE — 27000207 HC ISOLATION

## 2024-04-19 PROCEDURE — 25000003 PHARM REV CODE 250: Performed by: CLINICAL NURSE SPECIALIST

## 2024-04-19 PROCEDURE — 20600001 HC STEP DOWN PRIVATE ROOM

## 2024-04-19 PROCEDURE — 87340 HEPATITIS B SURFACE AG IA: CPT | Performed by: PHYSICIAN ASSISTANT

## 2024-04-19 PROCEDURE — 25500020 PHARM REV CODE 255: Performed by: PHYSICIAN ASSISTANT

## 2024-04-19 PROCEDURE — 85025 COMPLETE CBC W/AUTO DIFF WBC: CPT

## 2024-04-19 PROCEDURE — 63600175 PHARM REV CODE 636 W HCPCS

## 2024-04-19 PROCEDURE — 25000003 PHARM REV CODE 250

## 2024-04-19 PROCEDURE — 94640 AIRWAY INHALATION TREATMENT: CPT

## 2024-04-19 PROCEDURE — B4185 PARENTERAL SOL 10 GM LIPIDS: HCPCS | Performed by: CLINICAL NURSE SPECIALIST

## 2024-04-19 PROCEDURE — 63600175 PHARM REV CODE 636 W HCPCS: Performed by: PHYSICIAN ASSISTANT

## 2024-04-19 PROCEDURE — A4217 STERILE WATER/SALINE, 500 ML: HCPCS | Performed by: CLINICAL NURSE SPECIALIST

## 2024-04-19 PROCEDURE — 87389 HIV-1 AG W/HIV-1&-2 AB AG IA: CPT | Performed by: PHYSICIAN ASSISTANT

## 2024-04-19 PROCEDURE — 99233 SBSQ HOSP IP/OBS HIGH 50: CPT | Mod: 24,,, | Performed by: CLINICAL NURSE SPECIALIST

## 2024-04-19 PROCEDURE — 25000003 PHARM REV CODE 250: Performed by: PHYSICIAN ASSISTANT

## 2024-04-19 PROCEDURE — 80069 RENAL FUNCTION PANEL: CPT

## 2024-04-19 PROCEDURE — 25000003 PHARM REV CODE 250: Performed by: INTERNAL MEDICINE

## 2024-04-19 PROCEDURE — 87522 HEPATITIS C REVRS TRNSCRPJ: CPT | Performed by: PHYSICIAN ASSISTANT

## 2024-04-19 PROCEDURE — 36415 COLL VENOUS BLD VENIPUNCTURE: CPT | Performed by: PHYSICIAN ASSISTANT

## 2024-04-19 PROCEDURE — 99900035 HC TECH TIME PER 15 MIN (STAT)

## 2024-04-19 RX ORDER — MAGNESIUM SULFATE HEPTAHYDRATE 40 MG/ML
2 INJECTION, SOLUTION INTRAVENOUS ONCE
Status: COMPLETED | OUTPATIENT
Start: 2024-04-19 | End: 2024-04-19

## 2024-04-19 RX ORDER — FAMOTIDINE 10 MG/ML
20 INJECTION INTRAVENOUS NIGHTLY
Status: DISCONTINUED | OUTPATIENT
Start: 2024-04-19 | End: 2024-04-24

## 2024-04-19 RX ORDER — POTASSIUM CHLORIDE 7.45 MG/ML
10 INJECTION INTRAVENOUS
Status: COMPLETED | OUTPATIENT
Start: 2024-04-19 | End: 2024-04-19

## 2024-04-19 RX ADMIN — ASPIRIN 81 MG CHEWABLE TABLET 81 MG: 81 TABLET CHEWABLE at 08:04

## 2024-04-19 RX ADMIN — FLUTICASONE FUROATE AND VILANTEROL TRIFENATATE 1 PUFF: 100; 25 POWDER RESPIRATORY (INHALATION) at 08:04

## 2024-04-19 RX ADMIN — POLYETHYLENE GLYCOL 3350 17 G: 17 POWDER, FOR SOLUTION ORAL at 08:04

## 2024-04-19 RX ADMIN — DOCUSATE SODIUM 100 MG: 100 CAPSULE, LIQUID FILLED ORAL at 08:04

## 2024-04-19 RX ADMIN — MAGNESIUM SULFATE HEPTAHYDRATE: 500 INJECTION, SOLUTION INTRAMUSCULAR; INTRAVENOUS at 09:04

## 2024-04-19 RX ADMIN — CARVEDILOL 12.5 MG: 12.5 TABLET, FILM COATED ORAL at 09:04

## 2024-04-19 RX ADMIN — BISACODYL 10 MG: 5 TABLET, COATED ORAL at 08:04

## 2024-04-19 RX ADMIN — DIATRIZOATE MEGLUMINE AND DIATRIZOATE SODIUM 100 ML: 660; 100 LIQUID ORAL; RECTAL at 03:04

## 2024-04-19 RX ADMIN — MAGNESIUM SULFATE HEPTAHYDRATE 2 G: 40 INJECTION, SOLUTION INTRAVENOUS at 02:04

## 2024-04-19 RX ADMIN — TACROLIMUS 2 MG: 1 CAPSULE ORAL at 05:04

## 2024-04-19 RX ADMIN — VALGANCICLOVIR 900 MG: 450 TABLET, FILM COATED ORAL at 08:04

## 2024-04-19 RX ADMIN — POTASSIUM CHLORIDE 10 MEQ: 7.46 INJECTION, SOLUTION INTRAVENOUS at 01:04

## 2024-04-19 RX ADMIN — POTASSIUM CHLORIDE 10 MEQ: 7.46 INJECTION, SOLUTION INTRAVENOUS at 11:04

## 2024-04-19 RX ADMIN — ATOVAQUONE 1500 MG: 750 SUSPENSION ORAL at 08:04

## 2024-04-19 RX ADMIN — I.V. FAT EMULSION 250 ML: 20 EMULSION INTRAVENOUS at 09:04

## 2024-04-19 RX ADMIN — MYCOPHENOLIC ACID 720 MG: 180 TABLET, DELAYED RELEASE ORAL at 08:04

## 2024-04-19 RX ADMIN — PREDNISONE 5 MG: 5 TABLET ORAL at 08:04

## 2024-04-19 RX ADMIN — POTASSIUM CHLORIDE 10 MEQ: 7.46 INJECTION, SOLUTION INTRAVENOUS at 10:04

## 2024-04-19 RX ADMIN — TACROLIMUS 2 MG: 1 CAPSULE ORAL at 08:04

## 2024-04-19 RX ADMIN — FAMOTIDINE 20 MG: 10 INJECTION, SOLUTION INTRAVENOUS at 09:04

## 2024-04-19 RX ADMIN — POTASSIUM CHLORIDE 10 MEQ: 7.46 INJECTION, SOLUTION INTRAVENOUS at 12:04

## 2024-04-19 NOTE — NURSING
Gastrografin instilled through NG at 1600.  KUB ordered for 2000 and 0000.  Call placed to xray to notify medication given and times for KUB.

## 2024-04-19 NOTE — SUBJECTIVE & OBJECTIVE
Subjective:   History of Present Illness:  Mr. Colten Monet is a 65 yo male w/ PMHx of ESRD d/t IgA nephropathy, now S/P living related Ktxp on 3/11/24. Patient progressed well during initial post-txp course and discharged POD#2 with downtrending Cr. Now with BL Cr 1.0-1.1.     Patient recently seen in transplant surgery clinic on 4/3 and noted to have serous drainage from RLQ kidney txp incision along with palpable erythema. Staples intact w/ minimal tenderness to deep palpation at that time. Incision probed w/ 200 cc serous fluid drained in clinic by surgical fellow. Patient was placed on PO for cellulitis. He now presents to ED for worsening erythema to incisional area (see media for photo). Staples remain intact, erythema noted to entire length of incision, worse in upper portion. Small 1-2 cm area of yellow slough in upper portion incision. Minimal serous drainage expressed upon probing. Patient reports mild tenderness to deep palpation. Kidney US in ED w/ large 14.8 x 7.4 x 1.3 cm, just posterior to the anterior pelvic wall overlying the transplant kidney. Reviewed plan with Dr. Eduardo and Dr. Whyte. CT A/P non-con ordered along with IV abx. Staples removed at bedside, small 1-2 cm area of wound dehiscence noted, moderate amount serous fluid able to be expressed from incision. Mild HARRISON on admit (Cr 1.5). NS bolus ordered.           Hospital Course:  Patient admitted for worsening cellulitis of kidney transplant incision, incisional hernia, and HARRISON. Cefepime and vanc started, infectious work-up ordered. Hospital course as follows:  CT abd/pelvis 4/8/24 revealed incisional hernia containing distal ileum, associated small bowel feces sign, suggesting intestinal stasis, but without CT evidence of high-grade intestinal obstruction. TB  to OR 4/9/24 for repair of dehiscence of kidney transplant incision and hernia repair plus washout.   HARRISON: Worsened post OR with associated hyperkalemia. Required several  interventions (shifting, lasix, IVF). Renal function and hyperkalemia both improved, now back to baseline.   Ileus: Patient w/ N/V post take back, KUB 4/10 with ileus, NPO for bowel rest 4/11. Enema ordered 4/14 w/ 3 large BM, however no improvement in N/V. Remains w/ ileus. NGT placement 4/16. PPN started 4/18. Prealbumin 14  Infectious work-up negative. IV abx transitioned to PO levaquin and doxy x 5 days (EOT 4/15/24).    Interval History:   - NAEON. NGT output remains high, 1300 cc / 24 hrs. KUB reviewed w/ surgeon, minimal improvement noted. Abd distention remains, slight improvement. 1 small BM overnight. + Flatus, decreased bowel sounds. CT A/P w/ oral contrast pending, rule out obstruction/kink since not making significant progress  - Continue PPN while NPO.   - Kidney fxn at baseline. Replace electrolytes      Past Medical, Surgical, Family, and Social History:   Unchanged from H&P.    Scheduled Meds:  Current Facility-Administered Medications   Medication Dose Route Frequency    aspirin  81 mg Oral Daily    atorvastatin  40 mg Oral QHS    atovaquone  1,500 mg Oral Daily    bisacodyL  10 mg Oral Daily    carvediloL  12.5 mg Oral BID    docusate sodium  100 mg Oral BID    famotidine (PF)  20 mg Intravenous Nightly    fluticasone furoate-vilanteroL  1 puff Inhalation Daily    heparin (porcine)  5,000 Units Subcutaneous Q8H    magnesium sulfate IVPB  2 g Intravenous Once    mycophenolate sodium  720 mg Oral BID    polyethylene glycol  17 g Oral Daily    potassium chloride  10 mEq Intravenous Q1H    predniSONE  5 mg Oral Daily    tacrolimus  2 mg Sublingual BID    valGANciclovir  900 mg Oral QAM     Continuous Infusions:  Current Facility-Administered Medications   Medication Dose Route Frequency Last Rate Last Admin    TPN ADULT PERIPHERAL CUSTOM   Intravenous Continuous 100 mL/hr at 04/18/24 2212 New Bag at 04/18/24 2212     PRN Meds:  Current Facility-Administered Medications:     acetaminophen, 650 mg,  Oral, Q6H PRN    albuterol, 1 puff, Inhalation, Q6H PRN    aluminum-magnesium hydroxide-simethicone, 30 mL, Oral, Q6H PRN    bisacodyL, 10 mg, Rectal, Daily PRN    calcium carbonate, 500 mg, Oral, TID PRN    [COMPLETED] calcium gluconate IVPB, 1 g, Intravenous, Once **AND** calcium gluconate IVPB, 1 g, Intravenous, Q10 Min PRN    melatonin, 6 mg, Oral, Nightly PRN    ondansetron, 8 mg, Oral, Q6H PRN    oxyCODONE, 5 mg, Oral, Q4H PRN    oxyCODONE, 10 mg, Oral, Q4H PRN    sodium chloride 0.9%, 3 mL, Intravenous, PRN    Intake/Output - Last 3 Shifts         04/17 0700  04/18 0659 04/18 0700  04/19 0659 04/19 0700 04/20 0659    P.O. 180 60     I.V. (mL/kg) 788.7 (8.8) 1293.1 (14.3)     Total Intake(mL/kg) 968.7 (10.8) 1353.1 (14.9)     Urine (mL/kg/hr) 750 (0.3) 1200 (0.6)     Drains 1500 1350     Stool 0 0     Total Output 2250 2550     Net -1281.3 -1196.9            Urine Occurrence  1 x     Stool Occurrence 1 x 1 x              Review of Systems   Constitutional:  Positive for activity change and appetite change (NPO). Negative for chills and fever.   HENT:  Negative for congestion and trouble swallowing.    Eyes: Negative.    Respiratory:  Negative for cough and shortness of breath.    Cardiovascular:  Negative for chest pain and leg swelling.   Gastrointestinal:  Positive for abdominal distention and abdominal pain (incisional). Negative for diarrhea and vomiting.   Endocrine: Negative.    Genitourinary:  Negative for decreased urine volume and difficulty urinating.   Skin:  Positive for wound.   Allergic/Immunologic: Positive for immunocompromised state.   Neurological:  Negative for dizziness, tremors, weakness and headaches.   Psychiatric/Behavioral:  Negative for agitation, behavioral problems, confusion and decreased concentration.       Objective:     Vital Signs (Most Recent):  Temp: 98 °F (36.7 °C) (04/19/24 0715)  Pulse: 110 (04/19/24 0843)  Resp: 18 (04/19/24 0843)  BP: 132/78 (04/19/24 0715)  SpO2: 96 %  "(04/19/24 0843) Vital Signs (24h Range):  Temp:  [97.3 °F (36.3 °C)-98.3 °F (36.8 °C)] 98 °F (36.7 °C)  Pulse:  [] 110  Resp:  [18] 18  SpO2:  [92 %-97 %] 96 %  BP: (132-182)/(75-90) 132/78     Weight: 90.6 kg (199 lb 11.8 oz)  Height: 5' 7" (170.2 cm)  Body mass index is 31.28 kg/m².     Physical Exam  Vitals and nursing note reviewed.   Constitutional:       General: He is not in acute distress.     Appearance: Normal appearance. He is not ill-appearing.   HENT:      Head: Normocephalic.      Nose:      Comments: NGT in place      Mouth/Throat:      Pharynx: Oropharynx is clear.   Eyes:      Conjunctiva/sclera: Conjunctivae normal.   Cardiovascular:      Rate and Rhythm: Tachycardia present.      Pulses: Normal pulses.   Pulmonary:      Effort: Pulmonary effort is normal.      Breath sounds: Normal breath sounds.   Abdominal:      General: Bowel sounds are decreased. There is distension.      Tenderness: There is abdominal tenderness (mild). There is no guarding or rebound.      Comments: RLQ Kidney txp incision CDI with staples intact, small area of erythema at superior part of incision    Musculoskeletal:         General: Normal range of motion.      Right lower leg: No edema.      Left lower leg: No edema.   Skin:     General: Skin is warm and dry.   Neurological:      Mental Status: He is alert and oriented to person, place, and time. Mental status is at baseline.   Psychiatric:         Mood and Affect: Mood normal.         Behavior: Behavior normal. Behavior is cooperative.         Thought Content: Thought content normal.          Laboratory:  CBC:   Recent Labs   Lab 04/17/24  0551 04/18/24  0613 04/19/24  0544   WBC 9.54 10.19 10.50   RBC 3.79* 3.79* 3.66*   HGB 10.2* 10.3* 10.1*   HCT 34.3* 34.7* 33.2*    187 197   MCV 91 92 91   MCH 26.9* 27.2 27.6   MCHC 29.7* 29.7* 30.4*     CMP:   Recent Labs   Lab 04/17/24  0551 04/18/24  0613 04/19/24  0544   GLU 83 85 177*   CALCIUM 8.8 8.6* 8.6* "   ALBUMIN 2.8* 2.7* 2.8*    143 140   K 4.0 4.1 3.2*   CO2 25 27 24    108 107   BUN 31* 29* 26*   CREATININE 1.3 1.2 1.1       Diagnostic Results:  Abdominal X-Ray: No results found for this or any previous visit.

## 2024-04-19 NOTE — PLAN OF CARE
VSS, standing BP  No signs of evident distress or complaint of pain  Pt. Ambulating in room independently.  Non slip socks worn when OOB  Left FA 20g PIV dressing CDI  TPN infusing at 100mL/hr  RLQ incision with staples.  Edges approximated.  Staples intact.  Painted with betadine as per protocol.  Right nare NG.  1000mL output this shift  CT abdomen completed  Gastrografin challenge stated.  Gastrografin given at 1600. NG clamped.  KUB ordered for 2000 and 0000  Serum potassium 3.2.  40mEq IV admin as per MAR  Serum magnesium 1.8.  2g rider admin as per MAR  Caregiver at bedside.  Attentive to patient needs  Bed in lowest locked position.  Call light within reach.  Instructed to call for assistance.  Pt. Expressed understanding.    Educated on plan of care.

## 2024-04-19 NOTE — PLAN OF CARE
K+=4.1. Cr=1.2. Mg+2=1.8. Pre-Alb=14. Plan to start TPN peripherally tonight for nutrition. NGT remains to LIWS draining green secretions. Pt stated had BM today. Hypo-active bowel sounds noted. NGT repositioned. Follow-up x-ray confirmed correct placement. Applied black guerda on NGT to where placement needs to be at nare. UOP adequate. Sat in chair most of day. Afebrile. Denies pain. Dressing to RLQ abd dry and intact. Skin intact. Caregiver at BS. Plan for repeat abd x-ray in am.

## 2024-04-19 NOTE — PLAN OF CARE
- Patient is s/p Kidney Tranplant on 3/11/024 secondary to IgA Nephropathy. Admitted on 04/08/2024 for management of incisional hernia and ileus.  - Alert, orientedX4 and ambulatory.   - VSS. Afebrie. Spo2 maintained@RA.  - Scheduled medicines given as per MAR.  - Rt nostril NG tube in situ, connected to low intermittent wall suction. See I/O flowsheet for total output.  - RLQ incision intact with staples. Cleaned with betadine. Scant Serous fluid +ve, covered with abdominal pad .  - X-ray abdomen to be done this am.  - NPO  - Pre albumin= 14. TPN contd @ 100ml/hr and lipids @20.8 ml/hr as per MAR.  - Bed in low position. Wheels locked. Call light within patient's reach.  - Patient aware of calling for assistance.  - No s/s of acute distress noted this shift.  - Plan of care ongoing, progressing.

## 2024-04-19 NOTE — PROGRESS NOTES
[facility]  Kidney Transplant  Progress Note      Reason for Follow-up: Reassessment of Kidney Transplant - 3/11/2024  (#1) recipient and management of immunosuppression.    ORGAN: LEFT KIDNEY      Donor Type: Living      Donor CMV Status:    Donor HBcAB:   Donor HCV Status:   Donor HBV ANA:   Donor HCV ANA:       Subjective:   History of Present Illness:  Mr. Colten Monet is a 65 yo male w/ PMHx of ESRD d/t IgA nephropathy, now S/P living related Ktxp on 3/11/24. Patient progressed well during initial post-txp course and discharged POD#2 with downtrending Cr. Now with BL Cr 1.0-1.1.     Patient recently seen in transplant surgery clinic on 4/3 and noted to have serous drainage from RLQ kidney txp incision along with palpable erythema. Staples intact w/ minimal tenderness to deep palpation at that time. Incision probed w/ 200 cc serous fluid drained in clinic by surgical fellow. Patient was placed on PO for cellulitis. He now presents to ED for worsening erythema to incisional area (see media for photo). Staples remain intact, erythema noted to entire length of incision, worse in upper portion. Small 1-2 cm area of yellow slough in upper portion incision. Minimal serous drainage expressed upon probing. Patient reports mild tenderness to deep palpation. Kidney US in ED w/ large 14.8 x 7.4 x 1.3 cm, just posterior to the anterior pelvic wall overlying the transplant kidney. Reviewed plan with Dr. Eduardo and Dr. Whyte. CT A/P non-con ordered along with IV abx. Staples removed at bedside, small 1-2 cm area of wound dehiscence noted, moderate amount serous fluid able to be expressed from incision. Mild HARRISON on admit (Cr 1.5). NS bolus ordered.           Hospital Course:  Patient admitted for worsening cellulitis of kidney transplant incision, incisional hernia, and HARRISON. Cefepime and vanc started, infectious work-up ordered. Hospital course as follows:  CT abd/pelvis 4/8/24 revealed incisional  hernia containing distal ileum, associated small bowel feces sign, suggesting intestinal stasis, but without CT evidence of high-grade intestinal obstruction. TB  to OR 4/9/24 for repair of dehiscence of kidney transplant incision and hernia repair plus washout.   HARRISON: Worsened post OR with associated hyperkalemia. Required several interventions (shifting, lasix, IVF). Renal function and hyperkalemia both improved, now back to baseline.   Ileus: Patient w/ N/V post take back, KUB 4/10 with ileus, NPO for bowel rest 4/11. Enema ordered 4/14 w/ 3 large BM, however no improvement in N/V. Remains w/ ileus. NGT placement 4/16. PPN started 4/18. Prealbumin 14  Infectious work-up negative. IV abx transitioned to PO levaquin and doxy x 5 days (EOT 4/15/24).    Interval History:   - NAEON. NGT output remains high, 1300 cc / 24 hrs. KUB reviewed w/ surgeon, minimal improvement noted. Abd distention remains, slight improvement. 1 small BM overnight. + Flatus, decreased bowel sounds. CT A/P w/ oral contrast pending, rule out obstruction/kink since not making significant progress  - Continue PPN while NPO.   - Kidney fxn at baseline. Replace electrolytes      Past Medical, Surgical, Family, and Social History:   Unchanged from H&P.    Scheduled Meds:  Current Facility-Administered Medications   Medication Dose Route Frequency    aspirin  81 mg Oral Daily    atorvastatin  40 mg Oral QHS    atovaquone  1,500 mg Oral Daily    bisacodyL  10 mg Oral Daily    carvediloL  12.5 mg Oral BID    docusate sodium  100 mg Oral BID    famotidine (PF)  20 mg Intravenous Nightly    fluticasone furoate-vilanteroL  1 puff Inhalation Daily    heparin (porcine)  5,000 Units Subcutaneous Q8H    magnesium sulfate IVPB  2 g Intravenous Once    mycophenolate sodium  720 mg Oral BID    polyethylene glycol  17 g Oral Daily    potassium chloride  10 mEq Intravenous Q1H    predniSONE  5 mg Oral Daily    tacrolimus  2 mg Sublingual BID    valGANciclovir  900  mg Oral QAM     Continuous Infusions:  Current Facility-Administered Medications   Medication Dose Route Frequency Last Rate Last Admin    TPN ADULT PERIPHERAL CUSTOM   Intravenous Continuous 100 mL/hr at 04/18/24 2212 New Bag at 04/18/24 2212     PRN Meds:  Current Facility-Administered Medications:     acetaminophen, 650 mg, Oral, Q6H PRN    albuterol, 1 puff, Inhalation, Q6H PRN    aluminum-magnesium hydroxide-simethicone, 30 mL, Oral, Q6H PRN    bisacodyL, 10 mg, Rectal, Daily PRN    calcium carbonate, 500 mg, Oral, TID PRN    [COMPLETED] calcium gluconate IVPB, 1 g, Intravenous, Once **AND** calcium gluconate IVPB, 1 g, Intravenous, Q10 Min PRN    melatonin, 6 mg, Oral, Nightly PRN    ondansetron, 8 mg, Oral, Q6H PRN    oxyCODONE, 5 mg, Oral, Q4H PRN    oxyCODONE, 10 mg, Oral, Q4H PRN    sodium chloride 0.9%, 3 mL, Intravenous, PRN    Intake/Output - Last 3 Shifts         04/17 0700  04/18 0659 04/18 0700  04/19 0659 04/19 0700  04/20 0659    P.O. 180 60     I.V. (mL/kg) 788.7 (8.8) 1293.1 (14.3)     Total Intake(mL/kg) 968.7 (10.8) 1353.1 (14.9)     Urine (mL/kg/hr) 750 (0.3) 1200 (0.6)     Drains 1500 1350     Stool 0 0     Total Output 2250 2550     Net -1281.3 -1196.9            Urine Occurrence  1 x     Stool Occurrence 1 x 1 x              Review of Systems   Constitutional:  Positive for activity change and appetite change (NPO). Negative for chills and fever.   HENT:  Negative for congestion and trouble swallowing.    Eyes: Negative.    Respiratory:  Negative for cough and shortness of breath.    Cardiovascular:  Negative for chest pain and leg swelling.   Gastrointestinal:  Positive for abdominal distention and abdominal pain (incisional). Negative for diarrhea and vomiting.   Endocrine: Negative.    Genitourinary:  Negative for decreased urine volume and difficulty urinating.   Skin:  Positive for wound.   Allergic/Immunologic: Positive for immunocompromised state.   Neurological:  Negative for  "dizziness, tremors, weakness and headaches.   Psychiatric/Behavioral:  Negative for agitation, behavioral problems, confusion and decreased concentration.       Objective:     Vital Signs (Most Recent):  Temp: 98 °F (36.7 °C) (04/19/24 0715)  Pulse: 110 (04/19/24 0843)  Resp: 18 (04/19/24 0843)  BP: 132/78 (04/19/24 0715)  SpO2: 96 % (04/19/24 0843) Vital Signs (24h Range):  Temp:  [97.3 °F (36.3 °C)-98.3 °F (36.8 °C)] 98 °F (36.7 °C)  Pulse:  [] 110  Resp:  [18] 18  SpO2:  [92 %-97 %] 96 %  BP: (132-182)/(75-90) 132/78     Weight: 90.6 kg (199 lb 11.8 oz)  Height: 5' 7" (170.2 cm)  Body mass index is 31.28 kg/m².     Physical Exam  Vitals and nursing note reviewed.   Constitutional:       General: He is not in acute distress.     Appearance: Normal appearance. He is not ill-appearing.   HENT:      Head: Normocephalic.      Nose:      Comments: NGT in place      Mouth/Throat:      Pharynx: Oropharynx is clear.   Eyes:      Conjunctiva/sclera: Conjunctivae normal.   Cardiovascular:      Rate and Rhythm: Tachycardia present.      Pulses: Normal pulses.   Pulmonary:      Effort: Pulmonary effort is normal.      Breath sounds: Normal breath sounds.   Abdominal:      General: Bowel sounds are decreased. There is distension.      Tenderness: There is abdominal tenderness (mild). There is no guarding or rebound.      Comments: RLQ Kidney txp incision CDI with staples intact, small area of erythema at superior part of incision    Musculoskeletal:         General: Normal range of motion.      Right lower leg: No edema.      Left lower leg: No edema.   Skin:     General: Skin is warm and dry.   Neurological:      Mental Status: He is alert and oriented to person, place, and time. Mental status is at baseline.   Psychiatric:         Mood and Affect: Mood normal.         Behavior: Behavior normal. Behavior is cooperative.         Thought Content: Thought content normal.          Laboratory:  CBC:   Recent Labs   Lab " "04/17/24  0551 04/18/24  0613 04/19/24  0544   WBC 9.54 10.19 10.50   RBC 3.79* 3.79* 3.66*   HGB 10.2* 10.3* 10.1*   HCT 34.3* 34.7* 33.2*    187 197   MCV 91 92 91   MCH 26.9* 27.2 27.6   MCHC 29.7* 29.7* 30.4*     CMP:   Recent Labs   Lab 04/17/24  0551 04/18/24  0613 04/19/24  0544   GLU 83 85 177*   CALCIUM 8.8 8.6* 8.6*   ALBUMIN 2.8* 2.7* 2.8*    143 140   K 4.0 4.1 3.2*   CO2 25 27 24    108 107   BUN 31* 29* 26*   CREATININE 1.3 1.2 1.1       Diagnostic Results:  Abdominal X-Ray: No results found for this or any previous visit.  Assessment/Plan:     * History of incisional hernia repair  - see " surgical wound infection"      Hypokalemia  - Replace w/ IV supplements as needed while NG in place      Incisional hernia following transplant  - s/p repair on 4/9      Tachycardia  - Last EKG 4/15 with sinus tach  - Cont coreg       Ileus  - Seen on KUB 4/10   - No improvement after bowel rest, enema  - NGT placed 4/16 for decompression  - Remains w/ over 1L NG output daily, KUB still w/ ileus pattern. CT A/P w/ oral contrast pending, r/o obstruction/kink  - Continue PPN  - Continue bowel regimen      Encounter for post surgical wound check        Anemia of chronic disease  - monitor daily CBC  - stable      Hypomagnesemia  - Continue home PO mag  - IV Mag as needed      History of COPD  - Resume home neb      Dyslipidemia  - continue home statin      Surgical wound infection  - Pt with delayed surgical wound healing, seen in clinic 4/3 with 200 mL serous fluid expressed from incision w/ staples intact. Started on PO doxycycline at that time. No improvement with PO Doxy  - CT abd/pelvis 4/8 with RLQ transplant kidney with incisional hernia containing distal ileum. The large fluid collection interposed between the kidney and anterior abdominal wall drained at patient's bedside. Mild to moderate soft tissue stranding and trace fluid directly adjacent to the transplant kidney. CT reviewed by " "surgeon. Patient made NPO for surgical intervention for 4/9.   - Patient taken back to OR (4/9) for repair of wound dehiscence and hernia repair, abdominal washout  - Infectious work up negative thus far.   - Cefepime and vanc transitioned to PO levaquin and doxy x5 days. (EOT 4/15)      Prophylactic immunotherapy  - see "long term use of immunosuppression"      At risk for opportunistic infections  - OI prophylaxis per transplant protocol      Long-term use of immunosuppressant medication  - Continue prograf and prednisone  - Check prograf level daily and adjust for therapeutic dosage. Monitor for toxic side effects   - Myfortic restarted 4/17      S/P living-donor kidney transplantation  - S/p Ktxp 3/11/24 d/t IgA nephropathy. Progressed well post-txp with Cr downtrending  - BL Cr 1.1-1.2  - Cr at BL  - Strict Is and Os   - Avoid nephrotoxic medications            Discharge Planning:  Not suitable for discharge at this time    Medical decision making for this encounter includes review of pertinent labs and diagnostic studies, assessment and planning, discussions with consulting providers, discussion with patient/family, and participation in multidisciplinary rounds. Time spent caring for patient: 60 minutes    Osman Boyce, NIKITA  Kidney Transplant  [facility]  "

## 2024-04-20 LAB
ALBUMIN SERPL BCP-MCNC: 2.8 G/DL (ref 3.5–5.2)
ANION GAP SERPL CALC-SCNC: 8 MMOL/L (ref 8–16)
BASOPHILS # BLD AUTO: 0.06 K/UL (ref 0–0.2)
BASOPHILS NFR BLD: 0.6 % (ref 0–1.9)
BUN SERPL-MCNC: 21 MG/DL (ref 8–23)
CALCIUM SERPL-MCNC: 8.5 MG/DL (ref 8.7–10.5)
CHLORIDE SERPL-SCNC: 103 MMOL/L (ref 95–110)
CO2 SERPL-SCNC: 27 MMOL/L (ref 23–29)
CREAT SERPL-MCNC: 1.1 MG/DL (ref 0.5–1.4)
DIFFERENTIAL METHOD BLD: ABNORMAL
EOSINOPHIL # BLD AUTO: 0.1 K/UL (ref 0–0.5)
EOSINOPHIL NFR BLD: 1.4 % (ref 0–8)
ERYTHROCYTE [DISTWIDTH] IN BLOOD BY AUTOMATED COUNT: 13.5 % (ref 11.5–14.5)
EST. GFR  (NO RACE VARIABLE): >60 ML/MIN/1.73 M^2
GLUCOSE SERPL-MCNC: 146 MG/DL (ref 70–110)
HCT VFR BLD AUTO: 33.9 % (ref 40–54)
HGB BLD-MCNC: 10.6 G/DL (ref 14–18)
IMM GRANULOCYTES # BLD AUTO: 0.18 K/UL (ref 0–0.04)
IMM GRANULOCYTES NFR BLD AUTO: 1.8 % (ref 0–0.5)
LYMPHOCYTES # BLD AUTO: 1.2 K/UL (ref 1–4.8)
LYMPHOCYTES NFR BLD: 12 % (ref 18–48)
MAGNESIUM SERPL-MCNC: 2 MG/DL (ref 1.6–2.6)
MCH RBC QN AUTO: 28.2 PG (ref 27–31)
MCHC RBC AUTO-ENTMCNC: 31.3 G/DL (ref 32–36)
MCV RBC AUTO: 90 FL (ref 82–98)
MONOCYTES # BLD AUTO: 1.2 K/UL (ref 0.3–1)
MONOCYTES NFR BLD: 11.6 % (ref 4–15)
NEUTROPHILS # BLD AUTO: 7.5 K/UL (ref 1.8–7.7)
NEUTROPHILS NFR BLD: 72.6 % (ref 38–73)
NRBC BLD-RTO: 0 /100 WBC
PHOSPHATE SERPL-MCNC: 2.7 MG/DL (ref 2.7–4.5)
PLATELET # BLD AUTO: 206 K/UL (ref 150–450)
PMV BLD AUTO: 10.1 FL (ref 9.2–12.9)
POTASSIUM SERPL-SCNC: 3.1 MMOL/L (ref 3.5–5.1)
RBC # BLD AUTO: 3.76 M/UL (ref 4.6–6.2)
SODIUM SERPL-SCNC: 138 MMOL/L (ref 136–145)
TACROLIMUS BLD-MCNC: 8.3 NG/ML (ref 5–15)
TRIGL SERPL-MCNC: 85 MG/DL (ref 30–150)
WBC # BLD AUTO: 10.27 K/UL (ref 3.9–12.7)

## 2024-04-20 PROCEDURE — 80197 ASSAY OF TACROLIMUS: CPT

## 2024-04-20 PROCEDURE — 25000003 PHARM REV CODE 250: Performed by: PHYSICIAN ASSISTANT

## 2024-04-20 PROCEDURE — 25000003 PHARM REV CODE 250

## 2024-04-20 PROCEDURE — 25000003 PHARM REV CODE 250: Performed by: CLINICAL NURSE SPECIALIST

## 2024-04-20 PROCEDURE — 80069 RENAL FUNCTION PANEL: CPT

## 2024-04-20 PROCEDURE — 25000003 PHARM REV CODE 250: Performed by: INTERNAL MEDICINE

## 2024-04-20 PROCEDURE — 63600175 PHARM REV CODE 636 W HCPCS: Performed by: PHYSICIAN ASSISTANT

## 2024-04-20 PROCEDURE — B4185 PARENTERAL SOL 10 GM LIPIDS: HCPCS | Performed by: NURSE PRACTITIONER

## 2024-04-20 PROCEDURE — 27000207 HC ISOLATION

## 2024-04-20 PROCEDURE — A4217 STERILE WATER/SALINE, 500 ML: HCPCS | Performed by: NURSE PRACTITIONER

## 2024-04-20 PROCEDURE — 94761 N-INVAS EAR/PLS OXIMETRY MLT: CPT

## 2024-04-20 PROCEDURE — 84478 ASSAY OF TRIGLYCERIDES: CPT | Performed by: CLINICAL NURSE SPECIALIST

## 2024-04-20 PROCEDURE — 99900035 HC TECH TIME PER 15 MIN (STAT)

## 2024-04-20 PROCEDURE — 99233 SBSQ HOSP IP/OBS HIGH 50: CPT | Mod: 24,,, | Performed by: NURSE PRACTITIONER

## 2024-04-20 PROCEDURE — 20600001 HC STEP DOWN PRIVATE ROOM

## 2024-04-20 PROCEDURE — 63600175 PHARM REV CODE 636 W HCPCS: Performed by: NURSE PRACTITIONER

## 2024-04-20 PROCEDURE — 83735 ASSAY OF MAGNESIUM: CPT

## 2024-04-20 PROCEDURE — 85025 COMPLETE CBC W/AUTO DIFF WBC: CPT

## 2024-04-20 PROCEDURE — 36415 COLL VENOUS BLD VENIPUNCTURE: CPT

## 2024-04-20 PROCEDURE — 25000003 PHARM REV CODE 250: Performed by: NURSE PRACTITIONER

## 2024-04-20 PROCEDURE — 63600175 PHARM REV CODE 636 W HCPCS

## 2024-04-20 RX ADMIN — TACROLIMUS 2 MG: 1 CAPSULE ORAL at 05:04

## 2024-04-20 RX ADMIN — DOCUSATE SODIUM 100 MG: 100 CAPSULE, LIQUID FILLED ORAL at 08:04

## 2024-04-20 RX ADMIN — TACROLIMUS 2 MG: 1 CAPSULE ORAL at 08:04

## 2024-04-20 RX ADMIN — MYCOPHENOLIC ACID 720 MG: 180 TABLET, DELAYED RELEASE ORAL at 09:04

## 2024-04-20 RX ADMIN — FLUTICASONE FUROATE AND VILANTEROL TRIFENATATE 1 PUFF: 100; 25 POWDER RESPIRATORY (INHALATION) at 08:04

## 2024-04-20 RX ADMIN — ASPIRIN 81 MG CHEWABLE TABLET 81 MG: 81 TABLET CHEWABLE at 08:04

## 2024-04-20 RX ADMIN — VALGANCICLOVIR 900 MG: 450 TABLET, FILM COATED ORAL at 08:04

## 2024-04-20 RX ADMIN — BISACODYL 10 MG: 5 TABLET, COATED ORAL at 08:04

## 2024-04-20 RX ADMIN — MYCOPHENOLIC ACID 720 MG: 180 TABLET, DELAYED RELEASE ORAL at 08:04

## 2024-04-20 RX ADMIN — CARVEDILOL 12.5 MG: 12.5 TABLET, FILM COATED ORAL at 08:04

## 2024-04-20 RX ADMIN — ATOVAQUONE 1500 MG: 750 SUSPENSION ORAL at 09:04

## 2024-04-20 RX ADMIN — FAMOTIDINE 20 MG: 10 INJECTION, SOLUTION INTRAVENOUS at 09:04

## 2024-04-20 RX ADMIN — ATORVASTATIN CALCIUM 40 MG: 20 TABLET, FILM COATED ORAL at 09:04

## 2024-04-20 RX ADMIN — MAGNESIUM SULFATE HEPTAHYDRATE: 500 INJECTION, SOLUTION INTRAMUSCULAR; INTRAVENOUS at 09:04

## 2024-04-20 RX ADMIN — I.V. FAT EMULSION 250 ML: 20 EMULSION INTRAVENOUS at 09:04

## 2024-04-20 RX ADMIN — Medication 40 MEQ: at 12:04

## 2024-04-20 RX ADMIN — PREDNISONE 5 MG: 5 TABLET ORAL at 08:04

## 2024-04-20 RX ADMIN — DOCUSATE SODIUM 100 MG: 100 CAPSULE, LIQUID FILLED ORAL at 09:04

## 2024-04-20 RX ADMIN — CARVEDILOL 12.5 MG: 12.5 TABLET, FILM COATED ORAL at 09:04

## 2024-04-20 NOTE — SUBJECTIVE & OBJECTIVE
Subjective:   History of Present Illness:  Mr. Colten Monet is a 65 yo male w/ PMHx of ESRD d/t IgA nephropathy, now S/P living related Ktxp on 3/11/24. Patient progressed well during initial post-txp course and discharged POD#2 with downtrending Cr. Now with BL Cr 1.0-1.1.     Patient recently seen in transplant surgery clinic on 4/3 and noted to have serous drainage from RLQ kidney txp incision along with palpable erythema. Staples intact w/ minimal tenderness to deep palpation at that time. Incision probed w/ 200 cc serous fluid drained in clinic by surgical fellow. Patient was placed on PO for cellulitis. He now presents to ED for worsening erythema to incisional area (see media for photo). Staples remain intact, erythema noted to entire length of incision, worse in upper portion. Small 1-2 cm area of yellow slough in upper portion incision. Minimal serous drainage expressed upon probing. Patient reports mild tenderness to deep palpation. Kidney US in ED w/ large 14.8 x 7.4 x 1.3 cm, just posterior to the anterior pelvic wall overlying the transplant kidney. Reviewed plan with Dr. Eduardo and Dr. Whyte. CT A/P non-con ordered along with IV abx. Staples removed at bedside, small 1-2 cm area of wound dehiscence noted, moderate amount serous fluid able to be expressed from incision. Mild HARRISON on admit (Cr 1.5). NS bolus ordered.           Hospital Course:  Patient admitted for worsening cellulitis of kidney transplant incision, incisional hernia, and HARRISON. Cefepime and vanc started, infectious work-up ordered. Hospital course as follows:  CT abd/pelvis 4/8/24 revealed incisional hernia containing distal ileum, associated small bowel feces sign, suggesting intestinal stasis, but without CT evidence of high-grade intestinal obstruction. TB  to OR 4/9/24 for repair of dehiscence of kidney transplant incision and hernia repair plus washout.   HARRISON: Worsened post OR with associated hyperkalemia. Required several  interventions (shifting, lasix, IVF). Renal function and hyperkalemia both improved, now back to baseline.   Ileus: Patient w/ N/V post take back, KUB 4/10 with ileus, NPO for bowel rest 4/11. Enema ordered 4/14 w/ 3 large BM, however no improvement in N/V. Remains w/ ileus. NGT placement 4/16. PPN started 4/18. Prealbumin 14  Infectious work-up negative. IV abx transitioned to PO levaquin and doxy x 5 days (EOT 4/15/24).    Interval History:   - NAEON. NGT output, 1900 cc / 24 hrs. Abd distention remains, slight improvement. 2 small BM overnight. + Flatus, decreased bowel sounds.   XR gastrograffin completed with slow movement - repeat KUB tomorrow- keep NGT.   - Continue PPN while NPO.   - Kidney fxn at baseline. Replace electrolytes      Past Medical, Surgical, Family, and Social History:   Unchanged from H&P.    Scheduled Meds:  Current Facility-Administered Medications   Medication Dose Route Frequency    aspirin  81 mg Oral Daily    atorvastatin  40 mg Oral QHS    atovaquone  1,500 mg Oral Daily    bisacodyL  10 mg Oral Daily    carvediloL  12.5 mg Oral BID    docusate sodium  100 mg Oral BID    famotidine (PF)  20 mg Intravenous Nightly    fat emulsion 20%  250 mL Intravenous Daily    fluticasone furoate-vilanteroL  1 puff Inhalation Daily    heparin (porcine)  5,000 Units Subcutaneous Q8H    mycophenolate sodium  720 mg Oral BID    polyethylene glycol  17 g Oral Daily    predniSONE  5 mg Oral Daily    tacrolimus  2 mg Sublingual BID    valGANciclovir  900 mg Oral QAM     Continuous Infusions:  Current Facility-Administered Medications   Medication Dose Route Frequency Last Rate Last Admin    TPN ADULT PERIPHERAL CUSTOM   Intravenous Continuous 100 mL/hr at 04/19/24 2141 New Bag at 04/19/24 2141    TPN ADULT PERIPHERAL CUSTOM   Intravenous Continuous         PRN Meds:  Current Facility-Administered Medications:     acetaminophen, 650 mg, Oral, Q6H PRN    albuterol, 1 puff, Inhalation, Q6H PRN     aluminum-magnesium hydroxide-simethicone, 30 mL, Oral, Q6H PRN    bisacodyL, 10 mg, Rectal, Daily PRN    calcium carbonate, 500 mg, Oral, TID PRN    melatonin, 6 mg, Oral, Nightly PRN    ondansetron, 8 mg, Oral, Q6H PRN    oxyCODONE, 5 mg, Oral, Q4H PRN    oxyCODONE, 10 mg, Oral, Q4H PRN    sodium chloride 0.9%, 3 mL, Intravenous, PRN    Intake/Output - Last 3 Shifts         04/18 0700  04/19 0659 04/19 0700 04/20 0659 04/20 0700 04/21 0659    P.O. 60      I.V. (mL/kg) 1293.1 (14.3)      Total Intake(mL/kg) 1353.1 (14.9)      Urine (mL/kg/hr) 1200 (0.6) 300 (0.1)     Drains 1350 1900     Stool 0 0     Total Output 2550 2200     Net -1196.9 -2200            Urine Occurrence 1 x      Stool Occurrence 1 x 1 x              Review of Systems   Constitutional:  Positive for activity change and appetite change (NPO). Negative for chills and fever.   HENT:  Negative for congestion and trouble swallowing.    Eyes: Negative.    Respiratory:  Negative for cough and shortness of breath.    Cardiovascular:  Negative for chest pain and leg swelling.   Gastrointestinal:  Positive for abdominal distention and abdominal pain (incisional). Negative for diarrhea and vomiting.   Endocrine: Negative.    Genitourinary:  Negative for decreased urine volume and difficulty urinating.   Skin:  Positive for wound.   Allergic/Immunologic: Positive for immunocompromised state.   Neurological:  Negative for dizziness, tremors, weakness and headaches.   Psychiatric/Behavioral:  Negative for agitation, behavioral problems, confusion and decreased concentration.       Objective:     Vital Signs (Most Recent):  Temp: 97.6 °F (36.4 °C) (04/20/24 1138)  Pulse: 94 (04/20/24 1138)  Resp: 16 (04/20/24 1138)  BP: 110/76 (04/20/24 1138)  SpO2: 96 % (04/20/24 1138) Vital Signs (24h Range):  Temp:  [97.5 °F (36.4 °C)-98.1 °F (36.7 °C)] 97.6 °F (36.4 °C)  Pulse:  [] 94  Resp:  [16-18] 16  SpO2:  [94 %-97 %] 96 %  BP: (107-177)/(63-92) 110/76  "    Weight: 90 kg (198 lb 6.6 oz)  Height: 5' 7" (170.2 cm)  Body mass index is 31.08 kg/m².     Physical Exam  Vitals and nursing note reviewed.   Constitutional:       General: He is not in acute distress.     Appearance: Normal appearance. He is not ill-appearing.   HENT:      Head: Normocephalic.      Nose:      Comments: NGT in place      Mouth/Throat:      Pharynx: Oropharynx is clear.   Eyes:      Conjunctiva/sclera: Conjunctivae normal.   Cardiovascular:      Rate and Rhythm: Tachycardia present.      Pulses: Normal pulses.   Pulmonary:      Effort: Pulmonary effort is normal.      Breath sounds: Normal breath sounds.   Abdominal:      General: Bowel sounds are decreased. There is distension.      Tenderness: There is abdominal tenderness (mild). There is no guarding or rebound.      Comments: RLQ Kidney txp incision CDI with staples intact, small area of erythema at superior part of incision    Musculoskeletal:         General: Normal range of motion.      Right lower leg: No edema.      Left lower leg: No edema.   Skin:     General: Skin is warm and dry.   Neurological:      Mental Status: He is alert and oriented to person, place, and time. Mental status is at baseline.   Psychiatric:         Mood and Affect: Mood normal.         Behavior: Behavior normal. Behavior is cooperative.         Thought Content: Thought content normal.          Laboratory:  CBC:   Recent Labs   Lab 04/18/24  0613 04/19/24  0544 04/20/24  0526   WBC 10.19 10.50 10.27   RBC 3.79* 3.66* 3.76*   HGB 10.3* 10.1* 10.6*   HCT 34.7* 33.2* 33.9*    197 206   MCV 92 91 90   MCH 27.2 27.6 28.2   MCHC 29.7* 30.4* 31.3*     CMP:   Recent Labs   Lab 04/18/24  0613 04/19/24  0544 04/20/24  0526   GLU 85 177* 146*   CALCIUM 8.6* 8.6* 8.5*   ALBUMIN 2.7* 2.8* 2.8*    140 138   K 4.1 3.2* 3.1*   CO2 27 24 27    107 103   BUN 29* 26* 21   CREATININE 1.2 1.1 1.1       Diagnostic Results:  Abdominal X-Ray: No results found for " this or any previous visit.

## 2024-04-20 NOTE — NURSING
HS PO pills including Carvedilol, Mycophenolate EC, Atorvastatin held as per the provider Kae's order. Patient had a gastrograffin challenge test @ 4pm. First X-ray of abdomen done @ 8pm, second to be done @ 12 am. NG tube clamped.

## 2024-04-20 NOTE — PLAN OF CARE
- Patient is s/p Kidney Tranplant on 3/11/024 secondary to IgA Nephropathy. Admitted on 04/08/2024 for management of incisional hernia and ileus.  - Alert, orientedX4 and ambulatory.   - VSS. Afebrie. Spo2 maintained@RA.  - Gastrografin challenge test done.   - Rt nostril NG tube in situ, connected to low intermittent wall suction 8 hrs later after the gastrografin challenge test. See I/O flowsheet for total output.  - RLQ incision intact with staples covered with dsg.  - NPO.  - TPN contd @ 100ml/hr and lipids @20.8 ml/hr .  - Bed in low position. Wheels locked. Call light within patient's reach.  - Patient aware of calling for assistance.  - No s/s of acute distress noted this shift.  - Plan of care ongoing, progressing.

## 2024-04-20 NOTE — NURSING
Patient's NG tube connected back to the low intermittent wall suction after his second X-ray after his Gastrograffin challenge test, as per the order from the provider, Kae.  Patient put out 700 cc of output into the canister.

## 2024-04-20 NOTE — PROGRESS NOTES
[facility]plant Step[facility]  Kidney Transplant  Progress Note      Reason for Follow-up: Reassessment of Kidney Transplant - 3/11/2024  (#1) recipient and management of immunosuppression.    ORGAN: LEFT KIDNEY      Donor Type: Living      Donor CMV Status:    Donor HBcAB:   Donor HCV Status:   Donor HBV ANA:   Donor HCV ANA:       Subjective:   History of Present Illness:  Mr. Colten Monet is a 63 yo male w/ PMHx of ESRD d/t IgA nephropathy, now S/P living related Ktxp on 3/11/24. Patient progressed well during initial post-txp course and discharged POD#2 with downtrending Cr. Now with BL Cr 1.0-1.1.     Patient recently seen in transplant surgery clinic on 4/3 and noted to have serous drainage from RLQ kidney txp incision along with palpable erythema. Staples intact w/ minimal tenderness to deep palpation at that time. Incision probed w/ 200 cc serous fluid drained in clinic by surgical fellow. Patient was placed on PO for cellulitis. He now presents to ED for worsening erythema to incisional area (see media for photo). Staples remain intact, erythema noted to entire length of incision, worse in upper portion. Small 1-2 cm area of yellow slough in upper portion incision. Minimal serous drainage expressed upon probing. Patient reports mild tenderness to deep palpation. Kidney US in ED w/ large 14.8 x 7.4 x 1.3 cm, just posterior to the anterior pelvic wall overlying the transplant kidney. Reviewed plan with Dr. Eduardo and Dr. Whyte. CT A/P non-con ordered along with IV abx. Staples removed at bedside, small 1-2 cm area of wound dehiscence noted, moderate amount serous fluid able to be expressed from incision. Mild HARRISON on admit (Cr 1.5). NS bolus ordered.           Hospital Course:  Patient admitted for worsening cellulitis of kidney transplant incision, incisional hernia, and HARRISON. Cefepime and vanc started, infectious work-up ordered. Hospital course as follows:  CT abd/pelvis 4/8/24 revealed incisional  hernia containing distal ileum, associated small bowel feces sign, suggesting intestinal stasis, but without CT evidence of high-grade intestinal obstruction. TB  to OR 4/9/24 for repair of dehiscence of kidney transplant incision and hernia repair plus washout.   HARRISON: Worsened post OR with associated hyperkalemia. Required several interventions (shifting, lasix, IVF). Renal function and hyperkalemia both improved, now back to baseline.   Ileus: Patient w/ N/V post take back, KUB 4/10 with ileus, NPO for bowel rest 4/11. Enema ordered 4/14 w/ 3 large BM, however no improvement in N/V. Remains w/ ileus. NGT placement 4/16. PPN started 4/18. Prealbumin 14  Infectious work-up negative. IV abx transitioned to PO levaquin and doxy x 5 days (EOT 4/15/24).    Interval History:   - NAEON. NGT output, 1900 cc / 24 hrs. Abd distention remains, slight improvement. 2 small BM overnight. + Flatus, decreased bowel sounds.   XR gastrograffin completed with slow movement - repeat KUB tomorrow- keep NGT.   - Continue PPN while NPO.   - Kidney fxn at baseline. Replace electrolytes      Past Medical, Surgical, Family, and Social History:   Unchanged from H&P.    Scheduled Meds:  Current Facility-Administered Medications   Medication Dose Route Frequency    aspirin  81 mg Oral Daily    atorvastatin  40 mg Oral QHS    atovaquone  1,500 mg Oral Daily    bisacodyL  10 mg Oral Daily    carvediloL  12.5 mg Oral BID    docusate sodium  100 mg Oral BID    famotidine (PF)  20 mg Intravenous Nightly    fat emulsion 20%  250 mL Intravenous Daily    fluticasone furoate-vilanteroL  1 puff Inhalation Daily    heparin (porcine)  5,000 Units Subcutaneous Q8H    mycophenolate sodium  720 mg Oral BID    polyethylene glycol  17 g Oral Daily    predniSONE  5 mg Oral Daily    tacrolimus  2 mg Sublingual BID    valGANciclovir  900 mg Oral QAM     Continuous Infusions:  Current Facility-Administered Medications   Medication Dose Route Frequency Last Rate  Last Admin    TPN ADULT PERIPHERAL CUSTOM   Intravenous Continuous 100 mL/hr at 04/19/24 2141 New Bag at 04/19/24 2141    TPN ADULT PERIPHERAL CUSTOM   Intravenous Continuous         PRN Meds:  Current Facility-Administered Medications:     acetaminophen, 650 mg, Oral, Q6H PRN    albuterol, 1 puff, Inhalation, Q6H PRN    aluminum-magnesium hydroxide-simethicone, 30 mL, Oral, Q6H PRN    bisacodyL, 10 mg, Rectal, Daily PRN    calcium carbonate, 500 mg, Oral, TID PRN    melatonin, 6 mg, Oral, Nightly PRN    ondansetron, 8 mg, Oral, Q6H PRN    oxyCODONE, 5 mg, Oral, Q4H PRN    oxyCODONE, 10 mg, Oral, Q4H PRN    sodium chloride 0.9%, 3 mL, Intravenous, PRN    Intake/Output - Last 3 Shifts         04/18 0700  04/19 0659 04/19 0700  04/20 0659 04/20 0700  04/21 0659    P.O. 60      I.V. (mL/kg) 1293.1 (14.3)      Total Intake(mL/kg) 1353.1 (14.9)      Urine (mL/kg/hr) 1200 (0.6) 300 (0.1)     Drains 1350 1900     Stool 0 0     Total Output 2550 2200     Net -1196.9 -2200            Urine Occurrence 1 x      Stool Occurrence 1 x 1 x              Review of Systems   Constitutional:  Positive for activity change and appetite change (NPO). Negative for chills and fever.   HENT:  Negative for congestion and trouble swallowing.    Eyes: Negative.    Respiratory:  Negative for cough and shortness of breath.    Cardiovascular:  Negative for chest pain and leg swelling.   Gastrointestinal:  Positive for abdominal distention and abdominal pain (incisional). Negative for diarrhea and vomiting.   Endocrine: Negative.    Genitourinary:  Negative for decreased urine volume and difficulty urinating.   Skin:  Positive for wound.   Allergic/Immunologic: Positive for immunocompromised state.   Neurological:  Negative for dizziness, tremors, weakness and headaches.   Psychiatric/Behavioral:  Negative for agitation, behavioral problems, confusion and decreased concentration.       Objective:     Vital Signs (Most Recent):  Temp: 97.6 °F (36.4  "°C) (04/20/24 1138)  Pulse: 94 (04/20/24 1138)  Resp: 16 (04/20/24 1138)  BP: 110/76 (04/20/24 1138)  SpO2: 96 % (04/20/24 1138) Vital Signs (24h Range):  Temp:  [97.5 °F (36.4 °C)-98.1 °F (36.7 °C)] 97.6 °F (36.4 °C)  Pulse:  [] 94  Resp:  [16-18] 16  SpO2:  [94 %-97 %] 96 %  BP: (107-177)/(63-92) 110/76     Weight: 90 kg (198 lb 6.6 oz)  Height: 5' 7" (170.2 cm)  Body mass index is 31.08 kg/m².     Physical Exam  Vitals and nursing note reviewed.   Constitutional:       General: He is not in acute distress.     Appearance: Normal appearance. He is not ill-appearing.   HENT:      Head: Normocephalic.      Nose:      Comments: NGT in place      Mouth/Throat:      Pharynx: Oropharynx is clear.   Eyes:      Conjunctiva/sclera: Conjunctivae normal.   Cardiovascular:      Rate and Rhythm: Tachycardia present.      Pulses: Normal pulses.   Pulmonary:      Effort: Pulmonary effort is normal.      Breath sounds: Normal breath sounds.   Abdominal:      General: Bowel sounds are decreased. There is distension.      Tenderness: There is abdominal tenderness (mild). There is no guarding or rebound.      Comments: RLQ Kidney txp incision CDI with staples intact, small area of erythema at superior part of incision    Musculoskeletal:         General: Normal range of motion.      Right lower leg: No edema.      Left lower leg: No edema.   Skin:     General: Skin is warm and dry.   Neurological:      Mental Status: He is alert and oriented to person, place, and time. Mental status is at baseline.   Psychiatric:         Mood and Affect: Mood normal.         Behavior: Behavior normal. Behavior is cooperative.         Thought Content: Thought content normal.          Laboratory:  CBC:   Recent Labs   Lab 04/18/24  0613 04/19/24  0544 04/20/24  0526   WBC 10.19 10.50 10.27   RBC 3.79* 3.66* 3.76*   HGB 10.3* 10.1* 10.6*   HCT 34.7* 33.2* 33.9*    197 206   MCV 92 91 90   MCH 27.2 27.6 28.2   MCHC 29.7* 30.4* 31.3* " "    CMP:   Recent Labs   Lab 04/18/24  0613 04/19/24  0544 04/20/24  0526   GLU 85 177* 146*   CALCIUM 8.6* 8.6* 8.5*   ALBUMIN 2.7* 2.8* 2.8*    140 138   K 4.1 3.2* 3.1*   CO2 27 24 27    107 103   BUN 29* 26* 21   CREATININE 1.2 1.1 1.1       Diagnostic Results:  Abdominal X-Ray: No results found for this or any previous visit.  Assessment/Plan:     * History of incisional hernia repair  - see " surgical wound infection"      Hypokalemia  - Replace w/ IV supplements as needed while NG in place      Incisional hernia following transplant  - s/p repair on 4/9      Tachycardia  - Last EKG 4/15 with sinus tach  - Cont coreg       Ileus  - Seen on KUB 4/10   - No improvement after bowel rest, enema  - NGT placed 4/16 for decompression  - Remains w/ over 1L NG output daily, KUB still w/ ileus pattern. CT A/P w/ oral contrast pending, r/o obstruction/kink  - XR graffin slow movement - will get soap suds enema today   - Continue PPN  - Continue bowel regimen      Encounter for post surgical wound check        Anemia of chronic disease  - monitor daily CBC  - stable      Hypomagnesemia  - Continue home PO mag  - IV Mag as needed      History of COPD  - Resume home neb      Dyslipidemia  - continue home statin      Infection of surgical wound following transplant  - Pt with delayed surgical wound healing, seen in clinic 4/3 with 200 mL serous fluid expressed from incision w/ staples intact. Started on PO doxycycline at that time. No improvement with PO Doxy  - CT abd/pelvis 4/8 with RLQ transplant kidney with incisional hernia containing distal ileum. The large fluid collection interposed between the kidney and anterior abdominal wall drained at patient's bedside. Mild to moderate soft tissue stranding and trace fluid directly adjacent to the transplant kidney. CT reviewed by surgeon. Patient made NPO for surgical intervention for 4/9.   - Patient taken back to OR (4/9) for repair of wound dehiscence and hernia " "repair, abdominal washout  - Infectious work up negative thus far.   - Cefepime and vanc transitioned to PO levaquin and doxy x5 days. (EOT 4/15)      Prophylactic immunotherapy  - see "long term use of immunosuppression"      At risk for opportunistic infections  - OI prophylaxis per transplant protocol      Long-term use of immunosuppressant medication  - Continue prograf and prednisone  - Check prograf level daily and adjust for therapeutic dosage. Monitor for toxic side effects   - Myfortic restarted 4/17      S/P living-donor kidney transplantation  - S/p Ktxp 3/11/24 d/t IgA nephropathy. Progressed well post-txp with Cr downtrending  - BL Cr 1.1-1.2  - Cr at BL  - Strict Is and Os   - Avoid nephrotoxic medications            Discharge Planning:    Medical decision making for this encounter includes review of pertinent labs and diagnostic studies, assessment and planning, discussions with consulting providers, discussion with patient/family, and participation in multidisciplinary rounds. Time spent caring for patient: 60 minutes     MALCOM Benitez  Kidney Transplant  [facility]  "

## 2024-04-21 LAB
ALBUMIN SERPL BCP-MCNC: 2.6 G/DL (ref 3.5–5.2)
ANION GAP SERPL CALC-SCNC: 9 MMOL/L (ref 8–16)
BASOPHILS # BLD AUTO: 0.06 K/UL (ref 0–0.2)
BASOPHILS NFR BLD: 0.6 % (ref 0–1.9)
BUN SERPL-MCNC: 17 MG/DL (ref 8–23)
CALCIUM SERPL-MCNC: 8.2 MG/DL (ref 8.7–10.5)
CHLORIDE SERPL-SCNC: 104 MMOL/L (ref 95–110)
CO2 SERPL-SCNC: 25 MMOL/L (ref 23–29)
CREAT SERPL-MCNC: 1 MG/DL (ref 0.5–1.4)
DIFFERENTIAL METHOD BLD: ABNORMAL
EOSINOPHIL # BLD AUTO: 0.1 K/UL (ref 0–0.5)
EOSINOPHIL NFR BLD: 1 % (ref 0–8)
ERYTHROCYTE [DISTWIDTH] IN BLOOD BY AUTOMATED COUNT: 13.6 % (ref 11.5–14.5)
EST. GFR  (NO RACE VARIABLE): >60 ML/MIN/1.73 M^2
GLUCOSE SERPL-MCNC: 142 MG/DL (ref 70–110)
HCT VFR BLD AUTO: 34.2 % (ref 40–54)
HGB BLD-MCNC: 10.4 G/DL (ref 14–18)
IMM GRANULOCYTES # BLD AUTO: 0.19 K/UL (ref 0–0.04)
IMM GRANULOCYTES NFR BLD AUTO: 1.9 % (ref 0–0.5)
LYMPHOCYTES # BLD AUTO: 1.2 K/UL (ref 1–4.8)
LYMPHOCYTES NFR BLD: 12 % (ref 18–48)
MAGNESIUM SERPL-MCNC: 1.8 MG/DL (ref 1.6–2.6)
MCH RBC QN AUTO: 27.7 PG (ref 27–31)
MCHC RBC AUTO-ENTMCNC: 30.4 G/DL (ref 32–36)
MCV RBC AUTO: 91 FL (ref 82–98)
MONOCYTES # BLD AUTO: 1 K/UL (ref 0.3–1)
MONOCYTES NFR BLD: 9.9 % (ref 4–15)
NEUTROPHILS # BLD AUTO: 7.6 K/UL (ref 1.8–7.7)
NEUTROPHILS NFR BLD: 74.6 % (ref 38–73)
NRBC BLD-RTO: 0 /100 WBC
PHOSPHATE SERPL-MCNC: 2.9 MG/DL (ref 2.7–4.5)
PLATELET # BLD AUTO: 211 K/UL (ref 150–450)
PMV BLD AUTO: 10.1 FL (ref 9.2–12.9)
POTASSIUM SERPL-SCNC: 3.3 MMOL/L (ref 3.5–5.1)
RBC # BLD AUTO: 3.75 M/UL (ref 4.6–6.2)
SODIUM SERPL-SCNC: 138 MMOL/L (ref 136–145)
TACROLIMUS BLD-MCNC: 9.5 NG/ML (ref 5–15)
WBC # BLD AUTO: 10.14 K/UL (ref 3.9–12.7)

## 2024-04-21 PROCEDURE — 94761 N-INVAS EAR/PLS OXIMETRY MLT: CPT

## 2024-04-21 PROCEDURE — 25000003 PHARM REV CODE 250

## 2024-04-21 PROCEDURE — 85025 COMPLETE CBC W/AUTO DIFF WBC: CPT

## 2024-04-21 PROCEDURE — 99233 SBSQ HOSP IP/OBS HIGH 50: CPT | Mod: 24,,, | Performed by: NURSE PRACTITIONER

## 2024-04-21 PROCEDURE — 27000207 HC ISOLATION

## 2024-04-21 PROCEDURE — 63600175 PHARM REV CODE 636 W HCPCS: Performed by: NURSE PRACTITIONER

## 2024-04-21 PROCEDURE — 20600001 HC STEP DOWN PRIVATE ROOM

## 2024-04-21 PROCEDURE — 80197 ASSAY OF TACROLIMUS: CPT

## 2024-04-21 PROCEDURE — B4185 PARENTERAL SOL 10 GM LIPIDS: HCPCS | Performed by: NURSE PRACTITIONER

## 2024-04-21 PROCEDURE — 80069 RENAL FUNCTION PANEL: CPT

## 2024-04-21 PROCEDURE — 63600175 PHARM REV CODE 636 W HCPCS: Performed by: PHYSICIAN ASSISTANT

## 2024-04-21 PROCEDURE — 25000003 PHARM REV CODE 250: Performed by: INTERNAL MEDICINE

## 2024-04-21 PROCEDURE — 25000003 PHARM REV CODE 250: Performed by: CLINICAL NURSE SPECIALIST

## 2024-04-21 PROCEDURE — 25000003 PHARM REV CODE 250: Performed by: PHYSICIAN ASSISTANT

## 2024-04-21 PROCEDURE — 83735 ASSAY OF MAGNESIUM: CPT

## 2024-04-21 PROCEDURE — 25000003 PHARM REV CODE 250: Performed by: NURSE PRACTITIONER

## 2024-04-21 PROCEDURE — 63600175 PHARM REV CODE 636 W HCPCS: Performed by: INTERNAL MEDICINE

## 2024-04-21 PROCEDURE — 94640 AIRWAY INHALATION TREATMENT: CPT

## 2024-04-21 PROCEDURE — 36415 COLL VENOUS BLD VENIPUNCTURE: CPT

## 2024-04-21 PROCEDURE — 63600175 PHARM REV CODE 636 W HCPCS

## 2024-04-21 PROCEDURE — A4217 STERILE WATER/SALINE, 500 ML: HCPCS | Performed by: NURSE PRACTITIONER

## 2024-04-21 RX ORDER — SYRING-NEEDL,DISP,INSUL,0.3 ML 29 G X1/2"
296 SYRINGE, EMPTY DISPOSABLE MISCELLANEOUS
Status: COMPLETED | OUTPATIENT
Start: 2024-04-21 | End: 2024-04-21

## 2024-04-21 RX ORDER — PSEUDOEPHEDRINE/ACETAMINOPHEN 30MG-500MG
100 TABLET ORAL
Status: COMPLETED | OUTPATIENT
Start: 2024-04-21 | End: 2024-04-21

## 2024-04-21 RX ADMIN — ATORVASTATIN CALCIUM 40 MG: 20 TABLET, FILM COATED ORAL at 09:04

## 2024-04-21 RX ADMIN — BISACODYL 10 MG: 5 TABLET, COATED ORAL at 08:04

## 2024-04-21 RX ADMIN — POTASSIUM CHLORIDE 40 MEQ: 3 SOLUTION ORAL at 10:04

## 2024-04-21 RX ADMIN — MYCOPHENOLIC ACID 720 MG: 180 TABLET, DELAYED RELEASE ORAL at 08:04

## 2024-04-21 RX ADMIN — VALGANCICLOVIR 900 MG: 450 TABLET, FILM COATED ORAL at 08:04

## 2024-04-21 RX ADMIN — MAGNESIUM CITRATE 296 ML: 1.75 LIQUID ORAL at 10:04

## 2024-04-21 RX ADMIN — CARVEDILOL 12.5 MG: 12.5 TABLET, FILM COATED ORAL at 09:04

## 2024-04-21 RX ADMIN — SODIUM CHLORIDE 500 ML: 0.9 INJECTION, SOLUTION INTRAVENOUS at 09:04

## 2024-04-21 RX ADMIN — I.V. FAT EMULSION 250 ML: 20 EMULSION INTRAVENOUS at 09:04

## 2024-04-21 RX ADMIN — MAGNESIUM SULFATE HEPTAHYDRATE: 500 INJECTION, SOLUTION INTRAMUSCULAR; INTRAVENOUS at 09:04

## 2024-04-21 RX ADMIN — FAMOTIDINE 20 MG: 10 INJECTION, SOLUTION INTRAVENOUS at 09:04

## 2024-04-21 RX ADMIN — DOCUSATE SODIUM 100 MG: 100 CAPSULE, LIQUID FILLED ORAL at 09:04

## 2024-04-21 RX ADMIN — DOCUSATE SODIUM 100 MG: 100 CAPSULE, LIQUID FILLED ORAL at 08:04

## 2024-04-21 RX ADMIN — ATOVAQUONE 1500 MG: 750 SUSPENSION ORAL at 08:04

## 2024-04-21 RX ADMIN — FLUTICASONE FUROATE AND VILANTEROL TRIFENATATE 1 PUFF: 100; 25 POWDER RESPIRATORY (INHALATION) at 11:04

## 2024-04-21 RX ADMIN — Medication 100 ML: at 10:04

## 2024-04-21 RX ADMIN — PREDNISONE 5 MG: 5 TABLET ORAL at 08:04

## 2024-04-21 RX ADMIN — TACROLIMUS 1.5 MG: 1 CAPSULE ORAL at 06:04

## 2024-04-21 RX ADMIN — CARVEDILOL 12.5 MG: 12.5 TABLET, FILM COATED ORAL at 08:04

## 2024-04-21 RX ADMIN — ASPIRIN 81 MG CHEWABLE TABLET 81 MG: 81 TABLET CHEWABLE at 08:04

## 2024-04-21 RX ADMIN — TACROLIMUS 2 MG: 1 CAPSULE ORAL at 08:04

## 2024-04-21 RX ADMIN — MYCOPHENOLIC ACID 720 MG: 180 TABLET, DELAYED RELEASE ORAL at 09:04

## 2024-04-21 NOTE — PLAN OF CARE
- Patient is s/p Kidney Tranplant on 3/11/024 secondary to IgA Nephropathy. Admitted on 04/08/2024 for management of incisional hernia and ileus.  - Alert, orientedX4 and ambulatory.   - VSS. Afebrie. Spo2 maintained@RA.  - Gastrografin challenge test done on 4/20.  - Rt nostril NG tube in situ, connected to low intermittent wall suction . See I/O flowsheet for total output.  - RLQ incision intact with staples covered with dsg.  - NPO.  - TPN contd @ 100ml/hr and lipids @20.8 ml/hr .  - Patient refused the soap suds enema.  - 1 BM this shift.  - Plan x-ray abdomen this am.  - Slept well overnight.  - Bed in low position. Wheels locked. Call light within patient's reach.  - Patient aware of calling for assistance.  - No s/s of acute distress noted this shift.  - Plan of care ongoing, progressing.

## 2024-04-21 NOTE — NURSING
Pt refused midnight vitals, PCT Marquez reported the nurse that patient wants his vitals to be checked in the morning only, wants to sleep for now. Patient sleeping peacefully on his bed at the moment.

## 2024-04-21 NOTE — PLAN OF CARE
64 year-old male admitted 4/8/24 for Erythema/Drainage from transplant incision. Pt has hx of IgA nephropathy, kidney txp 3/11/24.  -AAOx4, ambulates independently  -NPO  -NG tube Rt Gumaro GODINEZ  -green/brown output  -20G Lt FA  -TPN @ 100ml/hr  -RLQ incision with staples  -K 3.3/Potassium 40mEq PO  -soap suds enema/BM x3  -friend at bedside, pt lying in bed, bed in lowest position, wheels locked, non-skid socks in place, call light within reach

## 2024-04-22 LAB
ALBUMIN SERPL BCP-MCNC: 2.8 G/DL (ref 3.5–5.2)
ANION GAP SERPL CALC-SCNC: 9 MMOL/L (ref 8–16)
BASOPHILS # BLD AUTO: 0.07 K/UL (ref 0–0.2)
BASOPHILS NFR BLD: 0.7 % (ref 0–1.9)
BUN SERPL-MCNC: 20 MG/DL (ref 8–23)
CALCIUM SERPL-MCNC: 8.7 MG/DL (ref 8.7–10.5)
CHLORIDE SERPL-SCNC: 104 MMOL/L (ref 95–110)
CO2 SERPL-SCNC: 27 MMOL/L (ref 23–29)
CREAT SERPL-MCNC: 1.1 MG/DL (ref 0.5–1.4)
DIFFERENTIAL METHOD BLD: ABNORMAL
EOSINOPHIL # BLD AUTO: 0.1 K/UL (ref 0–0.5)
EOSINOPHIL NFR BLD: 1.1 % (ref 0–8)
ERYTHROCYTE [DISTWIDTH] IN BLOOD BY AUTOMATED COUNT: 13.5 % (ref 11.5–14.5)
EST. GFR  (NO RACE VARIABLE): >60 ML/MIN/1.73 M^2
GLUCOSE SERPL-MCNC: 108 MG/DL (ref 70–110)
HCT VFR BLD AUTO: 33.6 % (ref 40–54)
HGB BLD-MCNC: 10.6 G/DL (ref 14–18)
IMM GRANULOCYTES # BLD AUTO: 0.21 K/UL (ref 0–0.04)
IMM GRANULOCYTES NFR BLD AUTO: 2 % (ref 0–0.5)
LYMPHOCYTES # BLD AUTO: 1.1 K/UL (ref 1–4.8)
LYMPHOCYTES NFR BLD: 10.6 % (ref 18–48)
MAGNESIUM SERPL-MCNC: 1.6 MG/DL (ref 1.6–2.6)
MCH RBC QN AUTO: 28.3 PG (ref 27–31)
MCHC RBC AUTO-ENTMCNC: 31.5 G/DL (ref 32–36)
MCV RBC AUTO: 90 FL (ref 82–98)
MONOCYTES # BLD AUTO: 0.8 K/UL (ref 0.3–1)
MONOCYTES NFR BLD: 7.7 % (ref 4–15)
NEUTROPHILS # BLD AUTO: 8.3 K/UL (ref 1.8–7.7)
NEUTROPHILS NFR BLD: 77.9 % (ref 38–73)
NRBC BLD-RTO: 0 /100 WBC
PHOSPHATE SERPL-MCNC: 3.1 MG/DL (ref 2.7–4.5)
PLATELET # BLD AUTO: 221 K/UL (ref 150–450)
PMV BLD AUTO: 10.5 FL (ref 9.2–12.9)
POTASSIUM SERPL-SCNC: 3.7 MMOL/L (ref 3.5–5.1)
RBC # BLD AUTO: 3.74 M/UL (ref 4.6–6.2)
SODIUM SERPL-SCNC: 140 MMOL/L (ref 136–145)
TACROLIMUS BLD-MCNC: 9.3 NG/ML (ref 5–15)
WBC # BLD AUTO: 10.66 K/UL (ref 3.9–12.7)

## 2024-04-22 PROCEDURE — A4217 STERILE WATER/SALINE, 500 ML: HCPCS | Performed by: INTERNAL MEDICINE

## 2024-04-22 PROCEDURE — 63600175 PHARM REV CODE 636 W HCPCS: Performed by: INTERNAL MEDICINE

## 2024-04-22 PROCEDURE — 94761 N-INVAS EAR/PLS OXIMETRY MLT: CPT

## 2024-04-22 PROCEDURE — 25000003 PHARM REV CODE 250: Performed by: NURSE PRACTITIONER

## 2024-04-22 PROCEDURE — 27000207 HC ISOLATION

## 2024-04-22 PROCEDURE — B4185 PARENTERAL SOL 10 GM LIPIDS: HCPCS | Performed by: INTERNAL MEDICINE

## 2024-04-22 PROCEDURE — 25000003 PHARM REV CODE 250: Performed by: INTERNAL MEDICINE

## 2024-04-22 PROCEDURE — 63600175 PHARM REV CODE 636 W HCPCS

## 2024-04-22 PROCEDURE — 63600175 PHARM REV CODE 636 W HCPCS: Performed by: PHYSICIAN ASSISTANT

## 2024-04-22 PROCEDURE — 80069 RENAL FUNCTION PANEL: CPT

## 2024-04-22 PROCEDURE — 99233 SBSQ HOSP IP/OBS HIGH 50: CPT | Mod: 24,,, | Performed by: NURSE PRACTITIONER

## 2024-04-22 PROCEDURE — 83735 ASSAY OF MAGNESIUM: CPT

## 2024-04-22 PROCEDURE — 85025 COMPLETE CBC W/AUTO DIFF WBC: CPT

## 2024-04-22 PROCEDURE — 94640 AIRWAY INHALATION TREATMENT: CPT

## 2024-04-22 PROCEDURE — 20600001 HC STEP DOWN PRIVATE ROOM

## 2024-04-22 PROCEDURE — 25000003 PHARM REV CODE 250

## 2024-04-22 PROCEDURE — 25000003 PHARM REV CODE 250: Performed by: PHYSICIAN ASSISTANT

## 2024-04-22 PROCEDURE — 80197 ASSAY OF TACROLIMUS: CPT

## 2024-04-22 PROCEDURE — 25000003 PHARM REV CODE 250: Performed by: CLINICAL NURSE SPECIALIST

## 2024-04-22 PROCEDURE — 36415 COLL VENOUS BLD VENIPUNCTURE: CPT

## 2024-04-22 PROCEDURE — 63600175 PHARM REV CODE 636 W HCPCS: Performed by: NURSE PRACTITIONER

## 2024-04-22 RX ORDER — MAGNESIUM SULFATE HEPTAHYDRATE 40 MG/ML
2 INJECTION, SOLUTION INTRAVENOUS ONCE
Status: COMPLETED | OUTPATIENT
Start: 2024-04-22 | End: 2024-04-22

## 2024-04-22 RX ORDER — MYCOPHENOLIC ACID 180 MG/1
540 TABLET, DELAYED RELEASE ORAL 2 TIMES DAILY
Status: DISCONTINUED | OUTPATIENT
Start: 2024-04-22 | End: 2024-04-25 | Stop reason: HOSPADM

## 2024-04-22 RX ADMIN — DOCUSATE SODIUM 100 MG: 100 CAPSULE, LIQUID FILLED ORAL at 08:04

## 2024-04-22 RX ADMIN — MAGNESIUM SULFATE HEPTAHYDRATE 2 G: 40 INJECTION, SOLUTION INTRAVENOUS at 01:04

## 2024-04-22 RX ADMIN — ATOVAQUONE 1500 MG: 750 SUSPENSION ORAL at 08:04

## 2024-04-22 RX ADMIN — TACROLIMUS 1.5 MG: 1 CAPSULE ORAL at 08:04

## 2024-04-22 RX ADMIN — CARVEDILOL 12.5 MG: 12.5 TABLET, FILM COATED ORAL at 08:04

## 2024-04-22 RX ADMIN — FLUTICASONE FUROATE AND VILANTEROL TRIFENATATE 1 PUFF: 100; 25 POWDER RESPIRATORY (INHALATION) at 11:04

## 2024-04-22 RX ADMIN — MYCOPHENOLIC ACID 540 MG: 180 TABLET, DELAYED RELEASE ORAL at 08:04

## 2024-04-22 RX ADMIN — HEPARIN SODIUM 5000 UNITS: 5000 INJECTION INTRAVENOUS; SUBCUTANEOUS at 05:04

## 2024-04-22 RX ADMIN — BISACODYL 10 MG: 5 TABLET, COATED ORAL at 08:04

## 2024-04-22 RX ADMIN — ATORVASTATIN CALCIUM 40 MG: 20 TABLET, FILM COATED ORAL at 08:04

## 2024-04-22 RX ADMIN — MAGNESIUM SULFATE HEPTAHYDRATE: 500 INJECTION, SOLUTION INTRAMUSCULAR; INTRAVENOUS at 08:04

## 2024-04-22 RX ADMIN — I.V. FAT EMULSION 250 ML: 20 EMULSION INTRAVENOUS at 09:04

## 2024-04-22 RX ADMIN — TACROLIMUS 1.5 MG: 1 CAPSULE ORAL at 06:04

## 2024-04-22 RX ADMIN — HEPARIN SODIUM 5000 UNITS: 5000 INJECTION INTRAVENOUS; SUBCUTANEOUS at 08:04

## 2024-04-22 RX ADMIN — VALGANCICLOVIR 900 MG: 450 TABLET, FILM COATED ORAL at 08:04

## 2024-04-22 RX ADMIN — POLYETHYLENE GLYCOL 3350 17 G: 17 POWDER, FOR SOLUTION ORAL at 08:04

## 2024-04-22 RX ADMIN — PREDNISONE 5 MG: 5 TABLET ORAL at 08:04

## 2024-04-22 RX ADMIN — MYCOPHENOLIC ACID 720 MG: 180 TABLET, DELAYED RELEASE ORAL at 08:04

## 2024-04-22 RX ADMIN — ASPIRIN 81 MG CHEWABLE TABLET 81 MG: 81 TABLET CHEWABLE at 08:04

## 2024-04-22 RX ADMIN — FAMOTIDINE 20 MG: 10 INJECTION, SOLUTION INTRAVENOUS at 08:04

## 2024-04-22 NOTE — SUBJECTIVE & OBJECTIVE
Subjective:   History of Present Illness:  Mr. Colten Monet is a 63 yo male w/ PMHx of ESRD d/t IgA nephropathy, now S/P living related Ktxp on 3/11/24. Patient progressed well during initial post-txp course and discharged POD#2 with downtrending Cr. Now with BL Cr 1.0-1.1.     Patient recently seen in transplant surgery clinic on 4/3 and noted to have serous drainage from RLQ kidney txp incision along with palpable erythema. Staples intact w/ minimal tenderness to deep palpation at that time. Incision probed w/ 200 cc serous fluid drained in clinic by surgical fellow. Patient was placed on PO for cellulitis. He now presents to ED for worsening erythema to incisional area (see media for photo). Staples remain intact, erythema noted to entire length of incision, worse in upper portion. Small 1-2 cm area of yellow slough in upper portion incision. Minimal serous drainage expressed upon probing. Patient reports mild tenderness to deep palpation. Kidney US in ED w/ large 14.8 x 7.4 x 1.3 cm, just posterior to the anterior pelvic wall overlying the transplant kidney. Reviewed plan with Dr. Eduardo and Dr. Whyte. CT A/P non-con ordered along with IV abx. Staples removed at bedside, small 1-2 cm area of wound dehiscence noted, moderate amount serous fluid able to be expressed from incision. Mild HARRISON on admit (Cr 1.5). NS bolus ordered.           Hospital Course:  Patient admitted for worsening cellulitis of kidney transplant incision, incisional hernia, and HARRISON. Cefepime and vanc started, infectious work-up ordered. Hospital course as follows:  CT abd/pelvis 4/8/24 revealed incisional hernia containing distal ileum, associated small bowel feces sign, suggesting intestinal stasis, but without CT evidence of high-grade intestinal obstruction. TB  to OR 4/9/24 for repair of dehiscence of kidney transplant incision and hernia repair plus washout.   HARRISON: Worsened post OR with associated hyperkalemia. Required several  interventions (shifting, lasix, IVF). Renal function and hyperkalemia both improved, now back to baseline.   Ileus: Patient w/ N/V post take back, KUB 4/10 with ileus, NPO for bowel rest 4/11. Enema ordered 4/14 w/ 3 large BM, however no improvement in N/V. Remains w/ ileus. NGT placement 4/16. PPN started 4/18. Prealbumin 14  Infectious work-up negative. IV abx transitioned to PO levaquin and doxy x 5 days (EOT 4/15/24).    Interval History:   - NAEON. NGT output, 1200 cc / 24 hrs. Abd distention remains, slight improvement. 6 small BM within 24 hours. - Patient refused enema yesterday - now in agreement today repeat KUB -  NGT to remain for now and continue PPN nutrition while NPO.  Patient reports feeling well otherwise denies n/v, VSS, Monitor   - Kidney fxn at baseline. Replace electrolytes      Past Medical, Surgical, Family, and Social History:   Unchanged from H&P.    Scheduled Meds:  Current Facility-Administered Medications   Medication Dose Route Frequency    aspirin  81 mg Oral Daily    atorvastatin  40 mg Oral QHS    atovaquone  1,500 mg Oral Daily    bisacodyL  10 mg Oral Daily    carvediloL  12.5 mg Oral BID    docusate sodium  100 mg Oral BID    famotidine (PF)  20 mg Intravenous Nightly    fat emulsion 20%  250 mL Intravenous Daily    fluticasone furoate-vilanteroL  1 puff Inhalation Daily    heparin (porcine)  5,000 Units Subcutaneous Q8H    mycophenolate sodium  720 mg Oral BID    polyethylene glycol  17 g Oral Daily    predniSONE  5 mg Oral Daily    tacrolimus  1.5 mg Sublingual BID    valGANciclovir  900 mg Oral QAM     Continuous Infusions:  Current Facility-Administered Medications   Medication Dose Route Frequency Last Rate Last Admin    TPN ADULT PERIPHERAL CUSTOM   Intravenous Continuous 100 mL/hr at 04/20/24 2110 New Bag at 04/20/24 2110    TPN ADULT PERIPHERAL CUSTOM   Intravenous Continuous         PRN Meds:  Current Facility-Administered Medications:     acetaminophen, 650 mg, Oral, Q6H  PRN    albuterol, 1 puff, Inhalation, Q6H PRN    aluminum-magnesium hydroxide-simethicone, 30 mL, Oral, Q6H PRN    bisacodyL, 10 mg, Rectal, Daily PRN    calcium carbonate, 500 mg, Oral, TID PRN    melatonin, 6 mg, Oral, Nightly PRN    ondansetron, 8 mg, Oral, Q6H PRN    oxyCODONE, 5 mg, Oral, Q4H PRN    oxyCODONE, 10 mg, Oral, Q4H PRN    sodium chloride 0.9%, 3 mL, Intravenous, PRN    Intake/Output - Last 3 Shifts         04/19 0700  04/20 0659 04/20 0700  04/21 0659 04/21 0700  04/22 0659    P.O.       I.V. (mL/kg)       Total Intake(mL/kg)       Urine (mL/kg/hr) 300 (0.1) 900 (0.4)     Drains 1900 1200 500    Stool 0 0     Total Output 2200 2100 500    Net -2200 -2100 -500           Urine Occurrence  2 x     Stool Occurrence 1 x 6 x              Review of Systems   Constitutional:  Positive for activity change and appetite change (NPO). Negative for chills and fever.   HENT:  Negative for congestion and trouble swallowing.    Eyes: Negative.    Respiratory:  Negative for cough and shortness of breath.    Cardiovascular:  Negative for chest pain and leg swelling.   Gastrointestinal:  Positive for abdominal distention and abdominal pain (incisional). Negative for diarrhea and vomiting.   Endocrine: Negative.    Genitourinary:  Negative for decreased urine volume and difficulty urinating.   Skin:  Positive for wound.   Allergic/Immunologic: Positive for immunocompromised state.   Neurological:  Negative for dizziness, tremors, weakness and headaches.   Psychiatric/Behavioral:  Negative for agitation, behavioral problems, confusion and decreased concentration.       Objective:     Vital Signs (Most Recent):  Temp: 98.2 °F (36.8 °C) (04/21/24 1925)  Pulse: 74 (04/21/24 1925)  Resp: 18 (04/21/24 1925)  BP: 125/68 (04/21/24 1925)  SpO2: 99 % (04/21/24 1925) Vital Signs (24h Range):  Temp:  [97.5 °F (36.4 °C)-98.2 °F (36.8 °C)] 98.2 °F (36.8 °C)  Pulse:  [74-92] 74  Resp:  [14-20] 18  SpO2:  [95 %-99 %] 99 %  BP:  "(115-149)/(68-89) 125/68     Weight: 89.3 kg (196 lb 13.9 oz)  Height: 5' 7" (170.2 cm)  Body mass index is 30.83 kg/m².     Physical Exam  Vitals and nursing note reviewed.   Constitutional:       General: He is not in acute distress.     Appearance: Normal appearance. He is not ill-appearing.   HENT:      Head: Normocephalic.      Nose:      Comments: NGT in place      Mouth/Throat:      Pharynx: Oropharynx is clear.   Eyes:      Conjunctiva/sclera: Conjunctivae normal.   Cardiovascular:      Rate and Rhythm: Tachycardia present.      Pulses: Normal pulses.   Pulmonary:      Effort: Pulmonary effort is normal.      Breath sounds: Normal breath sounds.   Abdominal:      General: Bowel sounds are decreased. There is distension.      Tenderness: There is abdominal tenderness (mild). There is no guarding or rebound.      Comments: RLQ Kidney txp incision CDI with staples intact, small area of erythema at superior part of incision    Musculoskeletal:         General: Normal range of motion.      Right lower leg: No edema.      Left lower leg: No edema.   Skin:     General: Skin is warm and dry.   Neurological:      Mental Status: He is alert and oriented to person, place, and time. Mental status is at baseline.   Psychiatric:         Mood and Affect: Mood normal.         Behavior: Behavior normal. Behavior is cooperative.         Thought Content: Thought content normal.          Laboratory:  CBC:   Recent Labs   Lab 04/19/24  0544 04/20/24  0526 04/21/24  0539   WBC 10.50 10.27 10.14   RBC 3.66* 3.76* 3.75*   HGB 10.1* 10.6* 10.4*   HCT 33.2* 33.9* 34.2*    206 211   MCV 91 90 91   MCH 27.6 28.2 27.7   MCHC 30.4* 31.3* 30.4*     CMP:   Recent Labs   Lab 04/19/24  0544 04/20/24  0526 04/21/24  0539   * 146* 142*   CALCIUM 8.6* 8.5* 8.2*   ALBUMIN 2.8* 2.8* 2.6*    138 138   K 3.2* 3.1* 3.3*   CO2 24 27 25    103 104   BUN 26* 21 17   CREATININE 1.1 1.1 1.0       Diagnostic Results:  Abdominal " X-Ray: No results found for this or any previous visit.

## 2024-04-22 NOTE — PROGRESS NOTES
[facility] Step[facility]  Kidney Transplant  Progress Note      Reason for Follow-up: Reassessment of Kidney Transplant - 3/11/2024  (#1) recipient and management of immunosuppression.    ORGAN: LEFT KIDNEY      Donor Type: Living      Donor CMV Status:    Donor HBcAB:   Donor HCV Status:   Donor HBV ANA:   Donor HCV ANA:       Subjective:   History of Present Illness:  Mr. Colten Monet is a 65 yo male w/ PMHx of ESRD d/t IgA nephropathy, now S/P living related Ktxp on 3/11/24. Patient progressed well during initial post-txp course and discharged POD#2 with downtrending Cr. Now with BL Cr 1.0-1.1.     Patient recently seen in transplant surgery clinic on 4/3 and noted to have serous drainage from RLQ kidney txp incision along with palpable erythema. Staples intact w/ minimal tenderness to deep palpation at that time. Incision probed w/ 200 cc serous fluid drained in clinic by surgical fellow. Patient was placed on PO for cellulitis. He now presents to ED for worsening erythema to incisional area (see media for photo). Staples remain intact, erythema noted to entire length of incision, worse in upper portion. Small 1-2 cm area of yellow slough in upper portion incision. Minimal serous drainage expressed upon probing. Patient reports mild tenderness to deep palpation. Kidney US in ED w/ large 14.8 x 7.4 x 1.3 cm, just posterior to the anterior pelvic wall overlying the transplant kidney. Reviewed plan with Dr. Eduardo and Dr. Whyte. CT A/P non-con ordered along with IV abx. Staples removed at bedside, small 1-2 cm area of wound dehiscence noted, moderate amount serous fluid able to be expressed from incision. Mild HARRISON on admit (Cr 1.5). NS bolus ordered.           Hospital Course:  Patient admitted for worsening cellulitis of kidney transplant incision, incisional hernia, and HARRISON. Cefepime and vanc started, infectious work-up ordered. Hospital course as follows:  CT abd/pelvis 4/8/24 revealed incisional  hernia containing distal ileum, associated small bowel feces sign, suggesting intestinal stasis, but without CT evidence of high-grade intestinal obstruction. TB  to OR 4/9/24 for repair of dehiscence of kidney transplant incision and hernia repair plus washout.   HARRISON: Worsened post OR with associated hyperkalemia. Required several interventions (shifting, lasix, IVF). Renal function and hyperkalemia both improved, now back to baseline.   Ileus: Patient w/ N/V post take back, KUB 4/10 with ileus, NPO for bowel rest 4/11. Enema ordered 4/14 w/ 3 large BM, however no improvement in N/V. Remains w/ ileus. NGT placement 4/16. PPN started 4/18. Prealbumin 14  Infectious work-up negative. IV abx transitioned to PO levaquin and doxy x 5 days (EOT 4/15/24).    Interval History:   - NAEON. NGT output, 1200 cc / 24 hrs. Abd distention remains, slight improvement. 6 small BM within 24 hours. - Patient refused enema yesterday - now in agreement today repeat KUB -  NGT to remain for now and continue PPN nutrition while NPO.  Patient reports feeling well otherwise denies n/v, VSS, Monitor   - Kidney fxn at baseline. Replace electrolytes      Past Medical, Surgical, Family, and Social History:   Unchanged from H&P.    Scheduled Meds:  Current Facility-Administered Medications   Medication Dose Route Frequency    aspirin  81 mg Oral Daily    atorvastatin  40 mg Oral QHS    atovaquone  1,500 mg Oral Daily    bisacodyL  10 mg Oral Daily    carvediloL  12.5 mg Oral BID    docusate sodium  100 mg Oral BID    famotidine (PF)  20 mg Intravenous Nightly    fat emulsion 20%  250 mL Intravenous Daily    fluticasone furoate-vilanteroL  1 puff Inhalation Daily    heparin (porcine)  5,000 Units Subcutaneous Q8H    mycophenolate sodium  720 mg Oral BID    polyethylene glycol  17 g Oral Daily    predniSONE  5 mg Oral Daily    tacrolimus  1.5 mg Sublingual BID    valGANciclovir  900 mg Oral QAM     Continuous Infusions:  Current  Facility-Administered Medications   Medication Dose Route Frequency Last Rate Last Admin    TPN ADULT PERIPHERAL CUSTOM   Intravenous Continuous 100 mL/hr at 04/20/24 2110 New Bag at 04/20/24 2110    TPN ADULT PERIPHERAL CUSTOM   Intravenous Continuous         PRN Meds:  Current Facility-Administered Medications:     acetaminophen, 650 mg, Oral, Q6H PRN    albuterol, 1 puff, Inhalation, Q6H PRN    aluminum-magnesium hydroxide-simethicone, 30 mL, Oral, Q6H PRN    bisacodyL, 10 mg, Rectal, Daily PRN    calcium carbonate, 500 mg, Oral, TID PRN    melatonin, 6 mg, Oral, Nightly PRN    ondansetron, 8 mg, Oral, Q6H PRN    oxyCODONE, 5 mg, Oral, Q4H PRN    oxyCODONE, 10 mg, Oral, Q4H PRN    sodium chloride 0.9%, 3 mL, Intravenous, PRN    Intake/Output - Last 3 Shifts         04/19 0700 04/20 0659 04/20 0700 04/21 0659 04/21 0700 04/22 0659    P.O.       I.V. (mL/kg)       Total Intake(mL/kg)       Urine (mL/kg/hr) 300 (0.1) 900 (0.4)     Drains 1900 1200 500    Stool 0 0     Total Output 2200 2100 500    Net -2200 -2100 -500           Urine Occurrence  2 x     Stool Occurrence 1 x 6 x              Review of Systems   Constitutional:  Positive for activity change and appetite change (NPO). Negative for chills and fever.   HENT:  Negative for congestion and trouble swallowing.    Eyes: Negative.    Respiratory:  Negative for cough and shortness of breath.    Cardiovascular:  Negative for chest pain and leg swelling.   Gastrointestinal:  Positive for abdominal distention and abdominal pain (incisional). Negative for diarrhea and vomiting.   Endocrine: Negative.    Genitourinary:  Negative for decreased urine volume and difficulty urinating.   Skin:  Positive for wound.   Allergic/Immunologic: Positive for immunocompromised state.   Neurological:  Negative for dizziness, tremors, weakness and headaches.   Psychiatric/Behavioral:  Negative for agitation, behavioral problems, confusion and decreased concentration.      "  Objective:     Vital Signs (Most Recent):  Temp: 98.2 °F (36.8 °C) (04/21/24 1925)  Pulse: 74 (04/21/24 1925)  Resp: 18 (04/21/24 1925)  BP: 125/68 (04/21/24 1925)  SpO2: 99 % (04/21/24 1925) Vital Signs (24h Range):  Temp:  [97.5 °F (36.4 °C)-98.2 °F (36.8 °C)] 98.2 °F (36.8 °C)  Pulse:  [74-92] 74  Resp:  [14-20] 18  SpO2:  [95 %-99 %] 99 %  BP: (115-149)/(68-89) 125/68     Weight: 89.3 kg (196 lb 13.9 oz)  Height: 5' 7" (170.2 cm)  Body mass index is 30.83 kg/m².     Physical Exam  Vitals and nursing note reviewed.   Constitutional:       General: He is not in acute distress.     Appearance: Normal appearance. He is not ill-appearing.   HENT:      Head: Normocephalic.      Nose:      Comments: NGT in place      Mouth/Throat:      Pharynx: Oropharynx is clear.   Eyes:      Conjunctiva/sclera: Conjunctivae normal.   Cardiovascular:      Rate and Rhythm: Tachycardia present.      Pulses: Normal pulses.   Pulmonary:      Effort: Pulmonary effort is normal.      Breath sounds: Normal breath sounds.   Abdominal:      General: Bowel sounds are decreased. There is distension.      Tenderness: There is abdominal tenderness (mild). There is no guarding or rebound.      Comments: RLQ Kidney txp incision CDI with staples intact, small area of erythema at superior part of incision    Musculoskeletal:         General: Normal range of motion.      Right lower leg: No edema.      Left lower leg: No edema.   Skin:     General: Skin is warm and dry.   Neurological:      Mental Status: He is alert and oriented to person, place, and time. Mental status is at baseline.   Psychiatric:         Mood and Affect: Mood normal.         Behavior: Behavior normal. Behavior is cooperative.         Thought Content: Thought content normal.          Laboratory:  CBC:   Recent Labs   Lab 04/19/24  0544 04/20/24  0526 04/21/24  0539   WBC 10.50 10.27 10.14   RBC 3.66* 3.76* 3.75*   HGB 10.1* 10.6* 10.4*   HCT 33.2* 33.9* 34.2*    206 211 " "  MCV 91 90 91   MCH 27.6 28.2 27.7   MCHC 30.4* 31.3* 30.4*     CMP:   Recent Labs   Lab 04/19/24  0544 04/20/24  0526 04/21/24  0539   * 146* 142*   CALCIUM 8.6* 8.5* 8.2*   ALBUMIN 2.8* 2.8* 2.6*    138 138   K 3.2* 3.1* 3.3*   CO2 24 27 25    103 104   BUN 26* 21 17   CREATININE 1.1 1.1 1.0       Diagnostic Results:  Abdominal X-Ray: No results found for this or any previous visit.  Assessment/Plan:     * History of incisional hernia repair  - see " surgical wound infection"      Hypokalemia  - Replace w/ IV supplements as needed while NG in place      Incisional hernia following transplant  - s/p repair on 4/9      Tachycardia  - Last EKG 4/15 with sinus tach  - Cont coreg       Ileus  - Seen on KUB 4/10   - No improvement after bowel rest, enema  - NGT placed 4/16 for decompression  - Remains w/ over 1L NG output daily, KUB still w/ ileus pattern. CT A/P w/ oral contrast pending, r/o obstruction/kink  - XR graffin slow movement - Brown bomb enema today 4/21  - Continue PPN  - Continue bowel regimen      Encounter for post surgical wound check        Anemia of chronic disease  - monitor daily CBC  - stable      Hypomagnesemia  - Continue home PO mag  - IV Mag as needed      History of COPD  - Resume home neb      Dyslipidemia  - continue home statin      Infection of surgical wound following transplant  - Pt with delayed surgical wound healing, seen in clinic 4/3 with 200 mL serous fluid expressed from incision w/ staples intact. Started on PO doxycycline at that time. No improvement with PO Doxy  - CT abd/pelvis 4/8 with RLQ transplant kidney with incisional hernia containing distal ileum. The large fluid collection interposed between the kidney and anterior abdominal wall drained at patient's bedside. Mild to moderate soft tissue stranding and trace fluid directly adjacent to the transplant kidney. CT reviewed by surgeon. Patient made NPO for surgical intervention for 4/9.   - Patient taken " "back to OR (4/9) for repair of wound dehiscence and hernia repair, abdominal washout  - Infectious work up negative thus far.   - Cefepime and vanc transitioned to PO levaquin and doxy x5 days. (EOT 4/15)      Prophylactic immunotherapy  - see "long term use of immunosuppression"      At risk for opportunistic infections  - OI prophylaxis per transplant protocol      Long-term use of immunosuppressant medication  - Continue prograf and prednisone  - Check prograf level daily and adjust for therapeutic dosage. Monitor for toxic side effects   - Myfortic restarted 4/17      S/P living-donor kidney transplantation  - S/p Ktxp 3/11/24 d/t IgA nephropathy. Progressed well post-txp with Cr downtrending  - BL Cr 1.1-1.2  - Cr at BL  - Strict Is and Os   - Avoid nephrotoxic medications            Discharge Planning:   Not candidate for discharge at this time   Medical decision making for this encounter includes review of pertinent labs and diagnostic studies, assessment and planning, discussions with consulting providers, discussion with patient/family, and participation in multidisciplinary rounds. Time spent caring for patient: 30 minutes    MALCOM Benitez  Kidney Transplant  [facility]  "

## 2024-04-22 NOTE — SUBJECTIVE & OBJECTIVE
Subjective:   History of Present Illness:  Mr. Coletn Monet is a 63 yo male w/ PMHx of ESRD d/t IgA nephropathy, now S/P living related Ktxp on 3/11/24. Patient progressed well during initial post-txp course and discharged POD#2 with downtrending Cr. Now with BL Cr 1.0-1.1.     Patient recently seen in transplant surgery clinic on 4/3 and noted to have serous drainage from RLQ kidney txp incision along with palpable erythema. Staples intact w/ minimal tenderness to deep palpation at that time. Incision probed w/ 200 cc serous fluid drained in clinic by surgical fellow. Patient was placed on PO for cellulitis. He now presents to ED for worsening erythema to incisional area (see media for photo). Staples remain intact, erythema noted to entire length of incision, worse in upper portion. Small 1-2 cm area of yellow slough in upper portion incision. Minimal serous drainage expressed upon probing. Patient reports mild tenderness to deep palpation. Kidney US in ED w/ large 14.8 x 7.4 x 1.3 cm, just posterior to the anterior pelvic wall overlying the transplant kidney. Reviewed plan with Dr. Eduardo and Dr. Whyte. CT A/P non-con ordered along with IV abx. Staples removed at bedside, small 1-2 cm area of wound dehiscence noted, moderate amount serous fluid able to be expressed from incision. Mild HARRISON on admit (Cr 1.5). NS bolus ordered.           Hospital Course:  Patient admitted for worsening cellulitis of kidney transplant incision, incisional hernia, and HARRISON. Cefepime and vanc started, infectious work-up ordered. Hospital course as follows:  CT abd/pelvis 4/8/24 revealed incisional hernia containing distal ileum, associated small bowel feces sign, suggesting intestinal stasis, but without CT evidence of high-grade intestinal obstruction. TB  to OR 4/9/24 for repair of dehiscence of kidney transplant incision and hernia repair plus washout.   HARRISON: Worsened post OR with associated hyperkalemia. Required several  interventions (shifting, lasix, IVF). Renal function and hyperkalemia both improved, now back to baseline.   Ileus: Patient w/ N/V post take back, KUB 4/10 with ileus, NPO for bowel rest 4/11. Enema ordered 4/14 w/ 3 large BM, however no improvement in N/V. Remains w/ ileus. NGT placement 4/16. PPN started 4/18. Prealbumin 14  Infectious work-up negative. IV abx transitioned to PO levaquin and doxy x 5 days (EOT 4/15/24).    Interval History: no acute events overnight. Pt reports passing liquid stool w enema given 4/21. Still not really passing flatus.  Reviewed KUB, pt had 900cc bilious drainage and remains distended w hypoactive bowel sounds. Plan to assess output another 24 hrs w NGT in place and encourage pt to ambulate. Plan to consult PICC team for PIV, cont PPN and Lipids. Cr wnl. Patient reports feeling well otherwise denies n/v. Replacing electrolytes. VSS, Monitor         Past Medical, Surgical, Family, and Social History:   Unchanged from H&P.    Scheduled Meds:  Current Facility-Administered Medications   Medication Dose Route Frequency    aspirin  81 mg Oral Daily    atorvastatin  40 mg Oral QHS    atovaquone  1,500 mg Oral Daily    bisacodyL  10 mg Oral Daily    carvediloL  12.5 mg Oral BID    docusate sodium  100 mg Oral BID    famotidine (PF)  20 mg Intravenous Nightly    fluticasone furoate-vilanteroL  1 puff Inhalation Daily    heparin (porcine)  5,000 Units Subcutaneous Q8H    magnesium sulfate IVPB  2 g Intravenous Once    mycophenolate sodium  540 mg Oral BID    polyethylene glycol  17 g Oral Daily    predniSONE  5 mg Oral Daily    tacrolimus  1.5 mg Sublingual BID    valGANciclovir  900 mg Oral QAM     Continuous Infusions:  Current Facility-Administered Medications   Medication Dose Route Frequency Last Rate Last Admin    TPN ADULT PERIPHERAL CUSTOM   Intravenous Continuous 100 mL/hr at 04/21/24 2125 New Bag at 04/21/24 2125     PRN Meds:  Current Facility-Administered Medications:      acetaminophen, 650 mg, Oral, Q6H PRN    albuterol, 1 puff, Inhalation, Q6H PRN    aluminum-magnesium hydroxide-simethicone, 30 mL, Oral, Q6H PRN    bisacodyL, 10 mg, Rectal, Daily PRN    calcium carbonate, 500 mg, Oral, TID PRN    melatonin, 6 mg, Oral, Nightly PRN    ondansetron, 8 mg, Oral, Q6H PRN    oxyCODONE, 5 mg, Oral, Q4H PRN    oxyCODONE, 10 mg, Oral, Q4H PRN    sodium chloride 0.9%, 3 mL, Intravenous, PRN    Intake/Output - Last 3 Shifts         04/20 0700 04/21 0659 04/21 0700 04/22 0659 04/22 0700 04/23 0659    Urine (mL/kg/hr) 900 (0.4) 200 (0.1)     Drains 1200 900     Stool 0      Total Output 2100 1100     Net -2100 -1100            Urine Occurrence 2 x      Stool Occurrence 6 x               Review of Systems   Constitutional:  Positive for activity change and appetite change (NPO). Negative for chills and fever.   HENT:  Negative for congestion and trouble swallowing.    Eyes: Negative.    Respiratory:  Negative for cough and shortness of breath.    Cardiovascular:  Negative for chest pain and leg swelling.   Gastrointestinal:  Positive for abdominal distention and abdominal pain (incisional). Negative for diarrhea and vomiting.   Endocrine: Negative.    Genitourinary:  Negative for decreased urine volume and difficulty urinating.   Skin:  Positive for wound.   Allergic/Immunologic: Positive for immunocompromised state.   Neurological:  Negative for dizziness, tremors, weakness and headaches.   Psychiatric/Behavioral:  Negative for agitation, behavioral problems, confusion and decreased concentration.       Objective:     Vital Signs (Most Recent):  Temp: 97.9 °F (36.6 °C) (04/22/24 0725)  Pulse: (!) 111 (04/22/24 0725)  Resp: 18 (04/22/24 0725)  BP: 104/70 (04/22/24 0725)  SpO2: 98 % (04/22/24 0725) Vital Signs (24h Range):  Temp:  [97.7 °F (36.5 °C)-98.3 °F (36.8 °C)] 97.9 °F (36.6 °C)  Pulse:  [] 111  Resp:  [14-20] 18  SpO2:  [95 %-99 %] 98 %  BP: (104-143)/(68-89) 104/70     Weight:  "87.6 kg (193 lb 2 oz)  Height: 5' 7" (170.2 cm)  Body mass index is 30.25 kg/m².     Physical Exam  Vitals and nursing note reviewed.   Constitutional:       General: He is not in acute distress.     Appearance: Normal appearance. He is not ill-appearing.   HENT:      Head: Normocephalic.      Nose:      Comments: NGT in place      Mouth/Throat:      Pharynx: Oropharynx is clear.   Eyes:      Conjunctiva/sclera: Conjunctivae normal.   Cardiovascular:      Rate and Rhythm: Tachycardia present.      Pulses: Normal pulses.   Pulmonary:      Effort: Pulmonary effort is normal.      Breath sounds: Normal breath sounds.   Abdominal:      General: Bowel sounds are decreased. There is distension.      Tenderness: There is abdominal tenderness (mild). There is no guarding or rebound.      Comments: RLQ Kidney txp incision CDI with staples intact, small area of erythema at superior part of incision    Musculoskeletal:         General: Normal range of motion.      Right lower leg: No edema.      Left lower leg: No edema.   Skin:     General: Skin is warm and dry.      Capillary Refill: Capillary refill takes 2 to 3 seconds.   Neurological:      Mental Status: He is alert and oriented to person, place, and time. Mental status is at baseline.   Psychiatric:         Mood and Affect: Mood normal.         Behavior: Behavior normal. Behavior is cooperative.         Thought Content: Thought content normal.         Judgment: Judgment normal.          Laboratory:  CBC:   Recent Labs   Lab 04/20/24  0526 04/21/24  0539 04/22/24  0614   WBC 10.27 10.14 10.66   RBC 3.76* 3.75* 3.74*   HGB 10.6* 10.4* 10.6*   HCT 33.9* 34.2* 33.6*    211 221   MCV 90 91 90   MCH 28.2 27.7 28.3   MCHC 31.3* 30.4* 31.5*     CMP:   Recent Labs   Lab 04/20/24  0526 04/21/24  0539 04/22/24  0614   * 142* 108   CALCIUM 8.5* 8.2* 8.7   ALBUMIN 2.8* 2.6* 2.8*    138 140   K 3.1* 3.3* 3.7   CO2 27 25 27    104 104   BUN 21 17 20 "   CREATININE 1.1 1.0 1.1       Diagnostic Results:  Abdominal X-Ray: reviewed w surgeon

## 2024-04-22 NOTE — PROGRESS NOTES
[facility]  Kidney Transplant  Progress Note      Reason for Follow-up: Reassessment of Kidney Transplant - 3/11/2024  (#1) recipient and management of immunosuppression.    ORGAN: LEFT KIDNEY      Donor Type: Living      Donor CMV Status:    Donor HBcAB:   Donor HCV Status:   Donor HBV ANA:   Donor HCV ANA:       Subjective:   History of Present Illness:  Mr. Colten Monet is a 65 yo male w/ PMHx of ESRD d/t IgA nephropathy, now S/P living related Ktxp on 3/11/24. Patient progressed well during initial post-txp course and discharged POD#2 with downtrending Cr. Now with BL Cr 1.0-1.1.     Patient recently seen in transplant surgery clinic on 4/3 and noted to have serous drainage from RLQ kidney txp incision along with palpable erythema. Staples intact w/ minimal tenderness to deep palpation at that time. Incision probed w/ 200 cc serous fluid drained in clinic by surgical fellow. Patient was placed on PO for cellulitis. He now presents to ED for worsening erythema to incisional area (see media for photo). Staples remain intact, erythema noted to entire length of incision, worse in upper portion. Small 1-2 cm area of yellow slough in upper portion incision. Minimal serous drainage expressed upon probing. Patient reports mild tenderness to deep palpation. Kidney US in ED w/ large 14.8 x 7.4 x 1.3 cm, just posterior to the anterior pelvic wall overlying the transplant kidney. Reviewed plan with Dr. Eduardo and Dr. Whyte. CT A/P non-con ordered along with IV abx. Staples removed at bedside, small 1-2 cm area of wound dehiscence noted, moderate amount serous fluid able to be expressed from incision. Mild HARRISON on admit (Cr 1.5). NS bolus ordered.           Hospital Course:  Patient admitted for worsening cellulitis of kidney transplant incision, incisional hernia, and HARRISON. Cefepime and vanc started, infectious work-up ordered. Hospital course as follows:  CT abd/pelvis 4/8/24 revealed incisional  hernia containing distal ileum, associated small bowel feces sign, suggesting intestinal stasis, but without CT evidence of high-grade intestinal obstruction. TB  to OR 4/9/24 for repair of dehiscence of kidney transplant incision and hernia repair plus washout.   HARRISON: Worsened post OR with associated hyperkalemia. Required several interventions (shifting, lasix, IVF). Renal function and hyperkalemia both improved, now back to baseline.   Ileus: Patient w/ N/V post take back, KUB 4/10 with ileus, NPO for bowel rest 4/11. Enema ordered 4/14 w/ 3 large BM, however no improvement in N/V. Remains w/ ileus. NGT placement 4/16. PPN started 4/18. Prealbumin 14  Infectious work-up negative. IV abx transitioned to PO levaquin and doxy x 5 days (EOT 4/15/24).    Interval History: no acute events overnight. Pt reports passing liquid stool w enema given 4/21. Still not really passing flatus.  Reviewed KUB, pt had 900cc bilious drainage and remains distended w hypoactive bowel sounds. Plan to assess output another 24 hrs w NGT in place and encourage pt to ambulate. Plan to consult PICC team for PIV, cont PPN and Lipids. Cr wnl. Patient reports feeling well otherwise denies n/v. Replacing electrolytes. VSS, Monitor         Past Medical, Surgical, Family, and Social History:   Unchanged from H&P.    Scheduled Meds:  Current Facility-Administered Medications   Medication Dose Route Frequency    aspirin  81 mg Oral Daily    atorvastatin  40 mg Oral QHS    atovaquone  1,500 mg Oral Daily    bisacodyL  10 mg Oral Daily    carvediloL  12.5 mg Oral BID    docusate sodium  100 mg Oral BID    famotidine (PF)  20 mg Intravenous Nightly    fluticasone furoate-vilanteroL  1 puff Inhalation Daily    heparin (porcine)  5,000 Units Subcutaneous Q8H    magnesium sulfate IVPB  2 g Intravenous Once    mycophenolate sodium  540 mg Oral BID    polyethylene glycol  17 g Oral Daily    predniSONE  5 mg Oral Daily    tacrolimus  1.5 mg Sublingual BID     valGANciclovir  900 mg Oral QAM     Continuous Infusions:  Current Facility-Administered Medications   Medication Dose Route Frequency Last Rate Last Admin    TPN ADULT PERIPHERAL CUSTOM   Intravenous Continuous 100 mL/hr at 04/21/24 2125 New Bag at 04/21/24 2125     PRN Meds:  Current Facility-Administered Medications:     acetaminophen, 650 mg, Oral, Q6H PRN    albuterol, 1 puff, Inhalation, Q6H PRN    aluminum-magnesium hydroxide-simethicone, 30 mL, Oral, Q6H PRN    bisacodyL, 10 mg, Rectal, Daily PRN    calcium carbonate, 500 mg, Oral, TID PRN    melatonin, 6 mg, Oral, Nightly PRN    ondansetron, 8 mg, Oral, Q6H PRN    oxyCODONE, 5 mg, Oral, Q4H PRN    oxyCODONE, 10 mg, Oral, Q4H PRN    sodium chloride 0.9%, 3 mL, Intravenous, PRN    Intake/Output - Last 3 Shifts         04/20 0700  04/21 0659 04/21 0700 04/22 0659 04/22 0700  04/23 0659    Urine (mL/kg/hr) 900 (0.4) 200 (0.1)     Drains 1200 900     Stool 0      Total Output 2100 1100     Net -2100 -1100            Urine Occurrence 2 x      Stool Occurrence 6 x               Review of Systems   Constitutional:  Positive for activity change and appetite change (NPO). Negative for chills and fever.   HENT:  Negative for congestion and trouble swallowing.    Eyes: Negative.    Respiratory:  Negative for cough and shortness of breath.    Cardiovascular:  Negative for chest pain and leg swelling.   Gastrointestinal:  Positive for abdominal distention and abdominal pain (incisional). Negative for diarrhea and vomiting.   Endocrine: Negative.    Genitourinary:  Negative for decreased urine volume and difficulty urinating.   Skin:  Positive for wound.   Allergic/Immunologic: Positive for immunocompromised state.   Neurological:  Negative for dizziness, tremors, weakness and headaches.   Psychiatric/Behavioral:  Negative for agitation, behavioral problems, confusion and decreased concentration.       Objective:     Vital Signs (Most Recent):  Temp: 97.9 °F (36.6 °C)  "(04/22/24 0725)  Pulse: (!) 111 (04/22/24 0725)  Resp: 18 (04/22/24 0725)  BP: 104/70 (04/22/24 0725)  SpO2: 98 % (04/22/24 0725) Vital Signs (24h Range):  Temp:  [97.7 °F (36.5 °C)-98.3 °F (36.8 °C)] 97.9 °F (36.6 °C)  Pulse:  [] 111  Resp:  [14-20] 18  SpO2:  [95 %-99 %] 98 %  BP: (104-143)/(68-89) 104/70     Weight: 87.6 kg (193 lb 2 oz)  Height: 5' 7" (170.2 cm)  Body mass index is 30.25 kg/m².     Physical Exam  Vitals and nursing note reviewed.   Constitutional:       General: He is not in acute distress.     Appearance: Normal appearance. He is not ill-appearing.   HENT:      Head: Normocephalic.      Nose:      Comments: NGT in place      Mouth/Throat:      Pharynx: Oropharynx is clear.   Eyes:      Conjunctiva/sclera: Conjunctivae normal.   Cardiovascular:      Rate and Rhythm: Tachycardia present.      Pulses: Normal pulses.   Pulmonary:      Effort: Pulmonary effort is normal.      Breath sounds: Normal breath sounds.   Abdominal:      General: Bowel sounds are decreased. There is distension.      Tenderness: There is abdominal tenderness (mild). There is no guarding or rebound.      Comments: RLQ Kidney txp incision CDI with staples intact, small area of erythema at superior part of incision    Musculoskeletal:         General: Normal range of motion.      Right lower leg: No edema.      Left lower leg: No edema.   Skin:     General: Skin is warm and dry.      Capillary Refill: Capillary refill takes 2 to 3 seconds.   Neurological:      Mental Status: He is alert and oriented to person, place, and time. Mental status is at baseline.   Psychiatric:         Mood and Affect: Mood normal.         Behavior: Behavior normal. Behavior is cooperative.         Thought Content: Thought content normal.         Judgment: Judgment normal.          Laboratory:  CBC:   Recent Labs   Lab 04/20/24  0526 04/21/24  0539 04/22/24  0614   WBC 10.27 10.14 10.66   RBC 3.76* 3.75* 3.74*   HGB 10.6* 10.4* 10.6*   HCT 33.9* " "34.2* 33.6*    211 221   MCV 90 91 90   MCH 28.2 27.7 28.3   MCHC 31.3* 30.4* 31.5*     CMP:   Recent Labs   Lab 04/20/24  0526 04/21/24  0539 04/22/24  0614   * 142* 108   CALCIUM 8.5* 8.2* 8.7   ALBUMIN 2.8* 2.6* 2.8*    138 140   K 3.1* 3.3* 3.7   CO2 27 25 27    104 104   BUN 21 17 20   CREATININE 1.1 1.0 1.1       Diagnostic Results:  Abdominal X-Ray: reviewed w surgeon  Assessment/Plan:     * History of incisional hernia repair  - see " surgical wound infection"      Hypokalemia  - Replace w/ IV supplements as needed while NG in place      Incisional hernia following transplant  - s/p repair on 4/9      Tachycardia  - Last EKG 4/15 with sinus tach  - Cont coreg       Ileus  - Seen on KUB 4/10   - No improvement after bowel rest, enema  - NGT placed 4/16 for decompression  - Remains w/ over 1L NG output daily, KUB still w/ ileus pattern. CT A/P w/ oral contrast pending, r/o obstruction/kink  - XR graffin 4/20/24 w slow movement - Brown bomb enema 4/21, encourage ambulation 4/22  - Continue PPN  - Continue bowel regimen      Encounter for post surgical wound check        Anemia of chronic disease  - monitor daily CBC  - stable      Hypomagnesemia  - Continue home PO mag  - IV Mag as needed      History of COPD  - Resume home neb      Dyslipidemia  - continue home statin      Infection of surgical wound following transplant  - Pt with delayed surgical wound healing, seen in clinic 4/3 with 200 mL serous fluid expressed from incision w/ staples intact. Started on PO doxycycline at that time. No improvement with PO Doxy  - CT abd/pelvis 4/8 with RLQ transplant kidney with incisional hernia containing distal ileum. The large fluid collection interposed between the kidney and anterior abdominal wall drained at patient's bedside. Mild to moderate soft tissue stranding and trace fluid directly adjacent to the transplant kidney. CT reviewed by surgeon. Patient made NPO for surgical intervention " "for 4/9.   - Patient taken back to OR (4/9) for repair of wound dehiscence and hernia repair, abdominal washout  - Infectious work up negative thus far.   - Cefepime and vanc transitioned to PO levaquin and doxy x5 days. (EOT 4/15). Pt has completed course of abx.      Prophylactic immunotherapy  - see "long term use of immunosuppression"      At risk for opportunistic infections  - OI prophylaxis per transplant protocol      Long-term use of immunosuppressant medication  - Continue prograf and prednisone  - Check prograf level daily and adjust for therapeutic dosage. Monitor for toxic side effects   - Myfortic restarted 4/17      S/P living-donor kidney transplantation  - S/p Ktxp 3/11/24 d/t IgA nephropathy. Progressed well post-txp with Cr downtrending  - BL Cr 1.1-1.2  - Cr at BL  - Strict Is and Os   - Avoid nephrotoxic medications            Discharge Planning:  Discussed plan of care.  No plan for discharge today.    Medical decision making for this encounter includes review of pertinent labs and diagnostic studies, assessment and planning, discussions with consulting providers, discussion with patient/family, and participation in multidisciplinary rounds. Time spent caring for patient: 90 minutes    Shirley Feliz, NP  Kidney Transplant  [facility]  "

## 2024-04-22 NOTE — CHAPLAIN
04/22/24 1515   Clinical Encounter Type   Visit Type Follow-up   Visit Category General Rounding   Visited With Patient and family together   Number of Family Visited 1   Length of Visit 10 Minutes   Continue Visiting Yes   [place]ai Encounters   Spiritual Resources Requested Prayer   Patient Spiritual Encounters   Care Provided Reflective listening;Prayer support;Compassionate presence   Patient Coping Accepting;Open/discussion;Active louann;Internal strength   Comments - Patient Pt said I am still here; waiting for some bowl movements; nothing working out. Probably it will happen today (and he laughs). Positive and relaxing attitude makes him move forward. Welcomed me to visit him again.   Family Spiritual Encounters   Care Provided Compassionate presence   Family Coping Open/discussion   Comments - Family one friend giving pt company

## 2024-04-22 NOTE — PLAN OF CARE
- Patient is s/p Kidney Tranplant on 3/11/024 secondary to IgA Nephropathy. Admitted on 04/08/2024 for management of incisional hernia and ileus.  - Alert, orientedX4 and ambulatory.   - VSS. Afebrie. Spo2 maintained@RA.  - Gastrografin challenge test done on 4/20.  - Rt nostril NG tube in situ, connected to low intermittent wall suction . See I/O flowsheet for total output.  - RLQ incision intact with staples covered with dsg.  - NPO.  - Patient lost his IV so couldn't continue his IV TPN and lipids . Couldn't get a new IV on him. Patient requested to be stuck again by the Midline team with the ultrasound machine as he is hard on stick. Provider, Kae ware.  PICC team consulted as per the order. See previous note from the charge nurse Flor.  - Patient had the soap suds enema with 4 successful BMS during the day  - Plan x-ray abdomen this am.  - Slept well overnight.  - Bed in low position. Wheels locked. Call light within patient's reach.  - Patient aware of calling for assistance.  - No s/s of acute distress noted this shift.  - Plan of care ongoing, progressing.

## 2024-04-22 NOTE — CONSULTS
NOMI consulted for PIV insertion in real time using ultrasound guidance.     Indication: PVA  Gauge and length: 20 g x 1 3/4 inch   Location: [facility]

## 2024-04-22 NOTE — NURSING
Notified by bedside RN Mitzi MATTA that PIV that lost PIV access. Per patient he is a hard stick and an ultrasound machine was used last time. PPN on hold this RN notified K. Hong PA order placed for midline cons and RRT notified to possibly put in a PIV tonight. Will monitor.

## 2024-04-23 LAB
ALBUMIN SERPL BCP-MCNC: 2.9 G/DL (ref 3.5–5.2)
ANION GAP SERPL CALC-SCNC: 7 MMOL/L (ref 8–16)
BASOPHILS # BLD AUTO: 0.09 K/UL (ref 0–0.2)
BASOPHILS NFR BLD: 0.8 % (ref 0–1.9)
BUN SERPL-MCNC: 20 MG/DL (ref 8–23)
CALCIUM SERPL-MCNC: 9 MG/DL (ref 8.7–10.5)
CHLORIDE SERPL-SCNC: 106 MMOL/L (ref 95–110)
CO2 SERPL-SCNC: 29 MMOL/L (ref 23–29)
CREAT SERPL-MCNC: 1.3 MG/DL (ref 0.5–1.4)
DIFFERENTIAL METHOD BLD: ABNORMAL
EOSINOPHIL # BLD AUTO: 0.2 K/UL (ref 0–0.5)
EOSINOPHIL NFR BLD: 1.3 % (ref 0–8)
ERYTHROCYTE [DISTWIDTH] IN BLOOD BY AUTOMATED COUNT: 13.6 % (ref 11.5–14.5)
EST. GFR  (NO RACE VARIABLE): >60 ML/MIN/1.73 M^2
GLUCOSE SERPL-MCNC: 113 MG/DL (ref 70–110)
HCT VFR BLD AUTO: 35.3 % (ref 40–54)
HGB BLD-MCNC: 10.4 G/DL (ref 14–18)
IMM GRANULOCYTES # BLD AUTO: 0.24 K/UL (ref 0–0.04)
IMM GRANULOCYTES NFR BLD AUTO: 2 % (ref 0–0.5)
LYMPHOCYTES # BLD AUTO: 1.3 K/UL (ref 1–4.8)
LYMPHOCYTES NFR BLD: 11 % (ref 18–48)
MAGNESIUM SERPL-MCNC: 1.9 MG/DL (ref 1.6–2.6)
MCH RBC QN AUTO: 26.9 PG (ref 27–31)
MCHC RBC AUTO-ENTMCNC: 29.5 G/DL (ref 32–36)
MCV RBC AUTO: 91 FL (ref 82–98)
MONOCYTES # BLD AUTO: 1.1 K/UL (ref 0.3–1)
MONOCYTES NFR BLD: 9.3 % (ref 4–15)
NEUTROPHILS # BLD AUTO: 9 K/UL (ref 1.8–7.7)
NEUTROPHILS NFR BLD: 75.6 % (ref 38–73)
NRBC BLD-RTO: 0 /100 WBC
PHOSPHATE SERPL-MCNC: 3.3 MG/DL (ref 2.7–4.5)
PLATELET # BLD AUTO: 267 K/UL (ref 150–450)
PMV BLD AUTO: 10.8 FL (ref 9.2–12.9)
POTASSIUM SERPL-SCNC: 4.8 MMOL/L (ref 3.5–5.1)
RBC # BLD AUTO: 3.87 M/UL (ref 4.6–6.2)
SODIUM SERPL-SCNC: 142 MMOL/L (ref 136–145)
TACROLIMUS BLD-MCNC: 9.6 NG/ML (ref 5–15)
WBC # BLD AUTO: 11.94 K/UL (ref 3.9–12.7)

## 2024-04-23 PROCEDURE — 25000003 PHARM REV CODE 250: Performed by: PHYSICIAN ASSISTANT

## 2024-04-23 PROCEDURE — 25000003 PHARM REV CODE 250: Performed by: NURSE PRACTITIONER

## 2024-04-23 PROCEDURE — 83735 ASSAY OF MAGNESIUM: CPT

## 2024-04-23 PROCEDURE — 36415 COLL VENOUS BLD VENIPUNCTURE: CPT

## 2024-04-23 PROCEDURE — 25000003 PHARM REV CODE 250: Performed by: INTERNAL MEDICINE

## 2024-04-23 PROCEDURE — 99233 SBSQ HOSP IP/OBS HIGH 50: CPT | Mod: 24,,, | Performed by: NURSE PRACTITIONER

## 2024-04-23 PROCEDURE — 63600175 PHARM REV CODE 636 W HCPCS: Performed by: INTERNAL MEDICINE

## 2024-04-23 PROCEDURE — 20600001 HC STEP DOWN PRIVATE ROOM

## 2024-04-23 PROCEDURE — 85025 COMPLETE CBC W/AUTO DIFF WBC: CPT

## 2024-04-23 PROCEDURE — 80197 ASSAY OF TACROLIMUS: CPT

## 2024-04-23 PROCEDURE — 99900035 HC TECH TIME PER 15 MIN (STAT)

## 2024-04-23 PROCEDURE — 63600175 PHARM REV CODE 636 W HCPCS: Performed by: NURSE PRACTITIONER

## 2024-04-23 PROCEDURE — 80069 RENAL FUNCTION PANEL: CPT

## 2024-04-23 PROCEDURE — 25000003 PHARM REV CODE 250

## 2024-04-23 PROCEDURE — 27000207 HC ISOLATION

## 2024-04-23 PROCEDURE — 25000003 PHARM REV CODE 250: Performed by: CLINICAL NURSE SPECIALIST

## 2024-04-23 PROCEDURE — 63600175 PHARM REV CODE 636 W HCPCS

## 2024-04-23 RX ADMIN — ATOVAQUONE 1500 MG: 750 SUSPENSION ORAL at 07:04

## 2024-04-23 RX ADMIN — BISACODYL 10 MG: 5 TABLET, COATED ORAL at 07:04

## 2024-04-23 RX ADMIN — HEPARIN SODIUM 5000 UNITS: 5000 INJECTION INTRAVENOUS; SUBCUTANEOUS at 08:04

## 2024-04-23 RX ADMIN — MYCOPHENOLIC ACID 540 MG: 180 TABLET, DELAYED RELEASE ORAL at 08:04

## 2024-04-23 RX ADMIN — ATORVASTATIN CALCIUM 40 MG: 20 TABLET, FILM COATED ORAL at 08:04

## 2024-04-23 RX ADMIN — PREDNISONE 5 MG: 5 TABLET ORAL at 07:04

## 2024-04-23 RX ADMIN — FAMOTIDINE 20 MG: 10 INJECTION, SOLUTION INTRAVENOUS at 08:04

## 2024-04-23 RX ADMIN — FLUTICASONE FUROATE AND VILANTEROL TRIFENATATE 1 PUFF: 100; 25 POWDER RESPIRATORY (INHALATION) at 07:04

## 2024-04-23 RX ADMIN — TACROLIMUS 1.5 MG: 1 CAPSULE ORAL at 05:04

## 2024-04-23 RX ADMIN — DOCUSATE SODIUM 100 MG: 100 CAPSULE, LIQUID FILLED ORAL at 08:04

## 2024-04-23 RX ADMIN — MYCOPHENOLIC ACID 540 MG: 180 TABLET, DELAYED RELEASE ORAL at 07:04

## 2024-04-23 RX ADMIN — DOCUSATE SODIUM 100 MG: 100 CAPSULE, LIQUID FILLED ORAL at 07:04

## 2024-04-23 RX ADMIN — TACROLIMUS 1.5 MG: 1 CAPSULE ORAL at 07:04

## 2024-04-23 RX ADMIN — CARVEDILOL 12.5 MG: 12.5 TABLET, FILM COATED ORAL at 08:04

## 2024-04-23 RX ADMIN — VALGANCICLOVIR 900 MG: 450 TABLET, FILM COATED ORAL at 07:04

## 2024-04-23 RX ADMIN — ASPIRIN 81 MG CHEWABLE TABLET 81 MG: 81 TABLET CHEWABLE at 07:04

## 2024-04-23 RX ADMIN — POLYETHYLENE GLYCOL 3350 17 G: 17 POWDER, FOR SOLUTION ORAL at 07:04

## 2024-04-23 RX ADMIN — CARVEDILOL 12.5 MG: 12.5 TABLET, FILM COATED ORAL at 07:04

## 2024-04-23 NOTE — SUBJECTIVE & OBJECTIVE
Subjective:   History of Present Illness:  Mr. Colten Monet is a 63 yo male w/ PMHx of ESRD d/t IgA nephropathy, now S/P living related Ktxp on 3/11/24. Patient progressed well during initial post-txp course and discharged POD#2 with downtrending Cr. Now with BL Cr 1.0-1.1.     Patient recently seen in transplant surgery clinic on 4/3 and noted to have serous drainage from RLQ kidney txp incision along with palpable erythema. Staples intact w/ minimal tenderness to deep palpation at that time. Incision probed w/ 200 cc serous fluid drained in clinic by surgical fellow. Patient was placed on PO for cellulitis. He now presents to ED for worsening erythema to incisional area (see media for photo). Staples remain intact, erythema noted to entire length of incision, worse in upper portion. Small 1-2 cm area of yellow slough in upper portion incision. Minimal serous drainage expressed upon probing. Patient reports mild tenderness to deep palpation. Kidney US in ED w/ large 14.8 x 7.4 x 1.3 cm, just posterior to the anterior pelvic wall overlying the transplant kidney. Reviewed plan with Dr. Eduardo and Dr. Whyte. CT A/P non-con ordered along with IV abx. Staples removed at bedside, small 1-2 cm area of wound dehiscence noted, moderate amount serous fluid able to be expressed from incision. Mild HARRISON on admit (Cr 1.5). NS bolus ordered.           Hospital Course:  Patient admitted for worsening cellulitis of kidney transplant incision, incisional hernia, and HARRISON. Cefepime and vanc started, infectious work-up ordered. Hospital course as follows:  CT abd/pelvis 4/8/24 revealed incisional hernia containing distal ileum, associated small bowel feces sign, suggesting intestinal stasis, but without CT evidence of high-grade intestinal obstruction. TB  to OR 4/9/24 for repair of dehiscence of kidney transplant incision and hernia repair plus washout.   HARRISON: Worsened post OR with associated hyperkalemia. Required several  interventions (shifting, lasix, IVF). Renal function and hyperkalemia both improved, now back to baseline.   Ileus: Patient w/ N/V post take back, KUB 4/10 with ileus, NPO for bowel rest 4/11. Enema ordered 4/14 w/ 3 large BM, however no improvement in N/V. Remains w/ ileus. NGT placement 4/16. PPN started 4/18. Prealbumin 14  Infectious work-up negative. IV abx transitioned to PO levaquin and doxy x 5 days (EOT 4/15/24).    Interval History: no acute events overnight. Pt passing flatus and had large BM this am. Bowel sounds active. KUB reviewed, less distention, no ileus noted. NGT removed 4/23. CLD started. Cr up slightly today likely fr being NPO and IV fluids held given poor PIV access.  Will finish PPN/Lipids and not order for tonight.Replacing electrolytes. VSS. Monitor         Past Medical, Surgical, Family, and Social History:   Unchanged from H&P.    Scheduled Meds:  Current Facility-Administered Medications   Medication Dose Route Frequency    aspirin  81 mg Oral Daily    atorvastatin  40 mg Oral QHS    atovaquone  1,500 mg Oral Daily    bisacodyL  10 mg Oral Daily    carvediloL  12.5 mg Oral BID    docusate sodium  100 mg Oral BID    famotidine (PF)  20 mg Intravenous Nightly    fluticasone furoate-vilanteroL  1 puff Inhalation Daily    heparin (porcine)  5,000 Units Subcutaneous Q8H    mycophenolate sodium  540 mg Oral BID    polyethylene glycol  17 g Oral Daily    predniSONE  5 mg Oral Daily    tacrolimus  1.5 mg Sublingual BID    valGANciclovir  900 mg Oral QAM     Continuous Infusions:  Current Facility-Administered Medications   Medication Dose Route Frequency Last Rate Last Admin    TPN ADULT PERIPHERAL CUSTOM   Intravenous Continuous   Stopped at 04/23/24 0400     PRN Meds:  Current Facility-Administered Medications:     acetaminophen, 650 mg, Oral, Q6H PRN    albuterol, 1 puff, Inhalation, Q6H PRN    aluminum-magnesium hydroxide-simethicone, 30 mL, Oral, Q6H PRN    bisacodyL, 10 mg, Rectal, Daily  PRN    calcium carbonate, 500 mg, Oral, TID PRN    melatonin, 6 mg, Oral, Nightly PRN    ondansetron, 8 mg, Oral, Q6H PRN    oxyCODONE, 5 mg, Oral, Q4H PRN    oxyCODONE, 10 mg, Oral, Q4H PRN    sodium chloride 0.9%, 3 mL, Intravenous, PRN    Intake/Output - Last 3 Shifts         04/21 0700 04/22 0659 04/22 0700 04/23 0659 04/23 0700 04/24 0659    P.O.  180     I.V. (mL/kg)  50 (0.6)     Other  0     Total Intake(mL/kg)  230 (2.6)     Urine (mL/kg/hr) 200 (0.1) 375 (0.2)     Emesis/NG output  0     Drains 900 850     Other  0     Stool  0     Blood  0     Total Output 1100 1225     Net -1100 -995            Urine Occurrence  3 x     Stool Occurrence  1 x     Emesis Occurrence  0 x              Review of Systems   Constitutional:  Positive for activity change and appetite change (NPO). Negative for chills and fever.   HENT:  Negative for congestion and trouble swallowing.    Eyes: Negative.    Respiratory:  Negative for cough and shortness of breath.    Cardiovascular:  Negative for chest pain and leg swelling.   Gastrointestinal:  Positive for abdominal distention and abdominal pain (incisional). Negative for diarrhea and vomiting.   Endocrine: Negative.    Genitourinary:  Negative for decreased urine volume and difficulty urinating.   Skin:  Positive for wound.   Allergic/Immunologic: Positive for immunocompromised state.   Neurological:  Negative for dizziness, tremors, weakness and headaches.   Psychiatric/Behavioral:  Negative for agitation, behavioral problems, confusion and decreased concentration.       Objective:     Vital Signs (Most Recent):  Temp: 97.9 °F (36.6 °C) (04/23/24 0705)  Pulse: 80 (04/23/24 0759)  Resp: 20 (04/23/24 0759)  BP: (!) 114/57 (04/23/24 0705)  SpO2: 98 % (04/23/24 0705) Vital Signs (24h Range):  Temp:  [97.5 °F (36.4 °C)-98.3 °F (36.8 °C)] 97.9 °F (36.6 °C)  Pulse:  [] 80  Resp:  [16-20] 20  SpO2:  [95 %-99 %] 98 %  BP: (104-140)/(56-70) 114/57     Weight: 87.6 kg (193 lb 2  "oz)  Height: 5' 7" (170.2 cm)  Body mass index is 30.25 kg/m².     Physical Exam  Vitals and nursing note reviewed.   Constitutional:       General: He is not in acute distress.     Appearance: Normal appearance. He is not ill-appearing.   HENT:      Head: Normocephalic.      Nose:      Comments: NGT in place      Mouth/Throat:      Pharynx: Oropharynx is clear.   Eyes:      Conjunctiva/sclera: Conjunctivae normal.   Cardiovascular:      Rate and Rhythm: Tachycardia present.      Pulses: Normal pulses.   Pulmonary:      Effort: Pulmonary effort is normal.      Breath sounds: Normal breath sounds.   Abdominal:      General: Bowel sounds are decreased. There is distension.      Tenderness: There is abdominal tenderness (mild). There is no guarding or rebound.      Comments: RLQ Kidney txp incision CDI with staples intact, small area of erythema at superior part of incision    Musculoskeletal:         General: Normal range of motion.      Right lower leg: No edema.      Left lower leg: No edema.   Skin:     General: Skin is warm and dry.      Capillary Refill: Capillary refill takes 2 to 3 seconds.   Neurological:      Mental Status: He is alert and oriented to person, place, and time. Mental status is at baseline.   Psychiatric:         Mood and Affect: Mood normal.         Behavior: Behavior normal. Behavior is cooperative.         Thought Content: Thought content normal.         Judgment: Judgment normal.          Laboratory:  CBC:   Recent Labs   Lab 04/21/24  0539 04/22/24  0614 04/23/24  0622   WBC 10.14 10.66 11.94   RBC 3.75* 3.74* 3.87*   HGB 10.4* 10.6* 10.4*   HCT 34.2* 33.6* 35.3*    221 267   MCV 91 90 91   MCH 27.7 28.3 26.9*   MCHC 30.4* 31.5* 29.5*     CMP:   Recent Labs   Lab 04/21/24  0539 04/22/24  0614 04/23/24  0622   * 108 113*   CALCIUM 8.2* 8.7 9.0   ALBUMIN 2.6* 2.8* 2.9*    140 142   K 3.3* 3.7 4.8   CO2 25 27 29    104 106   BUN 17 20 20   CREATININE 1.0 1.1 1.3 "       Diagnostic Results:  Abdominal X-Ray: reviewed w surgeon

## 2024-04-23 NOTE — PLAN OF CARE
Pt aaox4. Bed in low and locked position. Nonskid socks in use. Call bell, phone, food, drink within reach in bed or on bedside table. Friend at bedside and active in care. Both aware to call if needing assistance. Denies pain. NPO with R NGT to LIWS.  New PIV placed by picc team - mag IVPB then tpn/lipids restarted.  R incision jaylene w staples.   Possible dc tomorrow.

## 2024-04-23 NOTE — CONSULTS
[facility] consulted for PIV insertion in real time using ultrasound guidance.    Indication: PVA  Gauge and length: 20 g x 1 3/4 inch  Location: [facility]

## 2024-04-23 NOTE — PLAN OF CARE
Pt. AAOx4, VSS, spo2> 94% on room air. Standing BP only   Pt. Ambulating independently--total UOP in flowsheet, no BM this shift   No complaints of pain/N/V this shift.   NPO--right NGT LIWS c minimal output this shift  TPN @ 100cc/hr, lipids @ 20.8cc/hr  Friend at bedside   Bed in low locked position, call light within reach, pt instructed to call for assistance needed, pt verbalized understanding, remains free from falls this shift. WCTM.     Nurses Note -- 4 Eyes      4/23/2024   3:23 AM      Skin assessed during: Daily Assessment      [x] No Altered Skin Integrity Present    [x]Prevention Measures Documented      [] Yes- Altered Skin Integrity Present or Discovered   [] LDA Added if Not in Epic (Describe Wound)   [] New Altered Skin Integrity was Present on Admit and Documented in LDA   [] Wound Image Taken    Wound Care Consulted? No    Attending Nurse:  Yudy Gamboa RN/Staff Member:   Epi

## 2024-04-23 NOTE — NURSING
ALVARO Hong notified that pt IV has been beeping all nigh long, pt unable to sleep and requesting TPN and lipids be turned off at this time. Midline team consulted to placed another source of access, due to current source becoming occluded each time TPN and lipids are restarted.

## 2024-04-23 NOTE — PROGRESS NOTES
[facility] - Transplant Stepdown  Adult Nutrition  Progress Note    SUMMARY       Recommendations    When medically able, ADAT Regular diet with texture per SLP/MD    Current PPN order: 2.75% AA/ 10% D @ 100 ml/hr + IV lipids to provide 1580 kcal, 66g protein. GIR = 1.86  a. Meet 88% estimated energy, 65% protein needs     If central line placed and TPN warranted, rec'd 270g D, 100g AA + IV lipids to provide 1818 kcal, 100g protein. GIR = 2.10     RD to monitor and follow    Goals: Meet % EEN, EPN by RD f/u  Nutrition Goal Status: progressing towards goal  Communication of RD Recs:  (POC)    Assessment and Plan    Nutrition Problem  Increased nutrient needs      Related to (etiology):   Increased demand for nutrient due to wound healing/ prolonged catabolic illness/ chronic infection     Signs and Symptoms (as evidenced by):    Pt with hx of s/p living kidney tx (3/11).      Interventions/Recommendations (treatment strategy):  Collaboration with other providers     Nutrition Diagnosis Status:   Continue     Malnutrition Assessment     Skin (Micronutrient): none  Nails (Micronutrient): none  Hair/Scalp (Micronutrient): none  Eyes (Micronutrient): Bitot's spots  Extraoral (Micronutrient): none  Gums (Micronutrient): none  Lips/Mucous Membranes (Micronutrient): none  Teeth (Micronutrient): none  Tongue (Micronutrient): none  Neck/Chest (Micronutrient): none  Musculoskeletal/Lower Extremities: none   Micronutrient Evaluation Summary: suspected deficiency (vitamin A, zn)       Orbital Region (Subcutaneous Fat Loss): well nourished  Upper Arm Region (Subcutaneous Fat Loss): well nourished   Fowler Region (Muscle Loss): well nourished  Clavicle Bone Region (Muscle Loss): well nourished  Clavicle and Acromion Bone Region (Muscle Loss): well nourished  Dorsal Hand (Muscle Loss): well nourished  Patellar Region (Muscle Loss): well nourished  Anterior Thigh Region (Muscle Loss): well nourished  Posterior Calf Region  "(Muscle Loss): well nourished     Reason for Assessment    Reason For Assessment: RD follow-up  Diagnosis:  (History of incisional hernia repair)  Relevant Medical History: s/p living kidney tx, dyslipidemia, COPD, anemia, ileus  Interdisciplinary Rounds: did not attend    General Information Comments:   RD f/u. Diet advanced to clear liquid today. Difficulties with PPN/IL administration due to PVI access per chart. D/c PPN/IL after completing today. + BM. NGT removed today. Per initial assessment, pt with good PO intake PTA, wt stable, NFPE on 4/18 - no wasting noted. Does not meet criteria for malnutrition.     Nutrition Discharge Planning: Pending medical course    Nutrition Risk Screen    Nutrition Risk Screen: tube feeding or parenteral nutrition    Nutrition/Diet History    Spiritual, Cultural Beliefs, Episcopal Practices, Values that Affect Care: no    Anthropometrics    Temp: 97.6 °F (36.4 °C)  Height Method: Stated  Height: 5' 7" (170.2 cm)  Height (inches): 67 in  Weight Method: Bed Scale  Weight: 87.6 kg (193 lb 2 oz)  Weight (lb): 193.12 lb  Ideal Body Weight (IBW), Male: 148 lb  % Ideal Body Weight, Male (lb): 138.09 %  BMI (Calculated): 30.2       Lab/Procedures/Meds    Pertinent Labs Reviewed: reviewed  Pertinent Labs Comments: glucose 113, albumin 2.9  Pertinent Medications Reviewed: reviewed  Pertinent Medications Comments:   Current Facility-Administered Medications   Medication Dose Route Frequency    aspirin  81 mg Oral Daily    atorvastatin  40 mg Oral QHS    atovaquone  1,500 mg Oral Daily    bisacodyL  10 mg Oral Daily    carvediloL  12.5 mg Oral BID    docusate sodium  100 mg Oral BID    famotidine (PF)  20 mg Intravenous Nightly    fluticasone furoate-vilanteroL  1 puff Inhalation Daily    heparin (porcine)  5,000 Units Subcutaneous Q8H    mycophenolate sodium  540 mg Oral BID    polyethylene glycol  17 g Oral Daily    predniSONE  5 mg Oral Daily    tacrolimus  1.5 mg Sublingual BID    " valGANciclovir  900 mg Oral QAM     Current Facility-Administered Medications   Medication Dose Route Frequency Last Rate Last Admin    TPN ADULT PERIPHERAL CUSTOM   Intravenous Continuous 100 mL/hr at 04/23/24 1125 Restarted at 04/23/24 1125       Estimated/Assessed Needs    Weight Used For Calorie Calculations: 89.4 kg (197 lb)  Energy Calorie Requirements (kcal): 1800 kcal  Energy Need Method: [place]-St Jeor (PAL 1.0)  Protein Requirements: 101g (1.5g/kg IBW)  Weight Used For Protein Calculations: 67.1 kg (148 lb) (IBW)  Fluid Requirements (mL): 1ml/kcal or per MD  Estimated Fluid Requirement Method: RDA Method  RDA Method (mL): 1800         Nutrition Prescription Ordered    Current Diet Order: CLD  Current Nutrition Support Formula Ordered:  (2.75% AA/10% D + IL)  Current Nutrition Support Rate Ordered: 100 (ml)  Current Nutrition Support Frequency Ordered: ml/hr    Evaluation of Received Nutrient/Fluid Intake    Parenteral Calories (kcal): 1080  Parenteral Protein (gm): 66  Parenteral Fluid (mL): 2400  Lipid Calories (kcals): 500 kcals  GIR (Glucose Infusion Rate) (mg/kg/min): 1.86 mg/kg/min  Total Calories (kcal): 1580  % Kcal Needs: 88%  Total Protein (gm): 66  % Protein Needs: 65%  I/O: -  Energy Calories Required: meeting needs  Protein Required: not meeting needs  Comments: LBM 4/23  Tolerance: tolerating    Nutrition Risk    Level of Risk/Frequency of Follow-up:  (1x/week)     Monitor and Evaluation    Food and Nutrient Intake: energy intake, food and beverage intake, enteral nutrition intake, parenteral nutrition intake  Food and Nutrient Adminstration: diet order, enteral and parenteral nutrition administration  Physical Activity and Function: nutrition-related ADLs and IADLs  Anthropometric Measurements: height/length, weight, weight change, body mass index  Biochemical Data, Medical Tests and Procedures: electrolyte and renal panel, gastrointestinal profile, glucose/endocrine profile, inflammatory  profile, lipid profile  Nutrition-Focused Physical Findings: overall appearance     Nutrition Follow-Up    RD Follow-up?: Yes

## 2024-04-23 NOTE — PLAN OF CARE
Pt aaox4. Bed in low and locked position.  Nonskid socks in use.  Call bell, phone, food, drink within reach in bed or on bedside table.  Friend at bedside and active in care.  Both aware to call if needing assistance.  Denies pain.  Tolerating clear liquid diet post NGT removal.  3BMs since.  New PIV placed by picc team and TPN restarted - see mar.  R incision jaylene w staples.  Leaked this am and drsg applied.  Cr ^  - now encouraging po intake.  Possible dc tomorrow.

## 2024-04-23 NOTE — PROGRESS NOTES
[facility] - [facility]plant Step[facility]  Kidney Transplant  Progress Note      Reason for Follow-up: Reassessment of Kidney Transplant - 3/11/2024  (#1) recipient and management of immunosuppression.    ORGAN: LEFT KIDNEY      Donor Type: Living      Donor CMV Status:    Donor HBcAB:   Donor HCV Status:   Donor HBV ANA:   Donor HCV ANA:       Subjective:   History of Present Illness:  Mr. Colten Monet is a 65 yo male w/ PMHx of ESRD d/t IgA nephropathy, now S/P living related Ktxp on 3/11/24. Patient progressed well during initial post-txp course and discharged POD#2 with downtrending Cr. Now with BL Cr 1.0-1.1.     Patient recently seen in transplant surgery clinic on 4/3 and noted to have serous drainage from RLQ kidney txp incision along with palpable erythema. Staples intact w/ minimal tenderness to deep palpation at that time. Incision probed w/ 200 cc serous fluid drained in clinic by surgical fellow. Patient was placed on PO for cellulitis. He now presents to ED for worsening erythema to incisional area (see media for photo). Staples remain intact, erythema noted to entire length of incision, worse in upper portion. Small 1-2 cm area of yellow slough in upper portion incision. Minimal serous drainage expressed upon probing. Patient reports mild tenderness to deep palpation. Kidney US in ED w/ large 14.8 x 7.4 x 1.3 cm, just posterior to the anterior pelvic wall overlying the transplant kidney. Reviewed plan with Dr. Eduardo and Dr. Whyte. CT A/P non-con ordered along with IV abx. Staples removed at bedside, small 1-2 cm area of wound dehiscence noted, moderate amount serous fluid able to be expressed from incision. Mild HARRISON on admit (Cr 1.5). NS bolus ordered.           Hospital Course:  Patient admitted for worsening cellulitis of kidney transplant incision, incisional hernia, and HARRISON. Cefepime and vanc started, infectious work-up ordered. Hospital course as follows:  CT abd/pelvis 4/8/24 revealed incisional  hernia containing distal ileum, associated small bowel feces sign, suggesting intestinal stasis, but without CT evidence of high-grade intestinal obstruction. TB  to OR 4/9/24 for repair of dehiscence of kidney transplant incision and hernia repair plus washout.   HARRISON: Worsened post OR with associated hyperkalemia. Required several interventions (shifting, lasix, IVF). Renal function and hyperkalemia both improved, now back to baseline.   Ileus: Patient w/ N/V post take back, KUB 4/10 with ileus, NPO for bowel rest 4/11. Enema ordered 4/14 w/ 3 large BM, however no improvement in N/V. Remains w/ ileus. NGT placement 4/16. PPN started 4/18. Prealbumin 14  Infectious work-up negative. IV abx transitioned to PO levaquin and doxy x 5 days (EOT 4/15/24).    Interval History: no acute events overnight. Pt passing flatus and had large BM this am. Bowel sounds active. KUB reviewed, less distention, no ileus noted. NGT removed 4/23. CLD started. Cr up slightly today likely fr being NPO and IV fluids held given poor PIV access.  Will finish PPN/Lipids and not order for tonight.Replacing electrolytes. VSS. Monitor         Past Medical, Surgical, Family, and Social History:   Unchanged from H&P.    Scheduled Meds:  Current Facility-Administered Medications   Medication Dose Route Frequency    aspirin  81 mg Oral Daily    atorvastatin  40 mg Oral QHS    atovaquone  1,500 mg Oral Daily    bisacodyL  10 mg Oral Daily    carvediloL  12.5 mg Oral BID    docusate sodium  100 mg Oral BID    famotidine (PF)  20 mg Intravenous Nightly    fluticasone furoate-vilanteroL  1 puff Inhalation Daily    heparin (porcine)  5,000 Units Subcutaneous Q8H    mycophenolate sodium  540 mg Oral BID    polyethylene glycol  17 g Oral Daily    predniSONE  5 mg Oral Daily    tacrolimus  1.5 mg Sublingual BID    valGANciclovir  900 mg Oral QAM     Continuous Infusions:  Current Facility-Administered Medications   Medication Dose Route Frequency Last Rate  Last Admin    TPN ADULT PERIPHERAL CUSTOM   Intravenous Continuous   Stopped at 04/23/24 0400     PRN Meds:  Current Facility-Administered Medications:     acetaminophen, 650 mg, Oral, Q6H PRN    albuterol, 1 puff, Inhalation, Q6H PRN    aluminum-magnesium hydroxide-simethicone, 30 mL, Oral, Q6H PRN    bisacodyL, 10 mg, Rectal, Daily PRN    calcium carbonate, 500 mg, Oral, TID PRN    melatonin, 6 mg, Oral, Nightly PRN    ondansetron, 8 mg, Oral, Q6H PRN    oxyCODONE, 5 mg, Oral, Q4H PRN    oxyCODONE, 10 mg, Oral, Q4H PRN    sodium chloride 0.9%, 3 mL, Intravenous, PRN    Intake/Output - Last 3 Shifts         04/21 0700  04/22 0659 04/22 0700 04/23 0659 04/23 0700  04/24 0659    P.O.  180     I.V. (mL/kg)  50 (0.6)     Other  0     Total Intake(mL/kg)  230 (2.6)     Urine (mL/kg/hr) 200 (0.1) 375 (0.2)     Emesis/NG output  0     Drains 900 850     Other  0     Stool  0     Blood  0     Total Output 1100 1225     Net -1100 -995            Urine Occurrence  3 x     Stool Occurrence  1 x     Emesis Occurrence  0 x              Review of Systems   Constitutional:  Positive for activity change and appetite change (NPO). Negative for chills and fever.   HENT:  Negative for congestion and trouble swallowing.    Eyes: Negative.    Respiratory:  Negative for cough and shortness of breath.    Cardiovascular:  Negative for chest pain and leg swelling.   Gastrointestinal:  Positive for abdominal distention and abdominal pain (incisional). Negative for diarrhea and vomiting.   Endocrine: Negative.    Genitourinary:  Negative for decreased urine volume and difficulty urinating.   Skin:  Positive for wound.   Allergic/Immunologic: Positive for immunocompromised state.   Neurological:  Negative for dizziness, tremors, weakness and headaches.   Psychiatric/Behavioral:  Negative for agitation, behavioral problems, confusion and decreased concentration.       Objective:     Vital Signs (Most Recent):  Temp: 97.9 °F (36.6 °C)  "(04/23/24 0705)  Pulse: 80 (04/23/24 0759)  Resp: 20 (04/23/24 0759)  BP: (!) 114/57 (04/23/24 0705)  SpO2: 98 % (04/23/24 0705) Vital Signs (24h Range):  Temp:  [97.5 °F (36.4 °C)-98.3 °F (36.8 °C)] 97.9 °F (36.6 °C)  Pulse:  [] 80  Resp:  [16-20] 20  SpO2:  [95 %-99 %] 98 %  BP: (104-140)/(56-70) 114/57     Weight: 87.6 kg (193 lb 2 oz)  Height: 5' 7" (170.2 cm)  Body mass index is 30.25 kg/m².     Physical Exam  Vitals and nursing note reviewed.   Constitutional:       General: He is not in acute distress.     Appearance: Normal appearance. He is not ill-appearing.   HENT:      Head: Normocephalic.      Nose:      Comments: NGT in place      Mouth/Throat:      Pharynx: Oropharynx is clear.   Eyes:      Conjunctiva/sclera: Conjunctivae normal.   Cardiovascular:      Rate and Rhythm: Tachycardia present.      Pulses: Normal pulses.   Pulmonary:      Effort: Pulmonary effort is normal.      Breath sounds: Normal breath sounds.   Abdominal:      General: Bowel sounds are decreased. There is distension.      Tenderness: There is abdominal tenderness (mild). There is no guarding or rebound.      Comments: RLQ Kidney txp incision CDI with staples intact, small area of erythema at superior part of incision    Musculoskeletal:         General: Normal range of motion.      Right lower leg: No edema.      Left lower leg: No edema.   Skin:     General: Skin is warm and dry.      Capillary Refill: Capillary refill takes 2 to 3 seconds.   Neurological:      Mental Status: He is alert and oriented to person, place, and time. Mental status is at baseline.   Psychiatric:         Mood and Affect: Mood normal.         Behavior: Behavior normal. Behavior is cooperative.         Thought Content: Thought content normal.         Judgment: Judgment normal.          Laboratory:  CBC:   Recent Labs   Lab 04/21/24  0539 04/22/24  0614 04/23/24  0622   WBC 10.14 10.66 11.94   RBC 3.75* 3.74* 3.87*   HGB 10.4* 10.6* 10.4*   HCT 34.2* " "33.6* 35.3*    221 267   MCV 91 90 91   MCH 27.7 28.3 26.9*   MCHC 30.4* 31.5* 29.5*     CMP:   Recent Labs   Lab 04/21/24  0539 04/22/24  0614 04/23/24  0622   * 108 113*   CALCIUM 8.2* 8.7 9.0   ALBUMIN 2.6* 2.8* 2.9*    140 142   K 3.3* 3.7 4.8   CO2 25 27 29    104 106   BUN 17 20 20   CREATININE 1.0 1.1 1.3       Diagnostic Results:  Abdominal X-Ray: reviewed w surgeon  Assessment/Plan:     * History of incisional hernia repair  - see " surgical wound infection"      Hypokalemia  - Replace w/ IV supplements as needed while NG in place      Incisional hernia following transplant  - s/p repair on 4/9      Tachycardia  - Last EKG 4/15 with sinus tach  - Cont coreg       Ileus  - Seen on KUB 4/10   - No improvement after bowel rest, enema  - NGT placed 4/16 for decompression  - Remains w/ over 1L NG output daily, KUB still w/ ileus pattern. CT A/P w/ oral contrast pending, r/o obstruction/kink  - XR graffin 4/20/24 w slow movement - Brown bomb enema 4/21, encourage ambulation 4/22  - Continue PPN but not reordering  - Continue bowel regimen  - pt had large BM this am and passing flatus, KUB improved, NGT removed 4/23/24, CLD ordered      Wound dehiscence  - Incision w staples w small amount of serous drainage noted on gauze  - keeping staples in longer given wd dehiscence      Anemia of chronic disease  - monitor daily CBC  - stable      Hypomagnesemia  - Continue home PO mag  - IV Mag as needed      History of COPD  - Resume home neb      Dyslipidemia  - continue home statin      Infection of surgical wound following transplant  - Pt with delayed surgical wound healing, seen in clinic 4/3 with 200 mL serous fluid expressed from incision w/ staples intact. Started on PO doxycycline at that time. No improvement with PO Doxy  - CT abd/pelvis 4/8 with RLQ transplant kidney with incisional hernia containing distal ileum. The large fluid collection interposed between the kidney and anterior " "abdominal wall drained at patient's bedside. Mild to moderate soft tissue stranding and trace fluid directly adjacent to the transplant kidney. CT reviewed by surgeon. Patient made NPO for surgical intervention for 4/9.   - Patient taken back to OR (4/9) for repair of wound dehiscence and hernia repair, abdominal washout  - Infectious work up negative thus far.   - Cefepime and vanc transitioned to PO levaquin and doxy x5 days. (EOT 4/15). Pt has completed course of abx.      Prophylactic immunotherapy  - see "long term use of immunosuppression"      At risk for opportunistic infections  - OI prophylaxis per transplant protocol      Long-term use of immunosuppressant medication  - Continue prograf and prednisone  - Check prograf level daily and adjust for therapeutic dosage. Monitor for toxic side effects   - Myfortic restarted 4/17      S/P living-donor kidney transplantation  - S/p Ktxp 3/11/24 d/t IgA nephropathy. Progressed well post-txp with Cr downtrending  - BL Cr 1.1-1.2  - Cr at BL  - Strict Is and Os   - Avoid nephrotoxic medications  - Cr 1.3 today (4/23), IV fluids to be given once PIV access obtained, will have pt try to drink more water also given NGT removed            Discharge Planning:  Discussed plan of care.  No plan for discharge today.    Medical decision making for this encounter includes review of pertinent labs and diagnostic studies, assessment and planning, discussions with consulting providers, discussion with patient/family, and participation in multidisciplinary rounds. Time spent caring for patient: 90 minutes    Shirley Feliz, NP  Kidney Transplant  [facility]  "

## 2024-04-24 PROBLEM — I95.1 ORTHOSTATIC HYPOTENSION: Status: ACTIVE | Noted: 2024-04-24

## 2024-04-24 LAB
ALBUMIN SERPL BCP-MCNC: 2.8 G/DL (ref 3.5–5.2)
ANION GAP SERPL CALC-SCNC: 9 MMOL/L (ref 8–16)
BACTERIA #/AREA URNS AUTO: ABNORMAL /HPF
BASOPHILS # BLD AUTO: 0.07 K/UL (ref 0–0.2)
BASOPHILS NFR BLD: 0.7 % (ref 0–1.9)
BILIRUB UR QL STRIP: NEGATIVE
BUN SERPL-MCNC: 21 MG/DL (ref 8–23)
CALCIUM SERPL-MCNC: 8.7 MG/DL (ref 8.7–10.5)
CHLORIDE SERPL-SCNC: 107 MMOL/L (ref 95–110)
CLARITY UR REFRACT.AUTO: ABNORMAL
CO2 SERPL-SCNC: 26 MMOL/L (ref 23–29)
COLOR UR AUTO: YELLOW
CREAT SERPL-MCNC: 1.4 MG/DL (ref 0.5–1.4)
DIFFERENTIAL METHOD BLD: ABNORMAL
EOSINOPHIL # BLD AUTO: 0.2 K/UL (ref 0–0.5)
EOSINOPHIL NFR BLD: 1.5 % (ref 0–8)
ERYTHROCYTE [DISTWIDTH] IN BLOOD BY AUTOMATED COUNT: 13.7 % (ref 11.5–14.5)
EST. GFR  (NO RACE VARIABLE): 55.8 ML/MIN/1.73 M^2
GLUCOSE SERPL-MCNC: 114 MG/DL (ref 70–110)
GLUCOSE UR QL STRIP: NEGATIVE
HCT VFR BLD AUTO: 34.7 % (ref 40–54)
HGB BLD-MCNC: 10.6 G/DL (ref 14–18)
HGB UR QL STRIP: ABNORMAL
HYALINE CASTS UR QL AUTO: 7 /LPF
IMM GRANULOCYTES # BLD AUTO: 0.2 K/UL (ref 0–0.04)
IMM GRANULOCYTES NFR BLD AUTO: 2 % (ref 0–0.5)
KETONES UR QL STRIP: NEGATIVE
LEUKOCYTE ESTERASE UR QL STRIP: NEGATIVE
LYMPHOCYTES # BLD AUTO: 1.5 K/UL (ref 1–4.8)
LYMPHOCYTES NFR BLD: 15.2 % (ref 18–48)
MAGNESIUM SERPL-MCNC: 1.8 MG/DL (ref 1.6–2.6)
MCH RBC QN AUTO: 27.7 PG (ref 27–31)
MCHC RBC AUTO-ENTMCNC: 30.5 G/DL (ref 32–36)
MCV RBC AUTO: 91 FL (ref 82–98)
MICROSCOPIC COMMENT: ABNORMAL
MONOCYTES # BLD AUTO: 0.9 K/UL (ref 0.3–1)
MONOCYTES NFR BLD: 8.9 % (ref 4–15)
NEUTROPHILS # BLD AUTO: 7.1 K/UL (ref 1.8–7.7)
NEUTROPHILS NFR BLD: 71.7 % (ref 38–73)
NITRITE UR QL STRIP: NEGATIVE
NRBC BLD-RTO: 0 /100 WBC
PH UR STRIP: 5 [PH] (ref 5–8)
PHOSPHATE SERPL-MCNC: 3.5 MG/DL (ref 2.7–4.5)
PLATELET # BLD AUTO: 279 K/UL (ref 150–450)
PMV BLD AUTO: 11 FL (ref 9.2–12.9)
POTASSIUM SERPL-SCNC: 4.5 MMOL/L (ref 3.5–5.1)
PROT UR QL STRIP: ABNORMAL
RBC # BLD AUTO: 3.83 M/UL (ref 4.6–6.2)
RBC #/AREA URNS AUTO: 3 /HPF (ref 0–4)
SODIUM SERPL-SCNC: 142 MMOL/L (ref 136–145)
SP GR UR STRIP: 1.02 (ref 1–1.03)
SQUAMOUS #/AREA URNS AUTO: 1 /HPF
TACROLIMUS BLD-MCNC: 9.1 NG/ML (ref 5–15)
URN SPEC COLLECT METH UR: ABNORMAL
WBC # BLD AUTO: 9.94 K/UL (ref 3.9–12.7)
WBC #/AREA URNS AUTO: 4 /HPF (ref 0–5)

## 2024-04-24 PROCEDURE — 99233 SBSQ HOSP IP/OBS HIGH 50: CPT | Mod: ,,, | Performed by: NURSE PRACTITIONER

## 2024-04-24 PROCEDURE — 25000003 PHARM REV CODE 250

## 2024-04-24 PROCEDURE — 25000003 PHARM REV CODE 250: Performed by: CLINICAL NURSE SPECIALIST

## 2024-04-24 PROCEDURE — 85025 COMPLETE CBC W/AUTO DIFF WBC: CPT

## 2024-04-24 PROCEDURE — 25000003 PHARM REV CODE 250: Performed by: INTERNAL MEDICINE

## 2024-04-24 PROCEDURE — 63600175 PHARM REV CODE 636 W HCPCS: Performed by: NURSE PRACTITIONER

## 2024-04-24 PROCEDURE — 87086 URINE CULTURE/COLONY COUNT: CPT | Performed by: NURSE PRACTITIONER

## 2024-04-24 PROCEDURE — 25000003 PHARM REV CODE 250: Performed by: PHYSICIAN ASSISTANT

## 2024-04-24 PROCEDURE — 81001 URINALYSIS AUTO W/SCOPE: CPT | Performed by: NURSE PRACTITIONER

## 2024-04-24 PROCEDURE — 36415 COLL VENOUS BLD VENIPUNCTURE: CPT

## 2024-04-24 PROCEDURE — 20600001 HC STEP DOWN PRIVATE ROOM

## 2024-04-24 PROCEDURE — 80197 ASSAY OF TACROLIMUS: CPT

## 2024-04-24 PROCEDURE — 63600175 PHARM REV CODE 636 W HCPCS: Performed by: INTERNAL MEDICINE

## 2024-04-24 PROCEDURE — 27000207 HC ISOLATION

## 2024-04-24 PROCEDURE — 83735 ASSAY OF MAGNESIUM: CPT

## 2024-04-24 PROCEDURE — 25000003 PHARM REV CODE 250: Performed by: NURSE PRACTITIONER

## 2024-04-24 PROCEDURE — 80069 RENAL FUNCTION PANEL: CPT

## 2024-04-24 PROCEDURE — 63600175 PHARM REV CODE 636 W HCPCS

## 2024-04-24 RX ORDER — FAMOTIDINE 20 MG/1
20 TABLET, FILM COATED ORAL NIGHTLY
Status: DISCONTINUED | OUTPATIENT
Start: 2024-04-24 | End: 2024-04-25 | Stop reason: HOSPADM

## 2024-04-24 RX ORDER — MYCOPHENOLIC ACID 180 MG/1
540 TABLET, DELAYED RELEASE ORAL 2 TIMES DAILY
Qty: 180 TABLET | Refills: 11 | Status: SHIPPED | OUTPATIENT
Start: 2024-04-24 | End: 2025-04-24

## 2024-04-24 RX ORDER — CARVEDILOL 6.25 MG/1
6.25 TABLET ORAL 2 TIMES DAILY
Status: DISCONTINUED | OUTPATIENT
Start: 2024-04-24 | End: 2024-04-25

## 2024-04-24 RX ORDER — TACROLIMUS 1 MG/1
3 CAPSULE ORAL 2 TIMES DAILY
Status: DISCONTINUED | OUTPATIENT
Start: 2024-04-24 | End: 2024-04-25 | Stop reason: HOSPADM

## 2024-04-24 RX ORDER — ATOVAQUONE 750 MG/5ML
1500 SUSPENSION ORAL DAILY
Qty: 300 ML | Refills: 4 | Status: SHIPPED | OUTPATIENT
Start: 2024-04-25

## 2024-04-24 RX ORDER — PREDNISONE 5 MG/1
5 TABLET ORAL DAILY
Qty: 30 TABLET | Refills: 11 | Status: SHIPPED | OUTPATIENT
Start: 2024-04-25

## 2024-04-24 RX ADMIN — DOCUSATE SODIUM 100 MG: 100 CAPSULE, LIQUID FILLED ORAL at 08:04

## 2024-04-24 RX ADMIN — ASPIRIN 81 MG CHEWABLE TABLET 81 MG: 81 TABLET CHEWABLE at 08:04

## 2024-04-24 RX ADMIN — POLYETHYLENE GLYCOL 3350 17 G: 17 POWDER, FOR SOLUTION ORAL at 08:04

## 2024-04-24 RX ADMIN — ATOVAQUONE 1500 MG: 750 SUSPENSION ORAL at 08:04

## 2024-04-24 RX ADMIN — FLUTICASONE FUROATE AND VILANTEROL TRIFENATATE 1 PUFF: 100; 25 POWDER RESPIRATORY (INHALATION) at 08:04

## 2024-04-24 RX ADMIN — HEPARIN SODIUM 5000 UNITS: 5000 INJECTION INTRAVENOUS; SUBCUTANEOUS at 08:04

## 2024-04-24 RX ADMIN — FAMOTIDINE 20 MG: 20 TABLET ORAL at 08:04

## 2024-04-24 RX ADMIN — MYCOPHENOLIC ACID 540 MG: 180 TABLET, DELAYED RELEASE ORAL at 08:04

## 2024-04-24 RX ADMIN — BISACODYL 10 MG: 5 TABLET, COATED ORAL at 08:04

## 2024-04-24 RX ADMIN — VALGANCICLOVIR 900 MG: 450 TABLET, FILM COATED ORAL at 08:04

## 2024-04-24 RX ADMIN — PREDNISONE 5 MG: 5 TABLET ORAL at 08:04

## 2024-04-24 RX ADMIN — TACROLIMUS 3 MG: 1 CAPSULE ORAL at 05:04

## 2024-04-24 RX ADMIN — TACROLIMUS 1.5 MG: 1 CAPSULE ORAL at 08:04

## 2024-04-24 RX ADMIN — ATORVASTATIN CALCIUM 40 MG: 20 TABLET, FILM COATED ORAL at 08:04

## 2024-04-24 NOTE — PLAN OF CARE
Pt. AAOx4, VSS, spo2> 94% on room air. Standing BP only   Pt. Ambulating independently--total UOP in flowsheet, no BM this shift   No complaints of pain/N/V this shift.   Friend at bedside   Bed in low locked position, call light within reach, pt instructed to call for assistance needed, pt verbalized understanding, remains free from falls this shift. WCTM.    Nurses Note -- 4 Eyes      4/24/2024   3:34 AM      Skin assessed during: Daily Assessment      [x] No Altered Skin Integrity Present    [x]Prevention Measures Documented      [] Yes- Altered Skin Integrity Present or Discovered   [] LDA Added if Not in Epic (Describe Wound)   [] New Altered Skin Integrity was Present on Admit and Documented in LDA   [] Wound Image Taken    Wound Care Consulted? No    Attending Nurse:  Yudy Gamboa RN/Staff Member:   Kennedy

## 2024-04-24 NOTE — SUBJECTIVE & OBJECTIVE
Subjective:   History of Present Illness:  Mr. Colten Monet is a 65 yo male w/ PMHx of ESRD d/t IgA nephropathy, now S/P living related Ktxp on 3/11/24. Patient progressed well during initial post-txp course and discharged POD#2 with downtrending Cr. Now with BL Cr 1.0-1.1.     Patient recently seen in transplant surgery clinic on 4/3 and noted to have serous drainage from RLQ kidney txp incision along with palpable erythema. Staples intact w/ minimal tenderness to deep palpation at that time. Incision probed w/ 200 cc serous fluid drained in clinic by surgical fellow. Patient was placed on PO for cellulitis. He now presents to ED for worsening erythema to incisional area (see media for photo). Staples remain intact, erythema noted to entire length of incision, worse in upper portion. Small 1-2 cm area of yellow slough in upper portion incision. Minimal serous drainage expressed upon probing. Patient reports mild tenderness to deep palpation. Kidney US in ED w/ large 14.8 x 7.4 x 1.3 cm, just posterior to the anterior pelvic wall overlying the transplant kidney. Reviewed plan with Dr. Eduardo and Dr. Whyte. CT A/P non-con ordered along with IV abx. Staples removed at bedside, small 1-2 cm area of wound dehiscence noted, moderate amount serous fluid able to be expressed from incision. Mild HARRISON on admit (Cr 1.5). NS bolus ordered.         Hospital Course:  Patient admitted for worsening cellulitis of kidney transplant incision, incisional hernia, and HARRISON. Cefepime and vanc started, infectious work-up ordered. Hospital course as follows:  Incisional hernia: CT abd/pelvis 4/8/24 revealed incisional hernia containing distal ileum, associated small bowel feces sign, suggesting intestinal stasis, but without CT evidence of high-grade intestinal obstruction. TB  to OR 4/9/24 for repair of dehiscence of kidney transplant incision and hernia repair plus washout. IV abx transitioned to PO levaquin and doxy x 5 days (EOT  4/15/24).  HARRISON: Worsened post OR with associated hyperkalemia. Required several interventions (shifting, lasix, IVF). Renal function and hyperkalemia both improved, now back to baseline.   Ileus: Patient w/ N/V post take back, KUB 4/10 with ileus, NPO for bowel rest 4/11. Enema ordered 4/14 w/ 3 large BM, however no improvement in N/V. Remains w/ ileus. NGT placement 4/16. PPN started 4/18. Prealbumin 14    Interval History: no acute events overnight. Patient reports feeling great today. Advanced to regular diet, tolerating w/o N/V. (+) BM. Active BS throughout. Cr remains slightly elevated, encourage PO fluid intake. Check UA/urine culture. + Orthostatic hypotension, adjusted coreg, continue hold parameters.     Past Medical, Surgical, Family, and Social History:   Unchanged from H&P.    Scheduled Meds:  Current Facility-Administered Medications   Medication Dose Route Frequency    aspirin  81 mg Oral Daily    atorvastatin  40 mg Oral QHS    atovaquone  1,500 mg Oral Daily    bisacodyL  10 mg Oral Daily    carvediloL  6.25 mg Oral BID    docusate sodium  100 mg Oral BID    famotidine  20 mg Oral QHS    fluticasone furoate-vilanteroL  1 puff Inhalation Daily    heparin (porcine)  5,000 Units Subcutaneous Q8H    mycophenolate sodium  540 mg Oral BID    polyethylene glycol  17 g Oral Daily    predniSONE  5 mg Oral Daily    tacrolimus  3 mg Oral BID    valGANciclovir  900 mg Oral QAM     Continuous Infusions:  Current Facility-Administered Medications   Medication Dose Route Frequency Last Rate Last Admin     PRN Meds:  Current Facility-Administered Medications:     acetaminophen, 650 mg, Oral, Q6H PRN    albuterol, 1 puff, Inhalation, Q6H PRN    aluminum-magnesium hydroxide-simethicone, 30 mL, Oral, Q6H PRN    bisacodyL, 10 mg, Rectal, Daily PRN    calcium carbonate, 500 mg, Oral, TID PRN    melatonin, 6 mg, Oral, Nightly PRN    ondansetron, 8 mg, Oral, Q6H PRN    oxyCODONE, 5 mg, Oral, Q4H PRN    oxyCODONE, 10 mg,  "Oral, Q4H PRN    sodium chloride 0.9%, 3 mL, Intravenous, PRN    Intake/Output - Last 3 Shifts         04/22 0700 04/23 0659 04/23 0700 04/24 0659 04/24 0700 04/25 0659    P.O. 180 600     I.V. (mL/kg) 50 (0.6) 0 (0)     Other 0 0     Total Intake(mL/kg) 230 (2.6) 600 (6.8)     Urine (mL/kg/hr) 375 (0.2) 0 (0)     Emesis/NG output 0 0     Drains 850      Other 0 0     Stool 0 0     Blood 0 0     Total Output 1225 0     Net -995 +600            Urine Occurrence 3 x 5 x     Stool Occurrence 1 x 5 x     Emesis Occurrence 0 x 0 x              Review of Systems   Constitutional:  Positive for activity change and appetite change (NPO). Negative for chills and fever.   HENT:  Negative for congestion and trouble swallowing.    Eyes: Negative.    Respiratory:  Negative for cough and shortness of breath.    Cardiovascular:  Negative for chest pain and leg swelling.   Gastrointestinal:  Positive for abdominal pain (incisional). Negative for abdominal distention, diarrhea and vomiting.   Endocrine: Negative.    Genitourinary:  Negative for decreased urine volume and difficulty urinating.   Skin:  Positive for wound.   Allergic/Immunologic: Positive for immunocompromised state.   Neurological:  Negative for dizziness, tremors, weakness and headaches.   Psychiatric/Behavioral:  Negative for agitation, behavioral problems, confusion and decreased concentration.       Objective:     Vital Signs (Most Recent):  Temp: 98 °F (36.7 °C) (04/24/24 1109)  Pulse: 96 (04/24/24 1109)  Resp: 18 (04/24/24 1109)  BP: 91/60 (04/24/24 1109)  SpO2: 99 % (04/24/24 1109) Vital Signs (24h Range):  Temp:  [97.8 °F (36.6 °C)-98.4 °F (36.9 °C)] 98 °F (36.7 °C)  Pulse:  [] 96  Resp:  [18] 18  SpO2:  [99 %-100 %] 99 %  BP: ()/(60-67) 91/60     Weight: 87.6 kg (193 lb 2 oz)  Height: 5' 7" (170.2 cm)  Body mass index is 30.25 kg/m².     Physical Exam  Vitals and nursing note reviewed.   Constitutional:       General: He is not in acute " "distress.     Appearance: Normal appearance. He is not ill-appearing.   HENT:      Head: Normocephalic.      Mouth/Throat:      Pharynx: Oropharynx is clear.   Eyes:      Conjunctiva/sclera: Conjunctivae normal.   Cardiovascular:      Rate and Rhythm: Normal rate.      Pulses: Normal pulses.   Pulmonary:      Effort: Pulmonary effort is normal.      Breath sounds: Normal breath sounds.   Abdominal:      General: Bowel sounds are normal. There is no distension.      Tenderness: There is abdominal tenderness (mild). There is no guarding or rebound.      Comments: RLQ Kidney txp incision CDI with staples intact, small area of erythema at superior part of incision    Musculoskeletal:         General: Normal range of motion.      Right lower leg: No edema.      Left lower leg: No edema.   Skin:     General: Skin is warm and dry.      Capillary Refill: Capillary refill takes 2 to 3 seconds.   Neurological:      Mental Status: He is alert and oriented to person, place, and time. Mental status is at baseline.   Psychiatric:         Mood and Affect: Mood normal.         Behavior: Behavior normal. Behavior is cooperative.         Thought Content: Thought content normal.         Judgment: Judgment normal.          Laboratory:  CBC:   Recent Labs   Lab 04/22/24  0614 04/23/24  0622 04/24/24  0618   WBC 10.66 11.94 9.94   RBC 3.74* 3.87* 3.83*   HGB 10.6* 10.4* 10.6*   HCT 33.6* 35.3* 34.7*    267 279   MCV 90 91 91   MCH 28.3 26.9* 27.7   MCHC 31.5* 29.5* 30.5*     CMP:   Recent Labs   Lab 04/22/24  0614 04/23/24  0622 04/24/24  0618    113* 114*   CALCIUM 8.7 9.0 8.7   ALBUMIN 2.8* 2.9* 2.8*    142 142   K 3.7 4.8 4.5   CO2 27 29 26    106 107   BUN 20 20 21   CREATININE 1.1 1.3 1.4     Coagulation: No results for input(s): "PT", "APTT" in the last 168 hours.  Labs within the past 24 hours have been reviewed.    Diagnostic Results:  US - Kidney: Results for orders placed during the hospital encounter " of 04/08/24    US Transplant Kidney With Doppler    Narrative  EXAMINATION:  US TRANSPLANT KIDNEY WITH DOPPLER    CLINICAL HISTORY:  wound infection/drainage;    TECHNIQUE:  Real time gray scale and doppler ultrasound was performed over the patient's renal allograft.    COMPARISON:  03/25/2024    FINDINGS:  Renal allograft in the right lower quadrant.  The allograft measures 12.9 cm. Normal perfusion. No hydronephrosis.  There is a 1.2 cm cyst at the midpole.  There is a 1.7 cm cyst with a peripheral calcification, similar to the prior exam.    There is an elongated fluid collection underneath the anterior pelvic wall overlying the kidney transplant an area measuring 14.8 x 7.4 x 1.3 cm    Vasculature:    Inter lobar and segmental arterial resistive indices ranged from 0.67 to 0.77, previously 0.65 to 0.75.    Main renal artery peak systolic velocity: 200 cm/s with normal waveform, previously 314 cm/s .    Renal artery/iliac ratio: 1.3.    The main renal vein is patent.    Impression  Large fluid collection, 14.8 x 7.4 x 1.3 cm, just posterior to the anterior pelvic wall overlying the transplant kidney concerning for seroma, hematoma or abscess.    Small renal cysts, one  of which has a small peripheral calcification, unchanged from the prior study.    Satisfactory Doppler evaluation of the transplant kidney.      Electronically signed by: Teresita Ohara MD  Date:    04/08/2024  Time:    12:38

## 2024-04-24 NOTE — NURSING
Pt AAOx4, VSS, afebrile. Cr 1.4. Pt advanced to Regular diet, tolerating well. Multiple urine occurrences, none measured, despite education. Plan for d/c tomorrow. Bed in low/locked position, call light/personal belongings w/in reach, non-slip socks in place, pt remains free from falls, family at bedside, [facility].

## 2024-04-24 NOTE — ASSESSMENT & PLAN NOTE
"- KUB 4/10 with findings of ileus.    - patient reports feeling bloated and "full".  - Patient has not had a  BM since OR take back on 4/9.   - Denies any nausea or vomiting.   - He reports acid reflux and belching  -  Made patient NPO for bowel rest starting 4/11.  - repeat KUB 4/11 with impression of "Adynamic ileus proximal mid small bowel for favored. The possibility of partial obstructive distal small bowel abnormality would be considered and clinical correlation requested."  - continue NPO for bowel rest today 4/12  - enema ordered for today also 4/12  - Encourage ambulation.   - Cont to monitor.     "
"- KUB 4/10 with findings of ileus.    - patient reports feeling bloated and "full".  - Patient has not had a  BM since OR take back on 4/9.   - Denies any nausea or vomiting.   - He reports starting to belch more.  -  Made patient NPO for bowel rest.  - repeat KUB pending for today  - Encourage ambulation.   - Cont to monitor.     "
"- KUB 4/10 with findings of ileus.    - patient reports feeling bloated and "full".  - Patient has not had a  BM since OR take back on 4/9.   - Denies any nausea or vomiting.   - acid reflux and belching improving   - NPO for bowel rest starting 4/11, diet advanced to CLD 4/13   - repeat KUB 4/11 with impression of "Adynamic ileus proximal mid small bowel for favored. The possibility of partial obstructive distal small bowel abnormality would be considered and clinical correlation requested."  - Encourage ambulation.   - Cont to monitor.     "
"- KUB 4/10 with findings of ileus.    - patient reports feeling bloated and "full".  - Patient has not had a  BM since OR take back on 4/9.   - Denies any nausea or vomiting.   - acid reflux and belching improving but still intermittent   - NPO for bowel rest starting 4/11, diet advanced to CLD 4/13 and regular 4/14   - enema 4/14   - repeat KUB 4/11 with impression of "Adynamic ileus proximal mid small bowel for favored. The possibility of partial obstructive distal small bowel abnormality would be considered and clinical correlation requested."  - Encourage ambulation.   - Cont to monitor.     "
"- see " surgical wound infection"    "
"- see "long term use of immunosuppression"    "
- Continue home PO mag  - IV Mag as needed    
- Continue prograf and prednisone  - Check prograf level daily and adjust for therapeutic dosage. Monitor for toxic side effects   -  cellcept held on admit d/t infection    
- Continue prograf and prednisone  - Check prograf level daily and adjust for therapeutic dosage. Monitor for toxic side effects   - Hold cellcept on admit d/t infection    
- Continue prograf and prednisone  - Check prograf level daily and adjust for therapeutic dosage. Monitor for toxic side effects   - Myfortic restarted 4/17    
- Continue prograf and prednisone  - Check prograf level daily and adjust for therapeutic dosage. Monitor for toxic side effects   - Myfortic started 4/17    
- Continue prograf and prednisone  - Check prograf level daily and adjust for therapeutic dosage. Monitor for toxic side effects   - Myfortic started 4/17    
- Continue prograf and prednisone  - Check prograf level daily and adjust for therapeutic dosage. Monitor for toxic side effects   - cellcept held on admit d/t infection    
- Incision w staples w small amount of serous drainage noted on gauze  - Staples due to be removed ~4/30 (21 days after hernia repair)    
- Incision w staples w small amount of serous drainage noted on gauze  - keeping staples in longer given wd dehiscence    
- Last EKG 4/15 with sinus tach  - Cont coreg     
- Last EKG 4/15 with sinus tach  - Coreg started, dose increased 4/15    
- Last EKG 4/15 with sinus tach  - Coreg started, dose increased 4/15    
- OI prophylaxis per transplant protocol    
- Pt with delayed surgical wound healing, seen in clinic 4/3 with 200 mL serous fluid expressed from incision w/ staples intact. Started on PO doxycycline at that time  - Wound remains erythematous with swelling and mild tenderness. No improvement with PO Doxy  - Kidney US 4/8 w/ large 14.8 x 7.4 x 1.3 cm, just posterior to the anterior pelvic wall overlying the transplant kidney  - Staples removed 4/8, small area dehiscence noted with moderate serous fluid expressed  - CT A/P pending    
- Pt with delayed surgical wound healing, seen in clinic 4/3 with 200 mL serous fluid expressed from incision w/ staples intact. Started on PO doxycycline at that time  - Wound remains erythematous with swelling and mild tenderness. No improvement with PO Doxy  - Kidney US 4/8 w/ large 14.8 x 7.4 x 1.3 cm, just posterior to the anterior pelvic wall overlying the transplant kidney  - Staples removed 4/8, small area dehiscence noted with moderate serous fluid expressed  - CT abd/pelvis 4/8 with RLQ transplant kidney with incisional hernia containing distal ileum. The large fluid collection interposed between the kidney and anterior abdominal wall drained at patient's bedside. Mild to moderate soft tissue stranding and trace fluid directly adjacent to the transplant kidney. CT reviewed by surgeon. Patient made NPO for surgical intervention for 4/9.   - Patient taken back to OR (4/9)  for dehiscence of kidney transplant incision and hernia repair.   - Infectious work up negative thus far.   - Cefepime and vanc dc'd and levaquin and doxy x5 days. (EOT 4/15)  - cont to monitor   - incision with some erythema around superior end, no purulence expressed. Likely 2/2 pulling from abd distension, cont to monitor   
- Pt with delayed surgical wound healing, seen in clinic 4/3 with 200 mL serous fluid expressed from incision w/ staples intact. Started on PO doxycycline at that time  - Wound remains erythematous with swelling and mild tenderness. No improvement with PO Doxy  - Kidney US 4/8 w/ large 14.8 x 7.4 x 1.3 cm, just posterior to the anterior pelvic wall overlying the transplant kidney  - Staples removed 4/8, small area dehiscence noted with moderate serous fluid expressed  - CT abd/pelvis 4/8 with RLQ transplant kidney with incisional hernia containing distal ileum. The large fluid collection interposed between the kidney and anterior abdominal wall drained at patient's bedside. Mild to moderate soft tissue stranding and trace fluid directly adjacent to the transplant kidney. CT reviewed by surgeon. Patient made NPO for surgical intervention for 4/9.   - Started on IVFs; continue  -  on cefepime and vancomycin.   - NPO for OR take back this morning for incisional dehiscence and possible incisional hernia repair.   - Infectious work up negative thus far.   
- Pt with delayed surgical wound healing, seen in clinic 4/3 with 200 mL serous fluid expressed from incision w/ staples intact. Started on PO doxycycline at that time  - Wound remains erythematous with swelling and mild tenderness. No improvement with PO Doxy  - Kidney US 4/8 w/ large 14.8 x 7.4 x 1.3 cm, just posterior to the anterior pelvic wall overlying the transplant kidney  - Staples removed 4/8, small area dehiscence noted with moderate serous fluid expressed  - CT abd/pelvis 4/8 with RLQ transplant kidney with incisional hernia containing distal ileum. The large fluid collection interposed between the kidney and anterior abdominal wall drained at patient's bedside. Mild to moderate soft tissue stranding and trace fluid directly adjacent to the transplant kidney. CT reviewed by surgeon. Patient made NPO for surgical intervention for 4/9.   - Started on IVFs; continue  -  on cefepime and vancomycin.   - Patient taken back to OR yesterday (4/9)  for dehiscence of kidney transplant incision and hernia repair.   -  Infectious work up continues to be negative thus far.   - cont to monitor   
- Pt with delayed surgical wound healing, seen in clinic 4/3 with 200 mL serous fluid expressed from incision w/ staples intact. Started on PO doxycycline at that time  - Wound remains erythematous with swelling and mild tenderness. No improvement with PO Doxy  - Kidney US 4/8 w/ large 14.8 x 7.4 x 1.3 cm, just posterior to the anterior pelvic wall overlying the transplant kidney  - Staples removed 4/8, small area dehiscence noted with moderate serous fluid expressed  - CT abd/pelvis 4/8 with RLQ transplant kidney with incisional hernia containing distal ileum. The large fluid collection interposed between the kidney and anterior abdominal wall drained at patient's bedside. Mild to moderate soft tissue stranding and trace fluid directly adjacent to the transplant kidney. CT reviewed by surgeon. Patient made NPO for surgical intervention for 4/9.   - Started on IVFs; continue  - Patient taken back to OR (4/9)  for dehiscence of kidney transplant incision and hernia repair.   -  Infectious work up continues to be negative thus far.   - Cefepime and vanc dc'd and levaquin and doxy x5 days. (4/11)  - cont to monitor   
- Pt with delayed surgical wound healing, seen in clinic 4/3 with 200 mL serous fluid expressed from incision w/ staples intact. Started on PO doxycycline at that time  - Wound remains erythematous with swelling and mild tenderness. No improvement with PO Doxy  - Kidney US 4/8 w/ large 14.8 x 7.4 x 1.3 cm, just posterior to the anterior pelvic wall overlying the transplant kidney  - Staples removed 4/8, small area dehiscence noted with moderate serous fluid expressed  - CT abd/pelvis 4/8 with RLQ transplant kidney with incisional hernia containing distal ileum. The large fluid collection interposed between the kidney and anterior abdominal wall drained at patient's bedside. Mild to moderate soft tissue stranding and trace fluid directly adjacent to the transplant kidney. CT reviewed by surgeon. Patient made NPO for surgical intervention for 4/9.   - Started on IVFs; continue  - Patient taken back to OR (4/9)  for dehiscence of kidney transplant incision and hernia repair.   -  Infectious work up continues to be negative thus far.   - Cefepime and vanc dc'd and levaquin and doxy x5 days. (EOT 4/15)  - cont to monitor   
- Pt with delayed surgical wound healing, seen in clinic 4/3 with 200 mL serous fluid expressed from incision w/ staples intact. Started on PO doxycycline at that time  - Wound remains erythematous with swelling and mild tenderness. No improvement with PO Doxy  - Kidney US 4/8 w/ large 14.8 x 7.4 x 1.3 cm, just posterior to the anterior pelvic wall overlying the transplant kidney  - Staples removed 4/8, small area dehiscence noted with moderate serous fluid expressed  - CT abd/pelvis 4/8 with RLQ transplant kidney with incisional hernia containing distal ileum. The large fluid collection interposed between the kidney and anterior abdominal wall drained at patient's bedside. Mild to moderate soft tissue stranding and trace fluid directly adjacent to the transplant kidney. CT reviewed by surgeon. Patient made NPO for surgical intervention for 4/9.   - Started on IVFs; continue  - Patient taken back to OR (4/9)  for dehiscence of kidney transplant incision and hernia repair.   - Infectious work up negative thus far.   - Cefepime and vanc dc'd and levaquin and doxy x5 days. (EOT 4/15)  - cont to monitor   
- Pt with delayed surgical wound healing, seen in clinic 4/3 with 200 mL serous fluid expressed from incision w/ staples intact. Started on PO doxycycline at that time. No improvement with PO Doxy  - CT abd/pelvis 4/8 with RLQ transplant kidney with incisional hernia containing distal ileum. The large fluid collection interposed between the kidney and anterior abdominal wall drained at patient's bedside. Mild to moderate soft tissue stranding and trace fluid directly adjacent to the transplant kidney. CT reviewed by surgeon. Patient made NPO for surgical intervention for 4/9.   - Patient taken back to OR (4/9) for repair of wound dehiscence and hernia repair, abdominal washout  - Infectious work up negative thus far.   - Cefepime and vanc transitioned to PO levaquin and doxy x5 days. (EOT 4/15)    
- Pt with delayed surgical wound healing, seen in clinic 4/3 with 200 mL serous fluid expressed from incision w/ staples intact. Started on PO doxycycline at that time. No improvement with PO Doxy  - CT abd/pelvis 4/8 with RLQ transplant kidney with incisional hernia containing distal ileum. The large fluid collection interposed between the kidney and anterior abdominal wall drained at patient's bedside. Mild to moderate soft tissue stranding and trace fluid directly adjacent to the transplant kidney. CT reviewed by surgeon. Patient made NPO for surgical intervention for 4/9.   - Patient taken back to OR (4/9) for repair of wound dehiscence and hernia repair, abdominal washout  - Infectious work up negative thus far.   - Cefepime and vanc transitioned to PO levaquin and doxy x5 days. (EOT 4/15). Pt has completed course of abx.    
- Replace w/ IV supplements as needed while NG in place    
- Resume home neb    
- S/p Ktxp 3/11/24 d/t IgA nephropathy. Progressed well post-txp with Cr downtrending    
- S/p Ktxp 3/11/24 d/t IgA nephropathy. Progressed well post-txp with Cr downtrending  - BL Cr 1.1-1.2    
- S/p Ktxp 3/11/24 d/t IgA nephropathy. Progressed well post-txp with Cr downtrending  - BL Cr 1.1-1.2  - Cr 1.3 with hydration, cont IVFs  - Strict Is and Os       
- S/p Ktxp 3/11/24 d/t IgA nephropathy. Progressed well post-txp with Cr downtrending  - BL Cr 1.1-1.2  - Cr 1.6 on 4/16, IVFs started   - strict Is and Os   - encourage hydration     
- S/p Ktxp 3/11/24 d/t IgA nephropathy. Progressed well post-txp with Cr downtrending  - BL Cr 1.1-1.2  - Cr at BL  - Cont IVFs  - Strict Is and Os       
- S/p Ktxp 3/11/24 d/t IgA nephropathy. Progressed well post-txp with Cr downtrending  - BL Cr 1.1-1.2  - Cr at BL  - Strict Is and Os   - Avoid nephrotoxic medications      
- S/p Ktxp 3/11/24 d/t IgA nephropathy. Progressed well post-txp with Cr downtrending  - BL Cr 1.1-1.2  - Cr at BL  - Strict Is and Os   - Avoid nephrotoxic medications  - Cr 1.3 today (4/23), IV fluids to be given once PIV access obtained, will have pt try to drink more water also given NGT removed      
- S/p Ktxp 3/11/24 d/t IgA nephropathy. Progressed well post-txp with Cr downtrending  - BL Cr 1.1-1.2  - Strict Is and Os   - Avoid nephrotoxic medications  - Cr slightly above baseline, encourage PO fluid intake. Check UA/urine culture.       
- S/p Ktxp 3/11/24 d/t IgA nephropathy. Progressed well post-txp with Cr downtrending  - BL Cr 1.1-1.2  - strict Is and Os   - encourage hydration     
- Seen on KUB 4/10   - NPO for bowel rest starting 4/11, Enema 4/14 with 3 large BM noted, advanced to regular diet 4/14   - Continued N/V 4/15 w/ KUB still showing ileus.   - Diet changed to clears only, still having n/v  - NGT placed 4/16 for decompression    - Encourage increase ambulation.   - Continue bowel regimen    
- Seen on KUB 4/10   - NPO for bowel rest starting 4/11, Enema 4/14 with 3 large BM noted, advanced to regular diet 4/14   - Continued N/V 4/15 w/ KUB still showing ileus.   - Diet changed to clears only, still having n/v  - NGT placed 4/16 for decompression, 1500 cc output  - Cont LIWS  - Repeat KUB this am showing dilated loops of small bowel measuring up to 5.1 cm, progressed when compared to radiograph dated 04/15/2024 and possibly representing sequela of an ileus or obstruction.   - Will repeat KUB in the am.  - Pre albumin ordered and plan to start PPN.   - Continue bowel regimen    
- Seen on KUB 4/10   - NPO for bowel rest starting 4/11, Enema 4/14 with 3 large BM noted, advanced to regular diet 4/14   - Continued N/V 4/15 w/ KUB still showing ileus.   - Diet changed to clears only, still having n/v  - NGT placed 4/16 for decompression, 950 cc output  - Cont LIWS  - Repeat KUB ordered for am    - Encourage increase ambulation.   - Continue bowel regimen    
- Seen on KUB 4/10   - NPO for bowel rest starting 4/11, Enema 4/14 with 3 large BM noted, advanced to regular diet 4/14   - Continued N/V 4/15 w/ KUB still showing ileus. Diet changed to clears only. Encourage increase ambulation. Monitor closely for need for NGT  - Continue bowel regimen    
- Seen on KUB 4/10   - No improvement after bowel rest, enema  - NGT placed 4/16 for decompression  - Remains w/ over 1L NG output daily, KUB still w/ ileus pattern. CT A/P w/ oral contrast pending, r/o obstruction/kink  - Continue PPN  - Continue bowel regimen    
- Seen on KUB 4/10   - No improvement after bowel rest, enema  - NGT placed 4/16 for decompression  - Remains w/ over 1L NG output daily, KUB still w/ ileus pattern. CT A/P w/ oral contrast pending, r/o obstruction/kink  - XR graffin 4/20/24 w slow movement - Brown bomb enema 4/21, encourage ambulation 4/22  - Continue PPN  - Continue bowel regimen    
- Seen on KUB 4/10   - No improvement after bowel rest, enema  - NGT placed 4/16 for decompression  - Remains w/ over 1L NG output daily, KUB still w/ ileus pattern. CT A/P w/ oral contrast pending, r/o obstruction/kink  - XR graffin 4/20/24 w slow movement - Brown bomb enema 4/21, encourage ambulation 4/22  - Continue PPN but not reordering  - Continue bowel regimen  - pt had large BM this am and passing flatus, KUB improved, NGT removed 4/23/24, CLD ordered    
- Seen on KUB 4/10   - No improvement after bowel rest, enema  - NGT placed 4/16 for decompression  - Remains w/ over 1L NG output daily, KUB still w/ ileus pattern. CT A/P w/ oral contrast pending, r/o obstruction/kink  - XR graffin 4/20/24 w slow movement - Brown bomb enema 4/21, encourage ambulation 4/22  - Continue PPN but not reordering  - Continue bowel regimen  - pt had large BM this am and passing flatus, KUB improved, NGT removed 4/23/24, CLD ordered  - Tolerating CLD, advanced to regular. (+) BM. No N/V.     
- Seen on KUB 4/10   - No improvement after bowel rest, enema  - NGT placed 4/16 for decompression  - Remains w/ over 1L NG output daily, KUB still w/ ileus pattern. CT A/P w/ oral contrast pending, r/o obstruction/kink  - XR graffin slow movement - Brown bomb enema today 4/21  - Continue PPN  - Continue bowel regimen    
- Seen on KUB 4/10   - No improvement after bowel rest, enema  - NGT placed 4/16 for decompression  - Remains w/ over 1L NG output daily, KUB still w/ ileus pattern. CT A/P w/ oral contrast pending, r/o obstruction/kink  - XR graffin slow movement - will get soap suds enema today   - Continue PPN  - Continue bowel regimen    
- continue home statin    
- expect due to dehydration,  IVFs hydration ordered on admit  - Cr up to 1.6 today. Continue IVFs.   - OR take back today  -  cont to monitor   
- expect due to dehydration,  IVFs hydration ordered on admit  - Patient taken back to OR 4/9 for dehiscence of kidney transplant incision and hernia repair.  - Cr down to 1.2 from 1.5 today. 4/12  - DSAs in process  - Continue IVFs since NPO for ileus.   - Continues with good urine output.   - infectious workup negative thus far  - Cefepime and vanc dc'd and levaquin and doxy x5 days. EOT 4/15  - monitor  
- expect due to dehydration,  IVFs hydration ordered on admit  - Patient taken back to OR 4/9 for dehiscence of kidney transplant incision and hernia repair.  - Cr down to 1.5 from 1.8 today. 4/11  - DSAs in process  - Continue IVFs since NPO for ileus.   - Continues with good urine output.   - infectious workup negative thus far  - Cefepime and vanc dc'd and levaquin and doxy x5 days. 4/11  - monitor  
- expect due to dehydration,  IVFs hydration ordered on admit  - Patient taken back to OR 4/9 for dehiscence of kidney transplant incision and hernia repair.  - Cr stable at 1.3  - minimal + DSA   - good UOP  - IVF dc'd, diet advanced to clear liquids, pt tolerating well    
- expect due to dehydration,  IVFs hydration ordered on admit  - Patient taken back to OR yesterday for dehiscence of kidney transplant incision and hernia repair.  - Cr continuing to rise, up to 1.8 today.   - DSAs to be drawn with morning labs.   - Continue IVFs.   - If Cr increases again tomorrow, will plan for kidney transplant biopsy.   - Continues with good urine output.   - infectious workup negative thus far  - monitor  
- expect due to dehydration, gentle IVF hydration ordered    
- monitor daily CBC  - stable    
- s/p repair on 4/9    
- suspect related to dehydration  - Encourage PO fluid intake  - Remains on coreg, dose decreased, continue hold parameters  
Post op from OR take back on 4/9, patient with hyperkalemia needing interventions (shifting, IV lasix, and IVFs)  - K 5.2 4/11  - Repeat K at 1400.   - Cont IVFs  - NPO for ilues  - tele  - monitor   
Post op from OR take back on 4/9, patient with hyperkalemia needing interventions (shifting, IV lasix, and IVFs)  - K 5.2 4/12  - Repeat K at 1400.   - Cont IVFs  - NPO for ileus  - tele  - monitor   
Post op from OR take back on 4/9, patient with hyperkalemia needing interventions (shifting, IV lasix, and IVFs)  - K stable at 4.5 this morning  - monitor with renal panel prn  
Post op from OR take back on 4/9, patient with hyperkalemia needing interventions (shifting, IV lasix, and IVFs)  - K stable this morning.   - Repeat RFP at 1400.   - Cont IVFs  - low K diet  - tele  - monitor   
all other ROS negative except as per HPI

## 2024-04-24 NOTE — PROGRESS NOTES
[facility] Step[facility]  Kidney Transplant  Progress Note      Reason for Follow-up: Reassessment of Kidney Transplant - 3/11/2024  (#1) recipient and management of immunosuppression.    ORGAN: LEFT KIDNEY      Donor Type: Living      Donor CMV Status:    Donor HBcAB:   Donor HCV Status:   Donor HBV ANA:   Donor HCV ANA:       Subjective:   History of Present Illness:  Mr. Colten Monet is a 63 yo male w/ PMHx of ESRD d/t IgA nephropathy, now S/P living related Ktxp on 3/11/24. Patient progressed well during initial post-txp course and discharged POD#2 with downtrending Cr. Now with BL Cr 1.0-1.1.     Patient recently seen in transplant surgery clinic on 4/3 and noted to have serous drainage from RLQ kidney txp incision along with palpable erythema. Staples intact w/ minimal tenderness to deep palpation at that time. Incision probed w/ 200 cc serous fluid drained in clinic by surgical fellow. Patient was placed on PO for cellulitis. He now presents to ED for worsening erythema to incisional area (see media for photo). Staples remain intact, erythema noted to entire length of incision, worse in upper portion. Small 1-2 cm area of yellow slough in upper portion incision. Minimal serous drainage expressed upon probing. Patient reports mild tenderness to deep palpation. Kidney US in ED w/ large 14.8 x 7.4 x 1.3 cm, just posterior to the anterior pelvic wall overlying the transplant kidney. Reviewed plan with Dr. Eduardo and Dr. Whyte. CT A/P non-con ordered along with IV abx. Staples removed at bedside, small 1-2 cm area of wound dehiscence noted, moderate amount serous fluid able to be expressed from incision. Mild HARRISON on admit (Cr 1.5). NS bolus ordered.         Hospital Course:  Patient admitted for worsening cellulitis of kidney transplant incision, incisional hernia, and HARRISON. Cefepime and vanc started, infectious work-up ordered. Hospital course as follows:  Incisional hernia: CT abd/pelvis 4/8/24  revealed incisional hernia containing distal ileum, associated small bowel feces sign, suggesting intestinal stasis, but without CT evidence of high-grade intestinal obstruction. TB  to OR 4/9/24 for repair of dehiscence of kidney transplant incision and hernia repair plus washout. IV abx transitioned to PO levaquin and doxy x 5 days (EOT 4/15/24).  HARRISON: Worsened post OR with associated hyperkalemia. Required several interventions (shifting, lasix, IVF). Renal function and hyperkalemia both improved, now back to baseline.   Ileus: Patient w/ N/V post take back, KUB 4/10 with ileus, NPO for bowel rest 4/11. Enema ordered 4/14 w/ 3 large BM, however no improvement in N/V. Remains w/ ileus. NGT placement 4/16. PPN started 4/18. Prealbumin 14    Interval History: no acute events overnight. Patient reports feeling great today. Advanced to regular diet, tolerating w/o N/V. (+) BM. Active BS throughout. Cr remains slightly elevated, encourage PO fluid intake. Check UA/urine culture. + Orthostatic hypotension, adjusted coreg, continue hold parameters.     Past Medical, Surgical, Family, and Social History:   Unchanged from H&P.    Scheduled Meds:  Current Facility-Administered Medications   Medication Dose Route Frequency    aspirin  81 mg Oral Daily    atorvastatin  40 mg Oral QHS    atovaquone  1,500 mg Oral Daily    bisacodyL  10 mg Oral Daily    carvediloL  6.25 mg Oral BID    docusate sodium  100 mg Oral BID    famotidine  20 mg Oral QHS    fluticasone furoate-vilanteroL  1 puff Inhalation Daily    heparin (porcine)  5,000 Units Subcutaneous Q8H    mycophenolate sodium  540 mg Oral BID    polyethylene glycol  17 g Oral Daily    predniSONE  5 mg Oral Daily    tacrolimus  3 mg Oral BID    valGANciclovir  900 mg Oral QAM     Continuous Infusions:  Current Facility-Administered Medications   Medication Dose Route Frequency Last Rate Last Admin     PRN Meds:  Current Facility-Administered Medications:     acetaminophen, 650  mg, Oral, Q6H PRN    albuterol, 1 puff, Inhalation, Q6H PRN    aluminum-magnesium hydroxide-simethicone, 30 mL, Oral, Q6H PRN    bisacodyL, 10 mg, Rectal, Daily PRN    calcium carbonate, 500 mg, Oral, TID PRN    melatonin, 6 mg, Oral, Nightly PRN    ondansetron, 8 mg, Oral, Q6H PRN    oxyCODONE, 5 mg, Oral, Q4H PRN    oxyCODONE, 10 mg, Oral, Q4H PRN    sodium chloride 0.9%, 3 mL, Intravenous, PRN    Intake/Output - Last 3 Shifts         04/22 0700 04/23 0659 04/23 0700 04/24 0659 04/24 0700 04/25 0659    P.O. 180 600     I.V. (mL/kg) 50 (0.6) 0 (0)     Other 0 0     Total Intake(mL/kg) 230 (2.6) 600 (6.8)     Urine (mL/kg/hr) 375 (0.2) 0 (0)     Emesis/NG output 0 0     Drains 850      Other 0 0     Stool 0 0     Blood 0 0     Total Output 1225 0     Net -995 +600            Urine Occurrence 3 x 5 x     Stool Occurrence 1 x 5 x     Emesis Occurrence 0 x 0 x              Review of Systems   Constitutional:  Positive for activity change and appetite change (NPO). Negative for chills and fever.   HENT:  Negative for congestion and trouble swallowing.    Eyes: Negative.    Respiratory:  Negative for cough and shortness of breath.    Cardiovascular:  Negative for chest pain and leg swelling.   Gastrointestinal:  Positive for abdominal pain (incisional). Negative for abdominal distention, diarrhea and vomiting.   Endocrine: Negative.    Genitourinary:  Negative for decreased urine volume and difficulty urinating.   Skin:  Positive for wound.   Allergic/Immunologic: Positive for immunocompromised state.   Neurological:  Negative for dizziness, tremors, weakness and headaches.   Psychiatric/Behavioral:  Negative for agitation, behavioral problems, confusion and decreased concentration.       Objective:     Vital Signs (Most Recent):  Temp: 98 °F (36.7 °C) (04/24/24 1109)  Pulse: 96 (04/24/24 1109)  Resp: 18 (04/24/24 1109)  BP: 91/60 (04/24/24 1109)  SpO2: 99 % (04/24/24 1109) Vital Signs (24h Range):  Temp:  [97.8 °F  "(36.6 °C)-98.4 °F (36.9 °C)] 98 °F (36.7 °C)  Pulse:  [] 96  Resp:  [18] 18  SpO2:  [99 %-100 %] 99 %  BP: ()/(60-67) 91/60     Weight: 87.6 kg (193 lb 2 oz)  Height: 5' 7" (170.2 cm)  Body mass index is 30.25 kg/m².     Physical Exam  Vitals and nursing note reviewed.   Constitutional:       General: He is not in acute distress.     Appearance: Normal appearance. He is not ill-appearing.   HENT:      Head: Normocephalic.      Mouth/Throat:      Pharynx: Oropharynx is clear.   Eyes:      Conjunctiva/sclera: Conjunctivae normal.   Cardiovascular:      Rate and Rhythm: Normal rate.      Pulses: Normal pulses.   Pulmonary:      Effort: Pulmonary effort is normal.      Breath sounds: Normal breath sounds.   Abdominal:      General: Bowel sounds are normal. There is no distension.      Tenderness: There is abdominal tenderness (mild). There is no guarding or rebound.      Comments: RLQ Kidney txp incision CDI with staples intact, small area of erythema at superior part of incision    Musculoskeletal:         General: Normal range of motion.      Right lower leg: No edema.      Left lower leg: No edema.   Skin:     General: Skin is warm and dry.      Capillary Refill: Capillary refill takes 2 to 3 seconds.   Neurological:      Mental Status: He is alert and oriented to person, place, and time. Mental status is at baseline.   Psychiatric:         Mood and Affect: Mood normal.         Behavior: Behavior normal. Behavior is cooperative.         Thought Content: Thought content normal.         Judgment: Judgment normal.          Laboratory:  CBC:   Recent Labs   Lab 04/22/24  0614 04/23/24  0622 04/24/24 0618   WBC 10.66 11.94 9.94   RBC 3.74* 3.87* 3.83*   HGB 10.6* 10.4* 10.6*   HCT 33.6* 35.3* 34.7*    267 279   MCV 90 91 91   MCH 28.3 26.9* 27.7   MCHC 31.5* 29.5* 30.5*     CMP:   Recent Labs   Lab 04/22/24  0614 04/23/24  0622 04/24/24 0618    113* 114*   CALCIUM 8.7 9.0 8.7   ALBUMIN 2.8* " "2.9* 2.8*    142 142   K 3.7 4.8 4.5   CO2 27 29 26    106 107   BUN 20 20 21   CREATININE 1.1 1.3 1.4     Coagulation: No results for input(s): "PT", "APTT" in the last 168 hours.  Labs within the past 24 hours have been reviewed.    Diagnostic Results:  US - Kidney: Results for orders placed during the hospital encounter of 04/08/24    US Transplant Kidney With Doppler    Narrative  EXAMINATION:  US TRANSPLANT KIDNEY WITH DOPPLER    CLINICAL HISTORY:  wound infection/drainage;    TECHNIQUE:  Real time gray scale and doppler ultrasound was performed over the patient's renal allograft.    COMPARISON:  03/25/2024    FINDINGS:  Renal allograft in the right lower quadrant.  The allograft measures 12.9 cm. Normal perfusion. No hydronephrosis.  There is a 1.2 cm cyst at the midpole.  There is a 1.7 cm cyst with a peripheral calcification, similar to the prior exam.    There is an elongated fluid collection underneath the anterior pelvic wall overlying the kidney transplant an area measuring 14.8 x 7.4 x 1.3 cm    Vasculature:    Inter lobar and segmental arterial resistive indices ranged from 0.67 to 0.77, previously 0.65 to 0.75.    Main renal artery peak systolic velocity: 200 cm/s with normal waveform, previously 314 cm/s .    Renal artery/iliac ratio: 1.3.    The main renal vein is patent.    Impression  Large fluid collection, 14.8 x 7.4 x 1.3 cm, just posterior to the anterior pelvic wall overlying the transplant kidney concerning for seroma, hematoma or abscess.    Small renal cysts, one  of which has a small peripheral calcification, unchanged from the prior study.    Satisfactory Doppler evaluation of the transplant kidney.      Electronically signed by: Teresita Ohara MD  Date:    04/08/2024  Time:    12:38  Assessment/Plan:     * History of incisional hernia repair  - see " surgical wound infection"      Orthostatic hypotension  - suspect related to dehydration  - Encourage PO fluid intake  - " Remains on coreg, dose decreased, continue hold parameters    Hypokalemia  - Replace w/ IV supplements as needed while NG in place      Incisional hernia following transplant  - s/p repair on 4/9      Tachycardia  - Last EKG 4/15 with sinus tach  - Cont coreg       Ileus  - Seen on KUB 4/10   - No improvement after bowel rest, enema  - NGT placed 4/16 for decompression  - Remains w/ over 1L NG output daily, KUB still w/ ileus pattern. CT A/P w/ oral contrast pending, r/o obstruction/kink  - XR graffin 4/20/24 w slow movement - Brown bomb enema 4/21, encourage ambulation 4/22  - Continue PPN but not reordering  - Continue bowel regimen  - pt had large BM this am and passing flatus, KUB improved, NGT removed 4/23/24, CLD ordered  - Tolerating CLD, advanced to regular. (+) BM. No N/V.       Wound dehiscence  - Incision w staples w small amount of serous drainage noted on gauze  - Staples due to be removed ~4/30 (21 days after hernia repair)      Anemia of chronic disease  - monitor daily CBC  - stable      Hypomagnesemia  - Continue home PO mag  - IV Mag as needed      History of COPD  - Resume home neb      Dyslipidemia  - continue home statin      Infection of surgical wound following transplant  - Pt with delayed surgical wound healing, seen in clinic 4/3 with 200 mL serous fluid expressed from incision w/ staples intact. Started on PO doxycycline at that time. No improvement with PO Doxy  - CT abd/pelvis 4/8 with RLQ transplant kidney with incisional hernia containing distal ileum. The large fluid collection interposed between the kidney and anterior abdominal wall drained at patient's bedside. Mild to moderate soft tissue stranding and trace fluid directly adjacent to the transplant kidney. CT reviewed by surgeon. Patient made NPO for surgical intervention for 4/9.   - Patient taken back to OR (4/9) for repair of wound dehiscence and hernia repair, abdominal washout  - Infectious work up negative thus far.   -  "Cefepime and vanc transitioned to PO levaquin and doxy x5 days. (EOT 4/15). Pt has completed course of abx.      Prophylactic immunotherapy  - see "long term use of immunosuppression"      At risk for opportunistic infections  - OI prophylaxis per transplant protocol      Long-term use of immunosuppressant medication  - Continue prograf and prednisone  - Check prograf level daily and adjust for therapeutic dosage. Monitor for toxic side effects   - Myfortic restarted 4/17      S/P living-donor kidney transplantation  - S/p Ktxp 3/11/24 d/t IgA nephropathy. Progressed well post-txp with Cr downtrending  - BL Cr 1.1-1.2  - Strict Is and Os   - Avoid nephrotoxic medications  - Cr slightly above baseline, encourage PO fluid intake. Check UA/urine culture.             Discharge Planning: not stable for dc at this time. Possible dc tomorrow if tolerating diet and N/V.     Medical decision making for this encounter includes review of pertinent labs and diagnostic studies, assessment and planning, discussions with consulting providers, discussion with patient/family, and participation in multidisciplinary rounds. Time spent caring for patient: 60 minutes    Maren Harvey, LAKESHIA  Kidney Transplant  [facility]  "

## 2024-04-25 VITALS
HEART RATE: 90 BPM | TEMPERATURE: 98 F | OXYGEN SATURATION: 98 % | BODY MASS INDEX: 30.29 KG/M2 | WEIGHT: 193 LBS | RESPIRATION RATE: 14 BRPM | SYSTOLIC BLOOD PRESSURE: 107 MMHG | DIASTOLIC BLOOD PRESSURE: 63 MMHG | HEIGHT: 67 IN

## 2024-04-25 PROBLEM — T81.30XA WOUND DEHISCENCE: Status: RESOLVED | Noted: 2024-04-08 | Resolved: 2024-04-25

## 2024-04-25 PROBLEM — K56.7 ILEUS: Status: RESOLVED | Noted: 2024-04-11 | Resolved: 2024-04-25

## 2024-04-25 PROBLEM — Z94.9 INCISIONAL HERNIA FOLLOWING TRANSPLANT: Status: RESOLVED | Noted: 2024-04-15 | Resolved: 2024-04-25

## 2024-04-25 PROBLEM — K43.2 INCISIONAL HERNIA FOLLOWING TRANSPLANT: Status: RESOLVED | Noted: 2024-04-15 | Resolved: 2024-04-25

## 2024-04-25 LAB
ALBUMIN SERPL BCP-MCNC: 2.7 G/DL (ref 3.5–5.2)
ANION GAP SERPL CALC-SCNC: 8 MMOL/L (ref 8–16)
BASOPHILS # BLD AUTO: 0.1 K/UL (ref 0–0.2)
BASOPHILS NFR BLD: 0.9 % (ref 0–1.9)
BUN SERPL-MCNC: 27 MG/DL (ref 8–23)
CALCIUM SERPL-MCNC: 8.3 MG/DL (ref 8.7–10.5)
CHLORIDE SERPL-SCNC: 109 MMOL/L (ref 95–110)
CO2 SERPL-SCNC: 23 MMOL/L (ref 23–29)
CREAT SERPL-MCNC: 1.4 MG/DL (ref 0.5–1.4)
DIFFERENTIAL METHOD BLD: ABNORMAL
EOSINOPHIL # BLD AUTO: 0.2 K/UL (ref 0–0.5)
EOSINOPHIL NFR BLD: 1.4 % (ref 0–8)
ERYTHROCYTE [DISTWIDTH] IN BLOOD BY AUTOMATED COUNT: 13.4 % (ref 11.5–14.5)
EST. GFR  (NO RACE VARIABLE): 55.8 ML/MIN/1.73 M^2
GLUCOSE SERPL-MCNC: 108 MG/DL (ref 70–110)
HCT VFR BLD AUTO: 33.3 % (ref 40–54)
HGB BLD-MCNC: 10.1 G/DL (ref 14–18)
IMM GRANULOCYTES # BLD AUTO: 0.28 K/UL (ref 0–0.04)
IMM GRANULOCYTES NFR BLD AUTO: 2.6 % (ref 0–0.5)
LYMPHOCYTES # BLD AUTO: 1.7 K/UL (ref 1–4.8)
LYMPHOCYTES NFR BLD: 15.8 % (ref 18–48)
MAGNESIUM SERPL-MCNC: 1.7 MG/DL (ref 1.6–2.6)
MCH RBC QN AUTO: 27.8 PG (ref 27–31)
MCHC RBC AUTO-ENTMCNC: 30.3 G/DL (ref 32–36)
MCV RBC AUTO: 92 FL (ref 82–98)
MONOCYTES # BLD AUTO: 1 K/UL (ref 0.3–1)
MONOCYTES NFR BLD: 9.4 % (ref 4–15)
NEUTROPHILS # BLD AUTO: 7.7 K/UL (ref 1.8–7.7)
NEUTROPHILS NFR BLD: 69.9 % (ref 38–73)
NRBC BLD-RTO: 0 /100 WBC
PHOSPHATE SERPL-MCNC: 3.4 MG/DL (ref 2.7–4.5)
PLATELET # BLD AUTO: 274 K/UL (ref 150–450)
PMV BLD AUTO: 11 FL (ref 9.2–12.9)
POTASSIUM SERPL-SCNC: 4 MMOL/L (ref 3.5–5.1)
RBC # BLD AUTO: 3.63 M/UL (ref 4.6–6.2)
SODIUM SERPL-SCNC: 140 MMOL/L (ref 136–145)
TACROLIMUS BLD-MCNC: 8.8 NG/ML (ref 5–15)
WBC # BLD AUTO: 10.98 K/UL (ref 3.9–12.7)

## 2024-04-25 PROCEDURE — 25000003 PHARM REV CODE 250: Performed by: NURSE PRACTITIONER

## 2024-04-25 PROCEDURE — 63600175 PHARM REV CODE 636 W HCPCS: Performed by: NURSE PRACTITIONER

## 2024-04-25 PROCEDURE — 80069 RENAL FUNCTION PANEL: CPT

## 2024-04-25 PROCEDURE — 80197 ASSAY OF TACROLIMUS: CPT

## 2024-04-25 PROCEDURE — 36415 COLL VENOUS BLD VENIPUNCTURE: CPT

## 2024-04-25 PROCEDURE — 25000003 PHARM REV CODE 250: Performed by: PHYSICIAN ASSISTANT

## 2024-04-25 PROCEDURE — 25000003 PHARM REV CODE 250: Performed by: INTERNAL MEDICINE

## 2024-04-25 PROCEDURE — 63600175 PHARM REV CODE 636 W HCPCS

## 2024-04-25 PROCEDURE — 25000003 PHARM REV CODE 250

## 2024-04-25 PROCEDURE — 99239 HOSP IP/OBS DSCHRG MGMT >30: CPT | Mod: ,,, | Performed by: NURSE PRACTITIONER

## 2024-04-25 PROCEDURE — 83735 ASSAY OF MAGNESIUM: CPT

## 2024-04-25 PROCEDURE — 85025 COMPLETE CBC W/AUTO DIFF WBC: CPT

## 2024-04-25 RX ORDER — TACROLIMUS 1 MG/1
3 CAPSULE ORAL EVERY 12 HOURS
Qty: 180 CAPSULE | Refills: 11 | Status: SHIPPED | OUTPATIENT
Start: 2024-04-25 | End: 2024-05-28

## 2024-04-25 RX ADMIN — ATOVAQUONE 1500 MG: 750 SUSPENSION ORAL at 08:04

## 2024-04-25 RX ADMIN — FLUTICASONE FUROATE AND VILANTEROL TRIFENATATE 1 PUFF: 100; 25 POWDER RESPIRATORY (INHALATION) at 08:04

## 2024-04-25 RX ADMIN — MYCOPHENOLIC ACID 540 MG: 180 TABLET, DELAYED RELEASE ORAL at 08:04

## 2024-04-25 RX ADMIN — BISACODYL 10 MG: 5 TABLET, COATED ORAL at 08:04

## 2024-04-25 RX ADMIN — CARVEDILOL 6.25 MG: 6.25 TABLET, FILM COATED ORAL at 08:04

## 2024-04-25 RX ADMIN — DOCUSATE SODIUM 100 MG: 100 CAPSULE, LIQUID FILLED ORAL at 08:04

## 2024-04-25 RX ADMIN — PREDNISONE 5 MG: 5 TABLET ORAL at 08:04

## 2024-04-25 RX ADMIN — ASPIRIN 81 MG CHEWABLE TABLET 81 MG: 81 TABLET CHEWABLE at 08:04

## 2024-04-25 RX ADMIN — VALGANCICLOVIR 900 MG: 450 TABLET, FILM COATED ORAL at 08:04

## 2024-04-25 RX ADMIN — TACROLIMUS 3 MG: 1 CAPSULE ORAL at 08:04

## 2024-04-25 NOTE — CARE UPDATE
"RAPID RESPONSE NURSE CHART REVIEW        Chart Reviewed: 04/25/2024, 5:16 AM    MRN: [id number]  Bed: 63742/91775 A    Dx: History of incisional hernia repair    Colten Monet has a past medical history of Anemia, CVA (cerebral vascular accident), GERD (gastroesophageal reflux disease), Hyperlipidemia, Hypertension, IgA nephropathy, and Obesity.    Last VS: BP (!) 89/38   Pulse (!) 120   Temp 97.6 °F (36.4 °C) (Oral)   Resp 18   Ht 5' 7" (1.702 m)   Wt 87.5 kg (193 lb)   SpO2 99%   BMI 30.23 kg/m²     24H Vital Sign Range:  Temp:  [97.6 °F (36.4 °C)-98.7 °F (37.1 °C)]   Pulse:  []   Resp:  [18]   BP: ()/()   SpO2:  [95 %-99 %]     Level of Consciousness (AVPU): alert    Recent Labs     04/22/24  0614 04/23/24  0622 04/24/24  0618   WBC 10.66 11.94 9.94   HGB 10.6* 10.4* 10.6*   HCT 33.6* 35.3* 34.7*    267 279       Recent Labs     04/22/24  0614 04/23/24  0622 04/24/24  0618    142 142   K 3.7 4.8 4.5    106 107   CO2 27 29 26   BUN 20 20 21   CREATININE 1.1 1.3 1.4    113* 114*   PHOS 3.1 3.3 3.5   MG 1.6 1.9 1.8        No results for input(s): "PH", "PCO2", "PO2", "HCO3", "POCSATURATED", "BE" in the last 72 hours.     OXYGEN:  Flow (L/min) (Oxygen Therapy): 0          MEWS score: 4    Bedside RNKristina contacted for BP documented 89/38, states will recheck VS.  No additional concerns verbalized at this time. Instructed to call [phone] for further concerns or assistance.    DIANE Serna RN       "

## 2024-04-25 NOTE — PLAN OF CARE
Cr=1.4. K+=4.0. Mg+2=1.7. Tolerating po well. Denies N/V. Had BM this am. Voiding without difficulty. Ambulating in room without difficulty. Friend at BS. Plan to discharge home today. Pharmacy updated and reviewed blue card with pt and friend. Pharmacy to deliver needed home meds to BS.

## 2024-04-25 NOTE — PHYSICIAN QUERY
Please clarify Ileus as it relates to the procedure: Exploratory Laparotomy     Present, but not a complication of the procedure

## 2024-04-25 NOTE — NURSING
Discharge instructions given and reviewed with pt and caregiver. Needed home meds delivered to room. Verbalized understanding.

## 2024-04-25 NOTE — PROGRESS NOTES
Transplant Social Work Discharge Note:    Pt will discharge to patient's home under the care of self and patient's wife Ginna Monet phone number [phone].     Patient reports readiness to discharge at this time. Patient reports his friend Derek (currently in the room with him) will transport him home. Per rounds this morning patient does not require referrals from this  at time of discharge. Patient denied needing or wanting resources or referrals at this time. Patient agrees to contact transplant team with needs, questions, or concerns as they arise.     Pt aware of, involved in, and coping well with this discharge plan. Pt did not have any concerns with the discharge plan at this time. SW remains available at [phone].      Betsey Lewis LMSW

## 2024-04-25 NOTE — PROGRESS NOTES
Discharge Medication Note:    Hospital Course: RE-ADMIT s/p kidney transplant on 3/11/24 due to IgA nephropathy. Admitted for erythema/drainage from transplant incision and HARRISON - started on Cefepime and Vancomycin therapy. On 4/9, patient went for washout/exploration and repair of dehiscence. Cx NGTD. De-escalated to doxycycline +levofloxacin therapy (EOT 4/15/24).  Patient developed ileus during admission. Started on TPN + lipids then transitioned to clear liquids and tolerated well. Mycophenolate switched to Myfortic d/t GI disturbances - 540 mg BID per nephrologist. Patient started on carvedilol inpatient, but SBP's on day of discharge ranging . Patient not discharged with medication for blood pressure control, but recommend to monitor BP.    Met with Colten Monet at discharge to review discharge medications and to update the blue medication card.         Medication List        START taking these medications      atovaquone 750 mg/5 mL Susp oral liquid  Commonly known as: MEPRON  Take 10 mLs (1,500 mg total) by mouth once daily. Stop 9/7/24     mycophenolate sodium 180 MG Tbec  Take 3 tablets (540 mg total) by mouth 2 (two) times daily.            CHANGE how you take these medications      predniSONE 5 MG tablet  Commonly known as: DELTASONE  Take 1 tablet (5 mg total) by mouth once daily.  What changed:   how much to take  how to take this  when to take this  additional instructions     tacrolimus 1 MG Cap  Commonly known as: PROGRAF  Take 3 capsules (3 mg total) by mouth every 12 (twelve) hours.  What changed:   See the new instructions.  Another medication with the same name was removed. Continue taking this medication, and follow the directions you see here.            CONTINUE taking these medications      albuterol 90 mcg/actuation inhaler  Commonly known as: PROVENTIL/VENTOLIN HFA  Inhale 1-2 puffs into the lungs every 6 (six) hours as needed for Wheezing. Rescue     aspirin 81 MG EC  tablet  Commonly known as: ECOTRIN  Take 1 tablet (81 mg total) by mouth once daily.     atorvastatin 40 MG tablet  Commonly known as: LIPITOR     bisacodyL 5 mg EC tablet  Commonly known as: DULCOLAX  Take 2 tablets (10 mg total) by mouth daily as needed for Constipation.     BREO ELLIPTA 100-25 mcg/dose diskus inhaler  Generic drug: fluticasone furoate-vilanteroL  Inhale 1 puff into the lungs once daily. Controller     docusate sodium 100 MG capsule  Commonly known as: COLACE  Take 1 capsule (100 mg total) by mouth 3 (three) times daily as needed for Constipation.     famotidine 20 MG tablet  Commonly known as: PEPCID  Take 1 tablet (20 mg total) by mouth every evening.     valGANciclovir 450 mg Tab  Commonly known as: VALCYTE  Take 2 tablets (900 mg total) by mouth every morning. Stop: 6/9/24            STOP taking these medications      benzonatate 100 MG capsule  Commonly known as: TESSALON     doxycycline 100 MG Cap  Commonly known as: VIBRAMYCIN     furosemide 20 MG tablet  Commonly known as: LASIX     magnesium oxide 400 mg (241.3 mg magnesium) tablet  Commonly known as: MAG-OX     mycophenolate 250 mg Cap  Commonly known as: CELLCEPT     sodium bicarbonate 650 MG tablet     sulfamethoxazole-trimethoprim 400-80mg 400-80 mg per tablet  Commonly known as: BACTRIM,SEPTRA               Where to Get Your Medications        These medications were sent to [facility] [address], [place] [address]      Hours: Always Open Phone: [phone]   atovaquone 750 mg/5 mL Susp oral liquid  mycophenolate sodium 180 MG Tbec  predniSONE 5 MG tablet  tacrolimus 1 MG Cap        The following medications have been placed on HOLD and should be restarted in the outpatient setting (when appropriate): n/a    Colten Monet verbalized understanding and had the opportunity to ask questions.

## 2024-04-25 NOTE — PLAN OF CARE
Pt aaox4 vswnl and no c/o pain. Standing BP. Pt ambulates independently. Bed in low position and callbell within reach. Pt tolerated regular diet well. Rt lower quad ibcision jaylene with srtaples intact. Possible d/c in am 4/25.

## 2024-04-26 LAB — BACTERIA UR CULT: NO GROWTH

## 2024-04-28 ENCOUNTER — HOSPITAL ENCOUNTER (EMERGENCY)
Facility: HOSPITAL | Age: 64
Discharge: HOME | End: 2024-04-28
Attending: EMERGENCY MEDICINE
Payer: COMMERCIAL

## 2024-04-28 ENCOUNTER — NURSE TRIAGE (OUTPATIENT)
Dept: ADMINISTRATIVE | Facility: CLINIC | Age: 64
End: 2024-04-28
Payer: COMMERCIAL

## 2024-04-28 VITALS
OXYGEN SATURATION: 100 % | DIASTOLIC BLOOD PRESSURE: 96 MMHG | SYSTOLIC BLOOD PRESSURE: 168 MMHG | HEART RATE: 95 BPM | RESPIRATION RATE: 18 BRPM | WEIGHT: 190 LBS | TEMPERATURE: 98 F | BODY MASS INDEX: 29.82 KG/M2 | HEIGHT: 67 IN

## 2024-04-28 DIAGNOSIS — Z48.89 ENCOUNTER FOR POST SURGICAL WOUND CHECK: Primary | ICD-10-CM
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
## 2024-04-28 DIAGNOSIS — T81.49XA: ICD-10-CM

## 2024-04-28 DIAGNOSIS — E87.5 HYPERKALEMIA: ICD-10-CM

## 2024-04-28 LAB
ALBUMIN SERPL BCP-MCNC: 3.2 G/DL (ref 3.5–5.2)
ALP SERPL-CCNC: 79 U/L (ref 55–135)
ALT SERPL W/O P-5'-P-CCNC: 33 U/L (ref 10–44)
ANION GAP SERPL CALC-SCNC: 9 MMOL/L (ref 8–16)
AST SERPL-CCNC: 20 U/L (ref 10–40)
BASOPHILS # BLD AUTO: 0.1 K/UL (ref 0–0.2)
BASOPHILS NFR BLD: 0.8 % (ref 0–1.9)
BILIRUB SERPL-MCNC: 0.3 MG/DL (ref 0.1–1)
BUN SERPL-MCNC: 20 MG/DL (ref 8–23)
CALCIUM SERPL-MCNC: 9 MG/DL (ref 8.7–10.5)
CHLORIDE SERPL-SCNC: 112 MMOL/L (ref 95–110)
CO2 SERPL-SCNC: 19 MMOL/L (ref 23–29)
CREAT SERPL-MCNC: 1.3 MG/DL (ref 0.5–1.4)
DIFFERENTIAL METHOD BLD: ABNORMAL
EOSINOPHIL # BLD AUTO: 0.1 K/UL (ref 0–0.5)
EOSINOPHIL NFR BLD: 0.7 % (ref 0–8)
ERYTHROCYTE [DISTWIDTH] IN BLOOD BY AUTOMATED COUNT: 13.6 % (ref 11.5–14.5)
EST. GFR  (NO RACE VARIABLE): >60 ML/MIN/1.73 M^2
GLUCOSE SERPL-MCNC: 113 MG/DL (ref 70–110)
HCT VFR BLD AUTO: 38.4 % (ref 40–54)
HGB BLD-MCNC: 11.4 G/DL (ref 14–18)
IMM GRANULOCYTES # BLD AUTO: 0.16 K/UL (ref 0–0.04)
IMM GRANULOCYTES NFR BLD AUTO: 1.3 % (ref 0–0.5)
LYMPHOCYTES # BLD AUTO: 1.3 K/UL (ref 1–4.8)
LYMPHOCYTES NFR BLD: 10.4 % (ref 18–48)
MCH RBC QN AUTO: 27.7 PG (ref 27–31)
MCHC RBC AUTO-ENTMCNC: 29.7 G/DL (ref 32–36)
MCV RBC AUTO: 93 FL (ref 82–98)
MONOCYTES # BLD AUTO: 0.6 K/UL (ref 0.3–1)
MONOCYTES NFR BLD: 5.2 % (ref 4–15)
NEUTROPHILS # BLD AUTO: 9.9 K/UL (ref 1.8–7.7)
NEUTROPHILS NFR BLD: 81.6 % (ref 38–73)
NRBC BLD-RTO: 0 /100 WBC
PLATELET # BLD AUTO: 245 K/UL (ref 150–450)
PMV BLD AUTO: 10.3 FL (ref 9.2–12.9)
POTASSIUM SERPL-SCNC: 5.3 MMOL/L (ref 3.5–5.1)
PROT SERPL-MCNC: 6.4 G/DL (ref 6–8.4)
RBC # BLD AUTO: 4.12 M/UL (ref 4.6–6.2)
SODIUM SERPL-SCNC: 140 MMOL/L (ref 136–145)
WBC # BLD AUTO: 12.07 K/UL (ref 3.9–12.7)

## 2024-04-28 PROCEDURE — 85025 COMPLETE CBC W/AUTO DIFF WBC: CPT | Performed by: PHYSICIAN ASSISTANT

## 2024-04-28 PROCEDURE — 99284 EMERGENCY DEPT VISIT MOD MDM: CPT

## 2024-04-28 PROCEDURE — 80053 COMPREHEN METABOLIC PANEL: CPT | Performed by: PHYSICIAN ASSISTANT

## 2024-04-28 PROCEDURE — 25000003 PHARM REV CODE 250: Performed by: NURSE PRACTITIONER

## 2024-04-28 RX ORDER — DOXYCYCLINE HYCLATE 100 MG
100 TABLET ORAL EVERY 12 HOURS
Qty: 14 TABLET | Refills: 0 | Status: SHIPPED | OUTPATIENT
Start: 2024-04-28 | End: 2024-05-05

## 2024-04-28 RX ORDER — DOXYCYCLINE HYCLATE 100 MG
100 TABLET ORAL EVERY 12 HOURS
Status: DISCONTINUED | OUTPATIENT
Start: 2024-04-28 | End: 2024-04-28 | Stop reason: HOSPADM

## 2024-04-28 RX ADMIN — SODIUM ZIRCONIUM CYCLOSILICATE 5 G: 5 POWDER, FOR SUSPENSION ORAL at 02:04

## 2024-04-28 RX ADMIN — DOXYCYCLINE HYCLATE 100 MG: 100 TABLET, COATED ORAL at 01:04

## 2024-04-28 NOTE — PLAN OF CARE
Patient is 65 y/o M s/p Kidney transplant 3/11/24 d/t IgA Nephropathy, readmitted for worsening cellulitis 4/8-4/25 for which he had take back to OR for repair of dehiscence of kidney transplant and hernia rpqir with washout. He developed post op ileus requiring NGT and PPN for over 7 days. He was treated w abx while inpt- he completed course of Levaquin and Doxy on 4/15/24. He presents to today as directed by on call service. He reported wound looked more pink and opened to proximal area of incision. No active drainage noted(PIC in media). He denies fever or chills, increased pain, n/v, diarrhea or change in UOP. Labs showed CBC stable, Cr 1.3(BL) and Potassium 5.3(on Atovaquone).  Dose of Doxy and Lokelma ordered in ER.  Discussed w Dr Eduardo, plan to restart PO abx- Doxy 100 mg bid x7 days. Pt has labs scheduled for 4/29 and he will be seen in clinic on 5/1/24 by surgeon. Pt and CG verbalize understanding to plan of care and are in agreement. Pt will be discharged from ER and will get Rx from Pharmacy today.

## 2024-04-28 NOTE — ED TRIAGE NOTES
Patient identifiers for Colten Monet 64 y.o. male checked and correct. Pt arrives to ED via POV c/o redness at incision site. Pt had right kidney transplant 03/11/24. Pt sustained subsequent infection which required hospitalization; discharged on Thursday with antibiotics. Pt noticed increased redness to site yesterday. Pt has follow up appt for staple removal on Wednesday. Denies NVD, fever, chills.   Chief Complaint   Patient presents with    Post-op Problem     Kidney xplant march 2024, incision red     Past Medical History:   Diagnosis Date    Anemia     CVA (cerebral vascular accident)     GERD (gastroesophageal reflux disease)     Hyperlipidemia     Hypertension     IgA nephropathy     Obesity      Allergies reported:   Review of patient's allergies indicates:   Allergen Reactions    Codeine Hives     Reaction to Tylenol #3

## 2024-04-28 NOTE — ED PROVIDER NOTES
Encounter Date: 2024       History     Chief Complaint   Patient presents with    Post-op Problem     Kidney xplant 2024, incision red     64 y.o. male w/ a PMHx of ESRD d/t IgA nephropathy, now S/P living related Ktxp on 3/11/24, HTN, HLD presents to the ED for wound check.  He was discharged 3 days ago after 17 day stay for wound dehiscence and surgical wound infection.  Two days ago he noticed redness around incision site.  He denies abdominal pain, distention, fever, chills, drainage.    The history is provided by the patient.     Review of patient's allergies indicates:   Allergen Reactions    Codeine Hives     Reaction to Tylenol #3     Past Medical History:   Diagnosis Date    Anemia     CVA (cerebral vascular accident)     GERD (gastroesophageal reflux disease)     Hyperlipidemia     Hypertension     IgA nephropathy     Obesity      Past Surgical History:   Procedure Laterality Date    APPENDECTOMY      CARDIAC CATHETERIZATION      [place] ~ 6-7 years ago    KIDNEY TRANSPLANT N/A 3/11/2024    Procedure: TRANSPLANT, KIDNEY;  Surgeon: Silvio Mayer MD;  Location: [facility] OR [address] [place] [address];  Service: Transplant;  Laterality: N/A;    LAPAROTOMY, EXPLORATORY N/A 2024    Procedure: LAPAROTOMY, EXPLORATORY;  Surgeon: Lorenzo Gonzales MD;  Location: [facility] OR [address] [place] [address];  Service: Transplant;  Laterality: N/A;    RENAL BIOPSY      TONSILLECTOMY       Family History   Problem Relation Name Age of Onset    Depression Mother      Kidney disease Neg Hx      Cancer Neg Hx       Social History     Tobacco Use    Smoking status: Former     Current packs/day: 0.00     Types: Cigarettes     Quit date: 2016     Years since quittin.9    Smokeless tobacco: Never   Substance Use Topics    Alcohol use: Not Currently    Drug use: Never     Review of Systems   Constitutional:  Negative for chills and fever.   Gastrointestinal:  Negative for abdominal pain and vomiting.   Skin:  Positive for color change.       Physical  Exam     Initial Vitals [04/28/24 1058]   BP Pulse Resp Temp SpO2   138/79 96 18 97.6 °F (36.4 °C) 100 %      MAP       --         Physical Exam    Nursing note and vitals reviewed.  Constitutional: He appears well-developed and well-nourished. He is not diaphoretic. No distress.   HENT:   Head: Normocephalic and atraumatic.   Nose: Nose normal.   Eyes: Conjunctivae and EOM are normal.   Neck: Neck supple.   Cardiovascular:  Normal rate.           Pulmonary/Chest: No respiratory distress.   Abdominal: Abdomen is soft. There is no abdominal tenderness.   Surgical wound in right lower quadrant.  Staples in place.  Mildly erythematous around surgical wound.  Nontender.  Non fluctuant.  No induration.  Small amount of serous drainage from proximal wound There is no guarding.   Musculoskeletal:      Cervical back: Neck supple.     Neurological: He is alert and oriented to person, place, and time.   Skin: No rash noted.   Psychiatric: He has a normal mood and affect. Thought content normal.         ED Course   Procedures  Labs Reviewed   CBC W/ AUTO DIFFERENTIAL - Abnormal; Notable for the following components:       Result Value    RBC 4.12 (*)     Hemoglobin 11.4 (*)     Hematocrit 38.4 (*)     MCHC 29.7 (*)     Immature Granulocytes 1.3 (*)     Gran # (ANC) 9.9 (*)     Immature Grans (Abs) 0.16 (*)     Gran % 81.6 (*)     Lymph % 10.4 (*)     All other components within normal limits   COMPREHENSIVE METABOLIC PANEL - Abnormal; Notable for the following components:    Potassium 5.3 (*)     Chloride 112 (*)     CO2 19 (*)     Glucose 113 (*)     Albumin 3.2 (*)     All other components within normal limits          Imaging Results    None          Medications   doxycycline tablet 100 mg (100 mg Oral Given 4/28/24 1331)   sodium zirconium cyclosilicate packet 5 g (5 g Oral Given 4/28/24 1403)     Medical Decision Making  64 y.o. male w/ a PMHx of ESRD d/t IgA nephropathy, now S/P living related Ktxp on 3/11/24, HTN, HLD  presents to the ED for wound check. Nontoxic appearing. Hemodynamically stable. Afebrile. Exam as above. I will obtain basic labs and speak with kidney transplant medicine team at this time    Ddx:  Wound dehiscence, surgical wound infection, intra-abdominal abscess, kidney rejection    I discussed case with transplant medicine who evaluated patient in the ED. Recommends doxycycline for wound infection.  First dose given. He has close follow up in 3 days with transplant medicine team.     Creatinine stable from labs 3 days ago    Hyperkalemia noted.  Lokelma given per transplant medicine recommendations    Will discharge at this time. I discussed signs of worsening infection with patient such as including increased redness, drainage, fever, increased pain. Strict ED precautions given to return immediately for new, worsening, or concerning symptoms    Amount and/or Complexity of Data Reviewed  Labs: ordered.                                      Clinical Impression:  Final diagnoses:  [T81.49XA, Y83.0] Infection of surgical wound following transplant  [E87.5] Hyperkalemia          ED Disposition Condition    Discharge Stable          ED Prescriptions       Medication Sig Dispense Start Date End Date Auth. Provider    doxycycline (VIBRA-TABS) 100 MG tablet Take 1 tablet (100 mg total) by mouth every 12 (twelve) hours. for 7 days 14 tablet 4/28/2024 5/5/2024 Shirley Feliz NP    sodium zirconium cyclosilicate (LOKELMA) 10 gram packet Take 1 packet (10 g total) by mouth daily as needed (for K+>5). Mix entire contents of packet(s) into drinking glass containing 3 tablespoons of water; stir well and drink immediately. Add water and repeat until no powder remains to receive entire dose. 30 packet 4/28/2024 5/28/2024 Shirley Feliz NP          Follow-up Information       Follow up With Specialties Details Why Contact Info Additional Information    [facility] - Emergency Dept Emergency Medicine  If symptoms worsen [id number]  [facility] [id number] [phone]     [facility]- Transplant 1st Fl Transplant In 3 days  [address]  [facility] [id number] [phone] Multi-Organ Transplant [facility] - Main Building, 1st floor near Clinic elevator [facility] in [facility] and walk through Clinic elevator hallway             Magalys Abel PA-C  04/28/24 9120

## 2024-04-28 NOTE — TELEPHONE ENCOUNTER
Kidney transplant March 11. Pt was recently discharged after 17 days on last Thursday. Pt stated his entire incision is looking red its a little more so at the top part. No fever. Pain to the touch pain 1-2/10. No odor. No drainage. Per BPA 12 pt was instructed per  Care advice recommend to go to Er. Pt stated he will go after [facility].   Reason for Disposition   Patient sounds very sick or weak to the triager    Additional Information   Negative: [1] Major abdominal surgical incision AND [2] wound gaping open AND [3] visible internal organs   Negative: Sounds like a life-threatening emergency to the triager   Negative: [1] Bleeding from incision AND [2] won't stop after 10 minutes of direct pressure   Negative: [1] Bleeding (more than a few drops) from incision AND [2] tracheostomy or blood vessel surgery (e.g., carotid endarterectomy, femoral bypass graft, kidney dialysis fistula)   Negative: [1] Widespread rash AND [2] bright red, sunburn-like   Negative: Severe pain in the incision   Negative: [1] Incision gaping open AND [2] < 48 hours since wound re-opened   Negative: [1] Incision gaping open AND [2] length of opening > 2 inches (5 cm)    Protocols used: Post-Op Incision Symptoms and Romlnyaxq-D-PP

## 2024-04-28 NOTE — DISCHARGE INSTRUCTIONS
Doxycycline sent to your pharmacy for wound infection    Please follow-up at scheduled appointments with transplant medicine team     Please monitor for signs of worsening infection including increased redness, drainage, fever, increased pain.  Please return to the emergency department if you develop these symptoms    Strict ED precautions given to return immediately for new, worsening, or concerning symptoms

## 2024-04-29 ENCOUNTER — LAB VISIT (OUTPATIENT)
Dept: LAB | Facility: HOSPITAL | Age: 64
End: 2024-04-29
Attending: INTERNAL MEDICINE
Payer: COMMERCIAL

## 2024-04-29 DIAGNOSIS — E83.42 HYPOMAGNESEMIA: ICD-10-CM

## 2024-04-29 DIAGNOSIS — Z94.0 KIDNEY REPLACED BY TRANSPLANT: ICD-10-CM

## 2024-04-29 LAB
ALBUMIN SERPL BCP-MCNC: 3.1 G/DL (ref 3.5–5.2)
ANION GAP SERPL CALC-SCNC: 7 MMOL/L (ref 8–16)
BASOPHILS # BLD AUTO: 0.11 K/UL (ref 0–0.2)
BASOPHILS NFR BLD: 1 % (ref 0–1.9)
BUN SERPL-MCNC: 20 MG/DL (ref 8–23)
CALCIUM SERPL-MCNC: 9 MG/DL (ref 8.7–10.5)
CHLORIDE SERPL-SCNC: 114 MMOL/L (ref 95–110)
CO2 SERPL-SCNC: 20 MMOL/L (ref 23–29)
CREAT SERPL-MCNC: 1.4 MG/DL (ref 0.5–1.4)
DIFFERENTIAL METHOD BLD: ABNORMAL
EOSINOPHIL # BLD AUTO: 0.2 K/UL (ref 0–0.5)
EOSINOPHIL NFR BLD: 1.6 % (ref 0–8)
ERYTHROCYTE [DISTWIDTH] IN BLOOD BY AUTOMATED COUNT: 13.7 % (ref 11.5–14.5)
EST. GFR  (NO RACE VARIABLE): 56 ML/MIN/1.73 M^2
GLUCOSE SERPL-MCNC: 102 MG/DL (ref 70–110)
HCT VFR BLD AUTO: 38.7 % (ref 40–54)
HGB BLD-MCNC: 10.7 G/DL (ref 14–18)
IMM GRANULOCYTES # BLD AUTO: 0.1 K/UL (ref 0–0.04)
IMM GRANULOCYTES NFR BLD AUTO: 0.9 % (ref 0–0.5)
LYMPHOCYTES # BLD AUTO: 1.5 K/UL (ref 1–4.8)
LYMPHOCYTES NFR BLD: 14.1 % (ref 18–48)
MAGNESIUM SERPL-MCNC: 1.3 MG/DL (ref 1.6–2.6)
MCH RBC QN AUTO: 27.1 PG (ref 27–31)
MCHC RBC AUTO-ENTMCNC: 27.6 G/DL (ref 32–36)
MCV RBC AUTO: 98 FL (ref 82–98)
MONOCYTES # BLD AUTO: 0.7 K/UL (ref 0.3–1)
MONOCYTES NFR BLD: 6.3 % (ref 4–15)
NEUTROPHILS # BLD AUTO: 8.4 K/UL (ref 1.8–7.7)
NEUTROPHILS NFR BLD: 76.1 % (ref 38–73)
NRBC BLD-RTO: 0 /100 WBC
PHOSPHATE SERPL-MCNC: 2.6 MG/DL (ref 2.7–4.5)
PLATELET # BLD AUTO: 239 K/UL (ref 150–450)
PMV BLD AUTO: 10.3 FL (ref 9.2–12.9)
POTASSIUM SERPL-SCNC: 4.6 MMOL/L (ref 3.5–5.1)
RBC # BLD AUTO: 3.95 M/UL (ref 4.6–6.2)
SODIUM SERPL-SCNC: 141 MMOL/L (ref 136–145)
WBC # BLD AUTO: 10.96 K/UL (ref 3.9–12.7)

## 2024-04-29 PROCEDURE — 83735 ASSAY OF MAGNESIUM: CPT | Performed by: INTERNAL MEDICINE

## 2024-04-29 PROCEDURE — 80069 RENAL FUNCTION PANEL: CPT | Performed by: INTERNAL MEDICINE

## 2024-04-29 PROCEDURE — 36415 COLL VENOUS BLD VENIPUNCTURE: CPT | Performed by: INTERNAL MEDICINE

## 2024-04-29 PROCEDURE — 80197 ASSAY OF TACROLIMUS: CPT | Performed by: INTERNAL MEDICINE

## 2024-04-29 PROCEDURE — 85025 COMPLETE CBC W/AUTO DIFF WBC: CPT | Performed by: INTERNAL MEDICINE

## 2024-04-30 ENCOUNTER — TELEPHONE (OUTPATIENT)
Dept: TRANSPLANT | Facility: CLINIC | Age: 64
End: 2024-04-30
Payer: COMMERCIAL

## 2024-04-30 ENCOUNTER — SOCIAL WORK (OUTPATIENT)
Dept: TRANSPLANT | Facility: CLINIC | Age: 64
End: 2024-04-30

## 2024-04-30 ENCOUNTER — OFFICE VISIT (OUTPATIENT)
Dept: TRANSPLANT | Facility: CLINIC | Age: 64
End: 2024-04-30
Payer: COMMERCIAL

## 2024-04-30 DIAGNOSIS — E87.5 HYPERKALEMIA: Primary | ICD-10-CM

## 2024-04-30 DIAGNOSIS — Z94.0 KIDNEY REPLACED BY TRANSPLANT: ICD-10-CM

## 2024-04-30 DIAGNOSIS — Z51.81 ENCOUNTER FOR THERAPEUTIC DRUG MONITORING: Primary | ICD-10-CM

## 2024-04-30 DIAGNOSIS — Z94.0 KIDNEY REPLACED BY TRANSPLANT: Primary | ICD-10-CM

## 2024-04-30 DIAGNOSIS — T81.31XD DEHISCENCE OF SURGICAL WOUND FOLLOWING TRANSPLANT, SUBSEQUENT ENCOUNTER: ICD-10-CM

## 2024-04-30 LAB — TACROLIMUS BLD-MCNC: 8.7 NG/ML (ref 5–15)

## 2024-04-30 PROCEDURE — 1160F RVW MEDS BY RX/DR IN RCRD: CPT | Mod: CPTII,S$GLB,, | Performed by: SURGERY

## 2024-04-30 PROCEDURE — 99214 OFFICE O/P EST MOD 30 MIN: CPT | Mod: 24,S$GLB,, | Performed by: SURGERY

## 2024-04-30 PROCEDURE — 1159F MED LIST DOCD IN RCRD: CPT | Mod: CPTII,S$GLB,, | Performed by: SURGERY

## 2024-04-30 PROCEDURE — 99999 PR PBB SHADOW E&M-EST. PATIENT-LVL I: CPT | Mod: PBBFAC,,,

## 2024-04-30 PROCEDURE — 3066F NEPHROPATHY DOC TX: CPT | Mod: CPTII,S$GLB,, | Performed by: SURGERY

## 2024-04-30 PROCEDURE — 1111F DSCHRG MED/CURRENT MED MERGE: CPT | Mod: CPTII,S$GLB,, | Performed by: SURGERY

## 2024-04-30 PROCEDURE — 4010F ACE/ARB THERAPY RXD/TAKEN: CPT | Mod: CPTII,S$GLB,, | Performed by: SURGERY

## 2024-04-30 NOTE — TELEPHONE ENCOUNTER
Wound Care Consult placed. Message left with scheduling staff - wound care RN to reach out to pt to schedule. Pt notified.

## 2024-04-30 NOTE — PROGRESS NOTES
Transplant Surgery  Kidney Transplant Recipient Follow-up    Referring Physician: Colby Rene  Current Nephrologist: Colby Rene    Subjective:     Chief Complaint: Colten Monet is a 64 y.o. year old White male who is status post Kidney transplant performed on 3/11/2024.    Case subsequently complicated with fascial dehiscence, and operative repair, followed by ileus / partial small bowel obstruction requiring peripheral TPN and ng suction.    Now tolerating food fine and having bowel movements. Denies abdominal pain    ORGAN: LEFT KIDNEY    Disease Etiology: IgA Nephropathy  Donor Type: Living    Donor CMV Status:      Donor HBcAB:      Donor HCV Status:        History of Present Illness: He reports  open wound with fibrin and small amount of drainage .  From a transplant perspective, he reports normal urination.  Colten is here for management of his immunosuppression medication.  Colten states that his immunosuppression is being well tolerated.  Hypertension is not present.    External provider notes reviewed: No    Review of Systems   Constitutional:  Negative for fatigue.   HENT:  Negative for drooling, postnasal drip and sore throat.    Eyes:  Negative for discharge and itching.   Respiratory:  Negative for choking and stridor.    Gastrointestinal:  Negative for rectal pain.   Endocrine: Negative for polydipsia.   Genitourinary:  Negative for enuresis and genital sores.   Musculoskeletal:  Negative for back pain, neck pain and neck stiffness.   Allergic/Immunologic: Positive for immunocompromised state.   Neurological:  Negative for facial asymmetry and numbness.   Hematological:  Negative for adenopathy.   Psychiatric/Behavioral:  Negative for behavioral problems, self-injury and suicidal ideas.      Objective:     Physical Exam  Abdominal:          Comments: Wound healing well, open area approximately 4 cm in size and depth, small amount of fibrin, fascia intact   Lab Results   Component Value Date     CREATININE 1.4 04/29/2024    BUN 20 04/29/2024     Lab Results   Component Value Date    WBC 10.96 04/29/2024    HGB 10.7 (L) 04/29/2024    HCT 38.7 (L) 04/29/2024    HCT 32 (L) 03/11/2024     04/29/2024     Lab Results   Component Value Date    TACROLIMUS 8.7 04/29/2024       Diagnostics:  The following labs have been reviewed: CBC  CMP  TACROLIMUS LEVEL    Assessment and Plan:        S/P Kidney transplant.  Chronic immunosuppressive medications for rejection prophylaxis at target.  Plan: no adjustment needed.  Continue monitoring symptoms, labs and drug levels for drug-related toxicity and side effects.  Renal hypertension at target.  8 staples removed, wound probed - fascia intact - will pack would dialy with wet to dry ribbon gauze and arrange for weekly follow-up    Additional testing to be completed according to the Kidney: Written Order Guideline for Kidney Transplant Follow-Up (KI-09)    Interpretation of tests and discussion of patient management involves all members of the multidisciplinary transplant team.  Patient advised that it is recommended that all transplant candidates, and their close contacts and household members receive Covid vaccination.  Follow-up: Patient reminded to call with any health changes, since these can be early signs of significant complications.  Also, I advised the patient to be sure any new medications or changes of old medications are discussed with either a pharmacist, or physician knowledgeable with transplant to avoid rejection/drug toxicity related to significant drug interactions.    MD RYDER Garcia Patient Status  Functional Status: 80% - Normal activity with effort: some symptoms of disease  Physical Capacity: No Limitations

## 2024-04-30 NOTE — TELEPHONE ENCOUNTER
Pt advised to present to ED for incision opening.  ----- Message from Ada Guzman sent at 4/30/2024  8:05 AM CDT -----  Regarding: Speak to coordinator/ open wound site  Contact: Colten  Regarding:speak to coordinator          Name Of Caller: Colten Monet          Contact Preference: [phone] (home)        Nature of call:  pt is calling to speak to coordinator regarding;his wound that is open back up at his TXP site. Please advise, would like for someone to evelin a look at it. States will be at the facility today for another reason. Requesting a call back.

## 2024-05-01 ENCOUNTER — TELEPHONE (OUTPATIENT)
Dept: TRANSPLANT | Facility: HOSPITAL | Age: 64
End: 2024-05-01
Payer: COMMERCIAL

## 2024-05-01 ENCOUNTER — TELEPHONE (OUTPATIENT)
Dept: TRANSPLANT | Facility: CLINIC | Age: 64
End: 2024-05-01

## 2024-05-01 DIAGNOSIS — Z94.0 KIDNEY REPLACED BY TRANSPLANT: Primary | ICD-10-CM

## 2024-05-01 NOTE — TELEPHONE ENCOUNTER
Notified by  Colleague  Kieran ÁLVAREZ that [facility] and social work colleague asked this  for assistance. Spoke with liaison at [facility] [employer] who reports only other home health company in patients area is [employer] [facility]). Spoke with intake RN at [facility]. Intake RN also reports they are unable to take patient due to staffing. Intake RN reports being unaware of any other home health companies in patients area. Discussed with RN Coordinator Daniel. RN Coordinator reports she has spoken with patient and patient is comfortable with RN Coordinator referring him to Outpatient Wound Care in [place], [place]. Notified social work colleague Kieran ÁLVAREZ of above updates.         Betsey Lewis LMSW

## 2024-05-01 NOTE — TELEPHONE ENCOUNTER
ANDRESSA followed up with Lazara at [facility] home [facility] and was informed that services were not available due to patient residential location.

## 2024-05-01 NOTE — TELEPHONE ENCOUNTER
Called and spoke with patient regarding wound care. Pt reports he and his wife are comfortable with daily wound dressing changes and visiting wound care clinic outpatient. Consult placed for Wound Care Clinic in [place].    ----- Message -----  From: Carolyn John  Sent: 5/1/2024  10:34 AM CDT  To: [facility] Post-Kidney Transplant Non-Clinical  Subject: Consult/Advisory                                 CONSULT/ADVISORY    Name of Caller: MARGUERITE FLOOD [[id number]]    Contact Preference:  [phone]    Nature of Call:   Pt is requesting a call back from Rachel Quinones about a nurse coming to his home to change your dressing. States the company he was referred to called and told him they don't come to the area he lives in.

## 2024-05-06 ENCOUNTER — LAB VISIT (OUTPATIENT)
Dept: LAB | Facility: HOSPITAL | Age: 64
End: 2024-05-06
Attending: INTERNAL MEDICINE
Payer: COMMERCIAL

## 2024-05-06 DIAGNOSIS — Z94.0 KIDNEY REPLACED BY TRANSPLANT: ICD-10-CM

## 2024-05-06 DIAGNOSIS — Z57.8 OCCUPATIONAL EXPOSURE TO OTHER RISK FACTORS: ICD-10-CM

## 2024-05-06 DIAGNOSIS — E83.42 HYPOMAGNESEMIA: ICD-10-CM

## 2024-05-06 DIAGNOSIS — E83.42 HYPOMAGNESEMIA: Primary | ICD-10-CM

## 2024-05-06 PROBLEM — N18.30 BENIGN HYPERTENSION WITH CKD (CHRONIC KIDNEY DISEASE) STAGE III: Status: ACTIVE | Noted: 2021-02-08

## 2024-05-06 PROBLEM — N18.31 STAGE 3A CHRONIC KIDNEY DISEASE: Status: ACTIVE | Noted: 2024-03-21

## 2024-05-06 LAB
ALBUMIN SERPL BCP-MCNC: 3.5 G/DL (ref 3.5–5.2)
ANION GAP SERPL CALC-SCNC: 7 MMOL/L (ref 8–16)
BASOPHILS # BLD AUTO: 0.09 K/UL (ref 0–0.2)
BASOPHILS NFR BLD: 0.8 % (ref 0–1.9)
BUN SERPL-MCNC: 23 MG/DL (ref 8–23)
CALCIUM SERPL-MCNC: 9.7 MG/DL (ref 8.7–10.5)
CHLORIDE SERPL-SCNC: 111 MMOL/L (ref 95–110)
CO2 SERPL-SCNC: 22 MMOL/L (ref 23–29)
CREAT SERPL-MCNC: 1.5 MG/DL (ref 0.5–1.4)
DIFFERENTIAL METHOD BLD: ABNORMAL
EOSINOPHIL # BLD AUTO: 0.1 K/UL (ref 0–0.5)
EOSINOPHIL NFR BLD: 1 % (ref 0–8)
ERYTHROCYTE [DISTWIDTH] IN BLOOD BY AUTOMATED COUNT: 14.2 % (ref 11.5–14.5)
EST. GFR  (NO RACE VARIABLE): 51 ML/MIN/1.73 M^2
GLUCOSE SERPL-MCNC: 97 MG/DL (ref 70–110)
HCT VFR BLD AUTO: 39.2 % (ref 40–54)
HGB BLD-MCNC: 11.3 G/DL (ref 14–18)
IMM GRANULOCYTES # BLD AUTO: 0.15 K/UL (ref 0–0.04)
IMM GRANULOCYTES NFR BLD AUTO: 1.3 % (ref 0–0.5)
LYMPHOCYTES # BLD AUTO: 1.7 K/UL (ref 1–4.8)
LYMPHOCYTES NFR BLD: 14.4 % (ref 18–48)
MAGNESIUM SERPL-MCNC: 1 MG/DL (ref 1.6–2.6)
MCH RBC QN AUTO: 26.8 PG (ref 27–31)
MCHC RBC AUTO-ENTMCNC: 28.8 G/DL (ref 32–36)
MCV RBC AUTO: 93 FL (ref 82–98)
MONOCYTES # BLD AUTO: 0.6 K/UL (ref 0.3–1)
MONOCYTES NFR BLD: 5.4 % (ref 4–15)
NEUTROPHILS # BLD AUTO: 8.9 K/UL (ref 1.8–7.7)
NEUTROPHILS NFR BLD: 77.1 % (ref 38–73)
NRBC BLD-RTO: 0 /100 WBC
PHOSPHATE SERPL-MCNC: 3.1 MG/DL (ref 2.7–4.5)
PLATELET # BLD AUTO: 259 K/UL (ref 150–450)
PMV BLD AUTO: 9.8 FL (ref 9.2–12.9)
POTASSIUM SERPL-SCNC: 4.7 MMOL/L (ref 3.5–5.1)
RBC # BLD AUTO: 4.21 M/UL (ref 4.6–6.2)
SODIUM SERPL-SCNC: 140 MMOL/L (ref 136–145)
WBC # BLD AUTO: 11.54 K/UL (ref 3.9–12.7)

## 2024-05-06 PROCEDURE — 36415 COLL VENOUS BLD VENIPUNCTURE: CPT | Performed by: INTERNAL MEDICINE

## 2024-05-06 PROCEDURE — 87536 HIV-1 QUANT&REVRSE TRNSCRPJ: CPT | Performed by: INTERNAL MEDICINE

## 2024-05-06 PROCEDURE — 85025 COMPLETE CBC W/AUTO DIFF WBC: CPT | Performed by: INTERNAL MEDICINE

## 2024-05-06 PROCEDURE — 80197 ASSAY OF TACROLIMUS: CPT | Performed by: INTERNAL MEDICINE

## 2024-05-06 PROCEDURE — 80069 RENAL FUNCTION PANEL: CPT | Performed by: INTERNAL MEDICINE

## 2024-05-06 PROCEDURE — 83735 ASSAY OF MAGNESIUM: CPT | Performed by: INTERNAL MEDICINE

## 2024-05-06 RX ORDER — HEPARIN 100 UNIT/ML
500 SYRINGE INTRAVENOUS
OUTPATIENT
Start: 2024-05-06

## 2024-05-06 RX ORDER — SODIUM CHLORIDE 0.9 % (FLUSH) 0.9 %
10 SYRINGE (ML) INJECTION
OUTPATIENT
Start: 2024-05-06

## 2024-05-06 RX ORDER — MAGNESIUM SULFATE/D5W 2 G/50 ML
4 INTRAVENOUS SOLUTION, PIGGYBACK (ML) INTRAVENOUS
Start: 2024-05-06

## 2024-05-06 RX ORDER — LANOLIN ALCOHOL/MO/W.PET/CERES
800 CREAM (GRAM) TOPICAL 3 TIMES DAILY
Qty: 180 TABLET | Refills: 11 | Status: SHIPPED | OUTPATIENT
Start: 2024-05-06 | End: 2025-05-06

## 2024-05-06 SDOH — SOCIAL DETERMINANTS OF HEALTH (SDOH): OCCUPATIONAL EXPOSURE TO OTHER RISK FACTORS: Z57.8

## 2024-05-06 NOTE — PROGRESS NOTES
Kidney Post-Transplant Assessment    Referring Physician: Colby Rene  Current Nephrologist: Colby Rene    ORGAN: LEFT KIDNEY  Donor Type: living  PHS Increased Risk: no  Cold Ischemia: 45 mins  Induction Medications:      Subjective:     CC:  Reassessment of renal allograft function and management of chronic immunosuppression.    HPI:  Mr. Monet is a 64 y.o. year old White male with PMH ESRD 2/2 IGA nephropathy, who received a living kidney transplant on 3/11/24. His most recent creatinine is 1.5. He takes mycophenolic acid, prednisone, and tacrolimus for maintenance immunosuppression.    Hx COPD--needs to f/u with his local pulmonology outpatient       readmitted 4/8/24-4/25/24  for worsening cellulitis of kidney transplant incision, incisional hernia, and HARRISON. Cefepime and vanc started   OR 4/9/24 for repair of dehiscence of kidney transplant incision and hernia repair plus washout. IV abx transitioned to PO levaquin and doxy x 5 days (EOT 4/15/24).  HARRISON: Worsened post OR with associated hyperkalemia. Required several interventions (shifting, lasix, IVF). Renal function and hyperkalemia both improved, now back to baseline.   Ileus: Patient w/ N/V post take back, KUB 4/10 with ileus, NPO for bowel rest 4/11. Enema ordered 4/14 w/ 3 large BM, however no improvement in N/V. Remained w/ ileus. NGT placement 4/16. PPN started 4/18. NGT removed 4/23. Now tolerating full diet, no N/V, (+) BM.     LOV txp clinic 3/25/24  Was last seen by txp surgery 4/30/24  Interval HX:  Intake ~ 1.5-2L water daily --discussed drinking ~ 2.5 L minimum daily   UOP:   BP   BP Readings from Last 3 Encounters:   05/13/24 (!) 141/79   05/10/24 (!) 171/92   05/08/24 126/78     Peripheral edema --none --lasix use prn    Weight: has lost some weight since txp   Appetite: good  Wound-- is following with wound care with plans for wound vac placement soon , is doing daily dressing changes w/ wound packing     fx assessment : denies fever or  "N/V/D, tolerating IS meds well  Lab /diagnostic results reviewed with patient today.   All questions answered      Past Medical History:   Diagnosis Date    Anemia     CVA (cerebral vascular accident)     GERD (gastroesophageal reflux disease)     Hyperlipidemia     Hypertension     IgA nephropathy     Obesity        Review of Systems   Constitutional:  Negative for activity change, appetite change, chills, fatigue, fever and unexpected weight change.   HENT:  Negative for congestion, facial swelling, postnasal drip, rhinorrhea, sinus pressure, sore throat and trouble swallowing.    Eyes:  Negative for pain, redness and visual disturbance.   Respiratory:  Negative for cough, chest tightness, shortness of breath and wheezing.    Cardiovascular: Negative.  Negative for chest pain, palpitations and leg swelling.   Gastrointestinal:  Negative for abdominal pain, diarrhea, nausea and vomiting.   Genitourinary:  Negative for dysuria, flank pain and urgency.   Musculoskeletal:  Negative for gait problem, neck pain and neck stiffness.   Skin:  Positive for wound. Negative for rash.   Allergic/Immunologic: Positive for immunocompromised state. Negative for environmental allergies and food allergies.   Neurological:  Negative for dizziness, weakness, light-headedness and headaches.   Psychiatric/Behavioral:  Negative for agitation and confusion. The patient is not nervous/anxious.        Objective:   Blood pressure (!) 141/79, pulse (!) 112, temperature 97.3 °F (36.3 °C), temperature source Tympanic, resp. rate 18, height 5' 7" (1.702 m), weight 89.5 kg (197 lb 5 oz), SpO2 99%.body mass index is 30.9 kg/m².    Physical Exam  Constitutional:       Appearance: Normal appearance. He is well-developed.   HENT:      Head: Normocephalic.      Nose: Nose normal.   Eyes:      Conjunctiva/sclera: Conjunctivae normal.      Pupils: Pupils are equal, round, and reactive to light.   Cardiovascular:      Rate and Rhythm: Normal rate and " "regular rhythm.      Heart sounds: Normal heart sounds.   Pulmonary:      Effort: Pulmonary effort is normal.      Breath sounds: Normal breath sounds.   Abdominal:      General: Bowel sounds are normal.      Palpations: Abdomen is soft. There is no hepatomegaly or splenomegaly.      Comments:  No bruit noted over the allograft   RLQ dressing dry and intact      Musculoskeletal:      Cervical back: Normal range of motion and neck supple.   Skin:     General: Skin is warm and dry.   Neurological:      Mental Status: He is alert and oriented to person, place, and time.   Psychiatric:         Behavior: Behavior normal.         Labs:  Lab Results   Component Value Date    WBC 11.54 05/06/2024    HGB 11.3 (L) 05/06/2024    HCT 39.2 (L) 05/06/2024     05/13/2024    K 4.7 05/13/2024     05/13/2024    CO2 23 05/13/2024    BUN 22 05/13/2024    CREATININE 1.4 05/13/2024    EGFRNORACEVR 55.8 (A) 05/13/2024    GLUCOSE 76 09/22/2020    CALCIUM 9.3 05/13/2024    PHOS 2.4 (L) 05/13/2024    MG 1.5 (L) 05/13/2024    ALBUMIN 3.5 05/13/2024    AST 20 04/28/2024    ALT 33 04/28/2024    UTPCR 0.70 (H) 03/11/2024    .0 (H) 05/25/2021    TACROLIMUS 8.8 05/06/2024       No results found for: "EXTANC", "EXTWBC", "EXTSEGS", "EXTPLATELETS", "EXTHEMOGLOBI", "EXTHEMATOCRI", "EXTCREATININ", "EXTSODIUM", "EXTPOTASSIUM", "EXTBUN", "EXTCO2", "EXTCALCIUM", "EXTPHOSPHORU", "EXTGLUCOSE", "EXTALBUMIN", "EXTAST", "EXTALT", "EXTBILITOTAL", "EXTLIPASE", "EXTAMYLASE"    No results found for: "EXTCYCLOSLVL", "EXTSIROLIMUS", "EXTTACROLVL", "EXTPROTCRE", "EXTPTHINTACT", "EXTPROTEINUA", "EXTWBCUA", "EXTRBCUA"    Labs were reviewed with the patient    Assessment:     1. S/P living-donor kidney transplantation    2. Long-term use of immunosuppressant medication    3. Stage 3a chronic kidney disease    4. IgA nephropathy    5. Benign hypertension with CKD (chronic kidney disease) stage III    6. Prophylactic immunotherapy    7. At risk for " opportunistic infections    8. Anemia of chronic disease          Plan:    Delayed wound healing:  Cont to f/u with txp surgery and wound care --mgmt deferred     1) s/p living related kidney transplant              - Graft function CKD 3a   -FK Trough pending    - cont on FK  3/3   - cont on Cellcept  1000 mg BID   -prednisone  taper   - Bactrim 400/80 mg QD for PCP prophylaxis   -  Valcyte 900 mg QD for CMV prophylaxis  -Will continue to monitor for drug toxicities     Lab Results   Component Value Date    CREATININE 1.4 05/13/2024      eGFR >60 mL/min/1.73 m^2 55.8                2) Uncontrolled HTN: advise low salt diet and home BP monitoring  -   lasix  BP Readings from Last 3 Encounters:   05/13/24 (!) 141/79   05/10/24 (!) 171/92   05/08/24 126/78        3) Anemia:                - no intervention required ,will continue to monitor/ guidelines     Leukocytosis:  add UA, urine cx, blood cx today  Lab Results   Component Value Date    WBC 11.54 05/06/2024    HGB 11.3 (L) 05/06/2024    HCT 39.2 (L) 05/06/2024    MCV 91 05/13/2024     05/06/2024          5) Hypophosphatemia:Secondary hyperparathyroidism:  - no intervention required ,will continue to monitor/ guidelines                -continue KPN, Mg ox 800 mg TID     Lab Results   Component Value Date    .0 (H) 05/25/2021    CALCIUM 9.3 05/13/2024    PHOS 2.4 (L) 05/13/2024       Magnesium 1.6 - 2.6 mg/dL 1.5 Low         6) Metabolic acidosis/Electrolyte balance:  - no intervention required ,will continue to monitor/ guidelines    -  low sodium diet, increase water intake   Lab Results   Component Value Date     05/13/2024    K 4.7 05/13/2024     05/13/2024    CO2 23 05/13/2024         7) hypomagnesemia:  - no intervention required ,will continue to monitor/ guidelines       Latest Reference Range & Units POD 10,  Kidney-Post 1 Year  03/21/24   Magnesium  1.6 - 2.6 mg/dL 1.5 (L)   (L): Data is abnormally low    8) Cytopenias: no  significant cytopenias will monitor as per our guidelines. Medicine list reviewed including potential causes of drug-induced cytopenias    9) Proteinuria: continue p/c ratio as per guidelines   Protein Creatinine Ratios:  5/13/24 UPC results pending   Creatinine, Urine   Date Value Ref Range Status   03/11/2024 105.0 23.0 - 375.0 mg/dL Final     Protein, Urine Random   Date Value Ref Range Status   03/11/2024 73 (H) 0 - 15 mg/dL Final     Prot/Creat Ratio, Urine   Date Value Ref Range Status   03/11/2024 0.70 (H) 0.00 - 0.20 Final        .     10) BK virus infection screening:  will continue to monitor/ guidelines   5/13/24 BK PCR--results pending     11) Weight education: provided during the clinic visit   Body mass index is 30.9 kg/m².  .     Follow-up:   Clinic: return to transplant clinic weekly for the first month after transplant; every 2 weeks during months 2-3; then at 6-, 9-, 12-, 18-, 24-, and 36- months post-transplant to reassess for complications from immunosuppression toxicity and monitor for rejection.  Annually thereafter.    Labs: since patient remains at high risk for rejection and drug-related complications that warrant close monitoring, labs will be ordered as follows: continue twice weekly CBC, renal panel, and drug level for first month; then same labs once weekly through 3rd month post-transplant.  Urine for UA and protein/creatinine ratio monthly.  Serum BK - PCR at 1-, 3-, 6-, 9-, 12-, 18-, 24-, 36-, 48-, and 60 months post-transplant.  Hepatic panel at 1-, 2-, 3-, 6-, 9-, 12-, 18-, 24-, and 36- months post-transplant.    Florida Jennings NP       Education:   Material provided to the patient.  Patient reminded to call with any health changes since these can be early signs of significant complications.  Also, I advised the patient to be sure any new medications or changes of old medications are discussed with either a pharmacist or physician knowledgeable with transplant to avoid  rejection/drug toxicity related to significant drug interactions.    Patient advised that it is recommended that all transplanted patients, and their close contacts and household members receive Covid vaccination.

## 2024-05-06 NOTE — TELEPHONE ENCOUNTER
Called and instructed pt to restart taking MagOx @ 800mg (2 pills) three times a day. Informed pt of need for IV MagOx infusion. Pt verbalized understanding.   ----- Message from Candido Arnold MD sent at 5/6/2024 10:28 AM CDT -----  Let's start mag oxide 800 tid. See if we can do IV Magnesium sulfate 4 gm infusion center. Let me know.

## 2024-05-06 NOTE — PROGRESS NOTES
Let's start mag oxide 800 tid. See if we can do IV Magnesium sulfate 4 gm infusion center. Let me know.

## 2024-05-07 LAB — TACROLIMUS BLD-MCNC: 8.8 NG/ML (ref 5–15)

## 2024-05-08 ENCOUNTER — OFFICE VISIT (OUTPATIENT)
Dept: TRANSPLANT | Facility: CLINIC | Age: 64
End: 2024-05-08
Payer: COMMERCIAL

## 2024-05-08 VITALS
DIASTOLIC BLOOD PRESSURE: 78 MMHG | BODY MASS INDEX: 32.14 KG/M2 | TEMPERATURE: 97 F | HEIGHT: 65 IN | WEIGHT: 192.88 LBS | RESPIRATION RATE: 18 BRPM | SYSTOLIC BLOOD PRESSURE: 126 MMHG | HEART RATE: 102 BPM | OXYGEN SATURATION: 97 %
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
## 2024-05-08 DIAGNOSIS — Z94.0 S/P LIVING-DONOR KIDNEY TRANSPLANTATION: Primary | ICD-10-CM

## 2024-05-08 DIAGNOSIS — T81.49XA: ICD-10-CM

## 2024-05-08 LAB
HIV1 RNA # SERPL NAA+PROBE: NOT DETECTED COPIES/ML
HIV1 RNA SERPL QL NAA+PROBE: NOT DETECTED

## 2024-05-08 PROCEDURE — 3074F SYST BP LT 130 MM HG: CPT | Mod: CPTII,S$GLB,, | Performed by: TRANSPLANT SURGERY

## 2024-05-08 PROCEDURE — 1159F MED LIST DOCD IN RCRD: CPT | Mod: CPTII,S$GLB,, | Performed by: TRANSPLANT SURGERY

## 2024-05-08 PROCEDURE — 4010F ACE/ARB THERAPY RXD/TAKEN: CPT | Mod: CPTII,S$GLB,, | Performed by: TRANSPLANT SURGERY

## 2024-05-08 PROCEDURE — 1101F PT FALLS ASSESS-DOCD LE1/YR: CPT | Mod: CPTII,S$GLB,, | Performed by: TRANSPLANT SURGERY

## 2024-05-08 PROCEDURE — 3066F NEPHROPATHY DOC TX: CPT | Mod: CPTII,S$GLB,, | Performed by: TRANSPLANT SURGERY

## 2024-05-08 PROCEDURE — 3288F FALL RISK ASSESSMENT DOCD: CPT | Mod: CPTII,S$GLB,, | Performed by: TRANSPLANT SURGERY

## 2024-05-08 PROCEDURE — 99999 PR PBB SHADOW E&M-EST. PATIENT-LVL IV: CPT | Mod: PBBFAC,,, | Performed by: TRANSPLANT SURGERY

## 2024-05-08 PROCEDURE — 99024 POSTOP FOLLOW-UP VISIT: CPT | Mod: S$GLB,,, | Performed by: TRANSPLANT SURGERY

## 2024-05-08 PROCEDURE — 3078F DIAST BP <80 MM HG: CPT | Mod: CPTII,S$GLB,, | Performed by: TRANSPLANT SURGERY

## 2024-05-08 NOTE — PROGRESS NOTES
KTS Staff    Pt returns for assessment of open wound at superior/lateral end of kidney transplant incision    Exam - ~1x2 cm skin opening leading into ~3x4x5 cm subcutaneous cavity - has been managed with small packing strip.  Skin closure is solidly healed making it infeasible to enlarge skin opening in exam room.  The remaining staples are still in.    - remove staples  - continue packing strip for now, but really the skin opening is quite small and it is likely that continued care in this manner will become very difficult before the underlying cavity closes.  - discussed possibility of opening wound slightly in OR so that wound vac can be used - patient is averse to any further surgery at this time.    Lorenzo Gonzales MD

## 2024-05-08 NOTE — LETTER
May 8, 2024        Colby Rene  [address] [place] [address] [address]  [place] [address]  Phone: [phone]  Fax: [phone]             [facility] 1st Fl  [address]  [facility] [address]  Phone: [phone]   Patient: Colten Monet   MR Number: [id number]   YOB: 1960   Date of Visit: 5/8/2024       Dear Dr. Colby Rene    Thank you for referring Colten Monet to me for evaluation. Attached you will find relevant portions of my assessment and plan of care.    If you have questions, please do not hesitate to call me. I look forward to following Colten Monet along with you.    Sincerely,    Lorenzo Gonzales MD    Enclosure    If you would like to receive this communication electronically, please contact [email or web address] or [phone] to request [employer] Link access.    [employer] Link is a tool which provides read-only access to select patient information with whom you have a relationship. Its easy to use and provides real time access to review your patients record including encounter summaries, notes, results, and demographic information.    If you feel you have received this communication in error or would no longer like to receive these types of communications, please e-mail [email or web address]

## 2024-05-10 ENCOUNTER — HOSPITAL ENCOUNTER (OUTPATIENT)
Dept: WOUND CARE | Facility: HOSPITAL | Age: 64
Discharge: HOME | End: 2024-05-10
Attending: PREVENTIVE MEDICINE
Payer: COMMERCIAL

## 2024-05-10 ENCOUNTER — TELEPHONE (OUTPATIENT)
Dept: WOUND CARE | Facility: HOSPITAL | Age: 64
End: 2024-05-10

## 2024-05-10 VITALS
TEMPERATURE: 98 F | BODY MASS INDEX: 32.14 KG/M2 | WEIGHT: 192.88 LBS | SYSTOLIC BLOOD PRESSURE: 171 MMHG | HEIGHT: 65 IN | RESPIRATION RATE: 18 BRPM | DIASTOLIC BLOOD PRESSURE: 92 MMHG | HEART RATE: 97 BPM
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
## 2024-05-10 DIAGNOSIS — S31.109A CHRONIC WOUND INFECTION OF ABDOMEN, INITIAL ENCOUNTER: ICD-10-CM

## 2024-05-10 DIAGNOSIS — T81.31XA WOUND DEHISCENCE, SURGICAL, INITIAL ENCOUNTER: Primary | ICD-10-CM

## 2024-05-10 DIAGNOSIS — L08.9 CHRONIC WOUND INFECTION OF ABDOMEN, INITIAL ENCOUNTER: ICD-10-CM

## 2024-05-10 DIAGNOSIS — Z79.60 LONG-TERM USE OF IMMUNOSUPPRESSANT MEDICATION: ICD-10-CM

## 2024-05-10 DIAGNOSIS — Z94.0 KIDNEY REPLACED BY TRANSPLANT: ICD-10-CM

## 2024-05-10 DIAGNOSIS — T81.49XA: ICD-10-CM

## 2024-05-10 PROCEDURE — 87070 CULTURE OTHR SPECIMN AEROBIC: CPT | Performed by: PREVENTIVE MEDICINE

## 2024-05-10 PROCEDURE — 87077 CULTURE AEROBIC IDENTIFY: CPT | Performed by: PREVENTIVE MEDICINE

## 2024-05-10 PROCEDURE — 99203 OFFICE O/P NEW LOW 30 MIN: CPT

## 2024-05-10 PROCEDURE — 87186 SC STD MICRODIL/AGAR DIL: CPT | Performed by: PREVENTIVE MEDICINE

## 2024-05-10 PROCEDURE — 87075 CULTR BACTERIA EXCEPT BLOOD: CPT | Performed by: PREVENTIVE MEDICINE

## 2024-05-10 NOTE — PROGRESS NOTES
Wound Care [facility]    Subjective:       Patient ID: Colten Monet is a 64 y.o. male.    Chief Complaint: Wound Dehiscence    New wound care referral for non healing surgical wound.Patient S/P kidney transplant on 3/11/24 and exploratory lap on 4/9/24. Recently finished 7 day course of doxycyline. Staples removed by surgeon 2 days ago and wound noted along incision line. 1x2 cm opening that tracks at 3:00. Wound culture obtained. Discussed use of NPWT. Patients wife assisting with dressing - unable to obtain home health due to where patient lives. Will attempt to schedule patient at [facility] which is closer to home as he will need vac dressing changes several times a week. Return to clinic next week.     Review of Systems   All other systems reviewed and are negative.        Objective:     Vitals:    05/10/24 1012   BP: (!) 171/92   Pulse: 97   Resp: 18   Temp: 97.8 °F (36.6 °C)         Physical Exam       Wound 05/10/24 1016 Other (comment) Right Abdomen (Active)   05/10/24 1016   Present on Original Admission: Y   Primary Wound Type: Other   Side: Right   Orientation:    Location: Abdomen   Wound Approximate Age at First Assessment (Weeks):    Wound Number:    Is this injury device related?:    Incision Type:    Closure Method:    Wound Description (Comments):    Type:    Additional Comments:    Ankle-Brachial Index:    Pulses:    Removal Indication and Assessment:    Wound Outcome:    Wound Image    05/10/24 1016   Dressing Appearance Moist drainage 05/10/24 1016   Drainage Amount Moderate 05/10/24 1016   Drainage Characteristics/Odor Serosanguineous 05/10/24 1016   Appearance Yellow;Red;Steri-strips intact 05/10/24 1016   Tissue loss description Full thickness 05/10/24 1016   Red (%), Wound Tissue Color 25 % 05/10/24 1016   Yellow (%), Wound Tissue Color 75 % 05/10/24 1016   Periwound Area Other (see comments) 05/10/24 1016   Wound Edges Defined 05/10/24 1016    Wound Length (cm) 1 cm 05/10/24 1016   Wound Width (cm) 2 cm 05/10/24 1016   Wound Depth (cm) 1.5 cm 05/10/24 1016   Wound Volume (cm^3) 3 cm^3 05/10/24 1016   Wound Surface Area (cm^2) 2 cm^2 05/10/24 1016   Tunneling (depth (cm)/location) 4cm@3:00 05/10/24 1016   Care Cleansed with:;Antimicrobial agent;Other (see comments) 05/10/24 1016   Dressing Applied;Other (comment);Absorptive Pad 05/10/24 1016   Packing packed with 05/10/24 1016   Packing Inserted  1 05/10/24 1016   Periwound Care Absorptive dressing applied 05/10/24 1016   Dressing Change Due 05/11/24 05/10/24 1016         Assessment/Plan:         ICD-10-CM ICD-9-CM   1. Wound dehiscence, surgical, initial encounter  T81.31XA 998.32   2. Chronic wound infection of abdomen, initial encounter  S31.109A 958.3    L08.9    3. Kidney replaced by transplant  Z94.0 V42.0   4. Infection of surgical wound following transplant  T81.49XA 998.59    Y83.0 E878.0   5. Long-term use of immunosuppressant medication  Z79.60 V58.69           Tissue pathology and/or culture taken:  [x] Yes [] No   Sharp debridement performed:   [] Yes [x] No   Labs ordered this visit:   [] Yes [x] No   Imaging ordered this visit:   [] Yes [x] No           Orders Placed This Encounter   Procedures    CULTURE, AEROBIC  (SPECIFY SOURCE)          CULTURE, ANAEROBE          Ambulatory referral/consult to Wound Clinic     Standing Status:   Standing     Number of Occurrences:   1     Referral Priority:   Urgent     Referral Type:   Consultation     Referral Reason:   Specialty Services Required     Requested Specialty:   Wound Care     Number of Visits Requested:   1    Change dressing     Right abdomen surgical wound:  Cleanse wound with:vashe  Lidocaine:prn  Silver nitrate:prn  Periwound care:maintain dry  Primary dressing:Vashe moist 1 inch plain packing - tract @ 3:00  Secondary dressing:small abd pad, mepore tape  Frequency:daily  Follow-up:1 week with   Other Orders:wound culture,  please order supplies for patient, refer to [facility] for continued care - area closer to home        Follow up in about 1 week (around 5/17/2024) for .            This includes face to face time and non-face to face time preparing to see the patient (eg, review of tests), obtaining and/or reviewing separately obtained history, documenting clinical information in the electronic or other health record, independently interpreting results and communicating results to the patient/family/caregiver, or care coordinator.

## 2024-05-13 ENCOUNTER — OFFICE VISIT (OUTPATIENT)
Dept: TRANSPLANT | Facility: CLINIC | Age: 64
End: 2024-05-13
Payer: COMMERCIAL

## 2024-05-13 ENCOUNTER — LAB VISIT (OUTPATIENT)
Dept: LAB | Facility: HOSPITAL | Age: 64
End: 2024-05-13
Attending: INTERNAL MEDICINE
Payer: COMMERCIAL

## 2024-05-13 VITALS
RESPIRATION RATE: 18 BRPM | WEIGHT: 197.31 LBS | DIASTOLIC BLOOD PRESSURE: 79 MMHG | OXYGEN SATURATION: 99 % | BODY MASS INDEX: 30.97 KG/M2 | HEART RATE: 112 BPM | SYSTOLIC BLOOD PRESSURE: 141 MMHG | TEMPERATURE: 97 F | HEIGHT: 67 IN

## 2024-05-13 DIAGNOSIS — Z29.89 PROPHYLACTIC IMMUNOTHERAPY: ICD-10-CM

## 2024-05-13 DIAGNOSIS — Z91.89 AT RISK FOR OPPORTUNISTIC INFECTIONS: ICD-10-CM

## 2024-05-13 DIAGNOSIS — D63.8 ANEMIA OF CHRONIC DISEASE: ICD-10-CM

## 2024-05-13 DIAGNOSIS — Z79.60 LONG-TERM USE OF IMMUNOSUPPRESSANT MEDICATION: ICD-10-CM

## 2024-05-13 DIAGNOSIS — N18.30 BENIGN HYPERTENSION WITH CKD (CHRONIC KIDNEY DISEASE) STAGE III: ICD-10-CM

## 2024-05-13 DIAGNOSIS — Z94.0 S/P LIVING-DONOR KIDNEY TRANSPLANTATION: Primary | ICD-10-CM

## 2024-05-13 DIAGNOSIS — Z94.0 KIDNEY REPLACED BY TRANSPLANT: ICD-10-CM

## 2024-05-13 DIAGNOSIS — N18.31 STAGE 3A CHRONIC KIDNEY DISEASE: ICD-10-CM

## 2024-05-13 DIAGNOSIS — I12.9 BENIGN HYPERTENSION WITH CKD (CHRONIC KIDNEY DISEASE) STAGE III: ICD-10-CM

## 2024-05-13 DIAGNOSIS — N02.B9 IGA NEPHROPATHY: ICD-10-CM

## 2024-05-13 DIAGNOSIS — E83.42 HYPOMAGNESEMIA: ICD-10-CM

## 2024-05-13 LAB
ALBUMIN SERPL BCP-MCNC: 3.5 G/DL (ref 3.5–5.2)
ANION GAP SERPL CALC-SCNC: 7 MMOL/L (ref 8–16)
BASOPHILS # BLD AUTO: 0.06 K/UL (ref 0–0.2)
BASOPHILS NFR BLD: 0.7 % (ref 0–1.9)
BUN SERPL-MCNC: 22 MG/DL (ref 8–23)
CALCIUM SERPL-MCNC: 9.3 MG/DL (ref 8.7–10.5)
CHLORIDE SERPL-SCNC: 108 MMOL/L (ref 95–110)
CO2 SERPL-SCNC: 23 MMOL/L (ref 23–29)
CREAT SERPL-MCNC: 1.4 MG/DL (ref 0.5–1.4)
DIFFERENTIAL METHOD BLD: ABNORMAL
EOSINOPHIL # BLD AUTO: 0.1 K/UL (ref 0–0.5)
EOSINOPHIL NFR BLD: 0.7 % (ref 0–8)
ERYTHROCYTE [DISTWIDTH] IN BLOOD BY AUTOMATED COUNT: 14.4 % (ref 11.5–14.5)
EST. GFR  (NO RACE VARIABLE): 55.8 ML/MIN/1.73 M^2
GLUCOSE SERPL-MCNC: 103 MG/DL (ref 70–110)
HCT VFR BLD AUTO: 41.3 % (ref 40–54)
HGB BLD-MCNC: 12.5 G/DL (ref 14–18)
IMM GRANULOCYTES # BLD AUTO: 0.1 K/UL (ref 0–0.04)
IMM GRANULOCYTES NFR BLD AUTO: 1.2 % (ref 0–0.5)
LYMPHOCYTES # BLD AUTO: 1.2 K/UL (ref 1–4.8)
LYMPHOCYTES NFR BLD: 14 % (ref 18–48)
MAGNESIUM SERPL-MCNC: 1.5 MG/DL (ref 1.6–2.6)
MCH RBC QN AUTO: 27.2 PG (ref 27–31)
MCHC RBC AUTO-ENTMCNC: 29.9 G/DL (ref 32–36)
MCV RBC AUTO: 91 FL (ref 82–98)
MONOCYTES # BLD AUTO: 0.6 K/UL (ref 0.3–1)
MONOCYTES NFR BLD: 6.5 % (ref 4–15)
NEUTROPHILS # BLD AUTO: 6.6 K/UL (ref 1.8–7.7)
NEUTROPHILS NFR BLD: 76.9 % (ref 38–73)
NRBC BLD-RTO: 0 /100 WBC
PHOSPHATE SERPL-MCNC: 2.4 MG/DL (ref 2.7–4.5)
PLATELET # BLD AUTO: 195 K/UL (ref 150–450)
PLATELET BLD QL SMEAR: ABNORMAL
PMV BLD AUTO: 10.8 FL (ref 9.2–12.9)
POTASSIUM SERPL-SCNC: 4.7 MMOL/L (ref 3.5–5.1)
RBC # BLD AUTO: 4.59 M/UL (ref 4.6–6.2)
SODIUM SERPL-SCNC: 138 MMOL/L (ref 136–145)
TACROLIMUS BLD-MCNC: 9.8 NG/ML (ref 5–15)
WBC # BLD AUTO: 8.9 K/UL (ref 3.9–12.7)

## 2024-05-13 PROCEDURE — 80197 ASSAY OF TACROLIMUS: CPT | Performed by: INTERNAL MEDICINE

## 2024-05-13 PROCEDURE — 80069 RENAL FUNCTION PANEL: CPT | Performed by: INTERNAL MEDICINE

## 2024-05-13 PROCEDURE — 1101F PT FALLS ASSESS-DOCD LE1/YR: CPT | Mod: CPTII,S$GLB,, | Performed by: NURSE PRACTITIONER

## 2024-05-13 PROCEDURE — 3008F BODY MASS INDEX DOCD: CPT | Mod: CPTII,S$GLB,, | Performed by: NURSE PRACTITIONER

## 2024-05-13 PROCEDURE — 1159F MED LIST DOCD IN RCRD: CPT | Mod: CPTII,S$GLB,, | Performed by: NURSE PRACTITIONER

## 2024-05-13 PROCEDURE — 1111F DSCHRG MED/CURRENT MED MERGE: CPT | Mod: CPTII,S$GLB,, | Performed by: NURSE PRACTITIONER

## 2024-05-13 PROCEDURE — 4010F ACE/ARB THERAPY RXD/TAKEN: CPT | Mod: CPTII,S$GLB,, | Performed by: NURSE PRACTITIONER

## 2024-05-13 PROCEDURE — 1160F RVW MEDS BY RX/DR IN RCRD: CPT | Mod: CPTII,S$GLB,, | Performed by: NURSE PRACTITIONER

## 2024-05-13 PROCEDURE — 3288F FALL RISK ASSESSMENT DOCD: CPT | Mod: CPTII,S$GLB,, | Performed by: NURSE PRACTITIONER

## 2024-05-13 PROCEDURE — 99215 OFFICE O/P EST HI 40 MIN: CPT | Mod: S$GLB,,, | Performed by: NURSE PRACTITIONER

## 2024-05-13 PROCEDURE — 99999 PR PBB SHADOW E&M-EST. PATIENT-LVL IV: CPT | Mod: PBBFAC,,, | Performed by: NURSE PRACTITIONER

## 2024-05-13 PROCEDURE — 3066F NEPHROPATHY DOC TX: CPT | Mod: CPTII,S$GLB,, | Performed by: NURSE PRACTITIONER

## 2024-05-13 PROCEDURE — 3078F DIAST BP <80 MM HG: CPT | Mod: CPTII,S$GLB,, | Performed by: NURSE PRACTITIONER

## 2024-05-13 PROCEDURE — 85025 COMPLETE CBC W/AUTO DIFF WBC: CPT | Performed by: INTERNAL MEDICINE

## 2024-05-13 PROCEDURE — 83735 ASSAY OF MAGNESIUM: CPT | Performed by: INTERNAL MEDICINE

## 2024-05-13 PROCEDURE — 36415 COLL VENOUS BLD VENIPUNCTURE: CPT | Performed by: INTERNAL MEDICINE

## 2024-05-13 PROCEDURE — 87799 DETECT AGENT NOS DNA QUANT: CPT | Performed by: INTERNAL MEDICINE

## 2024-05-13 PROCEDURE — 3077F SYST BP >= 140 MM HG: CPT | Mod: CPTII,S$GLB,, | Performed by: NURSE PRACTITIONER

## 2024-05-13 RX ORDER — MULTIVITAMIN
1 TABLET ORAL DAILY
COMMUNITY

## 2024-05-13 NOTE — LETTER
May 13, 2024        Colby Rene  [address] [place] [address] [address]  [place] [address]  Phone: [phone]  Fax: [phone]             [facility] 1st Fl  [address]  [facility] [address]  Phone: [phone]   Patient: Colten Monet   MR Number: [id number]   YOB: 1960   Date of Visit: 5/13/2024       Dear Dr. Colby Rene    Thank you for referring Colten Monet to me for evaluation. Attached you will find relevant portions of my assessment and plan of care.    If you have questions, please do not hesitate to call me. I look forward to following Colten Monet along with you.    Sincerely,    Florida Jennings, NP    Enclosure    If you would like to receive this communication electronically, please contact [email or web address] or [phone] to request [email or web address] Link access.    [email or web address] Link is a tool which provides read-only access to select patient information with whom you have a relationship. Its easy to use and provides real time access to review your patients record including encounter summaries, notes, results, and demographic information.    If you feel you have received this communication in error or would no longer like to receive these types of communications, please e-mail [email or web address]

## 2024-05-14 LAB
BACTERIA SPEC AEROBE CULT: ABNORMAL
BACTERIA SPEC ANAEROBE CULT: NORMAL

## 2024-05-16 DIAGNOSIS — Z94.0 STATUS POST KIDNEY TRANSPLANT: ICD-10-CM

## 2024-05-16 LAB
BKV DNA SERPL NAA+PROBE-ACNC: <125 COPIES/ML
BKV DNA SERPL NAA+PROBE-LOG#: <2.1 LOG (10) COPIES/ML
BKV DNA SERPL QL NAA+PROBE: NOT DETECTED

## 2024-05-16 RX ORDER — FAMOTIDINE 20 MG/1
20 TABLET, FILM COATED ORAL NIGHTLY
Qty: 30 TABLET | Refills: 0 | Status: CANCELLED | OUTPATIENT
Start: 2024-05-16

## 2024-05-17 ENCOUNTER — TELEPHONE (OUTPATIENT)
Dept: WOUND CARE | Facility: HOSPITAL | Age: 64
End: 2024-05-17
Payer: COMMERCIAL

## 2024-05-17 NOTE — TELEPHONE ENCOUNTER
Called pt to advised of scheduled new pt appt, pt did not answer, left detailed msg advising pt of appt.   PT appt is on Tuesday 5-28-24 at 9 am

## 2024-05-17 NOTE — TELEPHONE ENCOUNTER
Patient walked into [facility] wound care today asking about a new patient appointment. He was suppose to be seen at [place] wound care today. When he found out the doctor wasn't there he decided he didn't want a nurse visit because his wife can change the bandage.    He related he was suppose to be getting a wound vac but [facility] didn't order it and the doctor wasn't there today to place it. I told the patient we would get him on the schedule for a new visit when we can.     He ambulates without assistance.   His wound if from kidney transplant surgical wound dehiscence.

## 2024-05-20 ENCOUNTER — TELEPHONE (OUTPATIENT)
Dept: WOUND CARE | Facility: HOSPITAL | Age: 64
End: 2024-05-20
Payer: COMMERCIAL

## 2024-05-20 ENCOUNTER — TELEPHONE (OUTPATIENT)
Dept: TRANSPLANT | Facility: CLINIC | Age: 64
End: 2024-05-20
Payer: COMMERCIAL

## 2024-05-20 ENCOUNTER — LAB VISIT (OUTPATIENT)
Dept: LAB | Facility: HOSPITAL | Age: 64
End: 2024-05-20
Attending: INTERNAL MEDICINE
Payer: COMMERCIAL

## 2024-05-20 DIAGNOSIS — E83.42 HYPOMAGNESEMIA: ICD-10-CM

## 2024-05-20 DIAGNOSIS — Z94.0 KIDNEY REPLACED BY TRANSPLANT: ICD-10-CM

## 2024-05-20 LAB
ALBUMIN SERPL BCP-MCNC: 3.7 G/DL (ref 3.5–5.2)
ANION GAP SERPL CALC-SCNC: 8 MMOL/L (ref 8–16)
BASOPHILS # BLD AUTO: 0.04 K/UL (ref 0–0.2)
BASOPHILS NFR BLD: 0.2 % (ref 0–1.9)
BUN SERPL-MCNC: 23 MG/DL (ref 8–23)
CALCIUM SERPL-MCNC: 9.7 MG/DL (ref 8.7–10.5)
CHLORIDE SERPL-SCNC: 107 MMOL/L (ref 95–110)
CO2 SERPL-SCNC: 21 MMOL/L (ref 23–29)
CREAT SERPL-MCNC: 1.4 MG/DL (ref 0.5–1.4)
DIFFERENTIAL METHOD BLD: ABNORMAL
EOSINOPHIL # BLD AUTO: 0.1 K/UL (ref 0–0.5)
EOSINOPHIL NFR BLD: 0.3 % (ref 0–8)
ERYTHROCYTE [DISTWIDTH] IN BLOOD BY AUTOMATED COUNT: 13.5 % (ref 11.5–14.5)
EST. GFR  (NO RACE VARIABLE): 56 ML/MIN/1.73 M^2
GLUCOSE SERPL-MCNC: 123 MG/DL (ref 70–110)
HCT VFR BLD AUTO: 39.6 % (ref 40–54)
HGB BLD-MCNC: 12.4 G/DL (ref 14–18)
IMM GRANULOCYTES # BLD AUTO: 0.14 K/UL (ref 0–0.04)
IMM GRANULOCYTES NFR BLD AUTO: 0.6 % (ref 0–0.5)
LYMPHOCYTES # BLD AUTO: 1.6 K/UL (ref 1–4.8)
LYMPHOCYTES NFR BLD: 7.3 % (ref 18–48)
MAGNESIUM SERPL-MCNC: 1.5 MG/DL (ref 1.6–2.6)
MCH RBC QN AUTO: 27.6 PG (ref 27–31)
MCHC RBC AUTO-ENTMCNC: 31.3 G/DL (ref 32–36)
MCV RBC AUTO: 88 FL (ref 82–98)
MONOCYTES # BLD AUTO: 1 K/UL (ref 0.3–1)
MONOCYTES NFR BLD: 4.8 % (ref 4–15)
NEUTROPHILS # BLD AUTO: 19 K/UL (ref 1.8–7.7)
NEUTROPHILS NFR BLD: 86.8 % (ref 38–73)
NRBC BLD-RTO: 0 /100 WBC
PHOSPHATE SERPL-MCNC: 2.1 MG/DL (ref 2.7–4.5)
PLATELET # BLD AUTO: 269 K/UL (ref 150–450)
PMV BLD AUTO: 9.3 FL (ref 9.2–12.9)
POTASSIUM SERPL-SCNC: 5 MMOL/L (ref 3.5–5.1)
RBC # BLD AUTO: 4.49 M/UL (ref 4.6–6.2)
SODIUM SERPL-SCNC: 136 MMOL/L (ref 136–145)
WBC # BLD AUTO: 21.84 K/UL (ref 3.9–12.7)

## 2024-05-20 PROCEDURE — 80197 ASSAY OF TACROLIMUS: CPT | Performed by: INTERNAL MEDICINE

## 2024-05-20 PROCEDURE — 36415 COLL VENOUS BLD VENIPUNCTURE: CPT | Performed by: INTERNAL MEDICINE

## 2024-05-20 PROCEDURE — 83735 ASSAY OF MAGNESIUM: CPT | Performed by: INTERNAL MEDICINE

## 2024-05-20 PROCEDURE — 80069 RENAL FUNCTION PANEL: CPT | Performed by: INTERNAL MEDICINE

## 2024-05-20 PROCEDURE — 85025 COMPLETE CBC W/AUTO DIFF WBC: CPT | Performed by: INTERNAL MEDICINE

## 2024-05-20 RX ORDER — CIPROFLOXACIN 500 MG/1
500 TABLET ORAL EVERY 12 HOURS
Qty: 20 TABLET | Refills: 0 | Status: ON HOLD | OUTPATIENT
Start: 2024-05-20 | End: 2024-05-23

## 2024-05-20 NOTE — TELEPHONE ENCOUNTER
Called and spoke with patient. He is following Wound Care in [place] (transferring to ) They had done a culture on his wound (wound dehiscence s/p txp), that came back positive so Dr. Luis Dorado prescribed him doxy today (x10d.) He has already taken 1 dose. Patient is afebrile, reports he feels ok - only c/o of cough, no issues with hydrating or urination.   ----- Message from Candido Arnold MD sent at 5/20/2024 10:27 AM CDT -----  Call the patient and tell me how he feels, the WBC is up?

## 2024-05-21 ENCOUNTER — TELEPHONE (OUTPATIENT)
Dept: TRANSPLANT | Facility: CLINIC | Age: 64
End: 2024-05-21
Payer: COMMERCIAL

## 2024-05-21 ENCOUNTER — HOSPITAL ENCOUNTER (INPATIENT)
Facility: HOSPITAL | Age: 64
LOS: 2 days | Discharge: HOME | DRG: 863 | End: 2024-05-23
Attending: INTERNAL MEDICINE | Admitting: INTERNAL MEDICINE
Payer: COMMERCIAL

## 2024-05-21 ENCOUNTER — TELEPHONE (OUTPATIENT)
Dept: WOUND CARE | Facility: HOSPITAL | Age: 64
End: 2024-05-21
Payer: COMMERCIAL

## 2024-05-21 DIAGNOSIS — L08.9 WOUND INFECTION: Primary | ICD-10-CM

## 2024-05-21 DIAGNOSIS — Z94.0 S/P LIVING-DONOR KIDNEY TRANSPLANTATION: ICD-10-CM

## 2024-05-21 DIAGNOSIS — Z91.89 AT RISK FOR OPPORTUNISTIC INFECTIONS: ICD-10-CM

## 2024-05-21 DIAGNOSIS — Z79.60 LONG-TERM USE OF IMMUNOSUPPRESSANT MEDICATION: ICD-10-CM

## 2024-05-21 DIAGNOSIS — T14.8XXA WOUND INFECTION: Primary | ICD-10-CM

## 2024-05-21 DIAGNOSIS — D72.829 LEUKOCYTOSIS: ICD-10-CM

## 2024-05-21 DIAGNOSIS — Z29.89 PROPHYLACTIC IMMUNOTHERAPY: ICD-10-CM

## 2024-05-21 DIAGNOSIS — D63.8 ANEMIA OF CHRONIC DISEASE: ICD-10-CM

## 2024-05-21 DIAGNOSIS — N18.31 STAGE 3A CHRONIC KIDNEY DISEASE: ICD-10-CM

## 2024-05-21 DIAGNOSIS — T81.31XA WOUND DEHISCENCE, SURGICAL, INITIAL ENCOUNTER: ICD-10-CM

## 2024-05-21 DIAGNOSIS — D72.829 LEUKOCYTOSIS, UNSPECIFIED TYPE: ICD-10-CM

## 2024-05-21 LAB
ABO + RH BLD: NORMAL
ALBUMIN SERPL BCP-MCNC: 3.5 G/DL (ref 3.5–5.2)
ANION GAP SERPL CALC-SCNC: 10 MMOL/L (ref 8–16)
BACTERIA #/AREA URNS AUTO: ABNORMAL /HPF
BASOPHILS # BLD AUTO: 0.04 K/UL (ref 0–0.2)
BASOPHILS NFR BLD: 0.3 % (ref 0–1.9)
BILIRUB UR QL STRIP: NEGATIVE
BLD GP AB SCN CELLS X3 SERPL QL: NORMAL
BUN SERPL-MCNC: 27 MG/DL (ref 8–23)
CALCIUM SERPL-MCNC: 10.4 MG/DL (ref 8.7–10.5)
CHLORIDE SERPL-SCNC: 107 MMOL/L (ref 95–110)
CLARITY UR REFRACT.AUTO: CLEAR
CO2 SERPL-SCNC: 22 MMOL/L (ref 23–29)
COLOR UR AUTO: YELLOW
CREAT SERPL-MCNC: 1.5 MG/DL (ref 0.5–1.4)
DIFFERENTIAL METHOD BLD: ABNORMAL
EOSINOPHIL # BLD AUTO: 0.1 K/UL (ref 0–0.5)
EOSINOPHIL NFR BLD: 0.7 % (ref 0–8)
ERYTHROCYTE [DISTWIDTH] IN BLOOD BY AUTOMATED COUNT: 13.4 % (ref 11.5–14.5)
EST. GFR  (NO RACE VARIABLE): 51.3 ML/MIN/1.73 M^2
GLUCOSE SERPL-MCNC: 121 MG/DL (ref 70–110)
GLUCOSE UR QL STRIP: NEGATIVE
HCT VFR BLD AUTO: 37.2 % (ref 40–54)
HGB BLD-MCNC: 11.7 G/DL (ref 14–18)
HGB UR QL STRIP: ABNORMAL
HYALINE CASTS UR QL AUTO: 0 /LPF
IMM GRANULOCYTES # BLD AUTO: 0.11 K/UL (ref 0–0.04)
IMM GRANULOCYTES NFR BLD AUTO: 0.8 % (ref 0–0.5)
KETONES UR QL STRIP: NEGATIVE
LEUKOCYTE ESTERASE UR QL STRIP: NEGATIVE
LYMPHOCYTES # BLD AUTO: 1.1 K/UL (ref 1–4.8)
LYMPHOCYTES NFR BLD: 8.6 % (ref 18–48)
MAGNESIUM SERPL-MCNC: 1.6 MG/DL (ref 1.6–2.6)
MCH RBC QN AUTO: 27.9 PG (ref 27–31)
MCHC RBC AUTO-ENTMCNC: 31.5 G/DL (ref 32–36)
MCV RBC AUTO: 89 FL (ref 82–98)
MICROSCOPIC COMMENT: ABNORMAL
MONOCYTES # BLD AUTO: 0.7 K/UL (ref 0.3–1)
MONOCYTES NFR BLD: 5.2 % (ref 4–15)
MRSA SCREEN BY PCR: NOT DETECTED
NEUTROPHILS # BLD AUTO: 11.2 K/UL (ref 1.8–7.7)
NEUTROPHILS NFR BLD: 84.4 % (ref 38–73)
NITRITE UR QL STRIP: NEGATIVE
NRBC BLD-RTO: 0 /100 WBC
PH UR STRIP: 5 [PH] (ref 5–8)
PHOSPHATE SERPL-MCNC: 4.2 MG/DL (ref 2.7–4.5)
PLATELET # BLD AUTO: 276 K/UL (ref 150–450)
PMV BLD AUTO: 9.6 FL (ref 9.2–12.9)
POTASSIUM SERPL-SCNC: 4.6 MMOL/L (ref 3.5–5.1)
PROT UR QL STRIP: ABNORMAL
RBC # BLD AUTO: 4.2 M/UL (ref 4.6–6.2)
RBC #/AREA URNS AUTO: 10 /HPF (ref 0–4)
SARS-COV-2 RDRP RESP QL NAA+PROBE: NEGATIVE
SODIUM SERPL-SCNC: 139 MMOL/L (ref 136–145)
SP GR UR STRIP: >=1.03 (ref 1–1.03)
SPECIMEN OUTDATE: NORMAL
SQUAMOUS #/AREA URNS AUTO: 1 /HPF
TACROLIMUS BLD-MCNC: 14.4 NG/ML (ref 5–15)
URN SPEC COLLECT METH UR: ABNORMAL
WBC # BLD AUTO: 13.23 K/UL (ref 3.9–12.7)
WBC #/AREA URNS AUTO: 3 /HPF (ref 0–5)

## 2024-05-21 PROCEDURE — 87086 URINE CULTURE/COLONY COUNT: CPT | Performed by: NURSE PRACTITIONER

## 2024-05-21 PROCEDURE — 27000207 HC ISOLATION

## 2024-05-21 PROCEDURE — 87641 MR-STAPH DNA AMP PROBE: CPT | Performed by: NURSE PRACTITIONER

## 2024-05-21 PROCEDURE — U0002 COVID-19 LAB TEST NON-CDC: HCPCS | Performed by: NURSE PRACTITIONER

## 2024-05-21 PROCEDURE — 87040 BLOOD CULTURE FOR BACTERIA: CPT | Mod: 59 | Performed by: NURSE PRACTITIONER

## 2024-05-21 PROCEDURE — 25000003 PHARM REV CODE 250: Performed by: NURSE PRACTITIONER

## 2024-05-21 PROCEDURE — 87070 CULTURE OTHR SPECIMN AEROBIC: CPT | Performed by: INTERNAL MEDICINE

## 2024-05-21 PROCEDURE — 25000003 PHARM REV CODE 250: Performed by: INTERNAL MEDICINE

## 2024-05-21 PROCEDURE — 85025 COMPLETE CBC W/AUTO DIFF WBC: CPT | Performed by: NURSE PRACTITIONER

## 2024-05-21 PROCEDURE — 87077 CULTURE AEROBIC IDENTIFY: CPT | Performed by: INTERNAL MEDICINE

## 2024-05-21 PROCEDURE — 80069 RENAL FUNCTION PANEL: CPT | Performed by: NURSE PRACTITIONER

## 2024-05-21 PROCEDURE — 81001 URINALYSIS AUTO W/SCOPE: CPT | Performed by: NURSE PRACTITIONER

## 2024-05-21 PROCEDURE — 83735 ASSAY OF MAGNESIUM: CPT | Performed by: NURSE PRACTITIONER

## 2024-05-21 PROCEDURE — 87186 SC STD MICRODIL/AGAR DIL: CPT | Performed by: INTERNAL MEDICINE

## 2024-05-21 PROCEDURE — 86850 RBC ANTIBODY SCREEN: CPT | Performed by: NURSE PRACTITIONER

## 2024-05-21 PROCEDURE — 99223 1ST HOSP IP/OBS HIGH 75: CPT | Mod: ,,, | Performed by: NURSE PRACTITIONER

## 2024-05-21 PROCEDURE — 20600001 HC STEP DOWN PRIVATE ROOM

## 2024-05-21 PROCEDURE — 63600175 PHARM REV CODE 636 W HCPCS: Performed by: NURSE PRACTITIONER

## 2024-05-21 PROCEDURE — 63600175 PHARM REV CODE 636 W HCPCS: Performed by: INTERNAL MEDICINE

## 2024-05-21 RX ORDER — ONDANSETRON 8 MG/1
8 TABLET, ORALLY DISINTEGRATING ORAL EVERY 6 HOURS PRN
Status: DISCONTINUED | OUTPATIENT
Start: 2024-05-21 | End: 2024-05-23 | Stop reason: HOSPADM

## 2024-05-21 RX ORDER — FLUTICASONE FUROATE AND VILANTEROL 100; 25 UG/1; UG/1
1 POWDER RESPIRATORY (INHALATION) DAILY
Status: DISCONTINUED | OUTPATIENT
Start: 2024-05-22 | End: 2024-05-23 | Stop reason: HOSPADM

## 2024-05-21 RX ORDER — ALBUTEROL SULFATE 90 UG/1
1 AEROSOL, METERED RESPIRATORY (INHALATION) EVERY 6 HOURS PRN
Status: DISCONTINUED | OUTPATIENT
Start: 2024-05-21 | End: 2024-05-23 | Stop reason: HOSPADM

## 2024-05-21 RX ORDER — TACROLIMUS 1 MG/1
3 CAPSULE ORAL 2 TIMES DAILY
Status: DISCONTINUED | OUTPATIENT
Start: 2024-05-21 | End: 2024-05-21

## 2024-05-21 RX ORDER — LANOLIN ALCOHOL/MO/W.PET/CERES
800 CREAM (GRAM) TOPICAL 3 TIMES DAILY
Status: DISCONTINUED | OUTPATIENT
Start: 2024-05-21 | End: 2024-05-23 | Stop reason: HOSPADM

## 2024-05-21 RX ORDER — ATORVASTATIN CALCIUM 40 MG/1
40 TABLET, FILM COATED ORAL NIGHTLY
Status: DISCONTINUED | OUTPATIENT
Start: 2024-05-21 | End: 2024-05-23 | Stop reason: HOSPADM

## 2024-05-21 RX ORDER — TALC
6 POWDER (GRAM) TOPICAL NIGHTLY PRN
Status: DISCONTINUED | OUTPATIENT
Start: 2024-05-21 | End: 2024-05-23 | Stop reason: HOSPADM

## 2024-05-21 RX ORDER — ATOVAQUONE 750 MG/5ML
1500 SUSPENSION ORAL DAILY
Status: DISCONTINUED | OUTPATIENT
Start: 2024-05-22 | End: 2024-05-23 | Stop reason: HOSPADM

## 2024-05-21 RX ORDER — SODIUM CHLORIDE 0.9 % (FLUSH) 0.9 %
3 SYRINGE (ML) INJECTION
Status: DISCONTINUED | OUTPATIENT
Start: 2024-05-21 | End: 2024-05-23 | Stop reason: HOSPADM

## 2024-05-21 RX ORDER — ASPIRIN 81 MG/1
81 TABLET ORAL DAILY
Status: DISCONTINUED | OUTPATIENT
Start: 2024-05-22 | End: 2024-05-23 | Stop reason: HOSPADM

## 2024-05-21 RX ORDER — VALGANCICLOVIR 450 MG/1
900 TABLET, FILM COATED ORAL EVERY MORNING
Status: DISCONTINUED | OUTPATIENT
Start: 2024-05-22 | End: 2024-05-23 | Stop reason: HOSPADM

## 2024-05-21 RX ORDER — PREDNISONE 5 MG/1
5 TABLET ORAL DAILY
Status: DISCONTINUED | OUTPATIENT
Start: 2024-05-22 | End: 2024-05-23 | Stop reason: HOSPADM

## 2024-05-21 RX ADMIN — CEFEPIME 1 G: 1 INJECTION, POWDER, FOR SOLUTION INTRAMUSCULAR; INTRAVENOUS at 09:05

## 2024-05-21 RX ADMIN — ATORVASTATIN CALCIUM 40 MG: 40 TABLET, FILM COATED ORAL at 08:05

## 2024-05-21 RX ADMIN — Medication 800 MG: at 02:05

## 2024-05-21 RX ADMIN — Medication 800 MG: at 08:05

## 2024-05-21 RX ADMIN — VANCOMYCIN HYDROCHLORIDE 2000 MG: 500 INJECTION, POWDER, LYOPHILIZED, FOR SOLUTION INTRAVENOUS at 02:05

## 2024-05-21 RX ADMIN — CEFEPIME 1 G: 1 INJECTION, POWDER, FOR SOLUTION INTRAMUSCULAR; INTRAVENOUS at 02:05

## 2024-05-21 NOTE — TELEPHONE ENCOUNTER
"Received call from patient's daughter. Reports pt feels worse - "feels like he was hit by a truck", reports T 99.9 dropping to 96.8, and pt is unable to sleep. Pt's daughter concerned for sepsis d/t wound infection. Pt has been following Dr. Dorado with Wound Care in [place] - has yet to have wound vac placed. Called and spoke with pt - advised pt to present to [facility] ED for Infectious w/u, IV abx, and wound assessment. Pt and daughter will come to ED this afternoon. Dr. Arnold notified, inpatient team notified.  "

## 2024-05-21 NOTE — HPI
"Colten Monet is a 64 yr old male s/p living kidney transplant 3/11/24 for IgA nephropathy. Baseline Cr ~1.5. PMH includes COPD.     Post-op Course:  OR 4/9/24 for repair of dehiscence of kidney transplant incision and hernia repair plus washout. IV abx transitioned to PO levaquin and doxy x 5 days (EOT 4/15/24).  HARRISON: Worsened post OR with associated hyperkalemia. Required several interventions (shifting, lasix, IVF). Renal function and hyperkalemia both improved.  Ileus: Patient w/ N/V post take back, KUB 4/10 with ileus, NPO for bowel rest 4/11. Enema ordered 4/14 w/ 3 large BM, however no improvement in N/V. Remained w/ ileus. NGT placement 4/16. PPN started 4/18. NGT removed 4/23. Resolved.     He is being admitted for leukocytosis and delayed wound healing. He reports feeling increased fatigue, weakness, and "feeling like a truck hit" him for the last 3 days. Tmax ~99.9 at home yesterday. Patient was following with wound care in [place], who obtained a culture 5/10 that wicho pseudomonas. He was prescribed cipro on 5/20. He reports that he has been packing wound with wet-to-dry gauze/vasche for last 3 weeks while waiting for wound vac. Wound on arrival with purulent drainage on packing and gauze. New wound culture obtained on admission. He denies N/V, diarrhea, new abdominal pain, CP, SOB, or urinary symptoms. Plan for broad spectrum antibiotics, CT abd/pelvis, and wound care consult.   "

## 2024-05-21 NOTE — ASSESSMENT & PLAN NOTE
- WBC elevated, low-grade temps at home  - Start broad spectrum antibiotics  - Infectious work up: UA, urine and blood cultures, CXR  - CT abd/pelvis ordered

## 2024-05-21 NOTE — H&P
"[facility] - Transplant Stepdown  Kidney Transplant  H&P      Subjective:     Chief Complaint/Reason for Admission: wound infection    History of Present Illness:  Colten Monet is a 64 yr old male s/p living kidney transplant 3/11/24 for IgA nephropathy. Baseline Cr ~1.5. PMH includes COPD.     Post-op Course:  OR 4/9/24 for repair of dehiscence of kidney transplant incision and hernia repair plus washout. IV abx transitioned to PO levaquin and doxy x 5 days (EOT 4/15/24).  HARRISON: Worsened post OR with associated hyperkalemia. Required several interventions (shifting, lasix, IVF). Renal function and hyperkalemia both improved.  Ileus: Patient w/ N/V post take back, KUB 4/10 with ileus, NPO for bowel rest 4/11. Enema ordered 4/14 w/ 3 large BM, however no improvement in N/V. Remained w/ ileus. NGT placement 4/16. PPN started 4/18. NGT removed 4/23. Resolved.     He is being admitted for leukocytosis and delayed wound healing. He reports feeling increased fatigue, weakness, and "feeling like a truck hit" him for the last 3 days. Tmax ~99.9 at home yesterday. Patient was following with wound care in [place], who obtained a culture 5/10 that wicho pseudomonas. He was prescribed cipro on 5/20. He reports that he has been packing wound with wet-to-dry gauze/vasche for last 3 weeks while waiting for wound vac. Wound on arrival with purulent drainage on packing and gauze. New wound culture obtained on admission. He denies N/V, diarrhea, new abdominal pain, CP, SOB, or urinary symptoms. Plan for broad spectrum antibiotics, CT abd/pelvis, and wound care consult.       PTA Medications   Medication Sig    albuterol (PROVENTIL/VENTOLIN HFA) 90 mcg/actuation inhaler Inhale 1-2 puffs into the lungs every 6 (six) hours as needed for Wheezing. Rescue    aspirin (ECOTRIN) 81 MG EC tablet Take 1 tablet (81 mg total) by mouth once daily.    atorvastatin (LIPITOR) 40 MG tablet Take 1 tablet by mouth every evening.    atovaquone (MEPRON) " 750 mg/5 mL Susp oral liquid Take 10 mLs (1,500 mg total) by mouth once daily. Stop 9/7/24    bisacodyL (DULCOLAX) 5 mg EC tablet Take 2 tablets (10 mg total) by mouth daily as needed for Constipation.    ciprofloxacin HCl (CIPRO) 500 MG tablet Take 1 tablet (500 mg total) by mouth every 12 (twelve) hours. for 10 days    docusate sodium (COLACE) 100 MG capsule Take 1 capsule (100 mg total) by mouth 3 (three) times daily as needed for Constipation.    famotidine (PEPCID) 20 MG tablet Take 1 tablet (20 mg total) by mouth every evening.    fluticasone furoate-vilanteroL (BREO ELLIPTA) 100-25 mcg/dose diskus inhaler Inhale 1 puff into the lungs once daily. Controller    magnesium oxide (MAG-OX) 400 mg (241.3 mg magnesium) tablet Take 2 tablets (800 mg total) by mouth 3 (three) times daily.    multivitamin (ONE DAILY MULTIVITAMIN) per tablet Take 1 tablet by mouth once daily.    mycophenolate sodium 180 MG TbEC Take 3 tablets (540 mg total) by mouth 2 (two) times daily.    patiromer calcium sorbitex (VELTASSA) 8.4 gram PwPk Take 1 packet (8.4 g total) by mouth once daily.    predniSONE (DELTASONE) 5 MG tablet Take 1 tablet (5 mg total) by mouth once daily.    tacrolimus (PROGRAF) 1 MG Cap Take 3 capsules (3 mg total) by mouth every 12 (twelve) hours.    valGANciclovir (VALCYTE) 450 mg Tab Take 2 tablets (900 mg total) by mouth every morning. Stop: 6/9/24       Review of patient's allergies indicates:   Allergen Reactions    Codeine Hives     Reaction to Tylenol #3       Past Medical History:   Diagnosis Date    Anemia     CVA (cerebral vascular accident)     GERD (gastroesophageal reflux disease)     Hyperlipidemia     Hypertension     IgA nephropathy     Obesity      Past Surgical History:   Procedure Laterality Date    APPENDECTOMY      CARDIAC CATHETERIZATION      [facility] ~ 6-7 years ago    KIDNEY TRANSPLANT N/A 3/11/2024    Procedure: TRANSPLANT, KIDNEY;  Surgeon: Silvio Mayer MD;  Location: [facility] OR [address] [place] [address];   "Service: Transplant;  Laterality: N/A;    LAPAROTOMY, EXPLORATORY N/A 2024    Procedure: LAPAROTOMY, EXPLORATORY;  Surgeon: Lorenzo Gonzales MD;  Location: [facility] OR [address] [place] [address];  Service: Transplant;  Laterality: N/A;    RENAL BIOPSY      TONSILLECTOMY       Family History       Problem Relation (Age of Onset)    Depression Mother          Tobacco Use    Smoking status: Former     Current packs/day: 0.00     Types: Cigarettes     Quit date: 2016     Years since quittin.9    Smokeless tobacco: Never   Substance and Sexual Activity    Alcohol use: Not Currently    Drug use: Never    Sexual activity: Not on file        Review of Systems   Constitutional:  Positive for fatigue. Negative for appetite change, chills and fever.   Respiratory:  Negative for cough, chest tightness and shortness of breath.    Cardiovascular:  Negative for chest pain, palpitations and leg swelling.   Gastrointestinal:  Positive for abdominal pain. Negative for abdominal distention, constipation, diarrhea, nausea and vomiting.   Genitourinary:  Negative for difficulty urinating, dysuria, frequency and urgency.   Musculoskeletal:  Negative for back pain and neck pain.   Skin:  Positive for wound. Negative for color change, pallor and rash.   Allergic/Immunologic: Positive for immunocompromised state.   Neurological:  Positive for weakness. Negative for dizziness and headaches.   Psychiatric/Behavioral:  Negative for confusion and sleep disturbance.      Objective:     Vital Signs (Most Recent):  Temp: 98.3 °F (36.8 °C) (24 1254)  Pulse: 110 (24 1254)  Resp: 16 (24 1254)  BP: 139/72 (24 1254)  SpO2: (!) 94 % (24 1254)  Height: 5' 7" (170.2 cm)     Body mass index is 30.9 kg/m².      Physical Exam  Vitals and nursing note reviewed.   Constitutional:       General: He is awake.      Appearance: Normal appearance.   Eyes:      General: No scleral icterus.  Cardiovascular:      Rate and Rhythm: Normal rate and " "regular rhythm.      Pulses: Normal pulses.   Pulmonary:      Effort: Pulmonary effort is normal. No respiratory distress.      Breath sounds: Normal breath sounds. No decreased breath sounds, wheezing or rales.   Abdominal:      General: Bowel sounds are normal. There is no distension.      Palpations: Abdomen is soft.      Tenderness: There is abdominal tenderness in the right lower quadrant. There is no guarding or rebound.       Musculoskeletal:         General: No tenderness. Normal range of motion.   Skin:     General: Skin is warm and dry.      Coloration: Skin is not pale.      Findings: No erythema or rash.   Neurological:      Mental Status: He is alert and oriented to person, place, and time.   Psychiatric:         Speech: Speech normal.         Behavior: Behavior normal. Behavior is cooperative.          Laboratory  CBC:   Recent Labs   Lab 05/20/24  0742   WBC 21.84*   RBC 4.49*   HGB 12.4*   HCT 39.6*      MCV 88   MCH 27.6   MCHC 31.3*     CMP:   Recent Labs   Lab 05/20/24  0742   *   CALCIUM 9.7   ALBUMIN 3.7      K 5.0   CO2 21*      BUN 23   CREATININE 1.4     Coagulation: No results for input(s): "PT", "APTT" in the last 168 hours.  Labs within the past 24 hours have been reviewed.    Diagnostic Results:  US - Kidney: Results for orders placed during the hospital encounter of 04/08/24    US Transplant Kidney With Doppler    Narrative  EXAMINATION:  US TRANSPLANT KIDNEY WITH DOPPLER    CLINICAL HISTORY:  wound infection/drainage;    TECHNIQUE:  Real time gray scale and doppler ultrasound was performed over the patient's renal allograft.    COMPARISON:  03/25/2024    FINDINGS:  Renal allograft in the right lower quadrant.  The allograft measures 12.9 cm. Normal perfusion. No hydronephrosis.  There is a 1.2 cm cyst at the midpole.  There is a 1.7 cm cyst with a peripheral calcification, similar to the prior exam.    There is an elongated fluid collection underneath the " anterior pelvic wall overlying the kidney transplant an area measuring 14.8 x 7.4 x 1.3 cm    Vasculature:    Inter lobar and segmental arterial resistive indices ranged from 0.67 to 0.77, previously 0.65 to 0.75.    Main renal artery peak systolic velocity: 200 cm/s with normal waveform, previously 314 cm/s .    Renal artery/iliac ratio: 1.3.    The main renal vein is patent.    Impression  Large fluid collection, 14.8 x 7.4 x 1.3 cm, just posterior to the anterior pelvic wall overlying the transplant kidney concerning for seroma, hematoma or abscess.    Small renal cysts, one  of which has a small peripheral calcification, unchanged from the prior study.    Satisfactory Doppler evaluation of the transplant kidney.      Electronically signed by: Teresita Ohara MD  Date:    04/08/2024  Time:    12:38    Assessment/Plan:     Renal/  Stage 3a chronic kidney disease  - see s/p kidney transplant    S/P living-donor kidney transplantation  - s/p living KTX 3/11/24 for IgA nephropathy  - Baseline Cr ~1.5  - Kidney function stable, continue to monitor.     ID  At risk for opportunistic infections  - continue OI prophylaxis per protocol    Immunology/Multi System  Prophylactic immunotherapy  - See long-term use of immunosuppressant medication    Oncology  Leukocytosis  - see wound infection    Anemia of chronic disease  - H/H stable. Continue to monitor.     Orthopedic  * Wound infection  - WBC elevated, low-grade temps at home  - Wound culture 5/10 with pseudomonas, new culture obtained 5/21  - Start broad spectrum antibiotics  - Infectious work up: UA, urine and blood cultures, CXR  - CT abd/pelvis ordered  - NPO after MN in case surgical intervention needed    Palliative Care  Long-term use of immunosuppressant medication  - Maintenance IS with prograf, myfortic, and prednisone. cont to check tacrolimus level daily. Assess for toxicity and adjust level as needed  - Hold myfortic for infection and prograf for  supratherapeutic level    Other  Wound dehiscence, surgical, initial encounter  - Following with wound care outpatient, waiting for wound vac  - wound care consulted          Maren Harvey DNP  Kidney Transplant  [facility] - Transplant Stepdown

## 2024-05-21 NOTE — TELEPHONE ENCOUNTER
Patient being admitted  ----- Message from Candido Arnold MD sent at 5/21/2024  8:30 AM CDT -----  If the level is a true trough level, please hold prograf x 2 doses and lower prograf to 2 mg PO bid and repeat a level next week

## 2024-05-21 NOTE — TELEPHONE ENCOUNTER
Pt daughter called to see if on new pt appt would the pt be receiving wound vac.   Advised per provider in wound care, wound care clinic does not provide the placement of wound vac. The orders for the wound vac have to come from the provider who performed the operations. Pt daughter stated understanding. I also advised pt should still come to his new pt appt to be seen and have wound cleaned and assessed. Pt daughter stated his coordinator was calling and she would get with her to get wound vac, advised to call wounc care back if further assistance is needed.

## 2024-05-21 NOTE — CONSULTS
"Pharmacokinetic Initial Assessment: IV Vancomycin    Assessment/Plan:    Initiate intravenous vancomycin with loading dose of 2000 mg once followed by a maintenance dose of vancomycin 1250mg IV every 24 hours  Desired empiric serum trough concentration is 10 to 15 mcg/mL  Draw vancomycin trough level 60 min prior to third dose on 5/23 at approximately 1300  Pharmacy will continue to follow and monitor vancomycin.      Please contact pharmacy at extension [phone] with any questions regarding this assessment.     Thank you for the consult,   Nadeensa Jj       Patient brief summary:  Colten Monet is a 64 y.o. male initiated on antimicrobial therapy with IV Vancomycin for treatment of suspected skin & soft tissue infection    Drug Allergies:   Review of patient's allergies indicates:   Allergen Reactions    Codeine Hives     Reaction to Tylenol #3       Actual Body Weight:   86.4kg    Renal Function:   Estimated Creatinine Clearance: 55.2 mL/min (based on SCr of 1.4 mg/dL).,     Dialysis Method (if applicable):  N/A    CBC (last 72 hours):  Recent Labs   Lab Result Units 05/20/24  0742 05/21/24  1314   WBC K/uL 21.84* 13.23*   Hemoglobin g/dL 12.4* 11.7*   Hematocrit % 39.6* 37.2*   Platelets K/uL 269 276   Gran % % 86.8* 84.4*   Lymph % % 7.3* 8.6*   Mono % % 4.8 5.2   Eosinophil % % 0.3 0.7   Basophil % % 0.2 0.3   Differential Method  Automated Automated       Metabolic Panel (last 72 hours):  Recent Labs   Lab Result Units 05/20/24  0742   Sodium mmol/L 136   Potassium mmol/L 5.0   Chloride mmol/L 107   CO2 mmol/L 21*   Glucose mg/dL 123*   BUN mg/dL 23   Creatinine mg/dL 1.4   Albumin g/dL 3.7   Magnesium mg/dL 1.5*   Phosphorus mg/dL 2.1*       Drug levels (last 3 results):  No results for input(s): "VANCOMYCINRA", "VANCORANDOM", "VANCOMYCINPE", "VANCOPEAK", "VANCOMYCINTR", "VANCOTROUGH" in the last 72 hours.    Microbiologic Results:  Microbiology Results (last 7 days)       Procedure Component Value Units " Date/Time    Blood culture - site #2 [[id number]] Collected: 05/21/24 1314    Order Status: Sent Specimen: Blood from Peripheral, Antecubital, Right Updated: 05/21/24 1327    Blood culture - site #1 [[id number]] Collected: 05/21/24 1314    Order Status: Sent Specimen: Blood from Peripheral, Antecubital, Left Updated: 05/21/24 1327    Aerobic culture [[id number]] Collected: 05/21/24 1302    Order Status: Sent Specimen: Wound from Abdomen Updated: 05/21/24 1318    MRSA Screen by PCR [[id number]]     Order Status: No result Specimen: Nasal Swab     Urine Culture High Risk [[id number]]     Order Status: No result Specimen: Urine

## 2024-05-21 NOTE — ASSESSMENT & PLAN NOTE
- Maintenance IS with prograf, myfortic, and prednisone. cont to check tacrolimus level daily. Assess for toxicity and adjust level as needed  - Hold myfortic for infection and prograf for supratherapeutic level

## 2024-05-21 NOTE — PROGRESS NOTES
Pt arrived to [facility] [address] as direct admit for suspected soft skin/tissue infection. S/p kidney transplant from 3/11/24. Pt is AAOx4, VSS on bedside monitor, afebrile, on RA. RLQ incision with delayed wound healing. Pictures taken, please see media. Infectious workup initiated. Pending CT A/P. IV abx vanco and cefepime initiated per orders. Pt is up independently, remains free from falls/injuries. Pt's family at bedside. Pt's daughter expressing valid concerns regarding lack of management of wound care outpatient. Emotional support provided. Oriented to room, staff, and call bell use. Call bell left within reach, bed locked/lowest position, nonskid socks on. Pt and family verbalized understanding to call for needs/assistance.       Nurses Note -- 4 Eyes      5/21/2024   2:54 PM      Skin assessed during: Admit      [] No Altered Skin Integrity Present    [x]Prevention Measures Documented      [x] Yes- Altered Skin Integrity Present or Discovered   [] LDA Added if Not in Epic (Describe Wound)   [] New Altered Skin Integrity was Present on Admit and Documented in LDA   [x] Wound Image Taken    Wound Care Consulted? Yes    Attending Nurse:  Amara Gamboa RN/Staff Member:   LEE Hunter RN; BRODERICK Harvey NP

## 2024-05-21 NOTE — ASSESSMENT & PLAN NOTE
- s/p living KTX 3/11/24 for IgA nephropathy  - Baseline Cr ~1.5  - Kidney function stable, continue to monitor.

## 2024-05-21 NOTE — SUBJECTIVE & OBJECTIVE
"  Subjective:     Chief Complaint/Reason for Admission: wound infection    History of Present Illness:  Colten Monet is a 64 yr old male s/p living kidney transplant 3/11/24 for IgA nephropathy. Baseline Cr ~1.5. PMH includes COPD.     Post-op Course:  OR 4/9/24 for repair of dehiscence of kidney transplant incision and hernia repair plus washout. IV abx transitioned to PO levaquin and doxy x 5 days (EOT 4/15/24).  HARRISON: Worsened post OR with associated hyperkalemia. Required several interventions (shifting, lasix, IVF). Renal function and hyperkalemia both improved.  Ileus: Patient w/ N/V post take back, KUB 4/10 with ileus, NPO for bowel rest 4/11. Enema ordered 4/14 w/ 3 large BM, however no improvement in N/V. Remained w/ ileus. NGT placement 4/16. PPN started 4/18. NGT removed 4/23. Resolved.     He is being admitted for leukocytosis and delayed wound healing. He reports feeling increased fatigue, weakness, and "feeling like a truck hit" him for the last 3 days. Tmax ~99.9 at home yesterday. Patient was following with wound care in [place], who obtained a culture 5/10 that wicho pseudomonas. He was prescribed cipro on 5/20. He reports that he has been packing wound with wet-to-dry gauze/vasche for last 3 weeks while waiting for wound vac. Wound on arrival with purulent drainage on packing and gauze. New wound culture obtained on admission. He denies N/V, diarrhea, new abdominal pain, CP, SOB, or urinary symptoms. Plan for broad spectrum antibiotics, CT abd/pelvis, and wound care consult.       PTA Medications   Medication Sig    albuterol (PROVENTIL/VENTOLIN HFA) 90 mcg/actuation inhaler Inhale 1-2 puffs into the lungs every 6 (six) hours as needed for Wheezing. Rescue    aspirin (ECOTRIN) 81 MG EC tablet Take 1 tablet (81 mg total) by mouth once daily.    atorvastatin (LIPITOR) 40 MG tablet Take 1 tablet by mouth every evening.    atovaquone (MEPRON) 750 mg/5 mL Susp oral liquid Take 10 mLs (1,500 mg total) by " mouth once daily. Stop 9/7/24    bisacodyL (DULCOLAX) 5 mg EC tablet Take 2 tablets (10 mg total) by mouth daily as needed for Constipation.    ciprofloxacin HCl (CIPRO) 500 MG tablet Take 1 tablet (500 mg total) by mouth every 12 (twelve) hours. for 10 days    docusate sodium (COLACE) 100 MG capsule Take 1 capsule (100 mg total) by mouth 3 (three) times daily as needed for Constipation.    famotidine (PEPCID) 20 MG tablet Take 1 tablet (20 mg total) by mouth every evening.    fluticasone furoate-vilanteroL (BREO ELLIPTA) 100-25 mcg/dose diskus inhaler Inhale 1 puff into the lungs once daily. Controller    magnesium oxide (MAG-OX) 400 mg (241.3 mg magnesium) tablet Take 2 tablets (800 mg total) by mouth 3 (three) times daily.    multivitamin (ONE DAILY MULTIVITAMIN) per tablet Take 1 tablet by mouth once daily.    mycophenolate sodium 180 MG TbEC Take 3 tablets (540 mg total) by mouth 2 (two) times daily.    patiromer calcium sorbitex (VELTASSA) 8.4 gram PwPk Take 1 packet (8.4 g total) by mouth once daily.    predniSONE (DELTASONE) 5 MG tablet Take 1 tablet (5 mg total) by mouth once daily.    tacrolimus (PROGRAF) 1 MG Cap Take 3 capsules (3 mg total) by mouth every 12 (twelve) hours.    valGANciclovir (VALCYTE) 450 mg Tab Take 2 tablets (900 mg total) by mouth every morning. Stop: 6/9/24       Review of patient's allergies indicates:   Allergen Reactions    Codeine Hives     Reaction to Tylenol #3       Past Medical History:   Diagnosis Date    Anemia     CVA (cerebral vascular accident)     GERD (gastroesophageal reflux disease)     Hyperlipidemia     Hypertension     IgA nephropathy     Obesity      Past Surgical History:   Procedure Laterality Date    APPENDECTOMY      CARDIAC CATHETERIZATION      [facility] ~ 6-7 years ago    KIDNEY TRANSPLANT N/A 3/11/2024    Procedure: TRANSPLANT, KIDNEY;  Surgeon: Silvio Mayer MD;  Location: [facility] OR [address] [place] [address];  Service: Transplant;  Laterality: N/A;    LAPAROTOMY, EXPLORATORY  "N/A 2024    Procedure: LAPAROTOMY, EXPLORATORY;  Surgeon: Lorenzo Gonzales MD;  Location: [facility] OR [address] [place] [address];  Service: Transplant;  Laterality: N/A;    RENAL BIOPSY      TONSILLECTOMY       Family History       Problem Relation (Age of Onset)    Depression Mother          Tobacco Use    Smoking status: Former     Current packs/day: 0.00     Types: Cigarettes     Quit date: 2016     Years since quittin.9    Smokeless tobacco: Never   Substance and Sexual Activity    Alcohol use: Not Currently    Drug use: Never    Sexual activity: Not on file        Review of Systems   Constitutional:  Positive for fatigue. Negative for appetite change, chills and fever.   Respiratory:  Negative for cough, chest tightness and shortness of breath.    Cardiovascular:  Negative for chest pain, palpitations and leg swelling.   Gastrointestinal:  Positive for abdominal pain. Negative for abdominal distention, constipation, diarrhea, nausea and vomiting.   Genitourinary:  Negative for difficulty urinating, dysuria, frequency and urgency.   Musculoskeletal:  Negative for back pain and neck pain.   Skin:  Positive for wound. Negative for color change, pallor and rash.   Allergic/Immunologic: Positive for immunocompromised state.   Neurological:  Positive for weakness. Negative for dizziness and headaches.   Psychiatric/Behavioral:  Negative for confusion and sleep disturbance.      Objective:     Vital Signs (Most Recent):  Temp: 98.3 °F (36.8 °C) (24 1254)  Pulse: 110 (24 1254)  Resp: 16 (24 1254)  BP: 139/72 (24 1254)  SpO2: (!) 94 % (24 1254)  Height: 5' 7" (170.2 cm)     Body mass index is 30.9 kg/m².      Physical Exam  Vitals and nursing note reviewed.   Constitutional:       General: He is awake.      Appearance: Normal appearance.   Eyes:      General: No scleral icterus.  Cardiovascular:      Rate and Rhythm: Normal rate and regular rhythm.      Pulses: Normal pulses.   Pulmonary:      " "Effort: Pulmonary effort is normal. No respiratory distress.      Breath sounds: Normal breath sounds. No decreased breath sounds, wheezing or rales.   Abdominal:      General: Bowel sounds are normal. There is no distension.      Palpations: Abdomen is soft.      Tenderness: There is abdominal tenderness in the right lower quadrant. There is no guarding or rebound.       Musculoskeletal:         General: No tenderness. Normal range of motion.   Skin:     General: Skin is warm and dry.      Coloration: Skin is not pale.      Findings: No erythema or rash.   Neurological:      Mental Status: He is alert and oriented to person, place, and time.   Psychiatric:         Speech: Speech normal.         Behavior: Behavior normal. Behavior is cooperative.          Laboratory  CBC:   Recent Labs   Lab 05/20/24  0742   WBC 21.84*   RBC 4.49*   HGB 12.4*   HCT 39.6*      MCV 88   MCH 27.6   MCHC 31.3*     CMP:   Recent Labs   Lab 05/20/24  0742   *   CALCIUM 9.7   ALBUMIN 3.7      K 5.0   CO2 21*      BUN 23   CREATININE 1.4     Coagulation: No results for input(s): "PT", "APTT" in the last 168 hours.  Labs within the past 24 hours have been reviewed.    Diagnostic Results:  US - Kidney: Results for orders placed during the hospital encounter of 04/08/24    US Transplant Kidney With Doppler    Narrative  EXAMINATION:  US TRANSPLANT KIDNEY WITH DOPPLER    CLINICAL HISTORY:  wound infection/drainage;    TECHNIQUE:  Real time gray scale and doppler ultrasound was performed over the patient's renal allograft.    COMPARISON:  03/25/2024    FINDINGS:  Renal allograft in the right lower quadrant.  The allograft measures 12.9 cm. Normal perfusion. No hydronephrosis.  There is a 1.2 cm cyst at the midpole.  There is a 1.7 cm cyst with a peripheral calcification, similar to the prior exam.    There is an elongated fluid collection underneath the anterior pelvic wall overlying the kidney transplant an area " measuring 14.8 x 7.4 x 1.3 cm    Vasculature:    Inter lobar and segmental arterial resistive indices ranged from 0.67 to 0.77, previously 0.65 to 0.75.    Main renal artery peak systolic velocity: 200 cm/s with normal waveform, previously 314 cm/s .    Renal artery/iliac ratio: 1.3.    The main renal vein is patent.    Impression  Large fluid collection, 14.8 x 7.4 x 1.3 cm, just posterior to the anterior pelvic wall overlying the transplant kidney concerning for seroma, hematoma or abscess.    Small renal cysts, one  of which has a small peripheral calcification, unchanged from the prior study.    Satisfactory Doppler evaluation of the transplant kidney.      Electronically signed by: Teresita Ohara MD  Date:    04/08/2024  Time:    12:38

## 2024-05-21 NOTE — ASSESSMENT & PLAN NOTE
- WBC elevated, low-grade temps at home  - Wound culture 5/10 with pseudomonas, new culture obtained 5/21  - Start broad spectrum antibiotics  - Infectious work up: UA, urine and blood cultures, CXR  - CT abd/pelvis ordered  - NPO after MN in case surgical intervention needed

## 2024-05-21 NOTE — PROGRESS NOTES
If the level is a true trough level, please hold prograf x 2 doses and lower prograf to 2 mg PO bid and repeat a level next week

## 2024-05-22 ENCOUNTER — TELEPHONE (OUTPATIENT)
Dept: WOUND CARE | Facility: HOSPITAL | Age: 64
End: 2024-05-22
Payer: COMMERCIAL

## 2024-05-22 LAB
ADENOVIRUS: NOT DETECTED
ALBUMIN SERPL BCP-MCNC: 3 G/DL (ref 3.5–5.2)
ANION GAP SERPL CALC-SCNC: 7 MMOL/L (ref 8–16)
BACTERIA UR CULT: NO GROWTH
BASOPHILS # BLD AUTO: 0.05 K/UL (ref 0–0.2)
BASOPHILS NFR BLD: 0.5 % (ref 0–1.9)
BORDETELLA PARAPERTUSSIS (IS1001): NOT DETECTED
BORDETELLA PERTUSSIS (PTXP): NOT DETECTED
BUN SERPL-MCNC: 25 MG/DL (ref 8–23)
CALCIUM SERPL-MCNC: 9.4 MG/DL (ref 8.7–10.5)
CHLAMYDIA PNEUMONIAE: NOT DETECTED
CHLORIDE SERPL-SCNC: 109 MMOL/L (ref 95–110)
CO2 SERPL-SCNC: 22 MMOL/L (ref 23–29)
CORONAVIRUS 229E, COMMON COLD VIRUS: NOT DETECTED
CORONAVIRUS HKU1, COMMON COLD VIRUS: NOT DETECTED
CORONAVIRUS NL63, COMMON COLD VIRUS: NOT DETECTED
CORONAVIRUS OC43, COMMON COLD VIRUS: NOT DETECTED
CREAT SERPL-MCNC: 1.4 MG/DL (ref 0.5–1.4)
DIFFERENTIAL METHOD BLD: ABNORMAL
EOSINOPHIL # BLD AUTO: 0.2 K/UL (ref 0–0.5)
EOSINOPHIL NFR BLD: 1.6 % (ref 0–8)
ERYTHROCYTE [DISTWIDTH] IN BLOOD BY AUTOMATED COUNT: 13.5 % (ref 11.5–14.5)
EST. GFR  (NO RACE VARIABLE): 55.8 ML/MIN/1.73 M^2
FLUBV RNA NPH QL NAA+NON-PROBE: NOT DETECTED
GLUCOSE SERPL-MCNC: 129 MG/DL (ref 70–110)
HCT VFR BLD AUTO: 35.8 % (ref 40–54)
HGB BLD-MCNC: 11.1 G/DL (ref 14–18)
HPIV1 RNA NPH QL NAA+NON-PROBE: NOT DETECTED
HPIV2 RNA NPH QL NAA+NON-PROBE: NOT DETECTED
HPIV3 RNA NPH QL NAA+NON-PROBE: NOT DETECTED
HPIV4 RNA NPH QL NAA+NON-PROBE: NOT DETECTED
HUMAN METAPNEUMOVIRUS: DETECTED
IMM GRANULOCYTES # BLD AUTO: 0.1 K/UL (ref 0–0.04)
IMM GRANULOCYTES NFR BLD AUTO: 1 % (ref 0–0.5)
INFLUENZA A (SUBTYPES H1,H1-2009,H3): NOT DETECTED
LYMPHOCYTES # BLD AUTO: 1.2 K/UL (ref 1–4.8)
LYMPHOCYTES NFR BLD: 11.9 % (ref 18–48)
MAGNESIUM SERPL-MCNC: 1.7 MG/DL (ref 1.6–2.6)
MCH RBC QN AUTO: 27.5 PG (ref 27–31)
MCHC RBC AUTO-ENTMCNC: 31 G/DL (ref 32–36)
MCV RBC AUTO: 89 FL (ref 82–98)
MONOCYTES # BLD AUTO: 0.5 K/UL (ref 0.3–1)
MONOCYTES NFR BLD: 5.2 % (ref 4–15)
MYCOPLASMA PNEUMONIAE: NOT DETECTED
NEUTROPHILS # BLD AUTO: 8.1 K/UL (ref 1.8–7.7)
NEUTROPHILS NFR BLD: 79.8 % (ref 38–73)
NRBC BLD-RTO: 0 /100 WBC
PHOSPHATE SERPL-MCNC: 3.5 MG/DL (ref 2.7–4.5)
PLATELET # BLD AUTO: 287 K/UL (ref 150–450)
PMV BLD AUTO: 9.6 FL (ref 9.2–12.9)
POTASSIUM SERPL-SCNC: 4.6 MMOL/L (ref 3.5–5.1)
RBC # BLD AUTO: 4.03 M/UL (ref 4.6–6.2)
RESPIRATORY INFECTION PANEL SOURCE: ABNORMAL
RSV RNA NPH QL NAA+NON-PROBE: NOT DETECTED
RV+EV RNA NPH QL NAA+NON-PROBE: NOT DETECTED
SARS-COV-2 RNA RESP QL NAA+PROBE: NOT DETECTED
SODIUM SERPL-SCNC: 138 MMOL/L (ref 136–145)
TACROLIMUS BLD-MCNC: 12.5 NG/ML (ref 5–15)
WBC # BLD AUTO: 10.13 K/UL (ref 3.9–12.7)

## 2024-05-22 PROCEDURE — 20600001 HC STEP DOWN PRIVATE ROOM

## 2024-05-22 PROCEDURE — 80197 ASSAY OF TACROLIMUS: CPT | Performed by: NURSE PRACTITIONER

## 2024-05-22 PROCEDURE — 94761 N-INVAS EAR/PLS OXIMETRY MLT: CPT

## 2024-05-22 PROCEDURE — 87633 RESP VIRUS 12-25 TARGETS: CPT | Performed by: NURSE PRACTITIONER

## 2024-05-22 PROCEDURE — 36415 COLL VENOUS BLD VENIPUNCTURE: CPT | Performed by: NURSE PRACTITIONER

## 2024-05-22 PROCEDURE — 27000207 HC ISOLATION

## 2024-05-22 PROCEDURE — 83735 ASSAY OF MAGNESIUM: CPT | Performed by: NURSE PRACTITIONER

## 2024-05-22 PROCEDURE — 63600175 PHARM REV CODE 636 W HCPCS: Performed by: NURSE PRACTITIONER

## 2024-05-22 PROCEDURE — 25000003 PHARM REV CODE 250: Performed by: NURSE PRACTITIONER

## 2024-05-22 PROCEDURE — 99233 SBSQ HOSP IP/OBS HIGH 50: CPT | Mod: ,,, | Performed by: NURSE PRACTITIONER

## 2024-05-22 PROCEDURE — 25000242 PHARM REV CODE 250 ALT 637 W/ HCPCS: Performed by: NURSE PRACTITIONER

## 2024-05-22 PROCEDURE — 85025 COMPLETE CBC W/AUTO DIFF WBC: CPT | Performed by: NURSE PRACTITIONER

## 2024-05-22 PROCEDURE — 80069 RENAL FUNCTION PANEL: CPT | Performed by: NURSE PRACTITIONER

## 2024-05-22 RX ORDER — CIPROFLOXACIN 500 MG/1
500 TABLET ORAL EVERY 12 HOURS
Status: DISCONTINUED | OUTPATIENT
Start: 2024-05-22 | End: 2024-05-23 | Stop reason: HOSPADM

## 2024-05-22 RX ORDER — BENZONATATE 100 MG/1
100 CAPSULE ORAL 3 TIMES DAILY PRN
Status: DISCONTINUED | OUTPATIENT
Start: 2024-05-22 | End: 2024-05-23 | Stop reason: HOSPADM

## 2024-05-22 RX ADMIN — ATORVASTATIN CALCIUM 40 MG: 40 TABLET, FILM COATED ORAL at 08:05

## 2024-05-22 RX ADMIN — FLUTICASONE FUROATE AND VILANTEROL TRIFENATATE 1 PUFF: 100; 25 POWDER RESPIRATORY (INHALATION) at 10:05

## 2024-05-22 RX ADMIN — ASPIRIN 81 MG: 81 TABLET, COATED ORAL at 08:05

## 2024-05-22 RX ADMIN — CIPROFLOXACIN 500 MG: 500 TABLET ORAL at 10:05

## 2024-05-22 RX ADMIN — BENZONATATE 100 MG: 100 CAPSULE ORAL at 02:05

## 2024-05-22 RX ADMIN — VALGANCICLOVIR 900 MG: 450 TABLET, FILM COATED ORAL at 06:05

## 2024-05-22 RX ADMIN — ATOVAQUONE 1500 MG: 750 SUSPENSION ORAL at 08:05

## 2024-05-22 RX ADMIN — Medication 800 MG: at 02:05

## 2024-05-22 RX ADMIN — Medication 800 MG: at 08:05

## 2024-05-22 RX ADMIN — THERA TABS 1 TABLET: TAB at 08:05

## 2024-05-22 RX ADMIN — PREDNISONE 5 MG: 5 TABLET ORAL at 08:05

## 2024-05-22 RX ADMIN — CIPROFLOXACIN 500 MG: 500 TABLET ORAL at 08:05

## 2024-05-22 RX ADMIN — CEFEPIME 1 G: 1 INJECTION, POWDER, FOR SOLUTION INTRAMUSCULAR; INTRAVENOUS at 05:05

## 2024-05-22 NOTE — SUBJECTIVE & OBJECTIVE
"  Subjective:   History of Present Illness:  Colten Monet is a 64 yr old male s/p living kidney transplant 3/11/24 for IgA nephropathy. Baseline Cr ~1.5. PMH includes COPD.     Post-op Course:  OR 4/9/24 for repair of dehiscence of kidney transplant incision and hernia repair plus washout. IV abx transitioned to PO levaquin and doxy x 5 days (EOT 4/15/24).  HARRISON: Worsened post OR with associated hyperkalemia. Required several interventions (shifting, lasix, IVF). Renal function and hyperkalemia both improved.  Ileus: Patient w/ N/V post take back, KUB 4/10 with ileus, NPO for bowel rest 4/11. Enema ordered 4/14 w/ 3 large BM, however no improvement in N/V. Remained w/ ileus. NGT placement 4/16. PPN started 4/18. NGT removed 4/23. Resolved.     He is being admitted for leukocytosis and delayed wound healing. He reports feeling increased fatigue, weakness, and "feeling like a truck hit" him for the last 3 days. Tmax ~99.9 at home yesterday. Patient was following with wound care in [place], who obtained a culture 5/10 that wicho pseudomonas. He was prescribed cipro on 5/20. He reports that he has been packing wound with wet-to-dry gauze/vasche for last 3 weeks while waiting for wound vac. Wound on arrival with purulent drainage on packing and gauze. New wound culture obtained on admission. He denies N/V, diarrhea, new abdominal pain, CP, SOB, or urinary symptoms. Plan for broad spectrum antibiotics, CT abd/pelvis, and wound care consult.     Hospital Course:  Patient admitted with leukocytosis, weakness, purulent drg from RLQ wound, and cough. CT abd/pelvis 5/21 with new RLQ open wound, no fluid collections or abscess, bilateral pulmonary opacities and nodularity in bases. Infectious work up: UA clean, urine culture pending, blood cultures NGTD, Covid (-). RVP and CMV pending. CT imaging reviewed with surgeons, no need for surgical intervention at this time. Wound care consulted.     Interval History: no acute events " overnight. Feels better today. WBC improving on cefepime/vanc. Dc vanc, MRSA screen negative. Switch cefepime to cipro. CT abd/pelvis reviewed with surgeons, no intervention needed. C/o cough, denies sputum production, CT with bilateral lung base opacities and nodularity concerning for developing infectious process. RVP and CMV pending. Other infectious work up NGTD, wound culture pending. Wound care consulted for recommendations of wound management.       Past Medical, Surgical, Family, and Social History:   Unchanged from H&P.    Scheduled Meds:   aspirin  81 mg Oral Daily    atorvastatin  40 mg Oral QHS    atovaquone  1,500 mg Oral Daily    ciprofloxacin HCl  500 mg Oral Q12H    fluticasone furoate-vilanteroL  1 puff Inhalation Daily    magnesium oxide  800 mg Oral TID    multivitamin  1 tablet Oral Daily    predniSONE  5 mg Oral Daily    valGANciclovir  900 mg Oral QAM     Continuous Infusions:  PRN Meds:  Current Facility-Administered Medications:     albuterol, 1 puff, Inhalation, Q6H PRN    benzonatate, 100 mg, Oral, TID PRN    melatonin, 6 mg, Oral, Nightly PRN    ondansetron, 8 mg, Oral, Q6H PRN    sodium chloride 0.9%, 3 mL, Intravenous, PRN    Intake/Output - Last 3 Shifts         05/20 0700  05/21 0659 05/21 0700 05/22 0659 05/22 0700  05/23 0659           Urine Occurrence  3 x     Stool Occurrence  0 x              Review of Systems   Constitutional:  Positive for fatigue. Negative for appetite change, chills and fever.   Respiratory:  Negative for cough, chest tightness and shortness of breath.    Cardiovascular:  Negative for chest pain, palpitations and leg swelling.   Gastrointestinal:  Positive for abdominal pain. Negative for abdominal distention, constipation, diarrhea, nausea and vomiting.   Genitourinary:  Negative for difficulty urinating, dysuria, frequency and urgency.   Musculoskeletal:  Negative for back pain and neck pain.   Skin:  Positive for wound. Negative for color change, pallor  "and rash.   Allergic/Immunologic: Positive for immunocompromised state.   Neurological:  Positive for weakness. Negative for dizziness and headaches.   Psychiatric/Behavioral:  Negative for confusion and sleep disturbance.       Objective:     Vital Signs (Most Recent):  Temp: 97.6 °F (36.4 °C) (05/22/24 0723)  Pulse: 65 (05/22/24 1009)  Resp: 20 (05/22/24 1009)  BP: 133/89 (05/22/24 0723)  SpO2: 98 % (05/22/24 1009) Vital Signs (24h Range):  Temp:  [97.6 °F (36.4 °C)-98.7 °F (37.1 °C)] 97.6 °F (36.4 °C)  Pulse:  [] 65  Resp:  [16-20] 20  SpO2:  [94 %-98 %] 98 %  BP: (133-156)/(72-94) 133/89     Weight: 86.4 kg (190 lb 5.9 oz)  Height: 5' 7" (170.2 cm)  Body mass index is 29.82 kg/m².     Physical Exam  Vitals and nursing note reviewed.   Constitutional:       General: He is awake.      Appearance: Normal appearance.   Eyes:      General: No scleral icterus.  Cardiovascular:      Rate and Rhythm: Normal rate and regular rhythm.      Pulses: Normal pulses.   Pulmonary:      Effort: Pulmonary effort is normal. No respiratory distress.      Breath sounds: Normal breath sounds. No decreased breath sounds, wheezing or rales.   Abdominal:      General: Bowel sounds are normal. There is no distension.      Palpations: Abdomen is soft.      Tenderness: There is abdominal tenderness in the right lower quadrant. There is no guarding or rebound.       Musculoskeletal:         General: No tenderness. Normal range of motion.   Skin:     General: Skin is warm and dry.      Coloration: Skin is not pale.      Findings: No erythema or rash.   Neurological:      Mental Status: He is alert and oriented to person, place, and time.   Psychiatric:         Speech: Speech normal.         Behavior: Behavior normal. Behavior is cooperative.          Laboratory:  CBC:   Recent Labs   Lab 05/20/24  0742 05/21/24  1314 05/22/24  0626   WBC 21.84* 13.23* 10.13   RBC 4.49* 4.20* 4.03*   HGB 12.4* 11.7* 11.1*   HCT 39.6* 37.2* 35.8*   PLT " "269 276 287   MCV 88 89 89   MCH 27.6 27.9 27.5   MCHC 31.3* 31.5* 31.0*     CMP:   Recent Labs   Lab 05/20/24  0742 05/21/24  1314 05/22/24  0626   * 121* 129*   CALCIUM 9.7 10.4 9.4   ALBUMIN 3.7 3.5 3.0*    139 138   K 5.0 4.6 4.6   CO2 21* 22* 22*    107 109   BUN 23 27* 25*   CREATININE 1.4 1.5* 1.4     Coagulation: No results for input(s): "PT", "APTT" in the last 168 hours.  Labs within the past 24 hours have been reviewed.    Diagnostic Results:  US - Kidney: Results for orders placed during the hospital encounter of 04/08/24    US Transplant Kidney With Doppler    Narrative  EXAMINATION:  US TRANSPLANT KIDNEY WITH DOPPLER    CLINICAL HISTORY:  wound infection/drainage;    TECHNIQUE:  Real time gray scale and doppler ultrasound was performed over the patient's renal allograft.    COMPARISON:  03/25/2024    FINDINGS:  Renal allograft in the right lower quadrant.  The allograft measures 12.9 cm. Normal perfusion. No hydronephrosis.  There is a 1.2 cm cyst at the midpole.  There is a 1.7 cm cyst with a peripheral calcification, similar to the prior exam.    There is an elongated fluid collection underneath the anterior pelvic wall overlying the kidney transplant an area measuring 14.8 x 7.4 x 1.3 cm    Vasculature:    Inter lobar and segmental arterial resistive indices ranged from 0.67 to 0.77, previously 0.65 to 0.75.    Main renal artery peak systolic velocity: 200 cm/s with normal waveform, previously 314 cm/s .    Renal artery/iliac ratio: 1.3.    The main renal vein is patent.    Impression  Large fluid collection, 14.8 x 7.4 x 1.3 cm, just posterior to the anterior pelvic wall overlying the transplant kidney concerning for seroma, hematoma or abscess.    Small renal cysts, one  of which has a small peripheral calcification, unchanged from the prior study.    Satisfactory Doppler evaluation of the transplant kidney.      Electronically signed by: Teresita Ohara, " MD  Date:    04/08/2024  Time:    12:38

## 2024-05-22 NOTE — PLAN OF CARE
Problem: Adult Inpatient Plan of Care  Goal: Plan of Care Review  Outcome: Not Progressing  Goal: Patient-Specific Goal (Individualized)  Outcome: Not Progressing  Goal: Absence of Hospital-Acquired Illness or Injury  Outcome: Not Progressing  Goal: Optimal Comfort and Wellbeing  Outcome: Not Progressing  Goal: Readiness for Transition of Care  Outcome: Not Progressing     Problem: Wound  Goal: Optimal Coping  Outcome: Not Progressing  Goal: Optimal Functional Ability  Outcome: Not Progressing  Goal: Absence of Infection Signs and Symptoms  Outcome: Not Progressing  Goal: Improved Oral Intake  Outcome: Not Progressing  Goal: Optimal Pain Control and Function  Outcome: Not Progressing  Goal: Skin Health and Integrity  Outcome: Not Progressing  Goal: Optimal Wound Healing  Outcome: Not Progressing     Problem: Neutropenia  Goal: Absence of Infection  Outcome: Not Progressing     Problem: Fall Injury Risk  Goal: Absence of Fall and Fall-Related Injury  Outcome: Not Progressing

## 2024-05-22 NOTE — HOSPITAL COURSE
Patient admitted with leukocytosis, weakness, purulent drg from RLQ wound, and cough. CT abd/pelvis 5/21 with new RLQ open wound, no fluid collections or abscess, bilateral pulmonary opacities and nodularity in bases. Infectious work up: UA clean, urine culture pending, blood cultures NGTD, Covid (-). RVP and CMV pending. CT imaging reviewed with surgeons, no need for surgical intervention at this time. Wound care consulted.     Interval History: no acute events overnight. Feels better today. WBC improving on cefepime/vanc. Dc vanc, MRSA screen negative. Switch cefepime to cipro. CT abd/pelvis reviewed with surgeons, no intervention needed. C/o cough, denies sputum production, CT with bilateral lung base opacities and nodularity concerning for developing infectious process. RVP and CMV pending. Other infectious work up NGTD, wound culture pending. Wound care consulted for recommendations of wound management.

## 2024-05-22 NOTE — PLAN OF CARE
Pt admitted 5/21 for RLQ wound infection s/p kidney transplant 3/11/24.  He is AAOx4 and ambulatory.  VSS.  Afebrile.  Stable on RA.  Denies pain.  RLQ incision/wound has packing and dressing in place.  Wound care has been consulted.  IV abx given per MAR.  CT a/p completed 5/21 - showed new RLQ skin and SQ soft tissue dehiscence of incision (-) abscess, and pulmonary opacities.  Pt reminded to remain NPO after midnight or possible surgical intervention today.  Blood cx and urine cx collected 5/21.  Cellcept on hold.  Bed in lowest locked position.  Nonskid encouraged when oob.  Call bell in reach.

## 2024-05-22 NOTE — PROGRESS NOTES
"[facility] - Transplant Stepdown  Kidney Transplant  Progress Note      Reason for Follow-up: Reassessment of Kidney Transplant - 3/11/2024  (#1) recipient and management of immunosuppression.    ORGAN: LEFT KIDNEY      Donor Type: Living      Donor CMV Status:    Donor HBcAB:   Donor HCV Status:   Donor HBV ANA:   Donor HCV ANA:       Subjective:   History of Present Illness:  Colten Monet is a 64 yr old male s/p living kidney transplant 3/11/24 for IgA nephropathy. Baseline Cr ~1.5. PMH includes COPD.     Post-op Course:  OR 4/9/24 for repair of dehiscence of kidney transplant incision and hernia repair plus washout. IV abx transitioned to PO levaquin and doxy x 5 days (EOT 4/15/24).  HARRISON: Worsened post OR with associated hyperkalemia. Required several interventions (shifting, lasix, IVF). Renal function and hyperkalemia both improved.  Ileus: Patient w/ N/V post take back, KUB 4/10 with ileus, NPO for bowel rest 4/11. Enema ordered 4/14 w/ 3 large BM, however no improvement in N/V. Remained w/ ileus. NGT placement 4/16. PPN started 4/18. NGT removed 4/23. Resolved.     He is being admitted for leukocytosis and delayed wound healing. He reports feeling increased fatigue, weakness, and "feeling like a truck hit" him for the last 3 days. Tmax ~99.9 at home yesterday. Patient was following with wound care in [place], who obtained a culture 5/10 that wicho pseudomonas. He was prescribed cipro on 5/20. He reports that he has been packing wound with wet-to-dry gauze/vasche for last 3 weeks while waiting for wound vac. Wound on arrival with purulent drainage on packing and gauze. New wound culture obtained on admission. He denies N/V, diarrhea, new abdominal pain, CP, SOB, or urinary symptoms. Plan for broad spectrum antibiotics, CT abd/pelvis, and wound care consult.     Hospital Course:  Patient admitted with leukocytosis, weakness, purulent drg from RLQ wound, and cough. CT abd/pelvis 5/21 with new RLQ open wound, no " fluid collections or abscess, bilateral pulmonary opacities and nodularity in bases. Infectious work up: UA clean, urine culture pending, blood cultures NGTD, Covid (-). RVP and CMV pending. CT imaging reviewed with surgeons, no need for surgical intervention at this time. Wound care consulted.     Interval History: no acute events overnight. Feels better today. WBC improving on cefepime/vanc. Dc vanc, MRSA screen negative. Switch cefepime to cipro. CT abd/pelvis reviewed with surgeons, no intervention needed. C/o cough, denies sputum production, CT with bilateral lung base opacities and nodularity concerning for developing infectious process. RVP and CMV pending. Other infectious work up NGTD, wound culture pending. Wound care consulted for recommendations of wound management.       Past Medical, Surgical, Family, and Social History:   Unchanged from H&P.    Scheduled Meds:   aspirin  81 mg Oral Daily    atorvastatin  40 mg Oral QHS    atovaquone  1,500 mg Oral Daily    ciprofloxacin HCl  500 mg Oral Q12H    fluticasone furoate-vilanteroL  1 puff Inhalation Daily    magnesium oxide  800 mg Oral TID    multivitamin  1 tablet Oral Daily    predniSONE  5 mg Oral Daily    valGANciclovir  900 mg Oral QAM     Continuous Infusions:  PRN Meds:  Current Facility-Administered Medications:     albuterol, 1 puff, Inhalation, Q6H PRN    benzonatate, 100 mg, Oral, TID PRN    melatonin, 6 mg, Oral, Nightly PRN    ondansetron, 8 mg, Oral, Q6H PRN    sodium chloride 0.9%, 3 mL, Intravenous, PRN    Intake/Output - Last 3 Shifts         05/20 0700 05/21 0659 05/21 0700 05/22 0659 05/22 0700 05/23 0659           Urine Occurrence  3 x     Stool Occurrence  0 x              Review of Systems   Constitutional:  Positive for fatigue. Negative for appetite change, chills and fever.   Respiratory:  Negative for cough, chest tightness and shortness of breath.    Cardiovascular:  Negative for chest pain, palpitations and leg swelling.  "  Gastrointestinal:  Positive for abdominal pain. Negative for abdominal distention, constipation, diarrhea, nausea and vomiting.   Genitourinary:  Negative for difficulty urinating, dysuria, frequency and urgency.   Musculoskeletal:  Negative for back pain and neck pain.   Skin:  Positive for wound. Negative for color change, pallor and rash.   Allergic/Immunologic: Positive for immunocompromised state.   Neurological:  Positive for weakness. Negative for dizziness and headaches.   Psychiatric/Behavioral:  Negative for confusion and sleep disturbance.       Objective:     Vital Signs (Most Recent):  Temp: 97.6 °F (36.4 °C) (05/22/24 0723)  Pulse: 65 (05/22/24 1009)  Resp: 20 (05/22/24 1009)  BP: 133/89 (05/22/24 0723)  SpO2: 98 % (05/22/24 1009) Vital Signs (24h Range):  Temp:  [97.6 °F (36.4 °C)-98.7 °F (37.1 °C)] 97.6 °F (36.4 °C)  Pulse:  [] 65  Resp:  [16-20] 20  SpO2:  [94 %-98 %] 98 %  BP: (133-156)/(72-94) 133/89     Weight: 86.4 kg (190 lb 5.9 oz)  Height: 5' 7" (170.2 cm)  Body mass index is 29.82 kg/m².     Physical Exam  Vitals and nursing note reviewed.   Constitutional:       General: He is awake.      Appearance: Normal appearance.   Eyes:      General: No scleral icterus.  Cardiovascular:      Rate and Rhythm: Normal rate and regular rhythm.      Pulses: Normal pulses.   Pulmonary:      Effort: Pulmonary effort is normal. No respiratory distress.      Breath sounds: Normal breath sounds. No decreased breath sounds, wheezing or rales.   Abdominal:      General: Bowel sounds are normal. There is no distension.      Palpations: Abdomen is soft.      Tenderness: There is abdominal tenderness in the right lower quadrant. There is no guarding or rebound.       Musculoskeletal:         General: No tenderness. Normal range of motion.   Skin:     General: Skin is warm and dry.      Coloration: Skin is not pale.      Findings: No erythema or rash.   Neurological:      Mental Status: He is alert and " "oriented to person, place, and time.   Psychiatric:         Speech: Speech normal.         Behavior: Behavior normal. Behavior is cooperative.          Laboratory:  CBC:   Recent Labs   Lab 05/20/24  0742 05/21/24  1314 05/22/24  0626   WBC 21.84* 13.23* 10.13   RBC 4.49* 4.20* 4.03*   HGB 12.4* 11.7* 11.1*   HCT 39.6* 37.2* 35.8*    276 287   MCV 88 89 89   MCH 27.6 27.9 27.5   MCHC 31.3* 31.5* 31.0*     CMP:   Recent Labs   Lab 05/20/24  0742 05/21/24  1314 05/22/24  0626   * 121* 129*   CALCIUM 9.7 10.4 9.4   ALBUMIN 3.7 3.5 3.0*    139 138   K 5.0 4.6 4.6   CO2 21* 22* 22*    107 109   BUN 23 27* 25*   CREATININE 1.4 1.5* 1.4     Coagulation: No results for input(s): "PT", "APTT" in the last 168 hours.  Labs within the past 24 hours have been reviewed.    Diagnostic Results:  US - Kidney: Results for orders placed during the hospital encounter of 04/08/24    US Transplant Kidney With Doppler    Narrative  EXAMINATION:  US TRANSPLANT KIDNEY WITH DOPPLER    CLINICAL HISTORY:  wound infection/drainage;    TECHNIQUE:  Real time gray scale and doppler ultrasound was performed over the patient's renal allograft.    COMPARISON:  03/25/2024    FINDINGS:  Renal allograft in the right lower quadrant.  The allograft measures 12.9 cm. Normal perfusion. No hydronephrosis.  There is a 1.2 cm cyst at the midpole.  There is a 1.7 cm cyst with a peripheral calcification, similar to the prior exam.    There is an elongated fluid collection underneath the anterior pelvic wall overlying the kidney transplant an area measuring 14.8 x 7.4 x 1.3 cm    Vasculature:    Inter lobar and segmental arterial resistive indices ranged from 0.67 to 0.77, previously 0.65 to 0.75.    Main renal artery peak systolic velocity: 200 cm/s with normal waveform, previously 314 cm/s .    Renal artery/iliac ratio: 1.3.    The main renal vein is patent.    Impression  Large fluid collection, 14.8 x 7.4 x 1.3 cm, just posterior to " the anterior pelvic wall overlying the transplant kidney concerning for seroma, hematoma or abscess.    Small renal cysts, one  of which has a small peripheral calcification, unchanged from the prior study.    Satisfactory Doppler evaluation of the transplant kidney.      Electronically signed by: Teresita Ohara MD  Date:    04/08/2024  Time:    12:38  Assessment/Plan:     * Wound infection  - WBC elevated, low-grade temps at home  - Wound culture 5/10 with pseudomonas, new culture obtained 5/21  - Start broad spectrum antibiotics. Switch to cipro 5/22.   - Infectious work up: UA, urine and blood cultures, CXR  - CT abd/pelvis 5/21, reviewed with surgeon, no intervention needed  - Wound care consult pending    Leukocytosis  - see wound infection  - c/o cough x 3 days  - CT abd/pelvis 5/21 with bilateral lung base opacities and nodularities concerning for infectious process  - RVP and CMV PCR pending    Wound dehiscence, surgical, initial encounter  - Following with wound care outpatient, waiting for wound vac  - wound care consulted    Anemia of chronic disease  - H/H stable. Continue to monitor.     Stage 3a chronic kidney disease  - see s/p kidney transplant    Prophylactic immunotherapy  - See long-term use of immunosuppressant medication    At risk for opportunistic infections  - continue OI prophylaxis per protocol    Long-term use of immunosuppressant medication  - Maintenance IS with prograf, myfortic, and prednisone. cont to check tacrolimus level daily. Assess for toxicity and adjust level as needed  - Hold myfortic for infection and prograf for supratherapeutic level    S/P living-donor kidney transplantation  - s/p living KTX 3/11/24 for IgA nephropathy  - Baseline Cr ~1.5  - Kidney function stable, continue to monitor.         Discharge Planning: not stable for dc at this time. Possible dc tomorrow.     Medical decision making for this encounter includes review of pertinent labs and diagnostic  studies, assessment and planning, discussions with consulting providers, discussion with patient/family, and participation in multidisciplinary rounds. Time spent caring for patient: 60 minutes    Maren Harvey, LAKESHIA  Kidney Transplant  [facility] - Transplant Stepdown

## 2024-05-22 NOTE — ASSESSMENT & PLAN NOTE
- see wound infection  - c/o cough x 3 days  - CT abd/pelvis 5/21 with bilateral lung base opacities and nodularities concerning for infectious process  - RVP and CMV PCR pending

## 2024-05-22 NOTE — ASSESSMENT & PLAN NOTE
- WBC elevated, low-grade temps at home  - Wound culture 5/10 with pseudomonas, new culture obtained 5/21  - Start broad spectrum antibiotics. Switch to cipro 5/22.   - Infectious work up: UA, urine and blood cultures, CXR  - CT abd/pelvis 5/21, reviewed with surgeon, no intervention needed  - Wound care consult pending

## 2024-05-22 NOTE — PROGRESS NOTES
"Transplant  Admit Note     SW met with patient and friend Tripp Miguel (Todd) to assess needs. Patient is a 64 y.o.  male, s/p kideny transplant from 3/11/2024, admitted for:    Leukocytosis [D72.829]    Patient admitted from home on 5/21/2024 .  At this time, patient presents as alert and oriented x 4, pleasant, good eye contact, well groomed, recall good, concentration/judgement good, average intelligence, calm, communicative, cooperative, and asking and answering questions appropriately.  At this time, patients caregiver presents as alert and oriented x 4, pleasant, well groomed, recall good, concentration/judgement good, calm, communicative, cooperative, and asking and answering questions appropriately.    Household/Family Systems     Patient resides with patient's wife and children, at [address] [place] [address]  [place] [address].  Support system includes his wife Ginna Monet ([phone]), and his friend Tripp Miguel (Todd) ([phone]).   Patient reports his minor child & stepchild (ages 15 & 5) are being cared for by family.     Patients primary caregiver is Ginna Monet, patients wife, phone number [phone].  Confirmed patients contact information is [phone] (home);   Telephone Information:   Mobile [phone]   .    During admission, patient's caregiver plans to stay at home.  Confirmed patient and patients caregivers do have access to reliable transportation.    Cognitive Status/Learning     Patient reports reading ability as 10th grade and states patient does have difficulty with hearing.  Patient reports patient learns best by multisensory learning.   Needed: No.   Highest education level: High School (9-12) or GED    Vocation/Disability   .  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    Adherence     Patient reports a high level of adherence to patients health " care regimen.  Adherence counseling and education provided. Patient verbalizes understanding.    Substance Use    Patient reports the following substance usage.    Tobacco: none, patient denies any use.  Alcohol: none, patient denies any use.  Illicit Drugs/Non-prescribed Medications: none, patient denies any use.  Patient states clear understanding of the potential impact of substance use.  Substance abstinence/cessation counseling, education and resources provided and reviewed.     Services Utilizing/ADLS    Infusion Service: Prior to admission, patient utilizing? no  Home Health: Prior to admission, patient utilizing? no  DME: Prior to admission, no  Pulmonary/Cardiac Rehab: Prior to admission, no  Dialysis:  Prior to admission, no  Transplant Specialty Pharmacy:  Prior to admission, yes; [facility].    Prior to admission, patient reports patient was independent with ADLS and was driving.  Patient reports patient is able to care for self at this time..  Patient indicates a willingness to care for self once medically cleared to do so.    Insurance/Medications    Insured by  Payer/Plan Subscr  Sex Relation Sub. Ins. ID Effective Group Num   1. [facility]* MARGUERITE FLOOD 1960 Male Self [id number]                                    [address] [address]   2. [facility]* MARGUERITE FLOOD 1960 Male Self [id number]                                   [address]     Primary Insurance (for UNOS reporting): Private Insurance  Secondary Insurance (for UNOS reporting): None - Patient reports just recently being awarded Medicare benefits (with retroactive effective date starting on 3/1/24) and expecting delivery of card via USPS soon.  Pt states plans of providing transplant team with Medicare card as soon as possible.    Patient reports patient is able to obtain and afford medications at this time and at time of discharge.    Living Will/Healthcare Power of     Patient  states patient does not have a LW and/or HCPA.   provided education regarding LW and HCPA and the completion of forms.    Coping/Mental Health    Patient is coping adequately with the aid of  family members and friends. Patient denied needing or wanting resources or referrals from this  at this time. Patient agrees to contact transplant team with needs, questions, or concerns as they arise.     Discharge Planning    At time of discharge, patient plans to return to patient's home under the care of self, wife Ginna Monet, and friend Derek.  Patients friend will transport patient.  Per rounds today, expected discharge date has not been medically determined at this time. Patient and patients caregiver verbalize understanding and are involved in treatment planning and discharge process.    Additional Concerns    Patient's caretaker denies additional needs and/or concerns at this time. Patient is being followed for needs, education, resources, information, emotional support, supportive counseling, and for supportive and skilled discharge plan of care.  providing ongoing psychosocial support, education, resources and d/c planning as needed.  SW remains available.  remains available. Patient's caregiver verbalizes understanding and agreement with information reviewed,  availability and how to access available resources as needed. Patient denies additional needs and/or concerns at this time. Patient verbalizes understanding and agreement with information reviewed, social work availability, and how to access available resources as needed.

## 2024-05-22 NOTE — TELEPHONE ENCOUNTER
Called pt to reschedule pt new pt appt from 9am to 8am -- pt advised tht was ok, moving appt up an hour

## 2024-05-23 VITALS
TEMPERATURE: 98 F | RESPIRATION RATE: 20 BRPM | DIASTOLIC BLOOD PRESSURE: 76 MMHG | HEART RATE: 99 BPM | BODY MASS INDEX: 29.88 KG/M2 | HEIGHT: 67 IN | SYSTOLIC BLOOD PRESSURE: 116 MMHG | OXYGEN SATURATION: 98 % | WEIGHT: 190.38 LBS

## 2024-05-23 PROBLEM — D72.829 LEUKOCYTOSIS: Status: RESOLVED | Noted: 2024-05-21 | Resolved: 2024-05-23

## 2024-05-23 LAB
ALBUMIN SERPL BCP-MCNC: 3.2 G/DL (ref 3.5–5.2)
ANION GAP SERPL CALC-SCNC: 11 MMOL/L (ref 8–16)
BASOPHILS # BLD AUTO: 0.09 K/UL (ref 0–0.2)
BASOPHILS NFR BLD: 0.7 % (ref 0–1.9)
BUN SERPL-MCNC: 24 MG/DL (ref 8–23)
CALCIUM SERPL-MCNC: 9.2 MG/DL (ref 8.7–10.5)
CHLORIDE SERPL-SCNC: 110 MMOL/L (ref 95–110)
CMV DNA SPEC QL NAA+PROBE: NORMAL
CO2 SERPL-SCNC: 21 MMOL/L (ref 23–29)
CREAT SERPL-MCNC: 1.4 MG/DL (ref 0.5–1.4)
CYTOMEGALOVIRUS PCR, QUANT: NOT DETECTED IU/ML
DIFFERENTIAL METHOD BLD: ABNORMAL
EOSINOPHIL # BLD AUTO: 0.2 K/UL (ref 0–0.5)
EOSINOPHIL NFR BLD: 1.4 % (ref 0–8)
ERYTHROCYTE [DISTWIDTH] IN BLOOD BY AUTOMATED COUNT: 13.4 % (ref 11.5–14.5)
EST. GFR  (NO RACE VARIABLE): 55.8 ML/MIN/1.73 M^2
GLUCOSE SERPL-MCNC: 103 MG/DL (ref 70–110)
HCT VFR BLD AUTO: 40.4 % (ref 40–54)
HGB BLD-MCNC: 12.3 G/DL (ref 14–18)
IMM GRANULOCYTES # BLD AUTO: 0.22 K/UL (ref 0–0.04)
IMM GRANULOCYTES NFR BLD AUTO: 1.8 % (ref 0–0.5)
LYMPHOCYTES # BLD AUTO: 1.7 K/UL (ref 1–4.8)
LYMPHOCYTES NFR BLD: 13.5 % (ref 18–48)
MAGNESIUM SERPL-MCNC: 1.8 MG/DL (ref 1.6–2.6)
MCH RBC QN AUTO: 27.5 PG (ref 27–31)
MCHC RBC AUTO-ENTMCNC: 30.4 G/DL (ref 32–36)
MCV RBC AUTO: 90 FL (ref 82–98)
MONOCYTES # BLD AUTO: 0.7 K/UL (ref 0.3–1)
MONOCYTES NFR BLD: 5.8 % (ref 4–15)
NEUTROPHILS # BLD AUTO: 9.6 K/UL (ref 1.8–7.7)
NEUTROPHILS NFR BLD: 76.8 % (ref 38–73)
NRBC BLD-RTO: 0 /100 WBC
PHOSPHATE SERPL-MCNC: 2.8 MG/DL (ref 2.7–4.5)
PLATELET # BLD AUTO: 320 K/UL (ref 150–450)
PMV BLD AUTO: 10.5 FL (ref 9.2–12.9)
POTASSIUM SERPL-SCNC: 4.8 MMOL/L (ref 3.5–5.1)
RBC # BLD AUTO: 4.47 M/UL (ref 4.6–6.2)
SODIUM SERPL-SCNC: 142 MMOL/L (ref 136–145)
TACROLIMUS BLD-MCNC: 8 NG/ML (ref 5–15)
WBC # BLD AUTO: 12.48 K/UL (ref 3.9–12.7)

## 2024-05-23 PROCEDURE — 80197 ASSAY OF TACROLIMUS: CPT | Performed by: NURSE PRACTITIONER

## 2024-05-23 PROCEDURE — 36415 COLL VENOUS BLD VENIPUNCTURE: CPT | Performed by: NURSE PRACTITIONER

## 2024-05-23 PROCEDURE — 80069 RENAL FUNCTION PANEL: CPT | Performed by: NURSE PRACTITIONER

## 2024-05-23 PROCEDURE — 99239 HOSP IP/OBS DSCHRG MGMT >30: CPT | Mod: ,,, | Performed by: NURSE PRACTITIONER

## 2024-05-23 PROCEDURE — 25000003 PHARM REV CODE 250: Performed by: NURSE PRACTITIONER

## 2024-05-23 PROCEDURE — 63600175 PHARM REV CODE 636 W HCPCS: Performed by: NURSE PRACTITIONER

## 2024-05-23 PROCEDURE — 83735 ASSAY OF MAGNESIUM: CPT | Performed by: NURSE PRACTITIONER

## 2024-05-23 PROCEDURE — 85025 COMPLETE CBC W/AUTO DIFF WBC: CPT | Performed by: NURSE PRACTITIONER

## 2024-05-23 RX ORDER — BENZONATATE 100 MG/1
100 CAPSULE ORAL 3 TIMES DAILY PRN
Qty: 30 CAPSULE | Refills: 0 | Status: SHIPPED | OUTPATIENT
Start: 2024-05-23 | End: 2024-06-02

## 2024-05-23 RX ORDER — CIPROFLOXACIN 500 MG/1
500 TABLET ORAL EVERY 12 HOURS
Qty: 20 TABLET | Refills: 0 | Status: SHIPPED | OUTPATIENT
Start: 2024-05-23 | End: 2024-06-10

## 2024-05-23 RX ADMIN — Medication 800 MG: at 08:05

## 2024-05-23 RX ADMIN — ASPIRIN 81 MG: 81 TABLET, COATED ORAL at 08:05

## 2024-05-23 RX ADMIN — CIPROFLOXACIN 500 MG: 500 TABLET ORAL at 08:05

## 2024-05-23 RX ADMIN — PREDNISONE 5 MG: 5 TABLET ORAL at 08:05

## 2024-05-23 RX ADMIN — VALGANCICLOVIR 900 MG: 450 TABLET, FILM COATED ORAL at 08:05

## 2024-05-23 RX ADMIN — ATOVAQUONE 1500 MG: 750 SUSPENSION ORAL at 08:05

## 2024-05-23 RX ADMIN — THERA TABS 1 TABLET: TAB at 08:05

## 2024-05-23 RX ADMIN — BENZONATATE 100 MG: 100 CAPSULE ORAL at 08:05

## 2024-05-23 RX ADMIN — FLUTICASONE FUROATE AND VILANTEROL TRIFENATATE 1 PUFF: 100; 25 POWDER RESPIRATORY (INHALATION) at 09:05

## 2024-05-23 NOTE — CONSULTS
[facility] - Transplant Stepdown  Wound Care    Patient Name:  Colten Monet   MRN:  [id number]  Date: 2024  Diagnosis: Wound infection    History:     Past Medical History:   Diagnosis Date    Anemia     CVA (cerebral vascular accident)     GERD (gastroesophageal reflux disease)     Hyperlipidemia     Hypertension     IgA nephropathy     Obesity        Social History     Socioeconomic History    Marital status:    Tobacco Use    Smoking status: Former     Current packs/day: 0.00     Types: Cigarettes     Quit date: 2016     Years since quittin.0    Smokeless tobacco: Never   Substance and Sexual Activity    Alcohol use: Not Currently    Drug use: Never   Social History Narrative    Works in construction as a supervisor, operating crane in marine environment.     Social Determinants of Health     Financial Resource Strain: High Risk (2024)    Overall Financial Resource Strain (CARDIA)     Difficulty of Paying Living Expenses: Hard   Food Insecurity: No Food Insecurity (2024)    Hunger Vital Sign     Worried About Running Out of Food in the Last Year: Never true     Ran Out of Food in the Last Year: Never true   Transportation Needs: No Transportation Needs (2024)    TRANSPORTATION NEEDS     Transportation : No   Stress: No Stress Concern Present (2024)    [employer] - Occupational Stress Questionnaire     Feeling of Stress : Only a little   Housing Stability: Low Risk  (2024)    Housing Stability Vital Sign     Unable to Pay for Housing in the Last Year: No     Homeless in the Last Year: No       Precautions:     Allergies as of 2024 - Reviewed 2024   Allergen Reaction Noted    Codeine Hives 2020       WO Assessment Details/Treatment     Patient seen for wound care consultation.   Reviewed chart for this encounter.   See Flow Sheet for findings.      RECOMMENDATIONS: Patient is Aox4 and agreeable to inpatient wound care.  Wound care consult for abdominal wound. Upon assessment, dressing clean, dry, and intact. Scant serosanguineous drainage on gauze packing with primarily purulent drainage noted on gauze packing. No odor detected. Pink, healthy tissue noted with no slough present. Wound cleansed with vashe for antimicrobial properties and pat dry. Pack wound with vashe soaked gauze wet to dry dressing. Apply mepilex foam border dressing for absorption and protection from friction and shear. If mepilex foam border dressings not available, cover with ABD pad and secure with medipore tape. Change dressing BID and PRN if soiled. Patient is eager for discharge, so large quantity of Vashe, mepilex foam border dressings, and gauze left at bedside for discharge until seen in outpatient wound clinic. No other issues or concerns at this time. Will follow up 5/30/2024 or sooner if needed.    Discussed POC with patient and primary nurse.   See EMR for orders & patient education.    Discussed nutrition and the role of protein in wound healing with the patient. Instructed patient to optimize protein for wound healing.    Bedside nursing to continue care & monitoring.  Bedside nursing to maintain pressure injury prevention interventions.            05/23/24 0840   WOCN Assessment   WOCN Total Time (mins) 45   Visit Date 05/23/24   Visit Time 0840   Consult Type New   WOCN Speciality Wound   Intervention assessed;changed;applied;chart review;coordination of care;orders   Teaching on-going        Wound 05/10/24 1016 Other (comment) Right Abdomen   Date First Assessed/Time First Assessed: 05/10/24 1016   Present on Original Admission: Yes  Primary Wound Type: (c) Other (comment)  Side: Right  Location: Abdomen   Wound Image     Dressing Appearance Dry;Intact;Clean   Drainage Amount Scant   Drainage Characteristics/Odor Serosanguineous   Appearance Pink;Red   Care Cleansed with:;Antimicrobial agent   Dressing Applied;Foam   Packing other (see  comment)  (Vashe soaked gauze)   Dressing Change Due 05/23/24       Orders placed.   Steve Walton BSN, RN  05/23/2024

## 2024-05-23 NOTE — DISCHARGE SUMMARY
"[facility] - Transplant Stepdown  Kidney Transplant  Discharge Summary    Patient Name: Colten Monet  MRN: [id number]  Admission Date: 5/21/2024  Hospital Length of Stay: 2 days  Discharge Date and Time:  05/23/2024 9:52 AM  Attending Physician: Yessy Cheema DO   Discharging Provider: Maren Harvey DNP  Primary Care Provider: Jeaneth, Primary Doctor    HPI:   Colten Monet is a 64 yr old male s/p living kidney transplant 3/11/24 for IgA nephropathy. Baseline Cr ~1.5. PMH includes COPD.     Post-op Course:  OR 4/9/24 for repair of dehiscence of kidney transplant incision and hernia repair plus washout. IV abx transitioned to PO levaquin and doxy x 5 days (EOT 4/15/24).  HARRISON: Worsened post OR with associated hyperkalemia. Required several interventions (shifting, lasix, IVF). Renal function and hyperkalemia both improved.  Ileus: Patient w/ N/V post take back, KUB 4/10 with ileus, NPO for bowel rest 4/11. Enema ordered 4/14 w/ 3 large BM, however no improvement in N/V. Remained w/ ileus. NGT placement 4/16. PPN started 4/18. NGT removed 4/23. Resolved.     He is being admitted for leukocytosis and delayed wound healing. He reports feeling increased fatigue, weakness, and "feeling like a truck hit" him for the last 3 days. Tmax ~99.9 at home yesterday. Patient was following with wound care in [place], who obtained a culture 5/10 that wicho pseudomonas. He was prescribed cipro on 5/20. He reports that he has been packing wound with wet-to-dry gauze/vasche for last 3 weeks while waiting for wound vac. Wound on arrival with purulent drainage on packing and gauze. New wound culture obtained on admission. He denies N/V, diarrhea, new abdominal pain, CP, SOB, or urinary symptoms. Plan for broad spectrum antibiotics, CT abd/pelvis, and wound care consult.     Hospital Course:    Patient admitted with leukocytosis, weakness, purulent drg from RLQ wound, and cough. CT abd/pelvis 5/21 with new RLQ open wound, no fluid " collections or abscess, bilateral pulmonary opacities and nodularity in bases. Infectious work up: UA clean, urine culture pending, blood cultures NGTD, Covid (-). RVP resulted + human metapneumovirus, recommend symptomatic management. CMV PCR and wound culture pending (growing presumptive pseudomonas). CT imaging reviewed with surgeons, no need for surgical intervention at this time. Wound care consulted, recommend daily packing wet-to-dry with vasche. Patient to follow up with wound care specialist outpatient, scheduled 5/29. He was initially started on broad spectrum IV antibiotics then switched to cipro for 10 day course. MMF on hold while on antibiotics. Suspect leukocytosis possibly combination of URI and wound infection. He is stable and ready for discharge today. He will have follow up labs Monday, 5/27.     Goals of Care Treatment Preferences:  Code Status: Full Code      Final Active Diagnoses:    Diagnosis Date Noted POA    PRINCIPAL PROBLEM:  Wound infection [T14.8XXA, L08.9] 05/21/2024 Yes    Anemia of chronic disease [D63.8] 04/08/2024 Yes    Wound dehiscence, surgical, initial encounter [T81.31XA] 04/08/2024 Yes    Stage 3a chronic kidney disease [N18.31] 03/21/2024 Yes    Prophylactic immunotherapy [Z29.89] 03/12/2024 Not Applicable    At risk for opportunistic infections [Z91.89] 03/12/2024 Yes    Long-term use of immunosuppressant medication [Z79.60] 03/12/2024 Not Applicable    S/P living-donor kidney transplantation [Z94.0] 03/12/2024 Not Applicable      Problems Resolved During this Admission:    Diagnosis Date Noted Date Resolved POA    Leukocytosis [D72.829] 05/21/2024 05/23/2024 Yes       Treatments: see hospital course      Pending Diagnostic Studies:       Procedure Component Value Units Date/Time    CMV DNA, quantitative, PCR [[id number]] Collected: 05/23/24 0622    Order Status: Sent Lab Status: In process Updated: 05/23/24 0647    Specimen: Blood           Significant Diagnostic Studies:  Labs: CMP   Recent Labs   Lab 05/21/24  1314 05/22/24  0626 05/23/24  0622    138 142   K 4.6 4.6 4.8    109 110   CO2 22* 22* 21*   * 129* 103   BUN 27* 25* 24*   CREATININE 1.5* 1.4 1.4   CALCIUM 10.4 9.4 9.2   ALBUMIN 3.5 3.0* 3.2*   ANIONGAP 10 7* 11   , CBC   Recent Labs   Lab 05/21/24  1314 05/22/24  0626 05/23/24  0622   WBC 13.23* 10.13 12.48   HGB 11.7* 11.1* 12.3*   HCT 37.2* 35.8* 40.4    287 320   , INR   Lab Results   Component Value Date    INR 0.9 04/09/2024    INR 0.9 03/11/2024    INR 0.9 02/28/2024   , and All labs within the past 24 hours have been reviewed    Discharged Condition: good    Disposition: Home or Self Care    Follow Up: see hospital course    Patient Instructions:      Diet Adult Regular     No dressing needed     Notify your health care provider if you experience any of the following:  temperature >100.4     Notify your health care provider if you experience any of the following:  persistent nausea and vomiting or diarrhea     Notify your health care provider if you experience any of the following:  severe uncontrolled pain     Notify your health care provider if you experience any of the following:  redness, tenderness, or signs of infection (pain, swelling, redness, odor or green/yellow discharge around incision site)     Notify your health care provider if you experience any of the following:  difficulty breathing or increased cough     Notify your health care provider if you experience any of the following:  severe persistent headache     Notify your health care provider if you experience any of the following:  worsening rash     Notify your health care provider if you experience any of the following:  persistent dizziness, light-headedness, or visual disturbances     Notify your health care provider if you experience any of the following:  increased confusion or weakness     Notify your health care provider if you experience any of the following:    Order Comments: Any other concerning signs and symptoms. If you have not received your scheduled follow up within 24 hours of your discharge or for any questions or concerns, please call [phone] for further assistance.     Activity as tolerated     Medications:  Reconciled Home Medications:      Medication List        START taking these medications      benzonatate 100 MG capsule  Commonly known as: TESSALON  Take 1 capsule (100 mg total) by mouth 3 (three) times daily as needed for Cough.            CHANGE how you take these medications      ciprofloxacin HCl 500 MG tablet  Commonly known as: CIPRO  Take 1 tablet (500 mg total) by mouth every 12 (twelve) hours. Stop 6/2/24 for 10 days  What changed: additional instructions            CONTINUE taking these medications      albuterol 90 mcg/actuation inhaler  Commonly known as: PROVENTIL/VENTOLIN HFA  Inhale 1-2 puffs into the lungs every 6 (six) hours as needed for Wheezing. Rescue     aspirin 81 MG EC tablet  Commonly known as: ECOTRIN  Take 1 tablet (81 mg total) by mouth once daily.     atorvastatin 40 MG tablet  Commonly known as: LIPITOR  Take 1 tablet by mouth every evening.     atovaquone 750 mg/5 mL Susp oral liquid  Commonly known as: MEPRON  Take 10 mLs (1,500 mg total) by mouth once daily. Stop 9/7/24     bisacodyL 5 mg EC tablet  Commonly known as: DULCOLAX  Take 2 tablets (10 mg total) by mouth daily as needed for Constipation.     BREO ELLIPTA 100-25 mcg/dose diskus inhaler  Generic drug: fluticasone furoate-vilanteroL  Inhale 1 puff into the lungs once daily. Controller     docusate sodium 100 MG capsule  Commonly known as: COLACE  Take 1 capsule (100 mg total) by mouth 3 (three) times daily as needed for Constipation.     magnesium oxide 400 mg (241.3 mg magnesium) tablet  Commonly known as: MAG-OX  Take 2 tablets (800 mg total) by mouth 3 (three) times daily.     mycophenolate sodium 180 MG Tbec  Take 3 tablets (540 mg total) by mouth 2 (two)  times daily.     ONE DAILY MULTIVITAMIN per tablet  Generic drug: multivitamin  Take 1 tablet by mouth once daily.     predniSONE 5 MG tablet  Commonly known as: DELTASONE  Take 1 tablet (5 mg total) by mouth once daily.     tacrolimus 1 MG Cap  Commonly known as: PROGRAF  Take 3 capsules (3 mg total) by mouth every 12 (twelve) hours.     valGANciclovir 450 mg Tab  Commonly known as: VALCYTE  Take 2 tablets (900 mg total) by mouth every morning. Stop: 6/9/24     VELTASSA 8.4 gram Pwpk  Generic drug: patiromer calcium sorbitex  Take 1 packet (8.4 g total) by mouth once daily.            STOP taking these medications      famotidine 20 MG tablet  Commonly known as: PEPCID            Time spent caring for patient (Greater than 1/2 spent in direct face-to-face contact): > 30 minutes    Maren Harvey DNP  Kidney Transplant  [facility] - Transplant Stepdown

## 2024-05-23 NOTE — PROGRESS NOTES
Discharge Medication Note:    Hospital Course:      63 yo CM DDRT 3/11/24 IgA   Admit for purulent wound. Discharged with Abx for 10 days       Met with Colten Monet at discharge to review discharge medications and to update the blue medication card.           Medication List        START taking these medications      benzonatate 100 MG capsule  Commonly known as: TESSALON  Take 1 capsule (100 mg total) by mouth 3 (three) times daily as needed for Cough.            CHANGE how you take these medications      ciprofloxacin HCl 500 MG tablet  Commonly known as: CIPRO  Take 1 tablet (500 mg total) by mouth every 12 (twelve) hours. Stop 6/2/24 for 10 days  What changed: additional instructions            CONTINUE taking these medications      albuterol 90 mcg/actuation inhaler  Commonly known as: PROVENTIL/VENTOLIN HFA  Inhale 1-2 puffs into the lungs every 6 (six) hours as needed for Wheezing. Rescue     aspirin 81 MG EC tablet  Commonly known as: ECOTRIN  Take 1 tablet (81 mg total) by mouth once daily.     atorvastatin 40 MG tablet  Commonly known as: LIPITOR     atovaquone 750 mg/5 mL Susp oral liquid  Commonly known as: MEPRON  Take 10 mLs (1,500 mg total) by mouth once daily. Stop 9/7/24     bisacodyL 5 mg EC tablet  Commonly known as: DULCOLAX  Take 2 tablets (10 mg total) by mouth daily as needed for Constipation.     BREO ELLIPTA 100-25 mcg/dose diskus inhaler  Generic drug: fluticasone furoate-vilanteroL  Inhale 1 puff into the lungs once daily. Controller     docusate sodium 100 MG capsule  Commonly known as: COLACE  Take 1 capsule (100 mg total) by mouth 3 (three) times daily as needed for Constipation.     magnesium oxide 400 mg (241.3 mg magnesium) tablet  Commonly known as: MAG-OX  Take 2 tablets (800 mg total) by mouth 3 (three) times daily.     mycophenolate sodium 180 MG Tbec  Take 3 tablets (540 mg total) by mouth 2 (two) times daily.     ONE DAILY MULTIVITAMIN per tablet  Generic drug:  multivitamin     predniSONE 5 MG tablet  Commonly known as: DELTASONE  Take 1 tablet (5 mg total) by mouth once daily.     tacrolimus 1 MG Cap  Commonly known as: PROGRAF  Take 3 capsules (3 mg total) by mouth every 12 (twelve) hours.     valGANciclovir 450 mg Tab  Commonly known as: VALCYTE  Take 2 tablets (900 mg total) by mouth every morning. Stop: 6/9/24     VELTASSA 8.4 gram Pwpk  Generic drug: patiromer calcium sorbitex  Take 1 packet (8.4 g total) by mouth once daily.            STOP taking these medications      famotidine 20 MG tablet  Commonly known as: PEPCID               Where to Get Your Medications        These medications were sent to [facility]  [address] [address]      Hours: Always Open Phone: [phone]   benzonatate 100 MG capsule  ciprofloxacin HCl 500 MG tablet            The following medications have been placed on HOLD and should be restarted in the outpatient setting (when appropriate): Myfortic     Colten Monet verbalized understanding and had the opportunity to ask questions.

## 2024-05-23 NOTE — PROGRESS NOTES
Transplant Social Work Discharge Note:     Pt will discharge to patient's home under the care of self and patient's wife Ginna Monet phone number [phone].      Patient reports readiness to discharge at this time. Patient reports his friend Derke (currently in the room with him) will transport him home.  Per rounds this morning patient does not require referrals from this  at time of discharge.  Patient denied needing or wanting resources or referrals at this time.  Patient agrees to contact transplant team with needs, questions, or concerns as they arise.      Pt aware of, involved in, and coping well with this discharge plan. Pt did not have any concerns with the discharge plan at this time. SW remains available at [phone].

## 2024-05-23 NOTE — PLAN OF CARE
Pt admitted 5/21 for RLQ wound infection s/p kidney transplant 3/11/24. He is AAOx4 and ambulatory. VSS. Afebrile. Stable on RA. Denies pain. RLQ incision/wound has Vash packing and ABD pad in place, CDI.  Wound cultures are in process and wound care has been consulted.  IV abx switched to PO Cipro, WBC improved and now WNL. CT a/p (+) pulmonary opacities. RIP (+) human Metapneumovirus.  Blood cx and urine cx NGTD. Myfortic on hold. Bed in lowest locked position. Nonskid encouraged when oob. Call bell in reach.

## 2024-05-24 ENCOUNTER — PATIENT OUTREACH (OUTPATIENT)
Dept: ADMINISTRATIVE | Facility: CLINIC | Age: 64
End: 2024-05-24
Payer: COMMERCIAL

## 2024-05-24 LAB — BACTERIA SPEC AEROBE CULT: ABNORMAL

## 2024-05-26 LAB
BACTERIA BLD CULT: NORMAL
BACTERIA BLD CULT: NORMAL

## 2024-05-27 ENCOUNTER — LAB VISIT (OUTPATIENT)
Dept: LAB | Facility: HOSPITAL | Age: 64
End: 2024-05-27
Attending: INTERNAL MEDICINE
Payer: COMMERCIAL

## 2024-05-27 DIAGNOSIS — E83.42 HYPOMAGNESEMIA: ICD-10-CM

## 2024-05-27 DIAGNOSIS — Z94.0 KIDNEY REPLACED BY TRANSPLANT: ICD-10-CM

## 2024-05-27 LAB
ALBUMIN SERPL BCP-MCNC: 3.4 G/DL (ref 3.5–5.2)
ANION GAP SERPL CALC-SCNC: 9 MMOL/L (ref 8–16)
BASOPHILS # BLD AUTO: 0.11 K/UL (ref 0–0.2)
BASOPHILS NFR BLD: 0.9 % (ref 0–1.9)
BUN SERPL-MCNC: 30 MG/DL (ref 8–23)
CALCIUM SERPL-MCNC: 9.9 MG/DL (ref 8.7–10.5)
CHLORIDE SERPL-SCNC: 111 MMOL/L (ref 95–110)
CO2 SERPL-SCNC: 22 MMOL/L (ref 23–29)
CREAT SERPL-MCNC: 1.5 MG/DL (ref 0.5–1.4)
DIFFERENTIAL METHOD BLD: ABNORMAL
EOSINOPHIL # BLD AUTO: 0.1 K/UL (ref 0–0.5)
EOSINOPHIL NFR BLD: 0.7 % (ref 0–8)
ERYTHROCYTE [DISTWIDTH] IN BLOOD BY AUTOMATED COUNT: 13.2 % (ref 11.5–14.5)
EST. GFR  (NO RACE VARIABLE): 51 ML/MIN/1.73 M^2
GLUCOSE SERPL-MCNC: 103 MG/DL (ref 70–110)
HCT VFR BLD AUTO: 39.4 % (ref 40–54)
HGB BLD-MCNC: 11.7 G/DL (ref 14–18)
IMM GRANULOCYTES # BLD AUTO: 0.33 K/UL (ref 0–0.04)
IMM GRANULOCYTES NFR BLD AUTO: 2.8 % (ref 0–0.5)
LYMPHOCYTES # BLD AUTO: 1.8 K/UL (ref 1–4.8)
LYMPHOCYTES NFR BLD: 15.1 % (ref 18–48)
MAGNESIUM SERPL-MCNC: 1.4 MG/DL (ref 1.6–2.6)
MCH RBC QN AUTO: 26.8 PG (ref 27–31)
MCHC RBC AUTO-ENTMCNC: 29.7 G/DL (ref 32–36)
MCV RBC AUTO: 90 FL (ref 82–98)
MONOCYTES # BLD AUTO: 0.7 K/UL (ref 0.3–1)
MONOCYTES NFR BLD: 6 % (ref 4–15)
NEUTROPHILS # BLD AUTO: 8.8 K/UL (ref 1.8–7.7)
NEUTROPHILS NFR BLD: 74.5 % (ref 38–73)
NRBC BLD-RTO: 0 /100 WBC
PHOSPHATE SERPL-MCNC: 3.1 MG/DL (ref 2.7–4.5)
PLATELET # BLD AUTO: 335 K/UL (ref 150–450)
PMV BLD AUTO: 9.7 FL (ref 9.2–12.9)
POTASSIUM SERPL-SCNC: 4.8 MMOL/L (ref 3.5–5.1)
RBC # BLD AUTO: 4.36 M/UL (ref 4.6–6.2)
SODIUM SERPL-SCNC: 142 MMOL/L (ref 136–145)
TACROLIMUS BLD-MCNC: 12.5 NG/ML (ref 5–15)
WBC # BLD AUTO: 11.83 K/UL (ref 3.9–12.7)

## 2024-05-27 PROCEDURE — 80197 ASSAY OF TACROLIMUS: CPT | Performed by: INTERNAL MEDICINE

## 2024-05-27 PROCEDURE — 83735 ASSAY OF MAGNESIUM: CPT | Performed by: INTERNAL MEDICINE

## 2024-05-27 PROCEDURE — 36415 COLL VENOUS BLD VENIPUNCTURE: CPT | Performed by: INTERNAL MEDICINE

## 2024-05-27 PROCEDURE — 85025 COMPLETE CBC W/AUTO DIFF WBC: CPT | Performed by: INTERNAL MEDICINE

## 2024-05-27 PROCEDURE — 80069 RENAL FUNCTION PANEL: CPT | Performed by: INTERNAL MEDICINE

## 2024-05-28 ENCOUNTER — HOSPITAL ENCOUNTER (OUTPATIENT)
Dept: WOUND CARE | Facility: HOSPITAL | Age: 64
Discharge: HOME | End: 2024-05-28
Attending: FAMILY MEDICINE
Payer: COMMERCIAL

## 2024-05-28 VITALS
DIASTOLIC BLOOD PRESSURE: 78 MMHG | SYSTOLIC BLOOD PRESSURE: 149 MMHG | RESPIRATION RATE: 18 BRPM | TEMPERATURE: 98 F | HEART RATE: 104 BPM

## 2024-05-28 DIAGNOSIS — Z94.0 KIDNEY TRANSPLANT RECIPIENT: ICD-10-CM

## 2024-05-28 DIAGNOSIS — T81.31XA DEHISCENCE OF SURGICAL WOUND FOLLOWING TRANSPLANT, INITIAL ENCOUNTER: Primary | ICD-10-CM

## 2024-05-28 PROCEDURE — 99204 OFFICE O/P NEW MOD 45 MIN: CPT | Mod: ,,, | Performed by: FAMILY MEDICINE

## 2024-05-28 PROCEDURE — 99203 OFFICE O/P NEW LOW 30 MIN: CPT

## 2024-05-28 RX ORDER — TACROLIMUS 1 MG/1
2 CAPSULE ORAL EVERY 12 HOURS
Qty: 120 CAPSULE | Refills: 11 | Status: SHIPPED | OUTPATIENT
Start: 2024-05-28 | End: 2024-06-04

## 2024-05-28 NOTE — TELEPHONE ENCOUNTER
Called and LVM instructing pt to lower prograf dose to 2mg twice a day. Repeat labs scheduled Monday.  ----- Message -----  From: Candido Arnold MD  Sent: 5/27/2024   2:41 PM CDT  To: [facility] Post-Kidney Transplant Clinical    Please lower prograf to 2 mg PO bid and repeat a level in 1 week

## 2024-05-28 NOTE — PROGRESS NOTES
[facility]                Progress Note    Subjective:       Patient ID: Colten oMnet is a 64 y.o. male.    Chief Complaint: Dressing Change, Wound Care, and Wound Check    Wound care consult for RLQ abdominal wound .Pt arrived to [facility] ambulating without any issues accompanied by spouse. Pt denies any fever, chills, or flu-like symptoms; denies pain/discomfort. Pt reports not having any issues with drsg since last visit. Wife reports changing drsg daily with vashe soaked gauze and a bordered foam. Dressing appears clean, dry, and intact with no strikethrough drainage. Drsg removed & wound cleaned by RN. Changes made to drsg order; applied per MD orders. Pt will return to [facility] in 1 wk. Pt and spouse educated to keep dressing in place and dry until next weeks visit. AVS printed for pt.    MD note:  patient presenting today for wound care to surgical site for renal transplant that dehisced.  He states that it has been open for about a month now.  He was admitted for an infection a few weeks ago an received IV antibiotics and is currently on Cipro.  His aerobic culture in hospital was positive for pseudomonas.  His WBC's while inpatient were elevated, but normalized by discharge.  There was a yellow drainage initially, but that has changed to a mild serous drainage.  His wife has been changing the dressings (vashe soaked gauze) daily.  He denies any fevers, chills, or sweats.  He states that the surgeon initially wanted a wound vac, but due to not being able to get in to wound care it wasn't approved.  He does not have home health due to not having a company that will go as far as where he lives in [place].  He does report the size of the wound has gotten smaller.  He has not new concerns other than getting the wound healed.      Dressing Change    Wound Check      Review of Systems   Constitutional: Negative.    HENT: Negative.     Eyes: Negative.    Respiratory:  Negative.     Cardiovascular: Negative.    Gastrointestinal: Negative.    Skin:  Positive for wound.         Objective:        Physical Exam  Constitutional:       Appearance: Normal appearance.   HENT:      Head: Normocephalic and atraumatic.      Right Ear: External ear normal.      Left Ear: External ear normal.      Mouth/Throat:      Mouth: Mucous membranes are moist.      Pharynx: Oropharynx is clear.   Eyes:      Extraocular Movements: Extraocular movements intact.      Conjunctiva/sclera: Conjunctivae normal.      Pupils: Pupils are equal, round, and reactive to light.   Cardiovascular:      Rate and Rhythm: Normal rate and regular rhythm.   Abdominal:      General: There is no distension.      Palpations: Abdomen is soft.   Skin:     General: Skin is warm.      Comments: +surgical site dehiscence without slough, but starting to show signs of epibole; no maceration to periwound and no slough in wound bed; no periwound erythema   Neurological:      Mental Status: He is alert and oriented to person, place, and time. Mental status is at baseline.         Vitals:    05/28/24 0825   BP: (!) 149/78   Pulse: 104   Resp: 18   Temp: 97.6 °F (36.4 °C)       Assessment:           ICD-10-CM ICD-9-CM   1. Dehiscence of surgical wound following transplant, initial encounter  T81.31XA 998.32            Wound 05/10/24 1016 Other (comment) Right Abdomen (Active)   05/10/24 1016   Present on Original Admission: Y   Primary Wound Type: Other   Side: Right   Orientation:    Location: Abdomen   Wound Approximate Age at First Assessment (Weeks):    Wound Number:    Is this injury device related?:    Incision Type:    Closure Method:    Wound Description (Comments):    Type:    Additional Comments:    Ankle-Brachial Index:    Pulses:    Removal Indication and Assessment:    Wound Outcome:    Wound Image   05/28/24 0828   Dressing Appearance Dry;Intact;Clean 05/28/24 0828   Drainage Amount Small 05/28/24 0828   Drainage  "Characteristics/Odor Serosanguineous 05/28/24 0828   Appearance Pink;Red;Moist 05/28/24 0828   Tissue loss description Full thickness 05/28/24 0828   Red (%), Wound Tissue Color 90 % 05/28/24 0828   Yellow (%), Wound Tissue Color 10 % 05/28/24 0828   Wound Edges Defined 05/28/24 0828   Wound Length (cm) 0.5 cm 05/28/24 0828   Wound Width (cm) 1.2 cm 05/28/24 0828   Wound Depth (cm) 1.2 cm 05/28/24 0828   Wound Volume (cm^3) 0.72 cm^3 05/28/24 0828   Wound Surface Area (cm^2) 0.6 cm^2 05/28/24 0828   Tunneling (depth (cm)/location) 1.5cm@3:00 05/28/24 0828   Care Cleansed with:;Antimicrobial agent;Sterile normal saline 05/28/24 0828   Dressing Applied 05/28/24 0828   Packing other (see comment) 05/28/24 0828   Periwound Care Absorptive dressing applied 05/28/24 0828   Dressing Change Due 06/03/24 05/28/24 0828           Plan:              Tissue pathology and/or culture taken     [] Yes      [x] No  Sharp debridement performed                   [] Yes       [x] No  Labs ordered     [] Yes       [x] No  Imaging ordered    [] Yes      [x] No    Orders Placed This Encounter   Procedures    Change dressing     Wound Dressing Orders    Dressing change frequency once a week and prn if soiled  Remove old dressing  Cleanse or irrigate with: Normal Saline  Protect periwound with:  Maintain dry  Primary dressing: WOUND BASE: Endoform and Hydrofera Blue Ready Transfer  Secondary dressing: MextraSizes: size used: 5"x5" secured with medipore tape    Pt to return to clinic in 1 wk for follow up     Patient to keep dressing in place unless excess drainage or if there begins to have pain or odor from wound  He would prefer to come on Monday as he has to come on Mondays for routine labs given recent transplant.  He and his wife were instructed to call with any concerns regarding the wound.   Follow up in about 1 week (around 6/4/2024) for Wound Care.       Valentin Veloz MD    "

## 2024-06-03 ENCOUNTER — HOSPITAL ENCOUNTER (OUTPATIENT)
Dept: WOUND CARE | Facility: HOSPITAL | Age: 64
Discharge: HOME | End: 2024-06-03
Attending: INTERNAL MEDICINE
Payer: COMMERCIAL

## 2024-06-03 ENCOUNTER — LAB VISIT (OUTPATIENT)
Dept: LAB | Facility: HOSPITAL | Age: 64
End: 2024-06-03
Attending: INTERNAL MEDICINE
Payer: COMMERCIAL

## 2024-06-03 VITALS
DIASTOLIC BLOOD PRESSURE: 88 MMHG | HEART RATE: 75 BPM | RESPIRATION RATE: 18 BRPM | SYSTOLIC BLOOD PRESSURE: 188 MMHG | TEMPERATURE: 98 F

## 2024-06-03 DIAGNOSIS — E83.42 HYPOMAGNESEMIA: ICD-10-CM

## 2024-06-03 DIAGNOSIS — T81.31XD DEHISCENCE OF SURGICAL WOUND FOLLOWING TRANSPLANT, SUBSEQUENT ENCOUNTER: Primary | ICD-10-CM

## 2024-06-03 DIAGNOSIS — Z94.0 KIDNEY REPLACED BY TRANSPLANT: ICD-10-CM

## 2024-06-03 LAB
ALBUMIN SERPL BCP-MCNC: 3.5 G/DL (ref 3.5–5.2)
ANION GAP SERPL CALC-SCNC: 3 MMOL/L (ref 8–16)
BASOPHILS # BLD AUTO: 0.11 K/UL (ref 0–0.2)
BASOPHILS NFR BLD: 1 % (ref 0–1.9)
BUN SERPL-MCNC: 31 MG/DL (ref 8–23)
CALCIUM SERPL-MCNC: 9.3 MG/DL (ref 8.7–10.5)
CHLORIDE SERPL-SCNC: 114 MMOL/L (ref 95–110)
CO2 SERPL-SCNC: 26 MMOL/L (ref 23–29)
CREAT SERPL-MCNC: 1.4 MG/DL (ref 0.5–1.4)
DIFFERENTIAL METHOD BLD: ABNORMAL
EOSINOPHIL # BLD AUTO: 0 K/UL (ref 0–0.5)
EOSINOPHIL NFR BLD: 0.4 % (ref 0–8)
ERYTHROCYTE [DISTWIDTH] IN BLOOD BY AUTOMATED COUNT: 13.6 % (ref 11.5–14.5)
EST. GFR  (NO RACE VARIABLE): 56 ML/MIN/1.73 M^2
GLUCOSE SERPL-MCNC: 94 MG/DL (ref 70–110)
HCT VFR BLD AUTO: 37.8 % (ref 40–54)
HGB BLD-MCNC: 11.2 G/DL (ref 14–18)
IMM GRANULOCYTES # BLD AUTO: 0.1 K/UL (ref 0–0.04)
IMM GRANULOCYTES NFR BLD AUTO: 0.9 % (ref 0–0.5)
LYMPHOCYTES # BLD AUTO: 1.9 K/UL (ref 1–4.8)
LYMPHOCYTES NFR BLD: 17.3 % (ref 18–48)
MAGNESIUM SERPL-MCNC: 1.9 MG/DL (ref 1.6–2.6)
MCH RBC QN AUTO: 27.7 PG (ref 27–31)
MCHC RBC AUTO-ENTMCNC: 29.6 G/DL (ref 32–36)
MCV RBC AUTO: 93 FL (ref 82–98)
MONOCYTES # BLD AUTO: 0.6 K/UL (ref 0.3–1)
MONOCYTES NFR BLD: 5.1 % (ref 4–15)
NEUTROPHILS # BLD AUTO: 8 K/UL (ref 1.8–7.7)
NEUTROPHILS NFR BLD: 75.3 % (ref 38–73)
NRBC BLD-RTO: 0 /100 WBC
PHOSPHATE SERPL-MCNC: 2.8 MG/DL (ref 2.7–4.5)
PLATELET # BLD AUTO: 316 K/UL (ref 150–450)
PMV BLD AUTO: 9.1 FL (ref 9.2–12.9)
POTASSIUM SERPL-SCNC: 5.2 MMOL/L (ref 3.5–5.1)
RBC # BLD AUTO: 4.05 M/UL (ref 4.6–6.2)
SODIUM SERPL-SCNC: 143 MMOL/L (ref 136–145)
WBC # BLD AUTO: 10.69 K/UL (ref 3.9–12.7)

## 2024-06-03 PROCEDURE — 80197 ASSAY OF TACROLIMUS: CPT | Performed by: INTERNAL MEDICINE

## 2024-06-03 PROCEDURE — 85025 COMPLETE CBC W/AUTO DIFF WBC: CPT | Performed by: INTERNAL MEDICINE

## 2024-06-03 PROCEDURE — 36415 COLL VENOUS BLD VENIPUNCTURE: CPT | Performed by: INTERNAL MEDICINE

## 2024-06-03 PROCEDURE — 99214 OFFICE O/P EST MOD 30 MIN: CPT | Mod: ,,, | Performed by: INTERNAL MEDICINE

## 2024-06-03 PROCEDURE — 99213 OFFICE O/P EST LOW 20 MIN: CPT | Performed by: INTERNAL MEDICINE

## 2024-06-03 PROCEDURE — 80069 RENAL FUNCTION PANEL: CPT | Performed by: INTERNAL MEDICINE

## 2024-06-03 PROCEDURE — 83735 ASSAY OF MAGNESIUM: CPT | Performed by: INTERNAL MEDICINE

## 2024-06-03 NOTE — PROGRESS NOTES
[facility]                Progress Note    Subjective:       Patient ID: Colten Monet is a 64 y.o. male.    Chief Complaint: Non-healing Wound Follow Up, Dressing Change, and Wound Care    Patient was seen today for follow up wound care visit. Patient ambulated to the exam room without any issues with patient's wife by side. He denies pain or discomfort at the wound bed. Denies fever, chills, nausea, vomiting, or diarrhea at present. Dressing intact without strike through drainage. Patient's wife was present throughout the exam. Dressing orders updated and applied by LPN.     Return to clinic in 1 week.    Outer dressing (endoform and hydrafera blue) have remained in place since last visit 5/28. Wife changed outer gauze two days ago no signifcant strike through drainage no fevers/chills. He completed ciprofloxacin one day ago (for culture positive fluid growing pseudomonas) moving bowels daily w/o straining he has resumed MMF and continues on tacrolimus (level drawn today) no skin rashes      Review of Systems      Objective:        Physical Exam  Constitutional:       General: He is not in acute distress.     Appearance: He is obese.   Eyes:      General: No scleral icterus.  Abdominal:      General: There is no distension.      Palpations: Abdomen is soft.      Tenderness: There is no abdominal tenderness. There is no rebound.   Skin:     Comments: Right lower abdomen shows small (0.5cm) superficial opening without surrounding fibrin no expressible discharge with mainpulation/massage of periwound skin using qtip there is tract to midline aproximately 1.3cm without loculations or discharge   Neurological:      Mental Status: He is alert.         Vitals:    06/03/24 0942   BP: (!) 188/88   Pulse: 75   Resp: 18   Temp: 97.7 °F (36.5 °C)       Assessment:           ICD-10-CM ICD-9-CM   1. Dehiscence of surgical wound following transplant, initial encounter  T81.31XA  998.32            Wound 05/10/24 1016 Other (comment) Right Abdomen (Active)   05/10/24 1016   Present on Original Admission: Y   Primary Wound Type: Other   Side: Right   Orientation:    Location: Abdomen   Wound Approximate Age at First Assessment (Weeks):    Wound Number:    Is this injury device related?:    Incision Type:    Closure Method:    Wound Description (Comments):    Type:    Additional Comments:    Ankle-Brachial Index:    Pulses:    Removal Indication and Assessment:    Wound Outcome:    Wound Image   06/03/24 0944   Dressing Appearance Dry;Intact;Clean 06/03/24 0944   Drainage Amount Scant 06/03/24 0944   Drainage Characteristics/Odor Serosanguineous 06/03/24 0944   Appearance Red;Moist 06/03/24 0944   Tissue loss description Full thickness 06/03/24 0944   Red (%), Wound Tissue Color 100 % 06/03/24 0944   Periwound Area Redness 06/03/24 0944   Wound Edges Open;Defined 06/03/24 0944   Wound Length (cm) 0.5 cm 06/03/24 0944   Wound Width (cm) 0.2 cm 06/03/24 0944   Wound Depth (cm) 0.4 cm 06/03/24 0944   Wound Volume (cm^3) 0.04 cm^3 06/03/24 0944   Wound Surface Area (cm^2) 0.1 cm^2 06/03/24 0944   Tunneling (depth (cm)/location) 1.4cm@3 o'clock 06/03/24 0944   Care Cleansed with:;Sterile normal saline 06/03/24 0944   Dressing Applied;Collagen;Absorptive Pad;Island/border 06/03/24 0944   Packing other (see comment) 06/03/24 0944   Periwound Care Dry periwound area maintained 06/03/24 0944   Dressing Change Due 06/10/24 06/03/24 0944           Plan:          No evidence of continued cellulitis, continue endoform to opening. There is no absecss to subcutaneous skin with probing wound with decrease in depth of medial running tunnel since visit six days ago. No indicaiton for further packing good bowel function no evidence of enterocutaneous fistula. Continue with hydrafera blue only change outer dressing prn for an drainage return in one week for wound check no debridement required today    Tissue  "pathology and/or culture taken     [] Yes      [x] No  Sharp debridement performed                   [] Yes       [x] No  Labs ordered     [] Yes       [x] No  Imaging ordered    [] Yes      [x] No    Orders Placed This Encounter   Procedures    Change dressing     Wound Dressing Orders    Dressing change frequency once a week and prn if soiled  Remove old dressing  Cleanse or irrigate with: Normal Saline  Protect periwound with:  Maintain dry  Primary dressing: WOUND BASE: Endoform   Secondary dressing: Mextra size used: 5"x5" secured with medipore tape    Pt to return to clinic in 1 wk for follow up        Follow up in about 1 week (around 6/10/2024) for wound care.         "

## 2024-06-04 DIAGNOSIS — Z94.0 KIDNEY TRANSPLANT RECIPIENT: ICD-10-CM

## 2024-06-04 LAB — TACROLIMUS BLD-MCNC: 12 NG/ML (ref 5–15)

## 2024-06-04 RX ORDER — TACROLIMUS 1 MG/1
1 CAPSULE ORAL EVERY 12 HOURS
Qty: 60 CAPSULE | Refills: 11 | Status: SHIPPED | OUTPATIENT
Start: 2024-06-04 | End: 2024-06-10

## 2024-06-04 NOTE — PROGRESS NOTES
Please verify he is taking the dose as prescribed, if he is, lower tacro to 1 mg po bid   Ask for any new medication ordered by other providers

## 2024-06-06 NOTE — PROGRESS NOTES
Kidney Post-Transplant Assessment    Referring Physician: Colby Rene  Current Nephrologist: Colby Rene    ORGAN: LEFT KIDNEY  Donor Type: living  PHS Increased Risk: no  Cold Ischemia: 45 mins  Induction Medications:      Subjective:     CC:  Reassessment of renal allograft function and management of chronic immunosuppression.    HPI:  Mr. Monet is a 64 y.o. year old White male with PMH ESRD 2/2 IGA nephropathy, who received a living kidney transplant on 3/11/24. His most recent creatinine is 1.5. He takes mycophenolic acid, prednisone, and tacrolimus for maintenance immunosuppression.    Hx COPD--needs to f/u with his local pulmonology outpatient      readmitted 4/8/24-4/25/24  for worsening cellulitis of kidney transplant incision, incisional hernia, and HARRISON. Cefepime and vanc started   OR 4/9/24 for repair of dehiscence of kidney transplant incision and hernia repair plus washout. IV abx transitioned to PO levaquin and doxy x 5 days (EOT 4/15/24).  HARRISON: Worsened post OR with associated hyperkalemia. Required several interventions (shifting, lasix, IVF). Renal function and hyperkalemia both improved, now back to baseline.   Ileus: Patient w/ N/V post take back, KUB 4/10 with ileus, NPO for bowel rest 4/11. Enema ordered 4/14 w/ 3 large BM, however no improvement in N/V. Remained w/ ileus. NGT placement 4/16. PPN started 4/18. NGT removed 4/23. Resolved  Wound infection: 5/10 + for pseudomas; w-d dressings  He was initially started on broad spectrum IV antibiotics then switched to cipro for 10 day course. MMF on hold while on antibiotics.       Interval HX:  Intake ~ 2.5 Ldaily   UOP: 2L  BP   BP Readings from Last 3 Encounters:   06/10/24 (!) 174/92   06/03/24 (!) 188/88   05/28/24 (!) 149/78     Peripheral edema --none   Weight: stable  Appetite: good  Wound-- is following with wound care--reports going to wound care at 2pm today. Reports small opening doing regular dressing changes    fx assessment :  "denies fever or N/V/D, tolerating IS meds well  Lab /diagnostic results reviewed with patient today.   All questions answered      Past Medical History:   Diagnosis Date    Anemia     CVA (cerebral vascular accident)     GERD (gastroesophageal reflux disease)     Hyperlipidemia     Hypertension     IgA nephropathy     Obesity        Review of Systems   Constitutional:  Negative for activity change, appetite change, chills, fatigue, fever and unexpected weight change.   HENT:  Negative for congestion, facial swelling, postnasal drip, rhinorrhea, sinus pressure, sore throat and trouble swallowing.    Eyes:  Negative for pain, redness and visual disturbance.   Respiratory:  Negative for cough, chest tightness, shortness of breath and wheezing.    Cardiovascular: Negative.  Negative for chest pain, palpitations and leg swelling.   Gastrointestinal:  Negative for abdominal pain, diarrhea, nausea and vomiting.   Genitourinary:  Negative for dysuria, flank pain and urgency.   Musculoskeletal:  Negative for gait problem, neck pain and neck stiffness.   Skin:  Positive for wound. Negative for rash.   Allergic/Immunologic: Positive for immunocompromised state. Negative for environmental allergies and food allergies.   Neurological:  Negative for dizziness, weakness, light-headedness and headaches.   Psychiatric/Behavioral:  Negative for agitation and confusion. The patient is not nervous/anxious.        Objective:   Blood pressure (!) 174/92, pulse 84, temperature 97.3 °F (36.3 °C), temperature source Temporal, resp. rate 16, height 5' 7.01" (1.702 m), weight 89.3 kg (196 lb 13.9 oz), SpO2 100%.body mass index is 30.83 kg/m².    Physical Exam  Constitutional:       Appearance: Normal appearance. He is well-developed.   HENT:      Head: Normocephalic.      Nose: Nose normal.   Eyes:      Conjunctiva/sclera: Conjunctivae normal.      Pupils: Pupils are equal, round, and reactive to light.   Cardiovascular:      Rate and Rhythm: " "Normal rate and regular rhythm.      Heart sounds: Normal heart sounds.   Pulmonary:      Effort: Pulmonary effort is normal.      Breath sounds: Normal breath sounds.   Abdominal:      General: Bowel sounds are normal.      Palpations: Abdomen is soft. There is no hepatomegaly or splenomegaly.      Comments:  No bruit noted over the allograft   RLQ dressing dry and intact      Musculoskeletal:      Cervical back: Normal range of motion and neck supple.   Skin:     General: Skin is warm and dry.   Neurological:      Mental Status: He is alert and oriented to person, place, and time.   Psychiatric:         Behavior: Behavior normal.         Labs:  Lab Results   Component Value Date    WBC 10.18 06/10/2024    HGB 11.7 (L) 06/10/2024    HCT 39.4 (L) 06/10/2024     06/10/2024    K 4.8 06/10/2024     (H) 06/10/2024    CO2 23 06/10/2024    BUN 25 (H) 06/10/2024    CREATININE 1.4 06/10/2024    EGFRNORACEVR 55.8 (A) 06/10/2024    GLUCOSE 76 09/22/2020    CALCIUM 9.5 06/10/2024    PHOS 3.4 06/10/2024    MG 1.8 06/10/2024    ALBUMIN 3.8 06/10/2024    ALBUMIN 3.8 06/10/2024    AST 13 06/10/2024    ALT 19 06/10/2024    UTPCR 0.11 05/13/2024    .0 (H) 05/25/2021    TACROLIMUS 12.0 06/03/2024       No results found for: "EXTANC", "EXTWBC", "EXTSEGS", "EXTPLATELETS", "EXTHEMOGLOBI", "EXTHEMATOCRI", "EXTCREATININ", "EXTSODIUM", "EXTPOTASSIUM", "EXTBUN", "EXTCO2", "EXTCALCIUM", "EXTPHOSPHORU", "EXTGLUCOSE", "EXTALBUMIN", "EXTAST", "EXTALT", "EXTBILITOTAL", "EXTLIPASE", "EXTAMYLASE"    No results found for: "EXTCYCLOSLVL", "EXTSIROLIMUS", "EXTTACROLVL", "EXTPROTCRE", "EXTPTHINTACT", "EXTPROTEINUA", "EXTWBCUA", "EXTRBCUA"    Labs were reviewed with the patient    Assessment:     1. S/P living-donor kidney transplantation    2. Stage 3a chronic kidney disease    3. Immunosuppression    4. Long-term use of immunosuppressant medication    5. IgA nephropathy          Plan:    Delayed wound healing:  Cont to f/u with txp " surgery and wound care --mgmt deferred   Pending Tac level  Blood pressure remains elevated--- started on nifedipine 30mg daily, continue to monitor BP and turn BP log into nurse next week for review    1) s/p living related kidney transplant              - Graft function CKD 3a   -FK Trough pending    - cont on FK  1/1   - cont on  mg BID   -prednisone     -atovaquone  QD for PCP prophylaxis   -  Valcyte 900 mg QD for CMV prophylaxis--completed  -Will continue to monitor for drug toxicities      Latest Reference Range & Units 2mo 4wk,  Kidney-Post 1 Year  06/10/24 07:30   Creatinine 0.5 - 1.4 mg/dL 1.4   eGFR >60 mL/min/1.73 m^2 55.8 !   Glucose 70 - 110 mg/dL 105             2) Uncontrolled HTN: advise low salt diet and home BP monitoring  -   started on nifedipine   BP Readings from Last 3 Encounters:   06/10/24 (!) 174/92   06/03/24 (!) 188/88   05/28/24 (!) 149/78        3) Anemia:                - no intervention required ,will continue to monitor/ guidelines   Lab Results   Component Value Date    WBC 10.18 06/10/2024    HGB 11.7 (L) 06/10/2024    HCT 39.4 (L) 06/10/2024    MCV 95 06/10/2024     06/10/2024          5) Hypophosphatemia:Secondary hyperparathyroidism:  - no intervention required ,will continue to monitor/ guidelines                -continue KPN, Mg ox 800 mg TID     Lab Results   Component Value Date    .0 (H) 05/25/2021    CALCIUM 9.5 06/10/2024    PHOS 3.4 06/10/2024      Latest Reference Range & Units 2mo 4wk,  Kidney-Post 1 Year  06/10/24   Magnesium  1.6 - 2.6 mg/dL 1.8       6) Metabolic acidosis/Electrolyte balance:  - no intervention required ,will continue to monitor/ guidelines    -  low sodium diet, increase water intake   Lab Results   Component Value Date     06/10/2024    K 4.8 06/10/2024     (H) 06/10/2024    CO2 23 06/10/2024         7) hypomagnesemia:  - no intervention required ,will continue to monitor/ guidelines    Latest Reference Range &  Units 2mo 4wk,  Kidney-Post 1 Year  06/10/24 07:30   Magnesium  1.6 - 2.6 mg/dL 1.8       8) Cytopenias: no significant cytopenias will monitor as per our guidelines. Medicine list reviewed including potential causes of drug-induced cytopenias    9) Proteinuria: continue p/c ratio as per guidelines   Protein Creatinine Ratios:  Creatinine, Urine   Date Value Ref Range Status   05/13/2024 93.0 23.0 - 375.0 mg/dL Final   03/11/2024 105.0 23.0 - 375.0 mg/dL Final     Protein, Urine Random   Date Value Ref Range Status   05/13/2024 10 0 - 15 mg/dL Final   03/11/2024 73 (H) 0 - 15 mg/dL Final     Prot/Creat Ratio, Urine   Date Value Ref Range Status   05/13/2024 0.11 0.00 - 0.20 Final   03/11/2024 0.70 (H) 0.00 - 0.20 Final        .     10) BK virus infection screening:  will continue to monitor/ guidelines   Latest Reference Range & Units POD 63,  Kidney-Post 1 Year  05/13/24   BK Virus DNA, Blood Not detected  Not detected   BK Virus DNA PCR, Quant, Blood <125 Copies/mL <125       11) Weight education: provided during the clinic visit   Body mass index is 30.83 kg/m².  .     Follow-up:   Clinic: return to transplant clinic weekly for the first month after transplant; every 2 weeks during months 2-3; then at 6-, 9-, 12-, 18-, 24-, and 36- months post-transplant to reassess for complications from immunosuppression toxicity and monitor for rejection.  Annually thereafter.    Labs: since patient remains at high risk for rejection and drug-related complications that warrant close monitoring, labs will be ordered as follows: continue twice weekly CBC, renal panel, and drug level for first month; then same labs once weekly through 3rd month post-transplant.  Urine for UA and protein/creatinine ratio monthly.  Serum BK - PCR at 1-, 3-, 6-, 9-, 12-, 18-, 24-, 36-, 48-, and 60 months post-transplant.  Hepatic panel at 1-, 2-, 3-, 6-, 9-, 12-, 18-, 24-, and 36- months post-transplant.    Kathryn Hernandez NP       Education:    Material provided to the patient.  Patient reminded to call with any health changes since these can be early signs of significant complications.  Also, I advised the patient to be sure any new medications or changes of old medications are discussed with either a pharmacist or physician knowledgeable with transplant to avoid rejection/drug toxicity related to significant drug interactions.    Patient advised that it is recommended that all transplanted patients, and their close contacts and household members receive Covid vaccination.

## 2024-06-10 ENCOUNTER — HOSPITAL ENCOUNTER (OUTPATIENT)
Dept: WOUND CARE | Facility: HOSPITAL | Age: 64
Discharge: HOME | End: 2024-06-10
Attending: INTERNAL MEDICINE
Payer: COMMERCIAL

## 2024-06-10 ENCOUNTER — LAB VISIT (OUTPATIENT)
Dept: LAB | Facility: HOSPITAL | Age: 64
End: 2024-06-10
Attending: INTERNAL MEDICINE
Payer: COMMERCIAL

## 2024-06-10 ENCOUNTER — OFFICE VISIT (OUTPATIENT)
Dept: TRANSPLANT | Facility: CLINIC | Age: 64
End: 2024-06-10
Payer: COMMERCIAL

## 2024-06-10 VITALS
HEART RATE: 84 BPM | DIASTOLIC BLOOD PRESSURE: 92 MMHG | HEIGHT: 67 IN | WEIGHT: 196.88 LBS | BODY MASS INDEX: 30.9 KG/M2 | SYSTOLIC BLOOD PRESSURE: 174 MMHG | TEMPERATURE: 97 F | RESPIRATION RATE: 16 BRPM | OXYGEN SATURATION: 100 %

## 2024-06-10 VITALS
HEART RATE: 92 BPM | DIASTOLIC BLOOD PRESSURE: 78 MMHG | SYSTOLIC BLOOD PRESSURE: 154 MMHG | TEMPERATURE: 98 F | RESPIRATION RATE: 18 BRPM

## 2024-06-10 DIAGNOSIS — D84.9 IMMUNOSUPPRESSION: ICD-10-CM

## 2024-06-10 DIAGNOSIS — E83.42 HYPOMAGNESEMIA: ICD-10-CM

## 2024-06-10 DIAGNOSIS — Z94.0 KIDNEY REPLACED BY TRANSPLANT: ICD-10-CM

## 2024-06-10 DIAGNOSIS — Z94.0 S/P LIVING-DONOR KIDNEY TRANSPLANTATION: Primary | ICD-10-CM

## 2024-06-10 DIAGNOSIS — T81.31XD DEHISCENCE OF SURGICAL WOUND FOLLOWING TRANSPLANT, SUBSEQUENT ENCOUNTER: Primary | ICD-10-CM

## 2024-06-10 DIAGNOSIS — Z79.60 LONG-TERM USE OF IMMUNOSUPPRESSANT MEDICATION: ICD-10-CM

## 2024-06-10 DIAGNOSIS — Z94.0 KIDNEY TRANSPLANT RECIPIENT: ICD-10-CM

## 2024-06-10 DIAGNOSIS — N18.31 STAGE 3A CHRONIC KIDNEY DISEASE: ICD-10-CM

## 2024-06-10 DIAGNOSIS — N02.B9 IGA NEPHROPATHY: ICD-10-CM

## 2024-06-10 LAB
ALBUMIN SERPL BCP-MCNC: 3.8 G/DL (ref 3.5–5.2)
ALBUMIN SERPL BCP-MCNC: 3.8 G/DL (ref 3.5–5.2)
ALP SERPL-CCNC: 78 U/L (ref 55–135)
ALT SERPL W/O P-5'-P-CCNC: 19 U/L (ref 10–44)
ANION GAP SERPL CALC-SCNC: 7 MMOL/L (ref 8–16)
AST SERPL-CCNC: 13 U/L (ref 10–40)
BASOPHILS # BLD AUTO: 0.08 K/UL (ref 0–0.2)
BASOPHILS NFR BLD: 0.8 % (ref 0–1.9)
BILIRUB DIRECT SERPL-MCNC: 0.2 MG/DL (ref 0.1–0.3)
BILIRUB SERPL-MCNC: 0.5 MG/DL (ref 0.1–1)
BUN SERPL-MCNC: 25 MG/DL (ref 8–23)
CALCIUM SERPL-MCNC: 9.5 MG/DL (ref 8.7–10.5)
CHLORIDE SERPL-SCNC: 111 MMOL/L (ref 95–110)
CO2 SERPL-SCNC: 23 MMOL/L (ref 23–29)
CREAT SERPL-MCNC: 1.4 MG/DL (ref 0.5–1.4)
DIFFERENTIAL METHOD BLD: ABNORMAL
EOSINOPHIL # BLD AUTO: 0.1 K/UL (ref 0–0.5)
EOSINOPHIL NFR BLD: 0.6 % (ref 0–8)
ERYTHROCYTE [DISTWIDTH] IN BLOOD BY AUTOMATED COUNT: 14.4 % (ref 11.5–14.5)
EST. GFR  (NO RACE VARIABLE): 55.8 ML/MIN/1.73 M^2
GLUCOSE SERPL-MCNC: 105 MG/DL (ref 70–110)
HCT VFR BLD AUTO: 39.4 % (ref 40–54)
HGB BLD-MCNC: 11.7 G/DL (ref 14–18)
IMM GRANULOCYTES # BLD AUTO: 0.08 K/UL (ref 0–0.04)
IMM GRANULOCYTES NFR BLD AUTO: 0.8 % (ref 0–0.5)
LYMPHOCYTES # BLD AUTO: 1.7 K/UL (ref 1–4.8)
LYMPHOCYTES NFR BLD: 16.7 % (ref 18–48)
MAGNESIUM SERPL-MCNC: 1.8 MG/DL (ref 1.6–2.6)
MCH RBC QN AUTO: 28.1 PG (ref 27–31)
MCHC RBC AUTO-ENTMCNC: 29.7 G/DL (ref 32–36)
MCV RBC AUTO: 95 FL (ref 82–98)
MONOCYTES # BLD AUTO: 1.1 K/UL (ref 0.3–1)
MONOCYTES NFR BLD: 10.6 % (ref 4–15)
NEUTROPHILS # BLD AUTO: 7.2 K/UL (ref 1.8–7.7)
NEUTROPHILS NFR BLD: 70.5 % (ref 38–73)
NRBC BLD-RTO: 0 /100 WBC
PHOSPHATE SERPL-MCNC: 3.4 MG/DL (ref 2.7–4.5)
PLATELET # BLD AUTO: 252 K/UL (ref 150–450)
PMV BLD AUTO: 10 FL (ref 9.2–12.9)
POTASSIUM SERPL-SCNC: 4.8 MMOL/L (ref 3.5–5.1)
PROT SERPL-MCNC: 7 G/DL (ref 6–8.4)
RBC # BLD AUTO: 4.17 M/UL (ref 4.6–6.2)
SODIUM SERPL-SCNC: 141 MMOL/L (ref 136–145)
TACROLIMUS BLD-MCNC: 5.5 NG/ML (ref 5–15)
WBC # BLD AUTO: 10.18 K/UL (ref 3.9–12.7)

## 2024-06-10 PROCEDURE — 99999 PR PBB SHADOW E&M-EST. PATIENT-LVL III: CPT | Mod: PBBFAC,,, | Performed by: NURSE PRACTITIONER

## 2024-06-10 PROCEDURE — 3288F FALL RISK ASSESSMENT DOCD: CPT | Mod: CPTII,S$GLB,, | Performed by: NURSE PRACTITIONER

## 2024-06-10 PROCEDURE — 36415 COLL VENOUS BLD VENIPUNCTURE: CPT | Performed by: INTERNAL MEDICINE

## 2024-06-10 PROCEDURE — 85025 COMPLETE CBC W/AUTO DIFF WBC: CPT | Performed by: INTERNAL MEDICINE

## 2024-06-10 PROCEDURE — 3077F SYST BP >= 140 MM HG: CPT | Mod: CPTII,S$GLB,, | Performed by: NURSE PRACTITIONER

## 2024-06-10 PROCEDURE — 99213 OFFICE O/P EST LOW 20 MIN: CPT | Performed by: INTERNAL MEDICINE

## 2024-06-10 PROCEDURE — 80069 RENAL FUNCTION PANEL: CPT | Performed by: INTERNAL MEDICINE

## 2024-06-10 PROCEDURE — 84155 ASSAY OF PROTEIN SERUM: CPT | Performed by: INTERNAL MEDICINE

## 2024-06-10 PROCEDURE — 3008F BODY MASS INDEX DOCD: CPT | Mod: CPTII,S$GLB,, | Performed by: NURSE PRACTITIONER

## 2024-06-10 PROCEDURE — 87799 DETECT AGENT NOS DNA QUANT: CPT | Performed by: INTERNAL MEDICINE

## 2024-06-10 PROCEDURE — 3066F NEPHROPATHY DOC TX: CPT | Mod: CPTII,S$GLB,, | Performed by: NURSE PRACTITIONER

## 2024-06-10 PROCEDURE — 80197 ASSAY OF TACROLIMUS: CPT | Performed by: INTERNAL MEDICINE

## 2024-06-10 PROCEDURE — 1159F MED LIST DOCD IN RCRD: CPT | Mod: CPTII,S$GLB,, | Performed by: NURSE PRACTITIONER

## 2024-06-10 PROCEDURE — 82247 BILIRUBIN TOTAL: CPT | Performed by: INTERNAL MEDICINE

## 2024-06-10 PROCEDURE — 1111F DSCHRG MED/CURRENT MED MERGE: CPT | Mod: CPTII,S$GLB,, | Performed by: NURSE PRACTITIONER

## 2024-06-10 PROCEDURE — 1101F PT FALLS ASSESS-DOCD LE1/YR: CPT | Mod: CPTII,S$GLB,, | Performed by: NURSE PRACTITIONER

## 2024-06-10 PROCEDURE — 83735 ASSAY OF MAGNESIUM: CPT | Performed by: INTERNAL MEDICINE

## 2024-06-10 PROCEDURE — 99214 OFFICE O/P EST MOD 30 MIN: CPT | Mod: ,,, | Performed by: INTERNAL MEDICINE

## 2024-06-10 PROCEDURE — 99215 OFFICE O/P EST HI 40 MIN: CPT | Mod: S$GLB,,, | Performed by: NURSE PRACTITIONER

## 2024-06-10 PROCEDURE — 4010F ACE/ARB THERAPY RXD/TAKEN: CPT | Mod: CPTII,S$GLB,, | Performed by: NURSE PRACTITIONER

## 2024-06-10 PROCEDURE — 3080F DIAST BP >= 90 MM HG: CPT | Mod: CPTII,S$GLB,, | Performed by: NURSE PRACTITIONER

## 2024-06-10 RX ORDER — NIFEDIPINE 30 MG/1
30 TABLET, EXTENDED RELEASE ORAL DAILY
Qty: 30 TABLET | Refills: 11 | Status: SHIPPED | OUTPATIENT
Start: 2024-06-10 | End: 2025-06-10

## 2024-06-10 RX ORDER — TACROLIMUS 1 MG/1
CAPSULE ORAL
Qty: 90 CAPSULE | Refills: 11 | Status: SHIPPED | OUTPATIENT
Start: 2024-06-10 | End: 2024-06-18

## 2024-06-10 NOTE — PROGRESS NOTES
[facility]                Progress Note    Subjective:       Patient ID: Colten Monet is a 64 y.o. male.    Chief Complaint: Non-healing Wound Follow Up, Dressing Change, and Wound Care    Patient was seen today for follow wound care follow up. Patient ambulated to the exam room without any issues, with LPN by side. He denies pain or discomfort to the Right Abdomen wound bed. Denies fever, chills, nausea, vomiting, or diarrhea at present. Dressing intact without strike through drainage. Right Abdomen wound bed measurement remains unchanged since last visit on 06/03/2024. Wound care done per MD orders.     Return to clinic in 2 week.    Wife changing outer dressing daily he reports no draiange. No abdominal pain moving bowels daily to twice daily w/o straining      Review of Systems      Objective:        Physical Exam  Constitutional:       General: He is not in acute distress.     Appearance: He is obese.   Abdominal:      General: There is no distension.      Palpations: Abdomen is soft.   Skin:     Comments: Right lower abdomen medial surgical scar aspect with opening iwthout fluctuance or expressible discharge medial tract to 1.4cm without loculations or drainage with probing    Neurological:      Mental Status: He is alert.         Vitals:    06/10/24 1411   BP: (!) 154/78   Pulse: 92   Resp: 18   Temp: 97.9 °F (36.6 °C)       Assessment:           ICD-10-CM ICD-9-CM   1. Dehiscence of surgical wound following transplant, subsequent encounter  T81.31XD V58.89     998.32            Wound 05/10/24 1016 Other (comment) Right Abdomen (Active)   05/10/24 1016   Present on Original Admission: Y   Primary Wound Type: Other   Side: Right   Orientation:    Location: Abdomen   Wound Approximate Age at First Assessment (Weeks):    Wound Number:    Is this injury device related?:    Incision Type:    Closure Method:    Wound Description (Comments):    Type:    Additional  "Comments:    Ankle-Brachial Index:    Pulses:    Removal Indication and Assessment:    Wound Outcome:    Wound Image   06/10/24 1412   Dressing Appearance Intact;Dry;Clean 06/10/24 1412   Drainage Amount Scant 06/10/24 1412   Drainage Characteristics/Odor Serous 06/10/24 1412   Appearance Red;Moist 06/10/24 1412   Tissue loss description Full thickness 06/10/24 1412   Red (%), Wound Tissue Color 100 % 06/10/24 1412   Wound Edges Open;Defined 06/10/24 1412   Wound Length (cm) 0.5 cm 06/10/24 1412   Wound Width (cm) 0.2 cm 06/10/24 1412   Wound Depth (cm) 0.4 cm 06/10/24 1412   Wound Volume (cm^3) 0.04 cm^3 06/10/24 1412   Wound Surface Area (cm^2) 0.1 cm^2 06/10/24 1412   Tunneling (depth (cm)/location) 1.4cm@3o'clock 06/10/24 1412   Care Cleansed with:;Sterile normal saline 06/10/24 1412   Dressing Other (comment) 06/10/24 1412   Packing packed with 06/10/24 1412   Periwound Care Dry periwound area maintained 06/10/24 1412   Dressing Change Due 06/24/24 06/10/24 1412           Plan:          Endoform to surface no evidence of abscess formation discontinue any packing into wound continue overlying dressing change daily to keep skin dry return in two weeks for wound check    Tissue pathology and/or culture taken     [] Yes      [x] No  Sharp debridement performed                   [] Yes       [x] No  Labs ordered     [] Yes       [x] No  Imaging ordered    [] Yes      [x] No    Orders Placed This Encounter   Procedures    Change dressing     Wound Dressing Orders    Dressing change frequency once a week and prn if soiled  Remove old dressing  Cleanse or irrigate with: Normal Saline  Protect periwound with:  Maintain dry  Primary dressing: WOUND BASE: Endoform  Secondary dressing: Mextra size used: 5"x5" secured with medipore tape    Pt to return to clinic in 2 wk for follow up        Follow up in about 2 weeks (around 6/24/2024) for wound care.         "

## 2024-06-10 NOTE — TELEPHONE ENCOUNTER
Called and instructed pt to increase prograf dose to 2mg in AM and 1mg in PM, repeat level next Monday 6/17. Pt repeated instructions and verbalized understanding. Reminded pt to continue BP log and send in 1 week.  ----- Message from Candido Arnold MD sent at 6/10/2024 11:39 AM CDT -----  Let's increase prograf to 2/1

## 2024-06-10 NOTE — LETTER
Daniela 10, 2024        Colby Rene  [address] [place] [address] [address]  Marla MILLER [address]  Phone: [phone]  Fax: [phone]             [facility] 1st Fl  [address]  [facility] [address]  Phone: [phone]   Patient: Colten Monet   MR Number: [id number]   YOB: 1960   Date of Visit: 6/10/2024       Dear Dr. Colby Rene    Thank you for referring Colten Monet to me for evaluation. Attached you will find relevant portions of my assessment and plan of care.    If you have questions, please do not hesitate to call me. I look forward to following Colten Monet along with you.    Sincerely,    Kathryn Hernandez, NP    Enclosure    If you would like to receive this communication electronically, please contact [email or web address] or [phone] to request [employer] access.    [employer] is a tool which provides read-only access to select patient information with whom you have a relationship. Its easy to use and provides real time access to review your patients record including encounter summaries, notes, results, and demographic information.    If you feel you have received this communication in error or would no longer like to receive these types of communications, please e-mail [email or web address]

## 2024-06-17 ENCOUNTER — LAB VISIT (OUTPATIENT)
Dept: LAB | Facility: HOSPITAL | Age: 64
End: 2024-06-17
Attending: INTERNAL MEDICINE
Payer: COMMERCIAL

## 2024-06-17 DIAGNOSIS — Z94.0 KIDNEY REPLACED BY TRANSPLANT: ICD-10-CM

## 2024-06-17 PROCEDURE — 80197 ASSAY OF TACROLIMUS: CPT | Performed by: INTERNAL MEDICINE

## 2024-06-17 PROCEDURE — 36415 COLL VENOUS BLD VENIPUNCTURE: CPT | Performed by: INTERNAL MEDICINE

## 2024-06-18 ENCOUNTER — TELEPHONE (OUTPATIENT)
Dept: WOUND CARE | Facility: HOSPITAL | Age: 64
End: 2024-06-18
Payer: COMMERCIAL

## 2024-06-18 DIAGNOSIS — Z94.0 KIDNEY TRANSPLANT RECIPIENT: ICD-10-CM

## 2024-06-18 LAB — TACROLIMUS BLD-MCNC: 6.2 NG/ML (ref 5–15)

## 2024-06-18 RX ORDER — TACROLIMUS 1 MG/1
2 CAPSULE ORAL EVERY 12 HOURS
Qty: 120 CAPSULE | Refills: 11 | Status: SHIPPED | OUTPATIENT
Start: 2024-06-18 | End: 2025-06-18

## 2024-06-18 NOTE — TELEPHONE ENCOUNTER
Pt reports true trough and correct dose taken. Instructed pt to increase prograf dose to 2mg twice a day and repeat level in 1 week. Instructed pt to submit BP log requested. Pt will have daughter send BP log through portal.  ----- Message from Claudia Whyte MD sent at 6/18/2024 10:37 AM CDT -----  Tac to 2 mg BID if it is a true level . Check tac level next week.

## 2024-06-18 NOTE — PROGRESS NOTES
Tac to 2 mg BID if it is a true level . Check tac level next week.  What Type Of Note Output Would You Prefer (Optional)?: Standard Output How Severe Are Your Spot(S)?: mild Have Your Spot(S) Been Treated In The Past?: has not been treated Hpi Title: Evaluation of Skin Lesions

## 2024-06-20 ENCOUNTER — PATIENT MESSAGE (OUTPATIENT)
Dept: TRANSPLANT | Facility: CLINIC | Age: 64
End: 2024-06-20
Payer: COMMERCIAL

## 2024-06-21 DIAGNOSIS — I10 BENIGN ESSENTIAL HTN: Primary | ICD-10-CM

## 2024-06-21 DIAGNOSIS — Z94.0 KIDNEY REPLACED BY TRANSPLANT: ICD-10-CM

## 2024-06-21 RX ORDER — NIFEDIPINE 30 MG/1
30 TABLET, EXTENDED RELEASE ORAL 2 TIMES DAILY
Qty: 60 TABLET | Refills: 11 | Status: SHIPPED | OUTPATIENT
Start: 2024-06-21 | End: 2025-06-21

## 2024-06-21 NOTE — TELEPHONE ENCOUNTER
BP log reviewed by Dr. Whyte - instructed pt to increase Nifedipine to 30 mg BID (1 pill twice a day) and obtain BP log in a week. Pt verbalized understanding.

## 2024-06-24 ENCOUNTER — HOSPITAL ENCOUNTER (OUTPATIENT)
Dept: WOUND CARE | Facility: HOSPITAL | Age: 64
Discharge: HOME | End: 2024-06-24
Attending: INTERNAL MEDICINE
Payer: COMMERCIAL

## 2024-06-24 ENCOUNTER — LAB VISIT (OUTPATIENT)
Dept: LAB | Facility: HOSPITAL | Age: 64
End: 2024-06-24
Attending: INTERNAL MEDICINE
Payer: COMMERCIAL

## 2024-06-24 DIAGNOSIS — Z94.0 KIDNEY REPLACED BY TRANSPLANT: ICD-10-CM

## 2024-06-24 DIAGNOSIS — T81.31XD DEHISCENCE OF SURGICAL WOUND FOLLOWING TRANSPLANT, SUBSEQUENT ENCOUNTER: Primary | ICD-10-CM

## 2024-06-24 LAB — TACROLIMUS BLD-MCNC: 8.6 NG/ML (ref 5–15)

## 2024-06-24 PROCEDURE — 99214 OFFICE O/P EST MOD 30 MIN: CPT | Mod: ,,, | Performed by: INTERNAL MEDICINE

## 2024-06-24 PROCEDURE — 80197 ASSAY OF TACROLIMUS: CPT | Performed by: INTERNAL MEDICINE

## 2024-06-24 PROCEDURE — 36415 COLL VENOUS BLD VENIPUNCTURE: CPT | Performed by: INTERNAL MEDICINE

## 2024-06-24 PROCEDURE — 99213 OFFICE O/P EST LOW 20 MIN: CPT

## 2024-06-24 NOTE — PROGRESS NOTES
[facility]                Progress Note    Subjective:       Patient ID: Colten Monet is a 64 y.o. male.    Chief Complaint: Dressing Change, Wound Care, and Wound Check    Follow wound care follow up. Patient ambulated to the exam room without any issues, with RN by side. Pt denies fever, chills, nausea, vomiting, or diarrhea; denies pain or discomfort to the Right Abdomen wound bed at present. Dressing intact without strike through drainage. Right Abdomen wound bed measures smaller with less depth. Drsg applied per MD orders. Pt denied AVS.    No pain to abdominal wound scant drainag on take down of dressing today wife applying endoform to area every other day no constipation       Review of Systems      Objective:        Physical Exam  Constitutional:       General: He is not in acute distress.     Appearance: He is obese.   Cardiovascular:      Pulses: Normal pulses.   Pulmonary:      Effort: Pulmonary effort is normal. No respiratory distress.   Abdominal:      General: There is no distension.      Palpations: Abdomen is soft.   Skin:     Comments: Abdominal wound shows smaller opening no expressible drainage with palpation of periwound skin no induration or periwoudn erythema. The opening is too small to appreciate in significant tunneling or depth (improved since last visit)   Neurological:      Mental Status: He is alert.         There were no vitals filed for this visit.    Assessment:           ICD-10-CM ICD-9-CM   1. Dehiscence of surgical wound following transplant, subsequent encounter  T81.31XD V58.89     998.32            Wound 05/10/24 1016 Other (comment) Right Abdomen (Active)   05/10/24 1016   Present on Original Admission: Y   Primary Wound Type: Other   Side: Right   Orientation:    Location: Abdomen   Wound Approximate Age at First Assessment (Weeks):    Wound Number:    Is this injury device related?:    Incision Type:    Closure Method:     Wound Description (Comments):    Type:    Additional Comments:    Ankle-Brachial Index:    Pulses:    Removal Indication and Assessment:    Wound Outcome:    Dressing Appearance Intact;Dry;Clean 06/24/24 1701   Drainage Amount None 06/24/24 1701   Appearance Red;Moist 06/24/24 1701   Tissue loss description Full thickness 06/24/24 1701   Red (%), Wound Tissue Color 100 % 06/24/24 1701   Wound Edges Defined;Open 06/24/24 1701   Wound Length (cm) 0.5 cm 06/24/24 1701   Wound Width (cm) 0.3 cm 06/24/24 1701   Wound Depth (cm) 0.3 cm 06/24/24 1701   Wound Volume (cm^3) 0.045 cm^3 06/24/24 1701   Wound Surface Area (cm^2) 0.15 cm^2 06/24/24 1701   Care Cleansed with:;Soap and water 06/24/24 1701   Dressing Applied;Collagen;Island/border 06/24/24 1701   Periwound Care Dry periwound area maintained 06/24/24 1701   Dressing Change Due 07/01/24 06/24/24 1701           Plan:          Wound is more shallow without evidence of recurretn cellulits or abscess contineu endoform to skin to promote collage formation no derbiement required today return for wound check in two weeks.     Tissue pathology and/or culture taken     [] Yes      [x] No  Sharp debridement performed                   [] Yes       [x] No  Labs ordered     [] Yes       [x] No  Imaging ordered    [] Yes      [x] No    Orders Placed This Encounter   Procedures    Change dressing     Wound Dressing Orders    Dressing change frequency once a week and prn if soiled  Remove old dressing  Cleanse or irrigate with: Normal Saline  Protect periwound with:  Maintain dry  Primary dressing: WOUND BASE: Endoform  Secondary dressing: Mepilex bordered foam    Pt to return to clinic in 2 wk for follow up        Follow up in about 2 weeks (around 7/8/2024) for Wound Care.

## 2024-07-01 ENCOUNTER — PATIENT MESSAGE (OUTPATIENT)
Dept: TRANSPLANT | Facility: CLINIC | Age: 64
End: 2024-07-01
Payer: COMMERCIAL

## 2024-07-08 ENCOUNTER — HOSPITAL ENCOUNTER (OUTPATIENT)
Dept: WOUND CARE | Facility: HOSPITAL | Age: 64
Discharge: HOME | End: 2024-07-08
Attending: INTERNAL MEDICINE
Payer: COMMERCIAL

## 2024-07-08 ENCOUNTER — LAB VISIT (OUTPATIENT)
Dept: LAB | Facility: HOSPITAL | Age: 64
End: 2024-07-08
Attending: INTERNAL MEDICINE
Payer: COMMERCIAL

## 2024-07-08 VITALS
SYSTOLIC BLOOD PRESSURE: 124 MMHG | TEMPERATURE: 98 F | HEART RATE: 92 BPM | DIASTOLIC BLOOD PRESSURE: 84 MMHG | RESPIRATION RATE: 18 BRPM

## 2024-07-08 DIAGNOSIS — E83.42 HYPOMAGNESEMIA: ICD-10-CM

## 2024-07-08 DIAGNOSIS — T81.31XD DEHISCENCE OF SURGICAL WOUND FOLLOWING TRANSPLANT, SUBSEQUENT ENCOUNTER: Primary | ICD-10-CM

## 2024-07-08 DIAGNOSIS — Z94.0 KIDNEY REPLACED BY TRANSPLANT: ICD-10-CM

## 2024-07-08 LAB
ALBUMIN SERPL BCP-MCNC: 3.6 G/DL (ref 3.5–5.2)
ANION GAP SERPL CALC-SCNC: 8 MMOL/L (ref 8–16)
BASOPHILS # BLD AUTO: 0.05 K/UL (ref 0–0.2)
BASOPHILS NFR BLD: 0.5 % (ref 0–1.9)
BUN SERPL-MCNC: 21 MG/DL (ref 8–23)
CALCIUM SERPL-MCNC: 9.3 MG/DL (ref 8.7–10.5)
CHLORIDE SERPL-SCNC: 110 MMOL/L (ref 95–110)
CO2 SERPL-SCNC: 22 MMOL/L (ref 23–29)
CREAT SERPL-MCNC: 1.4 MG/DL (ref 0.5–1.4)
DIFFERENTIAL METHOD BLD: ABNORMAL
EOSINOPHIL # BLD AUTO: 0.3 K/UL (ref 0–0.5)
EOSINOPHIL NFR BLD: 2.9 % (ref 0–8)
ERYTHROCYTE [DISTWIDTH] IN BLOOD BY AUTOMATED COUNT: 13.6 % (ref 11.5–14.5)
EST. GFR  (NO RACE VARIABLE): 56 ML/MIN/1.73 M^2
GLUCOSE SERPL-MCNC: 108 MG/DL (ref 70–110)
HCT VFR BLD AUTO: 39.7 % (ref 40–54)
HGB BLD-MCNC: 11.9 G/DL (ref 14–18)
IMM GRANULOCYTES # BLD AUTO: 0.09 K/UL (ref 0–0.04)
IMM GRANULOCYTES NFR BLD AUTO: 0.9 % (ref 0–0.5)
LYMPHOCYTES # BLD AUTO: 1.4 K/UL (ref 1–4.8)
LYMPHOCYTES NFR BLD: 13.2 % (ref 18–48)
MAGNESIUM SERPL-MCNC: 1.6 MG/DL (ref 1.6–2.6)
MCH RBC QN AUTO: 27.9 PG (ref 27–31)
MCHC RBC AUTO-ENTMCNC: 30 G/DL (ref 32–36)
MCV RBC AUTO: 93 FL (ref 82–98)
MONOCYTES # BLD AUTO: 1.2 K/UL (ref 0.3–1)
MONOCYTES NFR BLD: 11.3 % (ref 4–15)
NEUTROPHILS # BLD AUTO: 7.5 K/UL (ref 1.8–7.7)
NEUTROPHILS NFR BLD: 71.2 % (ref 38–73)
NRBC BLD-RTO: 0 /100 WBC
PHOSPHATE SERPL-MCNC: 1.9 MG/DL (ref 2.7–4.5)
PLATELET # BLD AUTO: 219 K/UL (ref 150–450)
PMV BLD AUTO: 9.6 FL (ref 9.2–12.9)
POTASSIUM SERPL-SCNC: 4.3 MMOL/L (ref 3.5–5.1)
RBC # BLD AUTO: 4.27 M/UL (ref 4.6–6.2)
SODIUM SERPL-SCNC: 140 MMOL/L (ref 136–145)
WBC # BLD AUTO: 10.51 K/UL (ref 3.9–12.7)

## 2024-07-08 PROCEDURE — 80197 ASSAY OF TACROLIMUS: CPT | Performed by: INTERNAL MEDICINE

## 2024-07-08 PROCEDURE — 36415 COLL VENOUS BLD VENIPUNCTURE: CPT | Performed by: INTERNAL MEDICINE

## 2024-07-08 PROCEDURE — 99214 OFFICE O/P EST MOD 30 MIN: CPT | Mod: ,,, | Performed by: INTERNAL MEDICINE

## 2024-07-08 PROCEDURE — 80069 RENAL FUNCTION PANEL: CPT | Performed by: INTERNAL MEDICINE

## 2024-07-08 PROCEDURE — 85025 COMPLETE CBC W/AUTO DIFF WBC: CPT | Performed by: INTERNAL MEDICINE

## 2024-07-08 PROCEDURE — 83735 ASSAY OF MAGNESIUM: CPT | Performed by: INTERNAL MEDICINE

## 2024-07-08 PROCEDURE — 99213 OFFICE O/P EST LOW 20 MIN: CPT | Performed by: INTERNAL MEDICINE

## 2024-07-08 NOTE — PROGRESS NOTES
[facility]                Progress Note    Subjective:       Patient ID: Colten Monet is a 64 y.o. male.    Chief Complaint: Non-healing Wound Follow Up, Dressing Change, and Wound Care    Patient was seen today for follow up wound care visit. Patient ambulated to the exam room without any issues, with LPN by side. He denies pain or discomfort to the wound bed. Denies fever, nausea, chills, vomiting, or diarrhea at present. Dressing intact without strike through drainage. Right Abdomen wound is measuring the same in measurement since last visit on 06/24/2024. Wound dressing updated and applied by LPN per MD orders.     Return to clinic I 1 week.    Scant clear draiange no periwound pain moving bowels daily w/o straining home bp are improving        Review of Systems      Objective:        Physical Exam  Constitutional:       General: He is not in acute distress.     Appearance: He is obese.   Abdominal:      General: There is no distension.      Palpations: Abdomen is soft.      Tenderness: There is no abdominal tenderness. There is no rebound.   Musculoskeletal:      Right lower leg: No edema.      Left lower leg: No edema.   Skin:     Comments: Right lower abdominal wound with small opening no tunneling or expressible discharge periwound skin soft w/o induratiom   Neurological:      Mental Status: He is alert.         Vitals:    07/08/24 0843   BP: 124/84   Pulse: 92   Resp: 18   Temp: 97.5 °F (36.4 °C)       Assessment:           ICD-10-CM ICD-9-CM   1. Dehiscence of surgical wound following transplant, subsequent encounter  T81.31XD V58.89     998.32            Wound 05/10/24 1016 Other (comment) Right Abdomen (Active)   05/10/24 1016   Present on Original Admission: Y   Primary Wound Type: Other   Side: Right   Orientation:    Location: Abdomen   Wound Approximate Age at First Assessment (Weeks):    Wound Number:    Is this injury device related?:    Incision  Type:    Closure Method:    Wound Description (Comments):    Type:    Additional Comments:    Ankle-Brachial Index:    Pulses:    Removal Indication and Assessment:    Wound Outcome:    Wound Image   07/08/24 0844   Dressing Appearance Intact;Dry 07/08/24 0844   Drainage Amount Scant 07/08/24 0844   Drainage Characteristics/Odor Serosanguineous 07/08/24 0844   Appearance Pink 07/08/24 0844   Tissue loss description Full thickness 07/08/24 0844   Red (%), Wound Tissue Color 100 % 07/08/24 0844   Periwound Area [place] 07/08/24 0844   Wound Edges Open;Defined 07/08/24 0844   Wound Length (cm) 0.5 cm 07/08/24 0844   Wound Width (cm) 0.3 cm 07/08/24 0844   Wound Depth (cm) 0.3 cm 07/08/24 0844   Wound Volume (cm^3) 0.045 cm^3 07/08/24 0844   Wound Surface Area (cm^2) 0.15 cm^2 07/08/24 0844   Care Cleansed with:;Sterile normal saline 07/08/24 0844   Dressing Applied;Absorptive Pad;Other (comment) 07/08/24 0844   Periwound Care Dry periwound area maintained 07/08/24 0844   Dressing Change Due 07/22/24 07/08/24 0844           Plan:          Wound unchanged from last visit discontinue endoform packing switch to Collagen paste to opening every other day follow up for wound cehck in one week    Tissue pathology and/or culture taken     [] Yes      [x] No  Sharp debridement performed                   [] Yes       [x] No  Labs ordered     [] Yes       [x] No  Imaging ordered    [] Yes      [x] No    Orders Placed This Encounter   Procedures    Change dressing     Wound Dressing Orders    Dressing change frequency once a week and prn if soiled  Remove old dressing  Cleanse or irrigate with: Normal Saline  Protect periwound with:  Maintain dry  Primary dressing: WOUND BASE: Triad cream  Secondary dressing: ABD pad x1; medipore tape    Pt to return to clinic in 1 wk for follow up        Follow up in about 1 week (around 7/15/2024) for wound care.

## 2024-07-09 DIAGNOSIS — Z94.0 KIDNEY REPLACED BY TRANSPLANT: Primary | ICD-10-CM

## 2024-07-09 LAB — TACROLIMUS BLD-MCNC: 6.4 NG/ML (ref 5–15)

## 2024-07-09 NOTE — TELEPHONE ENCOUNTER
Called and LVM instructing pt to start K-Phos @ 500mg (2 pills) twice a day and repeat labs 7/22. Instructed pt to send BP log for review.  ----- Message -----  From: Candido Arnold MD  Sent: 7/8/2024   8:49 AM CDT  To: [facility]    Let's start  bid and repeat labs in 10 days

## 2024-07-12 ENCOUNTER — TELEPHONE (OUTPATIENT)
Dept: TRANSPLANT | Facility: CLINIC | Age: 64
End: 2024-07-12
Payer: COMMERCIAL

## 2024-07-12 NOTE — TELEPHONE ENCOUNTER
Pt reports BLE swelling, R>L, no pain. Pt reports being active on his feet during the day and swelling improves after elevating feet o/n while asleep. Pt compliant with daily ASA. Instructed pt to elevate legs for swelling and consider compression socks. Instructed pt to notify team if swelling does not improve. Pt verbalized understanding.  ----- Message from Cady Domingo sent at 7/12/2024  2:06 PM CDT -----  Regarding: Consult/Advisory  Contact: :Colten Monet     Consult/Advisory     Name Of Caller:Colten Monet         Contact Preference:[phone] (home)       Nature of call:Patient is calling to speak to Rachel about fluid in his right leg. Requesting a call back feeling concerned.

## 2024-07-15 ENCOUNTER — HOSPITAL ENCOUNTER (OUTPATIENT)
Dept: WOUND CARE | Facility: HOSPITAL | Age: 64
Discharge: HOME | End: 2024-07-15
Attending: INTERNAL MEDICINE
Payer: COMMERCIAL

## 2024-07-15 VITALS
RESPIRATION RATE: 18 BRPM | DIASTOLIC BLOOD PRESSURE: 83 MMHG | TEMPERATURE: 98 F | SYSTOLIC BLOOD PRESSURE: 140 MMHG | HEART RATE: 89 BPM

## 2024-07-15 DIAGNOSIS — Z79.60 LONG-TERM USE OF IMMUNOSUPPRESSANT MEDICATION: ICD-10-CM

## 2024-07-15 DIAGNOSIS — T81.31XD DEHISCENCE OF SURGICAL WOUND FOLLOWING TRANSPLANT, SUBSEQUENT ENCOUNTER: Primary | ICD-10-CM

## 2024-07-15 DIAGNOSIS — Z94.0 KIDNEY REPLACED BY TRANSPLANT: ICD-10-CM

## 2024-07-15 PROCEDURE — 99214 OFFICE O/P EST MOD 30 MIN: CPT | Mod: ,,, | Performed by: INTERNAL MEDICINE

## 2024-07-15 PROCEDURE — 99213 OFFICE O/P EST LOW 20 MIN: CPT | Performed by: INTERNAL MEDICINE

## 2024-07-15 NOTE — PROGRESS NOTES
[facility]                Progress Note    Subjective:       Patient ID: Colten Monet is a 64 y.o. male.    Chief Complaint: Non-healing Wound Follow Up, Wound Care, and Dressing Change    Patient was seen today for follow up wound care visit. Patient ambulated to the exam room without any issues, with LPN by side. He denies pain or discomfort to the wound bed. Denies fever, nausea, chills, vomiting, or diarrhea at present. Dressing intact without strike through drainage. Wound care done per MD orders. Decline AVS, patient has MyChart.     Return to clinic in 2 week.    Patient has been apply collagen past every other day no periwound pain scant draiange no fevers/chills moving bowels daily w/o straining      Review of Systems      Objective:        Physical Exam  Constitutional:       General: He is not in acute distress.  Abdominal:      General: There is no distension.      Palpations: Abdomen is soft.   Skin:     Comments: Superficial opening unable to probe deeper due to narrow opening no fluctuance no expressible discahrge   Neurological:      Mental Status: He is alert.         Vitals:    07/15/24 1227   BP: (!) 140/83   Pulse: 89   Resp: 18   Temp: 98 °F (36.7 °C)       Assessment:           ICD-10-CM ICD-9-CM   1. Dehiscence of surgical wound following transplant, subsequent encounter  T81.31XD V58.89     998.32   2. Kidney replaced by transplant  Z94.0 V42.0   3. Long-term use of immunosuppressant medication  Z79.60 V58.69            Wound 05/10/24 1016 Other (comment) Right Abdomen (Active)   05/10/24 1016   Present on Original Admission: Y   Primary Wound Type: Other   Side: Right   Orientation:    Location: Abdomen   Wound Approximate Age at First Assessment (Weeks):    Wound Number:    Is this injury device related?:    Incision Type:    Closure Method:    Wound Description (Comments):    Type:    Additional Comments:    Ankle-Brachial Index:     Pulses:    Removal Indication and Assessment:    Wound Outcome:    Wound Image   07/15/24 1228   Dressing Appearance Intact;Dry 07/15/24 1228   Drainage Amount None 07/15/24 1228   Drainage Characteristics/Odor Serous 07/15/24 1228   Appearance Pink 07/15/24 1228   Tissue loss description Full thickness 07/15/24 1228   Red (%), Wound Tissue Color 100 % 07/15/24 1228   Periwound Area [place] 07/15/24 1228   Wound Edges Open;Defined 07/15/24 1228   Wound Length (cm) 0.2 cm 07/15/24 1228   Wound Width (cm) 0.3 cm 07/15/24 1228   Wound Depth (cm) 0.3 cm 07/15/24 1228   Wound Volume (cm^3) 0.018 cm^3 07/15/24 1228   Wound Surface Area (cm^2) 0.06 cm^2 07/15/24 1228   Care Cleansed with:;Sterile normal saline 07/15/24 1228   Dressing Applied;Absorptive Pad;Other (comment) 07/15/24 1228   Periwound Care Dry periwound area maintained 07/15/24 1228   Dressing Change Due 07/29/24 07/15/24 1228           Plan:          No debridement required today no evidence of soft tissue infection continue collagen paste daily return for wound checck in two weeks    Tissue pathology and/or culture taken     [] Yes      [x] No  Sharp debridement performed                   [] Yes       [x] No  Labs ordered     [] Yes       [x] No  Imaging ordered    [] Yes      [x] No    Orders Placed This Encounter   Procedures    Change dressing     Wound Dressing Orders     Dressing change frequency once a week and prn if soiled   Remove old dressing   Cleanse or irrigate with: Normal Saline   Protect periwound with:  Maintain dry   Primary dressing: WOUND BASE: Triad cream   Secondary dressing: ABD pad x1; medipore tape     Pt to return to clinic in 1 wk for follow up        Follow up in about 2 weeks (around 7/29/2024) for wound care.

## 2024-07-21 NOTE — PROGRESS NOTES
Clinic Note: Pre-op Transplant Note    Met with Colten Monet in the clinic as part of her pre-transplant clearance appointment.    1) Performed a complete medication reconciliation.    2) Obtained copies of insurance cards, patient understood that the Pharm.D. will order medications, and that medications must be available prior to discharge   3) Discussed medication education that will occur post-transplant   4) Patient will use [facility] for first fill.  5)  was checked: opioid naive    Test claims sent to [facility]: FK, MMF and valcyte all $10     Current Outpatient Medications   Medication Sig Dispense Refill    aspirin (ECOTRIN) 81 MG EC tablet Take 81 mg by mouth once daily.      atorvastatin (LIPITOR) 40 MG tablet Take 1 tablet by mouth once daily.      calcitRIOL (ROCALTROL) 0.25 MCG Cap Take 0.25 mcg by mouth once daily.      losartan (COZAAR) 100 MG tablet Take 100 mg by mouth once daily.      mycophenolate (CELLCEPT) 500 mg Tab Take 500 mg by mouth 2 (two) times daily.      albuterol (PROVENTIL/VENTOLIN HFA) 90 mcg/actuation inhaler Inhale 1-2 puffs into the lungs every 6 (six) hours as needed for Wheezing. Rescue (Patient not taking: Reported on 2/28/2024) 18 g 1     No current facility-administered medications for this visit.       Patient verbalized understanding and had the opportunity to ask questions  
ambulatory

## 2024-07-22 ENCOUNTER — LAB VISIT (OUTPATIENT)
Dept: LAB | Facility: HOSPITAL | Age: 64
End: 2024-07-22
Attending: INTERNAL MEDICINE
Payer: COMMERCIAL

## 2024-07-22 DIAGNOSIS — Z94.0 KIDNEY REPLACED BY TRANSPLANT: ICD-10-CM

## 2024-07-22 LAB
ALBUMIN SERPL BCP-MCNC: 3.6 G/DL (ref 3.5–5.2)
ANION GAP SERPL CALC-SCNC: 8 MMOL/L (ref 8–16)
BUN SERPL-MCNC: 18 MG/DL (ref 8–23)
CALCIUM SERPL-MCNC: 9.4 MG/DL (ref 8.7–10.5)
CHLORIDE SERPL-SCNC: 110 MMOL/L (ref 95–110)
CO2 SERPL-SCNC: 24 MMOL/L (ref 23–29)
CREAT SERPL-MCNC: 1.2 MG/DL (ref 0.5–1.4)
EST. GFR  (NO RACE VARIABLE): >60 ML/MIN/1.73 M^2
GLUCOSE SERPL-MCNC: 104 MG/DL (ref 70–110)
PHOSPHATE SERPL-MCNC: 2.6 MG/DL (ref 2.7–4.5)
POTASSIUM SERPL-SCNC: 4.2 MMOL/L (ref 3.5–5.1)
SODIUM SERPL-SCNC: 142 MMOL/L (ref 136–145)

## 2024-07-22 PROCEDURE — 36415 COLL VENOUS BLD VENIPUNCTURE: CPT | Performed by: INTERNAL MEDICINE

## 2024-07-22 PROCEDURE — 80069 RENAL FUNCTION PANEL: CPT | Performed by: INTERNAL MEDICINE

## 2024-07-22 PROCEDURE — 80197 ASSAY OF TACROLIMUS: CPT | Performed by: INTERNAL MEDICINE

## 2024-07-23 LAB — TACROLIMUS BLD-MCNC: 6.4 NG/ML (ref 5–15)

## 2024-07-29 ENCOUNTER — HOSPITAL ENCOUNTER (OUTPATIENT)
Dept: WOUND CARE | Facility: HOSPITAL | Age: 64
Discharge: HOME | End: 2024-07-29
Attending: INTERNAL MEDICINE
Payer: COMMERCIAL

## 2024-07-29 VITALS
RESPIRATION RATE: 20 BRPM | SYSTOLIC BLOOD PRESSURE: 150 MMHG | HEART RATE: 101 BPM | TEMPERATURE: 98 F | DIASTOLIC BLOOD PRESSURE: 71 MMHG

## 2024-07-29 DIAGNOSIS — T81.31XD DEHISCENCE OF SURGICAL WOUND FOLLOWING TRANSPLANT, SUBSEQUENT ENCOUNTER: Primary | ICD-10-CM

## 2024-07-29 PROCEDURE — 99213 OFFICE O/P EST LOW 20 MIN: CPT | Performed by: INTERNAL MEDICINE

## 2024-07-29 PROCEDURE — 99214 OFFICE O/P EST MOD 30 MIN: CPT | Mod: ,,, | Performed by: INTERNAL MEDICINE

## 2024-07-29 NOTE — PROGRESS NOTES
[facility]                Progress Note    Subjective:       Patient ID: Colten Monet is a 64 y.o. male.    Chief Complaint: Non-healing Wound Follow Up, Wound Care, and Dressing Change    Patient was seen today for follow up wound care visit. Patient ambulated to the exam room without any issues, with LPN by side.He denies pain or discomfort to the wound bed. Denies fever, nausea, chills, vomiting, or diarrhea at present. Dressing intact without strike through drainage. Right Abdomen wound is measuring smaller in depth since last visit on 07/15/2024. Wound care done per MD orders.      Return to clinic in 2 week.    No abdominal pain no drainage from wound site moving bowels daily w/o straining apply topical collagen paste once daily      Review of Systems      Objective:        Physical Exam  Constitutional:       General: He is not in acute distress.     Appearance: He is obese.   Abdominal:      General: There is no distension.      Palpations: Abdomen is soft.      Tenderness: There is no abdominal tenderness.      Comments: No induration the pinpoint opening has pink tissue to base no expressible discharge no periwound erythema   Neurological:      Mental Status: He is alert.         Vitals:    07/29/24 0933   BP: (!) 150/71   Pulse: 101   Resp: 20   Temp: 97.8 °F (36.6 °C)       Assessment:           ICD-10-CM ICD-9-CM   1. Dehiscence of surgical wound following transplant, subsequent encounter  T81.31XD V58.89     998.32            Wound 05/10/24 1016 Other (comment) Right Abdomen (Active)   05/10/24 1016   Present on Original Admission: Y   Primary Wound Type: Other   Side: Right   Orientation:    Location: Abdomen   Wound Approximate Age at First Assessment (Weeks):    Wound Number:    Is this injury device related?:    Incision Type:    Closure Method:    Wound Description (Comments):    Type:    Additional Comments:    Ankle-Brachial Index:    Pulses:     Removal Indication and Assessment:    Wound Outcome:    Wound Image   07/29/24 0934   Dressing Appearance Intact;Dry 07/29/24 0934   Drainage Amount None 07/29/24 0934   Appearance Pink 07/29/24 0934   Tissue loss description Full thickness 07/29/24 0934   Red (%), Wound Tissue Color 100 % 07/29/24 0934   Periwound Area [place] 07/29/24 0934   Wound Edges Open;Defined 07/29/24 0934   Wound Length (cm) 0.2 cm 07/29/24 0934   Wound Width (cm) 0.3 cm 07/29/24 0934   Wound Depth (cm) 0.2 cm 07/29/24 0934   Wound Volume (cm^3) 0.012 cm^3 07/29/24 0934   Wound Surface Area (cm^2) 0.06 cm^2 07/29/24 0934   Care Cleansed with:;Sterile normal saline 07/29/24 0934   Dressing Applied;Absorptive Pad;Other (comment) 07/29/24 0934   Periwound Care Dry periwound area maintained 07/29/24 0934   Dressing Change Due 08/12/24 07/29/24 0934           Plan:          Wound without evidence of infection or abscess continue collagen paste return for wound check in two weeks    Tissue pathology and/or culture taken     [] Yes      [x] No  Sharp debridement performed                   [] Yes       [x] No  Labs ordered     [] Yes       [x] No  Imaging ordered    [] Yes      [x] No    Orders Placed This Encounter   Procedures    Change dressing     Wound Dressing Orders    Dressing change frequency once a week and prn if soiled  Remove old dressing  Cleanse or irrigate with: Normal Saline  Protect periwound with:  Maintain dry  Primary dressing: WOUND BASE: Triad cream  Secondary dressing: ABD pad x1; medipore tape    Pt to return to clinic in 2 wk for follow up        Follow up in about 2 weeks (around 8/12/2024) for wound care.

## 2024-08-12 ENCOUNTER — LAB VISIT (OUTPATIENT)
Dept: LAB | Facility: HOSPITAL | Age: 64
End: 2024-08-12
Attending: INTERNAL MEDICINE
Payer: COMMERCIAL

## 2024-08-12 ENCOUNTER — HOSPITAL ENCOUNTER (OUTPATIENT)
Dept: WOUND CARE | Facility: HOSPITAL | Age: 64
Discharge: HOME | End: 2024-08-12
Attending: INTERNAL MEDICINE
Payer: COMMERCIAL

## 2024-08-12 VITALS
DIASTOLIC BLOOD PRESSURE: 88 MMHG | SYSTOLIC BLOOD PRESSURE: 146 MMHG | TEMPERATURE: 97 F | RESPIRATION RATE: 18 BRPM | HEART RATE: 84 BPM

## 2024-08-12 DIAGNOSIS — E83.42 HYPOMAGNESEMIA: ICD-10-CM

## 2024-08-12 DIAGNOSIS — T81.31XD DEHISCENCE OF SURGICAL WOUND FOLLOWING TRANSPLANT, SUBSEQUENT ENCOUNTER: Primary | ICD-10-CM

## 2024-08-12 DIAGNOSIS — Z94.0 KIDNEY REPLACED BY TRANSPLANT: ICD-10-CM

## 2024-08-12 LAB
ALBUMIN SERPL BCP-MCNC: 3.7 G/DL (ref 3.5–5.2)
ANION GAP SERPL CALC-SCNC: 7 MMOL/L (ref 8–16)
BASOPHILS # BLD AUTO: 0.04 K/UL (ref 0–0.2)
BASOPHILS NFR BLD: 0.4 % (ref 0–1.9)
BUN SERPL-MCNC: 20 MG/DL (ref 8–23)
CALCIUM SERPL-MCNC: 9.4 MG/DL (ref 8.7–10.5)
CHLORIDE SERPL-SCNC: 111 MMOL/L (ref 95–110)
CO2 SERPL-SCNC: 24 MMOL/L (ref 23–29)
CREAT SERPL-MCNC: 1.4 MG/DL (ref 0.5–1.4)
DIFFERENTIAL METHOD BLD: ABNORMAL
EOSINOPHIL # BLD AUTO: 0.3 K/UL (ref 0–0.5)
EOSINOPHIL NFR BLD: 2.8 % (ref 0–8)
ERYTHROCYTE [DISTWIDTH] IN BLOOD BY AUTOMATED COUNT: 13.3 % (ref 11.5–14.5)
EST. GFR  (NO RACE VARIABLE): 56 ML/MIN/1.73 M^2
GLUCOSE SERPL-MCNC: 103 MG/DL (ref 70–110)
HCT VFR BLD AUTO: 41.6 % (ref 40–54)
HGB BLD-MCNC: 12.2 G/DL (ref 14–18)
IMM GRANULOCYTES # BLD AUTO: 0.07 K/UL (ref 0–0.04)
IMM GRANULOCYTES NFR BLD AUTO: 0.6 % (ref 0–0.5)
LYMPHOCYTES # BLD AUTO: 1.7 K/UL (ref 1–4.8)
LYMPHOCYTES NFR BLD: 15.8 % (ref 18–48)
MAGNESIUM SERPL-MCNC: 1.8 MG/DL (ref 1.6–2.6)
MCH RBC QN AUTO: 27.2 PG (ref 27–31)
MCHC RBC AUTO-ENTMCNC: 29.3 G/DL (ref 32–36)
MCV RBC AUTO: 93 FL (ref 82–98)
MONOCYTES # BLD AUTO: 1.2 K/UL (ref 0.3–1)
MONOCYTES NFR BLD: 11.1 % (ref 4–15)
NEUTROPHILS # BLD AUTO: 7.6 K/UL (ref 1.8–7.7)
NEUTROPHILS NFR BLD: 69.3 % (ref 38–73)
NRBC BLD-RTO: 0 /100 WBC
PHOSPHATE SERPL-MCNC: 2.4 MG/DL (ref 2.7–4.5)
PLATELET # BLD AUTO: 259 K/UL (ref 150–450)
PMV BLD AUTO: 9.4 FL (ref 9.2–12.9)
POTASSIUM SERPL-SCNC: 4.6 MMOL/L (ref 3.5–5.1)
RBC # BLD AUTO: 4.48 M/UL (ref 4.6–6.2)
SODIUM SERPL-SCNC: 142 MMOL/L (ref 136–145)
WBC # BLD AUTO: 10.98 K/UL (ref 3.9–12.7)

## 2024-08-12 PROCEDURE — 99213 OFFICE O/P EST LOW 20 MIN: CPT | Performed by: INTERNAL MEDICINE

## 2024-08-12 PROCEDURE — 80197 ASSAY OF TACROLIMUS: CPT | Performed by: INTERNAL MEDICINE

## 2024-08-12 PROCEDURE — 85025 COMPLETE CBC W/AUTO DIFF WBC: CPT | Performed by: INTERNAL MEDICINE

## 2024-08-12 PROCEDURE — 80069 RENAL FUNCTION PANEL: CPT | Performed by: INTERNAL MEDICINE

## 2024-08-12 PROCEDURE — 99213 OFFICE O/P EST LOW 20 MIN: CPT | Mod: ,,, | Performed by: INTERNAL MEDICINE

## 2024-08-12 PROCEDURE — 83735 ASSAY OF MAGNESIUM: CPT | Performed by: INTERNAL MEDICINE

## 2024-08-12 PROCEDURE — 36415 COLL VENOUS BLD VENIPUNCTURE: CPT | Performed by: INTERNAL MEDICINE

## 2024-08-12 NOTE — PROGRESS NOTES
[facility]                Progress Note    Subjective:       Patient ID: Colten Monet is a 64 y.o. male.    Chief Complaint: Wound Care    Patient was seen today for follow up wound care visit. Patient ambulated to the exam room without any issues, with LPN by side. He denies pain or discomfort to the wound bed. Denies fever, nausea, chills, vomiting, or diarrhea at present. Dressing intact without strike through drainage. Right Abdomen wound bed is resolved. MD gave patient discharge instructions, patient is discharged from wound care.    No pain or draiange moving bowels daily w/o difficulty      Review of Systems      Objective:        Physical Exam  Constitutional:       General: He is not in acute distress.     Appearance: He is obese.   Abdominal:      General: There is no distension.      Palpations: Abdomen is soft.      Comments: Right lower abdomen no open wound there is puckering of skin but with intact epithelium no induration nor expressible discharge   Neurological:      Mental Status: He is alert.         Vitals:    08/12/24 0928   BP: (!) 146/88   Pulse: 84   Resp: 18   Temp: 97.4 °F (36.3 °C)       Assessment:           ICD-10-CM ICD-9-CM   1. Dehiscence of surgical wound following transplant, subsequent encounter  T81.31XD V58.89     998.32            Wound 05/10/24 1016 Other (comment) Right Abdomen (Active)   05/10/24 1016   Present on Original Admission: Y   Primary Wound Type: Other   Side: Right   Orientation:    Location: Abdomen   Wound Approximate Age at First Assessment (Weeks):    Wound Number:    Is this injury device related?:    Incision Type:    Closure Method:    Wound Description (Comments):    Type:    Additional Comments:    Ankle-Brachial Index:    Pulses:    Removal Indication and Assessment:    Wound Outcome: Healed   Wound Image   08/12/24 0929   Dressing Appearance Intact;Dry;Clean 08/12/24 0929   Drainage Amount None  08/12/24 0929   Appearance Pink 08/12/24 0929   Red (%), Wound Tissue Color 100 % 08/12/24 0929   Periwound Area [place] 08/12/24 0929   Wound Edges Defined 08/12/24 0929   Wound Length (cm) 0.1 cm 08/12/24 0929   Wound Width (cm) 0.2 cm 08/12/24 0929   Wound Depth (cm) 0.1 cm 08/12/24 0929   Wound Volume (cm^3) 0.002 cm^3 08/12/24 0929   Wound Surface Area (cm^2) 0.02 cm^2 08/12/24 0929   Care Cleansed with:;Sterile normal saline 08/12/24 0929   Dressing Removed 08/12/24 0929   Periwound Care Dry periwound area maintained 08/12/24 0929           Plan:          Wound has healed no further topical dressing necessary keep skin dry a  Discharge from wound clinic    Tissue pathology and/or culture taken     [] Yes      [x] No  Sharp debridement performed                   [] Yes       [x] No  Labs ordered     [] Yes       [x] No  Imaging ordered    [] Yes      [x] No    Orders Placed This Encounter   Procedures    Change dressing     Wound Dressing Orders    Leave open to air, patient is discharge from wound care.        Follow up if symptoms worsen or fail to improve.

## 2024-08-13 LAB — TACROLIMUS BLD-MCNC: 7.4 NG/ML (ref 5–15)

## 2024-08-15 ENCOUNTER — TELEPHONE (OUTPATIENT)
Dept: TRANSPLANT | Facility: CLINIC | Age: 64
End: 2024-08-15
Payer: COMMERCIAL

## 2024-08-15 ENCOUNTER — PATIENT MESSAGE (OUTPATIENT)
Dept: TRANSPLANT | Facility: CLINIC | Age: 64
End: 2024-08-15
Payer: COMMERCIAL

## 2024-09-13 NOTE — PROGRESS NOTES
Kidney Post-Transplant Assessment    Referring Physician: Colby Rene  Current Nephrologist: Colby Rene    ORGAN: LEFT KIDNEY  Donor Type: living  PHS Increased Risk: no  Cold Ischemia: 45 mins  Induction Medications:     Subjective:     CC:  Reassessment of renal allograft function and management of chronic immunosuppression.    HPI:  Mr. Monet is a 64 y.o. year old White male with history of ESRD secondary to IgA nephropathy who received a living kidney transplant on 3/11/24.  Post transplant with delayed wound healing. He has CKD stage 3 - GFR 30-59 and his baseline creatinine is between 1.2-1.4. He takes mycophenolate mofetil, prednisone, and tacrolimus for maintenance immunosuppression. He denies any recent hospitalizations or ER visits since his previous clinic visit.    Feels well. He is staying busy working in construction. Staying hydrated, no problems with urination. Brought BP logs, home -140. Intermittent mild LE edema, resolves with elevation. Appetite good, weight stable. Main complaint is ED, would like referral for urology. Tolerating IS without issues, no diarrhea or vomiting.     Current Outpatient Medications   Medication Sig Dispense Refill    albuterol (PROVENTIL/VENTOLIN HFA) 90 mcg/actuation inhaler Inhale 1-2 puffs into the lungs every 6 (six) hours as needed for Wheezing. Rescue 18 g 1    aspirin (ECOTRIN) 81 MG EC tablet Take 1 tablet (81 mg total) by mouth once daily. 30 tablet 11    atorvastatin (LIPITOR) 40 MG tablet Take 1 tablet by mouth every evening.      docusate sodium (COLACE) 100 MG capsule Take 1 capsule (100 mg total) by mouth 3 (three) times daily as needed for Constipation.  0    k phos di & mono-sod phos mono (K-PHOS-NEUTRAL) 250 mg Tab Take 2 tablets by mouth every 12 (twelve) hours. 120 tablet 11    magnesium oxide (MAG-OX) 400 mg (241.3 mg magnesium) tablet Take 2 tablets (800 mg total) by mouth 3 (three) times daily. 180 tablet 11    multivitamin (ONE DAILY  MULTIVITAMIN) per tablet Take 1 tablet by mouth once daily.      mycophenolate sodium 180 MG TbEC Take 3 tablets (540 mg total) by mouth 2 (two) times daily. 180 tablet 11    NIFEdipine (PROCARDIA-XL) 30 MG (OSM) 24 hr tablet Take 1 tablet (30 mg total) by mouth 2 (two) times a day. 60 tablet 11    patiromer calcium sorbitex (VELTASSA) 8.4 gram PwPk Take 1 packet (8.4 g total) by mouth once daily. 30 packet 5    predniSONE (DELTASONE) 5 MG tablet Take 1 tablet (5 mg total) by mouth once daily. 30 tablet 11    tacrolimus (PROGRAF) 1 MG Cap Take 2 capsules (2 mg total) by mouth every 12 (twelve) hours. 120 capsule 11    atovaquone (MEPRON) 750 mg/5 mL Susp oral liquid Take 10 mLs (1,500 mg total) by mouth once daily. Stop 9/7/24 (Patient not taking: Reported on 9/16/2024) 300 mL 4    fluticasone furoate-vilanteroL (BREO ELLIPTA) 100-25 mcg/dose diskus inhaler Inhale 1 puff into the lungs once daily. Controller (Patient not taking: Reported on 9/16/2024) 30 each 11     No current facility-administered medications for this visit.       Past Medical History:   Diagnosis Date    Anemia     CVA (cerebral vascular accident)     GERD (gastroesophageal reflux disease)     Hyperlipidemia     Hypertension     IgA nephropathy     Obesity        Review of Systems   Constitutional:  Negative for activity change and fever.   Eyes:  Negative for visual disturbance.   Respiratory:  Negative for cough and shortness of breath.    Cardiovascular:  Negative for chest pain and leg swelling.   Gastrointestinal:  Negative for abdominal pain, constipation, diarrhea and nausea.   Genitourinary:  Negative for difficulty urinating, frequency and hematuria.        + ED   Musculoskeletal:  Negative for arthralgias and myalgias.   Skin:  Negative for wound.   Allergic/Immunologic: Positive for immunocompromised state.   Neurological:  Negative for weakness.   Psychiatric/Behavioral:  Negative for sleep disturbance.        Objective:     Blood  "pressure (!) 146/93, pulse 94, temperature 97.7 °F (36.5 °C), temperature source Temporal, resp. rate 18, height 5' 7.01" (1.702 m), weight 95 kg (209 lb 7 oz), SpO2 100%.body mass index is 32.79 kg/m².    Physical Exam  Vitals and nursing note reviewed.   Constitutional:       Appearance: Normal appearance.   Cardiovascular:      Rate and Rhythm: Normal rate and regular rhythm.      Heart sounds: Normal heart sounds.   Pulmonary:      Effort: Pulmonary effort is normal.      Breath sounds: Normal breath sounds.   Abdominal:      General: There is no distension.   Musculoskeletal:         General: Normal range of motion.   Skin:     General: Skin is warm and dry.   Neurological:      Mental Status: He is alert.         Labs:  Lab Results   Component Value Date    WBC 12.28 09/16/2024    HGB 12.6 (L) 09/16/2024    HCT 43.3 09/16/2024     09/16/2024    K 4.4 09/16/2024     09/16/2024    CO2 24 09/16/2024    BUN 19 09/16/2024    CREATININE 1.4 09/16/2024    EGFRNORACEVR 55.8 (A) 09/16/2024    GLUCOSE 76 09/22/2020    CALCIUM 9.5 09/16/2024    PHOS 2.4 (L) 09/16/2024    MG 1.7 09/16/2024    ALBUMIN 3.9 09/16/2024    AST 13 06/10/2024    ALT 19 06/10/2024    UTPCR 0.09 06/10/2024    .0 (H) 05/25/2021    TACROLIMUS 7.4 08/12/2024       Labs were reviewed with the patient.    Assessment:     1. S/P living-donor kidney transplantation    2. Stage 3a chronic kidney disease    3. Long-term use of immunosuppressant medication    4. IgA nephropathy    5. At risk for opportunistic infections    6. Erectile dysfunction, unspecified erectile dysfunction type      Plan:   Prograf level pending  DC mepron-therapy completed  Urology referral for ED complaints      1. Immunosuppression: Prograf trough PENDING. Continue Prograf 2/2, MyF 540 mg BID, and Prednisone 5 mg QD. Will continue to monitor for drug toxicities    2. Allograft Function: CKD IIIa. Continue good oral hydration.   - ESRD secondary to IgA " nephropathy s/p  living kidney transplant on 3/11/24.    - Post transplant with delayed wound healing.   - baseline creatinine is between 1.2-1.4.  Lab Results   Component Value Date    CREATININE 1.4 09/16/2024       3. Hypertension management: advise low salt diet and home BP monitoring      4. History of COPD   - follows with local pulmonologist    5. Hypophosphatemia     - continue  mg BID   - high phosphorous diet    6. Anemia: stable. No need for intervention   Lab Results   Component Value Date    WBC 12.28 09/16/2024    HGB 12.6 (L) 09/16/2024    HCT 43.3 09/16/2024    MCV 92 09/16/2024     09/16/2024       7. Proteinuria: continue p/c ratio as per guidelines    - UPC 9/16 pending    8. BK virus infection screening:  BK PCR per protocol    - BK PCR 9/16 pending    9. Weight education: provided during the clinic visit   Body mass index is 32.79 kg/m².     10. Patient safety education regarding immunosuppression including prophylaxis posttransplant for CMV, PCP            Follow-up:   Clinic: return to transplant clinic weekly for the first month after transplant; every 2 weeks during months 2-3; then at 6-, 9-, 12-, 18-, 24-, and 36- months post-transplant to reassess for complications from immunosuppression toxicity and monitor for rejection.  Annually thereafter.    Labs: since patient remains at high risk for rejection and drug-related complications that warrant close monitoring, labs will be ordered as follows: continue twice weekly CBC, renal panel, and drug level for first month; then same labs once weekly through 3rd month post-transplant.  Urine for UA and protein/creatinine ratio monthly.  Serum BK - PCR at 1-, 3-, 6-, 9-, 12-, 18-, 24-, 36- 48-, and 60 months post-transplant.  Hepatic panel at 1-, 2-, 3-, 6-, 9-, 12-, 18-, 24-, and 36- months post-transplant.    Tish Dietz NP

## 2024-09-16 ENCOUNTER — LAB VISIT (OUTPATIENT)
Dept: LAB | Facility: HOSPITAL | Age: 64
End: 2024-09-16
Attending: INTERNAL MEDICINE
Payer: COMMERCIAL

## 2024-09-16 ENCOUNTER — OFFICE VISIT (OUTPATIENT)
Dept: TRANSPLANT | Facility: CLINIC | Age: 64
End: 2024-09-16
Payer: COMMERCIAL

## 2024-09-16 VITALS
HEIGHT: 67 IN | RESPIRATION RATE: 18 BRPM | DIASTOLIC BLOOD PRESSURE: 93 MMHG | BODY MASS INDEX: 32.87 KG/M2 | SYSTOLIC BLOOD PRESSURE: 146 MMHG | HEART RATE: 94 BPM | OXYGEN SATURATION: 100 % | WEIGHT: 209.44 LBS | TEMPERATURE: 98 F

## 2024-09-16 DIAGNOSIS — N18.31 STAGE 3A CHRONIC KIDNEY DISEASE: ICD-10-CM

## 2024-09-16 DIAGNOSIS — E83.42 HYPOMAGNESEMIA: ICD-10-CM

## 2024-09-16 DIAGNOSIS — N52.9 ERECTILE DYSFUNCTION, UNSPECIFIED ERECTILE DYSFUNCTION TYPE: ICD-10-CM

## 2024-09-16 DIAGNOSIS — N02.B9 IGA NEPHROPATHY: ICD-10-CM

## 2024-09-16 DIAGNOSIS — Z94.0 KIDNEY REPLACED BY TRANSPLANT: ICD-10-CM

## 2024-09-16 DIAGNOSIS — Z91.89 AT RISK FOR OPPORTUNISTIC INFECTIONS: ICD-10-CM

## 2024-09-16 DIAGNOSIS — Z94.0 S/P LIVING-DONOR KIDNEY TRANSPLANTATION: Primary | ICD-10-CM

## 2024-09-16 DIAGNOSIS — Z79.60 LONG-TERM USE OF IMMUNOSUPPRESSANT MEDICATION: ICD-10-CM

## 2024-09-16 LAB
ALBUMIN SERPL BCP-MCNC: 3.9 G/DL (ref 3.5–5.2)
ANION GAP SERPL CALC-SCNC: 9 MMOL/L (ref 8–16)
BASOPHILS # BLD AUTO: 0.04 K/UL (ref 0–0.2)
BASOPHILS NFR BLD: 0.3 % (ref 0–1.9)
BUN SERPL-MCNC: 19 MG/DL (ref 8–23)
CALCIUM SERPL-MCNC: 9.5 MG/DL (ref 8.7–10.5)
CHLORIDE SERPL-SCNC: 109 MMOL/L (ref 95–110)
CO2 SERPL-SCNC: 24 MMOL/L (ref 23–29)
CREAT SERPL-MCNC: 1.4 MG/DL (ref 0.5–1.4)
DIFFERENTIAL METHOD BLD: ABNORMAL
EOSINOPHIL # BLD AUTO: 0.2 K/UL (ref 0–0.5)
EOSINOPHIL NFR BLD: 2 % (ref 0–8)
ERYTHROCYTE [DISTWIDTH] IN BLOOD BY AUTOMATED COUNT: 12.8 % (ref 11.5–14.5)
EST. GFR  (NO RACE VARIABLE): 55.8 ML/MIN/1.73 M^2
GLUCOSE SERPL-MCNC: 104 MG/DL (ref 70–110)
HCT VFR BLD AUTO: 43.3 % (ref 40–54)
HGB BLD-MCNC: 12.6 G/DL (ref 14–18)
IMM GRANULOCYTES # BLD AUTO: 0.14 K/UL (ref 0–0.04)
IMM GRANULOCYTES NFR BLD AUTO: 1.1 % (ref 0–0.5)
LYMPHOCYTES # BLD AUTO: 2.1 K/UL (ref 1–4.8)
LYMPHOCYTES NFR BLD: 17.3 % (ref 18–48)
MAGNESIUM SERPL-MCNC: 1.7 MG/DL (ref 1.6–2.6)
MCH RBC QN AUTO: 26.6 PG (ref 27–31)
MCHC RBC AUTO-ENTMCNC: 29.1 G/DL (ref 32–36)
MCV RBC AUTO: 92 FL (ref 82–98)
MONOCYTES # BLD AUTO: 1.2 K/UL (ref 0.3–1)
MONOCYTES NFR BLD: 9.8 % (ref 4–15)
NEUTROPHILS # BLD AUTO: 8.5 K/UL (ref 1.8–7.7)
NEUTROPHILS NFR BLD: 69.5 % (ref 38–73)
NRBC BLD-RTO: 0 /100 WBC
PHOSPHATE SERPL-MCNC: 2.4 MG/DL (ref 2.7–4.5)
PLATELET # BLD AUTO: 262 K/UL (ref 150–450)
PMV BLD AUTO: 10.1 FL (ref 9.2–12.9)
POTASSIUM SERPL-SCNC: 4.4 MMOL/L (ref 3.5–5.1)
RBC # BLD AUTO: 4.73 M/UL (ref 4.6–6.2)
SODIUM SERPL-SCNC: 142 MMOL/L (ref 136–145)
TACROLIMUS BLD-MCNC: 5.7 NG/ML (ref 5–15)
WBC # BLD AUTO: 12.28 K/UL (ref 3.9–12.7)

## 2024-09-16 PROCEDURE — 1101F PT FALLS ASSESS-DOCD LE1/YR: CPT | Mod: CPTII,S$GLB,, | Performed by: NURSE PRACTITIONER

## 2024-09-16 PROCEDURE — 1159F MED LIST DOCD IN RCRD: CPT | Mod: CPTII,S$GLB,, | Performed by: NURSE PRACTITIONER

## 2024-09-16 PROCEDURE — 4010F ACE/ARB THERAPY RXD/TAKEN: CPT | Mod: CPTII,S$GLB,, | Performed by: NURSE PRACTITIONER

## 2024-09-16 PROCEDURE — 36415 COLL VENOUS BLD VENIPUNCTURE: CPT | Performed by: INTERNAL MEDICINE

## 2024-09-16 PROCEDURE — 3008F BODY MASS INDEX DOCD: CPT | Mod: CPTII,S$GLB,, | Performed by: NURSE PRACTITIONER

## 2024-09-16 PROCEDURE — 83735 ASSAY OF MAGNESIUM: CPT | Performed by: INTERNAL MEDICINE

## 2024-09-16 PROCEDURE — 3288F FALL RISK ASSESSMENT DOCD: CPT | Mod: CPTII,S$GLB,, | Performed by: NURSE PRACTITIONER

## 2024-09-16 PROCEDURE — 99215 OFFICE O/P EST HI 40 MIN: CPT | Mod: S$GLB,,, | Performed by: NURSE PRACTITIONER

## 2024-09-16 PROCEDURE — 80197 ASSAY OF TACROLIMUS: CPT | Performed by: INTERNAL MEDICINE

## 2024-09-16 PROCEDURE — 87799 DETECT AGENT NOS DNA QUANT: CPT | Performed by: INTERNAL MEDICINE

## 2024-09-16 PROCEDURE — 85025 COMPLETE CBC W/AUTO DIFF WBC: CPT | Performed by: INTERNAL MEDICINE

## 2024-09-16 PROCEDURE — 3080F DIAST BP >= 90 MM HG: CPT | Mod: CPTII,S$GLB,, | Performed by: NURSE PRACTITIONER

## 2024-09-16 PROCEDURE — 3066F NEPHROPATHY DOC TX: CPT | Mod: CPTII,S$GLB,, | Performed by: NURSE PRACTITIONER

## 2024-09-16 PROCEDURE — 3077F SYST BP >= 140 MM HG: CPT | Mod: CPTII,S$GLB,, | Performed by: NURSE PRACTITIONER

## 2024-09-16 PROCEDURE — 99999 PR PBB SHADOW E&M-EST. PATIENT-LVL V: CPT | Mod: PBBFAC,,, | Performed by: NURSE PRACTITIONER

## 2024-09-16 PROCEDURE — 80069 RENAL FUNCTION PANEL: CPT | Performed by: INTERNAL MEDICINE

## 2024-09-16 NOTE — LETTER
September 16, 2024        Colby Rene  [address] [place] [address] [address]  [address]  [place] [address]  Phone: [phone]  Fax: [phone]             [facility]- Transplant 1st Fl  [address]  [facility] [address]  Phone: [phone]   Patient: Colten Monet   MR Number: [id number]   YOB: 1960   Date of Visit: 9/16/2024       Dear Dr. Colby Rene    Thank you for referring Colten Monet to me for evaluation. Attached you will find relevant portions of my assessment and plan of care.    If you have questions, please do not hesitate to call me. I look forward to following Colten Monet along with you.    Sincerely,    Tish Dietz, NIKITA    Enclosure    If you would like to receive this communication electronically, please contact [email or web address] or [phone] to request [employer] Link access.    [employer] Link is a tool which provides read-only access to select patient information with whom you have a relationship. Its easy to use and provides real time access to review your patients record including encounter summaries, notes, results, and demographic information.    If you feel you have received this communication in error or would no longer like to receive these types of communications, please e-mail [email or web address]

## 2024-09-17 ENCOUNTER — TELEPHONE (OUTPATIENT)
Dept: TRANSPLANT | Facility: CLINIC | Age: 64
End: 2024-09-17
Payer: COMMERCIAL

## 2024-09-17 NOTE — TELEPHONE ENCOUNTER
Pt reports missing 1 dose of prograf Saturday AM. Repeat level scheduled 9/30  ----- Message from Massimo Luis Jr., MD sent at 9/17/2024 12:23 PM CDT -----  Repeat tac in 2 weeks to make sure not continuing to fall. Other labs acceptable.

## 2024-09-30 ENCOUNTER — LAB VISIT (OUTPATIENT)
Dept: LAB | Facility: HOSPITAL | Age: 64
End: 2024-09-30
Attending: INTERNAL MEDICINE
Payer: COMMERCIAL

## 2024-09-30 ENCOUNTER — OFFICE VISIT (OUTPATIENT)
Dept: UROLOGY | Facility: CLINIC | Age: 64
End: 2024-09-30
Payer: COMMERCIAL

## 2024-09-30 VITALS
BODY MASS INDEX: 32.87 KG/M2 | HEIGHT: 67 IN | DIASTOLIC BLOOD PRESSURE: 84 MMHG | HEART RATE: 81 BPM | SYSTOLIC BLOOD PRESSURE: 141 MMHG | WEIGHT: 209.44 LBS

## 2024-09-30 DIAGNOSIS — N13.8 BPH WITH URINARY OBSTRUCTION: ICD-10-CM

## 2024-09-30 DIAGNOSIS — E78.5 DYSLIPIDEMIA: ICD-10-CM

## 2024-09-30 DIAGNOSIS — I10 PRIMARY HYPERTENSION: ICD-10-CM

## 2024-09-30 DIAGNOSIS — N52.01 ERECTILE DYSFUNCTION DUE TO ARTERIAL INSUFFICIENCY: Primary | ICD-10-CM

## 2024-09-30 DIAGNOSIS — Z94.0 KIDNEY REPLACED BY TRANSPLANT: ICD-10-CM

## 2024-09-30 DIAGNOSIS — N40.1 BPH WITH URINARY OBSTRUCTION: ICD-10-CM
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
## 2024-09-30 PROBLEM — Z87.19 HISTORY OF INCISIONAL HERNIA REPAIR: Status: RESOLVED | Noted: 2024-04-15 | Resolved: 2024-09-30

## 2024-09-30 PROBLEM — T14.8XXA WOUND INFECTION: Status: RESOLVED | Noted: 2024-05-21 | Resolved: 2024-09-30

## 2024-09-30 PROBLEM — R05.9 COUGH: Status: RESOLVED | Noted: 2024-03-07 | Resolved: 2024-09-30

## 2024-09-30 PROBLEM — L08.9 WOUND INFECTION: Status: RESOLVED | Noted: 2024-05-21 | Resolved: 2024-09-30

## 2024-09-30 PROBLEM — E83.42 HYPOMAGNESEMIA: Status: RESOLVED | Noted: 2024-04-08 | Resolved: 2024-09-30

## 2024-09-30 PROBLEM — E87.6 HYPOKALEMIA: Status: RESOLVED | Noted: 2024-04-19 | Resolved: 2024-09-30

## 2024-09-30 PROBLEM — Z87.09 HISTORY OF COPD: Status: RESOLVED | Noted: 2024-04-08 | Resolved: 2024-09-30

## 2024-09-30 PROBLEM — Z98.890 HISTORY OF INCISIONAL HERNIA REPAIR: Status: RESOLVED | Noted: 2024-04-15 | Resolved: 2024-09-30

## 2024-09-30 PROBLEM — T81.49XA: Status: RESOLVED | Noted: 2024-04-08 | Resolved: 2024-09-30

## 2024-09-30 PROBLEM — T81.31XA: Status: RESOLVED | Noted: 2024-04-08 | Resolved: 2024-09-30

## 2024-09-30 LAB — TACROLIMUS BLD-MCNC: 5.8 NG/ML (ref 5–15)

## 2024-09-30 PROCEDURE — 3079F DIAST BP 80-89 MM HG: CPT | Mod: CPTII,S$GLB,, | Performed by: UROLOGY

## 2024-09-30 PROCEDURE — 99204 OFFICE O/P NEW MOD 45 MIN: CPT | Mod: S$GLB,,, | Performed by: UROLOGY

## 2024-09-30 PROCEDURE — 3077F SYST BP >= 140 MM HG: CPT | Mod: CPTII,S$GLB,, | Performed by: UROLOGY

## 2024-09-30 PROCEDURE — 4010F ACE/ARB THERAPY RXD/TAKEN: CPT | Mod: CPTII,S$GLB,, | Performed by: UROLOGY

## 2024-09-30 PROCEDURE — 3066F NEPHROPATHY DOC TX: CPT | Mod: CPTII,S$GLB,, | Performed by: UROLOGY

## 2024-09-30 PROCEDURE — 80197 ASSAY OF TACROLIMUS: CPT | Performed by: INTERNAL MEDICINE

## 2024-09-30 PROCEDURE — 99999 PR PBB SHADOW E&M-EST. PATIENT-LVL V: CPT | Mod: PBBFAC,,, | Performed by: UROLOGY

## 2024-09-30 PROCEDURE — 36415 COLL VENOUS BLD VENIPUNCTURE: CPT | Performed by: INTERNAL MEDICINE

## 2024-09-30 PROCEDURE — 1159F MED LIST DOCD IN RCRD: CPT | Mod: CPTII,S$GLB,, | Performed by: UROLOGY

## 2024-09-30 PROCEDURE — 1160F RVW MEDS BY RX/DR IN RCRD: CPT | Mod: CPTII,S$GLB,, | Performed by: UROLOGY

## 2024-09-30 PROCEDURE — 3008F BODY MASS INDEX DOCD: CPT | Mod: CPTII,S$GLB,, | Performed by: UROLOGY

## 2024-09-30 RX ORDER — SILDENAFIL 100 MG/1
100 TABLET, FILM COATED ORAL DAILY PRN
Qty: 10 TABLET | Refills: 11 | Status: SHIPPED | OUTPATIENT
Start: 2024-09-30 | End: 2025-09-30

## 2024-09-30 NOTE — PROGRESS NOTES
CHIEF COMPLAINT:    Mr. Monet is a 64 y.o. male presenting for a consultation at the request of MENA Dietz NP. Patient presents with ED.    PRESENTING ILLNESS:    Colten Monet is a 64 y.o. male who c/o ED.  He's s/p a kidney transplant .  He's never tried a PDE-5i.    He has LUTS.  Nocturia x 3.  Is pleased with how he voids.    REVIEW OF SYSTEMS:    Colten Monet denies headache, blurred vision, fever, nausea, vomiting, chills, abdominal pain, chest pain, sore throat, bleeding per rectum, cough, SOB, recent loss of consciousness, recent mental status changes, seizures, dizziness, or upper or lower extremity weakness.    CARIN  1. 4  2. 4  3. 3  4. 3  5. 4      PATIENT HISTORY:    Past Medical History:   Diagnosis Date    Anemia     CVA (cerebral vascular accident)     GERD (gastroesophageal reflux disease)     History of COPD 2024    History of incisional hernia repair 04/15/2024    Hyperlipidemia     Hypertension     IgA nephropathy     Obesity     S/P living-donor kidney transplantation 2024       Past Surgical History:   Procedure Laterality Date    APPENDECTOMY      CARDIAC CATHETERIZATION      [facility] ~ 6-7 years ago    KIDNEY TRANSPLANT N/A 3/11/2024    Procedure: TRANSPLANT, KIDNEY;  Surgeon: Silvio Mayer MD;  Location: [facility] OR [address] [place] [address];  Service: Transplant;  Laterality: N/A;    LAPAROTOMY, EXPLORATORY N/A 2024    Procedure: LAPAROTOMY, EXPLORATORY;  Surgeon: Lorenzo Gonzales MD;  Location: [facility] OR [address] [place] [address];  Service: Transplant;  Laterality: N/A;    RENAL BIOPSY      TONSILLECTOMY         Family History   Problem Relation Name Age of Onset    Depression Mother      Kidney disease Neg Hx      Cancer Neg Hx         Social History     Socioeconomic History    Marital status:    Tobacco Use    Smoking status: Former     Current packs/day: 0.00     Types: Cigarettes     Quit date: 2016     Years since quittin.3    Smokeless tobacco: Never   Substance and Sexual  Activity    Alcohol use: Not Currently    Drug use: Never   Social History Narrative    Works in construction as a supervisor, operating crane in marine environment.     Social Drivers of Health     Financial Resource Strain: High Risk (5/21/2024)    Overall Financial Resource Strain (CARDIA)     Difficulty of Paying Living Expenses: Hard   Food Insecurity: No Food Insecurity (5/21/2024)    Hunger Vital Sign     Worried About Running Out of Food in the Last Year: Never true     Ran Out of Food in the Last Year: Never true   Transportation Needs: No Transportation Needs (5/21/2024)    TRANSPORTATION NEEDS     Transportation : No   Stress: No Stress Concern Present (5/21/2024)    [employer] [place] [employer] - Occupational Stress Questionnaire     Feeling of Stress : Only a little   Housing Stability: Low Risk  (5/21/2024)    Housing Stability Vital Sign     Unable to Pay for Housing in the Last Year: No     Homeless in the Last Year: No       Allergies:  Codeine    Medications:    Current Outpatient Medications:     albuterol (PROVENTIL/VENTOLIN HFA) 90 mcg/actuation inhaler, Inhale 1-2 puffs into the lungs every 6 (six) hours as needed for Wheezing. Rescue, Disp: 18 g, Rfl: 1    aspirin (ECOTRIN) 81 MG EC tablet, Take 1 tablet (81 mg total) by mouth once daily., Disp: 30 tablet, Rfl: 11    atorvastatin (LIPITOR) 40 MG tablet, Take 1 tablet by mouth every evening., Disp: , Rfl:     docusate sodium (COLACE) 100 MG capsule, Take 1 capsule (100 mg total) by mouth 3 (three) times daily as needed for Constipation., Disp: , Rfl: 0    fluticasone furoate-vilanteroL (BREO ELLIPTA) 100-25 mcg/dose diskus inhaler, Inhale 1 puff into the lungs once daily. Controller (Patient not taking: Reported on 9/16/2024), Disp: 30 each, Rfl: 11    k phos di & mono-sod phos mono (K-PHOS-NEUTRAL) 250 mg Tab, Take 2 tablets by mouth every 12 (twelve) hours., Disp: 120 tablet, Rfl: 11    magnesium oxide (MAG-OX) 400 mg (241.3 mg  magnesium) tablet, Take 2 tablets (800 mg total) by mouth 3 (three) times daily., Disp: 180 tablet, Rfl: 11    multivitamin (ONE DAILY MULTIVITAMIN) per tablet, Take 1 tablet by mouth once daily., Disp: , Rfl:     mycophenolate sodium 180 MG TbEC, Take 3 tablets (540 mg total) by mouth 2 (two) times daily., Disp: 180 tablet, Rfl: 11    NIFEdipine (PROCARDIA-XL) 30 MG (OSM) 24 hr tablet, Take 1 tablet (30 mg total) by mouth 2 (two) times a day., Disp: 60 tablet, Rfl: 11    patiromer calcium sorbitex (VELTASSA) 8.4 gram PwPk, Take 1 packet (8.4 g total) by mouth once daily., Disp: 30 packet, Rfl: 5    predniSONE (DELTASONE) 5 MG tablet, Take 1 tablet (5 mg total) by mouth once daily., Disp: 30 tablet, Rfl: 11    tacrolimus (PROGRAF) 1 MG Cap, Take 2 capsules (2 mg total) by mouth every 12 (twelve) hours., Disp: 120 capsule, Rfl: 11    PHYSICAL EXAMINATION:    The patient generally appears in good health, is appropriately interactive, and is in no apparent distress.     Eyes: anicteric sclerae, moist conjunctivae; no lid-lag; PERRLA     HENT: Atraumatic; oropharynx clear with moist mucous membranes and no mucosal ulcerations;normal hard and soft palate.  No evidence of lymphadenopathy.    Neck: Trachea midline.  No thyromegaly.    Skin: No lesions.    Mental: Cooperative with normal affect.  Is oriented to time, place, and person.    Neuro: Grossly intact.    Chest: Normal inspiratory effort.   No accessory muscles.  No audible wheezes.  Respirations symmetric on inspiration and expiration.    Heart: Regular rhythm.      Abdomen:  Soft, non-tender. No masses or organomegaly. Bladder is not palpable. No evidence of flank discomfort. No evidence of inguinal hernia.    Genitourinary: The penis is circumcised with no evidence of plaques or induration. The urethral meatus is normal. The testes, epididymides, and cord structures are normal in size and contour bilaterally. The scrotum is normal in size and contour.    Normal  anal sphincter tone. No rectal mass.    The prostate is 30 g. Normal landmarks. Lateral sulci. Median furrow intact.  No nodularity or induration. Seminal vesicles are normal.    Extremities: No clubbing, cyanosis, or edema      LABS:      Lab Results   Component Value Date    PSA 1.6 09/21/2023    PSA 2.5 11/29/2022    PSA 1.2 05/25/2021       IMPRESSION:    Encounter Diagnoses   Name Primary?    Erectile dysfunction due to arterial insufficiency Yes    BPH with urinary obstruction     Dyslipidemia     Primary hypertension    HTN, stable  Dyslipidemia, stable      PLAN:    1. Will observe his LUTS as they don't bother him.  2. Discussed options for his ED (affected by above comorbidities).   He'd like to try Viagra. Side effects discussed.  A new Rx was given  3. RTC 3 months to see an DAVID as he's not interested in ICI or IPP.    Copy to:

## 2024-10-21 ENCOUNTER — LAB VISIT (OUTPATIENT)
Dept: LAB | Facility: HOSPITAL | Age: 64
End: 2024-10-21
Attending: INTERNAL MEDICINE
Payer: COMMERCIAL

## 2024-10-21 DIAGNOSIS — Z94.0 KIDNEY REPLACED BY TRANSPLANT: ICD-10-CM

## 2024-10-21 DIAGNOSIS — E83.42 HYPOMAGNESEMIA: ICD-10-CM

## 2024-10-21 LAB
ALBUMIN SERPL BCP-MCNC: 3.8 G/DL (ref 3.5–5.2)
ANION GAP SERPL CALC-SCNC: 9 MMOL/L (ref 8–16)
BASOPHILS # BLD AUTO: 0.07 K/UL (ref 0–0.2)
BASOPHILS NFR BLD: 0.5 % (ref 0–1.9)
BUN SERPL-MCNC: 18 MG/DL (ref 8–23)
CALCIUM SERPL-MCNC: 9.2 MG/DL (ref 8.7–10.5)
CHLORIDE SERPL-SCNC: 112 MMOL/L (ref 95–110)
CO2 SERPL-SCNC: 23 MMOL/L (ref 23–29)
CREAT SERPL-MCNC: 1.3 MG/DL (ref 0.5–1.4)
DIFFERENTIAL METHOD BLD: ABNORMAL
EOSINOPHIL # BLD AUTO: 0.3 K/UL (ref 0–0.5)
EOSINOPHIL NFR BLD: 2 % (ref 0–8)
ERYTHROCYTE [DISTWIDTH] IN BLOOD BY AUTOMATED COUNT: 13.1 % (ref 11.5–14.5)
EST. GFR  (NO RACE VARIABLE): >60 ML/MIN/1.73 M^2
GLUCOSE SERPL-MCNC: 108 MG/DL (ref 70–110)
HCT VFR BLD AUTO: 44.5 % (ref 40–54)
HGB BLD-MCNC: 13.2 G/DL (ref 14–18)
IMM GRANULOCYTES # BLD AUTO: 0.1 K/UL (ref 0–0.04)
IMM GRANULOCYTES NFR BLD AUTO: 0.7 % (ref 0–0.5)
LYMPHOCYTES # BLD AUTO: 2.4 K/UL (ref 1–4.8)
LYMPHOCYTES NFR BLD: 18.1 % (ref 18–48)
MAGNESIUM SERPL-MCNC: 2.5 MG/DL (ref 1.6–2.6)
MCH RBC QN AUTO: 26.6 PG (ref 27–31)
MCHC RBC AUTO-ENTMCNC: 29.7 G/DL (ref 32–36)
MCV RBC AUTO: 90 FL (ref 82–98)
MONOCYTES # BLD AUTO: 1.4 K/UL (ref 0.3–1)
MONOCYTES NFR BLD: 10.3 % (ref 4–15)
NEUTROPHILS # BLD AUTO: 9.1 K/UL (ref 1.8–7.7)
NEUTROPHILS NFR BLD: 68.4 % (ref 38–73)
NRBC BLD-RTO: 0 /100 WBC
PHOSPHATE SERPL-MCNC: 2.5 MG/DL (ref 2.7–4.5)
PLATELET # BLD AUTO: 217 K/UL (ref 150–450)
PMV BLD AUTO: 9.4 FL (ref 9.2–12.9)
POTASSIUM SERPL-SCNC: 4.3 MMOL/L (ref 3.5–5.1)
RBC # BLD AUTO: 4.96 M/UL (ref 4.6–6.2)
SODIUM SERPL-SCNC: 144 MMOL/L (ref 136–145)
TACROLIMUS BLD-MCNC: 6.4 NG/ML (ref 5–15)
WBC # BLD AUTO: 13.38 K/UL (ref 3.9–12.7)

## 2024-10-21 PROCEDURE — 85025 COMPLETE CBC W/AUTO DIFF WBC: CPT | Performed by: INTERNAL MEDICINE

## 2024-10-21 PROCEDURE — 80197 ASSAY OF TACROLIMUS: CPT | Performed by: INTERNAL MEDICINE

## 2024-10-21 PROCEDURE — 80069 RENAL FUNCTION PANEL: CPT | Performed by: INTERNAL MEDICINE

## 2024-10-21 PROCEDURE — 36415 COLL VENOUS BLD VENIPUNCTURE: CPT | Performed by: INTERNAL MEDICINE

## 2024-10-21 PROCEDURE — 83735 ASSAY OF MAGNESIUM: CPT | Performed by: INTERNAL MEDICINE

## 2024-11-07 ENCOUNTER — TELEPHONE (OUTPATIENT)
Dept: TRANSPLANT | Facility: CLINIC | Age: 64
End: 2024-11-07
Payer: COMMERCIAL

## 2024-11-07 NOTE — TELEPHONE ENCOUNTER
Pt reports lump by incision is growing larger - now the size of a quarter; no redness, no pain. Of note, pt previously followed wound care for delayed wound healing and had wound vac. Pt instructed to present to surgery clinic tomorrow AM.  ----- Message from Dasha sent at 11/7/2024  1:51 PM CST -----  Regarding: Medical Advice        Name Of Caller:   Colten      Contact Preference:   [phone]       Nature of call:   Pt would like to speak with Rachel about their txp incision. He states theres a lump by it that has abscess and is getting bigger. Pt would like to know if he needs to see wound care.

## 2024-11-08 ENCOUNTER — OFFICE VISIT (OUTPATIENT)
Dept: TRANSPLANT | Facility: CLINIC | Age: 64
End: 2024-11-08
Payer: COMMERCIAL

## 2024-11-08 ENCOUNTER — TELEPHONE (OUTPATIENT)
Dept: TRANSPLANT | Facility: CLINIC | Age: 64
End: 2024-11-08

## 2024-11-08 VITALS
SYSTOLIC BLOOD PRESSURE: 136 MMHG | TEMPERATURE: 97 F | WEIGHT: 211.63 LBS | RESPIRATION RATE: 18 BRPM | BODY MASS INDEX: 33.21 KG/M2 | DIASTOLIC BLOOD PRESSURE: 83 MMHG | OXYGEN SATURATION: 99 % | HEART RATE: 88 BPM | HEIGHT: 67 IN

## 2024-11-08 DIAGNOSIS — K43.2 INCISIONAL HERNIA, WITHOUT OBSTRUCTION OR GANGRENE: Primary | ICD-10-CM

## 2024-11-08 DIAGNOSIS — I82.403 ACUTE DEEP VEIN THROMBOSIS (DVT) OF BOTH LOWER EXTREMITIES, UNSPECIFIED VEIN: ICD-10-CM

## 2024-11-08 DIAGNOSIS — Z94.0 KIDNEY REPLACED BY TRANSPLANT: ICD-10-CM

## 2024-11-08 DIAGNOSIS — Z51.81 ENCOUNTER FOR THERAPEUTIC DRUG MONITORING: Primary | ICD-10-CM

## 2024-11-08 DIAGNOSIS — R20.0 NUMBNESS AND TINGLING: ICD-10-CM

## 2024-11-08 DIAGNOSIS — R20.2 NUMBNESS AND TINGLING: ICD-10-CM

## 2024-11-08 PROCEDURE — 99999 PR PBB SHADOW E&M-EST. PATIENT-LVL IV: CPT | Mod: PBBFAC,,, | Performed by: SURGERY

## 2024-11-08 NOTE — LETTER
November 8, 2024        Colby Rene  [address] [place] [address] [address]  [address]  [place] [address]  Phone: [phone]  Fax: [phone]             [facility]- Transplant 1st Fl  [address]  [facility] [address]  Phone: [phone]   Patient: Colten Monet   MR Number: [id number]   YOB: 1960   Date of Visit: 11/8/2024       Dear Dr. Colby Rene    Thank you for referring Colten Moent to me for evaluation. Attached you will find relevant portions of my assessment and plan of care.    If you have questions, please do not hesitate to call me. I look forward to following Colten Monet along with you.    Sincerely,    Justyn Chirinos MD    Enclosure    If you would like to receive this communication electronically, please contact [email or web address] or [phone] to request [email or web address] Link access.    [email or web address] Link is a tool which provides read-only access to select patient information with whom you have a relationship. Its easy to use and provides real time access to review your patients record including encounter summaries, notes, results, and demographic information.    If you feel you have received this communication in error or would no longer like to receive these types of communications, please e-mail [email or web address]

## 2024-11-08 NOTE — PROGRESS NOTES
Transplant Surgery  Kidney Transplant Recipient Follow-up    Referring Physician: Colby Rene  Current Nephrologist: Colby Rene    Subjective:     Chief Complaint: Colten Monet is a 64 y.o. year old White male who is status post Kidney transplant performed on 3/11/2024.    Kidney transplant complicated with wound dehiscence requiring reoperation.    Now complaining of bulge at top of wound, numbness in left 4,5 th finger and loss of muscle in left hand.  Has intermittent swelling of left leg.    ORGAN: LEFT KIDNEY    Disease Etiology: IgA Nephropathy  Donor Type: Living    Donor CMV Status:      Donor HBcAB:      Donor HCV Status:        History of Present Illness: He reports no concerns.  From a transplant perspective, he reports normal urination.  Colten is here for management of his immunosuppression medication.  Colten states that his immunosuppression is being well tolerated.  Hypertension is not present.    External provider notes reviewed: No    Review of Systems   Constitutional:  Positive for fatigue.   HENT:  Negative for drooling, postnasal drip and sore throat.    Eyes:  Negative for discharge and itching.   Respiratory:  Negative for choking and stridor.    Gastrointestinal:  Negative for rectal pain.   Endocrine: Negative for polydipsia.   Genitourinary:  Negative for enuresis and genital sores.   Musculoskeletal:  Negative for back pain, neck pain and neck stiffness.   Allergic/Immunologic: Positive for immunocompromised state.   Neurological:  Negative for facial asymmetry and numbness.   Hematological:  Negative for adenopathy.   Psychiatric/Behavioral:  Negative for behavioral problems, self-injury and suicidal ideas.      Objective:     Physical Exam  Abdominal:          Comments: Weakness at top of wound    Wound well healed   Lab Results   Component Value Date    CREATININE 1.3 10/21/2024    BUN 18 10/21/2024     Lab Results   Component Value Date    WBC 13.38 (H) 10/21/2024    HGB 13.2  (L) 10/21/2024    HCT 44.5 10/21/2024    HCT 32 (L) 03/11/2024     10/21/2024     Lab Results   Component Value Date    TACROLIMUS 6.4 10/21/2024       Diagnostics:  The following labs have been reviewed: CBC  CMP  The following radiology images have been independently reviewed and interpreted: CT Abd/Pelvis    Assessment and Plan:        S/P Kidney transplant.  Chronic immunosuppressive medications for rejection prophylaxis at target.  Plan: no adjustment needed.  Continue monitoring symptoms, labs and drug levels for drug-related toxicity and side effects.  Renal hypertension at target.  CT abdo/pelvis  Discussed possible hernia repair - he does not want this intervention   Ultrasound of bilateral extremities  Neurology consult for weakness    Additional testing to be completed according to the Kidney: Written Order Guideline for Kidney Transplant Follow-Up (KI-09)    Interpretation of tests and discussion of patient management involves all members of the multidisciplinary transplant team.  Patient advised that it is recommended that all transplant candidates, and their close contacts and household members receive Covid vaccination.  Follow-up: Patient reminded to call with any health changes, since these can be early signs of significant complications.  Also, I advised the patient to be sure any new medications or changes of old medications are discussed with either a pharmacist, or physician knowledgeable with transplant to avoid rejection/drug toxicity related to significant drug interactions.    Justyn Chirinos MD       [facility] Patient Status  Functional Status: 100% - Normal, no complaints, no evidence of disease  Physical Capacity: No Limitations

## 2024-11-11 ENCOUNTER — HOSPITAL ENCOUNTER (OUTPATIENT)
Dept: RADIOLOGY | Facility: HOSPITAL | Age: 64
Discharge: HOME | End: 2024-11-11
Attending: SURGERY
Payer: COMMERCIAL

## 2024-11-11 DIAGNOSIS — I82.403 ACUTE DEEP VEIN THROMBOSIS (DVT) OF BOTH LOWER EXTREMITIES, UNSPECIFIED VEIN: ICD-10-CM

## 2024-11-11 DIAGNOSIS — K43.2 INCISIONAL HERNIA, WITHOUT OBSTRUCTION OR GANGRENE: ICD-10-CM

## 2024-11-11 PROCEDURE — 74177 CT ABD & PELVIS W/CONTRAST: CPT | Mod: TC

## 2024-11-11 PROCEDURE — 74177 CT ABD & PELVIS W/CONTRAST: CPT | Mod: 26,,, | Performed by: INTERNAL MEDICINE

## 2024-11-11 PROCEDURE — 25500020 PHARM REV CODE 255: Performed by: SURGERY

## 2024-11-11 PROCEDURE — 93970 EXTREMITY STUDY: CPT | Mod: 26,,, | Performed by: RADIOLOGY

## 2024-11-11 PROCEDURE — 93970 EXTREMITY STUDY: CPT | Mod: TC

## 2024-11-11 RX ADMIN — IOHEXOL 100 ML: 350 INJECTION, SOLUTION INTRAVENOUS at 10:11

## 2024-11-12 ENCOUNTER — TELEPHONE (OUTPATIENT)
Dept: NEUROLOGY | Facility: CLINIC | Age: 64
End: 2024-11-12
Payer: COMMERCIAL

## 2024-11-12 NOTE — TELEPHONE ENCOUNTER
Spoke to pt to cancel appt since he is a gen neuro case scheduled with dr. Baltazar and offered dec 3rd at 1pm with dr. Bal in resident clinic. Patient verbalized agreement

## 2024-12-03 ENCOUNTER — OFFICE VISIT (OUTPATIENT)
Dept: NEUROLOGY | Facility: CLINIC | Age: 64
End: 2024-12-03
Payer: COMMERCIAL

## 2024-12-03 VITALS — HEART RATE: 91 BPM | DIASTOLIC BLOOD PRESSURE: 85 MMHG | SYSTOLIC BLOOD PRESSURE: 140 MMHG

## 2024-12-03 DIAGNOSIS — R06.83 SNORING: ICD-10-CM

## 2024-12-03 DIAGNOSIS — G47.10 HYPERSOMNIA: ICD-10-CM

## 2024-12-03 DIAGNOSIS — R29.898 WEAKNESS OF LEFT HAND: Primary | ICD-10-CM

## 2024-12-03 DIAGNOSIS — R73.03 PREDIABETES: ICD-10-CM

## 2024-12-03 DIAGNOSIS — Z91.89 AT RISK FOR OPPORTUNISTIC INFECTIONS: ICD-10-CM

## 2024-12-03 DIAGNOSIS — Z29.89 PROPHYLACTIC IMMUNOTHERAPY: ICD-10-CM

## 2024-12-03 DIAGNOSIS — E87.5 HYPERKALEMIA: ICD-10-CM

## 2024-12-03 DIAGNOSIS — D63.8 ANEMIA OF CHRONIC DISEASE: ICD-10-CM

## 2024-12-03 DIAGNOSIS — D84.9 IMMUNOSUPPRESSION: ICD-10-CM

## 2024-12-03 DIAGNOSIS — R20.0 NUMBNESS AND TINGLING: ICD-10-CM

## 2024-12-03 DIAGNOSIS — E78.5 DYSLIPIDEMIA: ICD-10-CM

## 2024-12-03 DIAGNOSIS — N02.B9 IGA NEPHROPATHY: ICD-10-CM

## 2024-12-03 DIAGNOSIS — R20.2 NUMBNESS AND TINGLING: ICD-10-CM

## 2024-12-03 PROCEDURE — 99999 PR PBB SHADOW E&M-EST. PATIENT-LVL IV: CPT | Mod: PBBFAC,,,
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
## 2024-12-05 NOTE — PROGRESS NOTES
Kidney Post-Transplant Assessment    Referring Physician: Colby Rene  Current Nephrologist: Colby Rene    ORGAN: LEFT KIDNEY  Donor Type: living  PHS Increased Risk: no  Cold Ischemia: 45 mins  Induction Medications:     Subjective:     CC:  Reassessment of renal allograft function and management of chronic immunosuppression.    HPI:  Mr. Monet is a 64 y.o. year old White male with history of ESRD secondary to IgA nephropathy who received a living kidney transplant on 3/11/24.  Post transplant with delayed wound healing. He has CKD stage 3 - GFR 30-59 and his baseline creatinine is between 1.2-1.4. He takes mycophenolate mofetil, prednisone, and tacrolimus for maintenance immunosuppression. He denies any recent hospitalizations or ER visits since his previous clinic visit.    Saw transplant surgery 11/8 for hernia, does not wish for repair at this time.    Saw neurology for complaints of left partial hand numbness and weakness. Ordered cervical spine MRI and lab work. Planning for clinic follow up after MRI and labs.     He feels well today without complaints. Staying hydrated, no problems with urination. Home -120, no peripheral edema. Has not re-established with general nephrology post transplant, previously seeing Dr. Rene. Tolerating IS without issues, no diarrhea or vomiting.     Current Outpatient Medications   Medication Sig Dispense Refill    albuterol (PROVENTIL/VENTOLIN HFA) 90 mcg/actuation inhaler Inhale 1-2 puffs into the lungs every 6 (six) hours as needed for Wheezing. Rescue 18 g 1    aspirin (ECOTRIN) 81 MG EC tablet Take 1 tablet (81 mg total) by mouth once daily. 30 tablet 11    atorvastatin (LIPITOR) 40 MG tablet Take 1 tablet by mouth every evening.      docusate sodium (COLACE) 100 MG capsule Take 1 capsule (100 mg total) by mouth 3 (three) times daily as needed for Constipation.  0    fluticasone furoate-vilanteroL (BREO ELLIPTA) 100-25 mcg/dose diskus inhaler Inhale 1 puff into the  lungs once daily. Controller 30 each 11    k phos di & mono-sod phos mono (K-PHOS-NEUTRAL) 250 mg Tab Take 2 tablets by mouth every 12 (twelve) hours. 120 tablet 11    magnesium oxide (MAG-OX) 400 mg (241.3 mg magnesium) tablet Take 2 tablets (800 mg total) by mouth 3 (three) times daily. 180 tablet 11    multivitamin (ONE DAILY MULTIVITAMIN) per tablet Take 1 tablet by mouth once daily.      mycophenolate sodium 180 MG TbEC Take 3 tablets (540 mg total) by mouth 2 (two) times daily. 180 tablet 11    NIFEdipine (PROCARDIA-XL) 30 MG (OSM) 24 hr tablet Take 1 tablet (30 mg total) by mouth 2 (two) times a day. 60 tablet 11    sildenafiL (VIAGRA) 100 MG tablet Take 1 tablet (100 mg total) by mouth daily as needed for Erectile Dysfunction. Dispense generic 10 tablet 11    tacrolimus (PROGRAF) 1 MG Cap Take 2 capsules (2 mg total) by mouth every 12 (twelve) hours. 120 capsule 11    patiromer calcium sorbitex (VELTASSA) 8.4 gram PwPk Take 1 packet (8.4 g total) by mouth once daily. 30 packet 5    predniSONE (DELTASONE) 5 MG tablet Take 1 tablet (5 mg total) by mouth once daily. (Patient not taking: Reported on 12/9/2024) 30 tablet 11     No current facility-administered medications for this visit.       Past Medical History:   Diagnosis Date    Anemia     CVA (cerebral vascular accident)     GERD (gastroesophageal reflux disease)     History of COPD 04/08/2024    History of incisional hernia repair 04/15/2024    Hyperlipidemia     Hypertension     IgA nephropathy     Obesity     S/P living-donor kidney transplantation 03/12/2024       Review of Systems   Constitutional:  Negative for activity change and fever.   Eyes:  Negative for visual disturbance.   Respiratory:  Negative for cough and shortness of breath.    Cardiovascular:  Negative for chest pain and leg swelling.   Gastrointestinal:  Negative for abdominal pain, constipation, diarrhea and nausea.   Genitourinary:  Negative for difficulty urinating, frequency and  "hematuria.   Musculoskeletal:  Negative for arthralgias and myalgias.   Skin:  Negative for wound.   Allergic/Immunologic: Positive for immunocompromised state.   Neurological:  Negative for weakness.   Psychiatric/Behavioral:  Negative for sleep disturbance.        Objective:     Blood pressure 119/76, pulse 97, temperature 97.3 °F (36.3 °C), temperature source Temporal, resp. rate 18, height 5' 7" (1.702 m), weight 95.4 kg (210 lb 5.1 oz), SpO2 96%.body mass index is 32.94 kg/m².    Physical Exam  Vitals and nursing note reviewed.   Constitutional:       Appearance: Normal appearance.   Cardiovascular:      Rate and Rhythm: Normal rate and regular rhythm.      Heart sounds: Normal heart sounds.   Pulmonary:      Effort: Pulmonary effort is normal.      Breath sounds: Normal breath sounds.   Abdominal:      General: There is no distension.   Musculoskeletal:         General: Normal range of motion.   Skin:     General: Skin is warm and dry.   Neurological:      Mental Status: He is alert.         Labs:  Lab Results   Component Value Date    WBC 12.34 12/09/2024    HGB 13.3 (L) 12/09/2024    HCT 44.0 12/09/2024     12/09/2024    K 4.3 12/09/2024     12/09/2024    CO2 23 12/09/2024    BUN 24 (H) 12/09/2024    CREATININE 1.2 12/09/2024    EGFRNORACEVR >60.0 12/09/2024    GLUCOSE 76 09/22/2020    CALCIUM 9.2 12/09/2024    PHOS 2.6 (L) 12/09/2024    MG 1.5 (L) 12/09/2024    ALBUMIN 4.0 12/09/2024    AST 13 06/10/2024    ALT 19 06/10/2024    UTPCR 0.12 12/09/2024    .0 (H) 05/25/2021    TACROLIMUS 10.7 12/09/2024       Labs were reviewed with the patient.    Assessment:     1. S/P living-donor kidney transplantation    2. Long-term use of immunosuppressant medication    3. IgA nephropathy    4. CKD (chronic kidney disease), stage II    5. At risk for opportunistic infections    6. Hyperkalemia      Plan:       1. Immunosuppression: Prograf trough PENDING. Continue Prograf 2/2, MyF 540 mg BID, and " Prednisone 5 mg QD. Will continue to monitor for drug toxicities    2. Allograft Function: CKD II. Continue good oral hydration.   - ESRD secondary to IgA nephropathy s/p  living kidney transplant on 3/11/24.    - Post transplant with delayed wound healing.   - baseline creatinine is between 1.2-1.4.  Lab Results   Component Value Date    CREATININE 1.2 12/09/2024       3. Hypertension management:   - advise low salt diet and home BP monitoring    - nifedipine 30 mg BID    4. History of COPD   - follows with local pulmonologist    5. Hypophosphatemia     - continue  mg BID   - high phosphorous diet    6. Anemia: stable. No need for intervention   Lab Results   Component Value Date    WBC 12.34 12/09/2024    HGB 13.3 (L) 12/09/2024    HCT 44.0 12/09/2024    MCV 89 12/09/2024     12/09/2024       7. Proteinuria: continue p/c ratio as per guidelines    - last UPC 0.12    8. BK virus infection screening:  BK PCR per protocol    - last PCR not detected    9. Weight education: provided during the clinic visit   Body mass index is 32.94 kg/m².     10. Patient safety education regarding immunosuppression including prophylaxis posttransplant for CMV, PCP            Follow-up:   Clinic: return to transplant clinic weekly for the first month after transplant; every 2 weeks during months 2-3; then at 6-, 9-, 12-, 18-, 24-, and 36- months post-transplant to reassess for complications from immunosuppression toxicity and monitor for rejection.  Annually thereafter.    Labs: since patient remains at high risk for rejection and drug-related complications that warrant close monitoring, labs will be ordered as follows: continue twice weekly CBC, renal panel, and drug level for first month; then same labs once weekly through 3rd month post-transplant.  Urine for UA and protein/creatinine ratio monthly.  Serum BK - PCR at 1-, 3-, 6-, 9-, 12-, 18-, 24-, 36- 48-, and 60 months post-transplant.  Hepatic panel at 1-, 2-, 3-,  6-, 9-, 12-, 18-, 24-, and 36- months post-transplant.    Tish Dietz, NP

## 2024-12-09 ENCOUNTER — LAB VISIT (OUTPATIENT)
Dept: LAB | Facility: HOSPITAL | Age: 64
End: 2024-12-09
Attending: INTERNAL MEDICINE
Payer: COMMERCIAL

## 2024-12-09 ENCOUNTER — OFFICE VISIT (OUTPATIENT)
Dept: TRANSPLANT | Facility: CLINIC | Age: 64
End: 2024-12-09
Payer: COMMERCIAL

## 2024-12-09 VITALS
DIASTOLIC BLOOD PRESSURE: 76 MMHG | BODY MASS INDEX: 33.01 KG/M2 | RESPIRATION RATE: 18 BRPM | SYSTOLIC BLOOD PRESSURE: 119 MMHG | OXYGEN SATURATION: 96 % | HEART RATE: 97 BPM | TEMPERATURE: 97 F | HEIGHT: 67 IN | WEIGHT: 210.31 LBS

## 2024-12-09 DIAGNOSIS — Z79.60 LONG-TERM USE OF IMMUNOSUPPRESSANT MEDICATION: ICD-10-CM

## 2024-12-09 DIAGNOSIS — E83.42 HYPOMAGNESEMIA: ICD-10-CM

## 2024-12-09 DIAGNOSIS — N18.2 CKD (CHRONIC KIDNEY DISEASE), STAGE II: ICD-10-CM

## 2024-12-09 DIAGNOSIS — Z94.0 S/P LIVING-DONOR KIDNEY TRANSPLANTATION: Primary | ICD-10-CM

## 2024-12-09 DIAGNOSIS — Z91.89 AT RISK FOR OPPORTUNISTIC INFECTIONS: ICD-10-CM

## 2024-12-09 DIAGNOSIS — Z94.0 S/P LIVING-DONOR KIDNEY TRANSPLANTATION: ICD-10-CM

## 2024-12-09 DIAGNOSIS — Z94.0 KIDNEY TRANSPLANT RECIPIENT: ICD-10-CM

## 2024-12-09 DIAGNOSIS — N02.B9 IGA NEPHROPATHY: ICD-10-CM

## 2024-12-09 DIAGNOSIS — Z94.0 KIDNEY REPLACED BY TRANSPLANT: Primary | ICD-10-CM

## 2024-12-09 DIAGNOSIS — Z94.0 KIDNEY REPLACED BY TRANSPLANT: ICD-10-CM

## 2024-12-09 LAB
ALBUMIN SERPL BCP-MCNC: 4 G/DL (ref 3.5–5.2)
ANION GAP SERPL CALC-SCNC: 8 MMOL/L (ref 8–16)
BASOPHILS # BLD AUTO: 0.05 K/UL (ref 0–0.2)
BASOPHILS NFR BLD: 0.4 % (ref 0–1.9)
BUN SERPL-MCNC: 24 MG/DL (ref 8–23)
CALCIUM SERPL-MCNC: 9.2 MG/DL (ref 8.7–10.5)
CHLORIDE SERPL-SCNC: 110 MMOL/L (ref 95–110)
CO2 SERPL-SCNC: 23 MMOL/L (ref 23–29)
CREAT SERPL-MCNC: 1.2 MG/DL (ref 0.5–1.4)
DIFFERENTIAL METHOD BLD: ABNORMAL
EOSINOPHIL # BLD AUTO: 0.3 K/UL (ref 0–0.5)
EOSINOPHIL NFR BLD: 2 % (ref 0–8)
ERYTHROCYTE [DISTWIDTH] IN BLOOD BY AUTOMATED COUNT: 13.1 % (ref 11.5–14.5)
EST. GFR  (NO RACE VARIABLE): >60 ML/MIN/1.73 M^2
GLUCOSE SERPL-MCNC: 102 MG/DL (ref 70–110)
HCT VFR BLD AUTO: 44 % (ref 40–54)
HGB BLD-MCNC: 13.3 G/DL (ref 14–18)
IMM GRANULOCYTES # BLD AUTO: 0.08 K/UL (ref 0–0.04)
IMM GRANULOCYTES NFR BLD AUTO: 0.6 % (ref 0–0.5)
LYMPHOCYTES # BLD AUTO: 2.3 K/UL (ref 1–4.8)
LYMPHOCYTES NFR BLD: 18.5 % (ref 18–48)
MAGNESIUM SERPL-MCNC: 1.5 MG/DL (ref 1.6–2.6)
MCH RBC QN AUTO: 26.9 PG (ref 27–31)
MCHC RBC AUTO-ENTMCNC: 30.2 G/DL (ref 32–36)
MCV RBC AUTO: 89 FL (ref 82–98)
MONOCYTES # BLD AUTO: 1.3 K/UL (ref 0.3–1)
MONOCYTES NFR BLD: 10.2 % (ref 4–15)
NEUTROPHILS # BLD AUTO: 8.4 K/UL (ref 1.8–7.7)
NEUTROPHILS NFR BLD: 68.3 % (ref 38–73)
NRBC BLD-RTO: 0 /100 WBC
PHOSPHATE SERPL-MCNC: 2.6 MG/DL (ref 2.7–4.5)
PLATELET # BLD AUTO: 234 K/UL (ref 150–450)
PMV BLD AUTO: 10.1 FL (ref 9.2–12.9)
POTASSIUM SERPL-SCNC: 4.3 MMOL/L (ref 3.5–5.1)
RBC # BLD AUTO: 4.94 M/UL (ref 4.6–6.2)
SODIUM SERPL-SCNC: 141 MMOL/L (ref 136–145)
TACROLIMUS BLD-MCNC: 10.7 NG/ML (ref 5–15)
WBC # BLD AUTO: 12.34 K/UL (ref 3.9–12.7)

## 2024-12-09 PROCEDURE — 3066F NEPHROPATHY DOC TX: CPT | Mod: CPTII,S$GLB,, | Performed by: NURSE PRACTITIONER

## 2024-12-09 PROCEDURE — 83735 ASSAY OF MAGNESIUM: CPT | Performed by: INTERNAL MEDICINE

## 2024-12-09 PROCEDURE — 3078F DIAST BP <80 MM HG: CPT | Mod: CPTII,S$GLB,, | Performed by: NURSE PRACTITIONER

## 2024-12-09 PROCEDURE — 99215 OFFICE O/P EST HI 40 MIN: CPT | Mod: S$GLB,,, | Performed by: NURSE PRACTITIONER

## 2024-12-09 PROCEDURE — 85025 COMPLETE CBC W/AUTO DIFF WBC: CPT | Performed by: INTERNAL MEDICINE

## 2024-12-09 PROCEDURE — 1160F RVW MEDS BY RX/DR IN RCRD: CPT | Mod: CPTII,S$GLB,, | Performed by: NURSE PRACTITIONER

## 2024-12-09 PROCEDURE — 80069 RENAL FUNCTION PANEL: CPT | Performed by: INTERNAL MEDICINE

## 2024-12-09 PROCEDURE — 4010F ACE/ARB THERAPY RXD/TAKEN: CPT | Mod: CPTII,S$GLB,, | Performed by: NURSE PRACTITIONER

## 2024-12-09 PROCEDURE — 99999 PR PBB SHADOW E&M-EST. PATIENT-LVL IV: CPT | Mod: PBBFAC,,, | Performed by: NURSE PRACTITIONER

## 2024-12-09 PROCEDURE — 1159F MED LIST DOCD IN RCRD: CPT | Mod: CPTII,S$GLB,, | Performed by: NURSE PRACTITIONER

## 2024-12-09 PROCEDURE — 87799 DETECT AGENT NOS DNA QUANT: CPT | Performed by: INTERNAL MEDICINE

## 2024-12-09 PROCEDURE — 80197 ASSAY OF TACROLIMUS: CPT | Performed by: INTERNAL MEDICINE

## 2024-12-09 PROCEDURE — 3008F BODY MASS INDEX DOCD: CPT | Mod: CPTII,S$GLB,, | Performed by: NURSE PRACTITIONER

## 2024-12-09 PROCEDURE — 3074F SYST BP LT 130 MM HG: CPT | Mod: CPTII,S$GLB,, | Performed by: NURSE PRACTITIONER

## 2024-12-09 NOTE — TELEPHONE ENCOUNTER
Instructed pt to lower prograf dose to 2mg in AM and 1mg in PM. Pt reports discussing with NP in clinic about holding Veltassa and rechecking K to assess for need. Repeat labs scheduled 12/21. Encouraged pt to re-establish with Gen Neph, referral placed. Pt verbalized understanding  ----- Message from Candido Arnold MD sent at 12/9/2024 11:02 AM CST -----  Please lower prograf to 2/1 and repeat a tacro level in 2 weeks

## 2024-12-09 NOTE — LETTER
December 9, 2024        Colby Rene  [address] [place] [address] [address]  [address]  [place] [address]  Phone: [phone]  Fax: [phone]             [facility] 1st Fl  [address]  [facility] [address]  Phone: [phone]   Patient: Colten Monet   MR Number: [id number]   YOB: 1960   Date of Visit: 12/9/2024       Dear Dr. Colby Rene    Thank you for referring Colten Monet to me for evaluation. Attached you will find relevant portions of my assessment and plan of care.    If you have questions, please do not hesitate to call me. I look forward to following Colten Monet along with you.    Sincerely,    Tish Dietz, NIKITA    Enclosure    If you would like to receive this communication electronically, please contact [email or web address] or [phone] to request One Hour Translation Link access.    One [employer] Translation Link is a tool which provides read-only access to select patient information with whom you have a relationship. Its easy to use and provides real time access to review your patients record including encounter summaries, notes, results, and demographic information.    If you feel you have received this communication in error or would no longer like to receive these types of communications, please e-mail [email or web address]

## 2024-12-10 RX ORDER — TACROLIMUS 1 MG/1
CAPSULE ORAL
Qty: 90 CAPSULE | Refills: 11 | Status: SHIPPED | OUTPATIENT
Start: 2024-12-10 | End: 2025-12-10

## 2024-12-12 ENCOUNTER — TELEPHONE (OUTPATIENT)
Dept: PULMONOLOGY | Facility: HOSPITAL | Age: 64
End: 2024-12-12
Payer: COMMERCIAL

## 2024-12-13 ENCOUNTER — TELEPHONE (OUTPATIENT)
Dept: SLEEP MEDICINE | Facility: HOSPITAL | Age: 64
End: 2024-12-13
Payer: COMMERCIAL

## 2024-12-21 ENCOUNTER — HOSPITAL ENCOUNTER (OUTPATIENT)
Dept: RADIOLOGY | Facility: HOSPITAL | Age: 64
Discharge: HOME | End: 2024-12-21
Payer: COMMERCIAL

## 2024-12-21 DIAGNOSIS — R29.898 WEAKNESS OF LEFT HAND: ICD-10-CM

## 2024-12-21 DIAGNOSIS — R20.0 NUMBNESS AND TINGLING: ICD-10-CM

## 2024-12-21 DIAGNOSIS — R20.2 NUMBNESS AND TINGLING: ICD-10-CM

## 2024-12-21 PROCEDURE — 72141 MRI NECK SPINE W/O DYE: CPT | Mod: 26,,, | Performed by: RADIOLOGY

## 2024-12-21 PROCEDURE — 72141 MRI NECK SPINE W/O DYE: CPT | Mod: TC

## 2025-01-08 NOTE — PLAN OF CARE
- Pt AAOx4, afebrile, RA  - Orthostatic BP assessed overnight. Refer to flowsheets.   - NGT to LIWS. Green output. Refer to flowsheets for output totals. Pt remains NPO. Pt reports understanding of diet orders.   - Plan for repeat KUB this AM  - RLQ incision with staples. SS drainage. Dressing changed PRN.   - NS continued at 70 cc/hr  - Bed in lowest locked position, call light and personal items in reach, verbalized understanding to call for assistance.         Subjective:      Patient ID: Mitesh iNeto is a 49 y.o. male coming in for   Chief Complaint   Patient presents with    Pharyngitis     Sore throat, chills, body aches, head and chest congestion. Symptoms 5 days ago.         Pharyngitis  This is a new problem. Episode onset: x5 days. The problem occurs constantly. Associated symptoms include chills, congestion, headaches, myalgias and a sore throat. Pertinent negatives include no coughing, fever, nausea or swollen glands. He has tried acetaminophen and NSAIDs for the symptoms. The treatment provided mild relief.         Review of Systems   Constitutional:  Positive for chills. Negative for fever.   HENT:  Positive for congestion and sore throat.    Respiratory:  Negative for cough.    Gastrointestinal:  Negative for nausea.   Musculoskeletal:  Positive for myalgias.   Neurological:  Positive for headaches.        Objective:/82 (Site: Left Upper Arm, Position: Sitting, Cuff Size: Large Adult)   Pulse 72   Temp 98.1 °F (36.7 °C) (Tympanic)   Resp 16   Ht 1.753 m (5' 9\")   Wt 93.9 kg (207 lb)   SpO2 96%   BMI 30.57 kg/m²      Physical Exam  Vitals and nursing note reviewed.   Constitutional:       General: He is not in acute distress.     Appearance: Normal appearance. He is not ill-appearing.   HENT:      Head: Normocephalic.      Right Ear: Tympanic membrane normal.      Left Ear: Tympanic membrane normal.      Nose: Congestion present.      Mouth/Throat:      Mouth: Mucous membranes are moist.      Pharynx: Oropharynx is clear. Posterior oropharyngeal erythema present. No oropharyngeal exudate.   Cardiovascular:      Rate and Rhythm: Normal rate and regular rhythm.      Heart sounds: Normal heart sounds.   Pulmonary:      Effort: Pulmonary effort is normal. No respiratory distress.      Breath sounds: Normal breath sounds. No stridor. No wheezing, rhonchi or rales.   Musculoskeletal:      Cervical back: Neck supple.   Lymphadenopathy:      Cervical:

## 2025-01-09 ENCOUNTER — TELEPHONE (OUTPATIENT)
Dept: SLEEP MEDICINE | Facility: HOSPITAL | Age: 65
End: 2025-01-09
Payer: COMMERCIAL

## 2025-01-11 ENCOUNTER — LAB VISIT (OUTPATIENT)
Dept: LAB | Facility: HOSPITAL | Age: 65
End: 2025-01-11
Payer: COMMERCIAL

## 2025-01-11 DIAGNOSIS — Z94.0 KIDNEY REPLACED BY TRANSPLANT: ICD-10-CM

## 2025-01-11 DIAGNOSIS — E83.42 HYPOMAGNESEMIA: ICD-10-CM

## 2025-01-11 LAB
ALBUMIN SERPL BCP-MCNC: 3.8 G/DL (ref 3.5–5.2)
ANION GAP SERPL CALC-SCNC: 9 MMOL/L (ref 8–16)
BASOPHILS # BLD AUTO: 0.04 K/UL (ref 0–0.2)
BASOPHILS NFR BLD: 0.3 % (ref 0–1.9)
BUN SERPL-MCNC: 17 MG/DL (ref 8–23)
CALCIUM SERPL-MCNC: 9.3 MG/DL (ref 8.7–10.5)
CHLORIDE SERPL-SCNC: 109 MMOL/L (ref 95–110)
CO2 SERPL-SCNC: 24 MMOL/L (ref 23–29)
CREAT SERPL-MCNC: 1.2 MG/DL (ref 0.5–1.4)
DIFFERENTIAL METHOD BLD: ABNORMAL
EOSINOPHIL # BLD AUTO: 0.4 K/UL (ref 0–0.5)
EOSINOPHIL NFR BLD: 2.5 % (ref 0–8)
ERYTHROCYTE [DISTWIDTH] IN BLOOD BY AUTOMATED COUNT: 13.4 % (ref 11.5–14.5)
EST. GFR  (NO RACE VARIABLE): >60 ML/MIN/1.73 M^2
GLUCOSE SERPL-MCNC: 105 MG/DL (ref 70–110)
HCT VFR BLD AUTO: 44.2 % (ref 40–54)
HGB BLD-MCNC: 13.2 G/DL (ref 14–18)
IMM GRANULOCYTES # BLD AUTO: 0.09 K/UL (ref 0–0.04)
IMM GRANULOCYTES NFR BLD AUTO: 0.6 % (ref 0–0.5)
LYMPHOCYTES # BLD AUTO: 1.9 K/UL (ref 1–4.8)
LYMPHOCYTES NFR BLD: 12.2 % (ref 18–48)
MAGNESIUM SERPL-MCNC: 1.7 MG/DL (ref 1.6–2.6)
MCH RBC QN AUTO: 26.8 PG (ref 27–31)
MCHC RBC AUTO-ENTMCNC: 29.9 G/DL (ref 32–36)
MCV RBC AUTO: 90 FL (ref 82–98)
MONOCYTES # BLD AUTO: 1.6 K/UL (ref 0.3–1)
MONOCYTES NFR BLD: 10.2 % (ref 4–15)
NEUTROPHILS # BLD AUTO: 11.8 K/UL (ref 1.8–7.7)
NEUTROPHILS NFR BLD: 74.2 % (ref 38–73)
NRBC BLD-RTO: 0 /100 WBC
PHOSPHATE SERPL-MCNC: 2.3 MG/DL (ref 2.7–4.5)
PLATELET # BLD AUTO: 242 K/UL (ref 150–450)
PMV BLD AUTO: 10.1 FL (ref 9.2–12.9)
POTASSIUM SERPL-SCNC: 4.2 MMOL/L (ref 3.5–5.1)
RBC # BLD AUTO: 4.93 M/UL (ref 4.6–6.2)
SODIUM SERPL-SCNC: 142 MMOL/L (ref 136–145)
TACROLIMUS BLD-MCNC: 6.2 NG/ML (ref 5–15)
WBC # BLD AUTO: 15.88 K/UL (ref 3.9–12.7)

## 2025-01-11 PROCEDURE — 36415 COLL VENOUS BLD VENIPUNCTURE: CPT | Performed by: INTERNAL MEDICINE

## 2025-01-11 PROCEDURE — 80197 ASSAY OF TACROLIMUS: CPT | Performed by: INTERNAL MEDICINE

## 2025-01-11 PROCEDURE — 85025 COMPLETE CBC W/AUTO DIFF WBC: CPT | Performed by: INTERNAL MEDICINE

## 2025-01-11 PROCEDURE — 83735 ASSAY OF MAGNESIUM: CPT | Performed by: INTERNAL MEDICINE

## 2025-01-11 PROCEDURE — 80069 RENAL FUNCTION PANEL: CPT | Performed by: INTERNAL MEDICINE

## 2025-01-13 DIAGNOSIS — Z94.0 KIDNEY TRANSPLANT RECIPIENT: ICD-10-CM

## 2025-01-13 RX ORDER — TACROLIMUS 1 MG/1
2 CAPSULE ORAL EVERY 12 HOURS
Qty: 120 CAPSULE | Refills: 11 | Status: SHIPPED | OUTPATIENT
Start: 2025-01-13 | End: 2026-01-13

## 2025-01-13 NOTE — TELEPHONE ENCOUNTER
Called and instructed pt to increase prograf dose to 2mg BID. Pt reports minor cold and congestion that started 2 days ago. Pt reports s/s not bad and is improving daily. Instructed pt to increase hydration and call team if sick s/s worsen. Pt verbalized understanding.  ----- Message from Candido Arnold MD sent at 1/13/2025  7:34 AM CST -----  Please increase prograf to 2/2  His WBC is elevated, any symptomatology of infection? Thanks

## 2025-02-15 ENCOUNTER — LAB VISIT (OUTPATIENT)
Dept: LAB | Facility: HOSPITAL | Age: 65
End: 2025-02-15
Attending: INTERNAL MEDICINE
Payer: COMMERCIAL

## 2025-02-15 DIAGNOSIS — Z94.0 KIDNEY REPLACED BY TRANSPLANT: ICD-10-CM

## 2025-02-15 DIAGNOSIS — E83.42 HYPOMAGNESEMIA: ICD-10-CM

## 2025-02-15 LAB
ALBUMIN SERPL BCP-MCNC: 4.1 G/DL (ref 3.5–5.2)
ANION GAP SERPL CALC-SCNC: 8 MMOL/L (ref 8–16)
BASOPHILS # BLD AUTO: 0.06 K/UL (ref 0–0.2)
BASOPHILS NFR BLD: 0.6 % (ref 0–1.9)
BUN SERPL-MCNC: 18 MG/DL (ref 8–23)
CALCIUM SERPL-MCNC: 9.4 MG/DL (ref 8.7–10.5)
CHLORIDE SERPL-SCNC: 110 MMOL/L (ref 95–110)
CO2 SERPL-SCNC: 22 MMOL/L (ref 23–29)
CREAT SERPL-MCNC: 1.2 MG/DL (ref 0.5–1.4)
DIFFERENTIAL METHOD BLD: ABNORMAL
EOSINOPHIL # BLD AUTO: 0.3 K/UL (ref 0–0.5)
EOSINOPHIL NFR BLD: 2.4 % (ref 0–8)
ERYTHROCYTE [DISTWIDTH] IN BLOOD BY AUTOMATED COUNT: 13.9 % (ref 11.5–14.5)
EST. GFR  (NO RACE VARIABLE): >60 ML/MIN/1.73 M^2
GLUCOSE SERPL-MCNC: 104 MG/DL (ref 70–110)
HCT VFR BLD AUTO: 47.1 % (ref 40–54)
HGB BLD-MCNC: 14.5 G/DL (ref 14–18)
IMM GRANULOCYTES # BLD AUTO: 0.09 K/UL (ref 0–0.04)
IMM GRANULOCYTES NFR BLD AUTO: 0.8 % (ref 0–0.5)
LYMPHOCYTES # BLD AUTO: 2.1 K/UL (ref 1–4.8)
LYMPHOCYTES NFR BLD: 19.6 % (ref 18–48)
MAGNESIUM SERPL-MCNC: 1.6 MG/DL (ref 1.6–2.6)
MCH RBC QN AUTO: 27.6 PG (ref 27–31)
MCHC RBC AUTO-ENTMCNC: 30.8 G/DL (ref 32–36)
MCV RBC AUTO: 90 FL (ref 82–98)
MONOCYTES # BLD AUTO: 1.2 K/UL (ref 0.3–1)
MONOCYTES NFR BLD: 10.8 % (ref 4–15)
NEUTROPHILS # BLD AUTO: 7.2 K/UL (ref 1.8–7.7)
NEUTROPHILS NFR BLD: 65.8 % (ref 38–73)
NRBC BLD-RTO: 0 /100 WBC
PHOSPHATE SERPL-MCNC: 2 MG/DL (ref 2.7–4.5)
PLATELET # BLD AUTO: 244 K/UL (ref 150–450)
PMV BLD AUTO: 9.8 FL (ref 9.2–12.9)
POTASSIUM SERPL-SCNC: 4.2 MMOL/L (ref 3.5–5.1)
RBC # BLD AUTO: 5.26 M/UL (ref 4.6–6.2)
SODIUM SERPL-SCNC: 140 MMOL/L (ref 136–145)
WBC # BLD AUTO: 10.88 K/UL (ref 3.9–12.7)

## 2025-02-15 PROCEDURE — 80197 ASSAY OF TACROLIMUS: CPT | Performed by: INTERNAL MEDICINE

## 2025-02-15 PROCEDURE — 36415 COLL VENOUS BLD VENIPUNCTURE: CPT | Performed by: INTERNAL MEDICINE

## 2025-02-15 PROCEDURE — 80069 RENAL FUNCTION PANEL: CPT | Performed by: INTERNAL MEDICINE

## 2025-02-15 PROCEDURE — 83735 ASSAY OF MAGNESIUM: CPT | Performed by: INTERNAL MEDICINE

## 2025-02-15 PROCEDURE — 85025 COMPLETE CBC W/AUTO DIFF WBC: CPT | Performed by: INTERNAL MEDICINE

## 2025-02-16 LAB — TACROLIMUS BLD-MCNC: 6.7 NG/ML (ref 5–15)

## 2025-02-17 ENCOUNTER — RESULTS FOLLOW-UP (OUTPATIENT)
Dept: TRANSPLANT | Facility: HOSPITAL | Age: 65
End: 2025-02-17

## 2025-03-08 ENCOUNTER — LAB VISIT (OUTPATIENT)
Dept: LAB | Facility: HOSPITAL | Age: 65
End: 2025-03-08
Attending: INTERNAL MEDICINE
Payer: COMMERCIAL

## 2025-03-08 DIAGNOSIS — Z94.0 KIDNEY REPLACED BY TRANSPLANT: ICD-10-CM

## 2025-03-08 DIAGNOSIS — E83.42 HYPOMAGNESEMIA: ICD-10-CM

## 2025-03-08 LAB
ALBUMIN SERPL BCP-MCNC: 3.9 G/DL (ref 3.5–5.2)
ANION GAP SERPL CALC-SCNC: 7 MMOL/L (ref 8–16)
BASOPHILS # BLD AUTO: 0.05 K/UL (ref 0–0.2)
BASOPHILS NFR BLD: 0.4 % (ref 0–1.9)
BUN SERPL-MCNC: 18 MG/DL (ref 8–23)
CALCIUM SERPL-MCNC: 9 MG/DL (ref 8.7–10.5)
CHLORIDE SERPL-SCNC: 110 MMOL/L (ref 95–110)
CO2 SERPL-SCNC: 24 MMOL/L (ref 23–29)
CREAT SERPL-MCNC: 1.4 MG/DL (ref 0.5–1.4)
DIFFERENTIAL METHOD BLD: ABNORMAL
EOSINOPHIL # BLD AUTO: 0.2 K/UL (ref 0–0.5)
EOSINOPHIL NFR BLD: 1.5 % (ref 0–8)
ERYTHROCYTE [DISTWIDTH] IN BLOOD BY AUTOMATED COUNT: 13.2 % (ref 11.5–14.5)
EST. GFR  (NO RACE VARIABLE): 56 ML/MIN/1.73 M^2
GLUCOSE SERPL-MCNC: 108 MG/DL (ref 70–110)
HCT VFR BLD AUTO: 45.5 % (ref 40–54)
HGB BLD-MCNC: 14 G/DL (ref 14–18)
IMM GRANULOCYTES # BLD AUTO: 0.08 K/UL (ref 0–0.04)
IMM GRANULOCYTES NFR BLD AUTO: 0.7 % (ref 0–0.5)
LYMPHOCYTES # BLD AUTO: 2.3 K/UL (ref 1–4.8)
LYMPHOCYTES NFR BLD: 19.4 % (ref 18–48)
MAGNESIUM SERPL-MCNC: 1.8 MG/DL (ref 1.6–2.6)
MCH RBC QN AUTO: 27.9 PG (ref 27–31)
MCHC RBC AUTO-ENTMCNC: 30.8 G/DL (ref 32–36)
MCV RBC AUTO: 91 FL (ref 82–98)
MONOCYTES # BLD AUTO: 1.3 K/UL (ref 0.3–1)
MONOCYTES NFR BLD: 11 % (ref 4–15)
NEUTROPHILS # BLD AUTO: 7.8 K/UL (ref 1.8–7.7)
NEUTROPHILS NFR BLD: 67 % (ref 38–73)
NRBC BLD-RTO: 0 /100 WBC
PHOSPHATE SERPL-MCNC: 2.2 MG/DL (ref 2.7–4.5)
PLATELET # BLD AUTO: 246 K/UL (ref 150–450)
PMV BLD AUTO: 9.6 FL (ref 9.2–12.9)
POTASSIUM SERPL-SCNC: 4.5 MMOL/L (ref 3.5–5.1)
RBC # BLD AUTO: 5.01 M/UL (ref 4.6–6.2)
SODIUM SERPL-SCNC: 141 MMOL/L (ref 136–145)
WBC # BLD AUTO: 11.69 K/UL (ref 3.9–12.7)

## 2025-03-08 PROCEDURE — 80197 ASSAY OF TACROLIMUS: CPT | Performed by: INTERNAL MEDICINE

## 2025-03-08 PROCEDURE — 83735 ASSAY OF MAGNESIUM: CPT | Performed by: INTERNAL MEDICINE

## 2025-03-08 PROCEDURE — 85025 COMPLETE CBC W/AUTO DIFF WBC: CPT | Performed by: INTERNAL MEDICINE

## 2025-03-08 PROCEDURE — 80069 RENAL FUNCTION PANEL: CPT | Performed by: INTERNAL MEDICINE

## 2025-03-08 PROCEDURE — 87799 DETECT AGENT NOS DNA QUANT: CPT | Performed by: INTERNAL MEDICINE

## 2025-03-09 LAB — TACROLIMUS BLD-MCNC: 7.5 NG/ML (ref 5–15)

## 2025-03-12 NOTE — PROGRESS NOTES
Kidney Post-Transplant Assessment    Referring Physician: Colby Rene  Current Nephrologist: Colby Rene    ORGAN: LEFT KIDNEY  Donor Type: living  PHS Increased Risk: no  Cold Ischemia: 45 mins  Induction Medications:     Subjective:     CC:  Reassessment of renal allograft function and management of chronic immunosuppression.    HPI:  Mr. Monet is a 64 y.o. year old White male with history of ESRD secondary to IgA nephropathy who received a living kidney transplant on 3/11/24.  Post transplant with delayed wound healing. He has CKD stage 3 - GFR 30-59 and his baseline creatinine is between 1.2-1.4. He takes mycophenolate mofetil, prednisone, and tacrolimus for maintenance immunosuppression.     Here today for his 1 year post transplant visit. He has not re-established with general nephrology but plans to return to Dr. Rene. He feels well without  complaints. Has upcoming trip to [facility] to visit his wife's family. Has been staying busy with his 5 year old son. Reports no problems with urination. Home BP at goal. Mild LE edema that resolves with elevation. Tolerating IS without issue, no diarrhea or vomiting.     Current Outpatient Medications   Medication Sig Dispense Refill    aspirin (ECOTRIN) 81 MG EC tablet Take 1 tablet (81 mg total) by mouth once daily. 30 tablet 11    atorvastatin (LIPITOR) 40 MG tablet Take 1 tablet by mouth every evening.      docusate sodium (COLACE) 100 MG capsule Take 1 capsule (100 mg total) by mouth 3 (three) times daily as needed for Constipation.  0    k phos di & mono-sod phos mono (K-PHOS-NEUTRAL) 250 mg Tab Take 2 tablets by mouth every 12 (twelve) hours. 120 tablet 11    magnesium oxide (MAG-OX) 400 mg (241.3 mg magnesium) tablet Take 2 tablets (800 mg total) by mouth 3 (three) times daily. 180 tablet 11    multivitamin (ONE DAILY MULTIVITAMIN) per tablet Take 1 tablet by mouth once daily.      NIFEdipine (PROCARDIA-XL) 30 MG (OSM) 24 hr tablet Take 1 tablet (30 mg total)  by mouth 2 (two) times a day. 60 tablet 11    patiromer calcium sorbitex (VELTASSA) 8.4 gram PwPk Take 1 packet (8.4 g total) by mouth once daily. 30 packet 5    sildenafiL (VIAGRA) 100 MG tablet Take 1 tablet (100 mg total) by mouth daily as needed for Erectile Dysfunction. Dispense generic 10 tablet 11    tacrolimus (PROGRAF) 1 MG Cap Take 2 capsules (2 mg total) by mouth every 12 (twelve) hours. 120 capsule 11    albuterol (PROVENTIL/VENTOLIN HFA) 90 mcg/actuation inhaler Inhale 1-2 puffs into the lungs every 6 (six) hours as needed for Wheezing. Rescue 18 g 1    fluticasone furoate-vilanteroL (BREO ELLIPTA) 100-25 mcg/dose diskus inhaler Inhale 1 puff into the lungs once daily. Controller 30 each 11    mycophenolate sodium 180 MG TbEC Take 3 tablets (540 mg total) by mouth 2 (two) times daily. 180 tablet 11    predniSONE (DELTASONE) 5 MG tablet Take 1 tablet (5 mg total) by mouth once daily. 30 tablet 11     No current facility-administered medications for this visit.       Past Medical History:   Diagnosis Date    Anemia     CVA (cerebral vascular accident)     GERD (gastroesophageal reflux disease)     History of COPD 04/08/2024    History of incisional hernia repair 04/15/2024    Hyperlipidemia     Hypertension     IgA nephropathy     Obesity     S/P living-donor kidney transplantation 03/12/2024       Review of Systems   Constitutional:  Negative for activity change and fever.   Eyes:  Negative for visual disturbance.   Respiratory:  Negative for cough and shortness of breath.    Cardiovascular:  Negative for chest pain and leg swelling.   Gastrointestinal:  Negative for abdominal pain, constipation, diarrhea and nausea.   Genitourinary:  Negative for difficulty urinating, frequency and hematuria.   Musculoskeletal:  Negative for arthralgias and myalgias.   Skin:  Negative for wound.   Allergic/Immunologic: Positive for immunocompromised state.   Neurological:  Negative for weakness.  "  Psychiatric/Behavioral:  Negative for sleep disturbance.        Objective:     Blood pressure 126/77, pulse 103, temperature 97.5 °F (36.4 °C), temperature source Temporal, resp. rate 18, height 5' 7" (1.702 m), weight 96.6 kg (212 lb 15.4 oz), SpO2 97%.body mass index is 33.35 kg/m².    Physical Exam  Vitals and nursing note reviewed.   Constitutional:       Appearance: Normal appearance.   Cardiovascular:      Rate and Rhythm: Normal rate and regular rhythm.      Heart sounds: Normal heart sounds.   Pulmonary:      Effort: Pulmonary effort is normal.      Breath sounds: Normal breath sounds.   Abdominal:      General: There is no distension.      Hernia: A hernia is present.   Musculoskeletal:         General: Normal range of motion.   Skin:     General: Skin is warm and dry.   Neurological:      Mental Status: He is alert.         Labs:  Lab Results   Component Value Date    WBC 11.69 03/08/2025    HGB 14.0 03/08/2025    HCT 45.5 03/08/2025     03/08/2025    K 4.5 03/08/2025     03/08/2025    CO2 24 03/08/2025    BUN 18 03/08/2025    CREATININE 1.4 03/08/2025    EGFRNORACEVR 56 (A) 03/08/2025    GLUCOSE 91 07/13/2023    CALCIUM 9.0 03/08/2025    PHOS 2.2 (L) 03/08/2025    MG 1.8 03/08/2025    ALBUMIN 3.9 03/08/2025    AST 13 06/10/2024    ALT 19 06/10/2024    UTPCR 0.13 03/08/2025    .0 (H) 05/25/2021    TACROLIMUS 7.5 03/08/2025       Labs were reviewed with the patient.    Assessment:     1. S/P living-donor kidney transplantation    2. Long-term use of immunosuppressant medication    3. IgA nephropathy    4. Stage 3a chronic kidney disease    5. At risk for opportunistic infections      Plan:   Needs to re-establish with general nephrology for CKD care, plans to return to Dr. Rene  IS refills x 1 year provided  Discussed safe travel tips   Stressed importance of routine cancer screenings while on immunosuppression (PSA, colonoscopy, skin checks)      1. Immunosuppression: Prograf trough 7.5, " which is therapeutic (goal 5-7). Continue Prograf 2/2, MyF 540 mg BID, and Prednisone 5 mg QD. Will continue to monitor for drug toxicities    2. Allograft Function: CKD IIIa. Continue good oral hydration.   - ESRD secondary to IgA nephropathy s/p  living kidney transplant on 3/11/24.    - Post transplant with delayed wound healing.   - baseline creatinine is between 1.2-1.4.  Lab Results   Component Value Date    CREATININE 1.4 03/08/2025       3. Hypertension management:   - advise low salt diet and home BP monitoring    - nifedipine 30 mg BID    4. History of COPD   - follows with local pulmonologist    5. Hypophosphatemia     - continue  mg BID   - high phosphorous diet    6. Anemia: stable. No need for intervention   Lab Results   Component Value Date    WBC 11.69 03/08/2025    HGB 14.0 03/08/2025    HCT 45.5 03/08/2025    MCV 91 03/08/2025     03/08/2025       7. Proteinuria: continue p/c ratio as per guidelines    - last UPC 0.13    8. BK virus infection screening:  BK PCR per protocol    - last PCR not detected    9. Weight education: provided during the clinic visit   Body mass index is 33.35 kg/m².     10. Patient safety education regarding immunosuppression including prophylaxis posttransplant for CMV, PCP            Follow-up:   Clinic: return to transplant clinic weekly for the first month after transplant; every 2 weeks during months 2-3; then at 6-, 9-, 12-, 18-, 24-, and 36- months post-transplant to reassess for complications from immunosuppression toxicity and monitor for rejection.  Annually thereafter.    Labs: since patient remains at high risk for rejection and drug-related complications that warrant close monitoring, labs will be ordered as follows: continue twice weekly CBC, renal panel, and drug level for first month; then same labs once weekly through 3rd month post-transplant.  Urine for UA and protein/creatinine ratio monthly.  Serum BK - PCR at 1-, 3-, 6-, 9-, 12-, 18-,  24-, 36- 48-, and 60 months post-transplant.  Hepatic panel at 1-, 2-, 3-, 6-, 9-, 12-, 18-, 24-, and 36- months post-transplant.    Tish Dietz, NP

## 2025-03-14 ENCOUNTER — OFFICE VISIT (OUTPATIENT)
Dept: TRANSPLANT | Facility: CLINIC | Age: 65
End: 2025-03-14
Payer: COMMERCIAL

## 2025-03-14 VITALS
HEIGHT: 67 IN | WEIGHT: 212.94 LBS | HEART RATE: 103 BPM | DIASTOLIC BLOOD PRESSURE: 77 MMHG | OXYGEN SATURATION: 97 % | RESPIRATION RATE: 18 BRPM | BODY MASS INDEX: 33.42 KG/M2 | TEMPERATURE: 98 F | SYSTOLIC BLOOD PRESSURE: 126 MMHG

## 2025-03-14 DIAGNOSIS — N18.31 STAGE 3A CHRONIC KIDNEY DISEASE: ICD-10-CM

## 2025-03-14 DIAGNOSIS — Z91.89 AT RISK FOR OPPORTUNISTIC INFECTIONS: ICD-10-CM

## 2025-03-14 DIAGNOSIS — N02.B9 IGA NEPHROPATHY: ICD-10-CM

## 2025-03-14 DIAGNOSIS — Z79.60 LONG-TERM USE OF IMMUNOSUPPRESSANT MEDICATION: ICD-10-CM

## 2025-03-14 DIAGNOSIS — Z94.0 S/P LIVING-DONOR KIDNEY TRANSPLANTATION: Primary | ICD-10-CM

## 2025-03-14 PROCEDURE — 99999 PR PBB SHADOW E&M-EST. PATIENT-LVL IV: CPT | Mod: PBBFAC,,, | Performed by: NURSE PRACTITIONER

## 2025-03-14 RX ORDER — PREDNISONE 5 MG/1
5 TABLET ORAL DAILY
Qty: 30 TABLET | Refills: 11 | Status: SHIPPED | OUTPATIENT
Start: 2025-03-14

## 2025-03-14 RX ORDER — MYCOPHENOLIC ACID 180 MG/1
540 TABLET, DELAYED RELEASE ORAL 2 TIMES DAILY
Qty: 180 TABLET | Refills: 11 | Status: SHIPPED | OUTPATIENT
Start: 2025-03-14 | End: 2026-03-14

## 2025-03-14 NOTE — LETTER
March 14, 2025        Colby Rene  [address] [place] [address] [address]  [address]  [place] [address]  Phone: [phone]  Fax: [phone]             [facility] 1st Fl  [address]  [facility] [address]  Phone: [phone]   Patient: Colten Monet   MR Number: [id number]   YOB: 1960   Date of Visit: 3/14/2025       Dear Dr. Colby Rene    Thank you for referring Colten Monet to me for evaluation. Attached you will find relevant portions of my assessment and plan of care.    If you have questions, please do not hesitate to call me. I look forward to following Colten Monet along with you.    Sincerely,    Tish Dietz, NIKITA    Enclosure    If you would like to receive this communication electronically, please contact [email or web address] or [phone] to request dot[employer] Link access.    [employer] Link is a tool which provides read-only access to select patient information with whom you have a relationship. Its easy to use and provides real time access to review your patients record including encounter summaries, notes, results, and demographic information.    If you feel you have received this communication in error or would no longer like to receive these types of communications, please e-mail [email or web address]

## 2025-04-01 ENCOUNTER — TELEPHONE (OUTPATIENT)
Dept: NEUROLOGY | Facility: CLINIC | Age: 65
End: 2025-04-01
Payer: COMMERCIAL

## 2025-04-01 ENCOUNTER — PATIENT MESSAGE (OUTPATIENT)
Dept: NEUROLOGY | Facility: CLINIC | Age: 65
End: 2025-04-01
Payer: COMMERCIAL

## 2025-04-01 DIAGNOSIS — M48.02 SPINAL STENOSIS OF CERVICAL REGION: ICD-10-CM

## 2025-04-01 DIAGNOSIS — M54.12 CERVICAL RADICULOPATHY: Primary | ICD-10-CM

## 2025-04-01 NOTE — TELEPHONE ENCOUNTER
Spoke with the patient. Offered next available f/u appointment with Dr Bal 4/17 at 9am. Pt accepted next available, verbalized understanding, and repeated back date/time/location of scheduled appointment.   Darby reviewed images and would like pt to see Dr mansoor LIU.

## 2025-05-13 DIAGNOSIS — J68.3 REACTIVE AIRWAYS DYSFUNCTION SYNDROME: ICD-10-CM

## 2025-05-13 RX ORDER — FLUTICASONE FUROATE AND VILANTEROL 100; 25 UG/1; UG/1
1 POWDER RESPIRATORY (INHALATION) DAILY
Qty: 30 EACH | Refills: 0 | Status: SHIPPED | OUTPATIENT
Start: 2025-05-13 | End: 2026-05-13

## 2025-05-13 NOTE — TELEPHONE ENCOUNTER
----- Message from Davi sent at 5/13/2025  1:14 PM CDT -----  Regarding: Rx Refill  Contact: [phone]  RX Name: fluticasone furoate-vilanteroL (BREO ELLIPTA) 100-25 mcg/dose diskus inhaler How is it taken:  Inhale 1 puff into the lungs once daily. Controller - Inhalation Quantity: 30 each Preferred Pharmacy with phone number: [facility] [address] [address] Phone: [phone]: [phone]   Ordering Provider: Dr. Felix Contact Preference: [phone] Addl info:

## 2025-05-13 NOTE — TELEPHONE ENCOUNTER
1 time refill until further follow up scheduled with Dr Suarez. Routed to Dr Felix for approval. Patient last seen Dr Felix on 3/27/24. Patient scheduled for 1yr f/u with Dr Suarez on 7/1/25 at 1:30pm. Appointment mailed.

## 2025-06-07 ENCOUNTER — NURSE TRIAGE (OUTPATIENT)
Dept: ADMINISTRATIVE | Facility: CLINIC | Age: 65
End: 2025-06-07
Payer: COMMERCIAL

## 2025-06-08 NOTE — TELEPHONE ENCOUNTER
Patient was calling in to report symptoms of diarrhea and generalized malaise however he is in the [facility] and can not be triaged. Advised him to be seen locally and update his providers through the portal. Instructed to call back with additional questions. Patient verbalized understanding.       Reason for Disposition   General information question, no triage required and triager able to answer question    Protocols used: Information Only Call - No Triage-A-

## 2025-06-11 ENCOUNTER — TELEPHONE (OUTPATIENT)
Dept: TRANSPLANT | Facility: CLINIC | Age: 65
End: 2025-06-11
Payer: MEDICARE

## 2025-06-11 NOTE — TELEPHONE ENCOUNTER
Copied from CRM #[id number]. Topic: General Inquiry - Patient Advice  >> Jun 11, 2025  9:45 AM Adwoa wrote:  Name of Caller:  Colten      Contact Preference: [phone]     Nature of Call:  Requesting a call back have issues and concerns he would like to discuss

## 2025-06-12 ENCOUNTER — HOSPITAL ENCOUNTER (OUTPATIENT)
Facility: HOSPITAL | Age: 65
Discharge: HOME | End: 2025-06-13
Attending: STUDENT IN AN ORGANIZED HEALTH CARE EDUCATION/TRAINING PROGRAM | Admitting: INTERNAL MEDICINE
Payer: MEDICARE

## 2025-06-12 DIAGNOSIS — Z13.6 SCREENING FOR CARDIOVASCULAR CONDITION: ICD-10-CM

## 2025-06-12 DIAGNOSIS — U07.1 COVID: Primary | ICD-10-CM

## 2025-06-12 DIAGNOSIS — R05.9 COUGH: ICD-10-CM

## 2025-06-12 DIAGNOSIS — R07.9 CHEST PAIN: ICD-10-CM

## 2025-06-12 DIAGNOSIS — R00.0 TACHYCARDIA: ICD-10-CM

## 2025-06-12 LAB
ABSOLUTE EOSINOPHIL (OHS): 0.16 K/UL
ABSOLUTE MONOCYTE (OHS): 1.6 K/UL (ref 0.3–1)
ABSOLUTE NEUTROPHIL COUNT (OHS): 5.13 K/UL (ref 1.8–7.7)
ALBUMIN SERPL BCP-MCNC: 3.7 G/DL (ref 3.5–5.2)
ALP SERPL-CCNC: 99 UNIT/L (ref 40–150)
ALT SERPL W/O P-5'-P-CCNC: 24 UNIT/L (ref 10–44)
ANION GAP (OHS): 11 MMOL/L (ref 8–16)
AST SERPL-CCNC: 20 UNIT/L (ref 11–45)
BACTERIA #/AREA URNS AUTO: ABNORMAL /HPF
BASOPHILS # BLD AUTO: 0.04 K/UL
BASOPHILS NFR BLD AUTO: 0.5 %
BILIRUB SERPL-MCNC: 0.5 MG/DL (ref 0.1–1)
BILIRUB UR QL STRIP.AUTO: NEGATIVE
BIPAP: 0
BNP SERPL-MCNC: 14 PG/ML (ref 0–99)
BUN SERPL-MCNC: 22 MG/DL (ref 8–23)
CALCIUM SERPL-MCNC: 8.8 MG/DL (ref 8.7–10.5)
CHLORIDE SERPL-SCNC: 106 MMOL/L (ref 95–110)
CLARITY UR: CLEAR
CO2 SERPL-SCNC: 23 MMOL/L (ref 23–29)
COLOR UR AUTO: YELLOW
CREAT SERPL-MCNC: 1.5 MG/DL (ref 0.5–1.4)
ERYTHROCYTE [DISTWIDTH] IN BLOOD BY AUTOMATED COUNT: 12.6 % (ref 11.5–14.5)
FIO2: 21 %
GFR SERPLBLD CREATININE-BSD FMLA CKD-EPI: 51 ML/MIN/1.73/M2
GLUCOSE SERPL-MCNC: 107 MG/DL (ref 70–110)
GLUCOSE UR QL STRIP: NEGATIVE
HCT VFR BLD AUTO: 44.7 % (ref 40–54)
HCV AB SERPL QL IA: NORMAL
HGB BLD-MCNC: 14 GM/DL (ref 14–18)
HGB UR QL STRIP: ABNORMAL
HIV 1+2 AB+HIV1 P24 AG SERPL QL IA: NORMAL
HOLD SPECIMEN: NORMAL
HOLD SPECIMEN: NORMAL
HYALINE CASTS UR QL AUTO: 3 /LPF (ref 0–1)
IMM GRANULOCYTES # BLD AUTO: 0.13 K/UL (ref 0–0.04)
IMM GRANULOCYTES NFR BLD AUTO: 1.5 % (ref 0–0.5)
INFLUENZA A MOLECULAR (OHS): NEGATIVE
INFLUENZA B MOLECULAR (OHS): NEGATIVE
KETONES UR QL STRIP: NEGATIVE
LACTATE SERPL-SCNC: 0.7 MMOL/L (ref 0.5–2.2)
LDH SERPL L TO P-CCNC: 1.4 MMOL/L
LEUKOCYTE ESTERASE UR QL STRIP: NEGATIVE
LYMPHOCYTES # BLD AUTO: 1.33 K/UL (ref 1–4.8)
MAGNESIUM SERPL-MCNC: 1.6 MG/DL (ref 1.6–2.6)
MCH RBC QN AUTO: 27.2 PG (ref 27–31)
MCHC RBC AUTO-ENTMCNC: 31.3 G/DL (ref 32–36)
MCV RBC AUTO: 87 FL (ref 82–98)
MICROSCOPIC COMMENT: ABNORMAL
NITRITE UR QL STRIP: NEGATIVE
NUCLEATED RBC (/100WBC) (OHS): 0 /100 WBC
OHS QRS DURATION: 74 MS
OHS QTC CALCULATION: 430 MS
PH UR STRIP: 7 [PH]
PHOSPHATE SERPL-MCNC: 2.2 MG/DL (ref 2.7–4.5)
PLATELET # BLD AUTO: 182 K/UL (ref 150–450)
PMV BLD AUTO: 9.6 FL (ref 9.2–12.9)
POC PERFORMED BY: NORMAL
POC TEMPERATURE: 37 C
POTASSIUM SERPL-SCNC: 4.1 MMOL/L (ref 3.5–5.1)
PROT SERPL-MCNC: 7.4 GM/DL (ref 6–8.4)
PROT UR QL STRIP: ABNORMAL
RBC # BLD AUTO: 5.14 M/UL (ref 4.6–6.2)
RBC #/AREA URNS AUTO: 24 /HPF (ref 0–4)
RELATIVE EOSINOPHIL (OHS): 1.9 %
RELATIVE LYMPHOCYTE (OHS): 15.9 % (ref 18–48)
RELATIVE MONOCYTE (OHS): 19.1 % (ref 4–15)
RELATIVE NEUTROPHIL (OHS): 61.1 % (ref 38–73)
SARS-COV-2 RDRP RESP QL NAA+PROBE: POSITIVE
SODIUM SERPL-SCNC: 140 MMOL/L (ref 136–145)
SP GR UR STRIP: 1.03
SPECIMEN SOURCE: NORMAL
T4 FREE SERPL-MCNC: 1.01 NG/DL (ref 0.71–1.51)
TACROLIMUS BLD-MCNC: 7.5 NG/ML (ref 5–15)
TSH SERPL-ACNC: 0.38 UIU/ML (ref 0.4–4)
UROBILINOGEN UR STRIP-ACNC: NEGATIVE EU/DL
WBC # BLD AUTO: 8.39 K/UL (ref 3.9–12.7)
WBC #/AREA URNS AUTO: 3 /HPF (ref 0–5)

## 2025-06-12 PROCEDURE — 84439 ASSAY OF FREE THYROXINE: CPT | Performed by: STUDENT IN AN ORGANIZED HEALTH CARE EDUCATION/TRAINING PROGRAM

## 2025-06-12 PROCEDURE — 83735 ASSAY OF MAGNESIUM: CPT | Performed by: STUDENT IN AN ORGANIZED HEALTH CARE EDUCATION/TRAINING PROGRAM

## 2025-06-12 PROCEDURE — 81001 URINALYSIS AUTO W/SCOPE: CPT | Performed by: STUDENT IN AN ORGANIZED HEALTH CARE EDUCATION/TRAINING PROGRAM

## 2025-06-12 PROCEDURE — 83880 ASSAY OF NATRIURETIC PEPTIDE: CPT | Performed by: STUDENT IN AN ORGANIZED HEALTH CARE EDUCATION/TRAINING PROGRAM

## 2025-06-12 PROCEDURE — 84443 ASSAY THYROID STIM HORMONE: CPT | Performed by: STUDENT IN AN ORGANIZED HEALTH CARE EDUCATION/TRAINING PROGRAM

## 2025-06-12 PROCEDURE — 63600175 PHARM REV CODE 636 W HCPCS

## 2025-06-12 PROCEDURE — 25000003 PHARM REV CODE 250: Performed by: STUDENT IN AN ORGANIZED HEALTH CARE EDUCATION/TRAINING PROGRAM

## 2025-06-12 PROCEDURE — 87389 HIV-1 AG W/HIV-1&-2 AB AG IA: CPT | Performed by: PHYSICIAN ASSISTANT

## 2025-06-12 PROCEDURE — 25000003 PHARM REV CODE 250

## 2025-06-12 PROCEDURE — G0378 HOSPITAL OBSERVATION PER HR: HCPCS

## 2025-06-12 PROCEDURE — 87502 INFLUENZA DNA AMP PROBE: CPT | Performed by: STUDENT IN AN ORGANIZED HEALTH CARE EDUCATION/TRAINING PROGRAM

## 2025-06-12 PROCEDURE — 86803 HEPATITIS C AB TEST: CPT | Performed by: PHYSICIAN ASSISTANT

## 2025-06-12 PROCEDURE — 99900035 HC TECH TIME PER 15 MIN (STAT)

## 2025-06-12 PROCEDURE — 25000242 PHARM REV CODE 250 ALT 637 W/ HCPCS

## 2025-06-12 PROCEDURE — 96360 HYDRATION IV INFUSION INIT: CPT

## 2025-06-12 PROCEDURE — 80053 COMPREHEN METABOLIC PANEL: CPT | Performed by: STUDENT IN AN ORGANIZED HEALTH CARE EDUCATION/TRAINING PROGRAM

## 2025-06-12 PROCEDURE — 63600175 PHARM REV CODE 636 W HCPCS: Performed by: STUDENT IN AN ORGANIZED HEALTH CARE EDUCATION/TRAINING PROGRAM

## 2025-06-12 PROCEDURE — 83605 ASSAY OF LACTIC ACID: CPT | Mod: 59

## 2025-06-12 PROCEDURE — 99285 EMERGENCY DEPT VISIT HI MDM: CPT | Mod: 25

## 2025-06-12 PROCEDURE — 83605 ASSAY OF LACTIC ACID: CPT

## 2025-06-12 PROCEDURE — 87040 BLOOD CULTURE FOR BACTERIA: CPT | Performed by: STUDENT IN AN ORGANIZED HEALTH CARE EDUCATION/TRAINING PROGRAM

## 2025-06-12 PROCEDURE — 80197 ASSAY OF TACROLIMUS: CPT | Performed by: STUDENT IN AN ORGANIZED HEALTH CARE EDUCATION/TRAINING PROGRAM

## 2025-06-12 PROCEDURE — 85025 COMPLETE CBC W/AUTO DIFF WBC: CPT | Performed by: STUDENT IN AN ORGANIZED HEALTH CARE EDUCATION/TRAINING PROGRAM

## 2025-06-12 PROCEDURE — 84100 ASSAY OF PHOSPHORUS: CPT | Performed by: STUDENT IN AN ORGANIZED HEALTH CARE EDUCATION/TRAINING PROGRAM

## 2025-06-12 PROCEDURE — 93005 ELECTROCARDIOGRAM TRACING: CPT

## 2025-06-12 PROCEDURE — 93010 ELECTROCARDIOGRAM REPORT: CPT | Mod: ,,, | Performed by: INTERNAL MEDICINE

## 2025-06-12 PROCEDURE — U0002 COVID-19 LAB TEST NON-CDC: HCPCS | Performed by: STUDENT IN AN ORGANIZED HEALTH CARE EDUCATION/TRAINING PROGRAM

## 2025-06-12 RX ORDER — DOCUSATE SODIUM 100 MG/1
100 CAPSULE, LIQUID FILLED ORAL 3 TIMES DAILY PRN
Status: DISCONTINUED | OUTPATIENT
Start: 2025-06-12 | End: 2025-06-13 | Stop reason: HOSPADM

## 2025-06-12 RX ORDER — IBUPROFEN 200 MG
16 TABLET ORAL
Status: DISCONTINUED | OUTPATIENT
Start: 2025-06-12 | End: 2025-06-13 | Stop reason: HOSPADM

## 2025-06-12 RX ORDER — TALC
6 POWDER (GRAM) TOPICAL NIGHTLY PRN
Status: DISCONTINUED | OUTPATIENT
Start: 2025-06-12 | End: 2025-06-13 | Stop reason: HOSPADM

## 2025-06-12 RX ORDER — POLYETHYLENE GLYCOL 3350 17 G/17G
17 POWDER, FOR SOLUTION ORAL 2 TIMES DAILY PRN
Status: DISCONTINUED | OUTPATIENT
Start: 2025-06-12 | End: 2025-06-13 | Stop reason: HOSPADM

## 2025-06-12 RX ORDER — PROCHLORPERAZINE EDISYLATE 5 MG/ML
5 INJECTION INTRAMUSCULAR; INTRAVENOUS EVERY 6 HOURS PRN
Status: DISCONTINUED | OUTPATIENT
Start: 2025-06-12 | End: 2025-06-13 | Stop reason: HOSPADM

## 2025-06-12 RX ORDER — IBUPROFEN 200 MG
24 TABLET ORAL
Status: DISCONTINUED | OUTPATIENT
Start: 2025-06-12 | End: 2025-06-13 | Stop reason: HOSPADM

## 2025-06-12 RX ORDER — SIMETHICONE 80 MG
1 TABLET,CHEWABLE ORAL 4 TIMES DAILY PRN
Status: DISCONTINUED | OUTPATIENT
Start: 2025-06-12 | End: 2025-06-13 | Stop reason: HOSPADM

## 2025-06-12 RX ORDER — ASPIRIN 81 MG/1
81 TABLET ORAL DAILY
Status: DISCONTINUED | OUTPATIENT
Start: 2025-06-12 | End: 2025-06-13 | Stop reason: HOSPADM

## 2025-06-12 RX ORDER — BISACODYL 10 MG/1
10 SUPPOSITORY RECTAL DAILY PRN
Status: DISCONTINUED | OUTPATIENT
Start: 2025-06-12 | End: 2025-06-13 | Stop reason: HOSPADM

## 2025-06-12 RX ORDER — ATORVASTATIN CALCIUM 40 MG/1
40 TABLET, FILM COATED ORAL NIGHTLY
Status: DISCONTINUED | OUTPATIENT
Start: 2025-06-12 | End: 2025-06-13 | Stop reason: HOSPADM

## 2025-06-12 RX ORDER — ACETAMINOPHEN 500 MG
1000 TABLET ORAL EVERY 6 HOURS PRN
Status: DISCONTINUED | OUTPATIENT
Start: 2025-06-12 | End: 2025-06-13 | Stop reason: HOSPADM

## 2025-06-12 RX ORDER — MYCOPHENOLIC ACID 180 MG/1
540 TABLET, DELAYED RELEASE ORAL 2 TIMES DAILY
Status: DISCONTINUED | OUTPATIENT
Start: 2025-06-12 | End: 2025-06-13

## 2025-06-12 RX ORDER — IPRATROPIUM BROMIDE AND ALBUTEROL SULFATE 2.5; .5 MG/3ML; MG/3ML
3 SOLUTION RESPIRATORY (INHALATION) EVERY 6 HOURS PRN
Status: DISCONTINUED | OUTPATIENT
Start: 2025-06-12 | End: 2025-06-13 | Stop reason: HOSPADM

## 2025-06-12 RX ORDER — LANOLIN ALCOHOL/MO/W.PET/CERES
800 CREAM (GRAM) TOPICAL 3 TIMES DAILY
Status: DISCONTINUED | OUTPATIENT
Start: 2025-06-12 | End: 2025-06-13 | Stop reason: HOSPADM

## 2025-06-12 RX ORDER — FLUTICASONE FUROATE AND VILANTEROL 100; 25 UG/1; UG/1
1 POWDER RESPIRATORY (INHALATION) DAILY
Status: DISCONTINUED | OUTPATIENT
Start: 2025-06-12 | End: 2025-06-13 | Stop reason: HOSPADM

## 2025-06-12 RX ORDER — FLUTICASONE PROPIONATE 50 MCG
2 SPRAY, SUSPENSION (ML) NASAL DAILY
Status: DISCONTINUED | OUTPATIENT
Start: 2025-06-12 | End: 2025-06-13 | Stop reason: HOSPADM

## 2025-06-12 RX ORDER — ACETAMINOPHEN 500 MG
1000 TABLET ORAL
Status: COMPLETED | OUTPATIENT
Start: 2025-06-12 | End: 2025-06-12

## 2025-06-12 RX ORDER — NALOXONE HCL 0.4 MG/ML
0.02 VIAL (ML) INJECTION
Status: DISCONTINUED | OUTPATIENT
Start: 2025-06-12 | End: 2025-06-13 | Stop reason: HOSPADM

## 2025-06-12 RX ORDER — PREDNISONE 2.5 MG/1
5 TABLET ORAL DAILY
Status: DISCONTINUED | OUTPATIENT
Start: 2025-06-12 | End: 2025-06-13 | Stop reason: HOSPADM

## 2025-06-12 RX ORDER — ALUMINUM HYDROXIDE, MAGNESIUM HYDROXIDE, AND SIMETHICONE 1200; 120; 1200 MG/30ML; MG/30ML; MG/30ML
30 SUSPENSION ORAL 4 TIMES DAILY PRN
Status: DISCONTINUED | OUTPATIENT
Start: 2025-06-12 | End: 2025-06-13 | Stop reason: HOSPADM

## 2025-06-12 RX ORDER — TACROLIMUS 1 MG/1
2 CAPSULE ORAL 2 TIMES DAILY
Status: DISCONTINUED | OUTPATIENT
Start: 2025-06-12 | End: 2025-06-13 | Stop reason: HOSPADM

## 2025-06-12 RX ORDER — GLUCAGON 1 MG
1 KIT INJECTION
Status: DISCONTINUED | OUTPATIENT
Start: 2025-06-12 | End: 2025-06-13 | Stop reason: HOSPADM

## 2025-06-12 RX ORDER — SODIUM CHLORIDE 0.9 % (FLUSH) 0.9 %
10 SYRINGE (ML) INJECTION EVERY 12 HOURS PRN
Status: DISCONTINUED | OUTPATIENT
Start: 2025-06-12 | End: 2025-06-13 | Stop reason: HOSPADM

## 2025-06-12 RX ORDER — NIFEDIPINE 30 MG/1
30 TABLET, EXTENDED RELEASE ORAL 2 TIMES DAILY
Status: DISCONTINUED | OUTPATIENT
Start: 2025-06-12 | End: 2025-06-13 | Stop reason: HOSPADM

## 2025-06-12 RX ORDER — ONDANSETRON 8 MG/1
8 TABLET, ORALLY DISINTEGRATING ORAL EVERY 8 HOURS PRN
Status: DISCONTINUED | OUTPATIENT
Start: 2025-06-12 | End: 2025-06-13 | Stop reason: HOSPADM

## 2025-06-12 RX ORDER — CETIRIZINE HYDROCHLORIDE 10 MG/1
10 TABLET ORAL DAILY
Status: DISCONTINUED | OUTPATIENT
Start: 2025-06-12 | End: 2025-06-13 | Stop reason: HOSPADM

## 2025-06-12 RX ADMIN — PREDNISONE 5 MG: 2.5 TABLET ORAL at 02:06

## 2025-06-12 RX ADMIN — ACETAMINOPHEN 1000 MG: 500 TABLET ORAL at 09:06

## 2025-06-12 RX ADMIN — ATORVASTATIN CALCIUM 40 MG: 40 TABLET, FILM COATED ORAL at 08:06

## 2025-06-12 RX ADMIN — DIBASIC SODIUM PHOSPHATE, MONOBASIC POTASSIUM PHOSPHATE AND MONOBASIC SODIUM PHOSPHATE 2 TABLET: 852; 155; 130 TABLET ORAL at 11:06

## 2025-06-12 RX ADMIN — CETIRIZINE HYDROCHLORIDE 10 MG: 10 TABLET, FILM COATED ORAL at 02:06

## 2025-06-12 RX ADMIN — FLUTICASONE FUROATE AND VILANTEROL TRIFENATATE 1 PUFF: 100; 25 POWDER RESPIRATORY (INHALATION) at 11:06

## 2025-06-12 RX ADMIN — FLUTICASONE PROPIONATE 100 MCG: 50 SPRAY, METERED NASAL at 05:06

## 2025-06-12 RX ADMIN — MYCOPHENOLIC ACID 540 MG: 180 TABLET, DELAYED RELEASE ORAL at 08:06

## 2025-06-12 RX ADMIN — Medication 800 MG: at 08:06

## 2025-06-12 RX ADMIN — DIBASIC SODIUM PHOSPHATE, MONOBASIC POTASSIUM PHOSPHATE AND MONOBASIC SODIUM PHOSPHATE 2 TABLET: 852; 155; 130 TABLET ORAL at 08:06

## 2025-06-12 RX ADMIN — THERA TABS 1 TABLET: TAB at 11:06

## 2025-06-12 RX ADMIN — NIFEDIPINE 30 MG: 30 TABLET, FILM COATED, EXTENDED RELEASE ORAL at 11:06

## 2025-06-12 RX ADMIN — Medication 800 MG: at 02:06

## 2025-06-12 RX ADMIN — SODIUM CHLORIDE, POTASSIUM CHLORIDE, SODIUM LACTATE AND CALCIUM CHLORIDE 1000 ML: 600; 310; 30; 20 INJECTION, SOLUTION INTRAVENOUS at 08:06

## 2025-06-12 RX ADMIN — TACROLIMUS 2 MG: 1 CAPSULE ORAL at 05:06

## 2025-06-12 RX ADMIN — ASPIRIN 81 MG: 81 TABLET, COATED ORAL at 11:06

## 2025-06-12 RX ADMIN — NIFEDIPINE 30 MG: 30 TABLET, FILM COATED, EXTENDED RELEASE ORAL at 08:06

## 2025-06-12 NOTE — ASSESSMENT & PLAN NOTE
Patient is identified as high risk for severe complications of COVID 19 based on COVID risk score of 5   Initiate standard COVID protocols; COVID-19 testing, Infection Control notification and isolation- respiratory, contact and droplet per protocol.     Diagnostics: CBC, CMP, and Portable CXR reviewed     Management: Maintain oxygen saturations 92-96% via Nasal Cannula LPM and monitor with continuous/intermittent pulse oximetry, Inhaled bronchodilators as needed for shortness of breath, and Continuous cardiac monitoring  - Discussed with on-call infectious disease provider, Dr. Grant, given history of immunosuppression and after further discussion with the patient regarding risks/benefits of remdesivir and given this is now day 6 of symptoms and he is improving without intervention, plan to hold off on remdesivir for now per patient preference     Advance Care Planning  Current advance care plan has been discussed with patient/family/POA and patient currently wishes Full Code.

## 2025-06-12 NOTE — PLAN OF CARE
Problem: Adult Inpatient Plan of Care  Goal: Plan of Care Review  Outcome: Progressing  Goal: Patient-Specific Goal (Individualized)  Outcome: Progressing  Goal: Absence of Hospital-Acquired Illness or Injury  Outcome: Progressing  Goal: Optimal Comfort and Wellbeing  Outcome: Progressing  Goal: Readiness for Transition of Care  Outcome: Progressing     Problem: Infection  Goal: Absence of Infection Signs and Symptoms  Outcome: Progressing     Pt progressing towards goals. No distress notice. No falls or injuries during shift. Bed in lowest position, call light within reach, belonging at bedside. Safety precaution maintain.Plan of Care reviewed. Pt verbalized understanding.

## 2025-06-12 NOTE — ED NOTES
Telemetry Verification   Patient placed on Telemetry Box  Verified with War Room  Box # [id number]   Monitor Tech War room   Rate 82   Rhythm Normal sinus

## 2025-06-12 NOTE — ED TRIAGE NOTES
Patient arrived to the ED from home for the chief complaint of having multiple complaints. The patient has a hx of a kidney transplant is march of 2024, the patient is endorsing a cough with generalized weakness and fatigue and what he feels like is a low grade fever

## 2025-06-12 NOTE — SUBJECTIVE & OBJECTIVE
Past Medical History:   Diagnosis Date    Anemia     CVA (cerebral vascular accident)     GERD (gastroesophageal reflux disease)     History of COPD 04/08/2024    History of incisional hernia repair 04/15/2024    Hyperlipidemia     Hypertension     IgA nephropathy     Obesity     S/P living-donor kidney transplantation 03/12/2024       Past Surgical History:   Procedure Laterality Date    APPENDECTOMY      CARDIAC CATHETERIZATION      [facility] 6-7 years ago    KIDNEY TRANSPLANT N/A 3/11/2024    Procedure: TRANSPLANT, KIDNEY;  Surgeon: Silvio Mayer MD;  [facility];  Service: Transplant;  Laterality: N/A;    LAPAROTOMY, EXPLORATORY N/A 4/9/2024    Procedure: LAPAROTOMY, EXPLORATORY;  Surgeon: Lorenzo Gonzales MD;  Location: [facility] OR [address] [place] [address];  Service: Transplant;  Laterality: N/A;    RENAL BIOPSY      TONSILLECTOMY         Review of patient's allergies indicates:   Allergen Reactions    Codeine Hives     Reaction to Tylenol #3       No current facility-administered medications on file prior to encounter.     Current Outpatient Medications on File Prior to Encounter   Medication Sig    albuterol (PROVENTIL/VENTOLIN HFA) 90 mcg/actuation inhaler Inhale 1-2 puffs into the lungs every 6 (six) hours as needed for Wheezing. Rescue    aspirin (ECOTRIN) 81 MG EC tablet Take 1 tablet (81 mg total) by mouth once daily.    atorvastatin (LIPITOR) 40 MG tablet Take 1 tablet by mouth every evening.    docusate sodium (COLACE) 100 MG capsule Take 1 capsule (100 mg total) by mouth 3 (three) times daily as needed for Constipation.    fluticasone furoate-vilanteroL (BREO ELLIPTA) 100-25 mcg/dose diskus inhaler Inhale 1 puff into the lungs once daily. Controller    k phos di & mono-sod phos mono (K-PHOS-NEUTRAL) 250 mg Tab Take 2 tablets by mouth every 12 (twelve) hours.    magnesium oxide (MAG-OX) 400 mg (241.3 mg magnesium) tablet Take 2 tablets (800 mg total) by mouth 3 (three) times daily.    multivitamin (ONE DAILY  MULTIVITAMIN) per tablet Take 1 tablet by mouth once daily.    mycophenolate sodium 180 MG TbEC Take 3 tablets (540 mg total) by mouth 2 (two) times daily.    NIFEdipine (PROCARDIA-XL) 30 MG (OSM) 24 hr tablet Take 1 tablet (30 mg total) by mouth 2 (two) times a day.    patiromer calcium sorbitex (VELTASSA) 8.4 gram PwPk Take 1 packet (8.4 g total) by mouth once daily.    predniSONE (DELTASONE) 5 MG tablet Take 1 tablet (5 mg total) by mouth once daily.    sildenafiL (VIAGRA) 100 MG tablet Take 1 tablet (100 mg total) by mouth daily as needed for Erectile Dysfunction. Dispense generic    tacrolimus (PROGRAF) 1 MG Cap Take 2 capsules (2 mg total) by mouth every 12 (twelve) hours.     Family History       Problem Relation (Age of Onset)    Depression Mother          Tobacco Use    Smoking status: Former     Current packs/day: 0.00     Types: Cigarettes     Quit date: 2016     Years since quittin.0    Smokeless tobacco: Never   Substance and Sexual Activity    Alcohol use: Not Currently    Drug use: Never    Sexual activity: Not on file     Review of Systems   Constitutional:  Positive for activity change and fatigue. Negative for chills and fever.   HENT:  Positive for congestion. Negative for trouble swallowing.    Eyes:  Negative for photophobia and visual disturbance.   Respiratory:  Positive for cough (dry) and shortness of breath. Negative for chest tightness and wheezing.    Cardiovascular:  Negative for chest pain, palpitations and leg swelling.   Gastrointestinal:  Negative for abdominal pain, constipation, nausea and vomiting.   Genitourinary:  Negative for dysuria, frequency, hematuria and urgency.   Musculoskeletal:  Positive for arthralgias. Negative for back pain and gait problem.   Skin:  Negative for color change and rash.   Neurological:  Negative for dizziness, syncope, weakness, light-headedness, numbness and headaches.   Psychiatric/Behavioral:  Negative for agitation and confusion. The  patient is not nervous/anxious.      Objective:     Vital Signs (Most Recent):  Temp: 99.8 °F (37.7 °C) (06/12/25 1142)  Pulse: 92 (06/12/25 1142)  Resp: 16 (06/12/25 1152)  BP: 135/88 (06/12/25 1142)  SpO2: 96 % (06/12/25 1142) Vital Signs (24h Range):  Temp:  [99.8 °F (37.7 °C)-100.2 °F (37.9 °C)] 99.8 °F (37.7 °C)  Pulse:  [] 92  Resp:  [16-24] 16  SpO2:  [95 %-96 %] 96 %  BP: (135-186)/(77-99) 135/88     Weight: 90.7 kg (200 lb)  Body mass index is 31.32 kg/m².     Physical Exam  Vitals and nursing note reviewed.   Constitutional:       General: He is not in acute distress.     Appearance: He is well-developed. He is not ill-appearing.   HENT:      Head: Normocephalic and atraumatic.      Mouth/Throat:      Mouth: Mucous membranes are dry.   Eyes:      Conjunctiva/sclera: Conjunctivae normal.      Pupils: Pupils are equal, round, and reactive to light.   Cardiovascular:      Rate and Rhythm: Normal rate and regular rhythm.      Heart sounds: Normal heart sounds.   Pulmonary:      Effort: Pulmonary effort is normal. No respiratory distress.      Breath sounds: Normal breath sounds. No wheezing.   Abdominal:      General: Bowel sounds are normal. There is no distension.      Palpations: Abdomen is soft.      Tenderness: There is no abdominal tenderness.   Musculoskeletal:         General: No tenderness. Normal range of motion.      Cervical back: Normal range of motion and neck supple.      Comments: No calf tenderness or difference in lower extremity size   Skin:     General: Skin is warm and dry.      Capillary Refill: Capillary refill takes less than 2 seconds.      Findings: No rash.   Neurological:      Mental Status: He is alert and oriented to person, place, and time.      Cranial Nerves: No cranial nerve deficit.      Sensory: No sensory deficit.      Coordination: Coordination normal.   Psychiatric:         Behavior: Behavior normal.         Thought Content: Thought content normal.         Judgment:  Judgment normal.              CRANIAL NERVES     CN III, IV, VI   Pupils are equal, round, and reactive to light.       Significant Labs: All pertinent labs within the past 24 hours have been reviewed.    Significant Imaging: I have reviewed all pertinent imaging results/findings within the past 24 hours.

## 2025-06-12 NOTE — H&P
"[facility] - Emergency Dept  [facility] Medicine  History & Physical    Patient Name: Colten Monet  MRN: [id number]  Patient Class: OP- Observation  Admission Date: 6/12/2025  Attending Physician: Duran Burrell MD   Primary Care Provider: Jeaneth Primary Doctor         Patient information was obtained from patient, past medical records, and ER records.     Subjective:     Principal Problem:COVID-19    Chief Complaint:   Chief Complaint   Patient presents with    Multiple complaints     Kidney xplant march 2024, cough        HPI: Cloten Monet is a 65 y.o. M with HTN, HLD, COPD, CVA, and history of kidney transplant on chronic immunosuppression who presented to the ED with 6 day history of general malaise (feeling "blah"), dry cough, intermittent SOB, nasal congestion, and non-bloody, non-mucoid diarrhea while he was on vacation in the [facility]. He saw a physician in the [facility] who reported they did not know how to treat his condition given his history of kidney transplant. He has taken nyquil, which provides some relief. His symptoms are now improving daily, and he is no longer having diarrhea or SOB. He endorses continued mild congestion and dry cough but otherwise denies fever, chills, chest pain, palpitations, abdominal pain, N/V/D, dysuria, leg swelling or pain, confusion, HA, or syncope. He reports other family members have similar symptoms.     In the ED: Tachycardic to 130s, tachypnic to 24, Temp 100.2 F. CBC and CMP grossly unremarkable. Lactic wnl. COVID+. CXR c/w atelectasis. Pt was given tylenol and IVFs and was placed in observation for further management.     Past Medical History:   Diagnosis Date    Anemia     CVA (cerebral vascular accident)     GERD (gastroesophageal reflux disease)     History of COPD 04/08/2024    History of incisional hernia repair 04/15/2024    Hyperlipidemia     Hypertension     IgA nephropathy     Obesity     S/P living-donor kidney transplantation 03/12/2024 "       Past Surgical History:   Procedure Laterality Date    APPENDECTOMY      CARDIAC CATHETERIZATION      [facility] ~ 6-7 years ago    KIDNEY TRANSPLANT N/A 3/11/2024    Procedure: TRANSPLANT, KIDNEY;  Surgeon: Silvio Mayer MD;  Location: [facility] OR [address] [place] [address];  Service: Transplant;  Laterality: N/A;    LAPAROTOMY, EXPLORATORY N/A 4/9/2024    Procedure: LAPAROTOMY, EXPLORATORY;  Surgeon: Lorenzo Gonzales MD;  [facility];  Service: Transplant;  Laterality: N/A;    RENAL BIOPSY      TONSILLECTOMY         Review of patient's allergies indicates:   Allergen Reactions    Codeine Hives     Reaction to Tylenol #3       No current facility-administered medications on file prior to encounter.     Current Outpatient Medications on File Prior to Encounter   Medication Sig    albuterol (PROVENTIL/VENTOLIN HFA) 90 mcg/actuation inhaler Inhale 1-2 puffs into the lungs every 6 (six) hours as needed for Wheezing. Rescue    aspirin (ECOTRIN) 81 MG EC tablet Take 1 tablet (81 mg total) by mouth once daily.    atorvastatin (LIPITOR) 40 MG tablet Take 1 tablet by mouth every evening.    docusate sodium (COLACE) 100 MG capsule Take 1 capsule (100 mg total) by mouth 3 (three) times daily as needed for Constipation.    fluticasone furoate-vilanteroL (BREO ELLIPTA) 100-25 mcg/dose diskus inhaler Inhale 1 puff into the lungs once daily. Controller    k phos di & mono-sod phos mono (K-PHOS-NEUTRAL) 250 mg Tab Take 2 tablets by mouth every 12 (twelve) hours.    magnesium oxide (MAG-OX) 400 mg (241.3 mg magnesium) tablet Take 2 tablets (800 mg total) by mouth 3 (three) times daily.    multivitamin (ONE DAILY MULTIVITAMIN) per tablet Take 1 tablet by mouth once daily.    mycophenolate sodium 180 MG TbEC Take 3 tablets (540 mg total) by mouth 2 (two) times daily.    NIFEdipine (PROCARDIA-XL) 30 MG (OSM) 24 hr tablet Take 1 tablet (30 mg total) by mouth 2 (two) times a day.    patiromer calcium sorbitex (VELTASSA) 8.4 gram PwPk Take 1  packet (8.4 g total) by mouth once daily.    predniSONE (DELTASONE) 5 MG tablet Take 1 tablet (5 mg total) by mouth once daily.    sildenafiL (VIAGRA) 100 MG tablet Take 1 tablet (100 mg total) by mouth daily as needed for Erectile Dysfunction. Dispense generic    tacrolimus (PROGRAF) 1 MG Cap Take 2 capsules (2 mg total) by mouth every 12 (twelve) hours.     Family History       Problem Relation (Age of Onset)    Depression Mother          Tobacco Use    Smoking status: Former     Current packs/day: 0.00     Types: Cigarettes     Quit date: 2016     Years since quittin.0    Smokeless tobacco: Never   Substance and Sexual Activity    Alcohol use: Not Currently    Drug use: Never    Sexual activity: Not on file     Review of Systems   Constitutional:  Positive for activity change and fatigue. Negative for chills and fever.   HENT:  Positive for congestion. Negative for trouble swallowing.    Eyes:  Negative for photophobia and visual disturbance.   Respiratory:  Positive for cough (dry) and shortness of breath. Negative for chest tightness and wheezing.    Cardiovascular:  Negative for chest pain, palpitations and leg swelling.   Gastrointestinal:  Negative for abdominal pain, constipation, nausea and vomiting.   Genitourinary:  Negative for dysuria, frequency, hematuria and urgency.   Musculoskeletal:  Positive for arthralgias. Negative for back pain and gait problem.   Skin:  Negative for color change and rash.   Neurological:  Negative for dizziness, syncope, weakness, light-headedness, numbness and headaches.   Psychiatric/Behavioral:  Negative for agitation and confusion. The patient is not nervous/anxious.      Objective:     Vital Signs (Most Recent):  Temp: 99.8 °F (37.7 °C) (25 1142)  Pulse: 92 (25 1142)  Resp: 16 (25 1152)  BP: 135/88 (25 1142)  SpO2: 96 % (25 1142) Vital Signs (24h Range):  Temp:  [99.8 °F (37.7 °C)-100.2 °F (37.9 °C)] 99.8 °F (37.7 °C)  Pulse:   [] 92  Resp:  [16-24] 16  SpO2:  [95 %-96 %] 96 %  BP: (135-186)/(77-99) 135/88     Weight: 90.7 kg (200 lb)  Body mass index is 31.32 kg/m².     Physical Exam  Vitals and nursing note reviewed.   Constitutional:       General: He is not in acute distress.     Appearance: He is well-developed. He is not ill-appearing.   HENT:      Head: Normocephalic and atraumatic.      Mouth/Throat:      Mouth: Mucous membranes are dry.   Eyes:      Conjunctiva/sclera: Conjunctivae normal.      Pupils: Pupils are equal, round, and reactive to light.   Cardiovascular:      Rate and Rhythm: Normal rate and regular rhythm.      Heart sounds: Normal heart sounds.   Pulmonary:      Effort: Pulmonary effort is normal. No respiratory distress.      Breath sounds: Normal breath sounds. No wheezing.   Abdominal:      General: Bowel sounds are normal. There is no distension.      Palpations: Abdomen is soft.      Tenderness: There is no abdominal tenderness.   Musculoskeletal:         General: No tenderness. Normal range of motion.      Cervical back: Normal range of motion and neck supple.      Comments: No calf tenderness or difference in lower extremity size   Skin:     General: Skin is warm and dry.      Capillary Refill: Capillary refill takes less than 2 seconds.      Findings: No rash.   Neurological:      Mental Status: He is alert and oriented to person, place, and time.      Cranial Nerves: No cranial nerve deficit.      Sensory: No sensory deficit.      Coordination: Coordination normal.   Psychiatric:         Behavior: Behavior normal.         Thought Content: Thought content normal.         Judgment: Judgment normal.              CRANIAL NERVES     CN III, IV, VI   Pupils are equal, round, and reactive to light.       Significant Labs: All pertinent labs within the past 24 hours have been reviewed.    Significant Imaging: I have reviewed all pertinent imaging results/findings within the past 24 hours.  Assessment/Plan:      Assessment & Plan  COVID-19  Immunosuppression  At risk for opportunistic infections  Patient is identified as high risk for severe complications of COVID 19 based on COVID risk score of 5   Initiate standard COVID protocols; COVID-19 testing, Infection Control notification and isolation- respiratory, contact and droplet per protocol.     Diagnostics: CBC, CMP, and Portable CXR reviewed     Management: Maintain oxygen saturations 92-96% via Nasal Cannula LPM and monitor with continuous/intermittent pulse oximetry, Inhaled bronchodilators as needed for shortness of breath, and Continuous cardiac monitoring  - Discussed with on-call infectious disease provider, Dr. Grant, given history of immunosuppression and after further discussion with the patient regarding risks/benefits of remdesivir and given this is now day 6 of symptoms and he is improving without intervention, plan to hold off on remdesivir for now per patient preference     Advance Care Planning  Current advance care plan has been discussed with patient/family/POA and patient currently wishes Full Code.   IgA nephropathy  Long-term use of immunosuppressant medication  S/p kidney transplant  - No acute issues  - Tacro level reviewed & KTM consulted   - Continue home prednisone, tacrolimus, and mycophenolate  Primary hypertension  Patient's blood pressure range in the last 24 hours was: BP  Min: 135/88  Max: 186/99.The patient's inpatient anti-hypertensive regimen is listed below:  Current Antihypertensives  NIFEdipine 24 hr tablet 30 mg, 2 times daily, Oral  Plan  - BP is controlled, no changes needed to their regimen  Stage 3a chronic kidney disease  Creatine stable for now. BMP reviewed- noted Estimated Creatinine Clearance: 52 mL/min (A) (based on SCr of 1.5 mg/dL (H)). according to latest data. Based on current GFR, CKD stage is stage 3 - GFR 30-59.  Monitor UOP and serial BMP and adjust therapy as needed. Renally dose meds. Avoid nephrotoxic  medications and procedures.  Dyslipidemia  - Continue statin   VTE Risk Mitigation (From admission, onward)           Ordered     IP VTE HIGH RISK PATIENT  Once         06/12/25 1053     Place sequential compression device  Until discontinued         06/12/25 1053                         On 06/12/2025, patient should be placed in hospital observation services under my care in collaboration with Dr. Duran Burrell.           JOVANNI EricksonC  Department of Hospital Medicine  [facility] - Emergency Dept

## 2025-06-12 NOTE — ASSESSMENT & PLAN NOTE
S/p kidney transplant  - No acute issues  - Tacro level reviewed & KTM consulted   - Continue home prednisone, tacrolimus, and mycophenolate

## 2025-06-12 NOTE — ED PROVIDER NOTES
Encounter Date: 6/12/2025       History     Chief Complaint   Patient presents with    Multiple complaints     Kidney xplant march 2024, cough     HPI  65 yom very of kidney transplant March 2024, CVA, COPD, incisional hernia, hypertension, hyperlipidemia is presenting with multiple complaints.    Patient states that 5 or 6 days ago he was in the [facility] on vacation with his family when he started to feel blah.  Patient states that he saw a doctor while in the [facility] was said that they did not know what to do or how to treat him given his history of kidney transplant.  Who reports associated cough and diarrhea.  Diarrhea has resolved 3 days ago.  Nonbloody..  States that other family members also had similar complaints.  Says that he had to cut his trip short given how he was feeling.  Unsure if he had any fevers.  Denies any nausea.  Currently denies any chest pain or difficulty breathing.  Says that he feels like maybe his symptoms are starting to improve but just want to get checked out anyway.  No other acute concerns at this time.  Review of patient's allergies indicates:   Allergen Reactions    Codeine Hives     Reaction to Tylenol #3     Past Medical History:   Diagnosis Date    Anemia     CVA (cerebral vascular accident)     GERD (gastroesophageal reflux disease)     History of COPD 04/08/2024    History of incisional hernia repair 04/15/2024    Hyperlipidemia     Hypertension     IgA nephropathy     Obesity     S/P living-donor kidney transplantation 03/12/2024     Past Surgical History:   Procedure Laterality Date    APPENDECTOMY      CARDIAC CATHETERIZATION      [facility] ~ 6-7 years ago    KIDNEY TRANSPLANT N/A 3/11/2024    Procedure: TRANSPLANT, KIDNEY;  Surgeon: Silvio Mayer MD;  Location: [facility] OR [address] [place] [address];  Service: Transplant;  Laterality: N/A;    LAPAROTOMY, EXPLORATORY N/A 4/9/2024    Procedure: LAPAROTOMY, EXPLORATORY;  Surgeon: Lorenzo Gonzales MD;  Location: [facility] OR [address] [place] [address];  Service:  Transplant;  Laterality: N/A;    RENAL BIOPSY      TONSILLECTOMY       Family History   Problem Relation Name Age of Onset    Depression Mother      Kidney disease Neg Hx      Cancer Neg Hx       Social History[1]  Review of Systems    Physical Exam     Initial Vitals [06/12/25 0727]   BP Pulse Resp Temp SpO2   (!) 143/91 (S) (!) 132 (S) (!) 24 100.2 °F (37.9 °C) 95 %      MAP       --         Physical Exam    Nursing note and vitals reviewed.  Constitutional: He appears well-developed and well-nourished.   HENT:   Head: Normocephalic and atraumatic.   Eyes: Conjunctivae and EOM are normal. Pupils are equal, round, and reactive to light.   Cardiovascular:  Regular rhythm, normal heart sounds and intact distal pulses.           No murmur heard.  Pulmonary/Chest: Breath sounds normal. No respiratory distress. He has no wheezes. He has no rhonchi. He has no rales.   Abdominal: Abdomen is soft. Bowel sounds are normal. He exhibits no distension. There is no abdominal tenderness.   Hernia RLQ     Neurological: He is alert and oriented to person, place, and time.   Skin: Skin is warm and dry. Capillary refill takes less than 2 seconds.   Psychiatric: He has a normal mood and affect.         ED Course   Procedures  Labs Reviewed   COMPREHENSIVE METABOLIC PANEL - Abnormal       Result Value    Sodium 140      Potassium 4.1      Chloride 106      CO2 23      Glucose 107      BUN 22      Creatinine 1.5 (*)     Calcium 8.8      Protein Total 7.4      Albumin 3.7      Bilirubin Total 0.5      ALP 99      AST 20      ALT 24      Anion Gap 11      eGFR 51 (*)    URINALYSIS, REFLEX TO URINE CULTURE - Abnormal    Color, UA Yellow      Appearance, UA Clear      pH, UA 7.0      Spec Grav UA 1.030      Protein, UA 1+ (*)     Glucose, UA Negative      Ketones, UA Negative      Bilirubin, UA Negative      Blood, UA 3+ (*)     Nitrites, UA Negative      Urobilinogen, UA Negative      Leukocyte Esterase, UA Negative     CBC WITH  DIFFERENTIAL - Abnormal    WBC 8.39      RBC 5.14      HGB 14.0      HCT 44.7      MCV 87      MCH 27.2      MCHC 31.3 (*)     RDW 12.6      Platelet Count 182      MPV 9.6      Nucleated RBC 0      Neut % 61.1      Lymph % 15.9 (*)     Mono % 19.1 (*)     Eos % 1.9      Basophil % 0.5      Imm Grans % 1.5 (*)     Neut # 5.13      Lymph # 1.33      Mono # 1.60 (*)     Eos # 0.16      Baso # 0.04      Imm Grans # 0.13 (*)    PHOSPHORUS - Abnormal    Phosphorus Level 2.2 (*)    TSH - Abnormal    TSH 0.379 (*)    SARS-COV-2 RNA AMPLIFICATION, QUAL - Abnormal    SARS COV-2 Molecular Positive (*)    URINALYSIS MICROSCOPIC - Abnormal    RBC, UA 24 (*)     WBC, UA 3      Bacteria, UA Few (*)     Hyaline Casts, UA 3 (*)     Microscopic Comment       INFLUENZA A & B BY MOLECULAR - Normal    INFLUENZA A MOLECULAR Negative      INFLUENZA B MOLECULAR  Negative     HEPATITIS C ANTIBODY - Normal    Hep C Ab Interp Non-Reactive     HIV 1 / 2 ANTIBODY - Normal    HIV 1/2 Ag/Ab Non-Reactive     TACROLIMUS LEVEL - Normal    Tacrolimus 7.5     B-TYPE NATRIURETIC PEPTIDE - Normal    BNP 14     MAGNESIUM - Normal    Magnesium  1.6     T4, FREE - Normal    Free T4 1.01     LACTIC ACID, PLASMA - Normal    Lactic Acid Level 0.7      Narrative:     Falsely low lactic acid results can be found in samples containing >=13.0 mg/dL total bilirubin and/or >=3.5 mg/dL direct bilirubin.    CULTURE, BLOOD   CULTURE, BLOOD   HEP C VIRUS HOLD SPECIMEN    Extra Tube Hold for add-ons.     CBC W/ AUTO DIFFERENTIAL    Narrative:     The following orders were created for panel order CBC auto differential.  Procedure                               Abnormality         Status                     ---------                               -----------         ------                     CBC with Differential[[id number]]       Abnormal            Final result                 Please view results for these tests on the individual orders.   GREY TOP URINE HOLD    Extra  Tube Hold for add-ons.          ECG Results              EKG 12-lead (Final result)        Collection Time Result Time QRS Duration OHS QTC Calculation    06/12/25 07:32:13 06/12/25 08:18:30 74 430                     Final result by Interface, Lab In [facility] (06/12/25 08:18:39)                   Narrative:    Test Reason : R00.0,    Vent. Rate : 127 BPM     Atrial Rate : 127 BPM     P-R Int : 114 ms          QRS Dur :  74 ms      QT Int : 296 ms       P-R-T Axes :  56  44  66 degrees    QTcB Int : 430 ms    Sinus tachycardia  Otherwise normal ECG  When compared with ECG of 15-Apr-2024 11:24,  No significant change was found  Confirmed by Deion Ceja ([id number]) on 6/12/2025 8:18:27 AM    Referred By:            Confirmed By: Deion Ceja                                  Imaging Results              X-Ray Chest AP Portable (Final result)  Result time 06/12/25 08:51:22      Final result by Enio Whaley MD (06/12/25 08:51:22)                   Impression:      Minimal left basilar subsegmental atelectasis.  With this minor exception, the appearance of the chest is unchanged since 05/21/2024, allowing for differences in patient positioning.      Electronically signed by: Enio Whaley MD  Date:    06/12/2025  Time:    08:51               Narrative:    EXAMINATION:  XR CHEST AP PORTABLE    CLINICAL HISTORY:  Sepsis;    TECHNIQUE:  One view    COMPARISON:  Comparison is made to 05/21/2024.    FINDINGS:  Heart size is normal, as is the appearance of the pulmonary vascularity.  Some minimal linear opacities in the left lower lung zone are seen, consistent with subsegmental atelectasis.  Lung zones are otherwise clear, and are free of significant airspace consolidation or volume loss.  No pleural fluid.  No hilar or mediastinal mass lesion.  No pneumothorax.                                       Medications   aspirin EC tablet 81 mg (81 mg Oral Given 6/12/25 1152)   atorvastatin tablet 40 mg (has no administration in time range)    docusate sodium capsule 100 mg (has no administration in time range)   fluticasone furoate-vilanteroL 100-25 mcg/dose diskus inhaler 1 puff (1 puff Inhalation Given 6/12/25 1152)   k phos di & mono-sod phos mono 250 mg tablet 2 tablet (2 tablets Oral Given 6/12/25 1152)   magnesium oxide tablet 800 mg (800 mg Oral Given 6/12/25 1431)   multivitamin tablet (1 tablet Oral Given 6/12/25 1152)   NIFEdipine 24 hr tablet 30 mg (30 mg Oral Given 6/12/25 1152)   sodium chloride 0.9% flush 10 mL (has no administration in time range)   melatonin tablet 6 mg (has no administration in time range)   ondansetron disintegrating tablet 8 mg (has no administration in time range)   prochlorperazine injection Soln 5 mg (has no administration in time range)   polyethylene glycol packet 17 g (has no administration in time range)   bisacodyL suppository 10 mg (has no administration in time range)   simethicone chewable tablet 80 mg (has no administration in time range)   aluminum-magnesium hydroxide-simethicone 200-200-20 mg/5 mL suspension 30 mL (has no administration in time range)   acetaminophen tablet 1,000 mg (has no administration in time range)   naloxone 0.4 mg/mL injection 0.02 mg (has no administration in time range)   glucose chewable tablet 16 g (has no administration in time range)   glucose chewable tablet 24 g (has no administration in time range)   dextrose 50% injection 12.5 g (has no administration in time range)   dextrose 50% injection 25 g (has no administration in time range)   glucagon (human recombinant) injection 1 mg (has no administration in time range)   albuterol-ipratropium 2.5 mg-0.5 mg/3 mL nebulizer solution 3 mL (has no administration in time range)   cetirizine tablet 10 mg (10 mg Oral Given 6/12/25 1431)   fluticasone propionate 50 mcg/actuation nasal spray 100 mcg (has no administration in time range)   mycophenolate sodium EC tablet 540 mg (has no administration in time range)   predniSONE tablet  5 mg (5 mg Oral Given 6/12/25 1431)   tacrolimus capsule 2 mg (has no administration in time range)   lactated ringers bolus 1,000 mL (0 mLs Intravenous Stopped 6/12/25 0932)   acetaminophen tablet 1,000 mg (1,000 mg Oral Given 6/12/25 0952)     Medical Decision Making  65 yom very of kidney transplant March 2024, CVA, COPD, incisional hernia, hypertension, hyperlipidemia is presenting with multiple complaints.  On arrival patient is tachycardic, tachypneic, and febrile.  Exam grossly unremarkable.  Differentials at this time include COVID, flu, other viral illness, pneumonia, UTI, gastroenteritis-though less likely given no vomiting and diarrhea has already resolved. , electrolyte derangement, HARRISON.  Given patient's history of kidney transplant we will activated sepsis protocol and swab for COVID and flu.    CBC and CMP grossly unremarkable.  Chest x-ray shows minimal left basilar subsegmental atelectasis.  Patient found to be COVID positive.  Given patient's persistent tachycardia and history of kidney transplant would recommend admission.  Discussed case with hospital medicine who admitted the patient.    This patient does have evidence of infective focus  My overall impression is sepsis without organ dysfunction.  Source: Respiratory Infection  Antibiotics given- Antibiotics (72h ago, onward)    None      Latest lactate reviewed-  Lab             06/12/25 06/12/25                       0846          1143          LACTATE       --          0.7           POCLAC       1.4           --           Organ dysfunction indicated by n/a    Fluid challenge Fluid Not Needed - Patient is not hypotensive and/or lactate is less than 4.0.     Post- resuscitation assessment No - Post resuscitation assessment not needed       Will Not start Pressors- Levophed for MAP of 65        Amount and/or Complexity of Data Reviewed  Labs: ordered. Decision-making details documented in ED Course.  Radiology: ordered. Decision-making  "details documented in ED Course.    Risk  OTC drugs.  Prescription drug management.               ED Course as of 25 1436   Thu 2025   0756 Temp: 100.2 °F (37.9 °C) [AC]   0756 Pulse(!): 132 [AC]   0756 Resp(!): 24 [AC]   0915 POC Lactate: 1.4 [LF]   0915 WBC: 8.39 [LF]   0915 Lymph %(!): 15.9 [LF]   0927 SARS COV-2 MOLECULAR(!): Positive [AC]   0928 X-Ray Chest AP Portable  Minimal left basilar subsegmental atelectasis. [AC]   1014 TSH(!): 0.379 [AC]      ED Course User Index  [AC] Amanda Martinez MD  [LF] Aria Vicente MD    Sepsis Perfusion Assessment: "I attest a sepsis perfusion exam was performed within 6 hours of sepsis, severe sepsis, or septic shock presentation, following fluid resuscitation."                      Clinical Impression:  Final diagnoses:  [R00.0] Tachycardia  [Z13.6] Screening for cardiovascular condition  [R05.9] Cough  [R07.9] Chest pain  [U07.1] COVID (Primary)          ED Disposition Condition    Observation                              [1]   Social History  Tobacco Use    Smoking status: Former     Current packs/day: 0.00     Types: Cigarettes     Quit date: 2016     Years since quittin.0    Smokeless tobacco: Never   Substance Use Topics    Alcohol use: Not Currently    Drug use: Never        Amanda Martinez MD  Resident  25 1436    "

## 2025-06-12 NOTE — ASSESSMENT & PLAN NOTE
Patient's blood pressure range in the last 24 hours was: BP  Min: 135/88  Max: 186/99.The patient's inpatient anti-hypertensive regimen is listed below:  Current Antihypertensives  NIFEdipine 24 hr tablet 30 mg, 2 times daily, Oral  Plan  - BP is controlled, no changes needed to their regimen

## 2025-06-12 NOTE — NURSING
Pt is AAOx4.  Pt stable arrived to floor  no complain, skin intact,No distress noted.    Call light in reach. Bed in low locked position.   Side rails x2. Belongings at bedside.   Pt free of fall and injuries Questions and concerns voiced and answered.    Plan of care continues.

## 2025-06-12 NOTE — ASSESSMENT & PLAN NOTE
Creatine stable for now. BMP reviewed- noted Estimated Creatinine Clearance: 52 mL/min (A) (based on SCr of 1.5 mg/dL (H)). according to latest data. Based on current GFR, CKD stage is stage 3 - GFR 30-59.  Monitor UOP and serial BMP and adjust therapy as needed. Renally dose meds. Avoid nephrotoxic medications and procedures.

## 2025-06-12 NOTE — HPI
"Colten Monet is a 65 y.o. M with HTN, HLD, COPD, CVA, and history of kidney transplant on chronic immunosuppression who presented to the ED with 6 day history of general malaise (feeling "blah"), dry cough, intermittent SOB, nasal congestion, and non-bloody, non-mucoid diarrhea while he was on vacation in the [facility]. He saw a physician in the [facility] who reported they did not know how to treat his condition given his history of kidney transplant. He has taken nyquil, which provides some relief. His symptoms are now improving daily, and he is no longer having diarrhea or SOB. He endorses continued mild congestion and dry cough but otherwise denies fever, chills, chest pain, palpitations, abdominal pain, N/V/D, dysuria, leg swelling or pain, confusion, HA, or syncope. He reports other family members have similar symptoms.     In the ED: Tachycardic to 130s, tachypnic to 24, Temp 100.2 F. CBC and CMP grossly unremarkable. Lactic wnl. COVID+. CXR c/w atelectasis. Pt was given tylenol and IVFs and was placed in observation for further management.   "

## 2025-06-13 VITALS
HEART RATE: 99 BPM | TEMPERATURE: 98 F | SYSTOLIC BLOOD PRESSURE: 128 MMHG | BODY MASS INDEX: 31.39 KG/M2 | WEIGHT: 200 LBS | DIASTOLIC BLOOD PRESSURE: 80 MMHG | RESPIRATION RATE: 20 BRPM | HEIGHT: 67 IN | OXYGEN SATURATION: 94 %

## 2025-06-13 DIAGNOSIS — U07.1 COVID-19 VIRUS DETECTED: ICD-10-CM

## 2025-06-13 LAB
ABSOLUTE EOSINOPHIL (OHS): 0.07 K/UL
ABSOLUTE MONOCYTE (OHS): 1.28 K/UL (ref 0.3–1)
ABSOLUTE NEUTROPHIL COUNT (OHS): 4.54 K/UL (ref 1.8–7.7)
ANION GAP (OHS): 10 MMOL/L (ref 8–16)
BASOPHILS # BLD AUTO: 0.04 K/UL
BASOPHILS NFR BLD AUTO: 0.5 %
BUN SERPL-MCNC: 19 MG/DL (ref 8–23)
CALCIUM SERPL-MCNC: 8.5 MG/DL (ref 8.7–10.5)
CHLORIDE SERPL-SCNC: 107 MMOL/L (ref 95–110)
CO2 SERPL-SCNC: 18 MMOL/L (ref 23–29)
CREAT SERPL-MCNC: 1.1 MG/DL (ref 0.5–1.4)
ERYTHROCYTE [DISTWIDTH] IN BLOOD BY AUTOMATED COUNT: 12.5 % (ref 11.5–14.5)
GFR SERPLBLD CREATININE-BSD FMLA CKD-EPI: >60 ML/MIN/1.73/M2
GLUCOSE SERPL-MCNC: 94 MG/DL (ref 70–110)
HCT VFR BLD AUTO: 44.7 % (ref 40–54)
HGB BLD-MCNC: 13.5 GM/DL (ref 14–18)
IMM GRANULOCYTES # BLD AUTO: 0.11 K/UL (ref 0–0.04)
IMM GRANULOCYTES NFR BLD AUTO: 1.3 % (ref 0–0.5)
LYMPHOCYTES # BLD AUTO: 2.14 K/UL (ref 1–4.8)
MAGNESIUM SERPL-MCNC: 1.6 MG/DL (ref 1.6–2.6)
MCH RBC QN AUTO: 26.7 PG (ref 27–31)
MCHC RBC AUTO-ENTMCNC: 30.2 G/DL (ref 32–36)
MCV RBC AUTO: 89 FL (ref 82–98)
NUCLEATED RBC (/100WBC) (OHS): 0 /100 WBC
PHOSPHATE SERPL-MCNC: 2.4 MG/DL (ref 2.7–4.5)
PLATELET # BLD AUTO: 160 K/UL (ref 150–450)
PMV BLD AUTO: 9.8 FL (ref 9.2–12.9)
POTASSIUM SERPL-SCNC: 4.1 MMOL/L (ref 3.5–5.1)
RBC # BLD AUTO: 5.05 M/UL (ref 4.6–6.2)
RELATIVE EOSINOPHIL (OHS): 0.9 %
RELATIVE LYMPHOCYTE (OHS): 26.2 % (ref 18–48)
RELATIVE MONOCYTE (OHS): 15.6 % (ref 4–15)
RELATIVE NEUTROPHIL (OHS): 55.5 % (ref 38–73)
SODIUM SERPL-SCNC: 135 MMOL/L (ref 136–145)
TACROLIMUS BLD-MCNC: 6.6 NG/ML (ref 5–15)
WBC # BLD AUTO: 8.18 K/UL (ref 3.9–12.7)

## 2025-06-13 PROCEDURE — G0378 HOSPITAL OBSERVATION PER HR: HCPCS

## 2025-06-13 PROCEDURE — 63600175 PHARM REV CODE 636 W HCPCS

## 2025-06-13 PROCEDURE — 94799 UNLISTED PULMONARY SVC/PX: CPT

## 2025-06-13 PROCEDURE — 99223 1ST HOSP IP/OBS HIGH 75: CPT | Mod: ,,, | Performed by: INTERNAL MEDICINE

## 2025-06-13 PROCEDURE — 83735 ASSAY OF MAGNESIUM: CPT

## 2025-06-13 PROCEDURE — 25000003 PHARM REV CODE 250: Performed by: INTERNAL MEDICINE

## 2025-06-13 PROCEDURE — 36415 COLL VENOUS BLD VENIPUNCTURE: CPT

## 2025-06-13 PROCEDURE — 80048 BASIC METABOLIC PNL TOTAL CA: CPT

## 2025-06-13 PROCEDURE — 85025 COMPLETE CBC W/AUTO DIFF WBC: CPT

## 2025-06-13 PROCEDURE — 80197 ASSAY OF TACROLIMUS: CPT | Performed by: INTERNAL MEDICINE

## 2025-06-13 PROCEDURE — 25000003 PHARM REV CODE 250

## 2025-06-13 PROCEDURE — 84100 ASSAY OF PHOSPHORUS: CPT

## 2025-06-13 RX ORDER — SODIUM BICARBONATE 650 MG/1
1300 TABLET ORAL 2 TIMES DAILY
Status: DISCONTINUED | OUTPATIENT
Start: 2025-06-13 | End: 2025-06-13 | Stop reason: HOSPADM

## 2025-06-13 RX ORDER — ALBUTEROL SULFATE 90 UG/1
1-2 INHALANT RESPIRATORY (INHALATION) EVERY 4 HOURS PRN
Qty: 8.5 G | Refills: 1 | Status: SHIPPED | OUTPATIENT
Start: 2025-06-13 | End: 2026-06-13

## 2025-06-13 RX ORDER — FLUTICASONE PROPIONATE 50 MCG
2 SPRAY, SUSPENSION (ML) NASAL DAILY
Qty: 16 G | Refills: 0 | Status: SHIPPED | OUTPATIENT
Start: 2025-06-13

## 2025-06-13 RX ORDER — CETIRIZINE HYDROCHLORIDE 10 MG/1
10 TABLET ORAL DAILY
Qty: 30 TABLET | Refills: 0 | Status: SHIPPED | OUTPATIENT
Start: 2025-06-13 | End: 2025-07-13

## 2025-06-13 RX ADMIN — THERA TABS 1 TABLET: TAB at 08:06

## 2025-06-13 RX ADMIN — PREDNISONE 5 MG: 2.5 TABLET ORAL at 08:06

## 2025-06-13 RX ADMIN — DIBASIC SODIUM PHOSPHATE, MONOBASIC POTASSIUM PHOSPHATE AND MONOBASIC SODIUM PHOSPHATE 2 TABLET: 852; 155; 130 TABLET ORAL at 08:06

## 2025-06-13 RX ADMIN — SODIUM BICARBONATE 1300 MG: 650 TABLET ORAL at 09:06

## 2025-06-13 RX ADMIN — NIFEDIPINE 30 MG: 30 TABLET, FILM COATED, EXTENDED RELEASE ORAL at 08:06

## 2025-06-13 RX ADMIN — CETIRIZINE HYDROCHLORIDE 10 MG: 10 TABLET, FILM COATED ORAL at 08:06

## 2025-06-13 RX ADMIN — TACROLIMUS 2 MG: 1 CAPSULE ORAL at 08:06

## 2025-06-13 RX ADMIN — FLUTICASONE PROPIONATE 100 MCG: 50 SPRAY, METERED NASAL at 08:06

## 2025-06-13 RX ADMIN — ASPIRIN 81 MG: 81 TABLET, COATED ORAL at 08:06

## 2025-06-13 RX ADMIN — Medication 800 MG: at 08:06

## 2025-06-13 NOTE — PLAN OF CARE
Problem: Adult Inpatient Plan of Care  Goal: Plan of Care Review  Outcome: Met  Goal: Patient-Specific Goal (Individualized)  Outcome: Met  Goal: Absence of Hospital-Acquired Illness or Injury  Outcome: Met  Goal: Optimal Comfort and Wellbeing  Outcome: Met  Goal: Readiness for Transition of Care  Outcome: Met     Problem: Infection  Goal: Absence of Infection Signs and Symptoms  Outcome: Met   Pt discharged, AVS given,education completed. Pt verbalized understanding. All questions and concerns were met. Bedside medication delivered.

## 2025-06-13 NOTE — DISCHARGE SUMMARY
"[facility] - Observation [address] [place] [address] Medicine  Discharge Summary      Patient Name: Colten Monet  MRN: [id number]  DEB: [id number]  Patient Class: OP- Observation  Admission Date: 6/12/2025  Hospital Length of Stay: 0 days  Discharge Date and Time: 6/13/2025 10:19 AM  Attending Physician: Jeaneth att. providers found   Discharging Provider: Diana Dixon PA-C  Primary Care Provider: Jeaneth Primary Doctor  [facility] Medicine Team: [facility] MED  Diana Dixon PA-C  Primary Care Team: [facility] MED     HPI:   Colten Monet is a 65 y.o. M with HTN, HLD, COPD, CVA, and history of kidney transplant on chronic immunosuppression who presented to the ED with 6 day history of general malaise (feeling "blah"), dry cough, intermittent SOB, nasal congestion, and non-bloody, non-mucoid diarrhea while he was on vacation in the [facility]. He saw a physician in the [facility] who reported they did not know how to treat his condition given his history of kidney transplant. He has taken nyquil, which provides some relief. His symptoms are now improving daily, and he is no longer having diarrhea or SOB. He endorses continued mild congestion and dry cough but otherwise denies fever, chills, chest pain, palpitations, abdominal pain, N/V/D, dysuria, leg swelling or pain, confusion, HA, or syncope. He reports other family members have similar symptoms.     In the ED: Tachycardic to 130s, tachypnic to 24, Temp 100.2 F. CBC and CMP grossly unremarkable. Lactic wnl. COVID+. CXR c/w atelectasis. Pt was given tylenol and IVFs and was placed in observation for further management.     * No surgery found *      Hospital Course:   Mr. Monet was placed in observation for COVID-19 and the patient was started on supportive care with improvement in symptoms. IV remdesivir was deferred given the patient presented on day 6 of symptoms.     Pt was seen and evaluated by me this morning, reports feeling well, and is eager to discharge home. All " questions were answered. Patient acknowledged understanding of discharge instructions and feels safe to discharge home. Patient was discharged on 6/13/2025 in stable condition with PCP follow-up. Education regarding condition provided and return precautions given.     Physical Exam  Gen: in NAD, appears stated age  Neuro: AAOx3, motor, sensory, and strength grossly intact BL  HEENT: EOMI, PERRLA; no JVD appreciated  CVS: RRR, no m/r/g  Resp: lungs CTAB, no w/r/r; no belabored breathing or accessory muscle use appreciated   Abd: NTND, soft to palpation  Extrem: no UE or LE edema BL     Goals of Care Treatment Preferences:  Code Status: Full Code         Consults:   Consults (From admission, onward)          Status Ordering Provider     Inpatient consult to Kidney/Pancreas Transplant Medicine  Once        Provider:  (Not yet assigned)    Completed NANCY GARCIA            Final Active Diagnoses:    Diagnosis Date Noted POA    PRINCIPAL PROBLEM:  COVID-19 [U07.1] 06/12/2025 Yes    Dyslipidemia [E78.5] 04/08/2024 Yes    Stage 3a chronic kidney disease [N18.31] 03/21/2024 Yes    Long-term use of immunosuppressant medication [Z79.60] 03/12/2024 Not Applicable    At risk for opportunistic infections [Z91.89] 03/12/2024 Yes    Immunosuppression [D84.9] 07/07/2021 Yes    Primary hypertension [I10] 02/08/2021 Yes    IgA nephropathy [N02.B9] 12/24/2019 Yes      Problems Resolved During this Admission:       Discharged Condition: good    Disposition: Home or Self Care    Follow Up:    Patient Instructions:   No discharge procedures on file.    Significant Diagnostic Studies: N/A    Pending Diagnostic Studies:       None           Medications:  Reconciled Home Medications:      Medication List        START taking these medications      cetirizine 10 MG tablet  Commonly known as: ZYRTEC  Take 1 tablet (10 mg total) by mouth once daily.     fluticasone propionate 50 mcg/actuation nasal spray  Commonly known as: FLONASE  2 sprays  (100 mcg total) by Each Nostril route once daily.     MUCUS DM  mg per 12 hr tablet  Generic drug: dextromethorphan-guaiFENesin  mg  Take 1 tablet by mouth 2 (two) times daily. for 10 days            CHANGE how you take these medications      albuterol 90 mcg/actuation inhaler  Commonly known as: PROVENTIL/VENTOLIN HFA  Inhale 1-2 puffs into the lungs every 4 (four) hours as needed for Wheezing. Rescue  What changed: when to take this            CONTINUE taking these medications      aspirin 81 MG EC tablet  Commonly known as: ECOTRIN  Take 1 tablet (81 mg total) by mouth once daily.     atorvastatin 40 MG tablet  Commonly known as: LIPITOR  Take 1 tablet by mouth every evening.     docusate sodium 100 MG capsule  Commonly known as: COLACE  Take 1 capsule (100 mg total) by mouth 3 (three) times daily as needed for Constipation.     fluticasone furoate-vilanteroL 100-25 mcg/dose diskus inhaler  Commonly known as: BREO ELLIPTA  Inhale 1 puff into the lungs once daily. Controller     magnesium oxide 400 mg (241.3 mg magnesium) tablet  Commonly known as: MAG-OX  Take 2 tablets (800 mg total) by mouth 3 (three) times daily.     mycophenolate sodium 180 MG Tbec  Take 3 tablets (540 mg total) by mouth 2 (two) times daily.     NIFEdipine 30 MG (OSM) 24 hr tablet  Commonly known as: PROCARDIA-XL  Take 1 tablet (30 mg total) by mouth 2 (two) times a day.     ONE DAILY MULTIVITAMIN per tablet  Generic drug: multivitamin  Take 1 tablet by mouth once daily.     patiromer calcium sorbitex 8.4 gram Pwpk  Commonly known as: VELTASSA  Take 1 packet (8.4 g total) by mouth once daily.     predniSONE 5 MG tablet  Commonly known as: DELTASONE  Take 1 tablet (5 mg total) by mouth once daily.     sildenafiL 100 MG tablet  Commonly known as: VIAGRA  Take 1 tablet (100 mg total) by mouth daily as needed for Erectile Dysfunction. Dispense generic     tacrolimus 1 MG Cap  Commonly known as: PROGRAF  Take 2 capsules (2 mg total) by  mouth every 12 (twelve) hours.     SALEEM-PHOS 250 NEUTRAL 250 mg Tab  Generic drug: k phos di & mono-sod phos mono  Take 2 tablets by mouth every 12 (twelve) hours.              Indwelling Lines/Drains at time of discharge:   Lines/Drains/Airways       None                   Time spent on the discharge of patient: 36 minutes         Diana Dixon PA-C  Department of Hospital Medicine  [facility] - Observation 11H

## 2025-06-13 NOTE — DISCHARGE INSTRUCTIONS
Please isolate yourself at home. You may leave home and/or return to work once the following conditions are met:    If you have symptoms and tested positive:  More than 5 days since symptoms first appeared AND  More than 24 hours fever free without medications AND       symptoms have improved   For five days after ending isolation, masks are required.    It has been a pleasure caring for you, and I hope you continue to feel better and stronger every day! Please do not hesitate to reach out to me with any questions or concerns.     Diana Dixon PA-C  [facility]  [phone]

## 2025-06-13 NOTE — CONSULTS
"Name: Colten Monet  Medical Record Number: [id number]  Date of Service: 06/13/2025  Note By: Claudia Whyte MD    RENAL TRANSPLANT INITIAL CONSULTATION NOTE    Reason for Consultation: immunosuppression management in patient with kidney transplant      History of Present Illness:    Colten Monet is a 65 y.o. M with HTN, HLD, COPD, CVA, and history ESRD secondary to IgA nephropathy who received a living kidney transplant on 3/11/24. Baseline Cr 1.1-1.4. Home IS: pred, myfortic and prograf     Presented to the ED with 6 day history of general malaise (feeling "blah"), dry cough, intermittent SOB while he was on vacation in the [facility]. Found to have COVID   ?    ?    Past Medical History:  Past Medical History:   Diagnosis Date    Anemia     CVA (cerebral vascular accident)     GERD (gastroesophageal reflux disease)     History of COPD 04/08/2024    History of incisional hernia repair 04/15/2024    Hyperlipidemia     Hypertension     IgA nephropathy     Obesity     S/P living-donor kidney transplantation 03/12/2024       Past Surgical History:  Past Surgical History:   Procedure Laterality Date    APPENDECTOMY      CARDIAC CATHETERIZATION      [place] ~ 6-7 years ago    KIDNEY TRANSPLANT N/A 3/11/2024    Procedure: TRANSPLANT, KIDNEY;  Surgeon: Silvio Mayer MD;  Location: [facility] OR [address] [place] [address];  Service: Transplant;  Laterality: N/A;    LAPAROTOMY, EXPLORATORY N/A 4/9/2024    Procedure: LAPAROTOMY, EXPLORATORY;  Surgeon: Lorenzo Gonzales MD;  Location: [facility] OR [address] [place] [address];  Service: Transplant;  Laterality: N/A;    RENAL BIOPSY      TONSILLECTOMY           Family History:  Family History   Problem Relation Name Age of Onset    Depression Mother      Kidney disease Neg Hx      Cancer Neg Hx           Inpatient Medications:  Scheduled Meds:   aspirin  81 mg Oral Daily    atorvastatin  40 mg Oral QHS    cetirizine  10 mg Oral Daily    fluticasone furoate-vilanteroL  1 puff Inhalation Daily    fluticasone propionate  " 2 spray Each Nostril Daily    k phos di & mono-sod phos mono  500 mg Oral Q12H    magnesium oxide  800 mg Oral TID    multivitamin  1 tablet Oral Daily    NIFEdipine  30 mg Oral BID    predniSONE  5 mg Oral Daily    tacrolimus  2 mg Oral BID       Continuous Infusions:    PRN Meds:.  Current Facility-Administered Medications:     acetaminophen, 1,000 mg, Oral, Q6H PRN    albuterol-ipratropium, 3 mL, Nebulization, Q6H PRN    aluminum-magnesium hydroxide-simethicone, 30 mL, Oral, QID PRN    bisacodyL, 10 mg, Rectal, Daily PRN    dextrose 50%, 12.5 g, Intravenous, PRN    dextrose 50%, 25 g, Intravenous, PRN    docusate sodium, 100 mg, Oral, TID PRN    glucagon (human recombinant), 1 mg, Intramuscular, PRN    glucose, 16 g, Oral, PRN    glucose, 24 g, Oral, PRN    melatonin, 6 mg, Oral, Nightly PRN    naloxone, 0.02 mg, Intravenous, PRN    ondansetron, 8 mg, Oral, Q8H PRN    polyethylene glycol, 17 g, Oral, BID PRN    prochlorperazine, 5 mg, Intravenous, Q6H PRN    simethicone, 1 tablet, Oral, QID PRN    sodium chloride 0.9%, 10 mL, Intravenous, Q12H PRN    Allergies:  Codeine    Review of Systems:  Constitutional:  Positive for fatigue. Negative for chills and fever.   HENT:  Positive for congestion. Negative for trouble swallowing.    Respiratory:  Positive for cough (dry) and shortness of breath. Negative for chest tightness and wheezing.    Cardiovascular:  Negative for chest pain, palpitations and leg swelling.   Gastrointestinal:  Negative for abdominal pain, constipation, nausea and vomiting.   Genitourinary:  Negative for dysuria, frequency, hematuria and urgency.   Psychiatric/Behavioral:  Negative for agitation and confusion. The patient is not nervous/anxious.      Physical Exam:  Vitals:  Vitals:    06/13/25 0711   BP: 128/80   Pulse: 99   Resp: 20   Temp: 98 °F (36.7 °C)     Temp:  [97.9 °F (36.6 °C)-100.1 °F (37.8 °C)] 98 °F (36.7 °C)  Pulse:  [] 99  Resp:  [16-20] 20  SpO2:  [93 %-96 %] 94 %  BP:  (128-186)/(77-99) 128/80        Exam  General: No acute distress, well groomed, alert and oriented x 3  Neck: Supple, symmetrical, trachea midline, no thyromegaly, no JVD  Respiratory: Clear to auscultation bilaterally, respirations unlabored, no rales/rhonchi/wheezing  Cardiovacular: Regular rate and rhythm, S1, S2 normal, no murmurs, rubs or gallops  Gastrointestinal: Soft, non-tender, bowel sounds normal, no hepatosplenomegaly  Extremities: no lower extremity edema bilaterally, radial pulses 2+ bilaterally, symmetric      Labs:  Lab Results   Component Value Date    WBC 8.18 06/13/2025    HGB 13.5 (L) 06/13/2025    HCT 44.7 06/13/2025     (L) 06/13/2025    K 4.1 06/13/2025     06/13/2025    CO2 18 (L) 06/13/2025    BUN 19 06/13/2025    CREATININE 1.1 06/13/2025    EGFRNONAA 19.0 (A) 05/25/2021    GLUCOSE 91 07/13/2023    CALCIUM 8.5 (L) 06/13/2025    PHOS 2.4 (L) 06/13/2025    MG 1.6 06/13/2025    ALBUMIN 3.7 06/12/2025    AST 20 06/12/2025    ALT 24 06/12/2025         Assessment/Plan:  65 y.o. male with:    # History of ESRD secondary to secondary to IgA nephropathy who received a living kidney transplant on 3/11/24. Baseline   - Cr 1.1-1.4.   - Cr currently at baseline    # Immunosuppression Management:  - continue Prograf 2 mg BID, will need to monitor for toxicities; goal [id number] trough level is 4-6  - continue prednisone 5 mg daily   - Hold Myfortic for now       # HTN: BPs stable  - continue home meds  - continue to monitor    # Lytes:  - NaHCO3 1300 mg BID   - on Kphos  - on Mg Oxide    # COVID infection  - per primary team     Claudia Whyte MD  Renal Transplant Attending

## 2025-06-13 NOTE — HOSPITAL COURSE
Mr. Monet was placed in observation for COVID-19 and the patient was started on supportive care with improvement in symptoms. IV remdesivir was deferred given the patient presented on day 6 of symptoms.     Pt was seen and evaluated by me this morning, reports feeling well, and is eager to discharge home. All questions were answered. Patient acknowledged understanding of discharge instructions and feels safe to discharge home. Patient was discharged on 6/13/2025 in stable condition with PCP follow-up. Education regarding condition provided and return precautions given.     Physical Exam  Gen: in NAD, appears stated age  Neuro: AAOx3, motor, sensory, and strength grossly intact BL  HEENT: EOMI, PERRLA; no JVD appreciated  CVS: RRR, no m/r/g  Resp: lungs CTAB, no w/r/r; no belabored breathing or accessory muscle use appreciated   Abd: NTND, soft to palpation  Extrem: no UE or LE edema BL

## 2025-06-13 NOTE — NURSING
Pt is AAOx4.  Pt stable, tolerated all meds well No distress noted.    Call light in reach. Bed in low locked position.   Side rails x2. Belongings at bedside.   Pt free of fall and injuries Questions and concerns voiced and answered.    Plan of care continues.

## 2025-06-13 NOTE — PLAN OF CARE
Patient discharged prior to SW being able to complete Discharge Planning Assessment.      Rita Clark LMSW  [facility]  Ext. [phone]

## 2025-06-17 LAB
BACTERIA BLD CULT: NORMAL
BACTERIA BLD CULT: NORMAL

## 2025-06-26 DIAGNOSIS — Z94.0 KIDNEY REPLACED BY TRANSPLANT: ICD-10-CM

## 2025-06-26 DIAGNOSIS — I10 BENIGN ESSENTIAL HTN: ICD-10-CM

## 2025-07-01 DIAGNOSIS — I10 BENIGN ESSENTIAL HTN: ICD-10-CM

## 2025-07-01 DIAGNOSIS — Z94.0 KIDNEY REPLACED BY TRANSPLANT: ICD-10-CM

## 2025-07-01 RX ORDER — NIFEDIPINE 30 MG/1
30 TABLET, EXTENDED RELEASE ORAL 2 TIMES DAILY
Qty: 60 TABLET | Refills: 11 | OUTPATIENT
Start: 2025-07-01

## 2025-07-01 RX ORDER — NIFEDIPINE 30 MG/1
30 TABLET, EXTENDED RELEASE ORAL 2 TIMES DAILY
Qty: 60 TABLET | Refills: 11 | Status: SHIPPED | OUTPATIENT
Start: 2025-07-01 | End: 2026-07-01

## 2025-08-04 DIAGNOSIS — Z94.0 KIDNEY REPLACED BY TRANSPLANT: ICD-10-CM

## 2025-08-04 DIAGNOSIS — E83.42 HYPOMAGNESEMIA: ICD-10-CM

## 2025-08-05 DIAGNOSIS — E83.42 HYPOMAGNESEMIA: ICD-10-CM

## 2025-08-05 DIAGNOSIS — Z94.0 KIDNEY REPLACED BY TRANSPLANT: ICD-10-CM

## 2025-08-06 RX ORDER — DIBASIC SODIUM PHOSPHATE, MONOBASIC POTASSIUM PHOSPHATE AND MONOBASIC SODIUM PHOSPHATE 852; 155; 130 MG/1; MG/1; MG/1
2 TABLET ORAL EVERY 12 HOURS
Qty: 120 TABLET | Refills: 11 | Status: SHIPPED | OUTPATIENT
Start: 2025-08-06

## 2025-08-06 RX ORDER — LANOLIN ALCOHOL/MO/W.PET/CERES
2 CREAM (GRAM) TOPICAL 3 TIMES DAILY
Qty: 180 TABLET | Refills: 11 | Status: SHIPPED | OUTPATIENT
Start: 2025-08-06

## 2025-08-07 RX ORDER — LANOLIN ALCOHOL/MO/W.PET/CERES
800 CREAM (GRAM) TOPICAL 3 TIMES DAILY
Qty: 180 TABLET | Refills: 11 | OUTPATIENT
Start: 2025-08-07 | End: 2026-08-07

## 2025-08-08 DIAGNOSIS — Z94.0 KIDNEY REPLACED BY TRANSPLANT: Primary | ICD-10-CM

## (undated) DEVICE — DECANTER FLUID TRNSF WHITE 9IN

## (undated) DEVICE — STAPLER SKIN PROXIMATE WIDE

## (undated) DEVICE — SUT PROLENE 6-0 BV-1 30IN

## (undated) DEVICE — HEMOSTAT SURGICEL NU-KNIT 6X9

## (undated) DEVICE — SOL NS 1000CC

## (undated) DEVICE — DRAPE INCISE IOBAN 2 23X17IN

## (undated) DEVICE — PUNCH AORTIC 4.8MM

## (undated) DEVICE — INSERTS STEALTH FIBRA SIZE 1.

## (undated) DEVICE — CLIPPER BLADE MOD 4406 (CAREF)

## (undated) DEVICE — PUNCH AORTIC 4.0MM 6/CASE

## (undated) DEVICE — SPONGE LAP 18X18 PREWASHED

## (undated) DEVICE — PATCH SEALANT EVARREST 2X4

## (undated) DEVICE — PLUG CATHETER STERILE FOLEY

## (undated) DEVICE — TIP YANKAUERS BULB NO VENT

## (undated) DEVICE — ADHESIVE DERMABOND ADVANCED

## (undated) DEVICE — TRAY GENERAL SURGERY OMC

## (undated) DEVICE — SUT SILK 3-0 STRANDS 30IN

## (undated) DEVICE — HANDSET ARGON PLUS

## (undated) DEVICE — STOCKINETTE 2INX36

## (undated) DEVICE — SUT 4-0 12-18IN SILK BLACK

## (undated) DEVICE — PACK UNIVERSAL SPLIT II

## (undated) DEVICE — SUT 3-0 12-18IN SILK

## (undated) DEVICE — DRAPE SLUSH WARMER WITH DISC

## (undated) DEVICE — SYR ONLY LUER LOCK 20CC

## (undated) DEVICE — SUT 2-0 12-18IN SILK

## (undated) DEVICE — TOWEL OR XRAY WHITE 17X26IN

## (undated) DEVICE — SUT 1 36IN PDS II VIO MONO

## (undated) DEVICE — PACK KIDNEY TRANSPLANT CUSTOM

## (undated) DEVICE — FOLEY BLLN 20FR 3WAY 5CC

## (undated) DEVICE — TRAY CATH FOL SIL URIMTR 16FR

## (undated) DEVICE — DRAPE STERI INSTRUMENT 1018

## (undated) DEVICE — SUT PDS BV 6-0

## (undated) DEVICE — DRESSING ABSRBNT ISLAND 3.6X8

## (undated) DEVICE — SUT PROLENE 5-0 36IN C-1

## (undated) DEVICE — SET IRR URLGY 2LINE UNIV SPIKE

## (undated) DEVICE — SUT 4/0 27IN PDS II VIO MON

## (undated) DEVICE — ELECTRODE REM PLYHSV RETURN 9

## (undated) DEVICE — Device

## (undated) DEVICE — GOWN SURGICAL X-LARGE

## (undated) DEVICE — KIT COLLECTION E SWAB REGULAR

## (undated) DEVICE — ELECTRODE EMG 24 LEADWIRE

## (undated) DEVICE — SUT SILK 2-0 STRANDS 30IN

## (undated) DEVICE — SUT ETHILON 3-0 PS2 18 BLK